# Patient Record
Sex: FEMALE | Race: WHITE | Employment: PART TIME | URBAN - METROPOLITAN AREA
[De-identification: names, ages, dates, MRNs, and addresses within clinical notes are randomized per-mention and may not be internally consistent; named-entity substitution may affect disease eponyms.]

---

## 2017-01-01 ENCOUNTER — RADIANT APPOINTMENT (OUTPATIENT)
Dept: MAMMOGRAPHY | Facility: CLINIC | Age: 62
End: 2017-01-01
Payer: MEDICARE

## 2017-01-01 ENCOUNTER — TELEPHONE (OUTPATIENT)
Dept: PSYCHIATRY | Facility: CLINIC | Age: 62
End: 2017-01-01

## 2017-01-01 ENCOUNTER — ALLIED HEALTH/NURSE VISIT (OUTPATIENT)
Dept: DERMATOLOGY | Facility: CLINIC | Age: 62
End: 2017-01-01
Payer: MEDICARE

## 2017-01-01 ENCOUNTER — TELEPHONE (OUTPATIENT)
Dept: FAMILY MEDICINE | Facility: CLINIC | Age: 62
End: 2017-01-01

## 2017-01-01 ENCOUNTER — OFFICE VISIT (OUTPATIENT)
Dept: PSYCHIATRY | Facility: CLINIC | Age: 62
End: 2017-01-01
Payer: MEDICARE

## 2017-01-01 ENCOUNTER — APPOINTMENT (OUTPATIENT)
Dept: GENERAL RADIOLOGY | Facility: CLINIC | Age: 62
End: 2017-01-01
Attending: EMERGENCY MEDICINE
Payer: MEDICARE

## 2017-01-01 ENCOUNTER — OFFICE VISIT (OUTPATIENT)
Dept: FAMILY MEDICINE | Facility: CLINIC | Age: 62
End: 2017-01-01
Payer: MEDICARE

## 2017-01-01 ENCOUNTER — HOSPITAL ENCOUNTER (OUTPATIENT)
Facility: CLINIC | Age: 62
Setting detail: OBSERVATION
Discharge: HOME | End: 2017-12-20
Attending: EMERGENCY MEDICINE | Admitting: INTERNAL MEDICINE
Payer: MEDICARE

## 2017-01-01 ENCOUNTER — TELEPHONE (OUTPATIENT)
Dept: CARDIOLOGY | Facility: CLINIC | Age: 62
End: 2017-01-01

## 2017-01-01 ENCOUNTER — OFFICE VISIT (OUTPATIENT)
Dept: BEHAVIORAL HEALTH | Facility: CLINIC | Age: 62
End: 2017-01-01
Payer: MEDICARE

## 2017-01-01 ENCOUNTER — PRE VISIT (OUTPATIENT)
Dept: CARDIOLOGY | Facility: CLINIC | Age: 62
End: 2017-01-01

## 2017-01-01 ENCOUNTER — APPOINTMENT (OUTPATIENT)
Dept: NUCLEAR MEDICINE | Facility: CLINIC | Age: 62
End: 2017-01-01
Attending: PHYSICIAN ASSISTANT
Payer: MEDICARE

## 2017-01-01 VITALS
HEART RATE: 90 BPM | OXYGEN SATURATION: 96 % | SYSTOLIC BLOOD PRESSURE: 160 MMHG | DIASTOLIC BLOOD PRESSURE: 81 MMHG | WEIGHT: 220 LBS | BODY MASS INDEX: 34.53 KG/M2 | HEIGHT: 67 IN | TEMPERATURE: 98.2 F | RESPIRATION RATE: 16 BRPM

## 2017-01-01 VITALS
RESPIRATION RATE: 14 BRPM | OXYGEN SATURATION: 93 % | HEIGHT: 67 IN | BODY MASS INDEX: 33.74 KG/M2 | WEIGHT: 215 LBS | SYSTOLIC BLOOD PRESSURE: 118 MMHG | HEART RATE: 99 BPM | TEMPERATURE: 98.2 F | DIASTOLIC BLOOD PRESSURE: 82 MMHG

## 2017-01-01 VITALS
TEMPERATURE: 98.1 F | RESPIRATION RATE: 14 BRPM | HEART RATE: 78 BPM | DIASTOLIC BLOOD PRESSURE: 83 MMHG | WEIGHT: 220 LBS | OXYGEN SATURATION: 78 % | HEIGHT: 67 IN | BODY MASS INDEX: 34.53 KG/M2 | SYSTOLIC BLOOD PRESSURE: 116 MMHG

## 2017-01-01 VITALS
DIASTOLIC BLOOD PRESSURE: 74 MMHG | SYSTOLIC BLOOD PRESSURE: 112 MMHG | RESPIRATION RATE: 14 BRPM | WEIGHT: 200 LBS | TEMPERATURE: 98.3 F | HEIGHT: 67 IN | HEART RATE: 105 BPM | BODY MASS INDEX: 31.39 KG/M2 | OXYGEN SATURATION: 92 %

## 2017-01-01 VITALS — BODY MASS INDEX: 31.17 KG/M2 | OXYGEN SATURATION: 97 % | WEIGHT: 199 LBS | HEART RATE: 106 BPM

## 2017-01-01 VITALS
OXYGEN SATURATION: 99 % | HEART RATE: 82 BPM | TEMPERATURE: 97.9 F | BODY MASS INDEX: 31.39 KG/M2 | HEIGHT: 67 IN | DIASTOLIC BLOOD PRESSURE: 74 MMHG | SYSTOLIC BLOOD PRESSURE: 102 MMHG | WEIGHT: 200 LBS

## 2017-01-01 DIAGNOSIS — F33.3 SEVERE EPISODE OF RECURRENT MAJOR DEPRESSIVE DISORDER, WITH PSYCHOTIC FEATURES (H): ICD-10-CM

## 2017-01-01 DIAGNOSIS — F43.10 PTSD (POST-TRAUMATIC STRESS DISORDER): ICD-10-CM

## 2017-01-01 DIAGNOSIS — F41.1 GAD (GENERALIZED ANXIETY DISORDER): ICD-10-CM

## 2017-01-01 DIAGNOSIS — F31.81 BIPOLAR 2 DISORDER (H): Primary | ICD-10-CM

## 2017-01-01 DIAGNOSIS — R07.9 CHEST PAIN: ICD-10-CM

## 2017-01-01 DIAGNOSIS — M54.59 MECHANICAL LOW BACK PAIN: ICD-10-CM

## 2017-01-01 DIAGNOSIS — M51.379 DEGENERATION OF LUMBAR OR LUMBOSACRAL INTERVERTEBRAL DISC: Primary | ICD-10-CM

## 2017-01-01 DIAGNOSIS — I25.10 CAD (CORONARY ARTERY DISEASE): ICD-10-CM

## 2017-01-01 DIAGNOSIS — Z87.891 PERSONAL HISTORY OF TOBACCO USE, PRESENTING HAZARDS TO HEALTH: ICD-10-CM

## 2017-01-01 DIAGNOSIS — I10 HTN, GOAL BELOW 140/90: ICD-10-CM

## 2017-01-01 DIAGNOSIS — G89.29 OTHER CHRONIC PAIN: ICD-10-CM

## 2017-01-01 DIAGNOSIS — Z87.891 PERSONAL HISTORY OF TOBACCO USE, PRESENTING HAZARDS TO HEALTH: Primary | ICD-10-CM

## 2017-01-01 DIAGNOSIS — Z48.01 ENCOUNTER FOR CHANGE OR REMOVAL OF SURGICAL WOUND DRESSING: Primary | ICD-10-CM

## 2017-01-01 DIAGNOSIS — J43.2 CENTRILOBULAR EMPHYSEMA (H): ICD-10-CM

## 2017-01-01 DIAGNOSIS — R30.0 DYSURIA: Primary | ICD-10-CM

## 2017-01-01 DIAGNOSIS — Z12.31 VISIT FOR SCREENING MAMMOGRAM: ICD-10-CM

## 2017-01-01 DIAGNOSIS — F33.41 MAJOR DEPRESSIVE DISORDER, RECURRENT EPISODE, IN PARTIAL REMISSION (H): ICD-10-CM

## 2017-01-01 DIAGNOSIS — J96.11 CHRONIC RESPIRATORY FAILURE WITH HYPOXIA AND HYPERCAPNIA (H): ICD-10-CM

## 2017-01-01 DIAGNOSIS — F41.1 GAD (GENERALIZED ANXIETY DISORDER): Primary | ICD-10-CM

## 2017-01-01 DIAGNOSIS — F41.1 GENERALIZED ANXIETY DISORDER: ICD-10-CM

## 2017-01-01 DIAGNOSIS — M51.379 DEGENERATION OF LUMBAR OR LUMBOSACRAL INTERVERTEBRAL DISC: ICD-10-CM

## 2017-01-01 DIAGNOSIS — J96.12 CHRONIC RESPIRATORY FAILURE WITH HYPOXIA AND HYPERCAPNIA (H): ICD-10-CM

## 2017-01-01 DIAGNOSIS — F31.81 BIPOLAR 2 DISORDER (H): ICD-10-CM

## 2017-01-01 LAB
ALBUMIN SERPL-MCNC: 3.1 G/DL (ref 3.4–5)
ALP SERPL-CCNC: 103 U/L (ref 40–150)
ALT SERPL W P-5'-P-CCNC: 24 U/L (ref 0–50)
ANION GAP SERPL CALCULATED.3IONS-SCNC: 2 MMOL/L (ref 3–14)
AST SERPL W P-5'-P-CCNC: 22 U/L (ref 0–45)
BASOPHILS # BLD AUTO: 0.1 10E9/L (ref 0–0.2)
BASOPHILS NFR BLD AUTO: 0.9 %
BILIRUB SERPL-MCNC: 0.2 MG/DL (ref 0.2–1.3)
BUN SERPL-MCNC: 8 MG/DL (ref 7–30)
CALCIUM SERPL-MCNC: 8.1 MG/DL (ref 8.5–10.1)
CHLORIDE SERPL-SCNC: 99 MMOL/L (ref 94–109)
CO2 SERPL-SCNC: 39 MMOL/L (ref 20–32)
CREAT SERPL-MCNC: 0.6 MG/DL (ref 0.52–1.04)
D DIMER PPP FEU-MCNC: 0.5 UG/ML FEU (ref 0–0.5)
DIFFERENTIAL METHOD BLD: ABNORMAL
EOSINOPHIL # BLD AUTO: 0.6 10E9/L (ref 0–0.7)
EOSINOPHIL NFR BLD AUTO: 9.3 %
ERYTHROCYTE [DISTWIDTH] IN BLOOD BY AUTOMATED COUNT: 13.2 % (ref 10–15)
GFR SERPL CREATININE-BSD FRML MDRD: >90 ML/MIN/1.7M2
GLUCOSE SERPL-MCNC: 95 MG/DL (ref 70–99)
HCT VFR BLD AUTO: 37.4 % (ref 35–47)
HGB BLD-MCNC: 11.3 G/DL (ref 11.7–15.7)
IMM GRANULOCYTES # BLD: 0 10E9/L (ref 0–0.4)
IMM GRANULOCYTES NFR BLD: 0.5 %
INTERPRETATION ECG - MUSE: NORMAL
LIPASE SERPL-CCNC: 71 U/L (ref 73–393)
LYMPHOCYTES # BLD AUTO: 1.8 10E9/L (ref 0.8–5.3)
LYMPHOCYTES NFR BLD AUTO: 28.9 %
MCH RBC QN AUTO: 29.7 PG (ref 26.5–33)
MCHC RBC AUTO-ENTMCNC: 30.2 G/DL (ref 31.5–36.5)
MCV RBC AUTO: 98 FL (ref 78–100)
MONOCYTES # BLD AUTO: 0.7 10E9/L (ref 0–1.3)
MONOCYTES NFR BLD AUTO: 10.8 %
NEUTROPHILS # BLD AUTO: 3.2 10E9/L (ref 1.6–8.3)
NEUTROPHILS NFR BLD AUTO: 49.6 %
NRBC # BLD AUTO: 0 10*3/UL
NRBC BLD AUTO-RTO: 0 /100
NT-PROBNP SERPL-MCNC: 476 PG/ML (ref 0–900)
PLATELET # BLD AUTO: 188 10E9/L (ref 150–450)
POTASSIUM SERPL-SCNC: 4.4 MMOL/L (ref 3.4–5.3)
PROT SERPL-MCNC: 6.7 G/DL (ref 6.8–8.8)
RBC # BLD AUTO: 3.8 10E12/L (ref 3.8–5.2)
SODIUM SERPL-SCNC: 140 MMOL/L (ref 133–144)
TROPONIN I SERPL-MCNC: <0.015 UG/L (ref 0–0.04)
WBC # BLD AUTO: 6.4 10E9/L (ref 4–11)

## 2017-01-01 PROCEDURE — 99220 ZZC INITIAL OBSERVATION CARE,LEVL III: CPT | Performed by: INTERNAL MEDICINE

## 2017-01-01 PROCEDURE — 99495 TRANSJ CARE MGMT MOD F2F 14D: CPT | Performed by: FAMILY MEDICINE

## 2017-01-01 PROCEDURE — 99285 EMERGENCY DEPT VISIT HI MDM: CPT | Mod: 25

## 2017-01-01 PROCEDURE — 90834 PSYTX W PT 45 MINUTES: CPT | Performed by: SOCIAL WORKER

## 2017-01-01 PROCEDURE — 93018 CV STRESS TEST I&R ONLY: CPT | Performed by: INTERNAL MEDICINE

## 2017-01-01 PROCEDURE — 99214 OFFICE O/P EST MOD 30 MIN: CPT | Performed by: NURSE PRACTITIONER

## 2017-01-01 PROCEDURE — 25000132 ZZH RX MED GY IP 250 OP 250 PS 637: Mod: GY | Performed by: PHYSICIAN ASSISTANT

## 2017-01-01 PROCEDURE — 78452 HT MUSCLE IMAGE SPECT MULT: CPT | Mod: 26 | Performed by: INTERNAL MEDICINE

## 2017-01-01 PROCEDURE — 93005 ELECTROCARDIOGRAM TRACING: CPT

## 2017-01-01 PROCEDURE — 83690 ASSAY OF LIPASE: CPT | Performed by: EMERGENCY MEDICINE

## 2017-01-01 PROCEDURE — 94640 AIRWAY INHALATION TREATMENT: CPT

## 2017-01-01 PROCEDURE — 99214 OFFICE O/P EST MOD 30 MIN: CPT | Performed by: FAMILY MEDICINE

## 2017-01-01 PROCEDURE — 83880 ASSAY OF NATRIURETIC PEPTIDE: CPT | Performed by: EMERGENCY MEDICINE

## 2017-01-01 PROCEDURE — 34300033 ZZH RX 343: Performed by: INTERNAL MEDICINE

## 2017-01-01 PROCEDURE — 25000128 H RX IP 250 OP 636

## 2017-01-01 PROCEDURE — 85379 FIBRIN DEGRADATION QUANT: CPT | Performed by: EMERGENCY MEDICINE

## 2017-01-01 PROCEDURE — 93016 CV STRESS TEST SUPVJ ONLY: CPT | Performed by: INTERNAL MEDICINE

## 2017-01-01 PROCEDURE — A9270 NON-COVERED ITEM OR SERVICE: HCPCS | Mod: GY | Performed by: PHYSICIAN ASSISTANT

## 2017-01-01 PROCEDURE — 93017 CV STRESS TEST TRACING ONLY: CPT

## 2017-01-01 PROCEDURE — 40000275 ZZH STATISTIC RCP TIME EA 10 MIN

## 2017-01-01 PROCEDURE — A9270 NON-COVERED ITEM OR SERVICE: HCPCS | Mod: GY | Performed by: INTERNAL MEDICINE

## 2017-01-01 PROCEDURE — A9502 TC99M TETROFOSMIN: HCPCS | Performed by: INTERNAL MEDICINE

## 2017-01-01 PROCEDURE — 80053 COMPREHEN METABOLIC PANEL: CPT | Performed by: EMERGENCY MEDICINE

## 2017-01-01 PROCEDURE — 99207 ZZC NO CHARGE NURSE ONLY: CPT

## 2017-01-01 PROCEDURE — G0378 HOSPITAL OBSERVATION PER HR: HCPCS

## 2017-01-01 PROCEDURE — 84484 ASSAY OF TROPONIN QUANT: CPT | Mod: 91 | Performed by: INTERNAL MEDICINE

## 2017-01-01 PROCEDURE — 78452 HT MUSCLE IMAGE SPECT MULT: CPT

## 2017-01-01 PROCEDURE — 25000125 ZZHC RX 250: Performed by: EMERGENCY MEDICINE

## 2017-01-01 PROCEDURE — 85025 COMPLETE CBC W/AUTO DIFF WBC: CPT | Performed by: EMERGENCY MEDICINE

## 2017-01-01 PROCEDURE — 25000125 ZZHC RX 250

## 2017-01-01 PROCEDURE — 71010 XR CHEST PORT 1 VW: CPT

## 2017-01-01 PROCEDURE — 36415 COLL VENOUS BLD VENIPUNCTURE: CPT | Performed by: INTERNAL MEDICINE

## 2017-01-01 PROCEDURE — G0202 SCR MAMMO BI INCL CAD: HCPCS | Mod: TC

## 2017-01-01 PROCEDURE — 84484 ASSAY OF TROPONIN QUANT: CPT | Performed by: EMERGENCY MEDICINE

## 2017-01-01 PROCEDURE — 25000132 ZZH RX MED GY IP 250 OP 250 PS 637: Mod: GY | Performed by: INTERNAL MEDICINE

## 2017-01-01 RX ORDER — LAMOTRIGINE 100 MG/1
100 TABLET ORAL 2 TIMES DAILY
Qty: 60 TABLET | Refills: 1 | Status: CANCELLED | OUTPATIENT
Start: 2017-01-01

## 2017-01-01 RX ORDER — AMOXICILLIN 250 MG
2 CAPSULE ORAL 2 TIMES DAILY PRN
Status: DISCONTINUED | OUTPATIENT
Start: 2017-01-01 | End: 2017-01-01 | Stop reason: HOSPADM

## 2017-01-01 RX ORDER — DIAZEPAM 10 MG
10 TABLET ORAL EVERY 12 HOURS PRN
Qty: 60 TABLET | Refills: 1 | OUTPATIENT
Start: 2017-01-01

## 2017-01-01 RX ORDER — GABAPENTIN 300 MG/1
CAPSULE ORAL
Qty: 60 CAPSULE | Refills: 1 | Status: ON HOLD | OUTPATIENT
Start: 2017-01-01 | End: 2017-01-01

## 2017-01-01 RX ORDER — HYDROCODONE BITARTRATE AND ACETAMINOPHEN 5; 325 MG/1; MG/1
1 TABLET ORAL 2 TIMES DAILY PRN
Status: DISCONTINUED | OUTPATIENT
Start: 2017-01-01 | End: 2017-01-01 | Stop reason: HOSPADM

## 2017-01-01 RX ORDER — TIOTROPIUM BROMIDE 18 UG/1
18 CAPSULE ORAL; RESPIRATORY (INHALATION) DAILY
Status: DISCONTINUED | OUTPATIENT
Start: 2017-01-01 | End: 2017-01-01 | Stop reason: HOSPADM

## 2017-01-01 RX ORDER — ISOSORBIDE MONONITRATE 30 MG/1
30 TABLET, EXTENDED RELEASE ORAL DAILY
Status: DISCONTINUED | OUTPATIENT
Start: 2017-01-01 | End: 2017-01-01

## 2017-01-01 RX ORDER — DULOXETIN HYDROCHLORIDE 30 MG/1
120 CAPSULE, DELAYED RELEASE ORAL DAILY
Status: DISCONTINUED | OUTPATIENT
Start: 2017-01-01 | End: 2017-01-01 | Stop reason: HOSPADM

## 2017-01-01 RX ORDER — QUETIAPINE FUMARATE 50 MG/1
50 TABLET, FILM COATED ORAL DAILY
Qty: 90 TABLET | Refills: 1 | Status: SHIPPED | OUTPATIENT
Start: 2017-01-01 | End: 2017-01-01 | Stop reason: ALTCHOICE

## 2017-01-01 RX ORDER — ACETAMINOPHEN 325 MG/1
650 TABLET ORAL EVERY 4 HOURS PRN
Status: DISCONTINUED | OUTPATIENT
Start: 2017-01-01 | End: 2017-01-01 | Stop reason: HOSPADM

## 2017-01-01 RX ORDER — QUETIAPINE FUMARATE 200 MG/1
200 TABLET, FILM COATED ORAL AT BEDTIME
Status: DISCONTINUED | OUTPATIENT
Start: 2017-01-01 | End: 2017-01-01 | Stop reason: HOSPADM

## 2017-01-01 RX ORDER — REGADENOSON 0.08 MG/ML
INJECTION, SOLUTION INTRAVENOUS
Status: COMPLETED
Start: 2017-01-01 | End: 2017-01-01

## 2017-01-01 RX ORDER — HYDROXYZINE HYDROCHLORIDE 25 MG/1
25 TABLET, FILM COATED ORAL EVERY 6 HOURS PRN
Status: DISCONTINUED | OUTPATIENT
Start: 2017-01-01 | End: 2017-01-01 | Stop reason: HOSPADM

## 2017-01-01 RX ORDER — DULOXETIN HYDROCHLORIDE 60 MG/1
120 CAPSULE, DELAYED RELEASE ORAL DAILY
Qty: 180 CAPSULE | Refills: 1 | Status: ON HOLD | OUTPATIENT
Start: 2017-01-01 | End: 2018-01-01

## 2017-01-01 RX ORDER — IBUPROFEN 600 MG/1
600 TABLET, FILM COATED ORAL EVERY 6 HOURS PRN
Status: DISCONTINUED | OUTPATIENT
Start: 2017-01-01 | End: 2017-01-01 | Stop reason: HOSPADM

## 2017-01-01 RX ORDER — NITROGLYCERIN 0.4 MG/1
0.4 TABLET SUBLINGUAL EVERY 5 MIN PRN
Status: DISCONTINUED | OUTPATIENT
Start: 2017-01-01 | End: 2017-01-01 | Stop reason: HOSPADM

## 2017-01-01 RX ORDER — ISOSORBIDE MONONITRATE 30 MG/1
30 TABLET, EXTENDED RELEASE ORAL DAILY
Status: DISCONTINUED | OUTPATIENT
Start: 2017-01-01 | End: 2017-01-01 | Stop reason: HOSPADM

## 2017-01-01 RX ORDER — PROCHLORPERAZINE 25 MG
25 SUPPOSITORY, RECTAL RECTAL EVERY 12 HOURS PRN
Status: DISCONTINUED | OUTPATIENT
Start: 2017-01-01 | End: 2017-01-01 | Stop reason: HOSPADM

## 2017-01-01 RX ORDER — AMOXICILLIN 500 MG/1
500 CAPSULE ORAL 3 TIMES DAILY
Qty: 30 CAPSULE | Refills: 0 | Status: ON HOLD | OUTPATIENT
Start: 2017-01-01 | End: 2017-01-01

## 2017-01-01 RX ORDER — NALOXONE HYDROCHLORIDE 0.4 MG/ML
.1-.4 INJECTION, SOLUTION INTRAMUSCULAR; INTRAVENOUS; SUBCUTANEOUS
Status: DISCONTINUED | OUTPATIENT
Start: 2017-01-01 | End: 2017-01-01 | Stop reason: HOSPADM

## 2017-01-01 RX ORDER — HYDROCODONE BITARTRATE AND ACETAMINOPHEN 5; 325 MG/1; MG/1
TABLET ORAL
Qty: 30 TABLET | Refills: 0 | Status: SHIPPED | OUTPATIENT
Start: 2017-01-01 | End: 2017-01-01

## 2017-01-01 RX ORDER — DIAZEPAM 5 MG
10 TABLET ORAL EVERY 12 HOURS PRN
Status: DISCONTINUED | OUTPATIENT
Start: 2017-01-01 | End: 2017-01-01 | Stop reason: HOSPADM

## 2017-01-01 RX ORDER — ALBUTEROL SULFATE 0.83 MG/ML
2.5 SOLUTION RESPIRATORY (INHALATION)
Status: DISCONTINUED | OUTPATIENT
Start: 2017-01-01 | End: 2017-01-01 | Stop reason: HOSPADM

## 2017-01-01 RX ORDER — BISACODYL 10 MG
10 SUPPOSITORY, RECTAL RECTAL DAILY PRN
Status: DISCONTINUED | OUTPATIENT
Start: 2017-01-01 | End: 2017-01-01 | Stop reason: HOSPADM

## 2017-01-01 RX ORDER — ASPIRIN 81 MG/1
81 TABLET, CHEWABLE ORAL DAILY
Status: DISCONTINUED | OUTPATIENT
Start: 2017-01-01 | End: 2017-01-01 | Stop reason: HOSPADM

## 2017-01-01 RX ORDER — ATORVASTATIN CALCIUM 40 MG/1
40 TABLET, FILM COATED ORAL DAILY
Qty: 90 TABLET | Refills: 0 | Status: SHIPPED | OUTPATIENT
Start: 2017-01-01 | End: 2018-01-01

## 2017-01-01 RX ORDER — AMOXICILLIN 250 MG
1 CAPSULE ORAL 2 TIMES DAILY PRN
Status: DISCONTINUED | OUTPATIENT
Start: 2017-01-01 | End: 2017-01-01 | Stop reason: HOSPADM

## 2017-01-01 RX ORDER — ONDANSETRON 4 MG/1
4 TABLET, ORALLY DISINTEGRATING ORAL EVERY 6 HOURS PRN
Status: DISCONTINUED | OUTPATIENT
Start: 2017-01-01 | End: 2017-01-01 | Stop reason: HOSPADM

## 2017-01-01 RX ORDER — IPRATROPIUM BROMIDE AND ALBUTEROL SULFATE 2.5; .5 MG/3ML; MG/3ML
3 SOLUTION RESPIRATORY (INHALATION) ONCE
Status: COMPLETED | OUTPATIENT
Start: 2017-01-01 | End: 2017-01-01

## 2017-01-01 RX ORDER — HYDROCODONE BITARTRATE AND ACETAMINOPHEN 5; 325 MG/1; MG/1
TABLET ORAL
Qty: 30 TABLET | Refills: 0 | Status: SHIPPED | OUTPATIENT
Start: 2017-01-01 | End: 2018-01-01

## 2017-01-01 RX ORDER — ACETAMINOPHEN 650 MG/1
650 SUPPOSITORY RECTAL EVERY 4 HOURS PRN
Status: DISCONTINUED | OUTPATIENT
Start: 2017-01-01 | End: 2017-01-01 | Stop reason: HOSPADM

## 2017-01-01 RX ORDER — GABAPENTIN 300 MG/1
300 CAPSULE ORAL AT BEDTIME
Status: DISCONTINUED | OUTPATIENT
Start: 2017-01-01 | End: 2017-01-01 | Stop reason: HOSPADM

## 2017-01-01 RX ORDER — QUETIAPINE FUMARATE 200 MG/1
200 TABLET, FILM COATED ORAL AT BEDTIME
Qty: 90 TABLET | Refills: 1 | Status: SHIPPED | OUTPATIENT
Start: 2017-01-01 | End: 2018-01-01

## 2017-01-01 RX ORDER — AMINOPHYLLINE 25 MG/ML
INJECTION, SOLUTION INTRAVENOUS
Status: DISCONTINUED
Start: 2017-01-01 | End: 2017-01-01 | Stop reason: WASHOUT

## 2017-01-01 RX ORDER — PROCHLORPERAZINE MALEATE 5 MG
10 TABLET ORAL EVERY 6 HOURS PRN
Status: DISCONTINUED | OUTPATIENT
Start: 2017-01-01 | End: 2017-01-01 | Stop reason: HOSPADM

## 2017-01-01 RX ORDER — OXYCODONE HYDROCHLORIDE 5 MG/1
5-10 TABLET ORAL
Status: DISCONTINUED | OUTPATIENT
Start: 2017-01-01 | End: 2017-01-01

## 2017-01-01 RX ORDER — DIAZEPAM 10 MG
10 TABLET ORAL EVERY 12 HOURS PRN
Qty: 60 TABLET | Refills: 1 | Status: ON HOLD | OUTPATIENT
Start: 2017-01-01 | End: 2018-01-01

## 2017-01-01 RX ORDER — IPRATROPIUM BROMIDE AND ALBUTEROL SULFATE 2.5; .5 MG/3ML; MG/3ML
SOLUTION RESPIRATORY (INHALATION)
Status: COMPLETED
Start: 2017-01-01 | End: 2017-01-01

## 2017-01-01 RX ORDER — BUPROPION HYDROCHLORIDE 150 MG/1
300 TABLET ORAL EVERY MORNING
Status: DISCONTINUED | OUTPATIENT
Start: 2017-01-01 | End: 2017-01-01 | Stop reason: HOSPADM

## 2017-01-01 RX ORDER — LIDOCAINE 40 MG/G
CREAM TOPICAL
Status: DISCONTINUED | OUTPATIENT
Start: 2017-01-01 | End: 2017-01-01 | Stop reason: HOSPADM

## 2017-01-01 RX ORDER — ONDANSETRON 2 MG/ML
4 INJECTION INTRAMUSCULAR; INTRAVENOUS EVERY 6 HOURS PRN
Status: DISCONTINUED | OUTPATIENT
Start: 2017-01-01 | End: 2017-01-01 | Stop reason: HOSPADM

## 2017-01-01 RX ORDER — POLYETHYLENE GLYCOL 3350 17 G/17G
17 POWDER, FOR SOLUTION ORAL DAILY PRN
Status: DISCONTINUED | OUTPATIENT
Start: 2017-01-01 | End: 2017-01-01 | Stop reason: HOSPADM

## 2017-01-01 RX ORDER — BUPROPION HYDROCHLORIDE 300 MG/1
300 TABLET ORAL EVERY MORNING
Qty: 90 TABLET | Refills: 1 | Status: SHIPPED | OUTPATIENT
Start: 2017-01-01

## 2017-01-01 RX ORDER — ATORVASTATIN CALCIUM 40 MG/1
40 TABLET, FILM COATED ORAL DAILY
Status: DISCONTINUED | OUTPATIENT
Start: 2017-01-01 | End: 2017-01-01 | Stop reason: HOSPADM

## 2017-01-01 RX ADMIN — IPRATROPIUM BROMIDE AND ALBUTEROL SULFATE 3 ML: .5; 3 SOLUTION RESPIRATORY (INHALATION) at 09:10

## 2017-01-01 RX ADMIN — TETROFOSMIN 11 MCI.: 1.38 INJECTION, POWDER, LYOPHILIZED, FOR SOLUTION INTRAVENOUS at 07:28

## 2017-01-01 RX ADMIN — DULOXETINE HYDROCHLORIDE 120 MG: 30 CAPSULE, DELAYED RELEASE ORAL at 13:42

## 2017-01-01 RX ADMIN — ASPIRIN 81 MG 81 MG: 81 TABLET ORAL at 13:42

## 2017-01-01 RX ADMIN — IPRATROPIUM BROMIDE AND ALBUTEROL SULFATE 3 ML: .5; 3 SOLUTION RESPIRATORY (INHALATION) at 23:46

## 2017-01-01 RX ADMIN — TETROFOSMIN 32 MCI.: 1.38 INJECTION, POWDER, LYOPHILIZED, FOR SOLUTION INTRAVENOUS at 09:11

## 2017-01-01 RX ADMIN — ATORVASTATIN CALCIUM 40 MG: 40 TABLET, FILM COATED ORAL at 13:42

## 2017-01-01 RX ADMIN — REGADENOSON 0.4 MG: 0.08 INJECTION, SOLUTION INTRAVENOUS at 09:16

## 2017-01-01 ASSESSMENT — ANXIETY QUESTIONNAIRES
1. FEELING NERVOUS, ANXIOUS, OR ON EDGE: NEARLY EVERY DAY
GAD7 TOTAL SCORE: 20
3. WORRYING TOO MUCH ABOUT DIFFERENT THINGS: NEARLY EVERY DAY
7. FEELING AFRAID AS IF SOMETHING AWFUL MIGHT HAPPEN: NEARLY EVERY DAY
GAD7 TOTAL SCORE: 20
2. NOT BEING ABLE TO STOP OR CONTROL WORRYING: NEARLY EVERY DAY
7. FEELING AFRAID AS IF SOMETHING AWFUL MIGHT HAPPEN: MORE THAN HALF THE DAYS
7. FEELING AFRAID AS IF SOMETHING AWFUL MIGHT HAPPEN: MORE THAN HALF THE DAYS
3. WORRYING TOO MUCH ABOUT DIFFERENT THINGS: NEARLY EVERY DAY
5. BEING SO RESTLESS THAT IT IS HARD TO SIT STILL: NEARLY EVERY DAY
1. FEELING NERVOUS, ANXIOUS, OR ON EDGE: NEARLY EVERY DAY
6. BECOMING EASILY ANNOYED OR IRRITABLE: NEARLY EVERY DAY
5. BEING SO RESTLESS THAT IT IS HARD TO SIT STILL: NEARLY EVERY DAY
6. BECOMING EASILY ANNOYED OR IRRITABLE: NEARLY EVERY DAY
3. WORRYING TOO MUCH ABOUT DIFFERENT THINGS: NEARLY EVERY DAY
GAD7 TOTAL SCORE: 20
2. NOT BEING ABLE TO STOP OR CONTROL WORRYING: NEARLY EVERY DAY
1. FEELING NERVOUS, ANXIOUS, OR ON EDGE: NEARLY EVERY DAY
GAD7 TOTAL SCORE: 21
4. TROUBLE RELAXING: NEARLY EVERY DAY
GAD7 TOTAL SCORE: 21
7. FEELING AFRAID AS IF SOMETHING AWFUL MIGHT HAPPEN: MORE THAN HALF THE DAYS
GAD7 TOTAL SCORE: 20
GAD7 TOTAL SCORE: 20
2. NOT BEING ABLE TO STOP OR CONTROL WORRYING: NEARLY EVERY DAY
GAD7 TOTAL SCORE: 20
6. BECOMING EASILY ANNOYED OR IRRITABLE: NEARLY EVERY DAY
5. BEING SO RESTLESS THAT IT IS HARD TO SIT STILL: NEARLY EVERY DAY
IF YOU CHECKED OFF ANY PROBLEMS ON THIS QUESTIONNAIRE, HOW DIFFICULT HAVE THESE PROBLEMS MADE IT FOR YOU TO DO YOUR WORK, TAKE CARE OF THINGS AT HOME, OR GET ALONG WITH OTHER PEOPLE: VERY DIFFICULT
IF YOU CHECKED OFF ANY PROBLEMS ON THIS QUESTIONNAIRE, HOW DIFFICULT HAVE THESE PROBLEMS MADE IT FOR YOU TO DO YOUR WORK, TAKE CARE OF THINGS AT HOME, OR GET ALONG WITH OTHER PEOPLE: EXTREMELY DIFFICULT

## 2017-01-01 ASSESSMENT — ENCOUNTER SYMPTOMS
FEVER: 0
PALPITATIONS: 0
LIGHT-HEADEDNESS: 1
MYALGIAS: 1
NAUSEA: 0
VOMITING: 0
CHILLS: 0
NUMBNESS: 0

## 2017-01-01 ASSESSMENT — PATIENT HEALTH QUESTIONNAIRE - PHQ9
SUM OF ALL RESPONSES TO PHQ QUESTIONS 1-9: 23
10. IF YOU CHECKED OFF ANY PROBLEMS, HOW DIFFICULT HAVE THESE PROBLEMS MADE IT FOR YOU TO DO YOUR WORK, TAKE CARE OF THINGS AT HOME, OR GET ALONG WITH OTHER PEOPLE: VERY DIFFICULT
SUM OF ALL RESPONSES TO PHQ QUESTIONS 1-9: 23
SUM OF ALL RESPONSES TO PHQ QUESTIONS 1-9: 24
5. POOR APPETITE OR OVEREATING: NEARLY EVERY DAY
5. POOR APPETITE OR OVEREATING: NEARLY EVERY DAY

## 2017-01-05 DIAGNOSIS — G89.29 OTHER CHRONIC PAIN: ICD-10-CM

## 2017-01-05 DIAGNOSIS — M54.59 MECHANICAL LOW BACK PAIN: Primary | ICD-10-CM

## 2017-01-05 NOTE — TELEPHONE ENCOUNTER
Controlled Substance Refill Request for Norco  Problem List Complete:  Yes    Patient is followed by OKSANA ANTONIO for ongoing prescription of pain medication.  All refills should be approved by this provider, or covering partner.    Medication(s): Norco.   Maximum quantity per month: 40  Clinic visit frequency required: Q 3 months     Controlled substance agreement on file: Yes       Date(s): 7/16/15    Pain Clinic evaluation in the past: No       Date(s):  n/a       Location(s):  n/a    DIRE Total Score(s):  No flowsheet data found.    Last [facility] website verification:  6/28/16     checked in past 6 months?  No, route to RN   Ilirue    Georgie Shabazz, Pharmacy [facility]

## 2017-01-06 RX ORDER — HYDROCODONE BITARTRATE AND ACETAMINOPHEN 5; 325 MG/1; MG/1
TABLET ORAL
Qty: 30 TABLET | Refills: 0 | Status: SHIPPED | OUTPATIENT
Start: 2017-01-06 | End: 2017-01-17

## 2017-01-06 NOTE — TELEPHONE ENCOUNTER
updated, routed to  (LAUREN FERNÁNDEZ)  Last refill per  12/20/16  Estelle Haynes, RN, BSN  Message handled by Nurse Triage.

## 2017-01-09 DIAGNOSIS — I25.10 CAD (CORONARY ARTERY DISEASE): Primary | ICD-10-CM

## 2017-01-09 DIAGNOSIS — R07.9 CHEST PAIN: ICD-10-CM

## 2017-01-09 RX ORDER — ISOSORBIDE MONONITRATE 30 MG/1
30 TABLET, EXTENDED RELEASE ORAL DAILY
Qty: 90 TABLET | Refills: 3 | Status: SHIPPED | OUTPATIENT
Start: 2017-01-09 | End: 2018-01-01

## 2017-01-09 RX ORDER — ATORVASTATIN CALCIUM 40 MG/1
40 TABLET, FILM COATED ORAL DAILY
Qty: 90 TABLET | Refills: 3 | Status: SHIPPED | OUTPATIENT
Start: 2017-01-09 | End: 2017-01-01

## 2017-01-10 ENCOUNTER — OFFICE VISIT (OUTPATIENT)
Dept: PSYCHIATRY | Facility: CLINIC | Age: 62
End: 2017-01-10
Payer: MEDICARE

## 2017-01-10 ENCOUNTER — TELEPHONE (OUTPATIENT)
Dept: INTERNAL MEDICINE | Facility: CLINIC | Age: 62
End: 2017-01-10

## 2017-01-10 VITALS
WEIGHT: 190.8 LBS | BODY MASS INDEX: 29.95 KG/M2 | OXYGEN SATURATION: 94 % | SYSTOLIC BLOOD PRESSURE: 96 MMHG | DIASTOLIC BLOOD PRESSURE: 64 MMHG | HEART RATE: 112 BPM | HEIGHT: 67 IN | TEMPERATURE: 98.3 F

## 2017-01-10 DIAGNOSIS — F41.1 GAD (GENERALIZED ANXIETY DISORDER): Primary | ICD-10-CM

## 2017-01-10 DIAGNOSIS — F33.3 SEVERE EPISODE OF RECURRENT MAJOR DEPRESSIVE DISORDER, WITH PSYCHOTIC FEATURES (H): ICD-10-CM

## 2017-01-10 DIAGNOSIS — J43.1 PANLOBULAR EMPHYSEMA (H): Primary | ICD-10-CM

## 2017-01-10 PROCEDURE — 99214 OFFICE O/P EST MOD 30 MIN: CPT | Performed by: NURSE PRACTITIONER

## 2017-01-10 RX ORDER — DULOXETIN HYDROCHLORIDE 60 MG/1
60 CAPSULE, DELAYED RELEASE ORAL DAILY
Qty: 30 CAPSULE | Refills: 1 | Status: SHIPPED | OUTPATIENT
Start: 2017-01-10 | End: 2017-03-09

## 2017-01-10 RX ORDER — QUETIAPINE FUMARATE 50 MG/1
TABLET, FILM COATED ORAL
Qty: 30 TABLET | Refills: 1 | Status: SHIPPED | OUTPATIENT
Start: 2017-01-10 | End: 2017-03-09

## 2017-01-10 RX ORDER — ALBUTEROL SULFATE 90 UG/1
1-2 AEROSOL, METERED RESPIRATORY (INHALATION) EVERY 4 HOURS PRN
Qty: 1 INHALER | Refills: 5 | Status: SHIPPED | OUTPATIENT
Start: 2017-01-10 | End: 2017-09-21

## 2017-01-10 ASSESSMENT — ANXIETY QUESTIONNAIRES
7. FEELING AFRAID AS IF SOMETHING AWFUL MIGHT HAPPEN: SEVERAL DAYS
2. NOT BEING ABLE TO STOP OR CONTROL WORRYING: NEARLY EVERY DAY
1. FEELING NERVOUS, ANXIOUS, OR ON EDGE: NEARLY EVERY DAY
5. BEING SO RESTLESS THAT IT IS HARD TO SIT STILL: MORE THAN HALF THE DAYS
GAD7 TOTAL SCORE: 18
6. BECOMING EASILY ANNOYED OR IRRITABLE: NEARLY EVERY DAY
IF YOU CHECKED OFF ANY PROBLEMS ON THIS QUESTIONNAIRE, HOW DIFFICULT HAVE THESE PROBLEMS MADE IT FOR YOU TO DO YOUR WORK, TAKE CARE OF THINGS AT HOME, OR GET ALONG WITH OTHER PEOPLE: SOMEWHAT DIFFICULT
3. WORRYING TOO MUCH ABOUT DIFFERENT THINGS: NEARLY EVERY DAY

## 2017-01-10 ASSESSMENT — PATIENT HEALTH QUESTIONNAIRE - PHQ9: 5. POOR APPETITE OR OVEREATING: NEARLY EVERY DAY

## 2017-01-10 NOTE — TELEPHONE ENCOUNTER
Kathi saw Leana Patel today and wanted a refill on her inhaler. I called and left a voicemail to call us back. Routed to PCP to advise.  Anila Villegas MA

## 2017-01-10 NOTE — PROGRESS NOTES
"    Outpatient Psychiatric Progress Note    Name: Karen Alex   : 1955  Date: 1/10/2017                         Primary Care Provider: Eros Rebolledo MD  Therapist: Lupis Mcgee - last visit was 2016    PHQ-9 scores:  PHQ-9 SCORE 2016 2016 1/10/2017   Total Score 23 20 23       HUNG-7 scores:  HUNG-7 SCORE 2016 2016 1/10/2017   Total Score 18 16 18       Patient Identification:  Patient is a 61 year old year old,   White American female  who presents for return visit with me.  Patient is currently disabled. Patient attended the session alone. Patient prefers to be called: \"Kathi.\"    Interim History:    I last saw Karen Alex for outpatient psychiatry Return Visit on 10/27/2016.     During that appointment, we Continue Lamictal (lamotrigine) 150 mg by mouth in AM and PM. May increase dose up to 200 mg by mouth in AM and 200 mg by mouth in PM, but due to risk of dose dependent cognitive changes, will wait on this.    Increase Cymbalta (duloxetine) to 60 mg by mouth daily.    Continue Ativan (lorazepam) 1 mg up to 3 times per day per primary care provider .     Current medications include:   Current Outpatient Prescriptions   Medication Sig     atorvastatin (LIPITOR) 40 MG tablet Take 1 tablet (40 mg) by mouth daily     isosorbide mononitrate (IMDUR) 30 MG 24 hr tablet Take 1 tablet (30 mg) by mouth daily     HYDROcodone-acetaminophen (NORCO) 5-325 MG per tablet Take one two times daily as needed     LORazepam (ATIVAN) 1 MG tablet Take 1 tablet (1 mg) by mouth 3 times daily as needed for anxiety     DULoxetine (CYMBALTA) 30 MG EC capsule Take 2 capsules (60 mg) by mouth daily For mood and anxiety and pain. Increased dose.     ibuprofen (ADVIL,MOTRIN) 600 MG tablet Take 1 tablet (600 mg) by mouth every 6 hours as needed for moderate pain     lamoTRIgine (LAMICTAL) 150 MG tablet Take 1 tablet (150 mg) by mouth 2 times daily     fluticasone-salmeterol (ADVAIR) " "250-50 MCG/DOSE diskus inhaler Inhale 1 puff into the lungs 2 times daily     tiotropium (SPIRIVA) 18 MCG inhalation capsule Inhale 1 capsule (18 mcg) into the lungs daily     buPROPion (WELLBUTRIN XL) 300 MG 24 hr tablet Take 1 tablet (300 mg) by mouth every morning     Cholecalciferol (VITAMIN D3) 2000 UNITS CHEW Take 2,000 mg by mouth daily     Docusate Calcium (STOOL SOFTENER PO) Take by mouth daily     albuterol (2.5 MG/3ML) 0.083% nebulizer solution Take 1 vial (2.5 mg) by nebulization 4 times daily as needed     albuterol (ALBUTEROL) 108 (90 BASE) MCG/ACT inhaler Inhale 1-2 puffs into the lungs every 4 hours as needed for shortness of breath / dyspnea     NITROSTAT 0.4 MG SL tablet prn     ASPIRIN PO Take 81 mg by mouth daily     No current facility-administered medications for this visit.       The [place] Prescription Monitoring Program has been reviewed and there are no concerns about diversionary activity for controlled substances at this time.  Receives Ativan (lorazepam) regularly from Primary Care Provider.     Karen ADRIANE Abi reports mood has been: \"still down\" and denies hypomania or cheko.   Anxiety has been: \"about the same\" with no panic   Sleep has been: \"not able to sleep well\". Hard to stay asleep. Rare naps during day. Still hears noises through register and in her living room. No command hallucinations. Does not appear to be responding today.   PHQ9 and GAD7 scores were reviewed today.   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     Hypercholesterolemia      Asthma      Arthritis      generalized     Chronic back pain      low back     Pneumothorax 8/2012     left sided      COPD (chronic obstructive pulmonary disease) (H)      O2 at night     Anxiety      " "Bipolar 2 disorder (H)      Varicose veins      HTN, goal below 140/90 1/29/2015     Hyperlipidaemia       has a past medical history of Cervical dysplasia; BOBBY III (vulvar intraepithelial neoplasia III) (8/2007); Hypercholesterolemia; Asthma; Arthritis; Chronic back pain; Pneumothorax (8/2012); COPD (chronic obstructive pulmonary disease) (H); Anxiety; Bipolar 2 disorder (H); Varicose veins; HTN, goal below 140/90 (1/29/2015); and Hyperlipidaemia. She also has no past medical history of Malignant hyperthermia, Chronic infection, or Diabetes (H).    Social History:  Current Living situation:  [place], [place] with self . Feels safe at home.  Employment: disabled   Current use of drugs or alcohol: Current use of drugs or alcohol: cannabis , cocaine , heroin  and cocaine and heroin was over 30 years ago. Last used marijuana last week. Once per week. \"it relaxes me and helps with my sleep\"     Tobacco use:Quit cigarettes one month ago- positive feedback provided today.  Caffeine:  Yes  2 sodas/day  Recovery Programming Involvement: None    Vital Signs:   BP 96/64 mmHg  Pulse 112  Temp(Src) 98.3  F (36.8  C) (Oral)  Ht 5' 7\" (1.702 m)  Wt 190 lb 12.8 oz (86.546 kg)  BMI 29.88 kg/m2  SpO2 94%  LMP 12/01/2007    Labs:  Most recent laboratory results reviewed and no new labs.    Review of Systems:  10 systems (general, cardiovascular, respiratory, eyes, ENT, endocrine, GI, , M/S, neurological) were reviewed. Most pertinent finding(s) is/are: wears oxygen, chronic pain, right knee sprain few weeks ago, dizziness rarely, no falls, dry skin, no rashes. The remaining systems are all unremarkable.    Mental Status Examination:  Appearance:  awake, alert, appeared as age stated, alert, cooperative, wearing oxygen, wears makeup, pleasant  Attitude:  cooperative  Eye Contact:  limited  Mood:  anxious and sad   Affect:  mood congruent   Speech:  clear, coherent  Psychomotor Behavior:  no evidence of tardive dyskinesia, " dystonia, or tics and physical retardation  Thought Process:  logical  Associations:  no loose associations  Thought Content:  no evidence of suicidal ideation or homicidal ideation, auditory hallucinations present and visual hallucinations present, does not appear to be responding  Oriented to:  time, person, and place  Attention Span and Concentration:  adequate, but notes difficultie  Recent and Remote Memory:  limited. Not formally assessed. Ongoing short term memory concerns. No Amnesia.  Fund of Knowledge: low-normal  Gait and Station: Normal, slightly slowed, history of falls, no assistive devices used  Insight:  fair  Judgment:  fair    Suicide Risk Assessment:  Today Karen Alex reports increased psychosocial distress. In addition, there are notable risk factors for self-harm, including age, anxiety, psychosis and substance abuse. However, risk is mitigated by absence of past attempts, ability to volunteer a safety plan, history of seeking help when needed, future oriented and no access to firearms or weapons. Therefore, based on all available evidence including the factors cited above, Karen Alex does not appear to be at imminent risk for self-harm, does not meet criteria for a 72-hr hold, and therefore remains appropriate for ongoing outpatient level of care.  A thorough assessment of risk factors related to suicide and self-harm have been reviewed and are noted above. The patient convincingly denies suicidality on several occasions. There was no deceit detected, and the patient presented in a manner that was believable.     DSM5 Diagnosis:  295.70 Schizoaffective Disorder Depressive Type Rule out Bipolar Type  300.02 (F41.1) Generalized Anxiety Disorder  309.81 (F43.10) Posttraumatic Stress Disorder Without dissociative symptoms, prolonged  Cluster B personality disorder traits. Rule out Disorder.    Medical comorbidities include:   Patient Active Problem List    Diagnosis Date Noted     Health  Care Home 09/04/2012     Priority: High     Status:  Closed   Care Coordinator:  Korin Wang [phone]  See Letters for [facility] Care Plan  March 16, 2015               Major depression in partial remission (H) 01/19/2016     Priority: Medium     PTSD (post-traumatic stress disorder) 09/22/2015     Priority: Medium     Chronic pain 08/31/2015     Priority: Medium     Patient is followed by OKSANA ANTONIO for ongoing prescription of pain medication.  All refills should be approved by this provider, or covering partner.    Medication(s): Norco.   Maximum quantity per month: 40  Clinic visit frequency required: Q 3 months     Controlled substance agreement on file: Yes       Date(s): 7/16/15    Pain Clinic evaluation in the past: No       Date(s):  n/a       Location(s):  n/a    DIRE Total Score(s):  No flowsheet data found.    Last [facility] website verification: 1/7/16   [email or web address]       HTN, goal below 140/90 01/29/2015     Priority: Medium     Chest pain      Priority: Medium     Acute on chronic respiratory failure with hypoxia (H) 10/26/2014     Priority: Medium     Bipolar 2 disorder (H) 08/21/2013     Priority: Medium     Generalized anxiety disorder 08/21/2013     Priority: Medium     Patient is followed by OKSANA ANTONIO for ongoing prescription of benzodiazepines.  All refills should be approved by this provider, or covering partner.    Medication(s): Lorazepam.   Maximum quantity per month: 90  Clinic visit frequency required: Q 3 months     Controlled substance agreement on file: Yes-7/17/15  Benzodiazepine use reviewed by psychiatry:  No    Last [employer] website verification:  done on 6/24/16   [email or web address]           Psychosis 12/07/2011     Priority: Medium     Problem list name updated by automated process. Provider to review       COPD (chronic obstructive pulmonary disease) (H) 09/01/2011     Priority: Medium     Hyperlipidemia LDL goal <130 10/31/2010      Priority: Medium     Anxiety 12/30/2009     Priority: Medium     Degeneration of lumbar or lumbosacral intervertebral disc 02/23/2005     Priority: Medium     Personal history of tobacco use, presenting hazards to health 02/23/2005     Priority: Medium       Psychosocial & Contextual Factors:  Financial Difficulties and Relationship Difficulties and Parent/Child Relationship Difficulties    Assessment:  Karen Alex reports limited improvement in mood and anxiety. Psychosis and insomnia continues to be a problem. She is taking Ativan (lorazepam) daily, scheduled 3 times per day and does not find it helpful. Will likely need to taper her off of these medications.  Medication side effects and alternatives were reviewed. Controlled Substance Agreement should remain in place with Primary Care Provider. If she continues to use Marijuana, I question continuing to prescribe benzodiazepines.  Risks and benefits of medication reviewed today. The Black Box Warning for use of benzodiazepines was reviewed today. Patients taking opioids with benzodiazepines, other CNS depressant medicines, or alcohol, and caregivers of these patients, should seek medical attention immediately if they or someone they are caring for experiences symptoms of unusual dizziness or lightheadedness, extreme sleepiness, slowed or difficult breathing, or unresponsiveness.  May increase dose of Lamictal (lamotrigine) up to 200 mg by mouth in AM and 200 mg by mouth in PM, but due to risk of dose dependent cognitive changes, will wait on this. Strongly recommend continued work with therapist due to ongoing psychosocial stressors that affect her mood.     Treatment Plan:    Continue Lamictal (lamotrigine) 150 mg by mouth in AM and PM.    Continue Cymbalta (duloxetine) to 60 mg by mouth daily.    Continue Wellbutrin (buproprion)  mg by mouth daily in AM.     Start Seroquel (quetiapine) 25 mg by mouth daily at bedtime. After 1-2 weeks, may increase  to 50 mg by mouth at bedtime. For insomnia, anxiety, mood, and psychosis.     Continue Ativan (lorazepam) 1 mg up to 3 times per day per primary care provider.    Continue all other medications as reviewed per electronic medical record today.     All questions addressed. Education and counseling completed regarding risks and benefits of medications and psychotherapy options.    Safety plan was reviewed. To the Emergency Department as needed or call after hours crisis line at [phone] or [phone].     Continue individual/group therapy as planned with Lupis Mcgee.     Schedule an appointment with me in 4-6 weeks or sooner as needed. Call [facility] at [phone] to schedule.    Follow up with primary care provider as planned or for acute medical concerns.    Call the psychiatric nurse line with medication questions or concerns at [phone].    My Practice Policy was reviewed and signed: YES     MyChart may be used to communicate with your provider, but this is not intended to be used for emergencies.    Administrative Billing:   Time spent with patient was 30 minutes and greater than 50% of time or 20 minutes was spent in counseling and coordination of care.    Signed:   Leana Patel, PhD, APRN, CNP   Psychiatry

## 2017-01-10 NOTE — PATIENT INSTRUCTIONS
Treatment Plan:    Continue Lamictal (lamotrigine) 150 mg by mouth in AM and PM.    Continue Cymbalta (duloxetine) to 60 mg by mouth daily.    Continue Wellbutrin (buproprion)  mg by mouth daily in AM.     Start Seroquel (quetiapine) 25 mg by mouth daily at bedtime.    Continue Ativan (lorazepam) 1 mg up to 3 times per day per primary care provider.    Continue all other medications as reviewed per electronic medical record today.     All questions addressed. Education and counseling completed regarding risks and benefits of medications and psychotherapy options.    Safety plan was reviewed. To the Emergency Department as needed or call after hours crisis line at [phone] or [phone].     Continue individual/group therapy as planned with Lupis Mcgee.     Schedule an appointment with me in 4-6 weeks or sooner as needed. Call [facility] at [phone] to schedule.    Follow up with primary care provider as planned or for acute medical concerns.    Call the psychiatric nurse line with medication questions or concerns at [phone].    My Practice Policy was reviewed and signed: YES     MyChart may be used to communicate with your provider, but this is not intended to be used for emergencies.

## 2017-01-10 NOTE — MR AVS SNAPSHOT
After Visit Summary   1/10/2017    Karen Alex    MRN: [id number]           Patient Information     Date Of Birth          1955        Visit Information        Provider Department      1/10/2017 12:45 PM Leana Patel NP [facility]        Today's Diagnoses     HUNG (generalized anxiety disorder)    -  1     Major depressive disorder, recurrent episode, in partial remission (H)           Care Instructions    Treatment Plan:    Continue Lamictal (lamotrigine) 150 mg by mouth in AM and PM.    Continue Cymbalta (duloxetine) to 60 mg by mouth daily.    Continue Wellbutrin (buproprion)  mg by mouth daily in AM.     Start Seroquel (quetiapine) 25 mg by mouth daily at bedtime.    Continue Ativan (lorazepam) 1 mg up to 3 times per day per primary care provider.    Continue all other medications as reviewed per electronic medical record today.     All questions addressed. Education and counseling completed regarding risks and benefits of medications and psychotherapy options.    Safety plan was reviewed. To the Emergency Department as needed or call after hours crisis line at [phone] or [phone].     Continue individual/group therapy as planned with Lupis Mcgee.     Schedule an appointment with me in 4-6 weeks or sooner as needed. Call [facility] at [phone] to schedule.    Follow up with primary care provider as planned or for acute medical concerns.    Call the psychiatric nurse line with medication questions or concerns at [phone].    My Practice Policy was reviewed and signed: YES     MyChart may be used to communicate with your provider, but this is not intended to be used for emergencies.        Follow-ups after your visit        Your next 10 appointments already scheduled     Jan 25, 2017  1:30 PM   Return Visit with MAURY Lemos   [facility] ([facility])    [address] [id number] [phone]              Who to contact     If you have questions or need follow up information about today's clinic visit or your schedule please contact [facility] directly at [phone].  Normal or non-critical lab and imaging results will be communicated to you by MyChart, letter or phone within 4 business days after the clinic has received the results. If you do not hear from us within 7 days, please contact the clinic through [employer] or phone. If you have a critical or abnormal lab result, we will notify you by phone as soon as possible.  Submit refill requests through [employer] or call your pharmacy and they will forward the refill request to us. Please allow 3 business days for your refill to be completed.          Additional Information About Your Visit        [employer] Information     [employer] gives you secure access to your electronic health record. If you see a primary care provider, you can also send messages to your care team and make appointments. If you have questions, please call your primary care clinic.  If you do not have a primary care provider, please call [phone] and they will assist you.        Care EveryWhere ID     This is your Care EveryWhere ID. This could be used by other organizations to access your [facility] medical records  [id number]        Your Vitals Were     Pulse Temperature Height BMI (Body Mass Index) Pulse Oximetry Last Period    112 98.3  F (36.8  C) (Oral) 5' 7\" (1.702 m) 29.88 kg/m2 94% 12/01/2007       Blood Pressure from Last 3 Encounters:   01/10/17 96/64   11/18/16 118/88   11/04/16 106/70    Weight from Last 3 Encounters:   01/10/17 190 lb 12.8 oz (86.546 kg)   11/18/16 181 lb (82.101 kg)   11/04/16 181 lb 12.8 oz (82.464 kg)              Today, you had the following     No orders found for display         Today's Medication Changes          These changes are accurate as of: 1/10/17  1:10 PM.  If you " have any questions, ask your nurse or doctor.               Start taking these medicines.        Dose/Directions    QUEtiapine 50 MG tablet   Commonly known as:  SEROQUEL   Used for:  HUNG (generalized anxiety disorder)   Started by:  Leana Patel NP        Start 1/2 tablet by mouth at bedtime for 1-2 week. Then may increase to 1 tablet by mouth at bedtime.   Quantity:  30 tablet   Refills:  1         These medicines have changed or have updated prescriptions.        Dose/Directions    DULoxetine 60 MG EC capsule   Commonly known as:  CYMBALTA   This may have changed:    - medication strength  - additional instructions   Used for:  Major depressive disorder, recurrent episode, in partial remission (H)   Changed by:  Leana Patel NP        Dose:  60 mg   Take 1 capsule (60 mg) by mouth daily For mood and anxiety and pain.   Quantity:  30 capsule   Refills:  1            Where to get your medicines      These medications were sent to [facility] [address] [address]     Phone:  [phone]    - DULoxetine 60 MG EC capsule  - QUEtiapine 50 MG tablet             Primary Care Provider Office Phone # Fax #    Eros Rebolledo [phone]       [address] [address]        Thank you!     Thank you for choosing [facility]  for your care. Our goal is always to provide you with excellent care. Hearing back from our patients is one way we can continue to improve our services. Please take a few minutes to complete the written survey that you may receive in the mail after your visit with us. Thank you!             Your Updated Medication List - Protect others around you: Learn how to safely use, store and throw away your medicines at www.disposemymeds.org.          This list is accurate as of: 1/10/17  1:10 PM.  Always use your most recent med list.                    Brand Name Dispense Instructions for use    * albuterol 108 (90 BASE) MCG/ACT Inhaler    albuterol    1 Inhaler    Inhale 1-2 puffs into the lungs every 4 hours as needed for shortness of breath / dyspnea       * albuterol (2.5 MG/3ML) 0.083% neb solution     120 vial    Take 1 vial (2.5 mg) by nebulization 4 times daily as needed       ASPIRIN PO      Take 81 mg by mouth daily       atorvastatin 40 MG tablet    LIPITOR    90 tablet    Take 1 tablet (40 mg) by mouth daily       buPROPion 300 MG 24 hr tablet    WELLBUTRIN XL    30 tablet    Take 1 tablet (300 mg) by mouth every morning       DULoxetine 60 MG EC capsule    CYMBALTA    30 capsule    Take 1 capsule (60 mg) by mouth daily For mood and anxiety and pain.       fluticasone-salmeterol 250-50 MCG/DOSE diskus inhaler    ADVAIR    3 Inhaler    Inhale 1 puff into the lungs 2 times daily       HYDROcodone-acetaminophen 5-325 MG per tablet    NORCO    30 tablet    Take one two times daily as needed       ibuprofen 600 MG tablet    ADVIL/MOTRIN    20 tablet    Take 1 tablet (600 mg) by mouth every 6 hours as needed for moderate pain       isosorbide mononitrate 30 MG 24 hr tablet    IMDUR    90 tablet    Take 1 tablet (30 mg) by mouth daily       lamoTRIgine 150 MG tablet    LAMICTAL    60 tablet    Take 1 tablet (150 mg) by mouth 2 times daily       LORazepam 1 MG tablet    ATIVAN    90 tablet    Take 1 tablet (1 mg) by mouth 3 times daily as needed for anxiety       NITROSTAT 0.4 MG sublingual tablet   Generic drug:  nitroglycerin      prn       QUEtiapine 50 MG tablet    SEROQUEL    30 tablet    Start 1/2 tablet by mouth at bedtime for 1-2 week. Then may increase to 1 tablet by mouth at bedtime.       STOOL SOFTENER PO      Take by mouth daily       tiotropium 18 MCG capsule    SPIRIVA    90 capsule    Inhale 1 capsule (18 mcg) into the lungs daily       Vitamin D3 2000 UNITS Chew      Take 2,000 mg by mouth daily       * Notice:  This list has 2  medication(s) that are the same as other medications prescribed for you. Read the directions carefully, and ask your doctor or other care provider to review them with you.

## 2017-01-10 NOTE — NURSING NOTE
"Chief Complaint   Patient presents with     RECHECK     3 months       Initial BP 96/64 mmHg  Pulse 112  Temp(Src) 98.3  F (36.8  C) (Oral)  Ht 5' 7\" (1.702 m)  Wt 190 lb 12.8 oz (86.546 kg)  BMI 29.88 kg/m2  SpO2 94%  LMP 12/01/2007 Estimated body mass index is 29.88 kg/(m^2) as calculated from the following:    Height as of this encounter: 5' 7\" (1.702 m).    Weight as of this encounter: 190 lb 12.8 oz (86.546 kg).  BP completed using cuff size: umm Villegas MA    "

## 2017-01-11 ASSESSMENT — ANXIETY QUESTIONNAIRES: GAD7 TOTAL SCORE: 18

## 2017-01-11 ASSESSMENT — PATIENT HEALTH QUESTIONNAIRE - PHQ9: SUM OF ALL RESPONSES TO PHQ QUESTIONS 1-9: 23

## 2017-01-17 DIAGNOSIS — J44.9 CHRONIC OBSTRUCTIVE PULMONARY DISEASE, UNSPECIFIED COPD TYPE (H): Primary | ICD-10-CM

## 2017-01-17 DIAGNOSIS — M54.59 MECHANICAL LOW BACK PAIN: ICD-10-CM

## 2017-01-17 DIAGNOSIS — F41.9 ANXIETY: Primary | ICD-10-CM

## 2017-01-17 RX ORDER — LORAZEPAM 1 MG/1
1 TABLET ORAL 3 TIMES DAILY PRN
Qty: 90 TABLET | Refills: 0 | Status: SHIPPED | OUTPATIENT
Start: 2017-01-17 | End: 2017-02-15

## 2017-01-17 RX ORDER — HYDROCODONE BITARTRATE AND ACETAMINOPHEN 5; 325 MG/1; MG/1
TABLET ORAL
Qty: 30 TABLET | Refills: 0 | Status: SHIPPED | OUTPATIENT
Start: 2017-01-17 | End: 2017-02-15

## 2017-01-17 NOTE — TELEPHONE ENCOUNTER
Controlled Substance Refill Request for Norco  Problem List Complete:  Yes    Patient is followed by OKSANA ANTONIO for ongoing prescription of pain medication.  All refills should be approved by this provider, or covering partner.    Medication(s): Norco.   Maximum quantity per month: 40  Clinic visit frequency required: Q 3 months     Controlled substance agreement on file: Yes       Date(s): 7/16/15    Pain Clinic evaluation in the past: No       Date(s):  n/a       Location(s):  n/a    DIRE Total Score(s):  No flowsheet data found.    Last [facility] website verification: 1/7/16     checked in past 6 months?  Yes 1/7/16     Last office visit: 10/21/16 pt due to be seen every 3 months so pt is due to be seen soon    Georgie Shabazz, Pharmacy [facility]

## 2017-01-17 NOTE — TELEPHONE ENCOUNTER
Controlled Substance Refill Request for Lorazepam  Problem List Complete:  Yes    Patient is followed by OKSANA ANTONIO for ongoing prescription of benzodiazepines.  All refills should be approved by this provider, or covering partner.    Medication(s): Lorazepam.   Maximum quantity per month: 90  Clinic visit frequency required: Q 3 months     Controlled substance agreement on file: Yes-7/17/15  Benzodiazepine use reviewed by psychiatry:  No    Last [facility] website verification:  done on 6/24/16     checked in past 6 months?  Yes 1/6/17     Last office visit: 10/21/16    Georgie Shabazz, Pharmacy [facility]

## 2017-01-25 ENCOUNTER — OFFICE VISIT (OUTPATIENT)
Dept: BEHAVIORAL HEALTH | Facility: CLINIC | Age: 62
End: 2017-01-25
Payer: MEDICARE

## 2017-01-25 DIAGNOSIS — F33.41 RECURRENT MAJOR DEPRESSIVE DISORDER, IN PARTIAL REMISSION (H): Primary | ICD-10-CM

## 2017-01-25 DIAGNOSIS — F43.10 PTSD (POST-TRAUMATIC STRESS DISORDER): ICD-10-CM

## 2017-01-25 DIAGNOSIS — F41.1 GAD (GENERALIZED ANXIETY DISORDER): ICD-10-CM

## 2017-01-25 PROCEDURE — 90834 PSYTX W PT 45 MINUTES: CPT | Performed by: SOCIAL WORKER

## 2017-01-25 ASSESSMENT — ANXIETY QUESTIONNAIRES
7. FEELING AFRAID AS IF SOMETHING AWFUL MIGHT HAPPEN: NEARLY EVERY DAY
1. FEELING NERVOUS, ANXIOUS, OR ON EDGE: NEARLY EVERY DAY
GAD7 TOTAL SCORE: 21
6. BECOMING EASILY ANNOYED OR IRRITABLE: NEARLY EVERY DAY
IF YOU CHECKED OFF ANY PROBLEMS ON THIS QUESTIONNAIRE, HOW DIFFICULT HAVE THESE PROBLEMS MADE IT FOR YOU TO DO YOUR WORK, TAKE CARE OF THINGS AT HOME, OR GET ALONG WITH OTHER PEOPLE: SOMEWHAT DIFFICULT
5. BEING SO RESTLESS THAT IT IS HARD TO SIT STILL: NEARLY EVERY DAY
2. NOT BEING ABLE TO STOP OR CONTROL WORRYING: NEARLY EVERY DAY
3. WORRYING TOO MUCH ABOUT DIFFERENT THINGS: NEARLY EVERY DAY

## 2017-01-25 ASSESSMENT — PATIENT HEALTH QUESTIONNAIRE - PHQ9: 5. POOR APPETITE OR OVEREATING: NEARLY EVERY DAY

## 2017-01-25 NOTE — PROGRESS NOTES
Progress Note    Client Name: Karen Alex  Date: 1/25/2017                                  Service Type: Individual      Session Start Time:  1:30  Session End Time: 2:30      Session Length: 45 - 50     Session #: 17     Attendees: Client attended alone    Treatment Plan Last Completed Date: 10/4/16  PHQ-9 / HUNG-7 Last Completed Date: 1/25/2017                 DATA      Progress Since Last Session (Related to Symptoms / Goals / Homework):   Symptoms: Stable    Homework: Achieved / completed to satisfaction- did talk to her bf, laying down limits      Episode of Care Goals: Minimal progress - ACTION (Actively working towards change); Intervened by reinforcing change plan / affirming steps taken     Current / Ongoing Stressors and Concerns:   Client's mood is more depressed.  Worried about her son, he is leaving for [place] next week.  It has increased her symptoms and memories of past. She has been focusing on her ex-husbands, the lack of a relationship with her older son, and more irritability with her partner.  We discussed that this increase in symptoms and presentation was similar to what happened when she thought her son was going to [facility].  Encouraged her to use her skills to remain in th present and acknowledge her sadness about him leaving.  She game planned what she will try to work on for social contact while he is gone.      Treatment Objective(s) Addressed in This Session:   focus on being socially active and not isolating at home due to medical condition   Using acceptance skills to understand her level of depression and what she has control over       Intervention:   CBT: Encourage client to focus on present and goals for future        ASSESSMENT: Current Emotional / Mental Status (status of significant symptoms):   Risk status (Self / Other harm or suicidal ideation)  Client denies a history of suicidal ideation, suicide attempts, self-injurious  behavior, homicidal ideation, homicidal behavior and and other safety concerns   Client denies current fears or concerns for personal safety.   Client reports the following current or recent suicidal ideation or behaviors: passive thoughts of not wanting to live.   Client denies current or recent homicidal ideation or behaviors.   Client denies current or recent self injurious behavior or ideation.   Client denies other safety concerns.   A safety and risk management plan has not been developed at this time, however client was given the after-hours number should there be a change in any of these risk factors.     Appearance:   Appropriate    Eye Contact:   Fair    Psychomotor Behavior: Normal    Attitude:   Cooperative    Orientation:   All   Speech    Rate / Production: Normal     Volume:  Soft    Mood:    Anxious  Depressed    Affect:    Labile    Thought Content:  Hallucinations  Notices spiders and auditory hallucinations of a party going on    Thought Form:  Ruminations about past and her losses    Insight:    Fair      Medication Review:   No changes to current psychiatric medication(s)     Medication Compliance:   Yes     Changes in Health Issues:   Yes: Respiratory Disease, Associated Psychological Distress     Chemical Use Review:   Substance Use: Chemical use reviewed, no active concerns identified      Tobacco Use: No current tobacco use.         Collateral Reports Completed:   Not Applicable    PLAN: (Client Tasks / Therapist Tasks / Other)  1. Focus on recognizing her feelings of sadness and use skills to work with them.     Lupis Mcgee, [facility]     __________________________________________________________________________________________________                                                           Treatment Plan    Client's Name: Karen Alex  YOB: 1955    Date: 9/22/2015       DSM-V Diagnoses: 296.32 - Major Depressive Disorder, Recurrent, Moderate    300.02 -  Generalized Anxiety Disorder    309.81 - Posttraumatic Stress Disorder By History; 799.9 - Deferred Diagnosis   WHODAS:  35    Referral / Collaboration:  Referral to another professional/service is not indicated at this time..    Anticipated number of session or this episode of care: 12      MeasurableTreatment Goal(s) related to diagnosis / functional impairment(s)  Goal 1: Client will have stability with her mental health as evidenced by PHQ-9 and HUNG-7 scores as well as self-report.      I will know I've met my goal when I start feeling better about myself.      Objective #A (Client Action)    Status: Continued - Date(s): 10/4/2016         Client will Decrease frequency and intensity of feeling down, depressed, hopeless.    Intervention(s)  Therapist will assign homework by learning to build awareness of her triggers which activate her mental health symptoms.  Therapist will introduce and have client implement strategies to address triggers.    Objective #B  Client will Identify negative self-talk and behaviors: challenge core beliefs, myths, and actions.  Status: Continued - Date(s): 10/4/2016        Intervention(s)  Therapist will continue to help client identify and reframe negative perceptions of self.  Work on ways to increase self-esteem.    Objective #C  Client will Feel less tired and more energy during the day .  Status: Completed - Date: 3/2/16     Intervention(s)  Therapist will assign homework 1.  Complete at least one household activity daily; 2.  Focus on getting out of the house at least three times weekly; 3.  Look into entering social groups.      Client has reviewed and agreed to the above plan.      Lupis Mcgee, [facility]  September 22, 2015

## 2017-01-26 ASSESSMENT — ANXIETY QUESTIONNAIRES: GAD7 TOTAL SCORE: 21

## 2017-01-26 ASSESSMENT — PATIENT HEALTH QUESTIONNAIRE - PHQ9: SUM OF ALL RESPONSES TO PHQ QUESTIONS 1-9: 22

## 2017-02-01 ENCOUNTER — TELEPHONE (OUTPATIENT)
Dept: FAMILY MEDICINE | Facility: CLINIC | Age: 62
End: 2017-02-01

## 2017-02-01 NOTE — TELEPHONE ENCOUNTER
Fax from [facility] review, pt late on filling escitalopram and bupropion, no action needed  Estelle Haynes RN, BSN  Message handled by Nurse Triage.

## 2017-02-14 ENCOUNTER — OFFICE VISIT (OUTPATIENT)
Dept: BEHAVIORAL HEALTH | Facility: CLINIC | Age: 62
End: 2017-02-14
Payer: MEDICARE

## 2017-02-14 DIAGNOSIS — F41.1 GAD (GENERALIZED ANXIETY DISORDER): ICD-10-CM

## 2017-02-14 DIAGNOSIS — F33.3 SEVERE EPISODE OF RECURRENT MAJOR DEPRESSIVE DISORDER, WITH PSYCHOTIC FEATURES (H): Primary | ICD-10-CM

## 2017-02-14 DIAGNOSIS — F43.10 PTSD (POST-TRAUMATIC STRESS DISORDER): ICD-10-CM

## 2017-02-14 PROCEDURE — 90834 PSYTX W PT 45 MINUTES: CPT | Performed by: SOCIAL WORKER

## 2017-02-14 ASSESSMENT — ANXIETY QUESTIONNAIRES
2. NOT BEING ABLE TO STOP OR CONTROL WORRYING: NEARLY EVERY DAY
5. BEING SO RESTLESS THAT IT IS HARD TO SIT STILL: NEARLY EVERY DAY
6. BECOMING EASILY ANNOYED OR IRRITABLE: NEARLY EVERY DAY
IF YOU CHECKED OFF ANY PROBLEMS ON THIS QUESTIONNAIRE, HOW DIFFICULT HAVE THESE PROBLEMS MADE IT FOR YOU TO DO YOUR WORK, TAKE CARE OF THINGS AT HOME, OR GET ALONG WITH OTHER PEOPLE: VERY DIFFICULT
1. FEELING NERVOUS, ANXIOUS, OR ON EDGE: NEARLY EVERY DAY
GAD7 TOTAL SCORE: 20
3. WORRYING TOO MUCH ABOUT DIFFERENT THINGS: NEARLY EVERY DAY
7. FEELING AFRAID AS IF SOMETHING AWFUL MIGHT HAPPEN: MORE THAN HALF THE DAYS

## 2017-02-14 ASSESSMENT — PATIENT HEALTH QUESTIONNAIRE - PHQ9: 5. POOR APPETITE OR OVEREATING: NEARLY EVERY DAY

## 2017-02-14 NOTE — MR AVS SNAPSHOT
MRN:[id number]                      After Visit Summary   2/14/2017    Karen Alex    MRN: [id number]           Visit Information        Provider Department      2/14/2017 10:30 AM Lupis Mcgee LICSW [facility]      Your next 10 appointments already scheduled     Mar 14, 2017 10:30 AM CDT   Return Visit with MAURY Lemos   [facility] ([facility]    [address] [place] [id number] [phone]              MyChart Information     Fingerprint gives you secure access to your electronic health record. If you see a primary care provider, you can also send messages to your care team and make appointments. If you have questions, please call your primary care clinic.  If you do not have a primary care provider, please call [phone] and they will assist you.        Care EveryWhere ID     This is your Care EveryWhere ID. This could be used by other organizations to access your [facility] medical records  [id number]

## 2017-02-14 NOTE — PROGRESS NOTES
Progress Note    Client Name: Karen Alex  Date: 2/14/2017                              Service Type: Individual      Session Start Time:  10:30  Session End Time: 11:15      Session Length: 45 - 50     Session #: 18     Attendees: Client attended alone    Treatment Plan Last Completed Date: 2/14/17  PHQ-9 / HUNG-7 Last Completed Date: 2/14/2017                  DATA      Progress Since Last Session (Related to Symptoms / Goals / Homework):   Symptoms: Stable    Homework: Achieved / completed to satisfaction- managing with out her son present      Episode of Care Goals: Minimal progress - ACTION (Actively working towards change); Intervened by reinforcing change plan / affirming steps taken     Current / Ongoing Stressors and Concerns:   Client's mood is stable.  Son left for [place]- has been gone for last two weeks.  She states that she has been talking to him everyday.  She is hoping to have him home by end of the week.  Processed through stressors with boyfriend, health, and her family.        Treatment Objective(s) Addressed in This Session:   focus on being socially active and not isolating at home due to medical condition   Using acceptance skills to understand her level of depression and what she has control over       Intervention:   CBT: Encourage client to focus on present and goals for future        ASSESSMENT: Current Emotional / Mental Status (status of significant symptoms):   Risk status (Self / Other harm or suicidal ideation)  Client denies a history of suicidal ideation, suicide attempts, self-injurious behavior, homicidal ideation, homicidal behavior and and other safety concerns   Client denies current fears or concerns for personal safety.   Client reports the following current or recent suicidal ideation or behaviors: passive thoughts of not wanting to live.  Denies any plan or intent.  Voices are not directing her.   Client denies current or  recent homicidal ideation or behaviors.   Client denies current or recent self injurious behavior or ideation.   Client denies other safety concerns.   A safety and risk management plan has not been developed at this time, however client was given the after-hours number should there be a change in any of these risk factors.     Appearance:   Appropriate    Eye Contact:   Fair    Psychomotor Behavior: Normal    Attitude:   Cooperative    Orientation:   All   Speech    Rate / Production: Normal     Volume:  Soft    Mood:    Anxious  Depressed    Affect:    Labile    Thought Content:  Hallucinations  notices spiders and orbs    Thought Form:  Ruminations about past and her losses    Insight:    Fair      Medication Review:   No changes to current psychiatric medication(s)     Medication Compliance:   Yes     Changes in Health Issues:   Yes: Respiratory Disease, Associated Psychological Distress     Chemical Use Review:   Substance Use: Chemical use reviewed, no active concerns identified      Tobacco Use: No current tobacco use.         Collateral Reports Completed:   Not Applicable    PLAN: (Client Tasks / Therapist Tasks / Other)  1. Recognize when the past is intruding and refocus back on present.     Lupis Mcgee, [facility]     __________________________________________________________________________________________________                                                           Treatment Plan    Client's Name: Karen Alex  YOB: 1955    Date: 9/22/2015       DSM-V Diagnoses: 296.32 - Major Depressive Disorder, Recurrent, Moderate    300.02 - Generalized Anxiety Disorder    309.81 - Posttraumatic Stress Disorder By History; 799.9 - Deferred Diagnosis   WHODAS:  35    Referral / Collaboration:  Referral to another professional/service is not indicated at this time..    Anticipated number of session or this episode of care: 12      MeasurableTreatment Goal(s) related to diagnosis /  functional impairment(s)  Goal 1: Client will have stability with her mental health as evidenced by PHQ-9 and HUNG-7 scores as well as self-report.      I will know I've met my goal when I start feeling better about myself.      Objective #A (Client Action)    Status: Continued - Date(s): 2/14/2017          Client will Decrease frequency and intensity of feeling down, depressed, hopeless.    Intervention(s)  Therapist will assign homework by learning to build awareness of her triggers which activate her mental health symptoms.  Therapist will introduce and have client implement strategies to address triggers.    Objective #B  Client will Identify negative self-talk and behaviors: challenge core beliefs, myths, and actions.  Status: Continued - Date(s): 2/14/2017        Intervention(s)  Therapist will continue to help client identify and reframe negative perceptions of self.  Work on ways to increase self-esteem.    Objective #C  Client will Feel less tired and more energy during the day .  Status: Completed - Date: 3/2/16     Intervention(s)  Therapist will assign homework 1.  Complete at least one household activity daily; 2.  Focus on getting out of the house at least three times weekly; 3.  Look into entering social groups.      Client has reviewed and agreed to the above plan.      Lupis Mcgee, [facility]  September 22, 2015

## 2017-02-15 DIAGNOSIS — F41.9 ANXIETY: ICD-10-CM

## 2017-02-15 DIAGNOSIS — M54.59 MECHANICAL LOW BACK PAIN: ICD-10-CM

## 2017-02-15 ASSESSMENT — ANXIETY QUESTIONNAIRES: GAD7 TOTAL SCORE: 20

## 2017-02-15 ASSESSMENT — PATIENT HEALTH QUESTIONNAIRE - PHQ9: SUM OF ALL RESPONSES TO PHQ QUESTIONS 1-9: 22

## 2017-02-15 NOTE — TELEPHONE ENCOUNTER
Controlled Substance Refill Request for Lorazepam  Problem List Complete:  Yes    Patient is followed by OKSANA ANTONIO for ongoing prescription of benzodiazepines.  All refills should be approved by this provider, or covering partner.    Medication(s): Lorazepam.   Maximum quantity per month: 90  Clinic visit frequency required: Q 3 months     Controlled substance agreement on file: Yes-7/17/15  Benzodiazepine use reviewed by psychiatry:  No    Last [facility] website verification:  done on 6/24/16   checked in past 6 months?  No, route to RN

## 2017-02-15 NOTE — TELEPHONE ENCOUNTER
Controlled Substance Refill Request for Norco  Problem List Complete:  Yes    Patient is followed by OKSANA ANTONIO for ongoing prescription of pain medication.  All refills should be approved by this provider, or covering partner.    Medication(s): Norco.   Maximum quantity per month: 40  Clinic visit frequency required: Q 3 months     Controlled substance agreement on file: Yes       Date(s): 7/16/15    Pain Clinic evaluation in the past: No       Date(s):  n/a       Location(s):  n/a    DIRE Total Score(s):  No flowsheet data found.    Last [facility] website verification: 1/7/16     checked in past 6 months?  No, route to RN  Ilirue    Georgie Shabazz, Pharmacy [facility]

## 2017-02-16 NOTE — TELEPHONE ENCOUNTER
Norco      Last Written Prescription Date:  1/17/2017  Last Fill Quantity: 30,   # refills: 0  Last Office Visit with [facility], [facility] or [facility] prescribing provider: 10/21/2016  Future Office visit:    Next 5 appointments (look out 90 days)     Mar 14, 2017 10:30 AM CDT   Return Visit with Lupis Mcgee [facility] ([address] [address]   [phone]                   Routing refill request to provider for review/approval because:  Drug not on the [facility], [facility] or [facility] refill protocol or controlled substance      Lorazepam      Last Written Prescription Date: 1/17/2017  Last Fill Quantity: 90,   # refills: 0  Last Office Visit with [facility], [facility] or [facility] prescribing provider: 10/21/2016  Future Office visit:    Next 5 appointments (look out 90 days)     Mar 14, 2017 10:30 AM CDT   Return Visit with Lupis Mcgee [facility] ([address] [address]   [phone]                   Routing refill request to provider for review/approval because:  Drug not on the [facility], [facility] or [facility] refill protocol or controlled substance      RX [place] ([place]) reviewed:  reviewed- no concerns    Last fill:     Norco: 1/20/2017, #15 and 1/6/2017, #15    Ativan: 1/18/2017, #30 and 12/20/2016, #30    [place]:  [email or web address]    Ally Verdin, MANUELA, BSN, PHN

## 2017-02-17 RX ORDER — LORAZEPAM 1 MG/1
1 TABLET ORAL 3 TIMES DAILY PRN
Qty: 90 TABLET | Refills: 0 | Status: SHIPPED | OUTPATIENT
Start: 2017-02-17 | End: 2017-03-15

## 2017-02-17 RX ORDER — HYDROCODONE BITARTRATE AND ACETAMINOPHEN 5; 325 MG/1; MG/1
TABLET ORAL
Qty: 30 TABLET | Refills: 0 | Status: SHIPPED | OUTPATIENT
Start: 2017-02-17 | End: 2017-03-15

## 2017-03-09 ENCOUNTER — OFFICE VISIT (OUTPATIENT)
Dept: PSYCHIATRY | Facility: CLINIC | Age: 62
End: 2017-03-09
Payer: MEDICARE

## 2017-03-09 VITALS
OXYGEN SATURATION: 98 % | SYSTOLIC BLOOD PRESSURE: 100 MMHG | BODY MASS INDEX: 29.66 KG/M2 | HEIGHT: 67 IN | DIASTOLIC BLOOD PRESSURE: 60 MMHG | HEART RATE: 91 BPM | TEMPERATURE: 98.1 F | WEIGHT: 189 LBS

## 2017-03-09 DIAGNOSIS — F41.1 GAD (GENERALIZED ANXIETY DISORDER): ICD-10-CM

## 2017-03-09 DIAGNOSIS — F31.81 BIPOLAR 2 DISORDER (H): ICD-10-CM

## 2017-03-09 DIAGNOSIS — F33.41 MAJOR DEPRESSIVE DISORDER, RECURRENT EPISODE, IN PARTIAL REMISSION (H): ICD-10-CM

## 2017-03-09 PROCEDURE — 99214 OFFICE O/P EST MOD 30 MIN: CPT | Performed by: NURSE PRACTITIONER

## 2017-03-09 RX ORDER — BUPROPION HYDROCHLORIDE 300 MG/1
300 TABLET ORAL EVERY MORNING
Qty: 30 TABLET | Refills: 1 | Status: SHIPPED | OUTPATIENT
Start: 2017-03-09 | End: 2017-05-08

## 2017-03-09 RX ORDER — QUETIAPINE FUMARATE 50 MG/1
50 TABLET, FILM COATED ORAL DAILY
Qty: 30 TABLET | Refills: 1 | Status: SHIPPED | OUTPATIENT
Start: 2017-03-09 | End: 2017-05-08

## 2017-03-09 RX ORDER — DULOXETIN HYDROCHLORIDE 30 MG/1
30 CAPSULE, DELAYED RELEASE ORAL DAILY
Qty: 30 CAPSULE | Refills: 1 | Status: SHIPPED | OUTPATIENT
Start: 2017-03-09 | End: 2017-05-08

## 2017-03-09 RX ORDER — LAMOTRIGINE 150 MG/1
150 TABLET ORAL 2 TIMES DAILY
Qty: 60 TABLET | Refills: 1 | Status: SHIPPED | OUTPATIENT
Start: 2017-03-09 | End: 2017-06-06

## 2017-03-09 RX ORDER — DULOXETIN HYDROCHLORIDE 60 MG/1
60 CAPSULE, DELAYED RELEASE ORAL DAILY
Qty: 30 CAPSULE | Refills: 1 | Status: SHIPPED | OUTPATIENT
Start: 2017-03-09 | End: 2017-05-08

## 2017-03-09 ASSESSMENT — ANXIETY QUESTIONNAIRES
GAD7 TOTAL SCORE: 19
2. NOT BEING ABLE TO STOP OR CONTROL WORRYING: NEARLY EVERY DAY
7. FEELING AFRAID AS IF SOMETHING AWFUL MIGHT HAPPEN: MORE THAN HALF THE DAYS
3. WORRYING TOO MUCH ABOUT DIFFERENT THINGS: NEARLY EVERY DAY
IF YOU CHECKED OFF ANY PROBLEMS ON THIS QUESTIONNAIRE, HOW DIFFICULT HAVE THESE PROBLEMS MADE IT FOR YOU TO DO YOUR WORK, TAKE CARE OF THINGS AT HOME, OR GET ALONG WITH OTHER PEOPLE: SOMEWHAT DIFFICULT
1. FEELING NERVOUS, ANXIOUS, OR ON EDGE: NEARLY EVERY DAY
6. BECOMING EASILY ANNOYED OR IRRITABLE: NEARLY EVERY DAY
5. BEING SO RESTLESS THAT IT IS HARD TO SIT STILL: MORE THAN HALF THE DAYS

## 2017-03-09 ASSESSMENT — PATIENT HEALTH QUESTIONNAIRE - PHQ9: 5. POOR APPETITE OR OVEREATING: NEARLY EVERY DAY

## 2017-03-09 NOTE — PATIENT INSTRUCTIONS
Treatment Plan:    Continue Lamictal (lamotrigine) 150 mg by mouth in AM and PM.    Increase Cymbalta (duloxetine) to 90 mg by mouth daily for depression and anxiety.     Continue Wellbutrin (buproprion)  mg by mouth daily in AM.     Continue Seroquel (quetiapine) 50 mg by mouth daily at bedtime.     Continue Ativan (lorazepam) 1 mg up to 3 times per day as needed for panic only per primary care provider.    Continue all other medications as reviewed per electronic medical record today.     All questions addressed. Education and counseling completed regarding risks and benefits of medications and psychotherapy options.    Safety plan was reviewed. To the Emergency Department as needed or call after hours crisis line at [phone] or [phone].     Continue individual/group therapy as planned with Lupis Mcgee.     Schedule an appointment with me in 4-6 weeks or sooner as needed. Call [facility] at [phone] to schedule.    Follow up with primary care provider as planned or for acute medical concerns.    Call the psychiatric nurse line with medication questions or concerns at [phone].    My Practice Policy was reviewed and signed: YES     MyChart may be used to communicate with your provider, but this is not intended to be used for emergencies.

## 2017-03-09 NOTE — MR AVS SNAPSHOT
After Visit Summary   3/9/2017    Karen Alex    MRN: [id number]           Patient Information     Date Of Birth          1955        Visit Information        Provider Department      3/9/2017 3:45 PM Leana Patel NP [facility]        Today's Diagnoses     HUNG (generalized anxiety disorder)        Bipolar 2 disorder (H)        Major depressive disorder, recurrent episode, in partial remission (H)          Care Instructions    Treatment Plan:    Continue Lamictal (lamotrigine) 150 mg by mouth in AM and PM.    Increase Cymbalta (duloxetine) to 90 mg by mouth daily for depression and anxiety.     Continue Wellbutrin (buproprion)  mg by mouth daily in AM.     Continue Seroquel (quetiapine) 50 mg by mouth daily at bedtime.     Continue Ativan (lorazepam) 1 mg up to 3 times per day as needed for panic only per primary care provider.    Continue all other medications as reviewed per electronic medical record today.     All questions addressed. Education and counseling completed regarding risks and benefits of medications and psychotherapy options.    Safety plan was reviewed. To the Emergency Department as needed or call after hours crisis line at [phone] or [phone].     Continue individual/group therapy as planned with Lupis Mcgee.     Schedule an appointment with me in 4-6 weeks or sooner as needed. Call [facility] at [phone] to schedule.    Follow up with primary care provider as planned or for acute medical concerns.    Call the psychiatric nurse line with medication questions or concerns at [phone].    My Practice Policy was reviewed and signed: YES     MyChart may be used to communicate with your provider, but this is not intended to be used for emergencies.        Follow-ups after your visit        Follow-up notes from your care team     Return in about 4 weeks (around 4/6/2017).      Your next 10 appointments already scheduled  "    Mar 14, 2017 10:30 AM CDT   Return Visit with MAURY Lemos   [facility] ([facility])    [address] [address] [phone]              Who to contact     If you have questions or need follow up information about today's clinic visit or your schedule please contact [facility] directly at [phone].  Normal or non-critical lab and imaging results will be communicated to you by [employer], letter or phone within 4 business days after the clinic has received the results. If you do not hear from us within 7 days, please contact the clinic through [employer] or phone. If you have a critical or abnormal lab result, we will notify you by phone as soon as possible.  Submit refill requests through [employer] or call your pharmacy and they will forward the refill request to us. Please allow 3 business days for your refill to be completed.          Additional Information About Your Visit        [employer] Information     [employer] gives you secure access to your electronic health record. If you see a primary care provider, you can also send messages to your care team and make appointments. If you have questions, please call your primary care clinic.  If you do not have a primary care provider, please call [phone] and they will assist you.        Care EveryWhere ID     This is your Care EveryWhere ID. This could be used by other organizations to access your [place] medical records  [id number]        Your Vitals Were     Pulse Temperature Height Last Period Pulse Oximetry BMI (Body Mass Index)    91 98.1  F (36.7  C) (Oral) 5' 7\" (1.702 m) 12/01/2007 98% 29.6 kg/m2       Blood Pressure from Last 3 Encounters:   03/09/17 100/60   01/10/17 96/64   11/18/16 118/88    Weight from Last 3 Encounters:   03/09/17 189 lb (85.7 kg)   01/10/17 190 lb 12.8 oz (86.5 kg)   11/18/16 181 lb (82.1 kg)              Today, you had " the following     No orders found for display         Today's Medication Changes          These changes are accurate as of: 3/9/17  4:22 PM.  If you have any questions, ask your nurse or doctor.               These medicines have changed or have updated prescriptions.        Dose/Directions    * DULoxetine 60 MG EC capsule   Commonly known as:  CYMBALTA   This may have changed:  additional instructions   Used for:  HUNG (generalized anxiety disorder), Major depressive disorder, recurrent episode, in partial remission (H)   Changed by:  Leana Patel NP        Dose:  60 mg   Take 1 capsule (60 mg) by mouth daily For mood and anxiety and pain. Increased dose. Take with 30 mg capsule.   Quantity:  30 capsule   Refills:  1       * DULoxetine 30 MG EC capsule   Commonly known as:  CYMBALTA   This may have changed:  You were already taking a medication with the same name, and this prescription was added. Make sure you understand how and when to take each.   Used for:  HUNG (generalized anxiety disorder)   Changed by:  Leana Patel NP        Dose:  30 mg   Take 1 capsule (30 mg) by mouth daily . Increased dose. Take with 60 mg capsules. For anxiety, depression, pain.   Quantity:  30 capsule   Refills:  1       QUEtiapine 50 MG tablet   Commonly known as:  SEROQUEL   This may have changed:    - how much to take  - how to take this  - when to take this  - additional instructions   Used for:  HUNG (generalized anxiety disorder)   Changed by:  Leana Patel NP        Dose:  50 mg   Take 1 tablet (50 mg) by mouth daily   Quantity:  30 tablet   Refills:  1       * Notice:  This list has 2 medication(s) that are the same as other medications prescribed for you. Read the directions carefully, and ask your doctor or other care provider to review them with you.         Where to get your medicines      These medications were sent [facility] [address]  [address] [facility]  [facility] [address]     Phone:  [phone]     buPROPion 300 MG 24 hr tablet    DULoxetine 30 MG EC capsule    DULoxetine 60 MG EC capsule    lamoTRIgine 150 MG tablet    QUEtiapine 50 MG tablet                Primary Care Provider Office Phone # Fax #    Eros Rebolledo [phone]       [facility] [address]  [facility] [address]        Thank you!     Thank you for choosing [facility]  for your care. Our goal is always to provide you with excellent care. Hearing back from our patients is one way we can continue to improve our services. Please take a few minutes to complete the written survey that you may receive in the mail after your visit with us. Thank you!             Your Updated Medication List - Protect others around you: Learn how to safely use, store and throw away your medicines at www.disposemymeds.org.          This list is accurate as of: 3/9/17  4:22 PM.  Always use your most recent med list.                   Brand Name Dispense Instructions for use    * albuterol (2.5 MG/3ML) 0.083% neb solution     120 vial    Take 1 vial (2.5 mg) by nebulization 4 times daily as needed       * albuterol 108 (90 BASE) MCG/ACT Inhaler    albuterol    1 Inhaler    Inhale 1-2 puffs into the lungs every 4 hours as needed for shortness of breath / dyspnea       ASPIRIN PO      Take 81 mg by mouth daily       atorvastatin 40 MG tablet    LIPITOR    90 tablet    Take 1 tablet (40 mg) by mouth daily       buPROPion 300 MG 24 hr tablet    WELLBUTRIN XL    30 tablet    Take 1 tablet (300 mg) by mouth every morning       * DULoxetine 60 MG EC capsule    CYMBALTA    30 capsule    Take 1 capsule (60 mg) by mouth daily For mood and anxiety and pain. Increased dose. Take with 30 mg capsule.       * DULoxetine 30 MG EC capsule    CYMBALTA    30 capsule    Take 1 capsule (30 mg) by mouth daily . Increased dose. Take with 60 mg capsules. For anxiety, depression, pain.        fluticasone-salmeterol 250-50 MCG/DOSE diskus inhaler    ADVAIR    3 Inhaler    Inhale 1 puff into the lungs 2 times daily       HYDROcodone-acetaminophen 5-325 MG per tablet    NORCO    30 tablet    Take one two times daily as needed       ibuprofen 600 MG tablet    ADVIL/MOTRIN    20 tablet    Take 1 tablet (600 mg) by mouth every 6 hours as needed for moderate pain       isosorbide mononitrate 30 MG 24 hr tablet    IMDUR    90 tablet    Take 1 tablet (30 mg) by mouth daily       lamoTRIgine 150 MG tablet    LAMICTAL    60 tablet    Take 1 tablet (150 mg) by mouth 2 times daily       LORazepam 1 MG tablet    ATIVAN    90 tablet    Take 1 tablet (1 mg) by mouth 3 times daily as needed for anxiety       NITROSTAT 0.4 MG sublingual tablet   Generic drug:  nitroglycerin      prn       QUEtiapine 50 MG tablet    SEROQUEL    30 tablet    Take 1 tablet (50 mg) by mouth daily       STOOL SOFTENER PO      Take by mouth daily       tiotropium 18 MCG capsule    SPIRIVA    90 capsule    Inhale 1 capsule (18 mcg) into the lungs daily       Vitamin D3 2000 UNITS Chew      Take 2,000 mg by mouth daily       * Notice:  This list has 4 medication(s) that are the same as other medications prescribed for you. Read the directions carefully, and ask your doctor or other care provider to review them with you.

## 2017-03-09 NOTE — PROGRESS NOTES
"    Outpatient Psychiatric Progress Note    Name: Karen Alex   : 1955                    Primary Care Provider: Eros Rebolledo MD - last visit 10/21/16  Therapist: Lupis Mcgee - last visit 2017    PHQ-9 scores:  PHQ-9 SCORE 2017 2017 3/9/2017   Total Score 22 22 22       HUNG-7 scores:  HUNG-7 SCORE 2017 2017 3/9/2017   Total Score 21 20 19       Patient Identification:  Patient is a 61 year old year old,   White American female  who presents for return visit with me.  Patient is currently disabled. Patient attended the session alone. Patient prefers to be called: \"Kathi\"    Interim History:    I last saw Karen Alex for outpatient psychiatry Return Visit on 1/10/2017.     During that appointment, we Continue Lamictal (lamotrigine) 150 mg by mouth in AM and PM.    Continue Cymbalta (duloxetine) to 60 mg by mouth daily.    Continue Wellbutrin (buproprion)  mg by mouth daily in AM.     Start Seroquel (quetiapine) 25 mg by mouth daily at bedtime. After 1-2 weeks, may increase to 50 mg by mouth at bedtime. For insomnia, anxiety, mood, and psychosis.     Continue Ativan (lorazepam) 1 mg up to 3 times per day per primary care provider.     Current medications include:   Current Outpatient Prescriptions   Medication Sig     HYDROcodone-acetaminophen (NORCO) 5-325 MG per tablet Take one two times daily as needed     LORazepam (ATIVAN) 1 MG tablet Take 1 tablet (1 mg) by mouth 3 times daily as needed for anxiety     QUEtiapine (SEROQUEL) 50 MG tablet Start 1/2 tablet by mouth at bedtime for 1-2 week. Then may increase to 1 tablet by mouth at bedtime.     DULoxetine (CYMBALTA) 60 MG EC capsule Take 1 capsule (60 mg) by mouth daily For mood and anxiety and pain.     albuterol (ALBUTEROL) 108 (90 BASE) MCG/ACT Inhaler Inhale 1-2 puffs into the lungs every 4 hours as needed for shortness of breath / dyspnea     atorvastatin (LIPITOR) 40 MG tablet Take 1 tablet (40 " mg) by mouth daily     isosorbide mononitrate (IMDUR) 30 MG 24 hr tablet Take 1 tablet (30 mg) by mouth daily     ibuprofen (ADVIL,MOTRIN) 600 MG tablet Take 1 tablet (600 mg) by mouth every 6 hours as needed for moderate pain     lamoTRIgine (LAMICTAL) 150 MG tablet Take 1 tablet (150 mg) by mouth 2 times daily     fluticasone-salmeterol (ADVAIR) 250-50 MCG/DOSE diskus inhaler Inhale 1 puff into the lungs 2 times daily     tiotropium (SPIRIVA) 18 MCG inhalation capsule Inhale 1 capsule (18 mcg) into the lungs daily     buPROPion (WELLBUTRIN XL) 300 MG 24 hr tablet Take 1 tablet (300 mg) by mouth every morning     Cholecalciferol (VITAMIN D3) 2000 UNITS CHEW Take 2,000 mg by mouth daily     Docusate Calcium (STOOL SOFTENER PO) Take by mouth daily     albuterol (2.5 MG/3ML) 0.083% nebulizer solution Take 1 vial (2.5 mg) by nebulization 4 times daily as needed     NITROSTAT 0.4 MG SL tablet prn     ASPIRIN PO Take 81 mg by mouth daily     No current facility-administered medications for this visit.        The [place] Prescription Monitoring Program has been reviewed and there are no concerns about diversionary activity for controlled substances at this time.      LORI MONTILLA  Search Criteria: Last Name 'lori' and First Name 'chalino' and  = ' and Request Period = '16' to  ' - 6 out of 6 Recipients Selected.    Fill Date Product, Str, Form Qty Days Pt ID Prescriber Written RX# N/R* Pharm MED+  ---------- -------------------------------- ------ ---- --------- ---------- ---------- ------------ ----- --------- ------  2017 LORAZEPAM 1 MG TABLET 90.00 30 [id number] 2017 [id number] N [id number] 00.0  2017 HYDROCODON-ACETAMINOPHEN 5-325 30.00 15 [id number] 2017 [id number] N [id number] 10.0  2017 HYDROCODON-ACETAMINOPHEN 5-325 30.00 15 [id number] 2017 [id number] N [id number] 10.0  2017 LORAZEPAM 1 MG TABLET 90.00 30 [id number] 2017  "[id number] N [id number] 00.0  01/06/2017 HYDROCODON-ACETAMINOPHEN 5-325 30.00 15 [id number] 01/06/2017 [id number] N [id number] 10.0       I was able to review most recent Primary Care Provider, specialty provider, and therapy visit notes.   Karen Alex reports mood has been: \"not great\" ongoing depression most of the days  Anxiety has been: \"still high\"  Takes Ativan (lorazepam) up to 2 times per day. However, she has been filling consistently per [place] .  Sleep has been: \"okay\" No nightmares.   PHQ9 and GAD7 scores were reviewed today.   Medication side effects: Denies  Current stressors include: About the same. 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 She also has no past medical history of Chronic infection; Diabetes (H); or Malignant hyperthermia.    Social History:  Current Living situation:  [place], [place] with self . Feels safe at home.  Employment: disabled   Current use of drugs or alcohol: Current use of drugs or alcohol: cannabis , cocaine , heroin  and cocaine and heroin was over 30 years ago. Last used marijuana last week. Once per week. \"it relaxes me and " "helps with my sleep\"     Tobacco use:Quit cigarettes one month ago- positive feedback provided today. Relapsed, and quit again 3 days ago.   Caffeine:  Yes  2 sodas/day  Recovery Programming Involvement: None    Vital Signs:   /60 (BP Location: Right arm, Patient Position: Chair, Cuff Size: Adult Regular)  Pulse 91  Temp 98.1  F (36.7  C) (Oral)  Ht 5' 7\" (1.702 m)  Wt 189 lb (85.7 kg)  LMP 12/01/2007  SpO2 98%  BMI 29.6 kg/m2    Labs:  Most recent laboratory results reviewed and no new labs.    Review of Systems:  10 systems (general, cardiovascular, respiratory, eyes, ENT, endocrine, GI, , M/S, neurological) were reviewed. Most pertinent finding(s) is/are:  most recent fall in January slipping on ice, no rashes. The remaining systems are all unremarkable.    Mental Status Examination:  Appearance:  awake, alert, appeared as age stated, alert, cooperative, wearing oxygen, wears makeup, pleasant  Attitude:  cooperative  Eye Contact:  limited  Mood:  anxious and sad   Affect:  mood congruent   Speech:  clear, coherent  Psychomotor Behavior:  no evidence of tardive dyskinesia, dystonia, or tics and physical retardation  Thought Process:  logical  Associations:  no loose associations  Thought Content:  no evidence of suicidal ideation or homicidal ideation, auditory hallucinations present and visual hallucinations present, does not appear to be responding  Oriented to:  time, person, and place  Attention Span and Concentration:  adequate, but notes difficultie  Recent and Remote Memory:  reported as \"so so\". Not formally assessed. Ongoing short term memory concerns. No Amnesia.  Fund of Knowledge: low-normal  Gait and Station: Normal, slightly slowed, history of falls, no assistive devices used  Insight:  fair  Judgment:  fair    Suicide Risk Assessment:  Today Karen Alex reports increased psychosocial distress. In addition, there are notable risk factors for self-harm, including age, anxiety, psychosis " and substance abuse. However, risk is mitigated by absence of past attempts, ability to volunteer a safety plan, history of seeking help when needed, future oriented and no access to firearms or weapons. Therefore, based on all available evidence including the factors cited above, Karen Alex does not appear to be at imminent risk for self-harm, does not meet criteria for a 72-hr hold, and therefore remains appropriate for ongoing outpatient level of care.  A thorough assessment of risk factors related to suicide and self-harm have been reviewed and are noted above. The patient convincingly denies suicidality on several occasions. There was no deceit detected, and the patient presented in a manner that was believable.     DSM5 Diagnosis:  295.70 Schizoaffective Disorder Depressive Type Rule out Bipolar Type  300.02 (F41.1) Generalized Anxiety Disorder  309.81 (F43.10) Posttraumatic Stress Disorder Without dissociative symptoms, prolonged  Cluster B personality disorder traits. Rule out Disorder.    Medical comorbidities include:   Patient Active Problem List    Diagnosis Date Noted     Health Care Home 09/04/2012     Priority: High     Status:  Closed   Care Coordinator:  Korin Wang [phone]  See Letters for [facility] Care Plan  March 16, 2015               HUNG (generalized anxiety disorder) 01/10/2017     Priority: Medium     Severe episode of recurrent major depressive disorder, with psychotic features (H) 01/10/2017     Priority: Medium     Major depression in partial remission (H) 01/19/2016     Priority: Medium     PTSD (post-traumatic stress disorder) 09/22/2015     Priority: Medium     Chronic pain 08/31/2015     Priority: Medium     Patient is followed by OKSANA ANTONIO for ongoing prescription of pain medication.  All refills should be approved by this provider, or covering partner.    Medication(s): Norco.   Maximum quantity per month: 40  Clinic visit frequency required: Q 3 months      Controlled substance agreement on file: Yes       Date(s): 7/16/15    Pain Clinic evaluation in the past: No       Date(s):  n/a       Location(s):  n/a    DIRE Total Score(s):  No flowsheet data found.    Last [facility] website verification: 1/7/16   [email or web address]       HTN, goal below 140/90 01/29/2015     Priority: Medium     Chest pain      Priority: Medium     Acute on chronic respiratory failure with hypoxia (H) 10/26/2014     Priority: Medium     Bipolar 2 disorder (H) 08/21/2013     Priority: Medium     Generalized anxiety disorder 08/21/2013     Priority: Medium     Patient is followed by OKSANA ANTONIO for ongoing prescription of benzodiazepines.  All refills should be approved by this provider, or covering partner.    Medication(s): Lorazepam.   Maximum quantity per month: 90  Clinic visit frequency required: Q 3 months     Controlled substance agreement on file: Yes-7/17/15  Benzodiazepine use reviewed by psychiatry:  No    Last [facility] website verification:  done on 6/24/16   [email or web address]           Psychosis 12/07/2011     Priority: Medium     Problem list name updated by automated process. Provider to review       COPD (chronic obstructive pulmonary disease) (H) 09/01/2011     Priority: Medium     Hyperlipidemia LDL goal <130 10/31/2010     Priority: Medium     Anxiety 12/30/2009     Priority: Medium     Degeneration of lumbar or lumbosacral intervertebral disc 02/23/2005     Priority: Medium     Personal history of tobacco use, presenting hazards to health 02/23/2005     Priority: Medium       Psychosocial & Contextual Factors:  Financial Difficulties and Relationship Difficulties and Parent/Child Relationship Difficulties    Assessment:  Karen Alex reports ongoing anxiety and depression. Psychosocial stressors continue to affect her mood and anxiety. Still continues to struggle with relationship with son. She has tried to reach out, but he has written back.  Encouraged to continue therapy work. Medication side effects and alternatives were reviewed. Health promotion activities recommended and reviewed today.     May increase dose of Lamictal (lamotrigine) up to 200 mg by mouth in AM and 200 mg by mouth in PM, but due to risk of dose dependent cognitive changes, will wait on this. We will work on maximizing her Cymbalta (duloxetine).     Discussed Ativan (lorazepam) taking only as needed as I do not recommend long term use of this medication. She was going to try to do this over the next few weeks. She finds limited improvement when she takes this medication.     Treatment Plan:    Continue Lamictal (lamotrigine) 150 mg by mouth in AM and PM.    Increase Cymbalta (duloxetine) to 90 mg by mouth daily for depression and anxiety.     Continue Wellbutrin (buproprion)  mg by mouth daily in AM.     Continue Seroquel (quetiapine) 50 mg by mouth daily at bedtime.     Continue Ativan (lorazepam) 1 mg up to 3 times per day as needed for panic only per primary care provider.    Continue all other medications as reviewed per electronic medical record today.     All questions addressed. Education and counseling completed regarding risks and benefits of medications and psychotherapy options.    Safety plan was reviewed. To the Emergency Department as needed or call after hours crisis line at [phone] or [phone].     Continue individual/group therapy as planned with Lupis Mcgee.     Schedule an appointment with me in 4-6 weeks or sooner as needed. Call [facility] at [phone] to schedule.    Follow up with primary care provider as planned or for acute medical concerns.    Call the psychiatric nurse line with medication questions or concerns at [phone].    My Practice Policy was reviewed and signed: YES     MyChart may be used to communicate with your provider, but this is not intended to be used for emergencies.    Administrative Billing:    Time spent with patient was 30 minutes and greater than 50% of time or 20 minutes was spent in counseling and coordination of care.    Patient Status:  Patient will continue to be seen for ongoing consultation and stabilization.    Signed:   Leana Patel, PhD, APRN, CNP   Psychiatry

## 2017-03-09 NOTE — NURSING NOTE
"Chief Complaint   Patient presents with     RECHECK       Initial /60 (BP Location: Right arm, Patient Position: Chair, Cuff Size: Adult Regular)  Pulse 91  Temp 98.1  F (36.7  C) (Oral)  Ht 5' 7\" (1.702 m)  Wt 189 lb (85.7 kg)  LMP 12/01/2007  SpO2 98%  BMI 29.6 kg/m2 Estimated body mass index is 29.6 kg/(m^2) as calculated from the following:    Height as of this encounter: 5' 7\" (1.702 m).    Weight as of this encounter: 189 lb (85.7 kg).  Medication Reconciliation: complete    "

## 2017-03-10 ASSESSMENT — PATIENT HEALTH QUESTIONNAIRE - PHQ9: SUM OF ALL RESPONSES TO PHQ QUESTIONS 1-9: 22

## 2017-03-10 ASSESSMENT — ANXIETY QUESTIONNAIRES: GAD7 TOTAL SCORE: 19

## 2017-03-14 ENCOUNTER — OFFICE VISIT (OUTPATIENT)
Dept: BEHAVIORAL HEALTH | Facility: CLINIC | Age: 62
End: 2017-03-14
Payer: MEDICARE

## 2017-03-14 DIAGNOSIS — F43.10 PTSD (POST-TRAUMATIC STRESS DISORDER): ICD-10-CM

## 2017-03-14 DIAGNOSIS — F41.1 GAD (GENERALIZED ANXIETY DISORDER): ICD-10-CM

## 2017-03-14 DIAGNOSIS — F33.41 RECURRENT MAJOR DEPRESSIVE DISORDER, IN PARTIAL REMISSION (H): Primary | ICD-10-CM

## 2017-03-14 PROCEDURE — 90834 PSYTX W PT 45 MINUTES: CPT | Performed by: SOCIAL WORKER

## 2017-03-14 ASSESSMENT — ANXIETY QUESTIONNAIRES
7. FEELING AFRAID AS IF SOMETHING AWFUL MIGHT HAPPEN: SEVERAL DAYS
3. WORRYING TOO MUCH ABOUT DIFFERENT THINGS: NEARLY EVERY DAY
IF YOU CHECKED OFF ANY PROBLEMS ON THIS QUESTIONNAIRE, HOW DIFFICULT HAVE THESE PROBLEMS MADE IT FOR YOU TO DO YOUR WORK, TAKE CARE OF THINGS AT HOME, OR GET ALONG WITH OTHER PEOPLE: EXTREMELY DIFFICULT
6. BECOMING EASILY ANNOYED OR IRRITABLE: NEARLY EVERY DAY
5. BEING SO RESTLESS THAT IT IS HARD TO SIT STILL: NEARLY EVERY DAY
2. NOT BEING ABLE TO STOP OR CONTROL WORRYING: NEARLY EVERY DAY
GAD7 TOTAL SCORE: 19
1. FEELING NERVOUS, ANXIOUS, OR ON EDGE: NEARLY EVERY DAY

## 2017-03-14 ASSESSMENT — PATIENT HEALTH QUESTIONNAIRE - PHQ9: 5. POOR APPETITE OR OVEREATING: NEARLY EVERY DAY

## 2017-03-14 NOTE — MR AVS SNAPSHOT
MRN:[id number]                      After Visit Summary   3/14/2017    Karen Alex    MRN: [id number]           Visit Information        Provider Department      3/14/2017 10:30 AM Lupis Mcgee [facility] Generic      MyChart Information     MyChart gives you secure access to your electronic health record. If you see a primary care provider, you can also send messages to your care team and make appointments. If you have questions, please call your primary care clinic.  If you do not have a primary care provider, please call [phone] and they will assist you.        Care EveryWhere ID     This is your Care EveryWhere ID. This could be used by other organizations to access your [place] medical records  [id number]

## 2017-03-14 NOTE — PROGRESS NOTES
Progress Note    Client Name: Karen Alex  Date: 3/14/2017                               Service Type: Individual      Session Start Time:  10:31  Session End Time: 11:15      Session Length: 45 - 50     Session #: 19     Attendees: Client attended alone    Treatment Plan Last Completed Date: 2/14/17  PHQ-9 / HUNG-7 Last Completed Date: 3/14/2017                   DATA      Progress Since Last Session (Related to Symptoms / Goals / Homework):   Symptoms: Stable    Homework: Achieved / completed to satisfaction- managing with out her son present      Episode of Care Goals: Minimal progress - ACTION (Actively working towards change); Intervened by reinforcing change plan / affirming steps taken     Current / Ongoing Stressors and Concerns:   Client's mood is stable.  Son is back from [place].  More symptoms present thinking about ex- and past regrets.  Encouraged to focus on the future- spend time with child and grandchildren.  Focus on where she can create stronger relationships and be assertive with her communication and what she would like to do.       Treatment Objective(s) Addressed in This Session:   focus on being socially active and not isolating at home due to medical condition   Using acceptance skills to understand her level of depression and what she has control over       Intervention:   CBT: Encourage client to focus on present and goals for future        ASSESSMENT: Current Emotional / Mental Status (status of significant symptoms):   Risk status (Self / Other harm or suicidal ideation)  Client denies a history of suicidal ideation, suicide attempts, self-injurious behavior, homicidal ideation, homicidal behavior and and other safety concerns   Client denies current fears or concerns for personal safety.   Client reports the following current or recent suicidal ideation or behaviors: passive thoughts of not wanting to live.  Denies any plan or  intent.  Voices are not directing her.   Client denies current or recent homicidal ideation or behaviors.   Client denies current or recent self injurious behavior or ideation.   Client denies other safety concerns.   A safety and risk management plan has not been developed at this time, however client was given the after-hours number should there be a change in any of these risk factors.     Appearance:   Appropriate    Eye Contact:   Fair    Psychomotor Behavior: Normal    Attitude:   Cooperative    Orientation:   All   Speech    Rate / Production: Normal     Volume:  Soft    Mood:    Anxious  Depressed    Affect:    Labile    Thought Content:  Hallucinations  notices spiders and orbs    Thought Form:  Ruminations about past and her losses    Insight:    Fair      Medication Review:   Changes to psychiatric medications, see updated Medication List in EPIC.      Medication Compliance:   Yes     Changes in Health Issues:   Yes: Respiratory Disease, Associated Psychological Distress     Chemical Use Review:   Substance Use: Chemical use reviewed, no active concerns identified      Tobacco Use: No current tobacco use.         Collateral Reports Completed:   Not Applicable    PLAN: (Client Tasks / Therapist Tasks / Other)  1. Talk to son about spending time with granddaughter  2.  Consider a family date with son's new girlfriend.     Lupis Mcgee, [facility]     __________________________________________________________________________________________________                                                           Treatment Plan    Client's Name: Karen Alex  YOB: 1955    Date: 9/22/2015       DSM-V Diagnoses: 296.32 - Major Depressive Disorder, Recurrent, Moderate    300.02 - Generalized Anxiety Disorder    309.81 - Posttraumatic Stress Disorder By History; 799.9 - Deferred Diagnosis   WHODAS:  35    Referral / Collaboration:  Referral to another professional/service is not indicated at  this time..    Anticipated number of session or this episode of care: 12      MeasurableTreatment Goal(s) related to diagnosis / functional impairment(s)  Goal 1: Client will have stability with her mental health as evidenced by PHQ-9 and HUNG-7 scores as well as self-report.      I will know I've met my goal when I start feeling better about myself.      Objective #A (Client Action)    Status: Continued - Date(s): 2/14/2017          Client will Decrease frequency and intensity of feeling down, depressed, hopeless.    Intervention(s)  Therapist will assign homework by learning to build awareness of her triggers which activate her mental health symptoms.  Therapist will introduce and have client implement strategies to address triggers.    Objective #B  Client will Identify negative self-talk and behaviors: challenge core beliefs, myths, and actions.  Status: Continued - Date(s): 2/14/2017        Intervention(s)  Therapist will continue to help client identify and reframe negative perceptions of self.  Work on ways to increase self-esteem.    Objective #C  Client will Feel less tired and more energy during the day .  Status: Completed - Date: 3/2/16     Intervention(s)  Therapist will assign homework 1.  Complete at least one household activity daily; 2.  Focus on getting out of the house at least three times weekly; 3.  Look into entering social groups.      Client has reviewed and agreed to the above plan.      Lupis Mcgee, [facility]  September 22, 2015

## 2017-03-15 ENCOUNTER — TELEPHONE (OUTPATIENT)
Dept: FAMILY MEDICINE | Facility: CLINIC | Age: 62
End: 2017-03-15

## 2017-03-15 DIAGNOSIS — M54.59 MECHANICAL LOW BACK PAIN: ICD-10-CM

## 2017-03-15 DIAGNOSIS — F41.9 ANXIETY: ICD-10-CM

## 2017-03-15 RX ORDER — LORAZEPAM 1 MG/1
1 TABLET ORAL 3 TIMES DAILY PRN
Qty: 90 TABLET | Refills: 0 | Status: SHIPPED | OUTPATIENT
Start: 2017-03-15 | End: 2017-04-14

## 2017-03-15 RX ORDER — HYDROCODONE BITARTRATE AND ACETAMINOPHEN 5; 325 MG/1; MG/1
TABLET ORAL
Qty: 30 TABLET | Refills: 0 | Status: SHIPPED | OUTPATIENT
Start: 2017-03-15 | End: 2017-04-14

## 2017-03-15 ASSESSMENT — ANXIETY QUESTIONNAIRES: GAD7 TOTAL SCORE: 19

## 2017-03-15 ASSESSMENT — PATIENT HEALTH QUESTIONNAIRE - PHQ9: SUM OF ALL RESPONSES TO PHQ QUESTIONS 1-9: 23

## 2017-03-15 NOTE — TELEPHONE ENCOUNTER
Fax from  [facility], need chart notes from past 90 days faxed to them, last visit with Oksana Rebolledo MD 10/2017, called [facility], informed, they will f/u with pt to see who lung doctor maybe, will also see who is prescribing O2, cannot find order from OKSANA REBOLLEDO MD, can disregard for now  Estelle Haynes, RN, BSN  Message handled by Nurse Triage.

## 2017-03-15 NOTE — TELEPHONE ENCOUNTER
Controlled Substance Refill Request for Lorazepam  Problem List Complete:  Yes  Patient is followed by OKSANA ANTONIO for ongoing prescription of benzodiazepines.  All refills should be approved by this provider, or covering partner.    Medication(s): Lorazepam.   Maximum quantity per month: 90  Clinic visit frequency required: Q 3 months Last office visit: 10/21/16 pt overdue for office visit    Controlled substance agreement on file: Yes-7/17/15  Benzodiazepine use reviewed by psychiatry:  No    Last [facility] website verification:  done on 6/24/16     checked in past 6 months?  Yes 2/17/16      Controlled Substance Refill Request for Norco  Problem List Complete:  Yes    Patient is followed by OKSANA ANTONIO for ongoing prescription of pain medication.  All refills should be approved by this provider, or covering partner.    Medication(s): Norco.   Maximum quantity per month: 40  Clinic visit frequency required: Q 3 months Last office visit: 10/21/16 pt overdue for office visit  Controlled substance agreement on file: Yes       Date(s): 7/16/15    Pain Clinic evaluation in the past: No       Date(s):  n/a       Location(s):  n/a    DIRE Total Score(s):  No flowsheet data found.    Last [facility] website verification: 1/7/16     checked in past 6 months?  Yes 2/16/17     Last picked up 2/17/17; qty 30    Georgie Shabazz, Pharmacy [facility]

## 2017-03-22 DIAGNOSIS — J44.9 CHRONIC OBSTRUCTIVE PULMONARY DISEASE, UNSPECIFIED COPD TYPE (H): ICD-10-CM

## 2017-03-23 NOTE — TELEPHONE ENCOUNTER
ADVAIR DISKUS 250-50 MCG/DOSE diskus inhaler       Last Written Prescription Date: 8/1/16  Last Fill Quantity: 3, # refills: 1    Last Office Visit with [facility], [facility] or [facility] prescribing provider:  11/4/2016     Future Office Visit:    Next 5 appointments (look out 90 days)     Apr 18, 2017 11:30 AM CDT   [facility]    [address] [place] [id number] [phone]                   Date of Last Asthma Action Plan Letter:   There are no preventive care reminders to display for this patient.   Asthma Control Test:   ACT Total Scores 10/16/2014   ACT TOTAL SCORE 16   ASTHMA ER VISITS 0 = None   ASTHMA HOSPITALIZATIONS 0 = None       Date of Last Spirometry Test:   No results found for this or any previous visit.      BESSY Antoine  March 23, 2017  1:53 PM

## 2017-03-27 NOTE — TELEPHONE ENCOUNTER
Refilled for 3 months per protocol.  Pt due for ACT.  Aissatou Mon, PharmD  [facility]

## 2017-04-14 DIAGNOSIS — M54.59 MECHANICAL LOW BACK PAIN: ICD-10-CM

## 2017-04-14 DIAGNOSIS — F41.9 ANXIETY: ICD-10-CM

## 2017-04-14 NOTE — TELEPHONE ENCOUNTER
Controlled Substance Refill Request for [place] 5/325  Problem List Complete:  Yes    Patient is followed by OKSANA ANTONIO for ongoing prescription of pain medication.  All refills should be approved by this provider, or covering partner.    Medication(s): Norco.   Maximum quantity per month: 40  Clinic visit frequency required: Q 3 months     Controlled substance agreement on file: Yes       Date(s): 7/16/15    Pain Clinic evaluation in the past: No       Date(s):  n/a       Location(s):  n/a    DIRE Total Score(s):  No flowsheet data found.    Last [facility] website verification: 1/7/16     checked in past 6 months?  No, route to RN     Please walk signed prescription to pharmacy.  Thanks!  Modesta Lizama, Aly  [facility]  [phone]

## 2017-04-14 NOTE — TELEPHONE ENCOUNTER
Controlled Substance Refill Request for lorazepam 1mg  Problem List Complete:  No     PROVIDER TO CONSIDER COMPLETION OF PROBLEM LIST AND OVERVIEW/CONTROLLED SUBSTANCE AGREEMENT    Last Written Prescription Date:  03/15/2017  Last Fill Quantity: 90,   # refills: 0    Last Office Visit with [facility] primary care provider: 03/14/2017    Future Office visit:   Next 5 appointments (look out 90 days)     Apr 18, 2017 11:30 AM CDT   [facility]   [facility] ([facility])    [address] [place] [id number] [phone]                  Controlled substance agreement on file: Yes:  Date 10/21/2016.     Processing:  Staff will hand deliver Rx to on-site pharmacy   checked in past 6 months?  No, route to RN     Thanks,  Modesta Lizama CPhT  [facility]  [phone]

## 2017-04-17 RX ORDER — HYDROCODONE BITARTRATE AND ACETAMINOPHEN 5; 325 MG/1; MG/1
TABLET ORAL
Qty: 30 TABLET | Refills: 0 | Status: SHIPPED | OUTPATIENT
Start: 2017-04-17 | End: 2017-05-15

## 2017-04-18 ENCOUNTER — OFFICE VISIT (OUTPATIENT)
Dept: BEHAVIORAL HEALTH | Facility: CLINIC | Age: 62
End: 2017-04-18
Payer: MEDICARE

## 2017-04-18 DIAGNOSIS — F41.1 GAD (GENERALIZED ANXIETY DISORDER): ICD-10-CM

## 2017-04-18 DIAGNOSIS — F31.81 BIPOLAR 2 DISORDER (H): Primary | ICD-10-CM

## 2017-04-18 PROCEDURE — 90834 PSYTX W PT 45 MINUTES: CPT | Performed by: SOCIAL WORKER

## 2017-04-18 RX ORDER — LORAZEPAM 1 MG/1
1 TABLET ORAL 3 TIMES DAILY PRN
Qty: 90 TABLET | Refills: 0 | Status: SHIPPED | OUTPATIENT
Start: 2017-04-18 | End: 2017-05-15

## 2017-04-18 ASSESSMENT — ANXIETY QUESTIONNAIRES
6. BECOMING EASILY ANNOYED OR IRRITABLE: MORE THAN HALF THE DAYS
7. FEELING AFRAID AS IF SOMETHING AWFUL MIGHT HAPPEN: NEARLY EVERY DAY
2. NOT BEING ABLE TO STOP OR CONTROL WORRYING: NEARLY EVERY DAY
1. FEELING NERVOUS, ANXIOUS, OR ON EDGE: NEARLY EVERY DAY
IF YOU CHECKED OFF ANY PROBLEMS ON THIS QUESTIONNAIRE, HOW DIFFICULT HAVE THESE PROBLEMS MADE IT FOR YOU TO DO YOUR WORK, TAKE CARE OF THINGS AT HOME, OR GET ALONG WITH OTHER PEOPLE: VERY DIFFICULT
5. BEING SO RESTLESS THAT IT IS HARD TO SIT STILL: MORE THAN HALF THE DAYS
GAD7 TOTAL SCORE: 19
3. WORRYING TOO MUCH ABOUT DIFFERENT THINGS: NEARLY EVERY DAY

## 2017-04-18 ASSESSMENT — PATIENT HEALTH QUESTIONNAIRE - PHQ9: 5. POOR APPETITE OR OVEREATING: NEARLY EVERY DAY

## 2017-04-18 NOTE — MR AVS SNAPSHOT
MRN:[id number]                      After Visit Summary   4/18/2017    Karen Alex    MRN: [id number]           Visit Information        Provider Department      4/18/2017 11:30 AM Lupis Mcgee [facility] Generic      Your next 10 appointments already scheduled     May 16, 2017 11:30 AM CDT   Return Visit with Lupis Mcgee [facility] ([facility])    [address] [place] [id number] [phone]            Jun 13, 2017 11:30 AM CDT   Return Visit with Lupis Mcgee [facility] ([facility])    [address] [place] [id number] [phone]              MyChart Information     [employer] gives you secure access to your electronic health record. If you see a primary care provider, you can also send messages to your care team and make appointments. If you have questions, please call your primary care clinic.  If you do not have a primary care provider, please call [phone] and they will assist you.        Care EveryWhere ID     This is your Care EveryWhere ID. This could be used by other organizations to access your [place] medical records  [id number]

## 2017-04-18 NOTE — PROGRESS NOTES
Progress Note    Client Name: Karen Alex  Date: 4/18/2017                               Service Type: Individual      Session Start Time:  11:31  Session End Time: 12:15      Session Length: 45 - 50     Session #: 20     Attendees: Client attended alone    Treatment Plan Last Completed Date: 2/14/17  PHQ-9 / HUNG-7 Last Completed Date: 4/18/2017                    DATA      Progress Since Last Session (Related to Symptoms / Goals / Homework):   Symptoms: Stable- remains consistent with her PHQ-9 and HUNG-7 scores    Homework: Achieved / completed to satisfaction- managing with out her son present      Episode of Care Goals: Minimal progress - ACTION (Actively working towards change); Intervened by reinforcing change plan / affirming steps taken     Current / Ongoing Stressors and Concerns:   Client reports that she has been working on accepting the situations that have happened in the past.  Client has been working on increasing her socialization outside of home.  She has been working  on making meals for self.  She has been spending time with her son and his family.  Encourage client to restructure her negative thoughts.  Keep focusing on long term goals.       Treatment Objective(s) Addressed in This Session:   focus on being socially active and not isolating at home due to medical condition   Using acceptance skills to understand her level of depression and what she has control over       Intervention:   CBT: Encourage client to focus on present and goals for future        ASSESSMENT: Current Emotional / Mental Status (status of significant symptoms):   Risk status (Self / Other harm or suicidal ideation)  Client denies a history of suicidal ideation, suicide attempts, self-injurious behavior, homicidal ideation, homicidal behavior and and other safety concerns   Client denies current fears or concerns for personal safety.   Client reports the following current or  recent suicidal ideation or behaviors: passive thoughts of not wanting to live.  Denies any plan or intent.  Voices are not directing her.   Client denies current or recent homicidal ideation or behaviors.   Client denies current or recent self injurious behavior or ideation.   Client denies other safety concerns.   A safety and risk management plan has not been developed at this time, however client was given the after-hours number should there be a change in any of these risk factors.     Appearance:   Appropriate    Eye Contact:   Fair    Psychomotor Behavior: Normal    Attitude:   Cooperative    Orientation:   All   Speech    Rate / Production: Normal     Volume:  Soft    Mood:    Anxious  Depressed    Affect:    Flat    Thought Content:  Hallucinations    Thought Form:  Ruminations about past and her losses    Insight:    Fair      Medication Review:   No changes to current psychiatric medication(s)     Medication Compliance:   Yes     Changes in Health Issues:   Yes: Respiratory Disease, No Psychological Distress     Chemical Use Review:   Substance Use: Chemical use reviewed, no active concerns identified      Tobacco Use: No current tobacco use.         Collateral Reports Completed:   Not Applicable    PLAN: (Client Tasks / Therapist Tasks / Other)  1.   Client will restructure when negative thoughts are present- focus on future goals and what she is grateful for in her life.    Lupis Mcgee, [facility]     __________________________________________________________________________________________________                                                           Treatment Plan    Client's Name: Karen Alex  YOB: 1955    Date: 9/22/2015       DSM-V Diagnoses: 296.32 - Major Depressive Disorder, Recurrent, Moderate    300.02 - Generalized Anxiety Disorder    309.81 - Posttraumatic Stress Disorder By History; 799.9 - Deferred Diagnosis   WHODAS:  35    Referral /  Collaboration:  Referral to another professional/service is not indicated at this time..    Anticipated number of session or this episode of care: 12      MeasurableTreatment Goal(s) related to diagnosis / functional impairment(s)  Goal 1: Client will have stability with her mental health as evidenced by PHQ-9 and HUNG-7 scores as well as self-report.      I will know I've met my goal when I start feeling better about myself.      Objective #A (Client Action)    Status: Continued - Date(s): 2/14/2017          Client will Decrease frequency and intensity of feeling down, depressed, hopeless.    Intervention(s)  Therapist will assign homework by learning to build awareness of her triggers which activate her mental health symptoms.  Therapist will introduce and have client implement strategies to address triggers.    Objective #B  Client will Identify negative self-talk and behaviors: challenge core beliefs, myths, and actions.  Status: Continued - Date(s): 2/14/2017        Intervention(s)  Therapist will continue to help client identify and reframe negative perceptions of self.  Work on ways to increase self-esteem.    Objective #C  Client will Feel less tired and more energy during the day .  Status: Completed - Date: 3/2/16     Intervention(s)  Therapist will assign homework 1.  Complete at least one household activity daily; 2.  Focus on getting out of the house at least three times weekly; 3.  Look into entering social groups.      Client has reviewed and agreed to the above plan.      Lupis Mcgee, [facility]  September 22, 2015

## 2017-04-18 NOTE — TELEPHONE ENCOUNTER
Controlled Substance Refill Request for Lorazepem  Problem List Complete:  Yes    Patient is followed by OKSANA ANTONIO for ongoing prescription of benzodiazepines.  All refills should be approved by this provider, or covering partner.    Medication(s): Lorazepam.   Maximum quantity per month: 90  Clinic visit frequency required: Q 3 months     Controlled substance agreement on file: Yes-7/17/15  Benzodiazepine use reviewed by psychiatry:  No    Last [facility] website verification:  done on 6/24/16     checked in past 6 months?  Yes 2/17/17     Georgie Shabazz, Pharmacy [facility]

## 2017-04-19 ASSESSMENT — PATIENT HEALTH QUESTIONNAIRE - PHQ9: SUM OF ALL RESPONSES TO PHQ QUESTIONS 1-9: 22

## 2017-04-19 ASSESSMENT — ANXIETY QUESTIONNAIRES: GAD7 TOTAL SCORE: 19

## 2017-04-27 ENCOUNTER — TELEPHONE (OUTPATIENT)
Dept: FAMILY MEDICINE | Facility: CLINIC | Age: 62
End: 2017-04-27

## 2017-04-27 NOTE — TELEPHONE ENCOUNTER
QUARTERLY review from ALPESH Carrera, no action needed  Estelle Haynes RN, BSN  Message handled by Nurse Triage.

## 2017-05-08 ENCOUNTER — TELEPHONE (OUTPATIENT)
Dept: PSYCHIATRY | Facility: CLINIC | Age: 62
End: 2017-05-08

## 2017-05-08 DIAGNOSIS — F33.41 MAJOR DEPRESSIVE DISORDER, RECURRENT EPISODE, IN PARTIAL REMISSION (H): ICD-10-CM

## 2017-05-08 DIAGNOSIS — F41.1 GAD (GENERALIZED ANXIETY DISORDER): ICD-10-CM

## 2017-05-08 DIAGNOSIS — F31.81 BIPOLAR 2 DISORDER (H): ICD-10-CM

## 2017-05-08 RX ORDER — BUPROPION HYDROCHLORIDE 300 MG/1
300 TABLET ORAL EVERY MORNING
Qty: 30 TABLET | Refills: 1 | Status: SHIPPED | OUTPATIENT
Start: 2017-05-08 | End: 2017-06-06

## 2017-05-08 RX ORDER — QUETIAPINE FUMARATE 50 MG/1
50 TABLET, FILM COATED ORAL DAILY
Qty: 30 TABLET | Refills: 1 | Status: SHIPPED | OUTPATIENT
Start: 2017-05-08 | End: 2017-06-06

## 2017-05-08 RX ORDER — DULOXETIN HYDROCHLORIDE 60 MG/1
60 CAPSULE, DELAYED RELEASE ORAL DAILY
Qty: 30 CAPSULE | Refills: 1 | Status: SHIPPED | OUTPATIENT
Start: 2017-05-08 | End: 2017-06-06

## 2017-05-08 RX ORDER — DULOXETIN HYDROCHLORIDE 30 MG/1
30 CAPSULE, DELAYED RELEASE ORAL DAILY
Qty: 30 CAPSULE | Refills: 1 | Status: SHIPPED | OUTPATIENT
Start: 2017-05-08 | End: 2017-06-06 | Stop reason: ALTCHOICE

## 2017-05-08 NOTE — TELEPHONE ENCOUNTER
lamoTRIgine (LAMICTAL) 150 MG tablet  Last Written Prescription Date: 3/9/17  Last Fill Quantity: 60,  # refills: 1   Last Office Visit with [facility], [facility] or [facility] prescribing provider:  11/4/2016                                          Next 5 appointments (look out 90 days)     May 16, 2017 11:30 AM CDT   Return Visit with MAURY Lemos   [facility] ([facility])    [address] [id number] [phone]            Jun 06, 2017 10:15 AM CDT   Return Visit with Leana Patel NP   [facility] ([facility]    [address]  [address]  [facility] [id number] [phone]            Jun 13, 2017 11:30 AM CDT   Return Visit with MAURY Lemos   [facility] ([facility])    [address] [id number] [phone]                    BESSY Antoine  May 8, 2017  3:02 PM

## 2017-05-08 NOTE — TELEPHONE ENCOUNTER
I reached out to Kathi to set up her follow up. She was due back at the beginning of April. She requested refills and these meds will be bridged per RN protocol.    Follow up scheduled with provider for 6/6/2017.    Last Office Visit with [facility], [facility] or [facility] prescribing provider: 3/9/17  Next 5 appointments (look out 90 days)     May 16, 2017 11:30 AM CDT   Return Visit with MAURY Lemos   [facility] ([facility])    [address] [address]   [phone]            Jun 06, 2017 10:15 AM CDT   Return Visit with Leana Patel NP   [facility]    [address]  [address]  [facility] [address]   [phone]            Jun 13, 2017 11:30 AM CDT   Return Visit with MAURY Lemos   [facility] ([facility])    [address] [id number] [phone]                   BP Readings from Last 3 Encounters:   03/09/17 100/60   01/10/17 96/64   11/18/16 118/88     Pulse Readings from Last 2 Encounters:   03/09/17 91   01/10/17 112     Lab Results   Component Value Date    GLC 91 10/21/2016     Lab Results   Component Value Date    WBC 6.7 10/21/2016     Lab Results   Component Value Date    RBC 4.73 10/21/2016     Lab Results   Component Value Date    HGB 14.6 10/21/2016     Lab Results   Component Value Date    HCT 45.2 10/21/2016     No components found for: MCT  Lab Results   Component Value Date    MCV 96 10/21/2016     Lab Results   Component Value Date    MCH 30.9 10/21/2016     Lab Results   Component Value Date    MCHC 32.3 10/21/2016     Lab Results   Component Value Date    RDW 13.2 10/21/2016     Lab Results   Component Value Date     10/21/2016     Lab Results   Component Value Date    CHOL 133 10/05/2016     Lab Results   Component Value Date    HDL 51  10/05/2016     Lab Results   Component Value Date    LDL 79 10/21/2016    LDL 69 10/05/2016     Lab Results   Component Value Date    TRIG 65 10/05/2016     Lab Results   Component Value Date    CHOLTRAVIS 2.6 01/16/2015

## 2017-05-09 RX ORDER — LAMOTRIGINE 150 MG/1
TABLET ORAL
Qty: 60 TABLET | Refills: 1 | Status: SHIPPED | OUTPATIENT
Start: 2017-05-09 | End: 2017-05-15

## 2017-05-09 NOTE — TELEPHONE ENCOUNTER
Medication is being filled for 1 time refill only due to:  Patient needs to be seen because due for f/u. .     Pt. Scheduled today will see PCP this Friday.    Prescription approved per [facility] Refill Protocol.    Ally Verdin, RN, BSN, PHN

## 2017-05-15 ENCOUNTER — OFFICE VISIT (OUTPATIENT)
Dept: FAMILY MEDICINE | Facility: CLINIC | Age: 62
End: 2017-05-15
Payer: MEDICARE

## 2017-05-15 VITALS
TEMPERATURE: 98 F | OXYGEN SATURATION: 91 % | WEIGHT: 191.7 LBS | DIASTOLIC BLOOD PRESSURE: 74 MMHG | SYSTOLIC BLOOD PRESSURE: 108 MMHG | RESPIRATION RATE: 16 BRPM | HEART RATE: 84 BPM | HEIGHT: 67 IN | BODY MASS INDEX: 30.09 KG/M2

## 2017-05-15 DIAGNOSIS — Z11.59 NEED FOR HEPATITIS C SCREENING TEST: ICD-10-CM

## 2017-05-15 DIAGNOSIS — F41.9 ANXIETY: ICD-10-CM

## 2017-05-15 DIAGNOSIS — F31.81 BIPOLAR II DISORDER (H): Primary | ICD-10-CM

## 2017-05-15 DIAGNOSIS — F41.1 GAD (GENERALIZED ANXIETY DISORDER): ICD-10-CM

## 2017-05-15 DIAGNOSIS — I10 HTN, GOAL BELOW 140/90: ICD-10-CM

## 2017-05-15 DIAGNOSIS — J42 CHRONIC BRONCHITIS, UNSPECIFIED CHRONIC BRONCHITIS TYPE (H): ICD-10-CM

## 2017-05-15 DIAGNOSIS — M54.59 MECHANICAL LOW BACK PAIN: ICD-10-CM

## 2017-05-15 LAB
ERYTHROCYTE [DISTWIDTH] IN BLOOD BY AUTOMATED COUNT: 13.4 % (ref 10–15)
HCT VFR BLD AUTO: 41.5 % (ref 35–47)
HGB BLD-MCNC: 13.3 G/DL (ref 11.7–15.7)
MCH RBC QN AUTO: 30.8 PG (ref 26.5–33)
MCHC RBC AUTO-ENTMCNC: 32 G/DL (ref 31.5–36.5)
MCV RBC AUTO: 96 FL (ref 78–100)
PLATELET # BLD AUTO: 204 10E9/L (ref 150–450)
RBC # BLD AUTO: 4.32 10E12/L (ref 3.8–5.2)
WBC # BLD AUTO: 13.3 10E9/L (ref 4–11)

## 2017-05-15 PROCEDURE — 80053 COMPREHEN METABOLIC PANEL: CPT | Performed by: FAMILY MEDICINE

## 2017-05-15 PROCEDURE — 85027 COMPLETE CBC AUTOMATED: CPT | Performed by: FAMILY MEDICINE

## 2017-05-15 PROCEDURE — 36415 COLL VENOUS BLD VENIPUNCTURE: CPT | Performed by: FAMILY MEDICINE

## 2017-05-15 PROCEDURE — 86803 HEPATITIS C AB TEST: CPT | Performed by: FAMILY MEDICINE

## 2017-05-15 PROCEDURE — 80061 LIPID PANEL: CPT | Performed by: FAMILY MEDICINE

## 2017-05-15 PROCEDURE — 99214 OFFICE O/P EST MOD 30 MIN: CPT | Performed by: FAMILY MEDICINE

## 2017-05-15 PROCEDURE — G0472 HEP C SCREEN HIGH RISK/OTHER: HCPCS | Performed by: FAMILY MEDICINE

## 2017-05-15 RX ORDER — HYDROCODONE BITARTRATE AND ACETAMINOPHEN 5; 325 MG/1; MG/1
TABLET ORAL
Qty: 30 TABLET | Refills: 0 | Status: SHIPPED | OUTPATIENT
Start: 2017-05-15 | End: 2017-06-08

## 2017-05-15 RX ORDER — LORAZEPAM 1 MG/1
1 TABLET ORAL 3 TIMES DAILY PRN
Qty: 90 TABLET | Refills: 0 | Status: SHIPPED | OUTPATIENT
Start: 2017-05-15 | End: 2017-06-13

## 2017-05-15 ASSESSMENT — PATIENT HEALTH QUESTIONNAIRE - PHQ9: 5. POOR APPETITE OR OVEREATING: NEARLY EVERY DAY

## 2017-05-15 ASSESSMENT — ANXIETY QUESTIONNAIRES
6. BECOMING EASILY ANNOYED OR IRRITABLE: NEARLY EVERY DAY
1. FEELING NERVOUS, ANXIOUS, OR ON EDGE: NEARLY EVERY DAY
5. BEING SO RESTLESS THAT IT IS HARD TO SIT STILL: MORE THAN HALF THE DAYS
7. FEELING AFRAID AS IF SOMETHING AWFUL MIGHT HAPPEN: MORE THAN HALF THE DAYS
GAD7 TOTAL SCORE: 19
2. NOT BEING ABLE TO STOP OR CONTROL WORRYING: NEARLY EVERY DAY
3. WORRYING TOO MUCH ABOUT DIFFERENT THINGS: NEARLY EVERY DAY

## 2017-05-15 NOTE — PROGRESS NOTES
SUBJECTIVE:                                                    Karen Alex is a 61 year old female who presents to clinic today for the following health issues:      Bipolar medication follow up.Has talk therapy and Psych consulting     Musculoskeletal problem/pain      Duration: 2 months    Description  Location: right knee pain which goes laterally up the right thigh area    Intensity:  9/10    Accompanying signs and symptoms: weakness of leg    History  Previous similar problem: no   Previous evaluation:  none    Precipitating or alleviating factors:  Trauma or overuse: no   Aggravating factors include: climbing stairs    Therapies tried and outcome: heat and stretching with the heat helping the most.       Inventory of mental status shows issues with anxiety , chronic pain and  depression.    Discussed regarding anxiety, sleep, anhedonia, irritability, energy,  perspective, self image, affect, current stressors, holistic parameters, work situation,  physical and social mitigating factors.    Past history has included bipolar  episodes and positive  family history.    Prior treatment has included meds and Psych  and talk therapy is ongoing with Lupis .    Suicidal ideation is currently absent.    No problems with vision, hearing, thyroid, chest pain, dyspnea,rash,  stomach upset, polyuria, polydipsia, dysuria,muscle aches, numbness,  cough, tics or balance issues. Memory is mostly reliable .    ANASTASIA,thyroid normal, lungs clear, s1s2,soft abdoman, symmetrical dtrs,  no abdominal mass,good peripheral pulses, vs stable.  Discussed ssri vs dop/ser grouping vs wellbutrin in depression mgmt  Discussed anxiolytics as situational tools not specific therapy  (F31.81) Bipolar II disorder (H)  (primary encounter diagnosis)  Comment:   Plan: CBC with platelets, Comprehensive metabolic         panel            (F41.1) HUNG (generalized anxiety disorder)  Comment:   Plan:     (I10) HTN, goal below 140/90  Comment:    Plan: Lipid panel reflex to direct LDL            (J42) Chronic bronchitis, unspecified chronic bronchitis type (H)  Comment:   Plan:     (F41.9) Anxiety  Comment:   Plan: LORazepam (ATIVAN) 1 MG tablet            (M54.5) Mechanical low back pain  Comment:   Plan: HYDROcodone-acetaminophen (NORCO) 5-325 MG per         tablet        She's been responsible , she asks for doseup but accepts NO    (Z11.59) Need for hepatitis C screening test  Comment:   Plan: Hepatitis C antibody          Sees her therapist tomorrow

## 2017-05-15 NOTE — MR AVS SNAPSHOT
After Visit Summary   5/15/2017    Karen Alex    MRN: [id number]           Patient Information     Date Of Birth          1955        Visit Information        Provider Department      5/15/2017 10:30 AM Eros Rebolledo MD [facility]        Today's Diagnoses     Bipolar II disorder (H)    -  1    HUNG (generalized anxiety disorder)        HTN, goal below 140/90        Chronic bronchitis, unspecified chronic bronchitis type (H)        Anxiety        Mechanical low back pain        Need for hepatitis C screening test           Follow-ups after your visit        Your next 10 appointments already scheduled     Jun 06, 2017 10:15 AM CDT   [facility]    [address]  [facility] [address] [phone]            Jun 13, 2017 11:30 AM CDT   Return Visit with MAURY Lemos   [facility])    [address] [address] [phone]              Who to contact     If you have questions or need follow up information about today's clinic visit or your schedule please contact [facility] directly at [phone].  Normal or non-critical lab and imaging results will be communicated to you by [email or web address], letter or phone within 4 business days after the clinic has received the results. If you do not hear from us within 7 days, please contact the clinic through MyChart or phone. If you have a critical or abnormal lab result, we will notify you by phone as soon as possible.  Submit refill requests through [employer] or call your pharmacy and they will forward the refill request to us. Please allow 3 business days for your refill to be completed.          Additional Information About Your Visit        [email or web address] Information     [employer] gives you secure access to  "your electronic health record. If you see a primary care provider, you can also send messages to your care team and make appointments. If you have questions, please call your primary care clinic.  If you do not have a primary care provider, please call [phone] and they will assist you.        Care EveryWhere ID     This is your Care EveryWhere ID. This could be used by other organizations to access your [place] medical records  [id number]        Your Vitals Were     Pulse Temperature Respirations Height Last Period Pulse Oximetry    84 98  F (36.7  C) (Oral) 16 5' 7\" (1.702 m) 12/01/2007 91%    BMI (Body Mass Index)                   30.02 kg/m2            Blood Pressure from Last 3 Encounters:   05/15/17 108/74   03/09/17 100/60   01/10/17 96/64    Weight from Last 3 Encounters:   05/15/17 191 lb 11.2 oz (87 kg)   03/09/17 189 lb (85.7 kg)   01/10/17 190 lb 12.8 oz (86.5 kg)              We Performed the Following     CBC with platelets     Comprehensive metabolic panel     DEPRESSION ACTION PLAN (DAP)     Hepatitis C antibody     Lipid panel reflex to direct LDL          Where to get your medicines      Some of these will need a paper prescription and others can be bought over the counter.  Ask your nurse if you have questions.     Bring a paper prescription for each of these medications     HYDROcodone-acetaminophen 5-325 MG per tablet    LORazepam 1 MG tablet          Primary Care Provider Office Phone # Fax #    Eros Rebolledo [phone]       [address] [address]        Thank you!     Thank you for choosing [facility]  for your care. Our goal is always to provide you with excellent care. Hearing back from our patients is one way we can continue to improve our services. Please take a few minutes to complete the written survey that you may receive in the mail after your visit with us. Thank you!             Your " Updated Medication List - Protect others around you: Learn how to safely use, store and throw away your medicines at www.disposemymeds.org.          This list is accurate as of: 5/15/17 11:59 PM.  Always use your most recent med list.                   Brand Name Dispense Instructions for use    ADVAIR DISKUS 250-50 MCG/DOSE diskus inhaler   Generic drug:  fluticasone-salmeterol     3 Inhaler    INHALE ONE PUFF BY MOUTH TWICE A DAY       * albuterol (2.5 MG/3ML) 0.083% neb solution     120 vial    Take 1 vial (2.5 mg) by nebulization 4 times daily as needed       * albuterol 108 (90 BASE) MCG/ACT Inhaler    albuterol    1 Inhaler    Inhale 1-2 puffs into the lungs every 4 hours as needed for shortness of breath / dyspnea       ASPIRIN PO      Take 81 mg by mouth daily       atorvastatin 40 MG tablet    LIPITOR    90 tablet    Take 1 tablet (40 mg) by mouth daily       buPROPion 300 MG 24 hr tablet    WELLBUTRIN XL    30 tablet    Take 1 tablet (300 mg) by mouth every morning       * DULoxetine 60 MG EC capsule    CYMBALTA    30 capsule    Take 1 capsule (60 mg) by mouth daily For mood and anxiety and pain. Increased dose. Take with 30 mg capsule.       * DULoxetine 30 MG EC capsule    CYMBALTA    30 capsule    Take 1 capsule (30 mg) by mouth daily . Increased dose. Take with 60 mg capsules. For anxiety, depression, pain.       HYDROcodone-acetaminophen 5-325 MG per tablet    NORCO    30 tablet    Take one two times daily as needed       ibuprofen 600 MG tablet    ADVIL/MOTRIN    20 tablet    Take 1 tablet (600 mg) by mouth every 6 hours as needed for moderate pain       isosorbide mononitrate 30 MG 24 hr tablet    IMDUR    90 tablet    Take 1 tablet (30 mg) by mouth daily       lamoTRIgine 150 MG tablet    LAMICTAL    60 tablet    Take 1 tablet (150 mg) by mouth 2 times daily       LORazepam 1 MG tablet    ATIVAN    90 tablet    Take 1 tablet (1 mg) by mouth 3 times daily as needed for anxiety       NITROSTAT 0.4 MG  sublingual tablet   Generic drug:  nitroglycerin      prn       QUEtiapine 50 MG tablet    SEROQUEL    30 tablet    Take 1 tablet (50 mg) by mouth daily       STOOL SOFTENER PO      Take by mouth daily       tiotropium 18 MCG capsule    SPIRIVA    90 capsule    Inhale 1 capsule (18 mcg) into the lungs daily       Vitamin D3 2000 UNITS Chew      Take 2,000 mg by mouth daily       * Notice:  This list has 4 medication(s) that are the same as other medications prescribed for you. Read the directions carefully, and ask your doctor or other care provider to review them with you.

## 2017-05-15 NOTE — LETTER
"[facility]  [address] [place], [place], [id number] [phone]    May 17, 2017       Karen FORREST [place]                                                                                                                                          [address]   [facility] [address]      Dear Karen:    The results of your latest lipid tests as attached.  Your blood is pretty good. I hope that your leg and back feel better as the month goes on..    LDL is the \"bad\" cholesterol linked to heart disease and stroke.   HDL is the \"good\" cholesterol and when it is high, it decreases the risk for above problems.    Results for orders placed or performed in visit on 05/15/17   CBC with platelets   Result Value Ref Range    WBC 13.3 (H) 4.0 - 11.0 10e9/L    RBC Count 4.32 3.8 - 5.2 10e12/L    Hemoglobin 13.3 11.7 - 15.7 g/dL    Hematocrit 41.5 35.0 - 47.0 %    MCV 96 78 - 100 fl    MCH 30.8 26.5 - 33.0 pg    MCHC 32.0 31.5 - 36.5 g/dL    RDW 13.4 10.0 - 15.0 %    Platelet Count 204 150 - 450 10e9/L   Lipid panel reflex to direct LDL   Result Value Ref Range    Cholesterol 135 <200 mg/dL    Triglycerides 104 <150 mg/dL    HDL Cholesterol 48 (L) >49 mg/dL    LDL Cholesterol Calculated 66 <100 mg/dL    Non HDL Cholesterol 87 <130 mg/dL   Comprehensive metabolic panel   Result Value Ref Range    Sodium 145 (H) 133 - 144 mmol/L    Potassium 4.3 3.4 - 5.3 mmol/L    Chloride 107 94 - 109 mmol/L    Carbon Dioxide 29 20 - 32 mmol/L    Anion Gap 9 3 - 14 mmol/L    Glucose 84 70 - 99 mg/dL    Urea Nitrogen 12 7 - 30 mg/dL    Creatinine 0.71 0.52 - 1.04 mg/dL    GFR Estimate 85 >60 mL/min/1.7m2    GFR Estimate If Black >90   GFR Calc   >60 mL/min/1.7m2    Calcium 8.5 8.5 - 10.1 mg/dL    Bilirubin Total 0.4 0.2 - 1.3 mg/dL    Albumin 3.4 3.4 - 5.0 g/dL    Protein Total 6.4 (L) 6.8 - 8.8 g/dL    Alkaline Phosphatase 106 40 - 150 U/L    ALT 26 0 - 50 U/L    AST 17 0 - 45 U/L   Hepatitis C " antibody   Result Value Ref Range    Hepatitis C Antibody  NR     Nonreactive   Assay performance characteristics have not been established for newborns,   infants, and children           Follow a low-fat, low-cholesterol diet and get regular exercise.  Please feel free to call the clinic at [phone] if you have any questions.    Sincerely,    Eros Rebolledo MD/DENNIS

## 2017-05-15 NOTE — NURSING NOTE
"Chief Complaint   Patient presents with     Bipolar 2 medication review       Initial /74 (BP Location: Left arm, Patient Position: Chair, Cuff Size: Adult Large)  Pulse 84  Temp 98  F (36.7  C) (Oral)  Resp 16  Ht 5' 7\" (1.702 m)  Wt 191 lb 11.2 oz (87 kg)  LMP 12/01/2007  SpO2 91%  BMI 30.02 kg/m2 Estimated body mass index is 30.02 kg/(m^2) as calculated from the following:    Height as of this encounter: 5' 7\" (1.702 m).    Weight as of this encounter: 191 lb 11.2 oz (87 kg).  Medication Reconciliation: complete   Anil Lopez CMA       "

## 2017-05-16 ENCOUNTER — OFFICE VISIT (OUTPATIENT)
Dept: BEHAVIORAL HEALTH | Facility: CLINIC | Age: 62
End: 2017-05-16
Payer: MEDICARE

## 2017-05-16 DIAGNOSIS — F31.81 BIPOLAR 2 DISORDER (H): Primary | ICD-10-CM

## 2017-05-16 DIAGNOSIS — F41.1 GAD (GENERALIZED ANXIETY DISORDER): ICD-10-CM

## 2017-05-16 LAB
ALBUMIN SERPL-MCNC: 3.4 G/DL (ref 3.4–5)
ALP SERPL-CCNC: 106 U/L (ref 40–150)
ALT SERPL W P-5'-P-CCNC: 26 U/L (ref 0–50)
ANION GAP SERPL CALCULATED.3IONS-SCNC: 9 MMOL/L (ref 3–14)
AST SERPL W P-5'-P-CCNC: 17 U/L (ref 0–45)
BILIRUB SERPL-MCNC: 0.4 MG/DL (ref 0.2–1.3)
BUN SERPL-MCNC: 12 MG/DL (ref 7–30)
CALCIUM SERPL-MCNC: 8.5 MG/DL (ref 8.5–10.1)
CHLORIDE SERPL-SCNC: 107 MMOL/L (ref 94–109)
CHOLEST SERPL-MCNC: 135 MG/DL
CO2 SERPL-SCNC: 29 MMOL/L (ref 20–32)
CREAT SERPL-MCNC: 0.71 MG/DL (ref 0.52–1.04)
GFR SERPL CREATININE-BSD FRML MDRD: 85 ML/MIN/1.7M2
GLUCOSE SERPL-MCNC: 84 MG/DL (ref 70–99)
HCV AB SERPL QL IA: NORMAL
HDLC SERPL-MCNC: 48 MG/DL
LDLC SERPL CALC-MCNC: 66 MG/DL
NONHDLC SERPL-MCNC: 87 MG/DL
POTASSIUM SERPL-SCNC: 4.3 MMOL/L (ref 3.4–5.3)
PROT SERPL-MCNC: 6.4 G/DL (ref 6.8–8.8)
SODIUM SERPL-SCNC: 145 MMOL/L (ref 133–144)
TRIGL SERPL-MCNC: 104 MG/DL

## 2017-05-16 PROCEDURE — 90834 PSYTX W PT 45 MINUTES: CPT | Performed by: SOCIAL WORKER

## 2017-05-16 ASSESSMENT — ANXIETY QUESTIONNAIRES: GAD7 TOTAL SCORE: 19

## 2017-05-16 ASSESSMENT — PATIENT HEALTH QUESTIONNAIRE - PHQ9: SUM OF ALL RESPONSES TO PHQ QUESTIONS 1-9: 19

## 2017-05-16 NOTE — PROGRESS NOTES
Progress Note    Client Name: Karen Alex  Date: 5/16/2017                               Service Type: Individual      Session Start Time:  11:31  Session End Time: 12:15      Session Length: 45 - 50     Session #: 20     Attendees: Client attended alone    Treatment Plan Last Completed Date: 5/16/17  PHQ-9 / HUNG-7 Last Completed Date: completed with PMD 5/15/17 (scores were 19, 19)                    DATA      Progress Since Last Session (Related to Symptoms / Goals / Homework):   Symptoms: Stable- remains consistent with her PHQ-9 and HUNG-7 scores    Homework: Achieved / completed to satisfaction- managing with out her son present      Episode of Care Goals: Minimal progress - ACTION (Actively working towards change); Intervened by reinforcing change plan / affirming steps taken     Current / Ongoing Stressors and Concerns:   Client reports that she met with PMD to discuss her physical health.  No further recommendations were made.  Spent some time with son on Mother's Day.  Relationship with Emmanuel is going well.  Has  been spending time with family or her bf.  Being a bit more social with the weather.  Waiting on CDA housing.       Treatment Objective(s) Addressed in This Session:   focus on being socially active and not isolating at home due to medical condition   Using acceptance skills to understand her level of depression and what she has control over       Intervention:   CBT: Encourage client to focus on present and goals for future        ASSESSMENT: Current Emotional / Mental Status (status of significant symptoms):   Risk status (Self / Other harm or suicidal ideation)  Client denies a history of suicidal ideation, suicide attempts, self-injurious behavior, homicidal ideation, homicidal behavior and and other safety concerns   Client denies current fears or concerns for personal safety.   Client reports the following current or recent suicidal ideation or  behaviors: passive thoughts of not wanting to live.  Denies any plan or intent.  Voices are not directing her.   Client denies current or recent homicidal ideation or behaviors.   Client denies current or recent self injurious behavior or ideation.   Client denies other safety concerns.   A safety and risk management plan has not been developed at this time, however client was given the after-hours number should there be a change in any of these risk factors.     Appearance:   Appropriate    Eye Contact:   Fair    Psychomotor Behavior: Normal    Attitude:   Cooperative    Orientation:   All   Speech    Rate / Production: Normal     Volume:  Soft    Mood:    Anxious  Depressed    Affect:    Flat    Thought Content:  Hallucinations    Thought Form:  Ruminations about past and her losses    Insight:    Fair      Medication Review:   No changes to current psychiatric medication(s)     Medication Compliance:   Yes     Changes in Health Issues:   Yes: Respiratory Disease, No Psychological Distress     Chemical Use Review:   Substance Use: Chemical use reviewed, no active concerns identified      Tobacco Use: No current tobacco use.         Collateral Reports Completed:   Not Applicable    PLAN: (Client Tasks / Therapist Tasks / Other)  1.   Client continues to work on  Restructuring her when negative thoughts are present- focus on future goals and what she is grateful for in her life.  2.  Continue with social events with family and friends.  3.  Commit to working on self-care to help strengthen her body.    Lupis Mcgee, [facility]     __________________________________________________________________________________________________                                                           Treatment Plan    Client's Name: Karen Alex  YOB: 1955    Date: 9/22/2015       DSM-V Diagnoses: 296.32 - Major Depressive Disorder, Recurrent, Moderate    300.02 - Generalized Anxiety Disorder    309.81  - Posttraumatic Stress Disorder By History; 799.9 - Deferred Diagnosis   WHODAS:  35    Referral / Collaboration:  Referral to another professional/service is not indicated at this time..    Anticipated number of session or this episode of care: 12      MeasurableTreatment Goal(s) related to diagnosis / functional impairment(s)  Goal 1: Client will have stability with her mental health as evidenced by PHQ-9 and HUNG-7 scores as well as self-report.      I will know I've met my goal when I start feeling better about myself.      Objective #A (Client Action)      Status: Continued - Date(s): 5/16/2017          Client will Decrease frequency and intensity of feeling down, depressed, hopeless.    Intervention(s)  Therapist will assign homework by learning to build awareness of her triggers which activate her mental health symptoms.  Therapist will introduce and have client implement strategies to address triggers.    Objective #B  Client will Identify negative self-talk and behaviors: challenge core beliefs, myths, and actions.  Status: Continued - Date(s): 5/16/2017        Intervention(s)  Therapist will continue to help client identify and reframe negative perceptions of self.  Work on ways to increase self-esteem.    Objective #C  Client will Feel less tired and more energy during the day .  Status: Completed - Date: 3/2/16     Intervention(s)  Therapist will assign homework 1.  Complete at least one household activity daily; 2.  Focus on getting out of the house at least three times weekly; 3.  Look into entering social groups.      Client has reviewed and agreed to the above plan.      Lupis Mcgee, [facility]  September 22, 2015

## 2017-05-16 NOTE — MR AVS SNAPSHOT
MRN:[id number]                      After Visit Summary   5/16/2017    Karen Alex    MRN: [id number]           Visit Information        Provider Department      5/16/2017 11:30 AM Lupis Mcgee LICSW [facility]      Your next 10 appointments already scheduled     Jun 06, 2017 10:15 AM CDT   [facility] with Leana Patel NP   [facility]    [address]  [facility] [address] [phone]            Jun 13, 2017 11:30 AM CDT   Return Visit with MAURY Lemos   [facility] ([facility])    [address] [address] [phone]              MyChart Information     [employer] gives you secure access to your electronic health record. If you see a primary care provider, you can also send messages to your care team and make appointments. If you have questions, please call your primary care clinic.  If you do not have a primary care provider, please call [phone] and they will assist you.        Care EveryWhere ID     This is your Care EveryWhere ID. This could be used by other organizations to access your [place] medical records  [id number]

## 2017-05-17 ENCOUNTER — TELEPHONE (OUTPATIENT)
Dept: FAMILY MEDICINE | Facility: CLINIC | Age: 62
End: 2017-05-17

## 2017-05-17 NOTE — TELEPHONE ENCOUNTER
Pt calls, does not recall name of lung doctor.   Meena Abreu MD at [facility]. Phone number provided too.   Chaim Slaughter RN

## 2017-05-30 ENCOUNTER — TRANSFERRED RECORDS (OUTPATIENT)
Dept: CARDIOLOGY | Facility: CLINIC | Age: 62
End: 2017-05-30

## 2017-05-30 ENCOUNTER — DOCUMENTATION ONLY (OUTPATIENT)
Dept: CARDIOLOGY | Facility: CLINIC | Age: 62
End: 2017-05-30

## 2017-06-06 ENCOUNTER — OFFICE VISIT (OUTPATIENT)
Dept: PSYCHIATRY | Facility: CLINIC | Age: 62
End: 2017-06-06
Payer: MEDICARE

## 2017-06-06 VITALS
TEMPERATURE: 98 F | OXYGEN SATURATION: 96 % | SYSTOLIC BLOOD PRESSURE: 90 MMHG | HEART RATE: 102 BPM | BODY MASS INDEX: 29.91 KG/M2 | DIASTOLIC BLOOD PRESSURE: 60 MMHG | WEIGHT: 191 LBS

## 2017-06-06 DIAGNOSIS — F33.41 MAJOR DEPRESSIVE DISORDER, RECURRENT EPISODE, IN PARTIAL REMISSION (H): ICD-10-CM

## 2017-06-06 DIAGNOSIS — F31.81 BIPOLAR 2 DISORDER (H): ICD-10-CM

## 2017-06-06 DIAGNOSIS — F41.1 GAD (GENERALIZED ANXIETY DISORDER): ICD-10-CM

## 2017-06-06 DIAGNOSIS — F17.200 TOBACCO DEPENDENCE SYNDROME: Primary | ICD-10-CM

## 2017-06-06 PROCEDURE — 99214 OFFICE O/P EST MOD 30 MIN: CPT | Performed by: NURSE PRACTITIONER

## 2017-06-06 RX ORDER — LAMOTRIGINE 150 MG/1
150 TABLET ORAL 2 TIMES DAILY
Qty: 60 TABLET | Refills: 1 | Status: SHIPPED | OUTPATIENT
Start: 2017-06-06 | End: 2017-07-10

## 2017-06-06 RX ORDER — DULOXETIN HYDROCHLORIDE 60 MG/1
120 CAPSULE, DELAYED RELEASE ORAL DAILY
Qty: 60 CAPSULE | Refills: 1 | Status: SHIPPED | OUTPATIENT
Start: 2017-06-06 | End: 2017-07-10

## 2017-06-06 RX ORDER — BUPROPION HYDROCHLORIDE 300 MG/1
300 TABLET ORAL EVERY MORNING
Qty: 30 TABLET | Refills: 1 | Status: SHIPPED | OUTPATIENT
Start: 2017-06-06 | End: 2017-08-11

## 2017-06-06 RX ORDER — QUETIAPINE FUMARATE 50 MG/1
50-100 TABLET, FILM COATED ORAL AT BEDTIME
Qty: 45 TABLET | Refills: 1 | Status: SHIPPED | OUTPATIENT
Start: 2017-06-06 | End: 2017-08-11

## 2017-06-06 ASSESSMENT — ANXIETY QUESTIONNAIRES
7. FEELING AFRAID AS IF SOMETHING AWFUL MIGHT HAPPEN: SEVERAL DAYS
5. BEING SO RESTLESS THAT IT IS HARD TO SIT STILL: MORE THAN HALF THE DAYS
1. FEELING NERVOUS, ANXIOUS, OR ON EDGE: NEARLY EVERY DAY
GAD7 TOTAL SCORE: 18
3. WORRYING TOO MUCH ABOUT DIFFERENT THINGS: NEARLY EVERY DAY
IF YOU CHECKED OFF ANY PROBLEMS ON THIS QUESTIONNAIRE, HOW DIFFICULT HAVE THESE PROBLEMS MADE IT FOR YOU TO DO YOUR WORK, TAKE CARE OF THINGS AT HOME, OR GET ALONG WITH OTHER PEOPLE: VERY DIFFICULT
6. BECOMING EASILY ANNOYED OR IRRITABLE: NEARLY EVERY DAY
2. NOT BEING ABLE TO STOP OR CONTROL WORRYING: NEARLY EVERY DAY

## 2017-06-06 ASSESSMENT — PATIENT HEALTH QUESTIONNAIRE - PHQ9: 5. POOR APPETITE OR OVEREATING: NEARLY EVERY DAY

## 2017-06-06 NOTE — MR AVS SNAPSHOT
After Visit Summary   6/6/2017    Karen Alex    MRN: [id number]           Patient Information     Date Of Birth          1955        Visit Information        Provider Department      6/6/2017 10:15 AM Leana Patel NP [facility]        Today's Diagnoses     Tobacco dependence syndrome    -  1    Major depressive disorder, recurrent episode, in partial remission (H)        HUNG (generalized anxiety disorder)        Bipolar 2 disorder (H)          Care Instructions    Treatment Plan:    Increase Cymbalta (duloxetine) to 120 mg by mouth daily for depression and anxiety.     Increase Seroquel (quetiapine)  mg by mouth daily at bedtime.     Continue Lamictal (lamotrigine) 150 mg by mouth in AM and PM.    Continue Wellbutrin (buproprion)  mg by mouth daily in AM.     Continue Ativan (lorazepam) 1 mg up to 3 times per day as needed for panic only per primary care provider.    Start Nicotine Patches for smoking cessation.     Continue all other medications as reviewed per electronic medical record today.     All questions addressed. Education and counseling completed regarding risks and benefits of medications and psychotherapy options.    Safety plan was reviewed. To the Emergency Department as needed or call after hours crisis line at [phone] or [phone].     Continue individual/group therapy as planned with Lupis Mcgee     Schedule an appointment with me in 6-8 weeks or sooner as needed. Call [facility] at [phone] to schedule.    Follow up with primary care provider as planned or for acute medical concerns.    Call the psychiatric nurse line with medication questions or concerns at [phone].    My Practice Policy was reviewed and signed: YES     MyChart may be used to communicate with your provider, but this is not intended to be used for emergencies.          Follow-ups after your visit        Follow-up notes from your care team      Return in about 6 weeks (around 7/18/2017).      Your next 10 appointments already scheduled     Jun 13, 2017 11:30 AM CDT   Return Visit with MAURY Lemos   [facility] ([facility])    [address] [id number] [phone]              Who to contact     If you have questions or need follow up information about today's clinic visit or your schedule please contact [facility] directly at [phone].  Normal or non-critical lab and imaging results will be communicated to you by [employer], letter or phone within 4 business days after the clinic has received the results. If you do not hear from us within 7 days, please contact the clinic through [employer] or phone. If you have a critical or abnormal lab result, we will notify you by phone as soon as possible.  Submit refill requests through [employer] or call your pharmacy and they will forward the refill request to us. Please allow 3 business days for your refill to be completed.          Additional Information About Your Visit        MyChart Information     [employer] gives you secure access to your electronic health record. If you see a primary care provider, you can also send messages to your care team and make appointments. If you have questions, please call your primary care clinic.  If you do not have a primary care provider, please call [phone] and they will assist you.        Care EveryWhere ID     This is your Care EveryWhere ID. This could be used by other organizations to access your [facility] medical records  [id number]        Your Vitals Were     Pulse Temperature Last Period Pulse Oximetry BMI (Body Mass Index)       102 98  F (36.7  C) (Oral) 12/01/2007 96% 29.91 kg/m2        Blood Pressure from Last 3 Encounters:   06/06/17 90/60   05/15/17 108/74   03/09/17 100/60    Weight from Last 3 Encounters:   06/06/17 191 lb (86.6 kg)   05/15/17 191  lb 11.2 oz (87 kg)   03/09/17 189 lb (85.7 kg)              Today, you had the following     No orders found for display         Today's Medication Changes          These changes are accurate as of: 6/6/17 10:50 AM.  If you have any questions, ask your nurse or doctor.               Start taking these medicines.        Dose/Directions    nicotine 7 MG/24HR 24 hr patch   Commonly known as:  NICODERM CQ   Used for:  Tobacco dependence syndrome   Started by:  Leana Patel NP        Dose:  1 patch   Place 1 patch onto the skin every 24 hours   Quantity:  30 patch   Refills:  3         These medicines have changed or have updated prescriptions.        Dose/Directions    DULoxetine 60 MG EC capsule   Commonly known as:  CYMBALTA   This may have changed:    - how much to take  - additional instructions  - Another medication with the same name was removed. Continue taking this medication, and follow the directions you see here.   Used for:  HUNG (generalized anxiety disorder), Major depressive disorder, recurrent episode, in partial remission (H)   Changed by:  Leana Patel NP        Dose:  120 mg   Take 2 capsules (120 mg) by mouth daily For mood and anxiety and pain. Increased dose.   Quantity:  60 capsule   Refills:  1       QUEtiapine 50 MG tablet   Commonly known as:  SEROQUEL   This may have changed:    - how much to take  - when to take this   Used for:  HUNG (generalized anxiety disorder)   Changed by:  Leana Patel NP        Dose:   mg   Take 1-2 tablets ( mg) by mouth At Bedtime   Quantity:  45 tablet   Refills:  1            Where to get your medicines      These medications were sent to [facility] [address]  [address] [facility] [address]     Phone:  [phone]     buPROPion 300 MG 24 hr tablet    DULoxetine 60 MG EC capsule    lamoTRIgine 150 MG tablet    nicotine 7 MG/24HR 24 hr patch    QUEtiapine 50 MG tablet                Primary  Care Provider Office Phone # Fax #    Eros Bayron Rebolledo [phone]       [address] [address]        Thank you!     Thank you for choosing [facility]  for your care. Our goal is always to provide you with excellent care. Hearing back from our patients is one way we can continue to improve our services. Please take a few minutes to complete the written survey that you may receive in the mail after your visit with us. Thank you!             Your Updated Medication List - Protect others around you: Learn how to safely use, store and throw away your medicines at www.disposemymeds.org.          This list is accurate as of: 6/6/17 10:50 AM.  Always use your most recent med list.                   Brand Name Dispense Instructions for use    ADVAIR DISKUS 250-50 MCG/DOSE diskus inhaler   Generic drug:  fluticasone-salmeterol     3 Inhaler    INHALE ONE PUFF BY MOUTH TWICE A DAY       * albuterol (2.5 MG/3ML) 0.083% neb solution     120 vial    Take 1 vial (2.5 mg) by nebulization 4 times daily as needed       * albuterol 108 (90 BASE) MCG/ACT Inhaler    albuterol    1 Inhaler    Inhale 1-2 puffs into the lungs every 4 hours as needed for shortness of breath / dyspnea       ASPIRIN PO      Take 81 mg by mouth daily       atorvastatin 40 MG tablet    LIPITOR    90 tablet    Take 1 tablet (40 mg) by mouth daily       buPROPion 300 MG 24 hr tablet    WELLBUTRIN XL    30 tablet    Take 1 tablet (300 mg) by mouth every morning       DULoxetine 60 MG EC capsule    CYMBALTA    60 capsule    Take 2 capsules (120 mg) by mouth daily For mood and anxiety and pain. Increased dose.       HYDROcodone-acetaminophen 5-325 MG per tablet    NORCO    30 tablet    Take one two times daily as needed       ibuprofen 600 MG tablet    ADVIL/MOTRIN    20 tablet    Take 1 tablet (600 mg) by mouth every 6 hours as needed for moderate pain       isosorbide  mononitrate 30 MG 24 hr tablet    IMDUR    90 tablet    Take 1 tablet (30 mg) by mouth daily       lamoTRIgine 150 MG tablet    LAMICTAL    60 tablet    Take 1 tablet (150 mg) by mouth 2 times daily       LORazepam 1 MG tablet    ATIVAN    90 tablet    Take 1 tablet (1 mg) by mouth 3 times daily as needed for anxiety       nicotine 7 MG/24HR 24 hr patch    NICODERM CQ    30 patch    Place 1 patch onto the skin every 24 hours       NITROSTAT 0.4 MG sublingual tablet   Generic drug:  nitroglycerin      prn       QUEtiapine 50 MG tablet    SEROQUEL    45 tablet    Take 1-2 tablets ( mg) by mouth At Bedtime       STOOL SOFTENER PO      Take by mouth daily       tiotropium 18 MCG capsule    SPIRIVA    90 capsule    Inhale 1 capsule (18 mcg) into the lungs daily       Vitamin D3 2000 UNITS Chew      Take 2,000 mg by mouth daily       * Notice:  This list has 2 medication(s) that are the same as other medications prescribed for you. Read the directions carefully, and ask your doctor or other care provider to review them with you.

## 2017-06-06 NOTE — NURSING NOTE
"Chief Complaint   Patient presents with     RECHECK     pt c/o trouble staying asleep and mood swings       Initial BP 90/60 (BP Location: Left arm, Patient Position: Chair, Cuff Size: Adult Large)  Pulse 102  Temp 98  F (36.7  C) (Oral)  Wt 191 lb (86.6 kg)  LMP 12/01/2007  SpO2 96%  BMI 29.91 kg/m2 Estimated body mass index is 29.91 kg/(m^2) as calculated from the following:    Height as of 5/15/17: 5' 7\" (1.702 m).    Weight as of this encounter: 191 lb (86.6 kg).  Medication Reconciliation: complete    "

## 2017-06-06 NOTE — PATIENT INSTRUCTIONS
Treatment Plan:    Increase Cymbalta (duloxetine) to 120 mg by mouth daily for depression and anxiety.     Increase Seroquel (quetiapine)  mg by mouth daily at bedtime.     Continue Lamictal (lamotrigine) 150 mg by mouth in AM and PM.    Continue Wellbutrin (buproprion)  mg by mouth daily in AM.     Continue Ativan (lorazepam) 1 mg up to 3 times per day as needed for panic only per primary care provider.    Start Nicotine Patches for smoking cessation.     Continue all other medications as reviewed per electronic medical record today.     All questions addressed. Education and counseling completed regarding risks and benefits of medications and psychotherapy options.    Safety plan was reviewed. To the Emergency Department as needed or call after hours crisis line at [phone] or [phone].     Continue individual/group therapy as planned with Lupis Mcgee     Schedule an appointment with me in 6-8 weeks or sooner as needed. Call [facility] at [phone] to schedule.    Follow up with primary care provider as planned or for acute medical concerns.    Call the psychiatric nurse line with medication questions or concerns at [phone].    My Practice Policy was reviewed and signed: YES     MyChart may be used to communicate with your provider, but this is not intended to be used for emergencies.

## 2017-06-06 NOTE — PROGRESS NOTES
"    Outpatient Psychiatric Progress Note    Name: Karen Alex   : 1955                    Primary Care Provider: Eros Rebolledo MD - last visit 5/15/2017  Therapist: Lupis Mcgee  - last visit 2017    PHQ-9 scores:  PHQ-9 SCORE 2017 5/15/2017 2017   Total Score 22 19 22       HUNG-7 scores:  HUNG-7 SCORE 2017 5/15/2017 2017   Total Score 19 19 18       Patient Identification:  Patient is a 62 year old year old,   White American female  who presents for return visit with me.  Patient is currently disabled. Patient attended the session alone. Patient prefers to be called: \"Kathi\"    Interim History:    I last saw Karen Alex for outpatient psychiatry Return Visit on 3/9/2017.     During that appointment, we Continue Lamictal (lamotrigine) 150 mg by mouth in AM and PM.    Increase Cymbalta (duloxetine) to 90 mg by mouth daily for depression and anxiety.     Continue Wellbutrin (buproprion)  mg by mouth daily in AM.     Continue Seroquel (quetiapine) 50 mg by mouth daily at bedtime.     Continue Ativan (lorazepam) 1 mg up to 3 times per day as needed for panic only per primary care provider.     Current medications include:   Current Outpatient Prescriptions   Medication Sig     LORazepam (ATIVAN) 1 MG tablet Take 1 tablet (1 mg) by mouth 3 times daily as needed for anxiety     HYDROcodone-acetaminophen (NORCO) 5-325 MG per tablet Take one two times daily as needed     DULoxetine (CYMBALTA) 60 MG EC capsule Take 1 capsule (60 mg) by mouth daily For mood and anxiety and pain. Increased dose. Take with 30 mg capsule.     DULoxetine (CYMBALTA) 30 MG EC capsule Take 1 capsule (30 mg) by mouth daily . Increased dose. Take with 60 mg capsules. For anxiety, depression, pain.     QUEtiapine (SEROQUEL) 50 MG tablet Take 1 tablet (50 mg) by mouth daily     buPROPion (WELLBUTRIN XL) 300 MG 24 hr tablet Take 1 tablet (300 mg) by mouth every morning     ADVAIR DISKUS " "250-50 MCG/DOSE diskus inhaler INHALE ONE PUFF BY MOUTH TWICE A DAY     lamoTRIgine (LAMICTAL) 150 MG tablet Take 1 tablet (150 mg) by mouth 2 times daily     albuterol (ALBUTEROL) 108 (90 BASE) MCG/ACT Inhaler Inhale 1-2 puffs into the lungs every 4 hours as needed for shortness of breath / dyspnea     atorvastatin (LIPITOR) 40 MG tablet Take 1 tablet (40 mg) by mouth daily     isosorbide mononitrate (IMDUR) 30 MG 24 hr tablet Take 1 tablet (30 mg) by mouth daily     ibuprofen (ADVIL,MOTRIN) 600 MG tablet Take 1 tablet (600 mg) by mouth every 6 hours as needed for moderate pain     tiotropium (SPIRIVA) 18 MCG inhalation capsule Inhale 1 capsule (18 mcg) into the lungs daily     Cholecalciferol (VITAMIN D3) 2000 UNITS CHEW Take 2,000 mg by mouth daily     Docusate Calcium (STOOL SOFTENER PO) Take by mouth daily     albuterol (2.5 MG/3ML) 0.083% nebulizer solution Take 1 vial (2.5 mg) by nebulization 4 times daily as needed     NITROSTAT 0.4 MG SL tablet prn     ASPIRIN PO Take 81 mg by mouth daily     No current facility-administered medications for this visit.        The [place] Prescription Monitoring Program has been reviewed and there are no concerns about diversionary activity for controlled substances at this time.  Ativan (lorazepam) 1 mg, 90 tablets filled 5/17/2017 from Primary Care Provider. Also continues to receive hydrocodone.     I was able to review most recent Primary Care Provider, specialty provider, and therapy visit notes that I have access to.     Karen Alex reports mood has been: \"swings still\" more high lately.   Anxiety has been: \"still worry\" takes Ativan (lorazepam) BID and feels it does not help her calm. Reviewed again to take this as needed and not scheduled.   Sleep has been: \"struggling with falling asleep\" tired during the day. Naps during the day. Rare nightmares. Unknown snoring. Continues to hear voices that do not bother her. No command hallucinations.   PHQ9 and GAD7 " "scores were reviewed today.   Medication side effects: Denies  Current stressors include: About the same. 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 She also has no past medical history of Chronic infection; Diabetes (H); or Malignant hyperthermia.    Social History:  Current Living situation:  [place], [place] with self . Feels safe at home.  Employment: Disabled   Current use of drugs or alcohol: Current use of drugs or alcohol: cannabis , cocaine , heroin  and cocaine and heroin was over 30 years ago. Last used marijuana last week. Once per week. \"it relaxes me and helps with my sleep\"     Tobacco use:Quit cigarettes one month ago- positive feedback provided today. Relapsed, and quit again 3 days ago- continues to relapse and restart. Discussed smoking cessation. Did well with patches in the past. Cravings continue.   Caffeine:  Yes  2-3 sodas/day  Recovery Programming Involvement: None    Vital Signs:   BP 90/60 (BP Location: Left arm, Patient Position: Chair, Cuff Size: Adult Large)  Pulse 102  Temp 98  F (36.7  C) (Oral)  Wt 191 lb (86.6 kg)  LMP 12/01/2007  SpO2 96%  " BMI 29.91 kg/m2    Labs:  Most recent laboratory results reviewed and the pertinent results include:   Office Visit on 05/15/2017   Component Date Value Ref Range Status     WBC 05/15/2017 13.3* 4.0 - 11.0 10e9/L Final     RBC Count 05/15/2017 4.32  3.8 - 5.2 10e12/L Final     Hemoglobin 05/15/2017 13.3  11.7 - 15.7 g/dL Final     Hematocrit 05/15/2017 41.5  35.0 - 47.0 % Final     MCV 05/15/2017 96  78 - 100 fl Final     MCH 05/15/2017 30.8  26.5 - 33.0 pg Final     MCHC 05/15/2017 32.0  31.5 - 36.5 g/dL Final     RDW 05/15/2017 13.4  10.0 - 15.0 % Final     Platelet Count 05/15/2017 204  150 - 450 10e9/L Final     Cholesterol 05/15/2017 135  <200 mg/dL Final     Triglycerides 05/15/2017 104  <150 mg/dL Final    Fasting specimen     HDL Cholesterol 05/15/2017 48* >49 mg/dL Final     LDL Cholesterol Calculated 05/15/2017 66  <100 mg/dL Final    Desirable:       <100 mg/dl     Non HDL Cholesterol 05/15/2017 87  <130 mg/dL Final     Sodium 05/15/2017 145* 133 - 144 mmol/L Final     Potassium 05/15/2017 4.3  3.4 - 5.3 mmol/L Final     Chloride 05/15/2017 107  94 - 109 mmol/L Final     Carbon Dioxide 05/15/2017 29  20 - 32 mmol/L Final     Anion Gap 05/15/2017 9  3 - 14 mmol/L Final     Glucose 05/15/2017 84  70 - 99 mg/dL Final    Fasting specimen     Urea Nitrogen 05/15/2017 12  7 - 30 mg/dL Final     Creatinine 05/15/2017 0.71  0.52 - 1.04 mg/dL Final     GFR Estimate 05/15/2017 85  >60 mL/min/1.7m2 Final    Non  GFR Calc     GFR Estimate If Black 05/15/2017   >60 mL/min/1.7m2 Final                    Value:>90   GFR Calc       Calcium 05/15/2017 8.5  8.5 - 10.1 mg/dL Final     Bilirubin Total 05/15/2017 0.4  0.2 - 1.3 mg/dL Final     Albumin 05/15/2017 3.4  3.4 - 5.0 g/dL Final     Protein Total 05/15/2017 6.4* 6.8 - 8.8 g/dL Final     Alkaline Phosphatase 05/15/2017 106  40 - 150 U/L Final     ALT 05/15/2017 26  0 - 50 U/L Final     AST 05/15/2017 17  0 - 45 U/L Final     Hepatitis C  "Antibody 05/15/2017   NR Final                    Value:Nonreactive   Assay performance characteristics have not been established for newborns,   infants, and children            Review of Systems:  10 systems (general, cardiovascular, respiratory, eyes, ENT, endocrine, GI, , M/S, neurological) were reviewed. Most pertinent finding(s) is/are: dry mouth, dizziness,  pain levels are under control . The remaining systems are all unremarkable.    Mental Status Examination:  Appearance:  awake, alert, appeared as age stated, alert, cooperative, wearing oxygen, wears makeup, pleasant  Attitude:  cooperative  Eye Contact:  limited- wears sunglasses  Mood:  anxious and sad   Affect:  mood congruent, calmer, more reactive  Speech:  clear, coherent  Psychomotor Behavior:  no evidence of tardive dyskinesia, dystonia, or tics and physical retardation  Thought Process:  logical, linear, future oriented  Associations:  no loose associations  Thought Content:  no evidence of suicidal ideation or homicidal ideation, auditory hallucinations present and visual hallucinations present, does not appear to be responding  Oriented to:  time, person, and place  Attention Span and Concentration:  adequate, but notes difficultie  Recent and Remote Memory:  reported as \"not good\". Forgetful at times.  Not formally assessed. Ongoing short term memory concerns. No Amnesia.  Fund of Knowledge: low-normal  Gait and Station: Normal, slightly slowed, history of falls, no assistive devices used  Insight:  fair  Judgment:  fair     Suicide Risk Assessment:  Today Karen Alex reports increased psychosocial distress with suicidal ideation 3 days ago. In addition, there are notable risk factors for self-harm, including age, anxiety, psychosis and substance abuse. However, risk is mitigated by absence of past attempts, ability to volunteer a safety plan, history of seeking help when needed, future oriented and no access to firearms or weapons. " Grandchildren and son are major reasons for living. Therefore, based on all available evidence including the factors cited above, Karen Alex does not appear to be at imminent risk for self-harm, does not meet criteria for a 72-hr hold, and therefore remains appropriate for ongoing outpatient level of care.  A thorough assessment of risk factors related to suicide and self-harm have been reviewed and are noted above. The patient convincingly denies suicidality on several occasions. Local community safety resources printed and reviewed for patient to use if needed. There was no deceit detected, and the patient presented in a manner that was believable.     DSM5 Diagnosis:  295.70 Schizoaffective Disorder Depressive Type Rule out Bipolar Type  300.02 (F41.1) Generalized Anxiety Disorder  309.81 (F43.10) Posttraumatic Stress Disorder Without dissociative symptoms, prolonged  Cluster B personality disorder traits. Rule out Disorder.    Medical comorbidities include:   Patient Active Problem List    Diagnosis Date Noted     Health Care Home 09/04/2012     Priority: High     Status:  Closed   Care Coordinator:  Korin Wang [phone]  See Letters for [facility] Care Plan  March 16, 2015               HUNG (generalized anxiety disorder) 01/10/2017     Priority: Medium     Severe episode of recurrent major depressive disorder, with psychotic features (H) 01/10/2017     Priority: Medium     Major depression in partial remission (H) 01/19/2016     Priority: Medium     PTSD (post-traumatic stress disorder) 09/22/2015     Priority: Medium     Chronic pain 08/31/2015     Priority: Medium     Patient is followed by OKSANA ANTONIO for ongoing prescription of pain medication.  All refills should be approved by this provider, or covering partner.    Medication(s): Norco.   Maximum quantity per month: 40  Clinic visit frequency required: Q 3 months     Controlled substance agreement on file: Yes       Date(s):  7/16/15    Pain Clinic evaluation in the past: No       Date(s):  n/a       Location(s):  n/a    DIRE Total Score(s):  No flowsheet data found.    Last [facility] website verification: 1/7/16   [email or web address]       HTN, goal below 140/90 01/29/2015     Priority: Medium     Chest pain      Priority: Medium     Acute on chronic respiratory failure with hypoxia (H) 10/26/2014     Priority: Medium     Bipolar 2 disorder (H) 08/21/2013     Priority: Medium     Generalized anxiety disorder 08/21/2013     Priority: Medium     Patient is followed by OKSANA ANTONIO for ongoing prescription of benzodiazepines.  All refills should be approved by this provider, or covering partner.    Medication(s): Lorazepam.   Maximum quantity per month: 90  Clinic visit frequency required: Q 3 months     Controlled substance agreement on file: Yes-7/17/15  Benzodiazepine use reviewed by psychiatry:  No    Last [facility] website verification:  done on 6/24/16   [email or web address]           Psychosis 12/07/2011     Priority: Medium     Problem list name updated by automated process. Provider to review       COPD (chronic obstructive pulmonary disease) (H) 09/01/2011     Priority: Medium     Hyperlipidemia LDL goal <130 10/31/2010     Priority: Medium     Anxiety 12/30/2009     Priority: Medium     Degeneration of lumbar or lumbosacral intervertebral disc 02/23/2005     Priority: Medium     Personal history of tobacco use, presenting hazards to health 02/23/2005     Priority: Medium       Psychosocial & Contextual Factors:  Financial Difficulties and Relationship Difficulties and Parent/Child Relationship Difficulties    Assessment:  Karen Alex reports ongoing struggles with mood and anxiety and sleep. Medication side effects and alternatives were reviewed. Health promotion activities recommended and reviewed today. Has been spending time at the indoor and outdoor pool at her place of residence. Tries to exercise on  bike as well. Discussed increased water intake to assist in orthostasis, overall health, and dry mouth. Discussed smoking cessation today including behavioral changes.     May increase dose of Lamictal (lamotrigine) up to 200 mg by mouth in AM and 200 mg by mouth in PM, but due to risk of dose dependent cognitive changes, will wait on this. We will work on maximizing her Cymbalta (duloxetine). Will also work on improving sleep.     Discussed Ativan (lorazepam) taking only as needed again as I do not recommend long term use of this medication. She was going to try to do this over the next few weeks. She finds limited improvement when she takes this medication. Controlled Substance Agreement should remain in place with Primary Care Provider. Risks and benefits of medication reviewed today. The Black Box Warning for use of benzodiazepines was reviewed today. Patients taking opioids with benzodiazepines, other CNS depressant medicines, or alcohol, and caregivers of these patients, should seek medical attention immediately if they or someone they are caring for experiences symptoms of unusual dizziness or lightheadedness, extreme sleepiness, slowed or difficult breathing, or unresponsiveness.      Treatment Plan:    Increase Cymbalta (duloxetine) to 120 mg by mouth daily for depression and anxiety.     Increase Seroquel (quetiapine)  mg by mouth daily at bedtime.     Continue Lamictal (lamotrigine) 150 mg by mouth in AM and PM.    Continue Wellbutrin (buproprion)  mg by mouth daily in AM.     Continue Ativan (lorazepam) 1 mg up to 3 times per day as needed for panic only per primary care provider.    Start Nicotine Patches for smoking cessation.     Continue all other medications as reviewed per electronic medical record today.     All questions addressed. Education and counseling completed regarding risks and benefits of medications and psychotherapy options.    Safety plan was reviewed. To the Emergency  Department as needed or call after hours crisis line at [phone] or [phone].     Continue individual/group therapy as planned with Lupis Mcgee     Schedule an appointment with me in 6-8 weeks or sooner as needed. Call [facility] at [phone] to schedule.    Follow up with primary care provider as planned or for acute medical concerns.    Call the psychiatric nurse line with medication questions or concerns at [phone].    My Practice Policy was reviewed and signed: YES     MyChart may be used to communicate with your provider, but this is not intended to be used for emergencies.    Administrative Billing:   Time spent with patient was 30 minutes and greater than 50% of time or 20 minutes was spent in counseling and coordination of care.    Patient Status:  Patient will continue to be seen for ongoing consultation and stabilization.    Signed:   Leana Patel, PhD, APRN, CNP   Psychiatry

## 2017-06-07 ASSESSMENT — ANXIETY QUESTIONNAIRES: GAD7 TOTAL SCORE: 18

## 2017-06-07 ASSESSMENT — PATIENT HEALTH QUESTIONNAIRE - PHQ9: SUM OF ALL RESPONSES TO PHQ QUESTIONS 1-9: 22

## 2017-06-08 DIAGNOSIS — M54.59 MECHANICAL LOW BACK PAIN: ICD-10-CM

## 2017-06-08 NOTE — TELEPHONE ENCOUNTER
Controlled Substance Refill Request    HYDROCODONE AND LOREZAPM    Problem List Complete:  No     PROVIDER TO CONSIDER COMPLETION OF PROBLEM LIST AND OVERVIEW/CONTROLLED SUBSTANCE AGREEMENT    Last Office Visit with G primary care provider: 05/17/17    Next 5 appointments (look out 90 days)     Jun 13, 2017 11:30 AM CDT   Return Visit with MAURY Lemos   [facility] ([facility])    [address] [address] [phone]             Controlled substance agreement on file: ?     Processing:  Staff will hand deliver Rx to on-site pharmacy      checked in past 6 months: ?

## 2017-06-12 RX ORDER — HYDROCODONE BITARTRATE AND ACETAMINOPHEN 5; 325 MG/1; MG/1
TABLET ORAL
Qty: 30 TABLET | Refills: 0 | Status: SHIPPED | OUTPATIENT
Start: 2017-06-12 | End: 2017-06-26

## 2017-06-13 ENCOUNTER — OFFICE VISIT (OUTPATIENT)
Dept: BEHAVIORAL HEALTH | Facility: CLINIC | Age: 62
End: 2017-06-13
Payer: MEDICARE

## 2017-06-13 DIAGNOSIS — F41.9 ANXIETY: ICD-10-CM

## 2017-06-13 DIAGNOSIS — F43.10 PTSD (POST-TRAUMATIC STRESS DISORDER): ICD-10-CM

## 2017-06-13 DIAGNOSIS — F31.81 BIPOLAR 2 DISORDER (H): Primary | ICD-10-CM

## 2017-06-13 DIAGNOSIS — F41.1 GAD (GENERALIZED ANXIETY DISORDER): ICD-10-CM

## 2017-06-13 PROCEDURE — 90834 PSYTX W PT 45 MINUTES: CPT | Performed by: SOCIAL WORKER

## 2017-06-13 RX ORDER — LORAZEPAM 1 MG/1
1 TABLET ORAL 3 TIMES DAILY PRN
Qty: 90 TABLET | Refills: 0 | Status: SHIPPED | OUTPATIENT
Start: 2017-06-13 | End: 2017-07-14

## 2017-06-13 ASSESSMENT — ANXIETY QUESTIONNAIRES
6. BECOMING EASILY ANNOYED OR IRRITABLE: MORE THAN HALF THE DAYS
GAD7 TOTAL SCORE: 20
1. FEELING NERVOUS, ANXIOUS, OR ON EDGE: NEARLY EVERY DAY
7. FEELING AFRAID AS IF SOMETHING AWFUL MIGHT HAPPEN: NEARLY EVERY DAY
2. NOT BEING ABLE TO STOP OR CONTROL WORRYING: NEARLY EVERY DAY
IF YOU CHECKED OFF ANY PROBLEMS ON THIS QUESTIONNAIRE, HOW DIFFICULT HAVE THESE PROBLEMS MADE IT FOR YOU TO DO YOUR WORK, TAKE CARE OF THINGS AT HOME, OR GET ALONG WITH OTHER PEOPLE: VERY DIFFICULT
3. WORRYING TOO MUCH ABOUT DIFFERENT THINGS: NEARLY EVERY DAY
5. BEING SO RESTLESS THAT IT IS HARD TO SIT STILL: NEARLY EVERY DAY

## 2017-06-13 ASSESSMENT — PATIENT HEALTH QUESTIONNAIRE - PHQ9: 5. POOR APPETITE OR OVEREATING: NEARLY EVERY DAY

## 2017-06-13 NOTE — MR AVS SNAPSHOT
MRN:[id number]                      After Visit Summary   6/13/2017    Karen Alex    MRN: [id number]           Visit Information        Provider Department      6/13/2017 11:30 AM Lupis Mcgee LICSW [facility] Generic      Your next 10 appointments already scheduled     Jul 18, 2017 10:30 AM CDT   Return Visit with MAURY Lemos   [facility] ([facility])    [address] [place] [id number] [phone]              MyChart Information     [employer] gives you secure access to your electronic health record. If you see a primary care provider, you can also send messages to your care team and make appointments. If you have questions, please call your primary care clinic.  If you do not have a primary care provider, please call [phone] and they will assist you.        Care EveryWhere ID     This is your Care EveryWhere ID. This could be used by other organizations to access your [facility] medical records  [id number]

## 2017-06-13 NOTE — TELEPHONE ENCOUNTER
Controlled Substance Refill Request for Ativan  Problem List Complete:  Yes    Patient is followed by OKSANA ANTONIO for ongoing prescription of benzodiazepines.  All refills should be approved by this provider, or covering partner.    Medication(s): Lorazepam.   Maximum quantity per month: 90  Clinic visit frequency required: Q 3 months Last Office Visit: 5/15/17    Controlled substance agreement on file: Yes-7/17/15  Benzodiazepine use reviewed by psychiatry:  No    Last [facility] website verification:  done on 6/24/16   [email or web address]         checked in past 6 months?  Yes 5/15/17         Georgie Shabazz, Pharmacy [facility]

## 2017-06-13 NOTE — PROGRESS NOTES
Progress Note    Client Name: Karen Alex  Date: 6/13/2017                                Service Type: Individual      Session Start Time:  11:33  Session End Time: 12:30      Session Length: 45 - 50     Session #: 21     Attendees: Client attended alone    Treatment Plan Last Completed Date: 5/16/17  PHQ-9 / HUNG-7 Last Completed Date: completed 6/13/2017                     DATA      Progress Since Last Session (Related to Symptoms / Goals / Homework):   Symptoms: Stable- remains consistent with her PHQ-9 and HUNG-7 scores    Homework: Achieved / completed to satisfaction- managing with out her son present      Episode of Care Goals: Minimal progress - ACTION (Actively working towards change); Intervened by reinforcing change plan / affirming steps taken     Current / Ongoing Stressors and Concerns:   Client reports that anxiety has been present.  BF quite his job.  Has been working on setting boundaries on him to not spend money.  He minimally contributes to grocery shopping but lives and support  her.  No changes with other relationships.  Talked about what continues to keep her stuck in her depressive and anxiety symptoms.  Finances continue to be a huge barrier for her as well as her  physical health.  Client reports that she is altogether eliminating tobacco in hopes of getting a lung transplant.  She is on the waitlist for senior housing.      Treatment Objective(s) Addressed in This Session:   focus on being socially active and not isolating at home due to medical condition   Using acceptance skills to understand her level of depression and what she has control over       Intervention:   CBT: Encourage client to focus on present and goals for future        ASSESSMENT: Current Emotional / Mental Status (status of significant symptoms):   Risk status (Self / Other harm or suicidal ideation)  Client denies a history of suicidal ideation, suicide attempts,  self-injurious behavior, homicidal ideation, homicidal behavior and and other safety concerns   Client denies current fears or concerns for personal safety.   Client reports the following current or recent suicidal ideation or behaviors: passive thoughts of not wanting to live.  Denies any plan or intent.  Voices are not directing her.   Client denies current or recent homicidal ideation or behaviors.   Client denies current or recent self injurious behavior or ideation.   Client denies other safety concerns.   A safety and risk management plan has not been developed at this time, however client was given the after-hours number should there be a change in any of these risk factors.     Appearance:   Appropriate    Eye Contact:   Fair    Psychomotor Behavior: Normal    Attitude:   Cooperative    Orientation:   All   Speech    Rate / Production: Normal     Volume:  Soft    Mood:    Anxious  Depressed    Affect:    Flat    Thought Content:  Hallucinations    Thought Form:  Ruminations about past and her losses    Insight:    Fair      Medication Review:   No changes to current psychiatric medication(s)     Medication Compliance:   Yes     Changes in Health Issues:   Yes: Respiratory Disease, No Psychological Distress     Chemical Use Review:   Substance Use: Chemical use reviewed, no active concerns identified      Tobacco Use: No current tobacco use.         Collateral Reports Completed:   Not Applicable    PLAN: (Client Tasks / Therapist Tasks / Other)  1.   Client will work on her boundary with her partner and focus more on her health, walking and not smoking to increase chance for lung transplant.    MAURY Lemos     __________________________________________________________________________________________________                                                           Treatment Plan    Client's Name: Karen Alex  YOB: 1955    Date: 9/22/2015       DSM-V Diagnoses: 296.32  - Major Depressive Disorder, Recurrent, Moderate    300.02 - Generalized Anxiety Disorder    309.81 - Posttraumatic Stress Disorder By History; 799.9 - Deferred Diagnosis   WHODAS:  35    Referral / Collaboration:  Referral to another professional/service is not indicated at this time..    Anticipated number of session or this episode of care: 12      MeasurableTreatment Goal(s) related to diagnosis / functional impairment(s)  Goal 1: Client will have stability with her mental health as evidenced by PHQ-9 and HUNG-7 scores as well as self-report.      I will know I've met my goal when I start feeling better about myself.      Objective #A (Client Action)      Status: Continued - Date(s): 5/16/2017          Client will Decrease frequency and intensity of feeling down, depressed, hopeless.    Intervention(s)  Therapist will assign homework by learning to build awareness of her triggers which activate her mental health symptoms.  Therapist will introduce and have client implement strategies to address triggers.    Objective #B  Client will Identify negative self-talk and behaviors: challenge core beliefs, myths, and actions.  Status: Continued - Date(s): 5/16/2017        Intervention(s)  Therapist will continue to help client identify and reframe negative perceptions of self.  Work on ways to increase self-esteem.    Objective #C  Client will Feel less tired and more energy during the day .  Status: Completed - Date: 3/2/16     Intervention(s)  Therapist will assign homework 1.  Complete at least one household activity daily; 2.  Focus on getting out of the house at least three times weekly; 3.  Look into entering social groups.      Client has reviewed and agreed to the above plan.      Lupis Mcgee, [facility]  September 22, 2015

## 2017-06-14 ASSESSMENT — ANXIETY QUESTIONNAIRES: GAD7 TOTAL SCORE: 20

## 2017-06-14 ASSESSMENT — PATIENT HEALTH QUESTIONNAIRE - PHQ9: SUM OF ALL RESPONSES TO PHQ QUESTIONS 1-9: 22

## 2017-06-26 DIAGNOSIS — M54.59 MECHANICAL LOW BACK PAIN: ICD-10-CM

## 2017-06-26 RX ORDER — HYDROCODONE BITARTRATE AND ACETAMINOPHEN 5; 325 MG/1; MG/1
TABLET ORAL
Qty: 30 TABLET | Refills: 0 | Status: SHIPPED | OUTPATIENT
Start: 2017-06-26 | End: 2017-07-13

## 2017-06-26 NOTE — TELEPHONE ENCOUNTER
Controlled Substance Refill Request for Norco  Problem List Complete:  Yes    Patient is followed by OKSANA ANTONIO for ongoing prescription of pain medication.  All refills should be approved by this provider, or covering partner.    Medication(s): Norco.   Maximum quantity per month: 40  Clinic visit frequency required: Q 3 months Last Office Visit: 5/15/17    Controlled substance agreement on file: Yes       Date(s): 7/16/15    Pain Clinic evaluation in the past: No       Date(s):  n/a       Location(s):  n/a    DIRE Total Score(s):  No flowsheet data found.    Last [facility] website verification: 1/7/16   [email or web address]     checked in past 6 months?  Yes 5/15/17     Last Picked up: 6/13/17    Georgie Shabazz, Pharmacy [facility]

## 2017-07-08 DIAGNOSIS — F31.81 BIPOLAR 2 DISORDER (H): ICD-10-CM

## 2017-07-08 NOTE — TELEPHONE ENCOUNTER
Pending Prescriptions:                       Disp   Refills    lamoTRIgine (LAMICTAL) 150 MG tablet [Pha*60 tab*1            Sig: TAKE 1 TABLET (150 MG) BY MOUTH 2 TIMES DAILY              Last Written Prescription Date:  6/6/2017  Last Fill Quantity: 60,   # refills: 1  Last [facility] Visit with [facility] prescribing provider: 5/15/2017Kesha    Future Office visit:    Next 5 appointments (look out 90 days)     Jul 18, 2017 10:30 AM CDT   Return Visit with Lupis Mcgee, [facility] ([facility])    [address] [address] [phone]                   Routing refill request to provider for review/approval because:  Drug not on the [facility] or  Health refill protocol or controlled substance

## 2017-07-10 ENCOUNTER — TELEPHONE (OUTPATIENT)
Dept: PSYCHIATRY | Facility: CLINIC | Age: 62
End: 2017-07-10

## 2017-07-10 DIAGNOSIS — F33.41 MAJOR DEPRESSIVE DISORDER, RECURRENT EPISODE, IN PARTIAL REMISSION (H): ICD-10-CM

## 2017-07-10 DIAGNOSIS — F41.1 GAD (GENERALIZED ANXIETY DISORDER): ICD-10-CM

## 2017-07-10 DIAGNOSIS — F31.81 BIPOLAR 2 DISORDER (H): ICD-10-CM

## 2017-07-10 RX ORDER — LAMOTRIGINE 150 MG/1
TABLET ORAL
Refills: 0
Start: 2017-07-10

## 2017-07-10 RX ORDER — LAMOTRIGINE 150 MG/1
150 TABLET ORAL 2 TIMES DAILY
Qty: 60 TABLET | Refills: 1 | Status: SHIPPED | OUTPATIENT
Start: 2017-07-10 | End: 2017-09-05

## 2017-07-10 RX ORDER — DULOXETIN HYDROCHLORIDE 60 MG/1
120 CAPSULE, DELAYED RELEASE ORAL DAILY
Qty: 60 CAPSULE | Refills: 1 | Status: SHIPPED | OUTPATIENT
Start: 2017-07-10 | End: 2017-01-01

## 2017-07-10 NOTE — TELEPHONE ENCOUNTER
Reason for call:  Other   Patient called regarding (reason for call): prescription  Additional comments: Needs refill of lamictal.    Phone number to reach patient:  Cell number on file:    Telephone Information:   Mobile [phone]       Best Time:  anytime    Can we leave a detailed message on this number?  YES

## 2017-07-10 NOTE — TELEPHONE ENCOUNTER
Denied with note to [facility] pharmacist:   Please contact patient whether transfer Rx. She has refill on file at [facility].  Chaim Slaughter RN

## 2017-07-10 NOTE — TELEPHONE ENCOUNTER
Pt takes Duloxetine in 2 rx's    Last Written Prescription Date:   Last Fill Quantity: , # refills:   Last Office Visit with [facility], [facility] or  Health prescribing provider:    Next 5 appointments (look out 90 days)     Jul 18, 2017 10:30 AM CDT   Return Visit with MAURY Lemos   [facility] ([facility])    [address] [address] [phone]            Aug 11, 2017 10:15 AM CDT   Return Visit with Leana Patel NP   [facility]    [address]  [address]  [facility] [address] [phone]                   BP Readings from Last 3 Encounters:   06/06/17 90/60   05/15/17 108/74   03/09/17 100/60     Pulse: (for Fetzima)  Creatinine   Date Value Ref Range Status   05/15/2017 0.71 0.52 - 1.04 mg/dL Final   ]    Last PHQ-9 score on record=   PHQ-9 SCORE 6/13/2017   Total Score 22

## 2017-07-10 NOTE — TELEPHONE ENCOUNTER
RX refill of Lamictal done per RN protocol.  Patient notified.         Next 5 appointments (look out 90 days)     Jul 18, 2017 10:30 AM CDT   Return Visit with MAURY Lemos   [facility] ([facility])    [address] [id number] [phone]            Aug 11, 2017 10:15 AM CDT   Return Visit with Leana Patel NP   [facility] ([facility])    [address]  [address]  [facility] [address] [phone]                   BP Readings from Last 3 Encounters:   06/06/17 90/60   05/15/17 108/74   03/09/17 100/60

## 2017-07-13 DIAGNOSIS — M54.59 MECHANICAL LOW BACK PAIN: ICD-10-CM

## 2017-07-13 NOTE — TELEPHONE ENCOUNTER
Norco 5/325      Last Written Prescription Date:  6/26/17  Last Fill Quantity: 30,   # refills: 0  Last Office Visit with [facility], [facility] or [facility] prescribing provider: 5/15/17  Future Office visit:    Next 5 appointments (look out 90 days)     Jul 18, 2017 10:30 AM CDT   Return Visit with MAURY Lemos   [facility] ([facility])    [address] [address] [phone]            Aug 11, 2017 10:15 AM CDT   Return Visit with Leana Patel NP   [facility]    [address]  [address]  [facility] [address] [phone]                   Routing refill request to provider for review/approval because:  Drug not on the [facility], [facility] or [facility] refill protocol or controlled substance      Thank you,  Cee Hook CPhT  On behalf of [facility]

## 2017-07-14 ENCOUNTER — TELEPHONE (OUTPATIENT)
Dept: PSYCHIATRY | Facility: CLINIC | Age: 62
End: 2017-07-14

## 2017-07-14 DIAGNOSIS — F41.9 ANXIETY: ICD-10-CM

## 2017-07-14 RX ORDER — LORAZEPAM 1 MG/1
1 TABLET ORAL 3 TIMES DAILY PRN
Qty: 90 TABLET | Refills: 0 | Status: SHIPPED | OUTPATIENT
Start: 2017-07-14 | End: 2017-08-10

## 2017-07-14 NOTE — TELEPHONE ENCOUNTER
Reason for call:  Other   Patient called regarding (reason for call): prescription  Additional comments: Patient has a question regarding to dosage of her medication.     Phone number to reach patient:  Home number on file [phone] (home)    Best Time:  any    Can we leave a detailed message on this number?  YES

## 2017-07-14 NOTE — TELEPHONE ENCOUNTER
Controlled Substance Refill Request for Lorazepam 1  Problem List Complete:  Yes    Patient is followed by OKSANA ANTONIO for ongoing prescription of benzodiazepines.  All refills should be approved by this provider, or covering partner.    Medication(s): Lorazepam.   Maximum quantity per month: 90  Clinic visit frequency required: Q 3 months     Controlled substance agreement on file: Yes-7/17/15  Benzodiazepine use reviewed by psychiatry:  No    Last [facility] website verification:  done on 6/24/16   [email or web address]       checked in past 6 months?  No, route to RN     Please walk signed prescription to pharmacy.  Thanks.  Modesta Lizama, [facility]  [phone]

## 2017-07-17 RX ORDER — HYDROCODONE BITARTRATE AND ACETAMINOPHEN 5; 325 MG/1; MG/1
TABLET ORAL
Qty: 30 TABLET | Refills: 0 | Status: SHIPPED | OUTPATIENT
Start: 2017-07-17 | End: 2017-07-28

## 2017-07-18 ENCOUNTER — OFFICE VISIT (OUTPATIENT)
Dept: BEHAVIORAL HEALTH | Facility: CLINIC | Age: 62
End: 2017-07-18
Payer: MEDICARE

## 2017-07-18 DIAGNOSIS — F31.81 BIPOLAR 2 DISORDER (H): Primary | ICD-10-CM

## 2017-07-18 DIAGNOSIS — F43.10 PTSD (POST-TRAUMATIC STRESS DISORDER): ICD-10-CM

## 2017-07-18 DIAGNOSIS — F41.1 GAD (GENERALIZED ANXIETY DISORDER): ICD-10-CM

## 2017-07-18 PROCEDURE — 90834 PSYTX W PT 45 MINUTES: CPT | Performed by: SOCIAL WORKER

## 2017-07-18 ASSESSMENT — ANXIETY QUESTIONNAIRES
7. FEELING AFRAID AS IF SOMETHING AWFUL MIGHT HAPPEN: SEVERAL DAYS
6. BECOMING EASILY ANNOYED OR IRRITABLE: NEARLY EVERY DAY
2. NOT BEING ABLE TO STOP OR CONTROL WORRYING: NEARLY EVERY DAY
3. WORRYING TOO MUCH ABOUT DIFFERENT THINGS: NEARLY EVERY DAY
IF YOU CHECKED OFF ANY PROBLEMS ON THIS QUESTIONNAIRE, HOW DIFFICULT HAVE THESE PROBLEMS MADE IT FOR YOU TO DO YOUR WORK, TAKE CARE OF THINGS AT HOME, OR GET ALONG WITH OTHER PEOPLE: VERY DIFFICULT
1. FEELING NERVOUS, ANXIOUS, OR ON EDGE: NEARLY EVERY DAY
5. BEING SO RESTLESS THAT IT IS HARD TO SIT STILL: NEARLY EVERY DAY
GAD7 TOTAL SCORE: 19

## 2017-07-18 ASSESSMENT — PATIENT HEALTH QUESTIONNAIRE - PHQ9: 5. POOR APPETITE OR OVEREATING: NEARLY EVERY DAY

## 2017-07-18 NOTE — PROGRESS NOTES
Progress Note    Client Name: Karen Alex  Date: 2017                                 Service Type: Individual      Session Start Time:  11:33  Session End Time: 12:30      Session Length: 45 - 50     Session #: 22     Attendees: Client attended alone    Treatment Plan Last Completed Date: 17  PHQ-9 / HUNG-7 Last Completed Date: completed 2017                      DATA      Progress Since Last Session (Related to Symptoms / Goals / Homework):   Symptoms: Stable- remains consistent with her PHQ-9 and HUNG-7 scores     Homework: Partially completed- still needs to work towards goals      Episode of Care Goals: Minimal progress - ACTION (Actively working towards change); Intervened by reinforcing change plan / affirming steps taken     Current / Ongoing Stressors and Concerns:   Client reports that stress continues to linger.  She had a friend passed away a couple of days ago- plans to go to the .  Bf continues to not have a job- he has financial concerns and relies on her financial support.  Client continues to spend time with granddaughters and son. Discussed some of the stress in the relationship with bf and encouraged the client to continue to set boundaries.       Treatment Objective(s) Addressed in This Session:   focus on being socially active and not isolating at home due to medical condition   Using acceptance skills to understand her level of depression and what she has control over       Intervention:   CBT: Encourage client to focus on present and goals for future        ASSESSMENT: Current Emotional / Mental Status (status of significant symptoms):   Risk status (Self / Other harm or suicidal ideation)  Client denies a history of suicidal ideation, suicide attempts, self-injurious behavior, homicidal ideation, homicidal behavior and and other safety concerns   Client denies current fears or concerns for personal safety.   Client reports  the following current or recent suicidal ideation or behaviors: passive thoughts of not wanting to live.  Denies any plan or intent.  Voices are not directing her.   Client denies current or recent homicidal ideation or behaviors.   Client denies current or recent self injurious behavior or ideation.   Client denies other safety concerns.   A safety and risk management plan has not been developed at this time, however client was given the after-hours number should there be a change in any of these risk factors.     Appearance:   Appropriate    Eye Contact:   Fair    Psychomotor Behavior: Normal    Attitude:   Cooperative    Orientation:   All   Speech    Rate / Production: Normal     Volume:  Soft    Mood:    Anxious  Depressed    Affect:    Flat    Thought Content:  Hallucinations    Thought Form:  Ruminations about past and her losses    Insight:    Fair      Medication Review:   No changes to current psychiatric medication(s)     Medication Compliance:   Yes     Changes in Health Issues:   Yes: Respiratory Disease, No Psychological Distress     Chemical Use Review:   Substance Use: Chemical use reviewed, no active concerns identified      Tobacco Use: No change in amount of tobacco use since last session.  Provided encouragement to quit  (smokes still on occassions)       Collateral Reports Completed:   Not Applicable    PLAN: (Client Tasks / Therapist Tasks / Other)  1.   Client will work on her boundary with her partner and focus more on her health, walking and not smoking to increase chance for lung transplant.    Lupis Mcgee, [facility]     __________________________________________________________________________________________________                                                           Treatment Plan    Client's Name: Karen Alex  YOB: 1955    Date: 9/22/2015       DSM-V Diagnoses: 296.32 - Major Depressive Disorder, Recurrent, Moderate    300.02 - Generalized Anxiety  Disorder    309.81 - Posttraumatic Stress Disorder By History; 799.9 - Deferred Diagnosis   WHODAS:  35    Referral / Collaboration:  Referral to another professional/service is not indicated at this time..    Anticipated number of session or this episode of care: 12      MeasurableTreatment Goal(s) related to diagnosis / functional impairment(s)  Goal 1: Client will have stability with her mental health as evidenced by PHQ-9 and HUNG-7 scores as well as self-report.      I will know I've met my goal when I start feeling better about myself.      Objective #A (Client Action)      Status: Continued - Date(s): 5/16/2017          Client will Decrease frequency and intensity of feeling down, depressed, hopeless.    Intervention(s)  Therapist will assign homework by learning to build awareness of her triggers which activate her mental health symptoms.  Therapist will introduce and have client implement strategies to address triggers.    Objective #B  Client will Identify negative self-talk and behaviors: challenge core beliefs, myths, and actions.  Status: Continued - Date(s): 5/16/2017        Intervention(s)  Therapist will continue to help client identify and reframe negative perceptions of self.  Work on ways to increase self-esteem.    Objective #C  Client will Feel less tired and more energy during the day .  Status: Completed - Date: 3/2/16     Intervention(s)  Therapist will assign homework 1.  Complete at least one household activity daily; 2.  Focus on getting out of the house at least three times weekly; 3.  Look into entering social groups.      Client has reviewed and agreed to the above plan.      Lupis Mcgee, [facility]  September 22, 2015

## 2017-07-18 NOTE — MR AVS SNAPSHOT
MRN:[id number]                      After Visit Summary   7/18/2017    Karen Alex    MRN: [id number]           Visit Information        Provider Department      7/18/2017 10:30 AM Lupis Mcgee, MAURY [facility]      Your next 10 appointments already scheduled     Aug 11, 2017 10:15 AM CDT   [facility]el [facility]    [address]  [address]  [facility] [address] [phone]            Aug 28, 2017 11:30 AM CDT   Return Visit with Lupis Symoneashleigh MAURY Monteiro   [facility] ([facility])    [address] [address] [phone]              MyChart Information     [facility] gives you secure access to your electronic health record. If you see a primary care provider, you can also send messages to your care team and make appointments. If you have questions, please call your primary care clinic.  If you do not have a primary care provider, please call [phone] and they will assist you.        Care EveryWhere ID     This is your Care EveryWhere ID. This could be used by other organizations to access your [place] medical records  [id number]        Equal Access to Services     BRANDYN TURNER : Hadii omid woodard hadasho Soomaali, waaxda luqadaha, qaybta kaalmada adeegyada, rj ace. So [facility] [phone].    ATENCIÓN: Si habla español, tiene a zaragoza disposición servicios gratuitos de asistencia lingüística. Llame al [phone].    We comply with applicable federal civil rights laws and [place] laws. We do not discriminate on the basis of race, color, national origin, age, disability sex, sexual orientation or gender identity.

## 2017-07-19 ASSESSMENT — PATIENT HEALTH QUESTIONNAIRE - PHQ9: SUM OF ALL RESPONSES TO PHQ QUESTIONS 1-9: 22

## 2017-07-19 ASSESSMENT — ANXIETY QUESTIONNAIRES: GAD7 TOTAL SCORE: 19

## 2017-07-28 DIAGNOSIS — M54.59 MECHANICAL LOW BACK PAIN: ICD-10-CM

## 2017-07-28 NOTE — TELEPHONE ENCOUNTER
Controlled Substance Refill Request for HYDROcodone-acetaminophen (NORCO) 5-325 MG per tablet  Problem List Complete:  No     PROVIDER TO CONSIDER COMPLETION OF PROBLEM LIST AND OVERVIEW/CONTROLLED SUBSTANCE AGREEMENT    Last Written Prescription Date:  07/17/17  Last Fill Quantity: 30,   # refills: 0    Last Office Visit with [facility] primary care provider: Dr. Rebolledo, 05/15/17    Future Office visit:   Next 5 appointments (look out 90 days)     Aug 11, 2017 10:15 AM CDT   Return Visit with Leana Patel NP   [facility]    [address] [place] [address] [phone]            Aug 28, 2017 11:30 AM CDT   Return Visit with MAURY Lemos   [facility] ([facility])    [address] [id number] [phone]                  Controlled substance agreement on file: Yes:  Date 10/28/16.     Processing:  Staff will hand deliver Rx to on-site pharmacy     checked in past 6 months?  No, route to RN

## 2017-07-30 DIAGNOSIS — F31.81 BIPOLAR 2 DISORDER (H): ICD-10-CM

## 2017-07-31 NOTE — TELEPHONE ENCOUNTER
Pending Prescriptions:                       Disp   Refills    lamoTRIgine (LAMICTAL) 150 MG tablet [Pha*60 tab*1            Sig: TAKE 1 TABLET (150 MG) BY MOUTH 2 TIMES DAILY              Last Written Prescription Date:  7/10/2017  Last Fill Quantity: 60,   # refills: 1  Last Office Visit with [facility] prescribing provider: 5/15/2017Kesha Office visit:    Next 5 appointments (look out 90 days)     Aug 11, 2017 10:15 AM CDT   Return Visit with Leana Patel NP   [facility]    [address] [place] [address] [phone]            Aug 28, 2017 11:30 AM CDT   Return Visit with MAURY Lemos   [facility] ([facility])    [address] [id number] [phone]                   Routing refill request to provider for review/approval because:  Drug not on the [facility], [facility] or [facility] refill protocol or controlled substance

## 2017-08-01 RX ORDER — HYDROCODONE BITARTRATE AND ACETAMINOPHEN 5; 325 MG/1; MG/1
TABLET ORAL
Qty: 30 TABLET | Refills: 0 | Status: SHIPPED | OUTPATIENT
Start: 2017-08-01 | End: 2017-08-16

## 2017-08-01 NOTE — TELEPHONE ENCOUNTER
Last fill 7/17/17 Dr. Rebolledo #30.   updated and nothing concerning and no unknown providers noted.  Not [facility] med.  Sent to provider.  Kailey Guevara RN    Patient is followed by OKSANA REBOLLEDO for ongoing prescription of pain medication.  All refills should be approved by this provider, or covering partner.    Medication(s): Norco.   Maximum quantity per month: 40  Clinic visit frequency required: Q 3 months     Controlled substance agreement on file: Yes       Date(s): 7/16/15    Pain Clinic evaluation in the past: No       Date(s):  n/a       Location(s):  n/a    DIRE Total Score(s):  No flowsheet data found.    Last [facility] website verification: 8/1/17   [email or web address]

## 2017-08-02 RX ORDER — LAMOTRIGINE 150 MG/1
TABLET ORAL
Qty: 60 TABLET | Refills: 1 | Status: SHIPPED | OUTPATIENT
Start: 2017-08-02 | End: 2017-08-11

## 2017-08-04 ENCOUNTER — TELEPHONE (OUTPATIENT)
Dept: FAMILY MEDICINE | Facility: CLINIC | Age: 62
End: 2017-08-04

## 2017-08-10 ENCOUNTER — TELEPHONE (OUTPATIENT)
Dept: FAMILY MEDICINE | Facility: CLINIC | Age: 62
End: 2017-08-10

## 2017-08-10 DIAGNOSIS — F41.9 ANXIETY: ICD-10-CM

## 2017-08-10 RX ORDER — LORAZEPAM 1 MG/1
1 TABLET ORAL 3 TIMES DAILY PRN
Qty: 90 TABLET | Refills: 0 | Status: SHIPPED | OUTPATIENT
Start: 2017-08-10 | End: 2017-08-15

## 2017-08-10 NOTE — TELEPHONE ENCOUNTER
Disp Refills Start End GIBRAN   LORazepam (ATIVAN) 1 MG tablet 90 tablet 0 2017  No   Sig: Take 1 tablet (1 mg) by mouth 3 times daily as needed for anxiety     Pt calling in tears, her sister  this morning and she is upset of course  Requesting something to help calm her    Requesting either refill or something similar    # [phone] OK to LM    Route to Dr Holguin covering for Dr Kesha Taylor RN, BS  Clinical Nurse Triage.

## 2017-08-11 ENCOUNTER — OFFICE VISIT (OUTPATIENT)
Dept: PSYCHIATRY | Facility: CLINIC | Age: 62
End: 2017-08-11
Payer: MEDICARE

## 2017-08-11 VITALS
SYSTOLIC BLOOD PRESSURE: 110 MMHG | WEIGHT: 194 LBS | OXYGEN SATURATION: 94 % | HEART RATE: 108 BPM | BODY MASS INDEX: 30.38 KG/M2 | TEMPERATURE: 98.8 F | DIASTOLIC BLOOD PRESSURE: 80 MMHG

## 2017-08-11 DIAGNOSIS — F33.41 MAJOR DEPRESSIVE DISORDER, RECURRENT EPISODE, IN PARTIAL REMISSION (H): ICD-10-CM

## 2017-08-11 DIAGNOSIS — F43.21 GRIEF: ICD-10-CM

## 2017-08-11 DIAGNOSIS — F41.1 GAD (GENERALIZED ANXIETY DISORDER): ICD-10-CM

## 2017-08-11 DIAGNOSIS — F33.3 SEVERE EPISODE OF RECURRENT MAJOR DEPRESSIVE DISORDER, WITH PSYCHOTIC FEATURES (H): Primary | ICD-10-CM

## 2017-08-11 PROCEDURE — 99214 OFFICE O/P EST MOD 30 MIN: CPT | Performed by: NURSE PRACTITIONER

## 2017-08-11 RX ORDER — QUETIAPINE FUMARATE 200 MG/1
200 TABLET, FILM COATED ORAL AT BEDTIME
Qty: 30 TABLET | Refills: 1 | Status: SHIPPED | OUTPATIENT
Start: 2017-08-11 | End: 2017-01-01

## 2017-08-11 RX ORDER — QUETIAPINE FUMARATE 50 MG/1
25-50 TABLET, FILM COATED ORAL 2 TIMES DAILY PRN
Qty: 60 TABLET | Refills: 1 | Status: SHIPPED | OUTPATIENT
Start: 2017-08-11 | End: 2017-01-01

## 2017-08-11 RX ORDER — BUPROPION HYDROCHLORIDE 300 MG/1
300 TABLET ORAL EVERY MORNING
Qty: 30 TABLET | Refills: 1 | Status: SHIPPED | OUTPATIENT
Start: 2017-08-11 | End: 2017-01-01

## 2017-08-11 ASSESSMENT — ANXIETY QUESTIONNAIRES
1. FEELING NERVOUS, ANXIOUS, OR ON EDGE: NEARLY EVERY DAY
IF YOU CHECKED OFF ANY PROBLEMS ON THIS QUESTIONNAIRE, HOW DIFFICULT HAVE THESE PROBLEMS MADE IT FOR YOU TO DO YOUR WORK, TAKE CARE OF THINGS AT HOME, OR GET ALONG WITH OTHER PEOPLE: SOMEWHAT DIFFICULT
7. FEELING AFRAID AS IF SOMETHING AWFUL MIGHT HAPPEN: MORE THAN HALF THE DAYS
5. BEING SO RESTLESS THAT IT IS HARD TO SIT STILL: NEARLY EVERY DAY
3. WORRYING TOO MUCH ABOUT DIFFERENT THINGS: NEARLY EVERY DAY
GAD7 TOTAL SCORE: 20
6. BECOMING EASILY ANNOYED OR IRRITABLE: NEARLY EVERY DAY
2. NOT BEING ABLE TO STOP OR CONTROL WORRYING: NEARLY EVERY DAY

## 2017-08-11 ASSESSMENT — PATIENT HEALTH QUESTIONNAIRE - PHQ9
SUM OF ALL RESPONSES TO PHQ QUESTIONS 1-9: 25
5. POOR APPETITE OR OVEREATING: NEARLY EVERY DAY

## 2017-08-11 NOTE — PATIENT INSTRUCTIONS
Treatment Plan:    Continue Cymbalta (duloxetine) 120 mg by mouth daily for depression and anxiety.     Continue Wellbutrin (buproprion)  mg by mouth daily in AM.     Increase Seroquel (quetiapine) to 200 mg by mouth daily at bedtime.      Increase Seroquel (quetiapine) to 25 - 50 mg by mouth up to 2 times per day for anxiety.    Continue Lamictal (lamotrigine) 150 mg by mouth in AM and PM.    Continue Ativan (lorazepam) 1 mg up to 3 times per day as needed for panic only per primary care provider. After using up Ativan (lorazepam), may consider change to Valium (diazepam).     Start Nicotine Patches for smoking cessation.     Continue all other medications as reviewed per electronic medical record today.     All questions addressed. Education and counseling completed regarding risks and benefits of medications and psychotherapy options.    Safety plan was reviewed. To the Emergency Department as needed or call after hours crisis line at [phone] or [phone].     Continue individual/group therapy as planned with Lupis Mcgee.    Schedule an appointment with me in 6-8 weeks or sooner as needed. Call [facility] at [phone] to schedule.    Follow up with primary care provider as planned or for acute medical concerns.    Call the psychiatric nurse line with medication questions or concerns at [phone].    My Practice Policy was reviewed and signed: YES     MyChart may be used to communicate with your provider, but this is not intended to be used for emergencies.

## 2017-08-11 NOTE — MR AVS SNAPSHOT
After Visit Summary   8/11/2017    Karen Alex    MRN: [id number]           Patient Information     Date Of Birth          1955        Visit Information        Provider Department      8/11/2017 10:15 AM Leana Patel NP [facility]        Today's Diagnoses     Severe episode of recurrent major depressive disorder, with psychotic features (H)    -  1    HUNG (generalized anxiety disorder)        Major depressive disorder, recurrent episode, in partial remission (H)          Care Instructions    Treatment Plan:    Continue Cymbalta (duloxetine) 120 mg by mouth daily for depression and anxiety.     Continue Wellbutrin (buproprion)  mg by mouth daily in AM.     Increase Seroquel (quetiapine) to 200 mg by mouth daily at bedtime.      Increase Seroquel (quetiapine) to 25 - 50 mg by mouth up to 2 times per day for anxiety.    Continue Lamictal (lamotrigine) 150 mg by mouth in AM and PM.    Continue Ativan (lorazepam) 1 mg up to 3 times per day as needed for panic only per primary care provider. After using up Ativan (lorazepam), may consider change to Valium (diazepam).     Start Nicotine Patches for smoking cessation.     Continue all other medications as reviewed per electronic medical record today.     All questions addressed. Education and counseling completed regarding risks and benefits of medications and psychotherapy options.    Safety plan was reviewed. To the Emergency Department as needed or call after hours crisis line at [phone] or [phone].     Continue individual/group therapy as planned with Lupis Mcgee.    Schedule an appointment with me in 6-8 weeks or sooner as needed. Call [facility] at [phone] to schedule.    Follow up with primary care provider as planned or for acute medical concerns.    Call the psychiatric nurse line with medication questions or concerns at [phone].    My Practice Policy was reviewed and signed:  YES     [employer] may be used to communicate with your provider, but this is not intended to be used for emergencies.          Follow-ups after your visit        Follow-up notes from your care team     Return in about 6 weeks (around 9/22/2017).      Your next 10 appointments already scheduled     Aug 28, 2017 11:30 AM CDT   Return Visit with MAURY Lemos   [facility] ([facility])    [address] [address] [phone]              Who to contact     If you have questions or need follow up information about today's clinic visit or your schedule please contact [facility] directly at [phone].  Normal or non-critical lab and imaging results will be communicated to you by MyChart, letter or phone within 4 business days after the clinic has received the results. If you do not hear from us within 7 days, please contact the clinic through [employer] or phone. If you have a critical or abnormal lab result, we will notify you by phone as soon as possible.  Submit refill requests through [employer] or call your pharmacy and they will forward the refill request to us. Please allow 3 business days for your refill to be completed.          Additional Information About Your Visit        [employer] Information     [employer] gives you secure access to your electronic health record. If you see a primary care provider, you can also send messages to your care team and make appointments. If you have questions, please call your primary care clinic.  If you do not have a primary care provider, please call [phone] and they will assist you.        Care EveryWhere ID     This is your Care EveryWhere ID. This could be used by other organizations to access your [facility] medical records  [id number]        Your Vitals Were     Pulse Temperature Last Period Pulse Oximetry BMI (Body Mass Index)       108 98.8  F (37.1  C) (Oral)  12/01/2007 94% 30.38 kg/m2        Blood Pressure from Last 3 Encounters:   08/11/17 110/80   06/06/17 90/60   05/15/17 108/74    Weight from Last 3 Encounters:   08/11/17 194 lb (88 kg)   06/06/17 191 lb (86.6 kg)   05/15/17 191 lb 11.2 oz (87 kg)              Today, you had the following     No orders found for display         Today's Medication Changes          These changes are accurate as of: 8/11/17 10:45 AM.  If you have any questions, ask your nurse or doctor.               These medicines have changed or have updated prescriptions.        Dose/Directions    * QUEtiapine 200 MG tablet   Commonly known as:  SEROQUEL   This may have changed:    - medication strength  - how much to take  - additional instructions   Used for:  HUNG (generalized anxiety disorder)   Changed by:  Leana Patel NP        Dose:  200 mg   Take 1 tablet (200 mg) by mouth At Bedtime Increased dose.   Quantity:  30 tablet   Refills:  1       * QUEtiapine 50 MG tablet   Commonly known as:  SEROQUEL   This may have changed:  You were already taking a medication with the same name, and this prescription was added. Make sure you understand how and when to take each.   Used for:  Severe episode of recurrent major depressive disorder, with psychotic features (H), HUNG (generalized anxiety disorder)   Changed by:  Leana Patel NP        Dose:  25-50 mg   Take 0.5-1 tablets (25-50 mg) by mouth 2 times daily as needed For anxiety   Quantity:  60 tablet   Refills:  1       * Notice:  This list has 2 medication(s) that are the same as other medications prescribed for you. Read the directions carefully, and ask your doctor or other care provider to review them with you.         Where to get your medicines      These medications were sent to [facility] [address]  [address] [facility] [address]     Phone:  [phone]     buPROPion 300 MG 24 hr tablet    QUEtiapine 200 MG tablet    QUEtiapine  50 MG tablet                Primary Care Provider Office Phone # Fax #    Eros Bayron Rebolledo [phone] [address] [facility] [address]        Equal Access to Services     AUBREYBRETT YUE : Hadivett omid ku westo Sodinoraali, waaxda luqadaha, qaybta kaalmada aderaymundoyada, rj mitchellsophia ace. So [facility] [phone].    ATENCIÓN: Si habla español, tiene a zaragoza disposición servicios gratuitos de asistencia lingüística. Llame al [phone].    We comply with applicable federal civil rights laws and [place] laws. We do not discriminate on the basis of race, color, national origin, age, disability sex, sexual orientation or gender identity.            Thank you!     Thank you for choosing [facility]  for your care. Our goal is always to provide you with excellent care. Hearing back from our patients is one way we can continue to improve our services. Please take a few minutes to complete the written survey that you may receive in the mail after your visit with us. Thank you!             Your Updated Medication List - Protect others around you: Learn how to safely use, store and throw away your medicines at www.disposemymeds.org.          This list is accurate as of: 8/11/17 10:45 AM.  Always use your most recent med list.                   Brand Name Dispense Instructions for use Diagnosis    ADVAIR DISKUS 250-50 MCG/DOSE diskus inhaler   Generic drug:  fluticasone-salmeterol     3 Inhaler    INHALE ONE PUFF BY MOUTH TWICE A DAY    Chronic obstructive pulmonary disease, unspecified COPD type (H)       * albuterol (2.5 MG/3ML) 0.083% neb solution     120 vial    Take 1 vial (2.5 mg) by nebulization 4 times daily as needed    COPD (chronic obstructive pulmonary disease) (H)       * albuterol 108 (90 BASE) MCG/ACT Inhaler    albuterol    1 Inhaler    Inhale 1-2 puffs into the lungs every 4 hours as needed for shortness of breath / dyspnea    Panlobular emphysema  (H)       ASPIRIN PO      Take 81 mg by mouth daily        atorvastatin 40 MG tablet    LIPITOR    90 tablet    Take 1 tablet (40 mg) by mouth daily    CAD (coronary artery disease)       buPROPion 300 MG 24 hr tablet    WELLBUTRIN XL    30 tablet    Take 1 tablet (300 mg) by mouth every morning    Major depressive disorder, recurrent episode, in partial remission (H)       DULoxetine 60 MG EC capsule    CYMBALTA    60 capsule    Take 2 capsules (120 mg) by mouth daily For mood and anxiety and pain. Increased dose.    HUNG (generalized anxiety disorder), Major depressive disorder, recurrent episode, in partial remission (H)       HYDROcodone-acetaminophen 5-325 MG per tablet    NORCO    30 tablet    Take one two times daily as needed    Mechanical low back pain       ibuprofen 600 MG tablet    ADVIL/MOTRIN    20 tablet    Take 1 tablet (600 mg) by mouth every 6 hours as needed for moderate pain        isosorbide mononitrate 30 MG 24 hr tablet    IMDUR    90 tablet    Take 1 tablet (30 mg) by mouth daily    Chest pain, CAD (coronary artery disease)       lamoTRIgine 150 MG tablet    LAMICTAL    60 tablet    Take 1 tablet (150 mg) by mouth 2 times daily    Bipolar 2 disorder (H)       LORazepam 1 MG tablet    ATIVAN    90 tablet    Take 1 tablet (1 mg) by mouth 3 times daily as needed for anxiety    Anxiety       nicotine 7 MG/24HR 24 hr patch    NICODERM CQ    30 patch    Place 1 patch onto the skin every 24 hours    Tobacco dependence syndrome       NITROSTAT 0.4 MG sublingual tablet   Generic drug:  nitroGLYcerin      prn        * QUEtiapine 200 MG tablet    SEROQUEL    30 tablet    Take 1 tablet (200 mg) by mouth At Bedtime Increased dose.    HUNG (generalized anxiety disorder)       * QUEtiapine 50 MG tablet    SEROQUEL    60 tablet    Take 0.5-1 tablets (25-50 mg) by mouth 2 times daily as needed For anxiety    Severe episode of recurrent major depressive disorder, with psychotic features (H), HUNG (generalized  anxiety disorder)       STOOL SOFTENER PO      Take by mouth daily        tiotropium 18 MCG capsule    SPIRIVA    90 capsule    Inhale 1 capsule (18 mcg) into the lungs daily    Generalized anxiety disorder, Chronic obstructive pulmonary disease, unspecified COPD type (H)       Vitamin D3 2000 UNITS Chew      Take 2,000 mg by mouth daily        * Notice:  This list has 4 medication(s) that are the same as other medications prescribed for you. Read the directions carefully, and ask your doctor or other care provider to review them with you.

## 2017-08-11 NOTE — NURSING NOTE
"Chief Complaint   Patient presents with     RECHECK     pt figidty cannot fall or stay asleep. No help with Lorazepam       Initial /80 (BP Location: Right arm, Patient Position: Chair, Cuff Size: Adult Large)  Pulse 108  Temp 98.8  F (37.1  C) (Oral)  Wt 194 lb (88 kg)  LMP 12/01/2007  SpO2 94%  BMI 30.38 kg/m2 Estimated body mass index is 30.38 kg/(m^2) as calculated from the following:    Height as of 5/15/17: 5' 7\" (1.702 m).    Weight as of this encounter: 194 lb (88 kg).  Medication Reconciliation: complete    "

## 2017-08-11 NOTE — PROGRESS NOTES
"    Outpatient Psychiatric Progress Note    Name: Karen Alex   : 1955                    Primary Care Provider: Eros Rebolledo MD - last visit 5/15/2017  Therapist: Lupis Mcgee  - last visit 2017    PHQ-9 scores:  PHQ-9 SCORE 2017   Total Score 22 22 25       HUNG-7 scores:  HUNG-7 SCORE 2017   Total Score 20 19 20       Patient Identification:  Patient is a 62 year old year old,   White American female  who presents for return visit with me.  Patient is currently disabled. Patient attended the session alone. Patient prefers to be called: \"Kathi\"    Interim History:    I last saw Karen Alex for outpatient psychiatry Return Visit on 2017.     During that appointment, we Increase Cymbalta (duloxetine) to 120 mg by mouth daily for depression and anxiety.     Increase Seroquel (quetiapine)  mg by mouth daily at bedtime.     Continue Lamictal (lamotrigine) 150 mg by mouth in AM and PM.    Continue Wellbutrin (buproprion)  mg by mouth daily in AM.     Continue Ativan (lorazepam) 1 mg up to 3 times per day as needed for panic only per primary care provider.    Start Nicotine Patches for smoking cessation.      Current medications include:   Current Outpatient Prescriptions   Medication Sig     LORazepam (ATIVAN) 1 MG tablet Take 1 tablet (1 mg) by mouth 3 times daily as needed for anxiety     HYDROcodone-acetaminophen (NORCO) 5-325 MG per tablet Take one two times daily as needed     lamoTRIgine (LAMICTAL) 150 MG tablet Take 1 tablet (150 mg) by mouth 2 times daily     DULoxetine (CYMBALTA) 60 MG EC capsule Take 2 capsules (120 mg) by mouth daily For mood and anxiety and pain. Increased dose.     buPROPion (WELLBUTRIN XL) 300 MG 24 hr tablet Take 1 tablet (300 mg) by mouth every morning     QUEtiapine (SEROQUEL) 50 MG tablet Take 1-2 tablets ( mg) by mouth At Bedtime     ADVAIR DISKUS 250-50 MCG/DOSE diskus " "inhaler INHALE ONE PUFF BY MOUTH TWICE A DAY     albuterol (ALBUTEROL) 108 (90 BASE) MCG/ACT Inhaler Inhale 1-2 puffs into the lungs every 4 hours as needed for shortness of breath / dyspnea     atorvastatin (LIPITOR) 40 MG tablet Take 1 tablet (40 mg) by mouth daily     isosorbide mononitrate (IMDUR) 30 MG 24 hr tablet Take 1 tablet (30 mg) by mouth daily     ibuprofen (ADVIL,MOTRIN) 600 MG tablet Take 1 tablet (600 mg) by mouth every 6 hours as needed for moderate pain     tiotropium (SPIRIVA) 18 MCG inhalation capsule Inhale 1 capsule (18 mcg) into the lungs daily     Cholecalciferol (VITAMIN D3) 2000 UNITS CHEW Take 2,000 mg by mouth daily     Docusate Calcium (STOOL SOFTENER PO) Take by mouth daily     albuterol (2.5 MG/3ML) 0.083% nebulizer solution Take 1 vial (2.5 mg) by nebulization 4 times daily as needed     NITROSTAT 0.4 MG SL tablet prn     ASPIRIN PO Take 81 mg by mouth daily     [DISCONTINUED] lamoTRIgine (LAMICTAL) 150 MG tablet TAKE 1 TABLET (150 MG) BY MOUTH 2 TIMES DAILY     nicotine (NICODERM CQ) 7 MG/24HR 24 hr patch Place 1 patch onto the skin every 24 hours (Patient not taking: Reported on 2017)     No current facility-administered medications for this visit.        The [place] Prescription Monitoring Program has been reviewed and there are no concerns about diversionary activity for controlled substances at this time.      I was able to review most recent Primary Care Provider, specialty provider, and therapy visit notes that I have access to.     Karen Alex reports mood has been: \"not good\" Patient shares that her sister  this morning.   Anxiety has been: \"not good\" Ativan (lorazepam) continues to not help her anxiety symptoms. Patient says that she takes more than prescribed of Ativan (lorazepam),  Up to 5 tablets per day. Reports she took Valium (diazepam) from her sister.   Sleep has been: \"not sleeping\" up to 2 at night.   PHQ9 and GAD7 scores were reviewed today. " "  Medication side effects: Denies  Current stressors include: About the same. Parenting Stress and Symptoms  Coping mechanisms and supports include: Therapy and Hobbies    Previous medication trials include but not limited to:  Prozac (fluoxetine)  Zoloft (sertraline)  Lexapro (escitalopram)  Wellbutrin, Zyban, Aplenzin (bupropion)  Cymbalta (duloxetine)  Lamictal (lamotrigine)  Trilafon (perphenazine)  Ambien (zolpidem)  Ativan (lorazepam)  Seroquel (quetiapine)   Trilafon (perphenazine)     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 She also has no past medical history of Chronic infection; Diabetes (H); or Malignant hyperthermia.    Social History:  Current Living situation:  [place], [place] with self . Feels safe at home.  Current use of drugs or alcohol: Current use of drugs or alcohol: cannabis , cocaine , heroin  and cocaine and heroin was over 30 years ago. Last used marijuana about Once per week. \"it relaxes me and helps with my sleep\"     Tobacco use: 1 ppd cigarettes. Discussed smoking cessation. Did well with patches in the past, but has not started them again. Cravings continue. Not ready to quit.   Caffeine:  Yes  2-3 sodas/day  Recovery Programming " "Involvement: None    Vital Signs:   /80 (BP Location: Right arm, Patient Position: Chair, Cuff Size: Adult Large)  Pulse 108  Temp 98.8  F (37.1  C) (Oral)  Wt 194 lb (88 kg)  LMP 12/01/2007  SpO2 94%  BMI 30.38 kg/m2    Labs:  Most recent laboratory results reviewed and no new labs     Review of Systems:  10 systems (general, cardiovascular, respiratory, eyes, ENT, endocrine, GI, , M/S, neurological) were reviewed. Most pertinent finding(s) is/are: dry mouth, dizziness,  pain  . The remaining systems are all unremarkable.    Mental Status Examination:  Appearance:  awake, alert, appeared as age stated, alert, cooperative, wearing oxygen, wears makeup, pleasant  Attitude:  cooperative  Eye Contact:  limited- wears sunglasses  Mood:  anxious and sad   Affect:  mood congruent, tearful entire visit  Speech:  clear, coherent  Psychomotor Behavior:  no evidence of tardive dyskinesia, dystonia, or tics and physical retardation  Thought Process:  logical, linear, future oriented  Associations:  no loose associations  Thought Content:  no evidence of suicidal ideation or homicidal ideation, auditory hallucinations present and visual hallucinations present, does not appear to be responding to stimuli - focused on death of sister  Oriented to:  time, person, and place  Attention Span and Concentration:  adequate, but notes difficultie  Recent and Remote Memory:  reported as \"not good\". Forgetful at times.  Not formally assessed. Ongoing short term memory concerns. No Amnesia.  Fund of Knowledge: low-normal  Gait and Station: Normal, slightly slowed, history of falls, no assistive devices used  Insight:  fair  Judgment:  fair      Suicide Risk Assessment:  Today Karen Alex reports increased psychosocial distress with her sister dying. In addition, there are notable risk factors for self-harm, including age, anxiety, psychosis and substance abuse. However, risk is mitigated by absence of past attempts, " ability to volunteer a safety plan, history of seeking help when needed, future oriented and no access to firearms or weapons. Grandchildren and son are major reasons for living. Does not want to leave her son. Therefore, based on all available evidence including the factors cited above, Karen Alex does not appear to be at imminent risk for self-harm, does not meet criteria for a 72-hr hold, and therefore remains appropriate for ongoing outpatient level of care.  A thorough assessment of risk factors related to suicide and self-harm have been reviewed and are noted above. The patient convincingly denies suicidality on several occasions. Local community safety resources printed and reviewed for patient to use if needed. There was no deceit detected, and the patient presented in a manner that was believable.      DSM5 Diagnosis:  295.70 Schizoaffective Disorder Depressive Type Rule out Bipolar Type  300.02 (F41.1) Generalized Anxiety Disorder  309.81 (F43.10) Posttraumatic Stress Disorder Without dissociative symptoms, prolonged  Cluster B personality disorder traits. Rule out Disorder.  Obesity per BMI of 30.38    Medical comorbidities include:   Patient Active Problem List    Diagnosis Date Noted     Health Care Home 09/04/2012     Priority: High     Status:  Closed   Care Coordinator:  Korin Wang [phone]  See Letters for [facility] Care Plan  March 16, 2015               Tobacco dependence syndrome 06/06/2017     Priority: Medium     HUNG (generalized anxiety disorder) 01/10/2017     Priority: Medium     Severe episode of recurrent major depressive disorder, with psychotic features (H) 01/10/2017     Priority: Medium     Major depression in partial remission (H) 01/19/2016     Priority: Medium     PTSD (post-traumatic stress disorder) 09/22/2015     Priority: Medium     Chronic pain 08/31/2015     Priority: Medium     Patient is followed by [facility]SA[facility] ANTONIO for ongoing prescription of pain  medication.  All refills should be approved by this provider, or covering partner.    Medication(s): Norco.   Maximum quantity per month: 40  Clinic visit frequency required: Q 3 months     Controlled substance agreement on file: Yes       Date(s): 7/16/15    Pain Clinic evaluation in the past: No       Date(s):  n/a       Location(s):  n/a    DIRE Total Score(s):  No flowsheet data found.    Last [facility] website verification: 8/1/17   [email or web address]       HTN, goal below 140/90 01/29/2015     Priority: Medium     Chest pain      Priority: Medium     Acute on chronic respiratory failure with hypoxia (H) 10/26/2014     Priority: Medium     Bipolar 2 disorder (H) 08/21/2013     Priority: Medium     Generalized anxiety disorder 08/21/2013     Priority: Medium     Patient is followed by [facility]SA[facility] ANTONIO for ongoing prescription of benzodiazepines.  All refills should be approved by this provider, or covering partner.    Medication(s): Lorazepam.   Maximum quantity per month: 90  Clinic visit frequency required: Q 3 months     Controlled substance agreement on file: Yes-7/17/15  Benzodiazepine use reviewed by psychiatry:  No    Last [facility] website verification:  done on 6/24/16   [email or web address]           Psychosis 12/07/2011     Priority: Medium     Problem list name updated by automated process. Provider to review       COPD (chronic obstructive pulmonary disease) (H) 09/01/2011     Priority: Medium     Hyperlipidemia LDL goal <130 10/31/2010     Priority: Medium     Anxiety 12/30/2009     Priority: Medium     Degeneration of lumbar or lumbosacral intervertebral disc 02/23/2005     Priority: Medium     Personal history of tobacco use, presenting hazards to health 02/23/2005     Priority: Medium       Psychosocial & Contextual Factors:  Financial Difficulties and Relationship Difficulties and Parent/Child Relationship Difficulties and Recent Loss of Sister    Assessment:  Karen Alex  reports much distress after sudden death of her sister today. Medication side effects and alternatives were reviewed. Health promotion activities recommended and reviewed today. Patient tells me again today that Ativan (lorazepam) continues to not work for her. Told her again that we should discontinue this medication. She wants Valium (diazepam). Patient just picked up 90 tabs of Ativan (lorazepam) yesterday.  I will not add another benzodiazepine with ongoing pain medication.  Will need to refer patient out for long term psychiatry care.     Treatment Plan:    Continue Cymbalta (duloxetine) 120 mg by mouth daily for depression and anxiety.     Continue Wellbutrin (buproprion)  mg by mouth daily in AM.     Increase Seroquel (quetiapine) to 200 mg by mouth daily at bedtime.      Increase Seroquel (quetiapine) to 25 - 50 mg by mouth up to 2 times per day for anxiety.    Continue Lamictal (lamotrigine) 150 mg by mouth in AM and PM.    Continue Ativan (lorazepam) 1 mg up to 3 times per day as needed for panic only per primary care provider. After using up Ativan (lorazepam), may consider change to Valium (diazepam).     Start Nicotine Patches for smoking cessation.     Continue all other medications as reviewed per electronic medical record today.     All questions addressed. Education and counseling completed regarding risks and benefits of medications and psychotherapy options.    Safety plan was reviewed. To the Emergency Department as needed or call after hours crisis line at [phone] or [phone].     Continue individual/group therapy as planned with Lupis Mcgee.    Schedule an appointment with me in 6-8 weeks or sooner as needed. Call [facility] at [phone] to schedule.    Follow up with primary care provider as planned or for acute medical concerns.    Call the psychiatric nurse line with medication questions or concerns at [phone].    My Practice Policy was reviewed and  signed: YES     MyChart may be used to communicate with your provider, but this is not intended to be used for emergencies.    Administrative Billing:   Time spent with patient was 30 minutes and greater than 50% of time or 20 minutes was spent in counseling and coordination of care.    Patient Status:  Patient will continue to be seen for ongoing consultation and stabilization.    Signed:   Leana Patel, PhD, APRN, CNP   Psychiatry

## 2017-08-12 ASSESSMENT — ANXIETY QUESTIONNAIRES: GAD7 TOTAL SCORE: 20

## 2017-08-15 ENCOUNTER — TELEPHONE (OUTPATIENT)
Dept: PSYCHIATRY | Facility: CLINIC | Age: 62
End: 2017-08-15

## 2017-08-15 ENCOUNTER — TELEPHONE (OUTPATIENT)
Dept: FAMILY MEDICINE | Facility: CLINIC | Age: 62
End: 2017-08-15

## 2017-08-15 DIAGNOSIS — F41.9 ANXIETY: ICD-10-CM

## 2017-08-15 RX ORDER — LORAZEPAM 1 MG/1
1 TABLET ORAL 4 TIMES DAILY PRN
Qty: 90 TABLET | Refills: 0 | COMMUNITY
Start: 2017-08-15 | End: 2017-09-05 | Stop reason: ALTCHOICE

## 2017-08-15 NOTE — TELEPHONE ENCOUNTER
Reason for call:  Other   Patient called regarding (reason for call): prescription  Additional comments: She needs clarification on dosage.  She thought Leana said take 200mg but her pills don't add up to that.       Phone number to reach patient:  Home number on file [phone] (home)    Best Time:  ASAP    Can we leave a detailed message on this number?  YES

## 2017-08-15 NOTE — TELEPHONE ENCOUNTER
[facility] pharmacy informed, recommend add note on next rx reminding/informing them of change, they can't do anything about current rx, MD KEO GONZALEZ, RN, BSN  Message handled by Nurse Triage.

## 2017-08-15 NOTE — TELEPHONE ENCOUNTER
"Pt calls reporting her sister's sudden and unexpected death. Tearful.  \" I just need something to hold me together.\"   Our sympathy extended.   She requests Valium or ?   Please advise.   Chaim Slaughter RN    "

## 2017-08-15 NOTE — TELEPHONE ENCOUNTER
Called pt, discussed, pt wonders how pharmacy/insurance will handle short term increase as will need refill earlier than usual, Eros Rebolledo MD let me know if I should call pharmacy to adjust increase, pt's sister burial is Saturday, wants to increase 4 per day until burial, see 8/11/17 psych note, route for processing if needed  Estelle Haynes RN, BSN  Message handled by Nurse Triage.

## 2017-08-15 NOTE — TELEPHONE ENCOUNTER
I THINK the lorazepam Kathi takes already is the best stuff, she can increase to 4 times daily during this stressful time.

## 2017-08-16 DIAGNOSIS — M54.59 MECHANICAL LOW BACK PAIN: ICD-10-CM

## 2017-08-16 RX ORDER — HYDROCODONE BITARTRATE AND ACETAMINOPHEN 5; 325 MG/1; MG/1
TABLET ORAL
Qty: 30 TABLET | Refills: 0 | Status: SHIPPED | OUTPATIENT
Start: 2017-08-16 | End: 2017-09-18

## 2017-08-16 NOTE — TELEPHONE ENCOUNTER
Controlled Substance Refill Request for Norco  Problem List Complete:  Yes  Patient is followed by [facility] ANTONIO for ongoing prescription of pain medication.  All refills should be approved by this provider, or covering partner.    Medication(s): Norco.   Maximum quantity per month: 40  Clinic visit frequency required: Q 3 months     Controlled substance agreement on file: Yes       Date(s): 7/16/15    Pain Clinic evaluation in the past: No       Date(s):  n/a       Location(s):  n/a    DIRE Total Score(s):  No flowsheet data found.    Last [facility] website verification: 8/1/17   [email or web address]   checked in past 6 months?  Yes 8/1/17     Last Picked Up: 8/2/17    Georgie Shabazz, Pharmacy [facility]

## 2017-08-16 NOTE — TELEPHONE ENCOUNTER
Returned call to Karen Alex to discuss her medications. Pt reported she found her paperwork and no longer has a question.  Pt did report she wants to know what she can take to help deal with the death of her sister.  Pt reported that her sister passed 3 days ago and she is struggling.     Per chart review she discussed with PCP, and Ativan increased to QID, however Pt reported this is not helping.     From OV 8/11/17 with Leana Patel, PhD, APRN, CNP  Treatment Plan:    Continue Cymbalta (duloxetine) 120 mg by mouth daily for depression and anxiety.     Continue Wellbutrin (buproprion)  mg by mouth daily in AM.     Increase Seroquel (quetiapine) to 200 mg by mouth daily at bedtime.      Increase Seroquel (quetiapine) to 25 - 50 mg by mouth up to 2 times per day for anxiety.    Continue Lamictal (lamotrigine) 150 mg by mouth in AM and PM.    Continue Ativan (lorazepam) 1 mg up to 3 times per day as needed for panic only per primary care provider. After using up Ativan (lorazepam), may consider change to Valium (diazepam).     Start Nicotine Patches for smoking cessation.     Continue all other medications as reviewed per electronic medical record today.     All questions addressed. Education and counseling completed regarding risks and benefits of medications and psychotherapy options.    Safety plan was reviewed. To the Emergency Department as needed or call after hours crisis line at [phone] or [phone].     Continue individual/group therapy as planned with Lupis Mcgee.    Schedule an appointment with me in 6-8 weeks or sooner as needed. Call [facility] at [phone] to schedule.    Follow up with primary care provider as planned or for acute medical concerns.    Call the psychiatric nurse line with medication questions or concerns at [phone].    My Practice Policy was reviewed and signed: YES     MyChart may be used to communicate with your provider, but this is  not intended to be used for emergencies.     Administrative Billing:   Time spent with patient was 30 minutes and greater than 50% of time or 20 minutes was spent in counseling and coordination of care.     Patient Status:  Patient will continue to be seen for ongoing consultation and stabilization.     Signed:   Leana Patel, PhD, APRN, CNP   Psychiatry

## 2017-08-17 NOTE — TELEPHONE ENCOUNTER
"Pt called again requesting additional medication for anxiety  Reminded her we just gave her lorazepam 2 days ago and would likely not prescribe anything else  She stated the medication was not effective  Sarcastically said \"thanks for your help\" and hung up    FYI to PCP  Chinyere Taylor RN, BS  Clinical Nurse Triage.    "

## 2017-08-17 NOTE — TELEPHONE ENCOUNTER
Yes, we talked about this at our visit.  Patient has not found help with Ativan (lorazepam) even before her loss and current grief. She has been on it too long and it is not effective for her. I have told her numerous times to take this medication as needed and she continues to just schedule it, now it is really not effective when she really needs it.   She should be seeing her therapist soon for more support.  I increased her Seroquel (quetiapine) to take during the day for anxiety.   She needs to work through her current Ativan (lorazepam) before I consider giving her possibly Valium (diazepam).

## 2017-08-17 NOTE — TELEPHONE ENCOUNTER
RN spoke with patient about the grief with her sister. RN encouraged her to use her supports and reach out to her psychotherapist to cope with this grief.  Patient verbalized understanding.

## 2017-08-28 ENCOUNTER — OFFICE VISIT (OUTPATIENT)
Dept: BEHAVIORAL HEALTH | Facility: CLINIC | Age: 62
End: 2017-08-28
Payer: MEDICARE

## 2017-08-28 DIAGNOSIS — F41.1 GAD (GENERALIZED ANXIETY DISORDER): ICD-10-CM

## 2017-08-28 DIAGNOSIS — F43.10 PTSD (POST-TRAUMATIC STRESS DISORDER): ICD-10-CM

## 2017-08-28 DIAGNOSIS — F31.81 BIPOLAR 2 DISORDER (H): Primary | ICD-10-CM

## 2017-08-28 PROCEDURE — 90834 PSYTX W PT 45 MINUTES: CPT | Performed by: SOCIAL WORKER

## 2017-08-28 ASSESSMENT — ANXIETY QUESTIONNAIRES
1. FEELING NERVOUS, ANXIOUS, OR ON EDGE: NEARLY EVERY DAY
7. FEELING AFRAID AS IF SOMETHING AWFUL MIGHT HAPPEN: NEARLY EVERY DAY
5. BEING SO RESTLESS THAT IT IS HARD TO SIT STILL: NEARLY EVERY DAY
2. NOT BEING ABLE TO STOP OR CONTROL WORRYING: NEARLY EVERY DAY
3. WORRYING TOO MUCH ABOUT DIFFERENT THINGS: NEARLY EVERY DAY
6. BECOMING EASILY ANNOYED OR IRRITABLE: NEARLY EVERY DAY
IF YOU CHECKED OFF ANY PROBLEMS ON THIS QUESTIONNAIRE, HOW DIFFICULT HAVE THESE PROBLEMS MADE IT FOR YOU TO DO YOUR WORK, TAKE CARE OF THINGS AT HOME, OR GET ALONG WITH OTHER PEOPLE: EXTREMELY DIFFICULT
GAD7 TOTAL SCORE: 21

## 2017-08-28 ASSESSMENT — PATIENT HEALTH QUESTIONNAIRE - PHQ9
SUM OF ALL RESPONSES TO PHQ QUESTIONS 1-9: 25
5. POOR APPETITE OR OVEREATING: NEARLY EVERY DAY

## 2017-08-28 NOTE — MR AVS SNAPSHOT
MRN:[id number]                      After Visit Summary   8/28/2017    Karen Alex    MRN: [id number]           Visit Information        Provider Department      8/28/2017 11:30 AM Lupis Mcgee LICSW [facility] Generic      Your next 10 appointments already scheduled     Sep 11, 2017 12:30 PM CDT   Return Visit with Lupis Mcgee [facility] ([facility])    [address] [address]   [phone]            Sep 19, 2017 12:30 PM CDT   Return Visit with Lupis Mcgee [facility] ([facility])    [address] [address]   [phone]            Sep 26, 2017 11:15 AM CDT   [facility]    [address]  [address]  [facility] [address]   [phone]            Sep 27, 2017  1:30 PM CDT   Return Visit with Lupis Mcgee [facility] ([facility])    [address] [address]   [phone]              Care Instructions      Continue Cymbalta (duloxetine) 120 mg by mouth daily for depression and anxiety.     Continue Wellbutrin (buproprion)  mg by mouth daily in AM.     Increase Seroquel (quetiapine) to 200 mg by mouth daily at bedtime.      Increase Seroquel (quetiapine) to 25 - 50 mg by mouth up to 2 times per day for anxiety.    Continue Lamictal (lamotrigine) 150 mg by mouth in AM and PM.    Continue Ativan (lorazepam) 1 mg up to 3 times per day as needed for panic only per primary care provider. After using up Ativan (lorazepam), may consider change to Valium (diazepam).          [employer] Information     [employer] gives you secure access to your electronic health  record. If you see a primary care provider, you can also send messages to your care team and make appointments. If you have questions, please call your primary care clinic.  If you do not have a primary care provider, please call [phone] and they will assist you.        Care EveryWhere ID     This is your Care EveryWhere ID. This could be used by other organizations to access your [facility] medical records  [id number]        Equal Access to Services     BRANDYN TURNER : Ej Calixto, rupert sifuentes, fede daniel, rj ace. So [facility] [phone].    ATENCIÓN: Si habla español, tiene a zaragoza disposición servicios gratuitos de asistencia lingüística. Llame al [phone].    We comply with applicable federal civil rights laws and [place] laws. We do not discriminate on the basis of race, color, national origin, age, disability sex, sexual orientation or gender identity.

## 2017-08-28 NOTE — PROGRESS NOTES
Progress Note    Client Name: Karen Alex  Date: 8/28/2017                                  Service Type: Individual      Session Start Time:  11:30  Session End Time: 12:30      Session Length: 45 - 50     Session #: 23     Attendees: Client attended alone    Treatment Plan Last Completed Date: 5/16/17  PHQ-9 / HUNG-7 Last Completed Date: completed 8/28/2017                      DATA      Progress Since Last Session (Related to Symptoms / Goals / Homework):   Symptoms: Stable- reports that sister passed away unexpectedly     Homework: Partially completed- still needs to work towards goals      Episode of Care Goals: Minimal progress - ACTION (Actively working towards change); Intervened by reinforcing change plan / affirming steps taken     Current / Ongoing Stressors and Concerns:   Client reports that about two weeks ago her younger sister and friend passed away unexpectedly.    Client has been managing her grief, had some thoughts to be with her sister, but won't because  Of her son.  Feels overwhelmed by the thoughts that her sister will not be there.  Some of the family stress emerged, client states that she feels minimal support from her other siblings.  Discussed grief and encouraging client to feel her feelings and grieve versus stuffing her feelings.  Discussed crisis plans, shared the med list with her which I asked her to share with her son to make sure that she is on the right plan.       Treatment Objective(s) Addressed in This Session:   focus on being socially active and not isolating at home due to medical condition   Using acceptance skills to understand her level of depression and what she has control over       Intervention:   CBT: Encourage client to focus on present and goals for future        ASSESSMENT: Current Emotional / Mental Status (status of significant symptoms):   Risk status (Self / Other harm or suicidal ideation)  Client denies a  "history of suicidal ideation, suicide attempts, self-injurious behavior, homicidal ideation, homicidal behavior and and other safety concerns   Client denies current fears or concerns for personal safety.   Client reports the following current or recent suicidal ideation or behaviors: passive thoughts of not wanting to live.  Denies any plan or intent.  \"Would not do that to my son\".   Client denies current or recent homicidal ideation or behaviors.   Client denies current or recent self injurious behavior or ideation.   Client denies other safety concerns.   A safety and risk management plan has not been developed at this time, however client was given the after-hours number should there be a change in any of these risk factors.     Appearance:   Appropriate    Eye Contact:   Fair    Psychomotor Behavior: Normal    Attitude:   Cooperative    Orientation:   All   Speech    Rate / Production: Normal     Volume:  Soft    Mood:    Depressed  Sad    Affect:    Flat    Thought Content:  Hallucinations    Thought Form:  Focused on grief   Insight:    Fair      Medication Review:   No changes to current psychiatric medication(s)     Medication Compliance:   Yes     Changes in Health Issues:   Yes: Respiratory Disease, No Psychological Distress     Chemical Use Review:   Substance Use: Chemical use reviewed, no active concerns identified      Tobacco Use: No change in amount of tobacco use since last session.  Provided encouragement to quit  (smokes still on occassions)       Collateral Reports Completed:   Not Applicable    PLAN: (Client Tasks / Therapist Tasks / Other)  1.   Client will work through grief, using her support system, and medications as prescribed.    Lupis Mcgee, MAURY     __________________________________________________________________________________________________                                                           Treatment Plan    Client's Name: Karen A Abi  Date Of " Birth: 1955    Date: 9/22/2015       DSM-V Diagnoses: 296.32 - Major Depressive Disorder, Recurrent, Moderate    300.02 - Generalized Anxiety Disorder    309.81 - Posttraumatic Stress Disorder By History; 799.9 - Deferred Diagnosis   WHODAS:  35    Referral / Collaboration:  Referral to another professional/service is not indicated at this time..    Anticipated number of session or this episode of care: 12      MeasurableTreatment Goal(s) related to diagnosis / functional impairment(s)  Goal 1: Client will have stability with her mental health as evidenced by PHQ-9 and HUNG-7 scores as well as self-report.      I will know I've met my goal when I start feeling better about myself.      Objective #A (Client Action)      Status: Continued - Date(s): 5/16/2017          Client will Decrease frequency and intensity of feeling down, depressed, hopeless.    Intervention(s)  Therapist will assign homework by learning to build awareness of her triggers which activate her mental health symptoms.  Therapist will introduce and have client implement strategies to address triggers.    Objective #B  Client will Identify negative self-talk and behaviors: challenge core beliefs, myths, and actions.  Status: Continued - Date(s): 5/16/2017        Intervention(s)  Therapist will continue to help client identify and reframe negative perceptions of self.  Work on ways to increase self-esteem.    Objective #C  Client will Feel less tired and more energy during the day .  Status: Completed - Date: 3/2/16     Intervention(s)  Therapist will assign homework 1.  Complete at least one household activity daily; 2.  Focus on getting out of the house at least three times weekly; 3.  Look into entering social groups.      Client has reviewed and agreed to the above plan.      Lupis Mcgee, [facility]  September 22, 2015

## 2017-08-28 NOTE — PATIENT INSTRUCTIONS
Continue Cymbalta (duloxetine) 120 mg by mouth daily for depression and anxiety.     Continue Wellbutrin (buproprion)  mg by mouth daily in AM.     Increase Seroquel (quetiapine) to 200 mg by mouth daily at bedtime.      Increase Seroquel (quetiapine) to 25 - 50 mg by mouth up to 2 times per day for anxiety.    Continue Lamictal (lamotrigine) 150 mg by mouth in AM and PM.    Continue Ativan (lorazepam) 1 mg up to 3 times per day as needed for panic only per primary care provider. After using up Ativan (lorazepam), may consider change to Valium (diazepam).

## 2017-08-29 ASSESSMENT — ANXIETY QUESTIONNAIRES: GAD7 TOTAL SCORE: 21

## 2017-09-02 DIAGNOSIS — F41.9 ANXIETY: ICD-10-CM

## 2017-09-02 RX ORDER — LORAZEPAM 1 MG/1
1 TABLET ORAL 4 TIMES DAILY PRN
Qty: 90 TABLET | Refills: 0 | Status: CANCELLED | OUTPATIENT
Start: 2017-09-02

## 2017-09-02 NOTE — TELEPHONE ENCOUNTER
Patient states the doctor changed Rx to 4 a day, she is out.     LORazepam (ATIVAN) 1 MG tablet      Last Written Prescription Date: 08/15/17  Last Fill Quantity: 90,  # refills: 0   Last Office Visit with ODETTE, RAMEZ or [facility] prescribing provider: 05/15/17                                             Next 5 appointments (look out 90 days)     Sep 11, 2017 12:30 PM CDT   Return Visit with MAURY Lemos   [facility]    [address] [id number] [phone]            Sep 19, 2017 12:30 PM CDT   Return Visit with MAURY Lemos   [facility]    [address] [id number] [phone]            Sep 26, 2017 11:15 AM CDT   [facility]    [address]  [address]  [facility] [id number] [phone]            Sep 27, 2017  1:30 PM CDT   Return Visit with MAURY Lemos   [facility]    [address] [id number] [phone]

## 2017-09-05 ENCOUNTER — OFFICE VISIT (OUTPATIENT)
Dept: PSYCHIATRY | Facility: CLINIC | Age: 62
End: 2017-09-05
Payer: MEDICARE

## 2017-09-05 VITALS
WEIGHT: 198 LBS | BODY MASS INDEX: 31.01 KG/M2 | DIASTOLIC BLOOD PRESSURE: 60 MMHG | HEART RATE: 104 BPM | SYSTOLIC BLOOD PRESSURE: 108 MMHG

## 2017-09-05 DIAGNOSIS — F31.81 BIPOLAR 2 DISORDER (H): ICD-10-CM

## 2017-09-05 DIAGNOSIS — F41.1 GAD (GENERALIZED ANXIETY DISORDER): Primary | ICD-10-CM

## 2017-09-05 PROCEDURE — 99214 OFFICE O/P EST MOD 30 MIN: CPT | Performed by: NURSE PRACTITIONER

## 2017-09-05 RX ORDER — DIAZEPAM 10 MG
10 TABLET ORAL EVERY 12 HOURS PRN
Qty: 45 TABLET | Refills: 1 | Status: SHIPPED | OUTPATIENT
Start: 2017-09-05 | End: 2017-01-01

## 2017-09-05 RX ORDER — LAMOTRIGINE 100 MG/1
100 TABLET ORAL 2 TIMES DAILY
Qty: 60 TABLET | Refills: 1 | Status: SHIPPED | OUTPATIENT
Start: 2017-09-05 | End: 2017-01-01 | Stop reason: ALTCHOICE

## 2017-09-05 ASSESSMENT — PATIENT HEALTH QUESTIONNAIRE - PHQ9
SUM OF ALL RESPONSES TO PHQ QUESTIONS 1-9: 25
5. POOR APPETITE OR OVEREATING: NEARLY EVERY DAY

## 2017-09-05 ASSESSMENT — ANXIETY QUESTIONNAIRES
3. WORRYING TOO MUCH ABOUT DIFFERENT THINGS: NEARLY EVERY DAY
6. BECOMING EASILY ANNOYED OR IRRITABLE: NEARLY EVERY DAY
7. FEELING AFRAID AS IF SOMETHING AWFUL MIGHT HAPPEN: NEARLY EVERY DAY
2. NOT BEING ABLE TO STOP OR CONTROL WORRYING: NEARLY EVERY DAY
1. FEELING NERVOUS, ANXIOUS, OR ON EDGE: NEARLY EVERY DAY
GAD7 TOTAL SCORE: 21
5. BEING SO RESTLESS THAT IT IS HARD TO SIT STILL: NEARLY EVERY DAY
IF YOU CHECKED OFF ANY PROBLEMS ON THIS QUESTIONNAIRE, HOW DIFFICULT HAVE THESE PROBLEMS MADE IT FOR YOU TO DO YOUR WORK, TAKE CARE OF THINGS AT HOME, OR GET ALONG WITH OTHER PEOPLE: VERY DIFFICULT

## 2017-09-05 NOTE — PATIENT INSTRUCTIONS
Treatment Plan:    Discontinue Ativan (lorazepam).     Start Valium (diazepam) 10 mg up to 2 times per day for anxiety.     Reduce Lamictal (lamotrigine) to 100 mg by mouth in AM and PM.    Continue Cymbalta (duloxetine) 120 mg by mouth daily for depression and anxiety.    Continue Wellbutrin (buproprion)  mg by mouth daily in AM.     Continue Seroquel (quetiapine) 100- 200 mg by mouth daily at bedtime.      Continue Seroquel (quetiapine) 25 - 50 mg by mouth up to 2 times per day for anxiety.    Continue to use nicotine patches for smoking cessation. Keep up the great work!      Continue all other medications as reviewed per electronic medical record today.     All questions addressed. Education and counseling completed regarding risks and benefits of medications and psychotherapy options.    Safety plan was reviewed. To the Emergency Department as needed or call after hours crisis line at [phone] or [phone].     Continue individual therapy as planned with Lupis Mcgee.    Schedule an appointment with me in 6-8 weeks or sooner as needed. Call [facility] at [phone] to schedule.    Follow up with primary care provider as planned or for acute medical concerns.    Call the psychiatric nurse line with medication questions or concerns at [phone].    My Practice Policy was reviewed and signed: YES     MyChart may be used to communicate with your provider, but this is not intended to be used for emergencies.

## 2017-09-05 NOTE — Clinical Note
Psychiatry update. Changing Ativan (lorazepam) to Valium (diazepam). Also working on reducing polypharmacy. More details in my note. Leana

## 2017-09-05 NOTE — PROGRESS NOTES
"    Outpatient Psychiatric Progress Note    Name: Karen Alex   : 1955                    Primary Care Provider: Eros Rebolledo MD - last visit 5/15/2017  Therapist: Lupis Mcgee  - last visit 2017    PHQ-9 scores:  PHQ-9 SCORE 2017   Total Score 25 25 25       HUNG-7 scores:  HUNG-7 SCORE 2017   Total Score 20 21 21       Patient Identification:  Patient is a 62 year old year old,   White American female  who presents for return visit with me.  Patient is currently disabled. Patient attended the session alone. Patient prefers to be called: \"Kathi\"    Interim History:    I last saw Karen Alex for outpatient psychiatry Return Visit on 2017.     During that appointment, we Continue Cymbalta (duloxetine) 120 mg by mouth daily for depression and anxiety.     Continue Wellbutrin (buproprion)  mg by mouth daily in AM.     Increase Seroquel (quetiapine) to 200 mg by mouth daily at bedtime.      Increase Seroquel (quetiapine) to 25 - 50 mg by mouth up to 2 times per day for anxiety.    Continue Lamictal (lamotrigine) 150 mg by mouth in AM and PM.    Continue Ativan (lorazepam) 1 mg up to 3 times per day as needed for panic only per primary care provider. After using up Ativan (lorazepam), may consider change to Valium (diazepam).     Start Nicotine Patches for smoking cessation.      Current medications include:   Current Outpatient Prescriptions   Medication Sig     diazepam (VALIUM) 10 MG tablet Take 1 tablet (10 mg) by mouth every 12 hours as needed for anxiety Change from Ativan (lorazepam)     lamoTRIgine (LAMICTAL) 100 MG tablet Take 1 tablet (100 mg) by mouth 2 times daily Reduced dose.     HYDROcodone-acetaminophen (NORCO) 5-325 MG per tablet Take one two times daily as needed     QUEtiapine (SEROQUEL) 200 MG tablet Take 1 tablet (200 mg) by mouth At Bedtime Increased dose.     buPROPion (WELLBUTRIN XL) 300 MG 24 hr " tablet Take 1 tablet (300 mg) by mouth every morning     QUEtiapine (SEROQUEL) 50 MG tablet Take 0.5-1 tablets (25-50 mg) by mouth 2 times daily as needed For anxiety     DULoxetine (CYMBALTA) 60 MG EC capsule Take 2 capsules (120 mg) by mouth daily For mood and anxiety and pain. Increased dose.     nicotine (NICODERM CQ) 7 MG/24HR 24 hr patch Place 1 patch onto the skin every 24 hours     ADVAIR DISKUS 250-50 MCG/DOSE diskus inhaler INHALE ONE PUFF BY MOUTH TWICE A DAY     albuterol (ALBUTEROL) 108 (90 BASE) MCG/ACT Inhaler Inhale 1-2 puffs into the lungs every 4 hours as needed for shortness of breath / dyspnea     atorvastatin (LIPITOR) 40 MG tablet Take 1 tablet (40 mg) by mouth daily     isosorbide mononitrate (IMDUR) 30 MG 24 hr tablet Take 1 tablet (30 mg) by mouth daily     ibuprofen (ADVIL,MOTRIN) 600 MG tablet Take 1 tablet (600 mg) by mouth every 6 hours as needed for moderate pain     tiotropium (SPIRIVA) 18 MCG inhalation capsule Inhale 1 capsule (18 mcg) into the lungs daily     Cholecalciferol (VITAMIN D3) 2000 UNITS CHEW Take 2,000 mg by mouth daily     Docusate Calcium (STOOL SOFTENER PO) Take by mouth daily     albuterol (2.5 MG/3ML) 0.083% nebulizer solution Take 1 vial (2.5 mg) by nebulization 4 times daily as needed     NITROSTAT 0.4 MG SL tablet prn     ASPIRIN PO Take 81 mg by mouth daily     [DISCONTINUED] lamoTRIgine (LAMICTAL) 150 MG tablet Take 1 tablet (150 mg) by mouth 2 times daily     No current facility-administered medications for this visit.        The [place] Prescription Monitoring Program has been reviewed and there are no concerns about diversionary activity for controlled substances at this time.  Ativan (lorazepam) 1 mg 90 tabs filled 8/10/89151 from Primary Care Provider team. Continues to receive chronic pain medication.     I was able to review most recent Primary Care Provider, specialty provider, and therapy visit notes that I have access to.   Patient was scheduled  "with me for a same day appointment today.     Karen Alex reports mood has been: \"angry at everything\" has been ongoing and building up over the last few months. Limited appetite. Still hears people in her living room- her partner does not hear anything. Is not scary to her.   Anxiety has been: \"high\" takes the Seroquel (quetiapine) in the AM.   Sleep has been: \"so so\" no nightmares. Is having dreams. No naps during the day.   PHQ9 and GAD7 scores were reviewed today.   Medication side effects: Seroquel (quetiapine) does not make her tired.  Current stressors include: About the same. Parenting Stress and Symptoms  Coping mechanisms and supports include: Therapy and Hobbies    Previous medication trials include but not limited to:  Prozac (fluoxetine)  Zoloft (sertraline)  Lexapro (escitalopram)  Wellbutrin, Zyban, Aplenzin (bupropion)  Cymbalta (duloxetine)  Lamictal (lamotrigine)  Trilafon (perphenazine)  Ambien (zolpidem)  Ativan (lorazepam)  Seroquel (quetiapine)   Trilafon (perphenazine)     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 She also has no past medical history of Chronic infection; Diabetes (H); or Malignant hyperthermia.    Social History:  Current " "Living situation:  [place], [place] with self . Feels safe at home.  Current use of drugs or alcohol: Current use of drugs or alcohol: cannabis , cocaine , heroin  and cocaine and heroin was over 30 years ago. Last used marijuana about 2 weeks ago and used once per week. \"it relaxes me and helps with my sleep\"     Tobacco use: 1 ppd cigarettes. Discussed smoking cessation. Did well with patches in the past, but has not started them again. Cravings continue. Not ready to quit.   Caffeine:  Yes  2-3 sodas/day  Recovery Programming Involvement: None    Vital Signs:   /60 (BP Location: Right arm, Patient Position: Chair, Cuff Size: Adult Large)  Pulse 104  Wt 198 lb (89.8 kg)  LMP 12/01/2007  BMI 31.01 kg/m2    Labs:  Most recent laboratory results reviewed and no new labs.     Review of Systems:  10 systems (general, cardiovascular, respiratory, eyes, ENT, endocrine, GI, , M/S, neurological) were reviewed. Most pertinent finding(s) is/are: dry mouth, chronic pain - reports does not take pain medication every day  . The remaining systems are all unremarkable.    Mental Status Examination:  Appearance:  awake, alert, appeared as age stated, alert, cooperative, wearing oxygen, wears makeup, pleasant  Attitude:  cooperative  Eye Contact:  adequate  Mood:  anxious and sad   Affect:  mood congruent, calmer and reactive as appropriate today  Speech:  clear, coherent  Psychomotor Behavior:  no evidence of tardive dyskinesia, dystonia, or tics and physical retardation  Thought Process:  logical, linear, future oriented  Associations:  no loose associations  Thought Content:  no evidence of suicidal ideation or homicidal ideation, auditory hallucinations present at night only, no visual hallucinations present, does not appear to be responding to stimuli, denies paranoia  Oriented to:  time, person, and place  Attention Span and Concentration:  adequate, but notes difficultie  Recent and Remote Memory:  reported as " "\"not good\". Forgetful at times.  Not formally assessed. Ongoing short term memory concerns. No Amnesia.  Fund of Knowledge: low-normal  Gait and Station: Normal, slightly slowed, history of falls, no assistive devices used  Insight:  fair  Judgment:  fair      Suicide Risk Assessment:  Today Karen Alex reports feelings of anger and thoughts of \"I can drive my car off the road\" These are brief glimpses and denies thoughts or wants to die. In addition, there are notable risk factors for self-harm, including age, anxiety, psychosis and substance abuse. However, risk is mitigated by absence of past attempts, ability to volunteer a safety plan, history of seeking help when needed, future oriented and no access to firearms or weapons. Grandchildren and son are major reasons for living. Does not want to leave her son. Therefore, based on all available evidence including the factors cited above, Karen Alex does not appear to be at imminent risk for self-harm, does not meet criteria for a 72-hr hold, and therefore remains appropriate for ongoing outpatient level of care.  A thorough assessment of risk factors related to suicide and self-harm have been reviewed and are noted above. The patient convincingly denies suicidality on several occasions. Local community safety resources printed and reviewed for patient to use if needed. There was no deceit detected, and the patient presented in a manner that was believable.       DSM5 Diagnosis:  295.70 Schizoaffective Disorder Depressive Type , Rule out Bipolar Type  300.02 (F41.1) Generalized Anxiety Disorder  309.81 (F43.10) Posttraumatic Stress Disorder Without dissociative symptoms, prolonged  Cluster B personality disorder traits. Rule out Disorder.  Polysubstance Abuse   Obesity per BMI of 31.01    Medical comorbidities include:   Patient Active Problem List    Diagnosis Date Noted     Health Care Home 09/04/2012     Priority: High     Status:  Closed   Care " Coordinator:  Korin Wang [phone]  See Letters for [facility] Care Plan  March 16, 2015               Grief 08/11/2017     Priority: Medium     Tobacco dependence syndrome 06/06/2017     Priority: Medium     HUNG (generalized anxiety disorder) 01/10/2017     Priority: Medium     Severe episode of recurrent major depressive disorder, with psychotic features (H) 01/10/2017     Priority: Medium     PTSD (post-traumatic stress disorder) 09/22/2015     Priority: Medium     Chronic pain 08/31/2015     Priority: Medium     Patient is followed by [facility]SA[facility] ANTONIO for ongoing prescription of pain medication.  All refills should be approved by this provider, or covering partner.    Medication(s): Norco.   Maximum quantity per month: 40  Clinic visit frequency required: Q 3 months     Controlled substance agreement on file: Yes       Date(s): 7/16/15    Pain Clinic evaluation in the past: No       Date(s):  n/a       Location(s):  n/a    DIRE Total Score(s):  No flowsheet data found.    Last [employer] website verification: 8/1/17   [email or web address]       HTN, goal below 140/90 01/29/2015     Priority: Medium     Chest pain      Priority: Medium     Acute on chronic respiratory failure with hypoxia (H) 10/26/2014     Priority: Medium     Generalized anxiety disorder 08/21/2013     Priority: Medium     Patient is followed by [facility]SA[facility] ANTONIO for ongoing prescription of benzodiazepines.  All refills should be approved by this provider, or covering partner.    Medication(s): Lorazepam.   Maximum quantity per month: 90  Clinic visit frequency required: Q 3 months     Controlled substance agreement on file: Yes-7/17/15  Benzodiazepine use reviewed by psychiatry:  No    Last [employer] website verification:  done on 6/24/16   [email or web address]           COPD (chronic obstructive pulmonary disease) (H) 09/01/2011     Priority: Medium     Hyperlipidemia LDL goal <130 10/31/2010     Priority: Medium      Degeneration of lumbar or lumbosacral intervertebral disc 02/23/2005     Priority: Medium     Personal history of tobacco use, presenting hazards to health 02/23/2005     Priority: Medium       Psychosocial & Contextual Factors:  Financial Difficulties and Relationship Difficulties and Parent/Child Relationship Difficulties and Recent Loss of Sister    Assessment:  Karen Alex reports ongoing depression, anxiety, and poor sleep; but is doing better than our last visit.   Medication side effects and alternatives were reviewed. Health promotion activities recommended and reviewed today.   Will likely need to refer patient out for long term psychiatry care.   Patient has a Controlled Substance Agreement in place with Primary Care Provider for pain and benzodiazepine medications.   Needs an annual Urine Drug Screen as Patient may still be using Marijuana ocassionally.   Reviewed Narcan today and provided printed information about benzodiazepine and opioid pain medications.   Patient declined a prescription at this time, but will continue to monitor with myself and her Primary Care Provider.    The Black Box Warning for use of benzodiazepines and opioids was specifically reviewed today. Patients taking opioids with benzodiazepines, other CNS depressant medicines, or alcohol, and caregivers of these patients, should seek medical attention immediately if they or someone they are caring for experiences symptoms of unusual dizziness or lightheadedness, extreme sleepiness, slowed or difficult breathing, or unresponsiveness.    Patient has been using nicotine patches and reports she is having strange dreams- sleeps with patches. Continue to monitor. She has not been smoking recently and cravings have been controlled. Positive feedback provided today.    Ongoing memory concerns, so will reduce dose of Lamictal (lamotrigine).   Valium (diazepam) prescription printed and faxed to pharmacy today.   Discussed grief process and  her processing through the loss of her sister with the help of her other sister and therapist.     Treatment Plan:    Discontinue Ativan (lorazepam).     Start Valium (diazepam) 10 mg up to 2 times per day as needed for anxiety.     Reduce Lamictal (lamotrigine) to 100 mg by mouth in AM and PM.    Continue Cymbalta (duloxetine) 120 mg by mouth daily for depression and anxiety.    Continue Wellbutrin (buproprion)  mg by mouth daily in AM.     Continue Seroquel (quetiapine) 100- 200 mg by mouth daily at bedtime.      Continue Seroquel (quetiapine) 25 - 50 mg by mouth up to 2 times per day for anxiety.    Continue to use nicotine patches for smoking cessation. Keep up the great work!      Continue all other medications as reviewed per electronic medical record today.     All questions addressed. Education and counseling completed regarding risks and benefits of medications and psychotherapy options.    Safety plan was reviewed. To the Emergency Department as needed or call after hours crisis line at [phone] or [phone].     Continue individual therapy as planned with Lupis Mcgee.    Schedule an appointment with me in 6-8 weeks or sooner as needed. Call [facility] at [phone] to schedule.    Follow up with primary care provider as planned or for acute medical concerns.    Call the psychiatric nurse line with medication questions or concerns at [phone].    My Practice Policy was reviewed and signed: YES     MyChart may be used to communicate with your provider, but this is not intended to be used for emergencies.    Administrative Billing:   Time spent with patient was 30 minutes and greater than 50% of time or 20 minutes was spent in counseling and coordination of care.    Patient Status:  Patient will continue to be seen for ongoing consultation and stabilization.    Signed:   Leana Patel, PhD, APRN, CNP   Psychiatry

## 2017-09-05 NOTE — MR AVS SNAPSHOT
After Visit Summary   9/5/2017    Karen Alex    MRN: [id number]           Patient Information     Date Of Birth          1955        Visit Information        Provider Department      9/5/2017 2:45 PM Leana Patel NP [facility]        Today's Diagnoses     HUNG (generalized anxiety disorder)    -  1    Bipolar 2 disorder (H)          Care Instructions    Treatment Plan:    Discontinue Ativan (lorazepam).     Start Valium (diazepam) 10 mg up to 2 times per day for anxiety.     Reduce Lamictal (lamotrigine) to 100 mg by mouth in AM and PM.    Continue Cymbalta (duloxetine) 120 mg by mouth daily for depression and anxiety.    Continue Wellbutrin (buproprion)  mg by mouth daily in AM.     Continue Seroquel (quetiapine) 100- 200 mg by mouth daily at bedtime.      Continue Seroquel (quetiapine) 25 - 50 mg by mouth up to 2 times per day for anxiety.    Continue to use nicotine patches for smoking cessation. Keep up the great work!      Continue all other medications as reviewed per electronic medical record today.     All questions addressed. Education and counseling completed regarding risks and benefits of medications and psychotherapy options.    Safety plan was reviewed. To the Emergency Department as needed or call after hours crisis line at [phone] or [phone].     Continue individual therapy as planned with Lupis Mcgee.    Schedule an appointment with me in 6-8 weeks or sooner as needed. Call [facility] at [phone] to schedule.    Follow up with primary care provider as planned or for acute medical concerns.    Call the psychiatric nurse line with medication questions or concerns at [phone].    My Practice Policy was reviewed and signed: YES     MyChart may be used to communicate with your provider, but this is not intended to be used for emergencies.          Follow-ups after your visit        Follow-up notes from your care team      Return in about 7 weeks (around 10/24/2017).      Your next 10 appointments already scheduled     Sep 11, 2017 12:30 PM CDT   Return Visit with MAURY Lemos   [facility] ([facility])    [address] [place] [id number] [phone]            Sep 19, 2017 12:30 PM CDT   Return Visit with Lupis Mcgee [facility] ([facility])    [address] [place] [id number] [phone]            Sep 27, 2017  1:30 PM CDT   Return Visit with Lupis Mcgee [facility] ([facility])    [address] [place] [address] [id number] [phone]              Who to contact     If you have questions or need follow up information about today's clinic visit or your schedule please contact [facility] directly at [phone].  Normal or non-critical lab and imaging results will be communicated to you by MyChart, letter or phone within 4 business days after the clinic has received the results. If you do not hear from us within 7 days, please contact the clinic through [employer] or phone. If you have a critical or abnormal lab result, we will notify you by phone as soon as possible.  Submit refill requests through [employer] or call your pharmacy and they will forward the refill request to us. Please allow 3 business days for your refill to be completed.          Additional Information About Your Visit        MyChart Information     [employer] gives you secure access to your electronic health record. If you see a primary care provider, you can also send messages to your care team and make appointments. If you have questions, please call your primary care clinic.  If you do not have a primary care provider, please call [phone] and they will assist you.        Care EveryWhere ID      This is your Care EveryWhere ID. This could be used by other organizations to access your [place] medical records  [id number]        Your Vitals Were     Pulse Last Period BMI (Body Mass Index)             104 12/01/2007 31.01 kg/m2          Blood Pressure from Last 3 Encounters:   09/05/17 108/60   08/11/17 110/80   06/06/17 90/60    Weight from Last 3 Encounters:   09/05/17 198 lb (89.8 kg)   08/11/17 194 lb (88 kg)   06/06/17 191 lb (86.6 kg)              Today, you had the following     No orders found for display         Today's Medication Changes          These changes are accurate as of: 9/5/17  3:13 PM.  If you have any questions, ask your nurse or doctor.               Start taking these medicines.        Dose/Directions    diazepam 10 MG tablet   Commonly known as:  VALIUM   Used for:  HUNG (generalized anxiety disorder)   Started by:  Leana Patel NP        Dose:  10 mg   Take 1 tablet (10 mg) by mouth every 12 hours as needed for anxiety Change from Ativan (lorazepam)   Quantity:  45 tablet   Refills:  1         These medicines have changed or have updated prescriptions.        Dose/Directions    lamoTRIgine 100 MG tablet   Commonly known as:  LAMICTAL   This may have changed:    - medication strength  - how much to take  - additional instructions   Used for:  Bipolar 2 disorder (H)   Changed by:  Leana Patel NP        Dose:  100 mg   Take 1 tablet (100 mg) by mouth 2 times daily Reduced dose.   Quantity:  60 tablet   Refills:  1         Stop taking these medicines if you haven't already. Please contact your care team if you have questions.     ATIVAN 1 MG tablet   Generic drug:  LORazepam   Stopped by:  Leana Patel NP                Where to get your medicines      [place] [facility] [address]  [address] [facility] [address]     Phone:  [phone]     lamoTRIgine 100 MG tablet         Some of these will  need a paper prescription and others can be bought over the counter.  Ask your nurse if you have questions.     Bring a paper prescription for each of these medications     diazepam 10 MG tablet                Primary Care Provider Office Phone # Fax #    Eros Rebolledo -[phone] [address] [facility] [address]        Equal Access to Services     [facility] Center[facility]ABRIEL : Hadii aad ku hadasho Soomaali, waaxda luqadaha, qaybta kaalmada adeegyada, waxay alanin hayaan adeeg khjoesh lapoonam . So [facility] [phone].    ATENCIÓN: Si habla español, tiene a zaragoza disposición servicios gratuitos de asistencia lingüística. Llame al [phone].    We comply with applicable federal civil rights laws and [place] laws. We do not discriminate on the basis of race, color, national origin, age, disability sex, sexual orientation or gender identity.            Thank you!     Thank you for choosing [facility]  for your care. Our goal is always to provide you with excellent care. Hearing back from our patients is one way we can continue to improve our services. Please take a few minutes to complete the written survey that you may receive in the mail after your visit with us. Thank you!             Your Updated Medication List - Protect others around you: Learn how to safely use, store and throw away your medicines at www.disposemymeds.org.          This list is accurate as of: 9/5/17  3:13 PM.  Always use your most recent med list.                   Brand Name Dispense Instructions for use Diagnosis    ADVAIR DISKUS 250-50 MCG/DOSE diskus inhaler   Generic drug:  fluticasone-salmeterol     3 Inhaler    INHALE ONE PUFF BY MOUTH TWICE A DAY    Chronic obstructive pulmonary disease, unspecified COPD type (H)       * albuterol (2.5 MG/3ML) 0.083% neb solution     120 vial    Take 1 vial (2.5 mg) by nebulization 4 times daily as needed    COPD (chronic obstructive pulmonary disease) (H)       *  albuterol 108 (90 BASE) MCG/ACT Inhaler    PROAIR HFA    1 Inhaler    Inhale 1-2 puffs into the lungs every 4 hours as needed for shortness of breath / dyspnea    Panlobular emphysema (H)       ASPIRIN PO      Take 81 mg by mouth daily        atorvastatin 40 MG tablet    LIPITOR    90 tablet    Take 1 tablet (40 mg) by mouth daily    CAD (coronary artery disease)       buPROPion 300 MG 24 hr tablet    WELLBUTRIN XL    30 tablet    Take 1 tablet (300 mg) by mouth every morning    Major depressive disorder, recurrent episode, in partial remission (H)       diazepam 10 MG tablet    VALIUM    45 tablet    Take 1 tablet (10 mg) by mouth every 12 hours as needed for anxiety Change from Ativan (lorazepam)    HUNG (generalized anxiety disorder)       DULoxetine 60 MG EC capsule    CYMBALTA    60 capsule    Take 2 capsules (120 mg) by mouth daily For mood and anxiety and pain. Increased dose.    HUNG (generalized anxiety disorder), Major depressive disorder, recurrent episode, in partial remission (H)       HYDROcodone-acetaminophen 5-325 MG per tablet    NORCO    30 tablet    Take one two times daily as needed    Mechanical low back pain       ibuprofen 600 MG tablet    ADVIL/MOTRIN    20 tablet    Take 1 tablet (600 mg) by mouth every 6 hours as needed for moderate pain        isosorbide mononitrate 30 MG 24 hr tablet    IMDUR    90 tablet    Take 1 tablet (30 mg) by mouth daily    Chest pain, CAD (coronary artery disease)       lamoTRIgine 100 MG tablet    LAMICTAL    60 tablet    Take 1 tablet (100 mg) by mouth 2 times daily Reduced dose.    Bipolar 2 disorder (H)       nicotine 7 MG/24HR 24 hr patch    NICODERM CQ    30 patch    Place 1 patch onto the skin every 24 hours    Tobacco dependence syndrome       NITROSTAT 0.4 MG sublingual tablet   Generic drug:  nitroGLYcerin      prn        * QUEtiapine 200 MG tablet    SEROQUEL    30 tablet    Take 1 tablet (200 mg) by mouth At Bedtime Increased dose.    HUNG (generalized  anxiety disorder)       * QUEtiapine 50 MG tablet    SEROQUEL    60 tablet    Take 0.5-1 tablets (25-50 mg) by mouth 2 times daily as needed For anxiety    Severe episode of recurrent major depressive disorder, with psychotic features (H), HUNG (generalized anxiety disorder)       STOOL SOFTENER PO      Take by mouth daily        tiotropium 18 MCG capsule    SPIRIVA    90 capsule    Inhale 1 capsule (18 mcg) into the lungs daily    Generalized anxiety disorder, Chronic obstructive pulmonary disease, unspecified COPD type (H)       Vitamin D3 2000 UNITS Chew      Take 2,000 mg by mouth daily        * Notice:  This list has 4 medication(s) that are the same as other medications prescribed for you. Read the directions carefully, and ask your doctor or other care provider to review them with you.

## 2017-09-05 NOTE — NURSING NOTE
"Chief Complaint   Patient presents with     RECHECK     pt c/o having anger       Initial /60 (BP Location: Right arm, Patient Position: Chair, Cuff Size: Adult Large)  Pulse 104  Wt 198 lb (89.8 kg)  LMP 12/01/2007  BMI 31.01 kg/m2 Estimated body mass index is 31.01 kg/(m^2) as calculated from the following:    Height as of 5/15/17: 5' 7\" (1.702 m).    Weight as of this encounter: 198 lb (89.8 kg).  Medication Reconciliation: complete    "

## 2017-09-06 ASSESSMENT — ANXIETY QUESTIONNAIRES: GAD7 TOTAL SCORE: 21

## 2017-09-07 ENCOUNTER — OFFICE VISIT (OUTPATIENT)
Dept: DERMATOLOGY | Facility: CLINIC | Age: 62
End: 2017-09-07
Payer: MEDICARE

## 2017-09-07 VITALS — SYSTOLIC BLOOD PRESSURE: 131 MMHG | HEART RATE: 88 BPM | HEIGHT: 67 IN | DIASTOLIC BLOOD PRESSURE: 86 MMHG

## 2017-09-07 DIAGNOSIS — D18.00 ANGIOMA: ICD-10-CM

## 2017-09-07 DIAGNOSIS — L72.3 INFLAMED EPIDERMOID CYST OF SKIN: ICD-10-CM

## 2017-09-07 DIAGNOSIS — L81.4 LENTIGO: ICD-10-CM

## 2017-09-07 DIAGNOSIS — L82.1 SK (SEBORRHEIC KERATOSIS): Primary | ICD-10-CM

## 2017-09-07 PROCEDURE — 99203 OFFICE O/P NEW LOW 30 MIN: CPT | Mod: 25 | Performed by: DERMATOLOGY

## 2017-09-07 PROCEDURE — 11900 INJECT SKIN LESIONS </W 7: CPT | Performed by: DERMATOLOGY

## 2017-09-07 RX ORDER — DOXYCYCLINE 100 MG/1
100 CAPSULE ORAL 2 TIMES DAILY
Qty: 14 CAPSULE | Refills: 0 | Status: SHIPPED | OUTPATIENT
Start: 2017-09-07 | End: 2017-09-14

## 2017-09-07 NOTE — NURSING NOTE
"Initial /86  Pulse 88  Ht 5' 7\" (1.702 m)  LMP 12/01/2007 Estimated body mass index is 31.01 kg/(m^2) as calculated from the following:    Height as of 5/15/17: 5' 7\" (1.702 m).    Weight as of 9/5/17: 198 lb (89.8 kg). .      "

## 2017-09-07 NOTE — MR AVS SNAPSHOT
After Visit Summary   9/7/2017    Karen Alex    MRN: [id number]           Patient Information     Date Of Birth          1955        Visit Information        Provider Department      9/7/2017 11:15 AM Sandip Elliott MD [facility]        Today's Diagnoses     SK (seborrheic keratosis)    -  1    Lentigo        Angioma        Inflamed epidermoid cyst of skin           Follow-ups after your visit        Your next 10 appointments already scheduled     Sep 11, 2017 12:30 PM CDT   Return Visit with Lupis Mcgee [facility] ([facility])    [address] [phone] [phone]            Sep 19, 2017 12:30 PM CDT   Return Visit with Lupis Mcgee [facility] ([facility])    [address] [phone] [phone]            Sep 27, 2017  1:30 PM CDT   Return Visit with Lupis Mcgee [facility] ([facility])    [address] [id number] [phone]              Who to contact     If you have questions or need follow up information about today's clinic visit or your schedule please contact [facility] directly at [phone].  Normal or non-critical lab and imaging results will be communicated to you by MyChart, letter or phone within 4 business days after the clinic has received the results. If you do not hear from us within 7 days, please contact the clinic through MyChart or phone. If you have a critical or abnormal lab result, we will notify you by phone as soon as possible.  Submit refill requests through [employer] or call your pharmacy and they will forward the refill request to us. Please allow 3 business days for your refill to be completed.           "Additional Information About Your Visit        MyChart Information     [employer] gives you secure access to your electronic health record. If you see a primary care provider, you can also send messages to your care team and make appointments. If you have questions, please call your primary care clinic.  If you do not have a primary care provider, please call [phone] and they will assist you.        Care EveryWhere ID     This is your Care EveryWhere ID. This could be used by other organizations to access your [place] medical records  [id number]        Your Vitals Were     Pulse Height Last Period             88 1.702 m (5' 7\") 12/01/2007          Blood Pressure from Last 3 Encounters:   09/07/17 131/86   09/05/17 108/60   08/11/17 110/80    Weight from Last 3 Encounters:   09/05/17 89.8 kg (198 lb)   08/11/17 88 kg (194 lb)   06/06/17 86.6 kg (191 lb)              We Performed the Following     INJECTION INTO SKIN LESIONS <=7     KENALOG PER 10 MG          Today's Medication Changes          These changes are accurate as of: 9/7/17 11:25 AM.  If you have any questions, ask your nurse or doctor.               Start taking these medicines.        Dose/Directions    doxycycline Monohydrate 100 MG Caps   Used for:  SK (seborrheic keratosis), Lentigo, Angioma, Inflamed epidermoid cyst of skin        Dose:  100 mg   Take 1 capsule (100 mg) by mouth 2 times daily for 7 days   Quantity:  14 capsule   Refills:  0            Where to get your medicines      These medications were sent to [facility] [address]  [id number] [place] [address]     Phone:  [phone]     doxycycline Monohydrate 100 MG Caps                Primary Care Provider Office Phone # Fax #    Eros Rebolledo [phone] [address] [facility] [address]        Equal Access to Services     BRANDYN TUNRER AH: Ej Calixto, rupert sifuentes, qaybta " rj austin charlycassidy leanna ramirez ahRadha [facility] [phone].    ATENCIÓN: Si gary montero, tiene a zaragoza disposición servicios gratuitos de asistencia lingüística. Farshad al [phone].    We comply with applicable federal civil rights laws and [place] laws. We do not discriminate on the basis of race, color, national origin, age, disability sex, sexual orientation or gender identity.            Thank you!     Thank you for choosing [facility]  for your care. Our goal is always to provide you with excellent care. Hearing back from our patients is one way we can continue to improve our services. Please take a few minutes to complete the written survey that you may receive in the mail after your visit with us. Thank you!             Your Updated Medication List - Protect others around you: Learn how to safely use, store and throw away your medicines at www.disposemymeds.org.          This list is accurate as of: 9/7/17 11:25 AM.  Always use your most recent med list.                   Brand Name Dispense Instructions for use Diagnosis    ADVAIR DISKUS 250-50 MCG/DOSE diskus inhaler   Generic drug:  fluticasone-salmeterol     3 Inhaler    INHALE ONE PUFF BY MOUTH TWICE A DAY    Chronic obstructive pulmonary disease, unspecified COPD type (H)       * albuterol (2.5 MG/3ML) 0.083% neb solution     120 vial    Take 1 vial (2.5 mg) by nebulization 4 times daily as needed    COPD (chronic obstructive pulmonary disease) (H)       * albuterol 108 (90 BASE) MCG/ACT Inhaler    PROAIR HFA    1 Inhaler    Inhale 1-2 puffs into the lungs every 4 hours as needed for shortness of breath / dyspnea    Panlobular emphysema (H)       ASPIRIN PO      Take 81 mg by mouth daily        atorvastatin 40 MG tablet    LIPITOR    90 tablet    Take 1 tablet (40 mg) by mouth daily    CAD (coronary artery disease)       buPROPion 300 MG 24 hr tablet    WELLBUTRIN XL    30 tablet    Take 1 tablet  (300 mg) by mouth every morning    Major depressive disorder, recurrent episode, in partial remission (H)       diazepam 10 MG tablet    VALIUM    45 tablet    Take 1 tablet (10 mg) by mouth every 12 hours as needed for anxiety Change from Ativan (lorazepam)    HUNG (generalized anxiety disorder)       doxycycline Monohydrate 100 MG Caps     14 capsule    Take 1 capsule (100 mg) by mouth 2 times daily for 7 days    SK (seborrheic keratosis), Lentigo, Angioma, Inflamed epidermoid cyst of skin       DULoxetine 60 MG EC capsule    CYMBALTA    60 capsule    Take 2 capsules (120 mg) by mouth daily For mood and anxiety and pain. Increased dose.    HUNG (generalized anxiety disorder), Major depressive disorder, recurrent episode, in partial remission (H)       HYDROcodone-acetaminophen 5-325 MG per tablet    NORCO    30 tablet    Take one two times daily as needed    Mechanical low back pain       ibuprofen 600 MG tablet    ADVIL/MOTRIN    20 tablet    Take 1 tablet (600 mg) by mouth every 6 hours as needed for moderate pain        isosorbide mononitrate 30 MG 24 hr tablet    IMDUR    90 tablet    Take 1 tablet (30 mg) by mouth daily    Chest pain, CAD (coronary artery disease)       lamoTRIgine 100 MG tablet    LAMICTAL    60 tablet    Take 1 tablet (100 mg) by mouth 2 times daily Reduced dose.    Bipolar 2 disorder (H)       nicotine 7 MG/24HR 24 hr patch    NICODERM CQ    30 patch    Place 1 patch onto the skin every 24 hours    Tobacco dependence syndrome       NITROSTAT 0.4 MG sublingual tablet   Generic drug:  nitroGLYcerin      prn        * QUEtiapine 200 MG tablet    SEROQUEL    30 tablet    Take 1 tablet (200 mg) by mouth At Bedtime Increased dose.    HUNG (generalized anxiety disorder)       * QUEtiapine 50 MG tablet    SEROQUEL    60 tablet    Take 0.5-1 tablets (25-50 mg) by mouth 2 times daily as needed For anxiety    Severe episode of recurrent major depressive disorder, with psychotic features (H), HUNG  (generalized anxiety disorder)       STOOL SOFTENER PO      Take by mouth daily        tiotropium 18 MCG capsule    SPIRIVA    90 capsule    Inhale 1 capsule (18 mcg) into the lungs daily    Generalized anxiety disorder, Chronic obstructive pulmonary disease, unspecified COPD type (H)       Vitamin D3 2000 UNITS Chew      Take 2,000 mg by mouth daily        * Notice:  This list has 4 medication(s) that are the same as other medications prescribed for you. Read the directions carefully, and ask your doctor or other care provider to review them with you.

## 2017-09-07 NOTE — PROGRESS NOTES
Karen Alex is a 62 year old year old female patient here today for spot on right cheek and r flank.   .  Patient states this has been present for months on cheek very painful and growing .  Patient reports the following symptoms:  Growing tender on cheek.  .  Patient reports the following previous treatments none.  Patient reports the following modifying factors none.  Associated symptoms: none.  Patient has no other skin complaints today.  Remainder of the HPI, Meds, PMH, Allergies, FH, and SH was reviewed in chart.    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       Past Surgical History:   Procedure Laterality Date     BACK SURGERY       CONIZATION  10/23/07    Vulvar excision for BOBBY 3, cold knife conization     EXCISE VULVA WIDE LOCAL  5/25/2012    Procedure:EXCISE VULVA WIDE LOCAL; Wide Local Excision Of Vulvar Lesion; Surgeon:RE ALDRIDGE; Location:[facility] OR     Foot surgeries       HERNIORRHAPHY INCISIONAL (LOCATION)  4/25/2012    Procedure:HERNIORRHAPHY INCISIONAL (LOCATION); Incisional Hernia Repair with mesh ; Surgeon:KIM QUICK; Location:[facility] OR     HYSTERECTOMY TOTAL ABDOMINAL, BILATERAL SALPINGO-OOPHORECTOMY, COMBINED       LAPAROSCOPIC HYSTERECTOMY TOTAL, BILATERAL SALPINGO-OOPHORECTOMY, COMBINED  3/6/2012    Procedure:COMBINED LAPAROSCOPIC HYSTERECTOMY TOTAL, BILATERAL SALPINGO-OOPHORECTOMY; Total laparoscopic Hysterectomy, Bilateral Salpingo Ooporectomy, Pubovaginal Sling, Biopsy Left Volva; Surgeon:RE ALDRIDGE; Location:[facility] OR     repair of  anal fissures       SLING BLADDER SUSPENSION WITH FASCIA ALESHA       SLING TRANSPUBO WITHOUT ANTERIOR COLPORRHAPHY  3/6/2012    Procedure:SLING TRANSPUBO WITHOUT ANTERIOR COLPORRHAPHY; Surgeon:JOLANTA ENRIQUEZ; Location:[facility] OR     TUBAL LIGATION          Family History   Problem Relation Age of Onset     CANCER Father      asbestos lung disease     Hypertension Mother      DIABETES Paternal Aunt      HEART DISEASE Maternal Grandfather      CANCER Maternal Grandmother      unsure of what kind,  at the age of 86     Circulatory Paternal Grandmother       of brain aneurysm     Asthma Son      HEART DISEASE Sister        Social History     Social History     Marital status:      Spouse name: N/A     Number of children: N/A     Years of education: N/A     Occupational History     Not on file.     Social History Main Topics     Smoking status: Former Smoker     Packs/day: 0.50     Years: 26.00     Types: Cigarettes     Quit date: 7/3/2015     Smokeless tobacco: Never Used      Comment: sometimes     Alcohol use No     Drug use: No     Sexual activity: Not Currently     Birth control/ protection: Surgical     Other Topics Concern     Parent/Sibling W/ Cabg, Mi Or Angioplasty Before 65f 55m? No     Caffeine Concern No     2 cans per day     Sleep Concern Yes     sometimes     Special Diet No     Exercise No     walking in halls at least 3 days per week     Social History Narrative       Outpatient Encounter Prescriptions as of 2017   Medication Sig Dispense Refill     diazepam (VALIUM) 10 MG tablet Take 1 tablet (10 mg) by mouth every 12 hours as needed for anxiety Change from Ativan (lorazepam) 45 tablet 1     lamoTRIgine (LAMICTAL) 100 MG tablet Take 1 tablet (100 mg) by mouth 2 times daily Reduced dose. 60 tablet 1     HYDROcodone-acetaminophen (NORCO) 5-325 MG per tablet Take one two times daily as needed 30 tablet 0     QUEtiapine (SEROQUEL) 200 MG tablet Take 1 tablet (200 mg) by mouth At  Bedtime Increased dose. 30 tablet 1     buPROPion (WELLBUTRIN XL) 300 MG 24 hr tablet Take 1 tablet (300 mg) by mouth every morning 30 tablet 1     QUEtiapine (SEROQUEL) 50 MG tablet Take 0.5-1 tablets (25-50 mg) by mouth 2 times daily as needed For anxiety 60 tablet 1     DULoxetine (CYMBALTA) 60 MG EC capsule Take 2 capsules (120 mg) by mouth daily For mood and anxiety and pain. Increased dose. 60 capsule 1     nicotine (NICODERM CQ) 7 MG/24HR 24 hr patch Place 1 patch onto the skin every 24 hours 30 patch 3     ADVAIR DISKUS 250-50 MCG/DOSE diskus inhaler INHALE ONE PUFF BY MOUTH TWICE A DAY 3 Inhaler 0     albuterol (ALBUTEROL) 108 (90 BASE) MCG/ACT Inhaler Inhale 1-2 puffs into the lungs every 4 hours as needed for shortness of breath / dyspnea 1 Inhaler 5     atorvastatin (LIPITOR) 40 MG tablet Take 1 tablet (40 mg) by mouth daily 90 tablet 3     isosorbide mononitrate (IMDUR) 30 MG 24 hr tablet Take 1 tablet (30 mg) by mouth daily 90 tablet 3     ibuprofen (ADVIL,MOTRIN) 600 MG tablet Take 1 tablet (600 mg) by mouth every 6 hours as needed for moderate pain 20 tablet 1     tiotropium (SPIRIVA) 18 MCG inhalation capsule Inhale 1 capsule (18 mcg) into the lungs daily 90 capsule 2     Cholecalciferol (VITAMIN D3) 2000 UNITS CHEW Take 2,000 mg by mouth daily       Docusate Calcium (STOOL SOFTENER PO) Take by mouth daily       albuterol (2.5 MG/3ML) 0.083% nebulizer solution Take 1 vial (2.5 mg) by nebulization 4 times daily as needed 120 vial 5     NITROSTAT 0.4 MG SL tablet prn  1     ASPIRIN PO Take 81 mg by mouth daily       No facility-administered encounter medications on file as of 9/7/2017.              Review Of Systems  Skin: As above  Eyes: negative  Ears/Nose/Throat: negative  Respiratory: No shortness of breath, dyspnea on exertion, cough, or hemoptysis  Cardiovascular: negative  Gastrointestinal: negative  Genitourinary: negative  Musculoskeletal: negative  Neurologic: negative  Psychiatric:  "negative  Hematologic/Lymphatic/Immunologic: negative  Endocrine: negative      O:   NAD, WDWN, Alert & Oriented, Mood & Affect wnl, Vitals stable   Here today alone   /86  Pulse 88  Ht 1.702 m (5' 7\")  LMP 12/01/2007   General appearance normal   Vitals stable   Alert, oriented and in no acute distress      Following lymph nodes palpated: Occipital, Cervical, Supraclavicular no lad   Stuck on papules and brown macules on trunk and ext    Red papules on trunk    r cheek indurated nodule with comedone         The remainder of the full exam was unremarkable; the following areas were examined:  conjunctiva/lids, oral mucosa, neck, peripheral vascular system, abdomen, lymph nodes, digits/nails, eccrine and apocrine glands, scalp/hair, face, neck, chest, abdomen, buttocks, back, RUE, LUE, RLE, LLE       Eyes: Conjunctivae/lids:Normal     ENT: Lips, buccal mucosa, tongue: normal    MSK:Normal    Cardiovascular: peripheral edema none    Pulm: Breathing Normal    Lymph Nodes: No Head and Neck Lymphadenopathy     Neuro/Psych: Orientation:Normal; Mood/Affect:Normal      A/P:  1. Inflamed eic r cheek  IL TAC: PGACAC discussed.  Risks including but not limited to injection site reaction, bruising, no resolution.  All questions answered and entertained to patient s satisfaction.  Informed consent obtained.  IL TAC in concentration of 10mg/ml was injected ID to R cheek.  Total injected was  0.3 ml.  Patient tolerated without complications and given wound care instructions, including not to move product around.  Return in 4 weeks for follow-up and possible additional IL TAC.    Schedule excision doxy for one week  2. Seborrheic keratosis, letnigo, angioma    BENIGN LESIONS DISCUSSED WITH PATIENT:  I discussed the specifics of tumor, prognosis, and genetics of benign lesions.  I explained that treatment of these lesions would be purely cosmetic and not medically neccessary.  I discussed with patient different removal " options including excision, cautery and /or laser.      Nature and genetics of benign skin lesions dicussed with patient.  Signs and Symptoms of skin cancer discussed with patient.  Patient encouraged to perform monthly skin exams.  UV precautions reviewed with patient.  Skin care regimen reviewed with patient: Eliminate harsh soaps, i.e. Dial, zest, irsih spring; Mild soaps such as Cetaphil or Dove sensitive skin, avoid hot or cold showers, aggressive use of emollients including vanicream, cetaphil or cerave discussed with patient.    Risks of non-melanoma skin cancer discussed with patient   Return to clinic for excision

## 2017-09-11 ENCOUNTER — OFFICE VISIT (OUTPATIENT)
Dept: BEHAVIORAL HEALTH | Facility: CLINIC | Age: 62
End: 2017-09-11
Payer: MEDICARE

## 2017-09-11 DIAGNOSIS — F41.1 GAD (GENERALIZED ANXIETY DISORDER): ICD-10-CM

## 2017-09-11 DIAGNOSIS — F43.10 PTSD (POST-TRAUMATIC STRESS DISORDER): ICD-10-CM

## 2017-09-11 DIAGNOSIS — F31.81 BIPOLAR 2 DISORDER (H): Primary | ICD-10-CM

## 2017-09-11 PROCEDURE — 90834 PSYTX W PT 45 MINUTES: CPT | Performed by: SOCIAL WORKER

## 2017-09-11 NOTE — PROGRESS NOTES
Progress Note    Client Name: Karen Alex  Date: 9/11/2017                                   Service Type: Individual      Session Start Time:  12:30  Session End Time: 1:30      Session Length: 45 - 50     Session #: 24     Attendees: Client attended alone    Treatment Plan Last Completed Date: 5/16/17  PHQ-9 / HUNG-7 Last Completed Date: completed 8/28/2017                      DATA      Progress Since Last Session (Related to Symptoms / Goals / Homework):   Symptoms: Stable-continues to grieve for sister     Homework: Partially completed- has been active with family      Episode of Care Goals: Minimal progress - ACTION (Actively working towards change); Intervened by reinforcing change plan / affirming steps taken     Current / Ongoing Stressors and Concerns:   Client states that her boyfriend's son passed away about one week ago.  She compares her grief to her bf's grief (he is not grieving- more relieved).  States that she continues to remember her sister and gets overwhelmed with her emotions.  Anxiety is better with a change in her medication.  Discussed ways to remember her sister and ways to work with her emotions.       Treatment Objective(s) Addressed in This Session:   focus on being socially active and not isolating at home due to medical condition   Using acceptance skills to understand her level of depression and what she has control over       Intervention:   CBT: Encourage client to focus on present and goals for future        ASSESSMENT: Current Emotional / Mental Status (status of significant symptoms):   Risk status (Self / Other harm or suicidal ideation)  Client denies a history of suicidal ideation, suicide attempts, self-injurious behavior, homicidal ideation, homicidal behavior and and other safety concerns   Client denies current fears or concerns for personal safety.   Client reports the following current or recent suicidal ideation or  "behaviors: passive thoughts of not wanting to live.  Denies any plan or intent.  \"Would not do that to my son\".   Client denies current or recent homicidal ideation or behaviors.   Client denies current or recent self injurious behavior or ideation.   Client denies other safety concerns.   A safety and risk management plan has not been developed at this time, however client was given the after-hours number should there be a change in any of these risk factors.     Appearance:   Appropriate    Eye Contact:   Fair    Psychomotor Behavior: Normal    Attitude:   Cooperative    Orientation:   All   Speech    Rate / Production: Normal     Volume:  Soft    Mood:    Depressed  Sad    Affect:    Flat    Thought Content:  Hallucinations    Thought Form:  Focused on grief   Insight:    Fair      Medication Review:   Changes to psychiatric medications, see updated Medication List in EPIC.      Medication Compliance:   Yes     Changes in Health Issues:   Yes: Respiratory Disease, No Psychological Distress     Chemical Use Review:   Substance Use: Chemical use reviewed, no active concerns identified      Tobacco Use: No change in amount of tobacco use since last session.  Action on nicotine patches- struggles with cravings     Collateral Reports Completed:   Not Applicable    PLAN: (Client Tasks / Therapist Tasks / Other)  1.   Client will work through grief, using her support system, and medications as prescribed. 2.  Encourage her to set up a tour [employer] [facility] for additional  support.    Lupis Mcgee, LICSW     __________________________________________________________________________________________________                                                           Treatment Plan    Client's Name: Karen Alex  YOB: 1955    Date: 9/22/2015       DSM-V Diagnoses: 296.32 - Major Depressive Disorder, Recurrent, Moderate    300.02 - Generalized Anxiety Disorder    309.81 - Posttraumatic " Stress Disorder By History; 799.9 - Deferred Diagnosis   WHODAS:  35    Referral / Collaboration:  Referral to another professional/service is not indicated at this time..    Anticipated number of session or this episode of care: 12      MeasurableTreatment Goal(s) related to diagnosis / functional impairment(s)  Goal 1: Client will have stability with her mental health as evidenced by PHQ-9 and HUNG-7 scores as well as self-report.      I will know I've met my goal when I start feeling better about myself.      Objective #A (Client Action)      Status: Continued - Date(s): 5/16/2017          Client will Decrease frequency and intensity of feeling down, depressed, hopeless.    Intervention(s)  Therapist will assign homework by learning to build awareness of her triggers which activate her mental health symptoms.  Therapist will introduce and have client implement strategies to address triggers.    Objective #B  Client will Identify negative self-talk and behaviors: challenge core beliefs, myths, and actions.  Status: Continued - Date(s): 5/16/2017        Intervention(s)  Therapist will continue to help client identify and reframe negative perceptions of self.  Work on ways to increase self-esteem.    Objective #C  Client will Feel less tired and more energy during the day .  Status: Completed - Date: 3/2/16     Intervention(s)  Therapist will assign homework 1.  Complete at least one household activity daily; 2.  Focus on getting out of the house at least three times weekly; 3.  Look into entering social groups.      Client has reviewed and agreed to the above plan.      Lupis Mcgee, [facility]  September 22, 2015

## 2017-09-11 NOTE — MR AVS SNAPSHOT
MRN:[id number]                      After Visit Summary   9/11/2017    Karen Alex    MRN: [id number]           Visit Information        Provider Department      9/11/2017 12:30 PM Lupis Mcgee [facility]      Your next 10 appointments already scheduled     Sep 19, 2017 12:30 PM CDT   Return Visit with Lupis Mcgee [facility]    [address] [place] [id number] [phone]            Sep 21, 2017  8:15 AM CDT   Return Visit with Sandip Elliott MD   [facility] ([address] [phone]            Sep 27, 2017  1:30 PM CDT   Return Visit with Lupis Mcgee formerly [facility] ([facility])    [address] [place] [address] [id number] [phone]              MyChart Information     [facility] gives you secure access to your electronic health record. If you see a primary care provider, you can also send messages to your care team and make appointments. If you have questions, please call your primary care clinic.  If you do not have a primary care provider, please call [phone] and they will assist you.        Care EveryWhere ID     This is your Care EveryWhere ID. This could be used by other organizations to access your [facility] medical records  [id number]        Equal Access to Services     [facility] : Hadii aad ku hadasho Soomaali, waaxda luqadaha, qaybta kaalmada adeegyada, rj burgess haysophia ramirez . So [facility] [phone].    ATENCIÓN: Si habla español, tiene a zaragoza disposición servicios gratuitos de asistencia lingüística. Llame al [phone].    We comply with applicable federal civil rights laws and [place] laws. We do  not discriminate on the basis of race, color, national origin, age, disability sex, sexual orientation or gender identity.

## 2017-09-14 DIAGNOSIS — R07.9 CHEST PAIN: Primary | ICD-10-CM

## 2017-09-14 DIAGNOSIS — L82.1 SK (SEBORRHEIC KERATOSIS): ICD-10-CM

## 2017-09-14 DIAGNOSIS — D18.00 ANGIOMA: ICD-10-CM

## 2017-09-14 DIAGNOSIS — L81.4 LENTIGO: ICD-10-CM

## 2017-09-14 DIAGNOSIS — L72.3 INFLAMED EPIDERMOID CYST OF SKIN: ICD-10-CM

## 2017-09-14 RX ORDER — DOXYCYCLINE 100 MG/1
100 CAPSULE ORAL 2 TIMES DAILY
Qty: 14 CAPSULE | Refills: 0 | Status: SHIPPED | OUTPATIENT
Start: 2017-09-14 | End: 2017-01-01

## 2017-09-14 NOTE — TELEPHONE ENCOUNTER
Nitrostat      Last Written Prescription Date: 11/27/14  Last Fill Quantity: prn,  # refills: 1   Last Office Visit with [facility], [facility] or [facility] prescribing provider: 05/15/17 w/Dr Rebolledo                                         Next 5 appointments (look out 90 days)     Sep 19, 2017 12:30 PM CDT   Return Visit with MAURY Lemos   [facility] ([facility])    [address] [phone]            Sep 21, 2017  8:15 AM CDT   Return Visit with Sandip Elliott MD   [facility]    [address] [place] [id number] [phone]            Sep 27, 2017  1:30 PM CDT   Return Visit with MAURY Lemos   [facility] ([facility])    [address] [place] [phone]

## 2017-09-14 NOTE — TELEPHONE ENCOUNTER
Doxycycline      Last Written Prescription Date: 09/07/17  Last Fill Quantity: 14,  # refills: 0   Last Office Visit with [facility] or [facility] prescribing provider: 09/07/17                                         Next 5 appointments (look out 90 days)     Sep 19, 2017 12:30 PM CDT   Return Visit with MAURY Lemos   [facility] ([facility])    [address] [place] [id number]   [phone]            Sep 21, 2017  8:15 AM CDT   Return Visit with Sandip Elliott MD   [facility] ([address] [phone]   [phone]            Sep 27, 2017  1:30 PM CDT   Return Visit with MAURY Lemos   [facility] ([address] [address]   [phone]                  Routing refill request to provider for review/approval because:  Drug not on the [facility] refill protocol. According to note doxy was suppose to be for 1 wk only

## 2017-09-15 RX ORDER — NITROGLYCERIN 0.4 MG/1
0.4 TABLET SUBLINGUAL EVERY 5 MIN PRN
Qty: 25 TABLET | Refills: 1 | Status: SHIPPED | OUTPATIENT
Start: 2017-09-15

## 2017-09-15 NOTE — TELEPHONE ENCOUNTER
Prescription approved per [facility] Refill Protocol.  Maribeth Mullen RN

## 2017-09-18 DIAGNOSIS — M54.59 MECHANICAL LOW BACK PAIN: ICD-10-CM

## 2017-09-18 NOTE — TELEPHONE ENCOUNTER
Controlled Substance Refill Request for Norco  Problem List Complete:  Yes  Patient is followed by OKSANA ANTONIO for ongoing prescription of pain medication.  All refills should be approved by this provider, or covering partner.    Medication(s): Norco.   Maximum quantity per month: 40  Clinic visit frequency required: Q 3 months Last: 5/15/17    Controlled substance agreement on file: Yes       Date(s): 7/16/15    Pain Clinic evaluation in the past: No       Date(s):  n/a       Location(s):  n/a    DIRE Total Score(s):  No flowsheet data found.    Last [facility] website verification: 8/1/17   [email or web address]   checked in past 6 months?  Yes 8/1/17     Last Picked Up: 8/21/17    Georgie Shabazz, Pharmacy [facility]

## 2017-09-19 ENCOUNTER — OFFICE VISIT (OUTPATIENT)
Dept: BEHAVIORAL HEALTH | Facility: CLINIC | Age: 62
End: 2017-09-19
Payer: MEDICARE

## 2017-09-19 DIAGNOSIS — F41.1 GAD (GENERALIZED ANXIETY DISORDER): ICD-10-CM

## 2017-09-19 DIAGNOSIS — F43.10 PTSD (POST-TRAUMATIC STRESS DISORDER): ICD-10-CM

## 2017-09-19 DIAGNOSIS — F31.81 BIPOLAR 2 DISORDER (H): Primary | ICD-10-CM

## 2017-09-19 PROCEDURE — 90834 PSYTX W PT 45 MINUTES: CPT | Performed by: SOCIAL WORKER

## 2017-09-19 RX ORDER — HYDROCODONE BITARTRATE AND ACETAMINOPHEN 5; 325 MG/1; MG/1
TABLET ORAL
Qty: 30 TABLET | Refills: 0 | Status: SHIPPED | OUTPATIENT
Start: 2017-09-19 | End: 2017-01-01

## 2017-09-19 NOTE — PROGRESS NOTES
"                                             Progress Note    Client Name: Karen Alex  Date: 9/19/2017                                    Service Type: Individual      Session Start Time:  12:30  Session End Time: 1:30      Session Length: 45 - 50     Session #: 25     Attendees: Client attended alone    Treatment Plan Last Completed Date: 5/16/17  PHQ-9 / HUNG-7 Last Completed Date: completed 8/28/2017                      DATA      Progress Since Last Session (Related to Symptoms / Goals / Homework):   Symptoms: Stable-continues to grieve for sister     Homework: Partially completed- has been active with family      Episode of Care Goals: Minimal progress - ACTION (Actively working towards change); Intervened by reinforcing change plan / affirming steps taken     Current / Ongoing Stressors and Concerns:   Client reports that her sleep has improved with the medication change (Valium).  Went to visit her other sister- Shannan over the past weekend.  Continues to grieve about the death of her sister.  Gets  triggered by other losses.       Treatment Objective(s) Addressed in This Session:   focus on being socially active and not isolating at home due to medical condition   Using acceptance skills to understand her level of depression and what she has control over       Intervention:   CBT: Encourage client to focus on present and goals for future        ASSESSMENT: Current Emotional / Mental Status (status of significant symptoms):   Risk status (Self / Other harm or suicidal ideation)  Client denies a history of suicidal ideation, suicide attempts, self-injurious behavior, homicidal ideation, homicidal behavior and and other safety concerns   Client denies current fears or concerns for personal safety.   Client reports the following current or recent suicidal ideation or behaviors: passive thoughts of not wanting to live.  Denies any plan or intent.  \"Would not do that to my son\".   Client denies current or " recent homicidal ideation or behaviors.   Client denies current or recent self injurious behavior or ideation.   Client denies other safety concerns.   A safety and risk management plan has not been developed at this time, however client was given the after-hours number should there be a change in any of these risk factors.     Appearance:   Appropriate    Eye Contact:   Fair    Psychomotor Behavior: Normal    Attitude:   Cooperative    Orientation:   All   Speech    Rate / Production: Normal     Volume:  Soft    Mood:    Depressed  Sad    Affect:    Flat    Thought Content:  Hallucinations    Thought Form:  Focused on grief   Insight:    Fair      Medication Review:   Changes to psychiatric medications, see updated Medication List in EPIC.      Medication Compliance:   Yes     Changes in Health Issues:   Yes: Respiratory Disease, No Psychological Distress     Chemical Use Review:   Substance Use: Chemical use reviewed, no active concerns identified      Tobacco Use: No change in amount of tobacco use since last session.  Action on nicotine patches- struggles with cravings- One whole week of no smoking     Collateral Reports Completed:   Not Applicable    PLAN: (Client Tasks / Therapist Tasks / Other)  1.   Client will follow through with referrals fort a tour [facility] for additional  support and Elderly Waiver.    Lupis Mcgee, [facility]     __________________________________________________________________________________________________                                                           Treatment Plan    Client's Name: Karen Alex  YOB: 1955    Date: 9/22/2015       DSM-V Diagnoses: 296.32 - Major Depressive Disorder, Recurrent, Moderate    300.02 - Generalized Anxiety Disorder    309.81 - Posttraumatic Stress Disorder By History; 799.9 - Deferred Diagnosis   WHODAS:  35    Referral / Collaboration:  Referral to another professional/service is not indicated at this  time..    Anticipated number of session or this episode of care: 12      MeasurableTreatment Goal(s) related to diagnosis / functional impairment(s)  Goal 1: Client will have stability with her mental health as evidenced by PHQ-9 and HUNG-7 scores as well as self-report.      I will know I've met my goal when I start feeling better about myself.      Objective #A (Client Action)      Status: Continued - Date(s): 5/16/2017          Client will Decrease frequency and intensity of feeling down, depressed, hopeless.    Intervention(s)  Therapist will assign homework by learning to build awareness of her triggers which activate her mental health symptoms.  Therapist will introduce and have client implement strategies to address triggers.    Objective #B  Client will Identify negative self-talk and behaviors: challenge core beliefs, myths, and actions.  Status: Continued - Date(s): 5/16/2017        Intervention(s)  Therapist will continue to help client identify and reframe negative perceptions of self.  Work on ways to increase self-esteem.    Objective #C  Client will Feel less tired and more energy during the day .  Status: Completed - Date: 3/2/16     Intervention(s)  Therapist will assign homework 1.  Complete at least one household activity daily; 2.  Focus on getting out of the house at least three times weekly; 3.  Look into entering social groups.      Client has reviewed and agreed to the above plan.      Lupis Mcgee, [facility]  September 22, 2015

## 2017-09-19 NOTE — MR AVS SNAPSHOT
MRN:[id number]                      After Visit Summary   9/19/2017    Karen Alex    MRN: [id number]           Visit Information        Provider Department      9/19/2017 12:30 PM Lupis Mcgee, MAUR[facility]      Your next 10 appointments already scheduled     Sep 21, 2017  8:15 AM CDT   Return Visit with Sandip Elliott MD   [facility] ([facility])    [address] [place] [id number] [phone]            Sep 27, 2017  1:30 PM CDT   [facility]   [facility] ([facility]    [address] [place] [id number] [phone]              MyChart Information     [employer] gives you secure access to your electronic health record. If you see a primary care provider, you can also send messages to your care team and make appointments. If you have questions, please call your primary care clinic.  If you do not have a primary care provider, please call [phone] and they will assist you.        Care EveryWhere ID     This is your Care EveryWhere ID. This could be used by other organizations to access your [facility] medical records  [id number]        Equal Access to Services     BRANDYN TURNER : Hadii omid woodard hadasho Sodinoraali, waaxda luqadaha, qaybta kaalmada adeegyada, rj ace. So [facility] [phone].    ATENCIÓN: Si habla español, tiene a zaragoza disposición servicios gratuitos de asistencia lingüística. Llame al [phone].    We comply with applicable federal civil rights laws and [place] laws. We do not discriminate on the basis of race, color, national origin, age, disability sex, sexual orientation or gender identity.

## 2017-09-21 ENCOUNTER — TELEPHONE (OUTPATIENT)
Dept: DERMATOLOGY | Facility: CLINIC | Age: 62
End: 2017-09-21

## 2017-09-21 ENCOUNTER — OFFICE VISIT (OUTPATIENT)
Dept: DERMATOLOGY | Facility: CLINIC | Age: 62
End: 2017-09-21
Payer: MEDICARE

## 2017-09-21 ENCOUNTER — MYC MEDICAL ADVICE (OUTPATIENT)
Dept: DERMATOLOGY | Facility: CLINIC | Age: 62
End: 2017-09-21

## 2017-09-21 VITALS — OXYGEN SATURATION: 92 % | HEART RATE: 72 BPM | DIASTOLIC BLOOD PRESSURE: 79 MMHG | SYSTOLIC BLOOD PRESSURE: 115 MMHG

## 2017-09-21 DIAGNOSIS — J44.9 CHRONIC OBSTRUCTIVE PULMONARY DISEASE, UNSPECIFIED COPD TYPE (H): ICD-10-CM

## 2017-09-21 DIAGNOSIS — F41.1 GENERALIZED ANXIETY DISORDER: ICD-10-CM

## 2017-09-21 DIAGNOSIS — L72.0 EIC (EPIDERMAL INCLUSION CYST): Primary | ICD-10-CM

## 2017-09-21 DIAGNOSIS — J43.1 PANLOBULAR EMPHYSEMA (H): ICD-10-CM

## 2017-09-21 PROCEDURE — 88331 PATH CONSLTJ SURG 1 BLK 1SPC: CPT | Performed by: DERMATOLOGY

## 2017-09-21 PROCEDURE — 11443 EXC FACE-MM B9+MARG 2.1-3 CM: CPT | Mod: 51 | Performed by: DERMATOLOGY

## 2017-09-21 PROCEDURE — 13132 CMPLX RPR F/C/C/M/N/AX/G/H/F: CPT | Performed by: DERMATOLOGY

## 2017-09-21 RX ORDER — TIOTROPIUM BROMIDE 18 UG/1
18 CAPSULE ORAL; RESPIRATORY (INHALATION) DAILY
Qty: 90 CAPSULE | Refills: 2 | Status: SHIPPED | OUTPATIENT
Start: 2017-09-21

## 2017-09-21 RX ORDER — ALBUTEROL SULFATE 90 UG/1
AEROSOL, METERED RESPIRATORY (INHALATION)
Qty: 18 G | Refills: 5 | Status: SHIPPED | OUTPATIENT
Start: 2017-09-21 | End: 2018-01-01

## 2017-09-21 NOTE — MR AVS SNAPSHOT
After Visit Summary   2017    Karen Alex    MRN: [id number]           Patient Information     Date Of Birth          1955        Visit Information        Provider Department      2017 8:15 AM Sandip Elliott MD [facility]        Care Instructions      Sutured Wound Care     [facility]: [phone]          ? No strenuous activity for 48 hours. Resume moderate activity in 48 hours. No heavy exercising until you are seen for follow up in one week.     ? Take Tylenol as needed for discomfort.                         ? Do not drink alcoholic beverages for 48 hours.     ? Keep the pressure bandage in place for 24 hours. If the bandage becomes blood tinged or loose, reinforce it with gauze and tape.        (Refer to the reverse side of this page for management of bleeding).    ? Remove pressure bandage in 24 hours     ? Leave the flat bandage in place until your follow up appointment.    ? Keep the bandage dry. Wash around it carefully.    ? If the tape becomes soiled or starts to come off, reinforce it with additional paper tape.    ? Do not smoke for 3 weeks; smoking is detrimental to wound healing.    ? It is normal to have swelling and bruising around the surgical site. The bruising will fade in approximately 10-14 days. Elevate the area to reduce swelling.    ? Numbness, itchiness and sensitivity to temperature changes can occur after surgery and may take up to 18 months to normalize.      POSSIBLE COMPLICATIONS    BLEEDIN. Leave the bandage in place.  2. Use tightly rolled up gauze or a cloth to apply direct pressure over the bandage for 20   minutes.  3. Reapply pressure for an additional 20 minutes if necessary  4. Call the office or go to the nearest emergency room if pressure fails to stop the bleeding.  5. Use additional gauze and tape to maintain pressure once the bleeding has stopped.        PAIN:    1. Post operative pain  should slowly get better, never worse.  2. A severe increase in pain may indicate a problem. Call the office if this occurs.    In case of emergency phone:Dr Elliott [phone]              Follow-ups after your visit        Your next 10 appointments already scheduled     Sep 27, 2017  1:30 PM CDT   Return Visit with MAURY Lemos   [facility] ([facility])    [address] [address] [phone]              Who to contact     If you have questions or need follow up information about today's clinic visit or your schedule please contact [facility] directly at [phone].  Normal or non-critical lab and imaging results will be communicated to you by MyChart, letter or phone within 4 business days after the clinic has received the results. If you do not hear from us within 7 days, please contact the clinic through MyChart or phone. If you have a critical or abnormal lab result, we will notify you by phone as soon as possible.  Submit refill requests through [employer] or call your pharmacy and they will forward the refill request to us. Please allow 3 business days for your refill to be completed.          Additional Information About Your Visit        [employer] Information     [employer] gives you secure access to your electronic health record. If you see a primary care provider, you can also send messages to your care team and make appointments. If you have questions, please call your primary care clinic.  If you do not have a primary care provider, please call [phone] and they will assist you.        Care EveryWhere ID     This is your Care EveryWhere ID. This could be used by other organizations to access your [facility] medical records  [id number]        Your Vitals Were     Last Period                   12/01/2007            Blood Pressure from Last 3 Encounters:   09/07/17 131/86   09/05/17  108/60   08/11/17 110/80    Weight from Last 3 Encounters:   09/05/17 89.8 kg (198 lb)   08/11/17 88 kg (194 lb)   06/06/17 86.6 kg (191 lb)              Today, you had the following     No orders found for display       Primary Care Provider Office Phone # Fax #    Eros Rebolledo [phone] [address] [facility] [address]        Equal Access to Services     BRETT TURNER : Hadii aad ku hadasho Soomaali, waaxda luqadaha, qaybta kaalmada adeegyada, waxay idiin hayaan adeeg kharash lapoonam . So [facility] [phone].    ATENCIÓN: Si habla español, tiene a zaragoza disposición servicios gratuitos de asistencia lingüística. [facility] [phone].    We comply with applicable federal civil rights laws and [place] laws. We do not discriminate on the basis of race, color, national origin, age, disability sex, sexual orientation or gender identity.            Thank you!     Thank you for choosing [facility]  for your care. Our goal is always to provide you with excellent care. Hearing back from our patients is one way we can continue to improve our services. Please take a few minutes to complete the written survey that you may receive in the mail after your visit with us. Thank you!             Your Updated Medication List - Protect others around you: Learn how to safely use, store and throw away your medicines at www.disposemymeds.org.          This list is accurate as of: 9/21/17  9:09 AM.  Always use your most recent med list.                   Brand Name Dispense Instructions for use Diagnosis    ADVAIR DISKUS 250-50 MCG/DOSE diskus inhaler   Generic drug:  fluticasone-salmeterol     3 Inhaler    INHALE ONE PUFF BY MOUTH TWICE A DAY    Chronic obstructive pulmonary disease, unspecified COPD type (H)       * albuterol (2.5 MG/3ML) 0.083% neb solution     120 vial    Take 1 vial (2.5 mg) by nebulization 4 times daily as needed    COPD (chronic obstructive pulmonary  disease) (H)       * albuterol 108 (90 BASE) MCG/ACT Inhaler    PROAIR HFA    1 Inhaler    Inhale 1-2 puffs into the lungs every 4 hours as needed for shortness of breath / dyspnea    Panlobular emphysema (H)       ASPIRIN PO      Take 81 mg by mouth daily        atorvastatin 40 MG tablet    LIPITOR    90 tablet    Take 1 tablet (40 mg) by mouth daily    CAD (coronary artery disease)       buPROPion 300 MG 24 hr tablet    WELLBUTRIN XL    30 tablet    Take 1 tablet (300 mg) by mouth every morning    Major depressive disorder, recurrent episode, in partial remission (H)       diazepam 10 MG tablet    VALIUM    45 tablet    Take 1 tablet (10 mg) by mouth every 12 hours as needed for anxiety Change from Ativan (lorazepam)    HUNG (generalized anxiety disorder)       doxycycline Monohydrate 100 MG Caps     14 capsule    Take 1 capsule (100 mg) by mouth 2 times daily    SK (seborrheic keratosis), Lentigo, Angioma, Inflamed epidermoid cyst of skin       DULoxetine 60 MG EC capsule    CYMBALTA    60 capsule    Take 2 capsules (120 mg) by mouth daily For mood and anxiety and pain. Increased dose.    HUNG (generalized anxiety disorder), Major depressive disorder, recurrent episode, in partial remission (H)       HYDROcodone-acetaminophen 5-325 MG per tablet    NORCO    30 tablet    Take one two times daily as needed    Mechanical low back pain       ibuprofen 600 MG tablet    ADVIL/MOTRIN    20 tablet    Take 1 tablet (600 mg) by mouth every 6 hours as needed for moderate pain        isosorbide mononitrate 30 MG 24 hr tablet    IMDUR    90 tablet    Take 1 tablet (30 mg) by mouth daily    Chest pain, CAD (coronary artery disease)       lamoTRIgine 100 MG tablet    LAMICTAL    60 tablet    Take 1 tablet (100 mg) by mouth 2 times daily Reduced dose.    Bipolar 2 disorder (H)       nicotine 7 MG/24HR 24 hr patch    NICODERM CQ    30 patch    Place 1 patch onto the skin every 24 hours    Tobacco dependence syndrome        nitroGLYcerin 0.4 MG sublingual tablet    NITROSTAT    25 tablet    Place 1 tablet (0.4 mg) under the tongue every 5 minutes as needed for chest pain prn    Chest pain       * QUEtiapine 200 MG tablet    SEROQUEL    30 tablet    Take 1 tablet (200 mg) by mouth At Bedtime Increased dose.    HUNG (generalized anxiety disorder)       * QUEtiapine 50 MG tablet    SEROQUEL    60 tablet    Take 0.5-1 tablets (25-50 mg) by mouth 2 times daily as needed For anxiety    Severe episode of recurrent major depressive disorder, with psychotic features (H), HUNG (generalized anxiety disorder)       STOOL SOFTENER PO      Take by mouth daily        tiotropium 18 MCG capsule    SPIRIVA    90 capsule    Inhale 1 capsule (18 mcg) into the lungs daily    Generalized anxiety disorder, Chronic obstructive pulmonary disease, unspecified COPD type (H)       Vitamin D3 2000 UNITS Chew      Take 2,000 mg by mouth daily        * Notice:  This list has 4 medication(s) that are the same as other medications prescribed for you. Read the directions carefully, and ask your doctor or other care provider to review them with you.

## 2017-09-21 NOTE — PROGRESS NOTES
Karen Alex is a 62 year old year old female patient here today for evaluation and managment of inflamed ruptured eic r cheek. Patient reports the following modifying factors none.  Associated symptoms: none.  Patient has no other skin complaints today.  Remainder of the HPI, Meds, PMH, Allergies, FH, and SH was reviewed in chart.      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       Past Surgical History:   Procedure Laterality Date     BACK SURGERY       CONIZATION  10/23/07    Vulvar excision for BOBBY 3, cold knife conization     EXCISE VULVA WIDE LOCAL  5/25/2012    Procedure:EXCISE VULVA WIDE LOCAL; Wide Local Excision Of Vulvar Lesion; Surgeon:RE ALDRIDGE; Location:[facility] OR     Foot surgeries       HERNIORRHAPHY INCISIONAL (LOCATION)  4/25/2012    Procedure:HERNIORRHAPHY INCISIONAL (LOCATION); Incisional Hernia Repair with mesh ; Surgeon:KIM QUICK; Location:[facility] OR     HYSTERECTOMY TOTAL ABDOMINAL, BILATERAL SALPINGO-OOPHORECTOMY, COMBINED       LAPAROSCOPIC HYSTERECTOMY TOTAL, BILATERAL SALPINGO-OOPHORECTOMY, COMBINED  3/6/2012    Procedure:COMBINED LAPAROSCOPIC HYSTERECTOMY TOTAL, BILATERAL SALPINGO-OOPHORECTOMY; Total laparoscopic Hysterectomy, Bilateral Salpingo Ooporectomy, Pubovaginal Sling, Biopsy Left Volva; Surgeon:RE ALDRIDGE; Location:[facility] OR     repair of anal fissures  2005     SLING BLADDER SUSPENSION WITH FASCIA ALESHA       SLING TRANSPUBO WITHOUT ANTERIOR COLPORRHAPHY  3/6/2012    Procedure:SLING TRANSPUBO WITHOUT ANTERIOR COLPORRHAPHY; Surgeon:JOLANTA ENRIQUEZ;  Location:[facility] OR     TUBAL LIGATION          Family History   Problem Relation Age of Onset     CANCER Father      asbestos lung disease     Hypertension Mother      DIABETES Paternal Aunt      HEART DISEASE Maternal Grandfather      CANCER Maternal Grandmother      unsure of what kind,  at the age of 86     Circulatory Paternal Grandmother       of brain aneurysm     Asthma Son      HEART DISEASE Sister        Social History     Social History     Marital status:      Spouse name: N/A     Number of children: N/A     Years of education: N/A     Occupational History     Not on file.     Social History Main Topics     Smoking status: Former Smoker     Packs/day: 0.50     Years: 26.00     Types: Cigarettes     Quit date: 7/3/2015     Smokeless tobacco: Never Used      Comment: sometimes     Alcohol use No     Drug use: No     Sexual activity: Not Currently     Birth control/ protection: Surgical     Other Topics Concern     Parent/Sibling W/ Cabg, Mi Or Angioplasty Before 65f 55m? No     Caffeine Concern No     2 cans per day     Sleep Concern Yes     sometimes     Special Diet No     Exercise No     walking in halls at least 3 days per week     Social History Narrative       Outpatient Encounter Prescriptions as of 2017   Medication Sig Dispense Refill     HYDROcodone-acetaminophen (NORCO) 5-325 MG per tablet Take one two times daily as needed 30 tablet 0     nitroGLYcerin (NITROSTAT) 0.4 MG sublingual tablet Place 1 tablet (0.4 mg) under the tongue every 5 minutes as needed for chest pain prn 25 tablet 1     doxycycline Monohydrate 100 MG CAPS Take 1 capsule (100 mg) by mouth 2 times daily 14 capsule 0     diazepam (VALIUM) 10 MG tablet Take 1 tablet (10 mg) by mouth every 12 hours as needed for anxiety Change from Ativan (lorazepam) 45 tablet 1     lamoTRIgine (LAMICTAL) 100 MG tablet Take 1 tablet (100 mg) by mouth 2 times daily Reduced dose. 60 tablet 1     QUEtiapine (SEROQUEL) 200 MG tablet  Take 1 tablet (200 mg) by mouth At Bedtime Increased dose. 30 tablet 1     buPROPion (WELLBUTRIN XL) 300 MG 24 hr tablet Take 1 tablet (300 mg) by mouth every morning 30 tablet 1     QUEtiapine (SEROQUEL) 50 MG tablet Take 0.5-1 tablets (25-50 mg) by mouth 2 times daily as needed For anxiety 60 tablet 1     DULoxetine (CYMBALTA) 60 MG EC capsule Take 2 capsules (120 mg) by mouth daily For mood and anxiety and pain. Increased dose. 60 capsule 1     nicotine (NICODERM CQ) 7 MG/24HR 24 hr patch Place 1 patch onto the skin every 24 hours 30 patch 3     ADVAIR DISKUS 250-50 MCG/DOSE diskus inhaler INHALE ONE PUFF BY MOUTH TWICE A DAY 3 Inhaler 0     albuterol (ALBUTEROL) 108 (90 BASE) MCG/ACT Inhaler Inhale 1-2 puffs into the lungs every 4 hours as needed for shortness of breath / dyspnea 1 Inhaler 5     atorvastatin (LIPITOR) 40 MG tablet Take 1 tablet (40 mg) by mouth daily 90 tablet 3     isosorbide mononitrate (IMDUR) 30 MG 24 hr tablet Take 1 tablet (30 mg) by mouth daily 90 tablet 3     ibuprofen (ADVIL,MOTRIN) 600 MG tablet Take 1 tablet (600 mg) by mouth every 6 hours as needed for moderate pain 20 tablet 1     tiotropium (SPIRIVA) 18 MCG inhalation capsule Inhale 1 capsule (18 mcg) into the lungs daily 90 capsule 2     Cholecalciferol (VITAMIN D3) 2000 UNITS CHEW Take 2,000 mg by mouth daily       Docusate Calcium (STOOL SOFTENER PO) Take by mouth daily       albuterol (2.5 MG/3ML) 0.083% nebulizer solution Take 1 vial (2.5 mg) by nebulization 4 times daily as needed 120 vial 5     ASPIRIN PO Take 81 mg by mouth daily       No facility-administered encounter medications on file as of 9/21/2017.              Review Of Systems  Skin: As above  Eyes: negative  Ears/Nose/Throat: negative  Respiratory: No shortness of breath, dyspnea on exertion, cough, or hemoptysis  Cardiovascular: negative  Gastrointestinal: negative  Genitourinary: negative  Musculoskeletal: negative  Neurologic: negative  Psychiatric:  negative  Hematologic/Lymphatic/Immunologic: negative  Endocrine: negative      O:   NAD, WDWN, Alert & Oriented, Mood & Affect wnl, Vitals stable   Here today alone   /79  Pulse 72  LMP 12/01/2007  SpO2 92%   General appearance normal   Vitals stable   Alert, oriented and in no acute distress     R cheek indurated red 2.3cm nodule with multiple comedones      Eyes: Conjunctivae/lids:Normal     ENT: Lips, buccal mucosa, tongue: normal    MSK:Normal    Cardiovascular: peripheral edema none    Pulm: Breathing Normal    Lymph Nodes: No Head and Neck Lymphadenopathy     Neuro/Psych: Orientation:Normal; Mood/Affect:Normal      MICRO:   R cheek: Normal epidermis with ruptured cyst lined by stratified squamous epithelium with epidermal keratinization with acute inflammatory cells and no overlying connection to epidermis.    A/P:  1. R cheek eic  EXCISION OF CYST AND COMPLEX: After thorough discussion of PGACAC, consent obtained, anesthesia and prep, the margins of the cyst were identified and an elliptical incision was made encompassing the cyst. The incisions were made through the skin and down to and including the subcutaneous tissue. The cyst was removed en bloc and submitted for frozen pathologic review. The wound edges were widely undermined until adequate tissue mobility was obtained. hemostasis was achieved. The wound edges were then closed in a layered fashion, being careful not to leave any dead space. Postoperative length was 4.3 cm.   EBL minimal; complications none; wound care routine. The patient was discharged in good condition and will return in one week for wound evaluation.

## 2017-09-21 NOTE — NURSING NOTE
"Initial /79  Pulse 72  LMP 12/01/2007  SpO2 92% Estimated body mass index is 31.01 kg/(m^2) as calculated from the following:    Height as of 5/15/17: 1.702 m (5' 7\").    Weight as of 9/5/17: 89.8 kg (198 lb). .      "

## 2017-09-21 NOTE — TELEPHONE ENCOUNTER
Inhalers  Last Office Visit with [facility], [facility] or [facility] prescribing provider:  5/2017   Future Office Visit:    Next 5 appointments (look out 90 days)     Sep 27, 2017  1:30 PM CDT   Return Visit with MAURY Lemos   [facility] ([facility])    [address] [address]   [phone]            Sep 28, 2017 11:00 AM CDT   Nurse Only with  DERM NURSE   [facility]    [address] [address]   [phone]                   Date of Last Asthma Action Plan Letter:   There are no preventive care reminders to display for this patient.   Asthma Control Test:   ACT Total Scores 10/16/2014   ACT TOTAL SCORE 16   ASTHMA ER VISITS 0 = None   ASTHMA HOSPITALIZATIONS 0 = None       Date of Last Spirometry Test:   No results found for this or any previous visit.    Approved per pharmacist refill protocol. Patient is current on office visit and labs. Thanks.    Laurel Michaud, Pharm.D  Pharmacist in Charge  [facility]

## 2017-09-21 NOTE — TELEPHONE ENCOUNTER
----- Message from Sandip Elliott MD sent at 9/21/2017 12:55 PM CDT -----  r cheek ruptured cyst no further treatment

## 2017-09-21 NOTE — PATIENT INSTRUCTIONS
Sutured Wound Care     [facility]: [phone]          ? No strenuous activity for 48 hours. Resume moderate activity in 48 hours. No heavy exercising until you are seen for follow up in one week.     ? Take Tylenol as needed for discomfort.                         ? Do not drink alcoholic beverages for 48 hours.     ? Keep the pressure bandage in place for 24 hours. If the bandage becomes blood tinged or loose, reinforce it with gauze and tape.        (Refer to the reverse side of this page for management of bleeding).    ? Remove pressure bandage in 24 hours     ? Leave the flat bandage in place until your follow up appointment.    ? Keep the bandage dry. Wash around it carefully.    ? If the tape becomes soiled or starts to come off, reinforce it with additional paper tape.    ? Do not smoke for 3 weeks; smoking is detrimental to wound healing.    ? It is normal to have swelling and bruising around the surgical site. The bruising will fade in approximately 10-14 days. Elevate the area to reduce swelling.    ? Numbness, itchiness and sensitivity to temperature changes can occur after surgery and may take up to 18 months to normalize.      POSSIBLE COMPLICATIONS    BLEEDIN. Leave the bandage in place.  2. Use tightly rolled up gauze or a cloth to apply direct pressure over the bandage for 20   minutes.  3. Reapply pressure for an additional 20 minutes if necessary  4. Call the office or go to the nearest emergency room if pressure fails to stop the bleeding.  5. Use additional gauze and tape to maintain pressure once the bleeding has stopped.        PAIN:    1. Post operative pain should slowly get better, never worse.  2. A severe increase in pain may indicate a problem. Call the office if this occurs.    In case of emergency phone:Dr Elliott [phone]

## 2017-09-27 ENCOUNTER — OFFICE VISIT (OUTPATIENT)
Dept: BEHAVIORAL HEALTH | Facility: CLINIC | Age: 62
End: 2017-09-27
Payer: MEDICARE

## 2017-09-27 DIAGNOSIS — F31.81 BIPOLAR 2 DISORDER (H): Primary | ICD-10-CM

## 2017-09-27 DIAGNOSIS — F43.10 PTSD (POST-TRAUMATIC STRESS DISORDER): ICD-10-CM

## 2017-09-27 DIAGNOSIS — F41.1 GAD (GENERALIZED ANXIETY DISORDER): ICD-10-CM

## 2017-09-27 PROCEDURE — 90834 PSYTX W PT 45 MINUTES: CPT | Performed by: SOCIAL WORKER

## 2017-09-27 NOTE — PROGRESS NOTES
Progress Note    Client Name: Karen Alex  Date: 9/27/2017                                     Service Type: Individual      Session Start Time:  1:30  Session End Time: 2:30      Session Length: 45 - 50     Session #: 26     Attendees: Client attended alone    Treatment Plan Last Completed Date: 5/16/17  PHQ-9 / HUNG-7 Last Completed Date: completed 8/28/2017                      DATA      Progress Since Last Session (Related to Symptoms / Goals / Homework):   Symptoms: Stable-continues to grieve for sister     Homework: Partially completed- has been stressed with stressors and not completing tasks we discussed      Episode of Care Goals: Minimal progress - ACTION (Actively working towards change); Intervened by reinforcing change plan / affirming steps taken     Current / Ongoing Stressors and Concerns:   Client reports that she overwhelmed with some recent stressors- she is trying to dispute a bill, get signed up for HE[employer], and grieving for her losses  She has not followed through on suggestions but will try after resolving some of these other stressors.  Gave her strategies to help with some of her stressors as well as reminding her to manage the emotions that are present.  Pushed client to work on her socialization needs that are not being currently met.     Treatment Objective(s) Addressed in This Session:   focus on being socially active and not isolating at home due to medical condition   Using acceptance skills to understand her level of depression and what she has control over       Intervention:   CBT: Encourage client to focus on present and goals for future        ASSESSMENT: Current Emotional / Mental Status (status of significant symptoms):   Risk status (Self / Other harm or suicidal ideation)  Client denies a history of suicidal ideation, suicide attempts, self-injurious behavior, homicidal ideation, homicidal behavior and and other safety  "concerns   Client denies current fears or concerns for personal safety.   Client reports the following current or recent suicidal ideation or behaviors: passive thoughts of not wanting to live.  Denies any plan or intent.  \"Would not do that to my son\".   Client denies current or recent homicidal ideation or behaviors.   Client denies current or recent self injurious behavior or ideation.   Client denies other safety concerns.   A safety and risk management plan has not been developed at this time, however client was given the after-hours number should there be a change in any of these risk factors.     Appearance:   Appropriate    Eye Contact:   Fair    Psychomotor Behavior: Normal    Attitude:   Cooperative    Orientation:   All   Speech    Rate / Production: Normal     Volume:  Soft    Mood:    Depressed  Sad    Affect:    Flat    Thought Content:  Hallucinations    Thought Form:  Focused on grief   Insight:    Fair      Medication Review:   Changes to psychiatric medications, see updated Medication List in EPIC.      Medication Compliance:   Yes     Changes in Health Issues:   Yes: Respiratory Disease, No Psychological Distress     Chemical Use Review:   Substance Use: Chemical use reviewed, no active concerns identified      Tobacco Use: No change in amount of tobacco use since last session.  Action on nicotine patches- struggles with cravings- One whole week of no smoking     Collateral Reports Completed:   Not Applicable    PLAN: (Client Tasks / Therapist Tasks / Other)  1.   Client will follow through with referrals fort a tour [facility] for additional  support and Elderly Waiver.  2.  Work on using supports for her anxiety and stress.    Lupis Mcgee, MAURY     __________________________________________________________________________________________________                                                           Treatment Plan    Client's Name: Karen FORREST Abi  Date Of " Birth: 1955    Date: 9/22/2015       DSM-V Diagnoses: 296.32 - Major Depressive Disorder, Recurrent, Moderate    300.02 - Generalized Anxiety Disorder    309.81 - Posttraumatic Stress Disorder By History; 799.9 - Deferred Diagnosis   WHODAS:  35    Referral / Collaboration:  Referral to another professional/service is not indicated at this time..    Anticipated number of session or this episode of care: 12      MeasurableTreatment Goal(s) related to diagnosis / functional impairment(s)  Goal 1: Client will have stability with her mental health as evidenced by PHQ-9 and HUNG-7 scores as well as self-report.      I will know I've met my goal when I start feeling better about myself.      Objective #A (Client Action)      Status: Continued - Date(s): 5/16/2017          Client will Decrease frequency and intensity of feeling down, depressed, hopeless.    Intervention(s)  Therapist will assign homework by learning to build awareness of her triggers which activate her mental health symptoms.  Therapist will introduce and have client implement strategies to address triggers.    Objective #B  Client will Identify negative self-talk and behaviors: challenge core beliefs, myths, and actions.  Status: Continued - Date(s): 5/16/2017        Intervention(s)  Therapist will continue to help client identify and reframe negative perceptions of self.  Work on ways to increase self-esteem.    Objective #C  Client will Feel less tired and more energy during the day .  Status: Completed - Date: 3/2/16     Intervention(s)  Therapist will assign homework 1.  Complete at least one household activity daily; 2.  Focus on getting out of the house at least three times weekly; 3.  Look into entering social groups.      Client has reviewed and agreed to the above plan.      Lupis Mcgee, [facility]  September 22, 2015

## 2017-09-27 NOTE — MR AVS SNAPSHOT
MRN:[id number]                      After Visit Summary   9/27/2017    Karen Alex    MRN: [id number]           Visit Information        Provider Department      9/27/2017 1:30 PM Arely, Lupis Marquez [facility] Generic      Your next 10 appointments already scheduled     Sep 28, 2017 11:00 AM CDT   Nurse Only with OX DERM NURSE   [facility]    [address] [address]   [phone]            Nov 01, 2017  1:30 PM CDT   Return Visit with Lupis Mcgee [facility] ([facility])    [address] [address]   [phone]            Nov 15, 2017  1:30 PM CST   Return Visit with Lupis Mcgee [facility] ([facility])    [address] [address]   [phone]            Nov 29, 2017  1:30 PM CST   Return Visit with Lupis Mcgee [facility] ([facility])    [address] [place] [address]   [phone]              MyChart Information     [employer] gives you secure access to your electronic health record. If you see a primary care provider, you can also send messages to your care team and make appointments. If you have questions, please call your primary care clinic.  If you do not have a primary care provider, please call [phone] and they will assist you.        Care EveryWhere ID     This is your Care EveryWhere ID. This could be used by other organizations to access your [place] medical records  [id number]        Equal Access to Services     BRANDYN TURNER : Ej reynolds Sonatalie, waaxda luqadaha, qaybta kaalmada aderaymundoyakike, rj ace. So [facility]  [phone].    ATENCIÓN: Si habla español, tiene a zaragoza disposición servicios gratuitos de asistencia lingüística. Llame al [phone].    We comply with applicable federal civil rights laws and [place] laws. We do not discriminate on the basis of race, color, national origin, age, disability sex, sexual orientation or gender identity.

## 2017-09-28 NOTE — NURSING NOTE
Pt returned to clinic for post surgery 1 week follow up bandage change. Pt has no complaints, denies pain. Bandage removed right cheek, area cleansed with normal saline. Site is healing and wound edges approximating well. Reapplied new steri strips and paper tape.    Advised to watch for signs/sx of infection; spreading redness, drainage, odor, fever. Call or report promptly to clinic. Pt given written instructions and informed to rtc as needed. Patient verbalized understanding.

## 2017-09-28 NOTE — MR AVS SNAPSHOT
After Visit Summary   9/28/2017    Karen Alex    MRN: [id number]           Patient Information     Date Of Birth          1955        Visit Information        Provider Department      9/28/2017 11:00 AM  [facility]        Care Instructions    WOUND CARE INSTRUCTIONS  for  ONE WEEK AFTER SURGERY          1) Leave flat bandage on your skin for one week after today s bandage change.  2) In one week when you remove the bandage, you may resume your regular skin care routine, including washing with mild soap and water, applying moisturizer, make-up and sunscreen.    3) If there are any open or bleeding areas at the incision/graft site you should begin to cover the area with a bandage daily as follows:    1) Clean and dry the area with plain tap water using a Q-tip or sterile gauze pad.  2) Apply Polysporin or Bacitracin ointment to the open area.  3) Cover the wound with a band-aid or a sterile non-stick gauze pad and micropore paper tape.             *Once the bandages are removed, the scar will be red and firm (especially in the lip/chin area). This is normal and will fade in time. It might take 6-12 months for this to happen.     *Massaging the area will help the scar soften and fade quicker. Begin to massage the area one month after the bandages have been removed. To massage apply pressure directly and firmly over the scar with the fingertips and move in a circular motion. Massage the area for a few minutes several times a day. Continue to massage the site for several months.    *Approximately 6-8 weeks after surgery it is not uncommon to see the formation of  tender pimple-like  bump along the scar. This is normal. As the scar continues to mature and the stitches underneath the skin begin to dissolve, this might occur. Do not pick or squeeze, this will resolve on it s own. Should one break open producing a small amount of drainage, apply Polysporin or  Bacitracin ointment a few times a day until the wound is completely healed.    *Numbness in the surgical area is expected. It might take 12-18 months for the feeling to return to normal. During this time sensations of itchiness, tingling and occasional sharp pains might be noted. These feelings are normal and will subside once the nerves have completely healed.         IN CASE OF EMERGENCY: Dr Elliott [phone]     If you were seen in [place] call: [phone]    If you were seen in [place] call: [phone]              Follow-ups after your visit        Your next 10 appointments already scheduled     Sep 28, 2017 11:00 AM CDT   Nurse Only with OX DERM NURSE   [facility] ([facility]    [address] [id number] [phone]            Sep 29, 2017  2:15 PM CDT   [facility]    [address]  [address]  [facility] [id number] [phone]            Oct 30, 2017  1:15 PM CDT   [facility] Visit with Leana Patel NP   [facility]    [address]  [facility] [address] [phone]            Nov 01, 2017  1:30 PM CDT   Return Visit with MAURY Lemos   [facility] ([facility])    [address] [id number] [phone]            Nov 15, 2017  1:30 PM CST   Return Visit with Lupis Mcgee [facility] ([facility])    [address] [id number] [phone]            Nov 29, 2017  1:30 PM CST   Return Visit with Lupis Mcgee [facility] ([facility])    [address] [address]    [phone]              Who to contact     If you have questions or need follow up information about today's clinic visit or your schedule please contact [facility] directly at [phone].  Normal or non-critical lab and imaging results will be communicated to you by MyChart, letter or phone within 4 business days after the clinic has received the results. If you do not hear from us within 7 days, please contact the clinic through MyChart or phone. If you have a critical or abnormal lab result, we will notify you by phone as soon as possible.  Submit refill requests through [employer] or call your pharmacy and they will forward the refill request to us. Please allow 3 business days for your refill to be completed.          Additional Information About Your Visit        [employer] Information     [employer] gives you secure access to your electronic health record. If you see a primary care provider, you can also send messages to your care team and make appointments. If you have questions, please call your primary care clinic.  If you do not have a primary care provider, please call [phone] and they will assist you.        Care EveryWhere ID     This is your Care EveryWhere ID. This could be used by other organizations to access your [place] medical records  [id number]        Your Vitals Were     Last Period                   12/01/2007            Blood Pressure from Last 3 Encounters:   09/21/17 115/79   09/07/17 131/86   09/05/17 108/60    Weight from Last 3 Encounters:   09/05/17 89.8 kg (198 lb)   08/11/17 88 kg (194 lb)   06/06/17 86.6 kg (191 lb)              Today, you had the following     No orders found for display       Primary Care Provider Office Phone # Fax #    Eros Rebolledo [phone] [address] [facility] [address]        Equal Access to Services     BRANDYN TURNER : Ej Calixto, rupert sifuentes, fede saucedo  rj daniel leanna mitchellaan ah. [facility] [phone].    ATENCIÓN: Si benignola kylah, tiene a zaragoza disposición servicios gratuitos de asistencia lingüística. Farshad al [phone].    We comply with applicable federal civil rights laws and [place] laws. We do not discriminate on the basis of race, color, national origin, age, disability sex, sexual orientation or gender identity.            Thank you!     Thank you for choosing [facility]  for your care. Our goal is always to provide you with excellent care. Hearing back from our patients is one way we can continue to improve our services. Please take a few minutes to complete the written survey that you may receive in the mail after your visit with us. Thank you!             Your Updated Medication List - Protect others around you: Learn how to safely use, store and throw away your medicines at www.disposemymeds.org.          This list is accurate as of: 9/28/17 10:55 AM.  Always use your most recent med list.                   Brand Name Dispense Instructions for use Diagnosis    ADVAIR DISKUS 250-50 MCG/DOSE diskus inhaler   Generic drug:  fluticasone-salmeterol     3 Inhaler    INHALE ONE PUFF BY MOUTH TWO TIMES A DAY    Chronic obstructive pulmonary disease, unspecified COPD type (H)       * albuterol (2.5 MG/3ML) 0.083% neb solution     120 vial    Take 1 vial (2.5 mg) by nebulization 4 times daily as needed    COPD (chronic obstructive pulmonary disease) (H)       * VENTOLIN  (90 BASE) MCG/ACT Inhaler   Generic drug:  albuterol     18 g    SHAKE WELL AND INHALE 1 TO 2 PUFFS INTO THE LUNGS EVERY 4 HOURS AS NEEDED FOR SHORTNESS OF BREATH, DIFFICULTY BREATHING OR WHEEZING.    Panlobular emphysema (H)       ASPIRIN PO      Take 81 mg by mouth daily        atorvastatin 40 MG tablet    LIPITOR    90 tablet    Take 1 tablet (40 mg) by mouth daily    CAD (coronary artery disease)       buPROPion 300 MG 24 hr tablet     WELLBUTRIN XL    30 tablet    Take 1 tablet (300 mg) by mouth every morning    Major depressive disorder, recurrent episode, in partial remission (H)       diazepam 10 MG tablet    VALIUM    45 tablet    Take 1 tablet (10 mg) by mouth every 12 hours as needed for anxiety Change from Ativan (lorazepam)    HUNG (generalized anxiety disorder)       doxycycline Monohydrate 100 MG Caps     14 capsule    Take 1 capsule (100 mg) by mouth 2 times daily    SK (seborrheic keratosis), Lentigo, Angioma, Inflamed epidermoid cyst of skin       DULoxetine 60 MG EC capsule    CYMBALTA    60 capsule    Take 2 capsules (120 mg) by mouth daily For mood and anxiety and pain. Increased dose.    HUNG (generalized anxiety disorder), Major depressive disorder, recurrent episode, in partial remission (H)       HYDROcodone-acetaminophen 5-325 MG per tablet    NORCO    30 tablet    Take one two times daily as needed    Mechanical low back pain       ibuprofen 600 MG tablet    ADVIL/MOTRIN    20 tablet    Take 1 tablet (600 mg) by mouth every 6 hours as needed for moderate pain        isosorbide mononitrate 30 MG 24 hr tablet    IMDUR    90 tablet    Take 1 tablet (30 mg) by mouth daily    Chest pain, CAD (coronary artery disease)       lamoTRIgine 100 MG tablet    LAMICTAL    60 tablet    Take 1 tablet (100 mg) by mouth 2 times daily Reduced dose.    Bipolar 2 disorder (H)       nicotine 7 MG/24HR 24 hr patch    NICODERM CQ    30 patch    Place 1 patch onto the skin every 24 hours    Tobacco dependence syndrome       nitroGLYcerin 0.4 MG sublingual tablet    NITROSTAT    25 tablet    Place 1 tablet (0.4 mg) under the tongue every 5 minutes as needed for chest pain prn    Chest pain       * QUEtiapine 200 MG tablet    SEROQUEL    30 tablet    Take 1 tablet (200 mg) by mouth At Bedtime Increased dose.    HUNG (generalized anxiety disorder)       * QUEtiapine 50 MG tablet    SEROQUEL    60 tablet    Take 0.5-1 tablets (25-50 mg) by mouth 2 times  daily as needed For anxiety    Severe episode of recurrent major depressive disorder, with psychotic features (H), HUNG (generalized anxiety disorder)       STOOL SOFTENER PO      Take by mouth daily        tiotropium 18 MCG capsule    SPIRIVA    90 capsule    Inhale 1 capsule (18 mcg) into the lungs daily    Generalized anxiety disorder, Chronic obstructive pulmonary disease, unspecified COPD type (H)       Vitamin D3 2000 UNITS Chew      Take 2,000 mg by mouth daily        * Notice:  This list has 4 medication(s) that are the same as other medications prescribed for you. Read the directions carefully, and ask your doctor or other care provider to review them with you.

## 2017-09-28 NOTE — PATIENT INSTRUCTIONS
WOUND CARE INSTRUCTIONS  for  ONE WEEK AFTER SURGERY          1) Leave flat bandage on your skin for one week after today s bandage change.  2) In one week when you remove the bandage, you may resume your regular skin care routine, including washing with mild soap and water, applying moisturizer, make-up and sunscreen.    3) If there are any open or bleeding areas at the incision/graft site you should begin to cover the area with a bandage daily as follows:    1) Clean and dry the area with plain tap water using a Q-tip or sterile gauze pad.  2) Apply Polysporin or Bacitracin ointment to the open area.  3) Cover the wound with a band-aid or a sterile non-stick gauze pad and micropore paper tape.             *Once the bandages are removed, the scar will be red and firm (especially in the lip/chin area). This is normal and will fade in time. It might take 6-12 months for this to happen.     *Massaging the area will help the scar soften and fade quicker. Begin to massage the area one month after the bandages have been removed. To massage apply pressure directly and firmly over the scar with the fingertips and move in a circular motion. Massage the area for a few minutes several times a day. Continue to massage the site for several months.    *Approximately 6-8 weeks after surgery it is not uncommon to see the formation of  tender pimple-like  bump along the scar. This is normal. As the scar continues to mature and the stitches underneath the skin begin to dissolve, this might occur. Do not pick or squeeze, this will resolve on it s own. Should one break open producing a small amount of drainage, apply Polysporin or Bacitracin ointment a few times a day until the wound is completely healed.    *Numbness in the surgical area is expected. It might take 12-18 months for the feeling to return to normal. During this time sensations of itchiness, tingling and occasional sharp pains might be noted. These feelings are normal  and will subside once the nerves have completely healed.         IN CASE OF EMERGENCY: Dr Elliott [phone]     If you were seen in [place] call: [phone]    If you were seen in [place] call: [phone]

## 2017-09-29 NOTE — Clinical Note
Psychiatry update. Scores are still bad, but she appears better during interview today. Still working on medication changes. More details in my note. Leana

## 2017-09-29 NOTE — MR AVS SNAPSHOT
After Visit Summary   9/29/2017    Karen Alex    MRN: [id number]           Patient Information     Date Of Birth          1955        Visit Information        Provider Department      9/29/2017 2:15 PM Leana Patel NP [facility]        Today's Diagnoses     HUNG (generalized anxiety disorder)        Severe episode of recurrent major depressive disorder, with psychotic features (H)        Major depressive disorder, recurrent episode, in partial remission (H)          Care Instructions    Treatment Plan:    Continue Valium (diazepam) 10 mg by mouth 2 times per day.     Continue Cymbalta (duloxetine) 120 mg by mouth daily for depression and anxiety.    Continue Wellbutrin (buproprion)  mg by mouth daily in AM.     Reduce Lamictal (lamotrigine) to 50 mg by mouth in AM and 50 mg in PM for one week then stop.     Continue Seroquel (quetiapine) 200 mg by mouth daily in PM at bedtime for anxiety    Continue Seroquel (quetiapine) 50 mg by mouth daily in AM for anxiety.    Continue all other medications as reviewed per electronic medical record today.     All questions addressed. Education and counseling completed regarding risks and benefits of medications and psychotherapy options.    Safety plan was reviewed. To the Emergency Department as needed or call after hours crisis line at [phone] or [phone].     Continue individual therapy as planned with Lupis Mcgee.    Schedule an appointment with me as planned in October. Call [facility] at [phone] to schedule.    Follow up with primary care provider as planned or for acute medical concerns.    Call the psychiatric nurse line with medication questions or concerns at [phone].    My Practice Policy was reviewed and signed: YES     MyChart may be used to communicate with your provider, but this is not intended to be used for emergencies.          Follow-ups after your visit        Your next  10 appointments already scheduled     Oct 30, 2017  1:15 PM CDT   Return Visit with Leana Patel NP   [facility]    [address]  [address]  [facility] [address]   [phone]            Nov 01, 2017  1:30 PM CDT   Return Visit with Lupis Mcgee [facility] ([facility])    [address] [id number] [phone]            Nov 15, 2017  1:30 PM CST   Return Visit with Lupis Mcgee [facility] ([facility])    [address] [id number] [phone]            Nov 29, 2017  1:30 PM CST   Return Visit with Lupis Mcgee [facility] ([address] [id number] [phone]              Who to contact     If you have questions or need follow up information about today's clinic visit or your schedule please contact [facility] directly at [phone].  Normal or non-critical lab and imaging results will be communicated to you by MyChart, letter or phone within 4 business days after the clinic has received the results. If you do not hear from us within 7 days, please contact the clinic through MyChart or phone. If you have a critical or abnormal lab result, we will notify you by phone as soon as possible.  Submit refill requests through [employer] or call your pharmacy and they will forward the refill request to us. Please allow 3 business days for your refill to be completed.          Additional Information About Your Visit        [email or web address] Information     [employer] gives you secure access to your electronic health record. If you see a primary care provider, you can also send messages to your care team and make appointments. If you have questions,  please call your primary care clinic.  If you do not have a primary care provider, please call [phone] and they will assist you.        Care EveryWhere ID     This is your Care EveryWhere ID. This could be used by other organizations to access your [facility] medical records  [id number]        Your Vitals Were     Last Period                   12/01/2007            Blood Pressure from Last 3 Encounters:   09/21/17 115/79   09/07/17 131/86   09/05/17 108/60    Weight from Last 3 Encounters:   09/05/17 198 lb (89.8 kg)   08/11/17 194 lb (88 kg)   06/06/17 191 lb (86.6 kg)              Today, you had the following     No orders found for display         Today's Medication Changes          These changes are accurate as of: 9/29/17  2:56 PM.  If you have any questions, ask your nurse or doctor.               These medicines have changed or have updated prescriptions.        Dose/Directions    diazepam 10 MG tablet   Commonly known as:  VALIUM   This may have changed:  additional instructions   Used for:  HUNG (generalized anxiety disorder)   Changed by:  Leana Patel NP        Dose:  10 mg   Take 1 tablet (10 mg) by mouth every 12 hours as needed for anxiety   Quantity:  60 tablet   Refills:  1       DULoxetine 60 MG EC capsule   Commonly known as:  CYMBALTA   This may have changed:  additional instructions   Used for:  HUNG (generalized anxiety disorder), Major depressive disorder, recurrent episode, in partial remission (H)   Changed by:  Leana Patel NP        Dose:  120 mg   Take 2 capsules (120 mg) by mouth daily For mood and anxiety and pain.   Quantity:  180 capsule   Refills:  1       * QUEtiapine 200 MG tablet   Commonly known as:  SEROquel   This may have changed:  additional instructions   Used for:  HUNG (generalized anxiety disorder)   Changed by:  Leana Patel NP        Dose:  200 mg   Take 1 tablet (200 mg) by mouth At Bedtime   Quantity:  90 tablet   Refills:  1       * QUEtiapine 50 MG  tablet   Commonly known as:  SEROQUEL   This may have changed:    - how much to take  - when to take this  - reasons to take this   Used for:  Severe episode of recurrent major depressive disorder, with psychotic features (H), HUNG (generalized anxiety disorder)   Changed by:  Leana Patel NP        Dose:  50 mg   Take 1 tablet (50 mg) by mouth daily For anxiety   Quantity:  90 tablet   Refills:  1       * Notice:  This list has 2 medication(s) that are the same as other medications prescribed for you. Read the directions carefully, and ask your doctor or other care provider to review them with you.      Stop taking these medicines if you haven't already. Please contact your care team if you have questions.     lamoTRIgine 100 MG tablet   Commonly known as:  LAMICTAL   Stopped by:  Leana Patel NP                Where to get your medicines      These medications were sent to [facility] [address]  [address] [facility] [address]     Phone:  [phone]     buPROPion 300 MG 24 hr tablet    DULoxetine 60 MG EC capsule    QUEtiapine 200 MG tablet    QUEtiapine 50 MG tablet         Some of these will need a paper prescription and others can be bought over the counter.  Ask your nurse if you have questions.     Bring a paper prescription for each of these medications     diazepam 10 MG tablet                Primary Care Provider Office Phone # Fax #    Eros Rebolledo [phone] [address] [facility] [address]        Equal Access to Services     BRETT [facility] Center[facility]ABRIEL : Hadii omid dodsono Sonatalie, waaxda luqadaha, qaybta kaalmada adeegyada, rj ace. So [facility] [phone].    ATENCIÓN: Si habla español, tiene a zaragoza disposición servicios gratuitos de asistencia lingüística. Llranjan al [phone].    We comply with applicable federal civil rights laws and [place] laws. We do not discriminate on the  basis of race, color, national origin, age, disability, sex, sexual orientation, or gender identity.            Thank you!     Thank you for choosing [facility]  for your care. Our goal is always to provide you with excellent care. Hearing back from our patients is one way we can continue to improve our services. Please take a few minutes to complete the written survey that you may receive in the mail after your visit with us. Thank you!             Your Updated Medication List - Protect others around you: Learn how to safely use, store and throw away your medicines at www.disposemymeds.org.          This list is accurate as of: 9/29/17  2:56 PM.  Always use your most recent med list.                   Brand Name Dispense Instructions for use Diagnosis    ADVAIR DISKUS 250-50 MCG/DOSE diskus inhaler   Generic drug:  fluticasone-salmeterol     3 Inhaler    INHALE ONE PUFF BY MOUTH TWO TIMES A DAY    Chronic obstructive pulmonary disease, unspecified COPD type (H)       * albuterol (2.5 MG/3ML) 0.083% neb solution     120 vial    Take 1 vial (2.5 mg) by nebulization 4 times daily as needed    COPD (chronic obstructive pulmonary disease) (H)       * VENTOLIN  (90 BASE) MCG/ACT Inhaler   Generic drug:  albuterol     18 g    SHAKE WELL AND INHALE 1 TO 2 PUFFS INTO THE LUNGS EVERY 4 HOURS AS NEEDED FOR SHORTNESS OF BREATH, DIFFICULTY BREATHING OR WHEEZING.    Panlobular emphysema (H)       ASPIRIN PO      Take 81 mg by mouth daily        atorvastatin 40 MG tablet    LIPITOR    90 tablet    Take 1 tablet (40 mg) by mouth daily    CAD (coronary artery disease)       buPROPion 300 MG 24 hr tablet    WELLBUTRIN XL    90 tablet    Take 1 tablet (300 mg) by mouth every morning    Major depressive disorder, recurrent episode, in partial remission (H)       diazepam 10 MG tablet    VALIUM    60 tablet    Take 1 tablet (10 mg) by mouth every 12 hours as needed for anxiety    HUNG (generalized anxiety  disorder)       doxycycline Monohydrate 100 MG Caps     14 capsule    Take 1 capsule (100 mg) by mouth 2 times daily    SK (seborrheic keratosis), Lentigo, Angioma, Inflamed epidermoid cyst of skin       DULoxetine 60 MG EC capsule    CYMBALTA    180 capsule    Take 2 capsules (120 mg) by mouth daily For mood and anxiety and pain.    HUNG (generalized anxiety disorder), Major depressive disorder, recurrent episode, in partial remission (H)       HYDROcodone-acetaminophen 5-325 MG per tablet    NORCO    30 tablet    Take one two times daily as needed    Mechanical low back pain       ibuprofen 600 MG tablet    ADVIL/MOTRIN    20 tablet    Take 1 tablet (600 mg) by mouth every 6 hours as needed for moderate pain        isosorbide mononitrate 30 MG 24 hr tablet    IMDUR    90 tablet    Take 1 tablet (30 mg) by mouth daily    Chest pain, CAD (coronary artery disease)       nicotine 7 MG/24HR 24 hr patch    NICODERM CQ    30 patch    Place 1 patch onto the skin every 24 hours    Tobacco dependence syndrome       nitroGLYcerin 0.4 MG sublingual tablet    NITROSTAT    25 tablet    Place 1 tablet (0.4 mg) under the tongue every 5 minutes as needed for chest pain prn    Chest pain       * QUEtiapine 200 MG tablet    SEROquel    90 tablet    Take 1 tablet (200 mg) by mouth At Bedtime    HUNG (generalized anxiety disorder)       * QUEtiapine 50 MG tablet    SEROQUEL    90 tablet    Take 1 tablet (50 mg) by mouth daily For anxiety    Severe episode of recurrent major depressive disorder, with psychotic features (H), HUNG (generalized anxiety disorder)       STOOL SOFTENER PO      Take by mouth daily        tiotropium 18 MCG capsule    SPIRIVA    90 capsule    Inhale 1 capsule (18 mcg) into the lungs daily    Generalized anxiety disorder, Chronic obstructive pulmonary disease, unspecified COPD type (H)       Vitamin D3 2000 UNITS Chew      Take 2,000 mg by mouth daily        * Notice:  This list has 4 medication(s) that are the  same as other medications prescribed for you. Read the directions carefully, and ask your doctor or other care provider to review them with you.

## 2017-09-29 NOTE — TELEPHONE ENCOUNTER
Rx discontinued by Leana [facility].    Ally THAKKAR RN, BSN, PHN  Salemcarmen Lara RN

## 2017-09-29 NOTE — TELEPHONE ENCOUNTER
Lamotrigine 150mg---REQUESTING A 90 DAY SUPPLY      Last Written Prescription Date: 09/05/2017  Last Fill Quantity: 60,  # refills: 1   Last Office Visit with G, [facility] or [facility] prescribing provider: 05/15/2017-Dr Rebolledo                                         Next 5 appointments (look out 90 days)     Sep 29, 2017  2:15 PM CDT   [facility]    [address]  [address]  [facility] [address] [phone]            Oct 30, 2017  1:15 PM CDT   [facility] with [facility]   [facility]    [address]  [address]  [facility] [id number] [phone]            Nov 01, 2017  1:30 PM CDT   Return Visit with MAURY Lemos   [facility] ([facility])    [address] [id number] [phone]            Nov 15, 2017  1:30 PM CST   Return Visit with MAURY Lemos   [facility] ([facility])    [address] [id number] [phone]            Nov 29, 2017  1:30 PM CST   Return Visit with MAURY Lemos   [facility] ([facility])    [address] [id number] [phone]

## 2017-09-29 NOTE — PROGRESS NOTES
"    Outpatient Psychiatric Progress Note    Name: Karen Alex   : 1955                    Primary Care Provider: Eros Rebolledo MD - last visit 5/15/2017  Therapist: Lupis Mcgee  - last visit 2017    PHQ-9 scores:  PHQ-9 SCORE 2017   Total Score 25 25 24       HUNG-7 scores:  HUNG-7 SCORE 2017   Total Score 21 21 21       Patient Identification:  Patient is a 62 year old year old,   White American female  who presents for return visit with me.  Patient is currently disabled. Patient attended the session alone. Patient prefers to be called: \"Kathi\".    Interim History:    I last saw Karen Alex for outpatient psychiatry Return Visit on 2017.     During that appointment, we Discontinue Ativan (lorazepam).     Start Valium (diazepam) 10 mg up to 2 times per day as needed for anxiety.     Reduce Lamictal (lamotrigine) to 100 mg by mouth in AM and PM.    Continue Cymbalta (duloxetine) 120 mg by mouth daily for depression and anxiety.    Continue Wellbutrin (buproprion)  mg by mouth daily in AM.     Continue Seroquel (quetiapine) 100- 200 mg by mouth daily at bedtime.      Continue Seroquel (quetiapine) 25 - 50 mg by mouth up to 2 times per day for anxiety.    Continue to use nicotine patches for smoking cessation. Keep up the great work!    Current medications include:   Current Outpatient Prescriptions   Medication Sig     ADVAIR DISKUS 250-50 MCG/DOSE diskus inhaler INHALE ONE PUFF BY MOUTH TWO TIMES A DAY     VENTOLIN  (90 BASE) MCG/ACT Inhaler SHAKE WELL AND INHALE 1 TO 2 PUFFS INTO THE LUNGS EVERY 4 HOURS AS NEEDED FOR SHORTNESS OF BREATH, DIFFICULTY BREATHING OR WHEEZING.     tiotropium (SPIRIVA) 18 MCG capsule Inhale 1 capsule (18 mcg) into the lungs daily     HYDROcodone-acetaminophen (NORCO) 5-325 MG per tablet Take one two times daily as needed     nitroGLYcerin (NITROSTAT) 0.4 MG sublingual tablet Place 1 " tablet (0.4 mg) under the tongue every 5 minutes as needed for chest pain prn     doxycycline Monohydrate 100 MG CAPS Take 1 capsule (100 mg) by mouth 2 times daily     diazepam (VALIUM) 10 MG tablet Take 1 tablet (10 mg) by mouth every 12 hours as needed for anxiety Change from Ativan (lorazepam)     lamoTRIgine (LAMICTAL) 100 MG tablet Take 1 tablet (100 mg) by mouth 2 times daily Reduced dose.     QUEtiapine (SEROQUEL) 200 MG tablet Take 1 tablet (200 mg) by mouth At Bedtime Increased dose.     buPROPion (WELLBUTRIN XL) 300 MG 24 hr tablet Take 1 tablet (300 mg) by mouth every morning     QUEtiapine (SEROQUEL) 50 MG tablet Take 0.5-1 tablets (25-50 mg) by mouth 2 times daily as needed For anxiety     DULoxetine (CYMBALTA) 60 MG EC capsule Take 2 capsules (120 mg) by mouth daily For mood and anxiety and pain. Increased dose.     nicotine (NICODERM CQ) 7 MG/24HR 24 hr patch Place 1 patch onto the skin every 24 hours     atorvastatin (LIPITOR) 40 MG tablet Take 1 tablet (40 mg) by mouth daily     isosorbide mononitrate (IMDUR) 30 MG 24 hr tablet Take 1 tablet (30 mg) by mouth daily     ibuprofen (ADVIL,MOTRIN) 600 MG tablet Take 1 tablet (600 mg) by mouth every 6 hours as needed for moderate pain     Cholecalciferol (VITAMIN D3) 2000 UNITS CHEW Take 2,000 mg by mouth daily      Docusate Calcium (STOOL SOFTENER PO) Take by mouth daily     albuterol (2.5 MG/3ML) 0.083% nebulizer solution Take 1 vial (2.5 mg) by nebulization 4 times daily as needed     ASPIRIN PO Take 81 mg by mouth daily     No current facility-administered medications for this visit.        The [place] Prescription Monitoring Program has been reviewed and there are no concerns about diversionary activity for controlled substances at this time.  Continues to receive chronic pain medication. Valium (diazepam) 10 mg 45 filled 9/5 and 9/25/2017 from me.     I was able to review most recent Primary Care Provider, specialty provider, and therapy visit  "notes that I have access to.     Karen ADRIANE Abi reports mood has been: \"pretty good- I get frustrated, but not as frustrated as before\" \"all the time depressed\"  Anxiety has been: \"okay\" \"people have noticed I have been more calm\" Patient has been taking the Valium (diazepam) 10 mg AM and 10 mg PM. Does not take it as needed. Reports anxiety in the AM and takes the medication in the AM. Reports this helps for sleep before bed.   Sleep has been: \"better\" sleep talking, wears oxygen at night.  PHQ9 and GAD7 scores were reviewed today. Continues elevated scores, not consistent with presentation and interview today.   Medication side effects: Denies  Current stressors include: Parenting Stress, Grief/Loss, Financial Difficulties, Relationship Difficulties, and Symptoms  Coping mechanisms and supports include: Therapy, Son, Nephew, Sister, Hobbies     Previous medication trials include but not limited to:  Prozac (fluoxetine)  Zoloft (sertraline)  Lexapro (escitalopram)  Wellbutrin, Zyban, Aplenzin (bupropion)  Cymbalta (duloxetine)  Lamictal (lamotrigine)  Trilafon (perphenazine)  Ambien (zolpidem)  Ativan (lorazepam)  Seroquel (quetiapine)   Trilafon (perphenazine)   Valium (diazepam)     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 She also has no past medical history of Basal cell carcinoma; Chronic infection; Diabetes (H); Malignant hyperthermia; Malignant melanoma (H); Skin cancer; or Squamous cell carcinoma.    Social History:  Current Living situation:  [place], [place] with Self. Feels safe at home.  Current use of drugs or alcohol: Current use of drugs or alcohol: cannabis , cocaine , heroin  and cocaine and heroin was over 30 years ago. Last used marijuana about 2-4 weeks ago and used once per week. \"it relaxes me and helps with my sleep\"     Tobacco use: denies  Caffeine:  Yes  2-3 sodas/day    Vital Signs:   LMP 12/01/2007    Labs:  Most recent laboratory results reviewed and no new labs.     Review of Systems:  10 systems (general, cardiovascular, respiratory, eyes, ENT, endocrine, GI, , M/S, neurological) were reviewed. Most pertinent finding(s) is/are: chronic shortness of breath, dry mouth, chronic pain, dizziness, no falls, left hip pain at night. The remaining systems are all unremarkable.    Mental Status Examination:  Appearance:  awake, alert, appeared as age stated, alert, cooperative, wearing oxygen, wears makeup, right cheek bandage, pleasant  Attitude:  cooperative  Eye Contact:  adequate  Psychomotor Behavior:  no evidence of tardive dyskinesia, dystonia, or tics and physical retardation  Speech:  clear, coherent, regular rate, fluent  Gait and Station: Normal, slightly slowed, history of falls, no assistive devices used  Mood: \" pretty good, less irritable\"  Affect:  mood congruent, calmer and reactive as appropriate today  Thought Process:  logical, linear, future oriented  Associations:  no loose associations  Thought Content:  no evidence of suicidal ideation or homicidal ideation, auditory hallucinations present at night only, no visual hallucinations present, does not appear to be responding to stimuli, denies paranoia  Oriented to:  time, person, and " "place  Attention Span and Concentration:  adequate, but notes difficulty  Recent and Remote Memory:  reported as \"not good\". Forgetful at times. Intact to interview. Not formally assessed. Ongoing short term memory concerns. No Amnesia.  Fund of Knowledge: low-normal  Insight:  fair  Judgment:  adequate for safety  Impulse Control: intact      Suicide Risk Assessment:  Today Karen Alex reports recent suicidal thoughts few days ago when thinking of financial stress, but does not want to kill herself because \"I don't want my son to hate me\". In addition, there are notable risk factors for self-harm, including age, anxiety, psychosis and substance abuse. However, risk is mitigated by absence of past attempts, ability to volunteer a safety plan, history of seeking help when needed, future oriented and no access to firearms or weapons. Grandchildren and son are major reasons for living. Does not want to leave her son. Will have a birthday party for grand daughter soon. Therefore, based on all available evidence including the factors cited above, Karen Alex does not appear to be at imminent risk for self-harm, does not meet criteria for a 72-hr hold, and therefore remains appropriate for ongoing outpatient level of care.  A thorough assessment of risk factors related to suicide and self-harm have been reviewed and are noted above. The patient convincingly denies suicidality on several occasions. Local community safety resources printed and reviewed for patient to use if needed. There was no deceit detected, and the patient presented in a manner that was believable.       DSM5 Diagnosis:  295.70 Schizoaffective Disorder Depressive Type, Rule out Bipolar Type  300.02 (F41.1) Generalized Anxiety Disorder  309.81 (F43.10) Posttraumatic Stress Disorder Without dissociative symptoms, prolonged  Borderline Personality Disorder   Polysubstance Abuse full remission per patient report except cannabis use  Tobacco Use " Disorder, partial remission  Obesity per BMI of 31.17- weight gain continues    Medical comorbidities include:   Patient Active Problem List    Diagnosis Date Noted     Health Care Home 09/04/2012     Priority: High     Status:  Closed   Care Coordinator:  Korin Wang [phone]  See Letters for [facility] Care Plan  March 16, 2015               Grief 08/11/2017     Priority: Medium     Tobacco dependence syndrome 06/06/2017     Priority: Medium     HUNG (generalized anxiety disorder) 01/10/2017     Priority: Medium     Severe episode of recurrent major depressive disorder, with psychotic features (H) 01/10/2017     Priority: Medium     PTSD (post-traumatic stress disorder) 09/22/2015     Priority: Medium     Chronic pain 08/31/2015     Priority: Medium     Patient is followed by OKSANA ANTONIO for ongoing prescription of pain medication.  All refills should be approved by this provider, or covering partner.    Medication(s): Norco.   Maximum quantity per month: 40  Clinic visit frequency required: Q 3 months     Controlled substance agreement on file: Yes       Date(s): 7/16/15    Pain Clinic evaluation in the past: No       Date(s):  n/a       Location(s):  n/a    DIRE Total Score(s):  No flowsheet data found.    Last [facility] website verification: 8/1/17   [email or web address]       HTN, goal below 140/90 01/29/2015     Priority: Medium     Chest pain      Priority: Medium     Acute on chronic respiratory failure with hypoxia (H) 10/26/2014     Priority: Medium     Generalized anxiety disorder 08/21/2013     Priority: Medium     Patient is followed by OKSANA ANTONIO for ongoing prescription of benzodiazepines.  All refills should be approved by this provider, or covering partner.    Medication(s): Lorazepam.   Maximum quantity per month: 90  Clinic visit frequency required: Q 3 months     Controlled substance agreement on file: Yes-7/17/15  Benzodiazepine use reviewed by psychiatry:   No    Last [facility] website verification:  done on 6/24/16   [email or web address]           COPD (chronic obstructive pulmonary disease) (H) 09/01/2011     Priority: Medium     Hyperlipidemia LDL goal <130 10/31/2010     Priority: Medium     Degeneration of lumbar or lumbosacral intervertebral disc 02/23/2005     Priority: Medium     Personal history of tobacco use, presenting hazards to health 02/23/2005     Priority: Medium       Psychosocial & Contextual Factors:  Financial Difficulties and Relationship Difficulties and Parent/Child Relationship Difficulties and Recent Loss of Sister    Assessment:  Karen Alex reports some mood and anxiety and sleep improvement. Medication side effects and alternatives were reviewed. Health promotion activities recommended and reviewed today. Controlled Substance Agreement should remain in place.     Risks and benefits of medication reviewed today. The Black Box Warning for use of benzodiazepines was reviewed today. Patients taking opioids with benzodiazepines, other CNS depressant medicines, or alcohol, and caregivers of these patients, should seek medical attention immediately if they or someone they are caring for experiences symptoms of unusual dizziness or lightheadedness, extreme sleepiness, slowed or difficult breathing, or unresponsiveness.      Reviewed Collaborative Care Psychiatry Service. Will meet again and hope to return back to Primary Care Provider again soon.     Patient is future oriented. Hopes to get on lung transplant list in December.  Hopes to volunteer for [employer] but closest in [place], [place]. I will ask therapist of other options to work with animals and volunteer.    Discussed polypharmacy and goal to reduce medications. May need to change Seroquel (quetiapine) due to weight gain. Continue to monitor. Will discontinue Lamictal (lamotrigine) due to polypharmacy and memory concerns and limited mood improvement.     Valium (diazepam) faxed  to pharmacy today.     Information related to safe disposing of medications provided to patient today.     Treatment Plan:    Continue Valium (diazepam) 10 mg by mouth 2 times per day.     Continue Cymbalta (duloxetine) 120 mg by mouth daily for depression and anxiety.    Continue Wellbutrin (buproprion)  mg by mouth daily in AM.     Reduce Lamictal (lamotrigine) to 50 mg by mouth in AM and 50 mg in PM for one week then stop.     Continue Seroquel (quetiapine) 200 mg by mouth daily in PM at bedtime for anxiety    Continue Seroquel (quetiapine) 50 mg by mouth daily in AM for anxiety.    Continue all other medications as reviewed per electronic medical record today.     All questions addressed. Education and counseling completed regarding risks and benefits of medications and psychotherapy options.    Safety plan was reviewed. To the Emergency Department as needed or call after hours crisis line at [phone] or [phone].     Continue individual therapy as planned with Lupis Mcgee.    Schedule an appointment with me as planned in October. Call [facility] at [phone] to schedule.    Follow up with primary care provider as planned or for acute medical concerns.    Call the psychiatric nurse line with medication questions or concerns at [phone].    My Practice Policy was reviewed and signed: YES     MyChart may be used to communicate with your provider, but this is not intended to be used for emergencies.    Administrative Billing:   Time spent with patient was 30 minutes and greater than 50% of time or 20 minutes was spent in counseling and coordination of care.    Patient Status:  Patient will continue to be seen for ongoing consultation and stabilization.    Signed:   Leana Patel, PhD, APRN, CNP   Psychiatry

## 2017-09-29 NOTE — PATIENT INSTRUCTIONS
Treatment Plan:    Continue Valium (diazepam) 10 mg by mouth 2 times per day.     Continue Cymbalta (duloxetine) 120 mg by mouth daily for depression and anxiety.    Continue Wellbutrin (buproprion)  mg by mouth daily in AM.     Reduce Lamictal (lamotrigine) to 50 mg by mouth in AM and 50 mg in PM for one week then stop.     Continue Seroquel (quetiapine) 200 mg by mouth daily in PM at bedtime for anxiety    Continue Seroquel (quetiapine) 50 mg by mouth daily in AM for anxiety.    Continue all other medications as reviewed per electronic medical record today.     All questions addressed. Education and counseling completed regarding risks and benefits of medications and psychotherapy options.    Safety plan was reviewed. To the Emergency Department as needed or call after hours crisis line at [phone] or [phone].     Continue individual therapy as planned with Lupis Mcgee.    Schedule an appointment with me as planned in October. Call [facility] at [phone] to schedule.    Follow up with primary care provider as planned or for acute medical concerns.    Call the psychiatric nurse line with medication questions or concerns at [phone].    My Practice Policy was reviewed and signed: YES     MyChart may be used to communicate with your provider, but this is not intended to be used for emergencies.

## 2017-09-29 NOTE — NURSING NOTE
"Chief Complaint   Patient presents with     RECHECK     pt wanting to know why she cannot get a month supply of diazepam       Initial Pulse 106  Wt 199 lb (90.3 kg)  LMP 12/01/2007  SpO2 97%  BMI 31.17 kg/m2 Estimated body mass index is 31.17 kg/(m^2) as calculated from the following:    Height as of 9/7/17: 5' 7\" (1.702 m).    Weight as of this encounter: 199 lb (90.3 kg).  Medication Reconciliation: complete    "

## 2017-10-09 NOTE — TELEPHONE ENCOUNTER
Controlled Substance Refill Request for Diazpeam  Problem List Complete:  Yes For Norco  Patient is followed by OKSANA ANTONIO for ongoing prescription of pain medication.  All refills should be approved by this provider, or covering partner.    Medication(s): Norco.   Maximum quantity per month: 40  Clinic visit frequency required: Q 3 months with [facility]: 9/29/17  Controlled substance agreement on file: Yes       Date(s): 7/16/15    Pain Clinic evaluation in the past: No       Date(s):  n/a       Location(s):  n/a    DIRE Total Score(s):  No flowsheet data found.    Last [facility] website verification: 8/1/17     checked in past 6 months?  Yes 8/1/17     Last picked up: 9/25/17; qty 45 for 22 days supply    Georgie Shabazz, Pharmacy [facility]

## 2017-10-10 NOTE — TELEPHONE ENCOUNTER
Controlled Substance Refill Request for Norco  Problem List Complete:  Yes  Patient is followed by OKSANA ANTONIO for ongoing prescription of pain medication.  All refills should be approved by this provider, or covering partner.    Medication(s): Norco.   Maximum quantity per month: 40  Clinic visit frequency required: Q 3 months     Controlled substance agreement on file: Yes       Date(s): 7/16/15    Pain Clinic evaluation in the past: No       Date(s):  n/a       Location(s):  n/a    DIRE Total Score(s):  No flowsheet data found.    Last [facility] website verification: 8/1/17   [email or web address]   checked in past 6 months?  Yes 8/1/17     Georgie Shabazz, Pharmacy [facility]

## 2017-10-10 NOTE — TELEPHONE ENCOUNTER
Valium refused as Pt just had this ordered. 9/29/17    From last OV 9/29/17  Assessment:  Karen Alex reports some mood and anxiety and sleep improvement. Medication side effects and alternatives were reviewed. Health promotion activities recommended and reviewed today. Controlled Substance Agreement should remain in place.      Risks and benefits of medication reviewed today. The Black Box Warning for use of benzodiazepines was reviewed today. Patients taking opioids with benzodiazepines, other CNS depressant medicines, or alcohol, and caregivers of these patients, should seek medical attention immediately if they or someone they are caring for experiences symptoms of unusual dizziness or lightheadedness, extreme sleepiness, slowed or difficult breathing, or unresponsiveness.       Reviewed Collaborative Care Psychiatry Service. Will meet again and hope to return back to Primary Care Provider again soon.      Patient is future oriented. Hopes to get on lung transplant list in December.  Hopes to volunteer for [email or web address] but closest in [place], [place]. I will ask therapist of other options to work with animals and volunteer.     Discussed polypharmacy and goal to reduce medications. May need to change Seroquel (quetiapine) due to weight gain. Continue to monitor. Will discontinue Lamictal (lamotrigine) due to polypharmacy and memory concerns and limited mood improvement.      Valium (diazepam) faxed to pharmacy today.      Information related to safe disposing of medications provided to patient today.      Treatment Plan:    Continue Valium (diazepam) 10 mg by mouth 2 times per day.     Continue Cymbalta (duloxetine) 120 mg by mouth daily for depression and anxiety.    Continue Wellbutrin (buproprion)  mg by mouth daily in AM.     Reduce Lamictal (lamotrigine) to 50 mg by mouth in AM and 50 mg in PM for one week then stop.     Continue Seroquel (quetiapine) 200 mg by mouth daily in PM at bedtime for  anxiety    Continue Seroquel (quetiapine) 50 mg by mouth daily in AM for anxiety.    Continue all other medications as reviewed per electronic medical record today.     All questions addressed. Education and counseling completed regarding risks and benefits of medications and psychotherapy options.    Safety plan was reviewed. To the Emergency Department as needed or call after hours crisis line at [phone] or [phone].     Continue individual therapy as planned with Lupis Mcgee.    Schedule an appointment with me as planned in October. Call [facility] at [phone] to schedule.    Follow up with primary care provider as planned or for acute medical concerns.    Call the psychiatric nurse line with medication questions or concerns at [phone].    My Practice Policy was reviewed and signed: YES     MyChart may be used to communicate with your provider, but this is not intended to be used for emergencies.     Administrative Billing:   Time spent with patient was 30 minutes and greater than 50% of time or 20 minutes was spent in counseling and coordination of care.     Patient Status:  Patient will continue to be seen for ongoing consultation and stabilization.     Signed:   Leana Patel, PhD, APRN, CNP   Psychiatry    [facility]  [facility] Date: 10/10/17  Query Report Page#: 1  Patient Rx History Report  AMAIRANI MOTT  Search Criteria: Last Name 'Amairani' and First Name 'P' and  = '[birth year]' and Request Period = '10/10/16' to  '10/10/17' - 6 out of 6 Recipients Selected.  Fill Date Product, Str, Form Qty Days Pt ID Prescriber Written RX# N/R* Pharm **MED+  ---------- -------------------------------- ------ ---- --------- ---------- ---------- ------------ ----- --------- ------  2017 DIAZEPAM 10 MG TABLET 45.00 22 [id number] 2017 [id number] R [id number] 00.0  2017 HYDROCODON-ACETAMINOPHEN 5-325 30.00 15 [id number] 2017 [id number] N [id number]  10.0  09/05/2017 DIAZEPAM 10 MG TABLET 45.00 22 [id number] 09/05/2017 [id number] N [id number] 00.0  08/17/2017 HYDROCODON-ACETAMINOPHEN 5-325 30.00 15 [id number] 08/16/2017 [id number] N [id number] 10.0  08/10/2017 LORAZEPAM 1 MG TABLET 90.00 30 [id number] 08/10/2017 [id number] N [id number] 00.0  08/01/2017 HYDROCODON-ACETAMINOPHEN 5-325 30.00 15 [id number] 08/01/2017 [id number] N [id number] 10.0  07/17/2017 HYDROCODON-ACETAMINOPHEN 5-325 30.00 15 [id number] 07/17/2017 [id number] N [id number] 10.0  07/15/2017 LORAZEPAM 1 MG TABLET 90.00 30 [id number] 07/14/2017 [id number] N [id number] 00.0  06/26/2017 HYDROCODON-ACETAMINOPHEN 5-325 30.00 15 [id number] 06/26/2017 [id number] N [id number] 10.0  06/15/2017 LORAZEPAM 1 MG TABLET 90.00 30 [id number] 06/13/2017 [id number] N [id number] 00.0  06/12/2017 HYDROCODON-ACETAMINOPHEN 5-325 30.00 15 [id number] 06/12/2017 [id number] N [id number] 10.0  05/17/2017 LORAZEPAM 1 MG TABLET 90.00 30 [id number] 05/15/2017 [id number] N [id number] 00.0  05/15/2017 HYDROCODON-ACETAMINOPHEN 5-325 30.00 15 [id number] 05/15/2017 [id number] N [id number] 10.0  04/18/2017 LORAZEPAM 1 MG TABLET 90.00 30 [id number] 04/18/2017 [id number] N [id number] 00.0  04/17/2017 HYDROCODON-ACETAMINOPHEN 5-325 30.00 15 [id number] 04/17/2017 [id number] N [id number] 10.0  03/17/2017 LORAZEPAM 1 MG TABLET 90.00 30 [id number] 03/15/2017 [id number] N [id number] 00.0  03/16/2017 HYDROCODON-ACETAMINOPHEN 5-325 30.00 15 [id number] 03/15/2017 [id number] N [id number] 10.0  02/17/2017 LORAZEPAM 1 MG TABLET 90.00 30 [id number] 02/17/2017 [id number] N [id number] 00.0  02/17/2017 HYDROCODON-ACETAMINOPHEN 5-325 30.00 15 [id number] 02/17/2017 [id number] N [id number] 10.0  01/20/2017 HYDROCODON-ACETAMINOPHEN 5-325 30.00 15 [id number] 01/17/2017 [id number] N [id number] 10.0  01/18/2017 LORAZEPAM 1 MG TABLET 90.00 30 [id number] 01/17/2017 [id number] N  [id number] 00.0  01/06/2017 HYDROCODON-ACETAMINOPHEN 5-325 30.00 15 [id number] 01/06/2017 [id number] N [id number] 10.0  12/20/2016 HYDROCODON-ACETAMINOPHEN 5-325 30.00 15 [id number] 12/20/2016 [id number] N [id number] 10.0  12/20/2016 LORAZEPAM 1 MG TABLET 90.00 30 [id number] 12/20/2016 [id number] N [id number] 00.0  12/05/2016 HYDROCODON-ACETAMINOPHEN 5-325 30.00 15 [id number] 12/01/2016 [id number] N [id number] 10.0  11/22/2016 LORAZEPAM 1 MG TABLET 90.00 30 [id number] 11/21/2016 [id number] N [id number] 00.0  11/21/2016 HYDROCODON-ACETAMINOPHEN 5-325 42.00 14 [id number] 11/21/2016 [id number] N [id number] 15.0  11/03/2016 HYDROCODON-ACETAMINOPHEN 5-325 42.00 14 [id number] 11/03/2016 [id number] N [id number] 15.0  10/25/2016 LORAZEPAM 1 MG TABLET 90.00 30 [id number] 10/24/2016 [id number] N [id number] 00.0  10/20/2016 HYDROCODON-ACETAMINOPHEN 5-325 28.00 7 [id number] 10/18/2016 [id number] N [id number] 20.0  10/12/2016 HYDROCODON-ACETAMINOPHEN 5-325 40.00 10 [id number] 10/11/2016 [id number] N [id number] 20.0  *N/R N=New R=Refill  +MED Daily  Prescribers for prescriptions listed  ----------------------------------------------------------------------------------------------------------------------------------  [id number] OKSANA ANTONIO MD; [facility]., [address] [facility] [address]  **Per CDC guidance, the conversion factors and associated daily morphine milligram equivalents for drugs prescribed as part of  medication-assisted treatment for opioid use disorder should not be used to benchmark against dosage thresholds meant for opioids  prescribed for pain.

## 2017-10-10 NOTE — TELEPHONE ENCOUNTER
Walked prescription over to the [facility] pharmacy.    Katelyn Webber/

## 2017-10-11 NOTE — NURSING NOTE
"Chief Complaint   Patient presents with     Recheck Medication       Initial /74 (BP Location: Left arm, Patient Position: Chair, Cuff Size: Adult Large)  Pulse 105  Temp 98.3  F (36.8  C) (Oral)  Resp 14  Ht 5' 7\" (1.702 m)  Wt 200 lb (90.7 kg)  LMP 12/01/2007  SpO2 (!) 89%  BMI 31.32 kg/m2 Estimated body mass index is 31.32 kg/(m^2) as calculated from the following:    Height as of this encounter: 5' 7\" (1.702 m).    Weight as of this encounter: 200 lb (90.7 kg).  Medication Reconciliation: complete   Anil Lopez CMA       "

## 2017-10-11 NOTE — MR AVS SNAPSHOT
After Visit Summary   10/11/2017    Karen Alex    MRN: [id number]           Patient Information     Date Of Birth          1955        Visit Information        Provider Department      10/11/2017 3:00 PM Eros Rebolledo MD [facility]         Follow-ups after your visit        Your next 10 appointments already scheduled     Oct 30, 2017  1:15 PM CDT   Return Visit with Leana Patel NP   [facility] ([facility])    [address]  [address]  [facility] [address]   [phone]            Nov 01, 2017  1:30 PM CDT   Return Visit with MAURY Lemos   [facility] ([facility]    [address] [address]   [phone]            Nov 15, 2017  1:30 PM CST   Return Visit with MAURY Lemos   [facility] ([facility]    [address] [id number] [phone]            Nov 29, 2017  1:30 PM CST   Return Visit with MAURY Lemos   [facility] ([facility]    [address] [address]   [phone]              Who to contact     If you have questions or need follow up information about today's clinic visit or your schedule please contact [facility] directly at [phone].  Normal or non-critical lab and imaging results will be communicated to you by MyChart, letter or phone within 4 business days after the clinic has received the results. If you do not hear from us within 7 days, please contact the clinic through MyChart or phone. If you have a critical or abnormal lab result, we will notify you by phone as soon as possible.  Submit refill requests through [employer] or call your pharmacy and they will forward the refill request to us.  "Please allow 3 business days for your refill to be completed.          Additional Information About Your Visit        MyChart Information     [employer] gives you secure access to your electronic health record. If you see a primary care provider, you can also send messages to your care team and make appointments. If you have questions, please call your primary care clinic.  If you do not have a primary care provider, please call [phone] and they will assist you.        Care EveryWhere ID     This is your Care EveryWhere ID. This could be used by other organizations to access your [place] medical records  [id number]        Your Vitals Were     Pulse Temperature Respirations Height Last Period Pulse Oximetry    105 98.3  F (36.8  C) (Oral) 14 5' 7\" (1.702 m) 12/01/2007 89%    BMI (Body Mass Index)                   31.32 kg/m2            Blood Pressure from Last 3 Encounters:   10/11/17 112/74   09/21/17 115/79   09/07/17 131/86    Weight from Last 3 Encounters:   10/11/17 200 lb (90.7 kg)   09/29/17 199 lb (90.3 kg)   09/05/17 198 lb (89.8 kg)              Today, you had the following     No orders found for display       Primary Care Provider Office Phone # Fax #    Eros Rebolledo [phone] [address] [facility] [address]        Equal Access to Services     BRANDYN TURNER : Hadii omid ku hadasho Sonatalie, waaxda luqadaha, qaybta kaalmada leannayada, rj ramirez . So [facility] [phone].    ATENCIÓN: Si habla español, tiene a zaragoza disposición servicios gratuitos de asistencia lingüística. Farshad al [phone].    We comply with applicable federal civil rights laws and [place] laws. We do not discriminate on the basis of race, color, national origin, age, disability, sex, sexual orientation, or gender identity.            Thank you!     Thank you for choosing [facility]  for your care. Our goal is always to provide you with " excellent care. Hearing back from our patients is one way we can continue to improve our services. Please take a few minutes to complete the written survey that you may receive in the mail after your visit with us. Thank you!             Your Updated Medication List - Protect others around you: Learn how to safely use, store and throw away your medicines at www.disposemymeds.org.          This list is accurate as of: 10/11/17  3:41 PM.  Always use your most recent med list.                   Brand Name Dispense Instructions for use Diagnosis    ADVAIR DISKUS 250-50 MCG/DOSE diskus inhaler   Generic drug:  fluticasone-salmeterol     3 Inhaler    INHALE ONE PUFF BY MOUTH TWO TIMES A DAY    Chronic obstructive pulmonary disease, unspecified COPD type (H)       * albuterol (2.5 MG/3ML) 0.083% neb solution     120 vial    Take 1 vial (2.5 mg) by nebulization 4 times daily as needed    COPD (chronic obstructive pulmonary disease) (H)       * VENTOLIN  (90 BASE) MCG/ACT Inhaler   Generic drug:  albuterol     18 g    SHAKE WELL AND INHALE 1 TO 2 PUFFS INTO THE LUNGS EVERY 4 HOURS AS NEEDED FOR SHORTNESS OF BREATH, DIFFICULTY BREATHING OR WHEEZING.    Panlobular emphysema (H)       ASPIRIN PO      Take 81 mg by mouth daily        atorvastatin 40 MG tablet    LIPITOR    90 tablet    Take 1 tablet (40 mg) by mouth daily    CAD (coronary artery disease)       buPROPion 300 MG 24 hr tablet    WELLBUTRIN XL    90 tablet    Take 1 tablet (300 mg) by mouth every morning    Major depressive disorder, recurrent episode, in partial remission (H)       diazepam 10 MG tablet    VALIUM    60 tablet    Take 1 tablet (10 mg) by mouth every 12 hours as needed for anxiety    HUNG (generalized anxiety disorder)       doxycycline monohydrate 100 MG capsule     14 capsule    Take 1 capsule (100 mg) by mouth 2 times daily    SK (seborrheic keratosis), Lentigo, Angioma, Inflamed epidermoid cyst of skin       DULoxetine 60 MG EC capsule     CYMBALTA    180 capsule    Take 2 capsules (120 mg) by mouth daily For mood and anxiety and pain.    HUNG (generalized anxiety disorder), Major depressive disorder, recurrent episode, in partial remission (H)       HYDROcodone-acetaminophen 5-325 MG per tablet    NORCO    30 tablet    Take one two times daily as needed    Mechanical low back pain       ibuprofen 600 MG tablet    ADVIL/MOTRIN    20 tablet    Take 1 tablet (600 mg) by mouth every 6 hours as needed for moderate pain        isosorbide mononitrate 30 MG 24 hr tablet    IMDUR    90 tablet    Take 1 tablet (30 mg) by mouth daily    Chest pain, CAD (coronary artery disease)       nicotine 7 MG/24HR 24 hr patch    NICODERM CQ    30 patch    Place 1 patch onto the skin every 24 hours    Tobacco dependence syndrome       nitroGLYcerin 0.4 MG sublingual tablet    NITROSTAT    25 tablet    Place 1 tablet (0.4 mg) under the tongue every 5 minutes as needed for chest pain prn    Chest pain       * QUEtiapine 200 MG tablet    SEROquel    90 tablet    Take 1 tablet (200 mg) by mouth At Bedtime    HUNG (generalized anxiety disorder)       * QUEtiapine 50 MG tablet    SEROQUEL    90 tablet    Take 1 tablet (50 mg) by mouth daily For anxiety    Severe episode of recurrent major depressive disorder, with psychotic features (H), HUNG (generalized anxiety disorder)       STOOL SOFTENER PO      Take by mouth daily        tiotropium 18 MCG capsule    SPIRIVA    90 capsule    Inhale 1 capsule (18 mcg) into the lungs daily    Generalized anxiety disorder, Chronic obstructive pulmonary disease, unspecified COPD type (H)       Vitamin D3 2000 UNITS Chew      Take 2,000 mg by mouth daily        * Notice:  This list has 4 medication(s) that are the same as other medications prescribed for you. Read the directions carefully, and ask your doctor or other care provider to review them with you.

## 2017-10-11 NOTE — PROGRESS NOTES
SUBJECTIVE:   Karen Alex is a 62 year old female who presents to clinic today for the following health issues:oxygen dependent copd, depression chronic low back pain and has ongoing Psych therapy with close supervision for Bipolar      Medication Followup of Norco    Taking Medication as prescribed: yes    Side Effects:  None    Medication Helping Symptoms:  yes     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       Past Surgical History:   Procedure Laterality Date     BACK SURGERY       CONIZATION  10/23/07    Vulvar excision for BOBBY 3, cold knife conization     EXCISE VULVA WIDE LOCAL  5/25/2012    Procedure:EXCISE VULVA WIDE LOCAL; Wide Local Excision Of Vulvar Lesion; Surgeon:RE ALDRIDGE; Location:[facility] OR     Foot surgeries       HERNIORRHAPHY INCISIONAL (LOCATION)  4/25/2012    Procedure:HERNIORRHAPHY INCISIONAL (LOCATION); Incisional Hernia Repair with mesh ; Surgeon:KIM QUICK; Location:[facility] OR     HYSTERECTOMY TOTAL ABDOMINAL, BILATERAL SALPINGO-OOPHORECTOMY, COMBINED       LAPAROSCOPIC HYSTERECTOMY TOTAL, BILATERAL SALPINGO-OOPHORECTOMY, COMBINED  3/6/2012    Procedure:COMBINED LAPAROSCOPIC HYSTERECTOMY TOTAL, BILATERAL SALPINGO-OOPHORECTOMY; Total laparoscopic Hysterectomy, Bilateral Salpingo Ooporectomy, Pubovaginal Sling, Biopsy Left Volva; Surgeon:RE ALDRIDGE; Location:[facility] OR     repair of anal fissures  2005     SLING BLADDER SUSPENSION WITH FASCIA ALESHA       SLING TRANSPUBO WITHOUT ANTERIOR COLPORRHAPHY  3/6/2012    Procedure:SLING TRANSPUBO WITHOUT ANTERIOR COLPORRHAPHY;  "Surgeon:JOLANTA ENRIQUEZ; Location:[facility] OR     TUBAL LIGATION         Family History   Problem Relation Age of Onset     CANCER Father      asbestos lung disease     Hypertension Mother      DIABETES Paternal Aunt      HEART DISEASE Maternal Grandfather      CANCER Maternal Grandmother      unsure of what kind,  at the age of 86     Circulatory Paternal Grandmother       of brain aneurysm     Asthma Son      HEART DISEASE Sister        Social History   Substance Use Topics     Smoking status: Former Smoker     Packs/day: 0.50     Years: 26.00     Types: Cigarettes     Quit date: 7/3/2015     Smokeless tobacco: Never Used      Comment: sometimes     Alcohol use No          REVIEW OF SYSTEMS    Generally has been struggling with  feeling well until this episode. No problems with vision, hearing, dental or neck pain.Has severe copd, oxygen dependent  airborne or ingestion allergy  No chest pain, palpitations, dyspnea, change in bowel habits, blood  in stool or dyspepsia.  No rashes, changing moles, weakness, lassitude or back problems.  No chronic issues . No dysuria  Patient no longer  a smoker. No problems with significant headaches.         ROS:  Constitutional, HEENT, cardiovascular, pulmonary, GI, , musculoskeletal, neuro, skin, endocrine and psych systems are negative, except as otherwise noted.      OBJECTIVE:   /74 (BP Location: Left arm, Patient Position: Chair, Cuff Size: Adult Large)  Pulse 105  Temp 98.3  F (36.8  C) (Oral)  Resp 14  Ht 5' 7\" (1.702 m)  Wt 200 lb (90.7 kg)  LMP 2007  SpO2 (!) 89%  BMI 31.32 kg/m2  Body mass index is 31.32 kg/(m^2).  GENERAL: healthy, alert and no distress  EYES: Eyes grossly normal to inspection, PERRL and conjunctivae and sclerae normal  HENT: ear canals and TM's normal, nose and mouth without ulcers or lesions  NECK: no adenopathy, no asymmetry, masses, or scars and thyroid normal to palpation  RESP: lungs clear to auscultation - no rales, " rhonchi or wheezes  CV: regular rate and rhythm, normal S1 S2, no S3 or S4, no murmur, click or rub, no peripheral edema and peripheral pulses strong  ABDOMEN: soft, nontender, no hepatosplenomegaly, no masses and bowel sounds normal  MS: no gross musculoskeletal defects noted, no edema  SKIN: no suspicious lesions or rashes  NEURO: Normal strength and tone, mentation intact and speech normal  PSYCH: mentation appears normal, affect normal/bright  MENTAL STATUS EXAM  Appearance: appropriate  Attitude: cooperative  Behavior: normal  Eyre Contact: normal  Speech: normal  Orientation: oreinted to person , place, time and situation  Mood:  admits sadness and anxiety most of time  Affect: Mood Congruient  Thought Process: clear  Suicidal Ideation: reports thoughts, no intention  Hallucination: no      ASSESSMENT/PLAN:     (M51.37) Degeneration of lumbar or lumbosacral intervertebral disc  (primary encounter diagnosis)  Comment: right leg pain  Plan: nsaids and prn norco     (F33.3) Severe episode of recurrent major depressive disorder, with psychotic features (H)  Comment:   Plan: bipolar, Psych prescribeds, talk therapy    (Z87.891) Personal history of tobacco use, presenting hazards to health  Comment:   Plan:     (J43.2) Centrilobular emphysema (H)  Comment:   Plan: oxygen            Eros Rebolledo MD  [facility]

## 2017-10-13 NOTE — TELEPHONE ENCOUNTER
Discussed with pharmacy. 9/29 Rx had been filed and they did not see that request. Per her last fill she would not be able to get the medication filled until 10/15, however they will reach out to her if she can get that filled tomorrow.

## 2017-10-13 NOTE — TELEPHONE ENCOUNTER
"Reason for call:  Other   Patient called regarding (reason for call): prescription  Additional comments: Requesting refill of Valium as she only has 2 pills left.  I informed her of the nurse msg from 10/10 that says it can't be refilled now as it was ordered 9/29.  She states that she takes one in the morning and one at night and doesn't know if her boyfriend is taking them but she only has 2 left and doesn't want to \"flip out\" if she doesn't get a refill.      Phone number to reach patient:  Home number on file [phone] (home)    Best Time:  anytime    Can we leave a detailed message on this number?  YES  "
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
## 2017-10-30 NOTE — PATIENT INSTRUCTIONS
Treatment Plan:    Continue Valium (diazepam) 10 mg by mouth 2 times per day.     Continue Cymbalta (duloxetine) 120 mg by mouth daily for depression and anxiety.    Continue Wellbutrin (buproprion)  mg by mouth daily in AM.     Continue Seroquel (quetiapine) 200 mg by mouth daily in PM at bedtime for anxiety.    Discontinue Seroquel (quetiapine) during the day.     Start Neurontin (gabapentin) 300 mg by mouth daily at bedtime for 5 days, then increase to 300 mg in AM and 300 mg in PM. May consider dose increase if needed.     Continue all other medications as reviewed per electronic medical record today.     Safety plan reviewed. To the Emergency Department as needed or call after hours crisis line at [phone] or [phone].     Continue individual therapy as planned with Lupis Mcgee.    Schedule an appointment with me in 10-12 weeks or sooner as needed. Call [facility] at [phone] to schedule.    Follow up with primary care provider as planned or for acute medical concerns.    Call the psychiatric nurse line with medication questions or concerns at [phone].    MyChart may be used to communicate with your provider, but this is not intended to be used for emergencies.

## 2017-10-30 NOTE — Clinical Note
Please read psychiatry update. Tarasoff Warning reported today. Referring out for long term community psychiatry care.

## 2017-10-30 NOTE — NURSING NOTE
"Chief Complaint   Patient presents with     Consult       Initial /74 (BP Location: Left arm, Patient Position: Sitting, Cuff Size: Adult Large)  Pulse 82  Temp 97.9  F (36.6  C) (Oral)  Ht 5' 7\" (1.702 m)  Wt 200 lb (90.7 kg)  LMP 12/01/2007  SpO2 99%  Breastfeeding? No  BMI 31.32 kg/m2 Estimated body mass index is 31.32 kg/(m^2) as calculated from the following:    Height as of this encounter: 5' 7\" (1.702 m).    Weight as of this encounter: 200 lb (90.7 kg).  Medication Reconciliation: complete   Eros HINOJOSA      "
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
## 2017-10-30 NOTE — TELEPHONE ENCOUNTER
Noted. Thank you.   My note is complete with more details.  Leadership notified via email.   PCP and [facility] ([facility]) Therapist have been notified via CC Chart.

## 2017-10-30 NOTE — MR AVS SNAPSHOT
After Visit Summary   10/30/2017    Karen Alex    MRN: [id number]           Patient Information     Date Of Birth          1955        Visit Information        Provider Department      10/30/2017 1:15 PM Leana Patel NP [facility]        Today's Diagnoses     Severe episode of recurrent major depressive disorder, with psychotic features (H)        HUNG (generalized anxiety disorder)          Care Instructions    Treatment Plan:    Continue Valium (diazepam) 10 mg by mouth 2 times per day.     Continue Cymbalta (duloxetine) 120 mg by mouth daily for depression and anxiety.    Continue Wellbutrin (buproprion)  mg by mouth daily in AM.     Continue Seroquel (quetiapine) 200 mg by mouth daily in PM at bedtime for anxiety.    Discontinue Seroquel (quetiapine) during the day.     Start Neurontin (gabapentin) 300 mg by mouth daily at bedtime for 5 days, then increase to 300 mg in AM and 300 mg in PM. May consider dose increase if needed.     Continue all other medications as reviewed per electronic medical record today.     Safety plan reviewed. To the Emergency Department as needed or call after hours crisis line at [phone] or [phone].     Continue individual therapy as planned with Lupis Mcgee.    Schedule an appointment with me in 10-12 weeks or sooner as needed. Call [facility] at [phone] to schedule.    Follow up with primary care provider as planned or for acute medical concerns.    Call the psychiatric nurse line with medication questions or concerns at [phone].    MyChart may be used to communicate with your provider, but this is not intended to be used for emergencies.            Follow-ups after your visit        Additional Services     MENTAL HEALTH REFERRAL       Your provider has referred you to: Behavioral Healthcare Providers Intake Scheduling [phone]  [email or web address]    Long term psychiatry. Prefer  females.     All scheduling is subject to the client's specific insurance plan & benefits, provider/location availability, and provider clinical specialities.  Please arrive 15 minutes early for your first appointment and bring your completed paperwork.    Please be aware that coverage of these services is subject to the terms and limitations of your health insurance plan.  Call member services at your health plan with any benefit or coverage questions.                  Follow-up notes from your care team     Return in about 10 weeks (around 1/8/2018).      Your next 10 appointments already scheduled     Nov 01, 2017  1:30 PM CDT   [facility]    [address] [phone]            Nov 15, 2017  1:30 PM CST   Return Visit with Lupis Mcgee [facility] ([facility]    [address] [phone]            Nov 29, 2017  1:30 PM CST   Return Visit with Lupis Mcgee [facility]    [address] [place] [address] [phone]              Who to contact     If you have questions or need follow up information about today's clinic visit or your schedule please contact [facility] directly at [phone].  Normal or non-critical lab and imaging results will be communicated to you by MyChart, letter or phone within 4 business days after the clinic has received the results. If you do not hear from us within 7 days, please contact the clinic through MyChart or phone. If you have a critical or abnormal lab result, we will notify you by phone as soon as possible.  Submit refill requests through [employer] or call your pharmacy and they will forward the refill  "request to us. Please allow 3 business days for your refill to be completed.          Additional Information About Your Visit        PAX [employer] Information     [employer] gives you secure access to your electronic health record. If you see a primary care provider, you can also send messages to your care team and make appointments. If you have questions, please call your primary care clinic.  If you do not have a primary care provider, please call [phone] and they will assist you.        Care EveryWhere ID     This is your Care EveryWhere ID. This could be used by other organizations to access your [facility] medical records  [id number]        Your Vitals Were     Pulse Temperature Height Last Period Pulse Oximetry Breastfeeding?    82 97.9  F (36.6  C) (Oral) 5' 7\" (1.702 m) 12/01/2007 99% No    BMI (Body Mass Index)                   31.32 kg/m2            Blood Pressure from Last 3 Encounters:   10/30/17 102/74   10/11/17 112/74   09/21/17 115/79    Weight from Last 3 Encounters:   10/30/17 200 lb (90.7 kg)   10/11/17 200 lb (90.7 kg)   09/29/17 199 lb (90.3 kg)              We Performed the Following     MENTAL HEALTH REFERRAL          Today's Medication Changes          These changes are accurate as of: 10/30/17  2:01 PM.  If you have any questions, ask your nurse or doctor.               Start taking these medicines.        Dose/Directions    gabapentin 300 MG capsule   Commonly known as:  NEURONTIN   Used for:  HUNG (generalized anxiety disorder)   Started by:  Leana Patel NP        Take 1 tablet (300 mg) every night for 3 days, then 1 tablet twice daily. For anxiety, mood, and anger.   Quantity:  60 capsule   Refills:  1         These medicines have changed or have updated prescriptions.        Dose/Directions    QUEtiapine 200 MG tablet   Commonly known as:  SEROquel   This may have changed:  Another medication with the same name was removed. Continue taking this medication, and follow the directions you " see here.   Used for:  HUNG (generalized anxiety disorder)   Changed by:  Leana Patel NP        Dose:  200 mg   Take 1 tablet (200 mg) by mouth At Bedtime   Quantity:  90 tablet   Refills:  1            Where to get your medicines      These medications were sent to [facility] [place], [place] - [address]  [address] [facility] [address]     Phone:  [phone]     gabapentin 300 MG capsule                Primary Care Provider Office Phone # Fax #    Eros Bayron Rebolledo [phone] [address] [facility] [address]        Equal Access to Services     [facility]: Hadii omid woodard hadasho Sonatalie, waaxda luqadaha, qaybta kaalmada adeegyada, rj ramirez . So [facility] [phone].    ATENCIÓN: Si habla español, tiene a zaragoza disposición servicios gratuitos de asistencia lingüística. [facility] [phone].    We comply with applicable federal civil rights laws and [place] laws. We do not discriminate on the basis of race, color, national origin, age, disability, sex, sexual orientation, or gender identity.            Thank you!     Thank you for choosing [facility]  for your care. Our goal is always to provide you with excellent care. Hearing back from our patients is one way we can continue to improve our services. Please take a few minutes to complete the written survey that you may receive in the mail after your visit with us. Thank you!             Your Updated Medication List - Protect others around you: Learn how to safely use, store and throw away your medicines at www.disposemymeds.org.          This list is accurate as of: 10/30/17  2:01 PM.  Always use your most recent med list.                   Brand Name Dispense Instructions for use Diagnosis    ADVAIR DISKUS 250-50 MCG/DOSE diskus inhaler   Generic drug:  fluticasone-salmeterol     3 Inhaler    INHALE ONE PUFF BY MOUTH TWO TIMES A DAY    Chronic  obstructive pulmonary disease, unspecified COPD type (H)       * albuterol (2.5 MG/3ML) 0.083% neb solution     120 vial    Take 1 vial (2.5 mg) by nebulization 4 times daily as needed    COPD (chronic obstructive pulmonary disease) (H)       * VENTOLIN  (90 BASE) MCG/ACT Inhaler   Generic drug:  albuterol     18 g    SHAKE WELL AND INHALE 1 TO 2 PUFFS INTO THE LUNGS EVERY 4 HOURS AS NEEDED FOR SHORTNESS OF BREATH, DIFFICULTY BREATHING OR WHEEZING.    Panlobular emphysema (H)       ASPIRIN PO      Take 81 mg by mouth daily        atorvastatin 40 MG tablet    LIPITOR    90 tablet    Take 1 tablet (40 mg) by mouth daily    CAD (coronary artery disease)       buPROPion 300 MG 24 hr tablet    WELLBUTRIN XL    90 tablet    Take 1 tablet (300 mg) by mouth every morning    Major depressive disorder, recurrent episode, in partial remission (H)       diazepam 10 MG tablet    VALIUM    60 tablet    Take 1 tablet (10 mg) by mouth every 12 hours as needed for anxiety    HUNG (generalized anxiety disorder)       DULoxetine 60 MG EC capsule    CYMBALTA    180 capsule    Take 2 capsules (120 mg) by mouth daily For mood and anxiety and pain.    HUNG (generalized anxiety disorder), Major depressive disorder, recurrent episode, in partial remission (H)       gabapentin 300 MG capsule    NEURONTIN    60 capsule    Take 1 tablet (300 mg) every night for 3 days, then 1 tablet twice daily. For anxiety, mood, and anger.    HUNG (generalized anxiety disorder)       HYDROcodone-acetaminophen 5-325 MG per tablet    NORCO    30 tablet    Take one two times daily as needed    Mechanical low back pain       ibuprofen 600 MG tablet    ADVIL/MOTRIN    20 tablet    Take 1 tablet (600 mg) by mouth every 6 hours as needed for moderate pain        isosorbide mononitrate 30 MG 24 hr tablet    IMDUR    90 tablet    Take 1 tablet (30 mg) by mouth daily    Chest pain, CAD (coronary artery disease)       nicotine 7 MG/24HR 24 hr patch    NICODERM CQ     30 patch    Place 1 patch onto the skin every 24 hours    Tobacco dependence syndrome       nitroGLYcerin 0.4 MG sublingual tablet    NITROSTAT    25 tablet    Place 1 tablet (0.4 mg) under the tongue every 5 minutes as needed for chest pain prn    Chest pain       QUEtiapine 200 MG tablet    SEROquel    90 tablet    Take 1 tablet (200 mg) by mouth At Bedtime    HUNG (generalized anxiety disorder)       STOOL SOFTENER PO      Take by mouth daily        tiotropium 18 MCG capsule    SPIRIVA    90 capsule    Inhale 1 capsule (18 mcg) into the lungs daily    Generalized anxiety disorder, Chronic obstructive pulmonary disease, unspecified COPD type (H)       Vitamin D3 2000 UNITS Chew      Take 2,000 mg by mouth daily        * Notice:  This list has 2 medication(s) that are the same as other medications prescribed for you. Read the directions carefully, and ask your doctor or other care provider to review them with you.

## 2017-10-31 NOTE — TELEPHONE ENCOUNTER
----- Message from Leana Montenegro sent at 10/31/2017  3:52 PM CDT -----  Regarding: MENTAL HEALTH ORDER   Patient was contacted by [facility]. Patient was scheduled for medication management services with Jany Leonardo on 11.3.17 at 2pm.       Leana Montenegro   , [facility]

## 2017-11-01 NOTE — MR AVS SNAPSHOT
MRN:[id number]                      After Visit Summary   11/1/2017    Karen Alex    MRN: [id number]           Visit Information        Provider Department      11/1/2017 1:30 PM Siddharth Mcgeeargavi Symonenatividadroger Marquez [facility]      Your next 10 appointments already scheduled     Nov 15, 2017  1:30 PM CST   Return Visit with Lupiskatina Friedman Alma Mcgee [facility] ([facility]    [address] [place] [phone]            Nov 29, 2017  1:30 PM CST   Return Visit with Lupis Friedman Alma Mcgee [facility] ([facility])    [address] [place] [address] [phone]              MyChart Information     [employer] gives you secure access to your electronic health record. If you see a primary care provider, you can also send messages to your care team and make appointments. If you have questions, please call your primary care clinic.  If you do not have a primary care provider, please call [phone] and they will assist you.        Care EveryWhere ID     This is your Care EveryWhere ID. This could be used by other organizations to access your [facility] medical records  [id number]        Equal Access to Services     BRANDYN TURNER : Hadii omid woodard hadasho Soomaali, waaxda luqadaha, qaybta kaalmada aderaymundoyakike, rj ace. So [facility] [phone].    ATENCIÓN: Si habla español, tiene a zaragoza disposición servicios gratuitos de asistencia lingüística. Llame al [phone].    We comply with applicable federal civil rights laws and [place] laws. We do not discriminate on the basis of race, color, national origin, age, disability, sex, sexual orientation, or gender identity.
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
## 2017-11-07 NOTE — TELEPHONE ENCOUNTER
Controlled Substance Refill Request for Norco  Problem List Complete:  Yes  Patient is followed by OKSANA ANTONIO for ongoing prescription of pain medication.  All refills should be approved by this provider, or covering partner.    Medication(s): Norco.   Maximum quantity per month: 40  Clinic visit frequency required: Q 3 months  Last Visit: 10/11/17    Controlled substance agreement on file: Yes       Date(s): 7/16/15    Pain Clinic evaluation in the past: No       Date(s):  n/a       Location(s):  n/a    DIRE Total Score(s):  No flowsheet data found.    Last [facility] website verification: 8/1/17   [email or web address]   checked in past 6 months?  Yes 8/1/17     Last office visit: 10/11/17    Georgie Shabazz, Pharmacy [facility]

## 2017-11-15 NOTE — PROGRESS NOTES
Progress Note    Client Name: Karen Alex  Date: 11/15/2017                                      Service Type: Individual      Session Start Time:  1:30  Session End Time: 2:30      Session Length: 45 - 50     Session #: 27     Attendees: Client attended alone    Treatment Plan Last Completed Date: 11/1/17  PHQ-9 / HUNG-7 Last Completed Date: last completed 10/30/17                  DATA      Progress Since Last Session (Related to Symptoms / Goals / Homework):   Symptoms: Stable-continues to grieve for sister, anger about ex-bf     Homework: Did not complete- has been stressed with stressors and not completing tasks we discussed      Episode of Care Goals: Minimal progress - ACTION (Actively working towards change); Intervened by reinforcing change plan / affirming steps taken     Current / Ongoing Stressors and Concerns:   Client reports that her bf was admitted to the hospital for heart attack.  She is concerned about his wants and demands on her. Client reports changes in her medication.  She was discontinued off her her one med. Instead of tapering as it was directed, client quit cold turkey.  This was about two weeks.  She has noticed that her irritability has been more- made the association that it may be due to the discontinuation of this medication.  Client has continues to struggle to follow though on suggestions we have discussed.  Reiterated the importance of following through and setting up some of these supports.  Reprinted some of these resources including [facility] [facility] and Community Psychiatry.  Has some financial stress that she is trying to manage though- gave info on food shelf.     Treatment Objective(s) Addressed in This Session:   focus on being socially active and not isolating at home due to medical condition   Using acceptance skills to understand her level of depression and what she has control over       Intervention:   CBT: Encourage client  "to focus on present and goals for future        ASSESSMENT: Current Emotional / Mental Status (status of significant symptoms):   Risk status (Self / Other harm or suicidal ideation)  Client denies a history of suicidal ideation, suicide attempts, self-injurious behavior, homicidal ideation, homicidal behavior and and other safety concerns   Client denies current fears or concerns for personal safety.   Client reports the following current or recent suicidal ideation or behaviors: passive thoughts of not wanting to live.  Denies any plan or intent.  \"Would not do that to my son\".   Client denies current or recent homicidal ideation or behaviors.   Client denies current or recent self injurious behavior or ideation.   Client denies other safety concerns.   A safety and risk management plan has not been developed at this time, however client was given the after-hours number should there be a change in any of these risk factors.     Appearance:   Appropriate    Eye Contact:   Fair    Psychomotor Behavior: Normal    Attitude:   Cooperative    Orientation:   All   Speech    Rate / Production: Normal     Volume:  Soft    Mood:    Depressed  Sad    Affect:    Flat    Thought Content:  Hallucinations    Thought Form:  Focused on grief   Insight:    Fair      Medication Review:   Changes to psychiatric medications, see updated Medication List in EPIC.      Medication Compliance:   Yes     Changes in Health Issues:   Yes: Respiratory Disease, No Psychological Distress     Chemical Use Review:   Substance Use: Chemical use reviewed, no active concerns identified      Tobacco Use: No change in amount of tobacco use since last session.  Action on nicotine patches- struggles with cravings- One whole week of no smoking     Collateral Reports Completed:   Not Applicable    PLAN: (Client Tasks / Therapist Tasks / Other)  1.   Client will follow through with referrals fort a tour [employer] [facility] for additional MH support, Elderly " Waiver, Food Shelf , and Community Psychiatry.  2.  Work on using supports for her anxiety and stress.    Lupis Mcgee, LICSW     __________________________________________________________________________________________________                                                           Treatment Plan    Client's Name: Karen Alex  YOB: 1955    Date: 9/22/2015       DSM-V Diagnoses: 296.32 - Major Depressive Disorder, Recurrent, Moderate    300.02 - Generalized Anxiety Disorder    309.81 - Posttraumatic Stress Disorder By History; 799.9 - Deferred Diagnosis   WHODAS:  35    Referral / Collaboration:  Referral to another professional/service is not indicated at this time..    Anticipated number of session or this episode of care: 12      MeasurableTreatment Goal(s) related to diagnosis / functional impairment(s)  Goal 1: Client will have stability with her mental health as evidenced by PHQ-9 and HUNG-7 scores as well as self-report.      I will know I've met my goal when I start feeling better about myself.      Objective #A (Client Action)      Status: Continued - Date(s): 11/1/2017           Client will Decrease frequency and intensity of feeling down, depressed, hopeless.    Intervention(s)  Therapist will assign homework by learning to build awareness of her triggers which activate her mental health symptoms.  Therapist will introduce and have client implement strategies to address triggers.    Objective #B  Client will Identify negative self-talk and behaviors: challenge core beliefs, myths, and actions.  Status: Continued - Date(s): 11/1/2017         Intervention(s)  Therapist will continue to help client identify and reframe negative perceptions of self.  Work on ways to increase self-esteem.    Objective #C  Client will Feel less tired and more energy during the day .  Status: Completed - Date: 3/2/16     Intervention(s)  Therapist will assign homework 1.  Complete at least  one household activity daily; 2.  Focus on getting out of the house at least three times weekly; 3.  Look into entering social groups.      Client has reviewed and agreed to the above plan.      Lupis Mcgee, MAURY  September 22, 2015

## 2017-11-15 NOTE — MR AVS SNAPSHOT
MRN:[id number]                      After Visit Summary   11/15/2017    Karen Alex    MRN: [id number]           Visit Information        Provider Department      11/15/2017 1:30 PM Siddharth Mcgeeargavi Symonenatividadroger Marquez [facility] Generic      Your next 10 appointments already scheduled     Nov 29, 2017  1:30 PM CST   Return Visit with Lupis Friedman Alma Mcgee [facility] ([facility])    [address] [place] [address] [id number] [phone]            Dec 18, 2017  2:30 PM CST   Return Visit with Lupis Friedman Alma Mcgee [facility] ([facility])    [address] [place] [address] [id number] [phone]              MyChart Information     [employer] gives you secure access to your electronic health record. If you see a primary care provider, you can also send messages to your care team and make appointments. If you have questions, please call your primary care clinic.  If you do not have a primary care provider, please call [phone] and they will assist you.        Care EveryWhere ID     This is your Care EveryWhere ID. This could be used by other organizations to access your [place] medical records  [id number]        Equal Access to Services     BRANDYN TURNER : Hadii omid woodard hadasho Soomaali, waaxda luqadaha, qaybta kaalmada aderaymundoyakike, rj ace. So [facility] [phone].    ATENCIÓN: Si habla español, tiene a zaragoza disposición servicios gratuitos de asistencia lingüística. Llame al [phone].    We comply with applicable federal civil rights laws and [place] laws. We do not discriminate on the basis of race, color, national origin, age, disability, sex, sexual orientation, or gender identity.

## 2017-11-27 NOTE — TELEPHONE ENCOUNTER
Controlled Substance Refill Request for Norco  Problem List Complete:  Yes  Patient is followed by OKSANA ANTONIO for ongoing prescription of pain medication.  All refills should be approved by this provider, or covering partner.    Medication(s): Norco.   Maximum quantity per month: 40  Clinic visit frequency required: Q 3 months Last office visit: 10/11/17    Controlled substance agreement on file: Yes       Date(s): 7/16/15    Pain Clinic evaluation in the past: No       Date(s):  n/a       Location(s):  n/a    DIRE Total Score(s):  No flowsheet data found.    Last [facility] website verification: 8/1/17   [email or web address]   checked in past 6 months?  Yes 8/1/17     Last picked uP: 11/9/17    Georgie Shabazz, Pharmacy [facility]

## 2017-11-29 NOTE — MR AVS SNAPSHOT
MRN:[id number]                      After Visit Summary   11/29/2017    Karen Alex    MRN: [id number]           Visit Information        Provider Department      11/29/2017 1:30 PM Lupis Mcgee LICSW [facility] Generic      Your next 10 appointments already scheduled     Dec 18, 2017  2:30 PM CST   Return Visit with MAURY Lemos   [facility] ([facility])    [address] [id number] [phone]              MyChart Information     [employer] gives you secure access to your electronic health record. If you see a primary care provider, you can also send messages to your care team and make appointments. If you have questions, please call your primary care clinic.  If you do not have a primary care provider, please call [phone] and they will assist you.        Care EveryWhere ID     This is your Care EveryWhere ID. This could be used by other organizations to access your [place] medical records  [id number]        Equal Access to Services     BRANDYN TURNER : Hadii omid Calixto, waaxda lujericaadaha, qaybta kaalal daniel, rj ace. So [facility] [phone].    ATENCIÓN: Si habla español, tiene a zaragoza disposición servicios gratuitos de asistencia lingüística. Llame al [phone].    We comply with applicable federal civil rights laws and [place] laws. We do not discriminate on the basis of race, color, national origin, age, disability, sex, sexual orientation, or gender identity.

## 2017-11-29 NOTE — PROGRESS NOTES
Progress Note    Client Name: Karen Alex  Date: 11/29/2017                                      Service Type: Individual      Session Start Time:  1:50  Session End Time: 2:40      Session Length: 45 - 50     Session #: 28     Attendees: Client attended alone    Treatment Plan Last Completed Date: 11/1/17  PHQ-9 / HUNG-7 Last Completed Date: last completed 10/30/17                  DATA      Progress Since Last Session (Related to Symptoms / Goals / Homework):   Symptoms: Stable-continues to grieve for sister, anger about ex-bf     Homework: Did not complete- has been present with stressors       Episode of Care Goals: Minimal progress - ACTION (Actively working towards change); Intervened by reinforcing change plan / affirming steps taken     Current / Ongoing Stressors and Concerns:   Client reports that [facility].  She reports that son did a welfare check and found the bf had cut his wrists.  Client reports that her son will be living temporarily with  her.  Discussed the stressors that have been present and the skills that she has been using to manage them.  Client did report that she was unable to follow through on the homework we had  discussed due to this recent stress.  Met with son briefly to talk about action plans.      Treatment Objective(s) Addressed in This Session:   focus on being socially active and not isolating at home due to medical condition   Using acceptance skills to understand her level of depression and what she has control over       Intervention:   CBT: Following through with action steps        ASSESSMENT: Current Emotional / Mental Status (status of significant symptoms):   Risk status (Self / Other harm or suicidal ideation)  Client denies a history of suicidal ideation, suicide attempts, self-injurious behavior, homicidal ideation, homicidal behavior and and other safety concerns   Client denies current  "fears or concerns for personal safety.   Client reports the following current or recent suicidal ideation or behaviors: passive thoughts of not wanting to live.  Denies any plan or intent.  \"Would not do that to my son\".   Client denies current or recent homicidal ideation or behaviors.   Client denies current or recent self injurious behavior or ideation.   Client denies other safety concerns.   A safety and risk management plan has not been developed at this time, however client was given the after-hours number should there be a change in any of these risk factors.     Appearance:   Appropriate    Eye Contact:   Fair    Psychomotor Behavior: Normal    Attitude:   Cooperative    Orientation:   All   Speech    Rate / Production: Normal     Volume:  Soft    Mood:    Depressed  Sad    Affect:    Flat    Thought Content:  Hallucinations    Thought Form:  Focused on grief   Insight:    Fair      Medication Review:   Changes to psychiatric medications, see updated Medication List in EPIC.      Medication Compliance:   Yes     Changes in Health Issues:   Yes: Respiratory Disease, No Psychological Distress     Chemical Use Review:   Substance Use: Chemical use reviewed, no active concerns identified      Tobacco Use: No change in amount of tobacco use since last session.  Action on nicotine patches- struggles with cravings- One whole week of no smoking     Collateral Reports Completed:   Not Applicable    PLAN: (Client Tasks / Therapist Tasks / Other)  1.   Client will follow through with referrals [facility] for additional [facility] support, Elderly Waiver, Food Shelf , and Community Psychiatry.      Lupis Mcgee, MAURY     __________________________________________________________________________________________________                                                           Treatment Plan    Client's Name: Karen Alex  YOB: 1955    Date: 9/22/2015       DSM-V " Diagnoses: 296.32 - Major Depressive Disorder, Recurrent, Moderate    300.02 - Generalized Anxiety Disorder    309.81 - Posttraumatic Stress Disorder By History; 799.9 - Deferred Diagnosis   WHODAS:  35    Referral / Collaboration:  Referral to another professional/service is not indicated at this time..    Anticipated number of session or this episode of care: 12      MeasurableTreatment Goal(s) related to diagnosis / functional impairment(s)  Goal 1: Client will have stability with her mental health as evidenced by PHQ-9 and HUNG-7 scores as well as self-report.      I will know I've met my goal when I start feeling better about myself.      Objective #A (Client Action)      Status: Continued - Date(s): 11/1/2017           Client will Decrease frequency and intensity of feeling down, depressed, hopeless.    Intervention(s)  Therapist will assign homework by learning to build awareness of her triggers which activate her mental health symptoms.  Therapist will introduce and have client implement strategies to address triggers.    Objective #B  Client will Identify negative self-talk and behaviors: challenge core beliefs, myths, and actions.  Status: Continued - Date(s): 11/1/2017         Intervention(s)  Therapist will continue to help client identify and reframe negative perceptions of self.  Work on ways to increase self-esteem.    Objective #C  Client will Feel less tired and more energy during the day .  Status: Completed - Date: 3/2/16     Intervention(s)  Therapist will assign homework 1.  Complete at least one household activity daily; 2.  Focus on getting out of the house at least three times weekly; 3.  Look into entering social groups.      Client has reviewed and agreed to the above plan.      Lupis Mcgee, [facility]  September 22, 2015

## 2017-12-12 PROBLEM — J96.11 CHRONIC RESPIRATORY FAILURE WITH HYPOXIA AND HYPERCAPNIA (H): Status: ACTIVE | Noted: 2017-01-01

## 2017-12-12 PROBLEM — J96.12 CHRONIC RESPIRATORY FAILURE WITH HYPOXIA AND HYPERCAPNIA (H): Status: ACTIVE | Noted: 2017-01-01

## 2017-12-12 NOTE — TELEPHONE ENCOUNTER
Panel Management Review      Patient has the following on her problem list: None      Composite cancer screening  Chart review shows that this patient is due/due soon for the following Mammogram  Summary:    Patient is due/failing the following:   MAMMOGRAM    Action needed:   Patient needs office visit for Mammogram.    Type of outreach:    Phone, spoke to patient.  Schedule Mammogram 12/15/17.At     Questions for provider review:    None                                                                                                                                    Brynn Brown      Chart routed to Care Team .

## 2017-12-12 NOTE — TELEPHONE ENCOUNTER
Please contact patient.   I referred out to another provider at last visit and should be established with psychiatry in community.   Below is note from referral office.      Regarding: MENTAL HEALTH ORDER   Patient was contacted by [facility]. Patient was scheduled for medication management services with Jany Leonardo on 11.3.17 at 2pm.

## 2017-12-12 NOTE — PROGRESS NOTES
"  SUBJECTIVE:   Karen Alex is a 62 year old female who presents to clinic today for the following health issues:  Issues with urinary incontinence  And Chronic Pain Follow-Up and   COPD  OXYGEN DEPENDENT FOR SLEEP AND ACTIVITY        Type / Location of Pain: Back pain , longterm, recurrent, osteopenia,   Analgesia/pain control:       Recent changes:  improved      Overall control: Comfortably manageable  Activity level/function:      Daily activities:  Light house cooking     Work:  Unable to work  Adverse effects:  No  Adherance    Taking medication as directed?  Yes    Participating in other treatments: yes  Risk Factors:    Sleep:  Fair    Mood/anxiety:  improved    Recent family or social stressors:  none noted    Other aggravating factors: none  PHQ-9 SCORE 9/5/2017 9/29/2017 10/30/2017   Total Score - - -   Total Score MyChart - - 23 (Severe depression)   Total Score 25 24 23     HUNG-7 SCORE 9/5/2017 9/29/2017 10/30/2017   Total Score - - -   Total Score - - 20 (severe anxiety)   Total Score 21 21 20     Encounter-Level CSA - 10/21/2016:          Controlled Substance Agreement - Scan on 10/28/2016  9:19 AM : CONTROLLED SUBSTANCE AGREEMENT (below)            Encounter-Level CSA - 10/21/2016:          Controlled Substance Agreement - Scan on 7/20/2015  2:33 PM : [facility] Controlled Substance Agreement 7-16-15 (below)             /83 (BP Location: Right arm, Patient Position: Chair, Cuff Size: Adult Large)  Pulse 78  Temp 98.1  F (36.7  C) (Oral)  Resp 14  Ht 5' 7\" (1.702 m)  Wt 220 lb (99.8 kg)  LMP 12/01/2007  SpO2 (!) 78%  BMI 34.46 kg/m2           Amount of exercise or physical activity: 5  day/week for an average of 15-30 minutes    Problems taking medications regularly: No    Medication side effects: none    Diet: regular (no restrictions)    Past Medical History:   Diagnosis Date     Anxiety      Arthritis     generalized     Asthma      Bipolar 2 disorder (H)      Cervical " dysplasia      Chronic back pain     low back     COPD (chronic obstructive pulmonary disease) (H)     O2 at night     HTN, goal below 140/90 2015     Hypercholesterolemia      Hyperlipidaemia      Other chronic pain      Pneumothorax 2012    left sided      Varicose veins      BOBBY III (vulvar intraepithelial neoplasia III) 2007       Past Surgical History:   Procedure Laterality Date     BACK SURGERY       CONIZATION  10/23/07    Vulvar excision for BOBBY 3, cold knife conization     EXCISE VULVA WIDE LOCAL  2012    Procedure:EXCISE VULVA WIDE LOCAL; Wide Local Excision Of Vulvar Lesion; Surgeon:RE ALDRIDGE; Location:[facility] OR     Foot surgeries       HERNIORRHAPHY INCISIONAL (LOCATION)  2012    Procedure:HERNIORRHAPHY INCISIONAL (LOCATION); Incisional Hernia Repair with mesh ; Surgeon:KIM QUICK; Location:[facility] OR     HYSTERECTOMY TOTAL ABDOMINAL, BILATERAL SALPINGO-OOPHORECTOMY, COMBINED       LAPAROSCOPIC HYSTERECTOMY TOTAL, BILATERAL SALPINGO-OOPHORECTOMY, COMBINED  3/6/2012    Procedure:COMBINED LAPAROSCOPIC HYSTERECTOMY TOTAL, BILATERAL SALPINGO-OOPHORECTOMY; Total laparoscopic Hysterectomy, Bilateral Salpingo Ooporectomy, Pubovaginal Sling, Biopsy Left Volva; Surgeon:RE ALDRIDGE; Location:[facility] OR     repair of anal fissures       SLING BLADDER SUSPENSION WITH FASCIA ALESHA       SLING TRANSPUBO WITHOUT ANTERIOR COLPORRHAPHY  3/6/2012    Procedure:SLING TRANSPUBO WITHOUT ANTERIOR COLPORRHAPHY; Surgeon:JOLANTA ENRIQUEZ; Location:[facility] OR     TUBAL LIGATION         Family History   Problem Relation Age of Onset     CANCER Father      asbestos lung disease     Hypertension Mother      DIABETES Paternal Aunt      HEART DISEASE Maternal Grandfather      CANCER Maternal Grandmother      unsure of what kind,  at the age of 86     Circulatory Paternal Grandmother       of brain aneurysm     Asthma Son      HEART DISEASE Sister        Social History   Substance Use Topics      Smoking status: Former Smoker     Packs/day: 0.50     Years: 26.00     Types: Cigarettes     Quit date: 7/3/2015     Smokeless tobacco: Never Used      Comment: sometimes     Alcohol use No     On exam the vital signs are stable  Weight is Body mass index is 34.46 kg/(m^2).   Eyes show edith  No neck masses or thyromegaly.Ear nose and throat shows normal   No bruits, murmers, rubs or extrasounds. No cardiomegaly or chest wall tenderness. Lungs clear, no abdominal masses or organomegaly. No CVA tenderness.  Skin eval no   No hernias, good range of motion neck, back and extremities. No abnormal skin lesions. Normal genitalia. Good peripheral pulses. No adenopathy.  Normal gait and stance. Neck is supple.  Back exam shows       (R30.0)loss of urinary control -stress incontinence  Comment:   Plan: *UA reflex to Microscopic and Culture ([place]         and [facility] (except [place] and         [place]), amoxicillin (AMOXIL) 500 MG capsule            (M51.37) Degeneration of lumbar or lumbosacral intervertebral disc  Comment:   Plan: severe low back pain  Current Outpatient Prescriptions   Medication     HYDROcodone-acetaminophen (NORCO) 5-325 MG per tablet     amoxicillin (AMOXIL) 500 MG capsule     gabapentin (NEURONTIN) 300 MG capsule     atorvastatin (LIPITOR) 40 MG tablet     QUEtiapine (SEROQUEL) 200 MG tablet     buPROPion (WELLBUTRIN XL) 300 MG 24 hr tablet     DULoxetine (CYMBALTA) 60 MG EC capsule     diazepam (VALIUM) 10 MG tablet     ADVAIR DISKUS 250-50 MCG/DOSE diskus inhaler     VENTOLIN  (90 BASE) MCG/ACT Inhaler     tiotropium (SPIRIVA) 18 MCG capsule     nitroGLYcerin (NITROSTAT) 0.4 MG sublingual tablet     nicotine (NICODERM CQ) 7 MG/24HR 24 hr patch     isosorbide mononitrate (IMDUR) 30 MG 24 hr tablet     ibuprofen (ADVIL,MOTRIN) 600 MG tablet     Cholecalciferol (VITAMIN D3) 2000 UNITS CHEW     Docusate Calcium (STOOL SOFTENER PO)     albuterol (2.5 MG/3ML) 0.083% nebulizer solution      ASPIRIN PO     No current facility-administered medications for this visit.          (M54.5) Mechanical low back pain  Comment:   Plan: HYDROcodone-acetaminophen (NORCO) 5-325 MG per         tablet            (J96.11,  J96.12) Chronic respiratory failure with hypoxia and hypercapnia (H)  Comment:   Plan: sats 89 percent

## 2017-12-12 NOTE — NURSING NOTE
"Chief Complaint   Patient presents with     Recheck Medication       Initial /83 (BP Location: Right arm, Patient Position: Chair, Cuff Size: Adult Large)  Pulse 78  Temp 98.1  F (36.7  C) (Oral)  Resp 14  Ht 5' 7\" (1.702 m)  Wt 220 lb (99.8 kg)  LMP 12/01/2007  SpO2 (!) 78%  BMI 34.46 kg/m2 Estimated body mass index is 34.46 kg/(m^2) as calculated from the following:    Height as of this encounter: 5' 7\" (1.702 m).    Weight as of this encounter: 220 lb (99.8 kg).  Medication Reconciliation: complete   "

## 2017-12-12 NOTE — MR AVS SNAPSHOT
"              After Visit Summary   12/12/2017    Karen Alex    MRN: [id number]           Patient Information     Date Of Birth          1955        Visit Information        Provider Department      12/12/2017 1:15 PM Eros Rebolledo MD [facility]        Today's Diagnoses     Dysuria    -  1    Degeneration of lumbar or lumbosacral intervertebral disc        Mechanical low back pain        Chronic respiratory failure with hypoxia and hypercapnia (H)           Follow-ups after your visit        Your next 10 appointments already scheduled     Dec 15, 2017  2:30 PM CST   (Arrive by 2:15 PM)   MA SCREENING DIGITAL BILATERAL with [facility]   [facility]    [address] [place] [address] [phone]           Do not use any powder, lotion or deodorant under your arms or on your breast. If you do, we will ask you to remove it before your exam.  Wear comfortable, two-piece clothing.  If you have any allergies, tell your care team.  Bring any previous mammograms from other facilities or have them mailed to the breast center. Three-dimensional (3D) mammograms are available at [place] locations in [facility], [facility], [place], [place], and [place]. [facility] locations include [place] [facility] in [place]. Benefits of 3D mammograms include: - Improved rate of cancer detection - Decreases your chance of having to go back for more tests, which means fewer: - \"False-positive\" results (This means that there is an abnormal area but it isn't cancer.) - Invasive testing procedures, such as a biopsy or surgery - Can provide clearer images of the breast if you have dense breast tissue. 3D mammography is an optional exam that anyone can have with a 2D mammogram. It doesn't replace or take the place of a 2D mammogram. 2D mammograms remain an effective screening test for " all women.  Not all insurance companies cover the cost of a 3D mammogram. Check with your insurance.            Dec 18, 2017  2:30 PM CST   Return Visit with MAURY Lemos   [facility] ([facility])    [address] [id number] [phone]            Jan 08, 2018  2:30 PM CST   Return Visit with MAURY Lemos   [facility] ([facility])    [address] [id number] [phone]              Future tests that were ordered for you today     Open Future Orders        Priority Expected Expires Ordered    MA Screening Digital Bilateral Routine  12/12/2018 12/12/2017    *UA reflex to Microscopic and Culture ([facility] (except [place] and [place]) Routine  12/12/2018 12/12/2017            Who to contact     If you have questions or need follow up information about today's clinic visit or your schedule please contact [facility] directly at [phone].  Normal or non-critical lab and imaging results will be communicated to you by MyChart, letter or phone within 4 business days after the clinic has received the results. If you do not hear from us within 7 days, please contact the clinic through [employer] or phone. If you have a critical or abnormal lab result, we will notify you by phone as soon as possible.  Submit refill requests through [employer] or call your pharmacy and they will forward the refill request to us. Please allow 3 business days for your refill to be completed.          Additional Information About Your Visit        MyChart Information     [employer] gives you secure access to your electronic health record. If you see a primary care provider, you can also send messages to your care team and make appointments. If you have questions, please call your primary care clinic.  If you do not have a  "primary care provider, please call [phone] and they will assist you.        Care EveryWhere ID     This is your Care EveryWhere ID. This could be used by other organizations to access your [facility] medical records  [id number]        Your Vitals Were     Pulse Temperature Respirations Height Last Period Pulse Oximetry    78 98.1  F (36.7  C) (Oral) 14 5' 7\" (1.702 m) 12/01/2007 78%    BMI (Body Mass Index)                   34.46 kg/m2            Blood Pressure from Last 3 Encounters:   12/12/17 116/83   10/30/17 102/74   10/11/17 112/74    Weight from Last 3 Encounters:   12/12/17 220 lb (99.8 kg)   10/30/17 200 lb (90.7 kg)   10/11/17 200 lb (90.7 kg)                 Today's Medication Changes          These changes are accurate as of: 12/12/17 11:59 PM.  If you have any questions, ask your nurse or doctor.               Start taking these medicines.        Dose/Directions    amoxicillin 500 MG capsule   Commonly known as:  AMOXIL   Used for:  Dysuria   Started by:  Eros Rebolledo MD        Dose:  500 mg   Take 1 capsule (500 mg) by mouth 3 times daily   Quantity:  30 capsule   Refills:  0            Where to get your medicines      Some of these will need a paper prescription and others can be bought over the counter.  Ask your nurse if you have questions.     Bring a paper prescription for each of these medications     amoxicillin 500 MG capsule    HYDROcodone-acetaminophen 5-325 MG per tablet                Primary Care Provider Office Phone # Fax #    Eros Rebolledo [phone] [address] [facility] [address]        Equal Access to Services     [facility]: Hadii omid Calixto, waaxda luqadaha, qaybta kaalmarj de los santos. So [facility] [phone].    ATENCIÓN: Si habla español, tiene a zaragoza disposición servicios gratuitos de asistencia lingüística. Llame al [phone].    We comply with applicable federal " civil rights laws and [place] laws. We do not discriminate on the basis of race, color, national origin, age, disability, sex, sexual orientation, or gender identity.            Thank you!     Thank you for choosing [facility]  for your care. Our goal is always to provide you with excellent care. Hearing back from our patients is one way we can continue to improve our services. Please take a few minutes to complete the written survey that you may receive in the mail after your visit with us. Thank you!             Your Updated Medication List - Protect others around you: Learn how to safely use, store and throw away your medicines at www.disposemymeds.org.          This list is accurate as of: 12/12/17 11:59 PM.  Always use your most recent med list.                   Brand Name Dispense Instructions for use Diagnosis    ADVAIR DISKUS 250-50 MCG/DOSE diskus inhaler   Generic drug:  fluticasone-salmeterol     3 Inhaler    INHALE ONE PUFF BY MOUTH TWO TIMES A DAY    Chronic obstructive pulmonary disease, unspecified COPD type (H)       * albuterol (2.5 MG/3ML) 0.083% neb solution     120 vial    Take 1 vial (2.5 mg) by nebulization 4 times daily as needed    COPD (chronic obstructive pulmonary disease) (H)       * VENTOLIN  (90 BASE) MCG/ACT Inhaler   Generic drug:  albuterol     18 g    SHAKE WELL AND INHALE 1 TO 2 PUFFS INTO THE LUNGS EVERY 4 HOURS AS NEEDED FOR SHORTNESS OF BREATH, DIFFICULTY BREATHING OR WHEEZING.    Panlobular emphysema (H)       amoxicillin 500 MG capsule    AMOXIL    30 capsule    Take 1 capsule (500 mg) by mouth 3 times daily    Dysuria       ASPIRIN PO      Take 81 mg by mouth daily        atorvastatin 40 MG tablet    LIPITOR    90 tablet    Take 1 tablet (40 mg) by mouth daily    CAD (coronary artery disease)       buPROPion 300 MG 24 hr tablet    WELLBUTRIN XL    90 tablet    Take 1 tablet (300 mg) by mouth every morning    Major depressive disorder, recurrent  episode, in partial remission (H)       diazepam 10 MG tablet    VALIUM    60 tablet    Take 1 tablet (10 mg) by mouth every 12 hours as needed for anxiety    HUNG (generalized anxiety disorder)       DULoxetine 60 MG EC capsule    CYMBALTA    180 capsule    Take 2 capsules (120 mg) by mouth daily For mood and anxiety and pain.    HUNG (generalized anxiety disorder), Major depressive disorder, recurrent episode, in partial remission (H)       gabapentin 300 MG capsule    NEURONTIN    60 capsule    Take 1 tablet (300 mg) every night for 3 days, then 1 tablet twice daily. For anxiety, mood, and anger.    HUNG (generalized anxiety disorder)       HYDROcodone-acetaminophen 5-325 MG per tablet    NORCO    30 tablet    Take one two times daily as needed    Mechanical low back pain       ibuprofen 600 MG tablet    ADVIL/MOTRIN    20 tablet    Take 1 tablet (600 mg) by mouth every 6 hours as needed for moderate pain        isosorbide mononitrate 30 MG 24 hr tablet    IMDUR    90 tablet    Take 1 tablet (30 mg) by mouth daily    Chest pain, CAD (coronary artery disease)       nicotine 7 MG/24HR 24 hr patch    NICODERM CQ    30 patch    Place 1 patch onto the skin every 24 hours    Tobacco dependence syndrome       nitroGLYcerin 0.4 MG sublingual tablet    NITROSTAT    25 tablet    Place 1 tablet (0.4 mg) under the tongue every 5 minutes as needed for chest pain prn    Chest pain       QUEtiapine 200 MG tablet    SEROquel    90 tablet    Take 1 tablet (200 mg) by mouth At Bedtime    HUNG (generalized anxiety disorder)       STOOL SOFTENER PO      Take by mouth daily        tiotropium 18 MCG capsule    SPIRIVA    90 capsule    Inhale 1 capsule (18 mcg) into the lungs daily    Generalized anxiety disorder, Chronic obstructive pulmonary disease, unspecified COPD type (H)       Vitamin D3 2000 UNITS Chew      Take 2,000 mg by mouth daily        * Notice:  This list has 2 medication(s) that are the same as other medications prescribed  for you. Read the directions carefully, and ask your doctor or other care provider to review them with you.

## 2017-12-12 NOTE — TELEPHONE ENCOUNTER
Controlled Substance Refill Request for Diazepam  Problem List Complete:  No     PROVIDER TO CONSIDER COMPLETION OF PROBLEM LIST AND OVERVIEW/CONTROLLED SUBSTANCE AGREEMENT    Last Written Prescription Date:  9/29/17  Last Fill Quantity: 60,   # refills: 1    Last Office Visit with [facility] primary care provider: 10/30/17    Future Office visit:   Next 5 appointments (look out 90 days)     Dec 12, 2017  1:15 PM CST   SHORT with Eros Macias MD   [facility]    [address] [place] [address]   [phone]            Dec 18, 2017  2:30 PM CST   Return Visit with MAURY Lemos   [facility] ([address] [phone]            Jan 08, 2018  2:30 PM CST   Return Visit with MAURY Lemos   [facility] ([address] [phone]                  Controlled substance agreement on file: Yes:  Date 10/28/16 with dr. macias.     Processing:  Staff will hand deliver Rx to on-site pharmacy     checked in past 6 months?  Yes -     Georgie Shabazz, Pharmacy [facility]

## 2017-12-12 NOTE — TELEPHONE ENCOUNTER
RN left [facility]. She was to see a new provider in November. MPMP below.        [employer] [place] Date: 17  Query Report Page#: 1  Patient Rx History Report  AMAIRANI VERA  Search Criteria: Last Name 'Amairani' and First Name 'lynn' and  = ' and Request Period = ' to  ' - 2 out of 2 Recipients Selected.  Fill Date Product, Str, Form Qty Days Pt ID Prescriber Written RX# N/R* Pharm **MED+  ---------- -------------------------------- ------ ---- --------- ---------- ---------- ------------ ----- --------- ------  2017 GABAPENTIN 300 MG CAPSULE 60.00 30 [id number] 10/30/2017 [id number] R [id number] 00.0  2017 HYDROCODONE-ACETAMIN 5-325 MG 30.00 15 [id number] 2017 [id number] N [id number] 10.0  2017 DIAZEPAM 10 MG TABLET 60.00 30 [id number] 2017 [id number] R [id number] 00.0  2017 HYDROCODONE-ACETAMIN 5-325 MG 30.00 15 [id number] 2017 [id number] N [id number] 10.0  10/30/2017 GABAPENTIN 300 MG CAPSULE 60.00 30 [id number] 10/30/2017 [id number] N [id number] 00.0  10/14/2017 DIAZEPAM 10 MG TABLET 60.00 30 [id number] 2017 [id number] N [id number] 00.0  10/10/2017 HYDROCODONE-ACETAMIN 5-325 MG 30.00 15 [id number] 10/10/2017 [id number] N [id number] 10.0  2017 DIAZEPAM 10 MG TABLET 45.00 22 [id number] 2017 [id number] R [id number] 00.0  2017 HYDROCODONE-ACETAMIN 5-325 MG 30.00 15 [id number] 2017 [id number] N [id number] 10.0  2017 DIAZEPAM 10 MG TABLET 45.00 22 [id number] 2017 [id number] N [id number] 00.0  2017 HYDROCODONE-ACETAMIN 5-325 MG 30.00 15 [id number] 2017 [id number] N [id number] 10.0  08/10/2017 LORAZEPAM 1 MG TABLET 90.00 30 [id number] 08/10/2017 [id number] N [id number] 00.0  2017 HYDROCODONE-ACETAMIN 5-325 MG 30.00 15 [id number] 2017 [id number] N [id number] 10.0  2017 HYDROCODONE-ACETAMIN 5-325 MG 30.00 15  [id number] 07/17/2017 [id number] N [id number] 10.0  07/15/2017 LORAZEPAM 1 MG TABLET 90.00 30 [id number] 07/14/2017 [id number] N [id number] 00.0  06/26/2017 HYDROCODONE-ACETAMIN 5-325 MG 30.00 15 [id number] 06/26/2017 [id number] N [id number] 10.0  06/15/2017 LORAZEPAM 1 MG TABLET 90.00 30 [id number] 06/13/2017 [id number] N [id number] 00.0  06/12/2017 HYDROCODONE-ACETAMIN 5-325 MG 30.00 15 [id number] 06/12/2017 [id number] N [id number] 10.0  05/17/2017 LORAZEPAM 1 MG TABLET 90.00 30 [id number] 05/15/2017 [id number] N [id number] 00.0  05/15/2017 HYDROCODONE-ACETAMIN 5-325 MG 30.00 15 [id number] 05/15/2017 [id number] N [id number] 10.0  04/18/2017 LORAZEPAM 1 MG TABLET 90.00 30 [id number] 04/18/2017 [id number] N [id number] 00.0  04/17/2017 HYDROCODONE-ACETAMIN 5-325 MG 30.00 15 [id number] 04/17/2017 [id number] N [id number] 10.0  03/17/2017 LORAZEPAM 1 MG TABLET 90.00 30 [id number] 03/15/2017 [id number] N [id number] 00.0  03/16/2017 HYDROCODONE-ACETAMIN 5-325 MG 30.00 15 [id number] 03/15/2017 [id number] N [id number] 10.0  02/17/2017 LORAZEPAM 1 MG TABLET 90.00 30 [id number] 02/17/2017 [id number] N [id number] 00.0  02/17/2017 HYDROCODONE-ACETAMIN 5-325 MG 30.00 15 [id number] 02/17/2017 [id number] N [id number] 10.0  01/20/2017 HYDROCODONE-ACETAMIN 5-325 MG 30.00 15 [id number] 01/17/2017 [id number] N [id number] 10.0  01/18/2017 LORAZEPAM 1 MG TABLET 90.00 30 [id number] 01/17/2017 [id number] N [id number] 00.0  01/06/2017 HYDROCODONE-ACETAMIN 5-325 MG 30.00 15 [id number] 01/06/2017 [id number] N [id number] 10.0  12/20/2016 HYDROCODONE-ACETAMIN 5-325 MG 30.00 15 [id number] 12/20/2016 [id number] N [id number] 10.0  12/20/2016 LORAZEPAM 1 MG TABLET 90.00 30 [id number] 12/20/2016 [id number] N [id number] 00.0  *N/R N=New R=Refill  +MED Daily  Prescribers for prescriptions  listed  ----------------------------------------------------------------------------------------------------------------------------------  [id number] OKSANA ANTONIO MD; [facility], [address] [place] [address]  **Per CDC guidance, the conversion factors and associated daily morphine milligram equivalents for drugs prescribed as part of  medication-assisted treatment for opioid use disorder should not be used to benchmark against dosage thresholds meant for opioids  prescribed for pain.  Report Disclaimers:  The report provided above is based upon the search criteria and the data provided by the dispensing entities. For more information  about any prescription, please contact the dispenser or the prescriber.  This report contains confidential information, including patient identifiers, and is not a public record. The information on this  report must be treated as protected health information and is to be disclosed to others only as authorized by applicable state  and Federal regulations.  [facility] Date: 12/12/17  Query Report Page#: 2  Patient Rx History Report  AAMIRANI VERA  Prescribers for prescriptions listed  ----------------------------------------------------------------------------------------------------------------------------------  [id number] HAASE, SUSAN; [address] [place] [address]  [id number] CARISA CALABRESE; [place] [address]  [id number] SHERLEY PERES (NP); [address], [address], [facility] [address]  Pharmacies that dispensed prescriptions listed  ----------------------------------------------------------------------------------------------------------------------------------  [id number] [facility]; [address] [facility] [address],  Patients that match search  criteria  ----------------------------------------------------------------------------------------------------------------------------------  [id number] AMAIRANI JODY, [facility]; [address] [facility] [address]  [id number] AAMIRANI MAYRA, [facility]; [address] [facility] [facility] [address]  [id number] AMAIRANI MAYRA, [facility] 57; [address] [facility], [place] [address]  [id number] AMAIRANI MAYRA, [facility] 57; [address] , [place] [address]  [id number] AMAIRANI JODY, [facility] 57; [address] , [place] [address]  MED Summary  This section displays cumulative MED values by unique recipient. The MED Max value is the maximum occurrence of cumulative MED  sustained for any 3 consecutive days. This value is calculated based on prescriptions dispensed during the date range requested.  -----------------------------------------------------------------------------------------------------------------------------------  10 MAYRA BALES; 1955; [address], [place] [address]  **Per CDC guidance, the conversion factors and associated daily morphine milligram equivalents for drugs prescribed as part of  medication-assisted treatment for opioid use disorder should not be used to benchmark against dosage thresholds meant for opioids  prescribed for pain.  Report Disclaimers:  The report provided above is based upon the search criteria and the data provided by the dispensing entities. For more information  about any prescription, please contact the dispenser or the prescriber.  This report contains confidential information, including patient identifiers, and is not a public record. The information on this  report must be treated as protected health information and is to be disclosed to others only as authorized by applicable state  and Federal regulations.

## 2017-12-14 NOTE — TELEPHONE ENCOUNTER
"Pt calls, \"does not have urinary infection\", has incontinence issue, confused why received message to  amoxicillin for uti, not told has uti, no ua done (order futured), see 12/12 visit, routed to OKSANA ANTONIO MD, please CONFIRM, aware you are out now and ok for am to advise    (R30.0)loss of urinary control -stress incontinence  Comment:   Plan: *UA reflex to Microscopic and Culture ([facility] (except [place] and         [place]), amoxicillin (AMOXIL) 500 MG capsule      Telephone Information:   Mobile [phone]     Estelle Haynes, RN, BSN  Message handled by Nurse Triage.    "

## 2017-12-18 NOTE — PROGRESS NOTES
Progress Note    Client Name: Karen Alex  Date: 12/18/2017                                       Service Type: Individual      Session Start Time:  1:50  Session End Time: 2:40      Session Length: 45 - 50     Session #: 28     Attendees: Client attended alone    Treatment Plan Last Completed Date: 11/1/17  PHQ-9 / HUNG-7 Last Completed Date: last completed 12/12/17                DATA      Progress Since Last Session (Related to Symptoms / Goals / Homework):   Symptoms: Stable-continues to grieve for sister, anger about ex-bf     Homework: Partially completed- has been challenging with current health      Episode of Care Goals: Minimal progress - ACTION (Actively working towards change); Intervened by reinforcing change plan / affirming steps taken     Current / Ongoing Stressors and Concerns:   Client states that she was able to schedule for psychiatry.  She has been having more difficulty with her breathing, incontinence, and fluid retention.  Met with PMD.  Plans to see her pulmonologist and cardiologist within the month.  Son continues to stay with her and it has been helpful to have him around the house.  BF remains in the hospital.      Treatment Objective(s) Addressed in This Session:   focus on being socially active and not isolating at home due to medical condition   Using acceptance skills to understand her level of depression and what she has control over       Intervention:   CBT: Following through with action steps        ASSESSMENT: Current Emotional / Mental Status (status of significant symptoms):   Risk status (Self / Other harm or suicidal ideation)  Client denies a history of suicidal ideation, suicide attempts, self-injurious behavior, homicidal ideation, homicidal behavior and and other safety concerns   Client denies current fears or concerns for personal safety.   Client reports the following current or recent suicidal ideation or behaviors:  "passive thoughts of not wanting to live.  Denies any plan or intent.  \"Would not do that to my son\".   Client denies current or recent homicidal ideation or behaviors.   Client denies current or recent self injurious behavior or ideation.   Client denies other safety concerns.   A safety and risk management plan has not been developed at this time, however client was given the after-hours number should there be a change in any of these risk factors.     Appearance:   Appropriate    Eye Contact:   Fair    Psychomotor Behavior: Normal    Attitude:   Cooperative    Orientation:   All   Speech    Rate / Production: Normal     Volume:  Soft    Mood:    Depressed  Sad    Affect:    Flat    Thought Content:  Hallucinations    Thought Form:  Focused on grief   Insight:    Fair      Medication Review:   Changes to psychiatric medications, see updated Medication List in EPIC.      Medication Compliance:   Yes- set up appt with psychiatry in Jan 30th with Sara Leonardo PA-C     Changes in Health Issues:   Yes: Respiratory Disease, No Psychological Distress-struggling with breathing, fluid retention     Chemical Use Review:   Substance Use: Chemical use reviewed, no active concerns identified      Tobacco Use: No change in amount of tobacco use since last session.  Action on nicotine patches- struggles with cravings- Continues to not smoke     Collateral Reports Completed:   Not Applicable    PLAN: (Client Tasks / Therapist Tasks / Other)  1.   Client will follow through with referrals fort a tour [facility] for additional  support, Food Shelf, and Community Psychiatry.      Lupis Mcgee, MAURY     __________________________________________________________________________________________________                                                           Treatment Plan    Client's Name: Karen Alex  YOB: 1955    Date: 9/22/2015       DSM-V Diagnoses: 296.32 - Major Depressive " Disorder, Recurrent, Moderate    300.02 - Generalized Anxiety Disorder    309.81 - Posttraumatic Stress Disorder By History; 799.9 - Deferred Diagnosis   WHODAS:  35    Referral / Collaboration:  Referral to another professional/service is not indicated at this time..    Anticipated number of session or this episode of care: 12      MeasurableTreatment Goal(s) related to diagnosis / functional impairment(s)  Goal 1: Client will have stability with her mental health as evidenced by PHQ-9 and HUNG-7 scores as well as self-report.      I will know I've met my goal when I start feeling better about myself.      Objective #A (Client Action)      Status: Continued - Date(s): 11/1/2017           Client will Decrease frequency and intensity of feeling down, depressed, hopeless.    Intervention(s)  Therapist will assign homework by learning to build awareness of her triggers which activate her mental health symptoms.  Therapist will introduce and have client implement strategies to address triggers.    Objective #B  Client will Identify negative self-talk and behaviors: challenge core beliefs, myths, and actions.  Status: Continued - Date(s): 11/1/2017         Intervention(s)  Therapist will continue to help client identify and reframe negative perceptions of self.  Work on ways to increase self-esteem.    Objective #C  Client will Feel less tired and more energy during the day .  Status: Completed - Date: 3/2/16     Intervention(s)  Therapist will assign homework 1.  Complete at least one household activity daily; 2.  Focus on getting out of the house at least three times weekly; 3.  Look into entering social groups.      Client has reviewed and agreed to the above plan.      Lupis Mcgee, [facility]  September 22, 2015

## 2017-12-18 NOTE — MR AVS SNAPSHOT
MRN:[id number]                      After Visit Summary   12/18/2017    Karen Alex    MRN: [id number]           Visit Information        Provider Department      12/18/2017 2:30 PM Lupis Mcgee LICSW [facility]      Your next 10 appointments already scheduled     Jan 08, 2018  2:30 PM CST   Return Visit with MAURY Lemos   [facility])    [address] [address] [phone]              MyChart Information     [employer] gives you secure access to your electronic health record. If you see a primary care provider, you can also send messages to your care team and make appointments. If you have questions, please call your primary care clinic.  If you do not have a primary care provider, please call [phone] and they will assist you.        Care EveryWhere ID     This is your Care EveryWhere ID. This could be used by other organizations to access your [place] medical records  [id number]        Equal Access to Services     BRANDYN TURNER : Hadii omid Calixto, waaxda lujericaadaha, qaybta kaalal daniel, rj ace. So [facility] [phone].    ATENCIÓN: Si habla español, tiene a zaragoza disposición servicios grattalos de asistencia lingüística. Llame al [phone].    We comply with applicable federal civil rights laws and [place] laws. We do not discriminate on the basis of race, color, national origin, age, disability, sex, sexual orientation, or gender identity.

## 2017-12-19 NOTE — TELEPHONE ENCOUNTER
Message received from patient reporting swelling. Called to follow up regarding this. Kathi stated that she has had swelling in her hands, ankles, and feet. She feels that this has been an issue intermittently for a couple of years, but the current worsening of the swelling has been going on for about two weeks. She has been on prednisone and thinks she's gained around 30lbs in a little over the past month. She has a hard time telling if she has been more short of breath or not because she is short of breath at baseline and has been on home oxygen as needed for several years. However, she noted that she has needed to wear her oxygen more frequently for about the past two weeks. She denied fever, redness, or tenderness in her extremities with the swelling. She has been elevating her extremities in the evening and it improves the swelling, but it returns shortly after. She is scheduled to see a PA with her Pulmonology office tomorrow 12/20, follow up with her PMD on Friday 12/22, and was scheduled to see Dr. Andrade on 12/29. Advised that she keep these appointments, but if her symptoms worsen or she becomes more short of breath in the mean time that she go to the ER for further evaluation. She stated understanding and agreement with this plan. TEMO Sorto RN

## 2017-12-19 NOTE — IP AVS SNAPSHOT
[facility] Observation Department    [address]    [facility] [address]    Phone:  [phone]                                       After Visit Summary   12/19/2017    Karen Alex    MRN: [id number]           After Visit Summary Signature Page     I have received my discharge instructions, and my questions have been answered. I have discussed any challenges I see with this plan with the nurse or doctor.    ..........................................................................................................................................  Patient/Patient Representative Signature      ..........................................................................................................................................  Patient Representative Print Name and Relationship to Patient    ..................................................               ................................................  Date                                            Time    ..........................................................................................................................................  Reviewed by Signature/Title    ...................................................              ..............................................  Date                                                            Time

## 2017-12-19 NOTE — IP AVS SNAPSHOT
MRN:[id number]                      After Visit Summary   12/19/2017    Karen Alex    MRN: [id number]           Thank you!     Thank you for choosing [facility] for your care. Our goal is always to provide you with excellent care. Hearing back from our patients is one way we can continue to improve our services. Please take a few minutes to complete the written survey that you may receive in the mail after you visit. If you would like to speak to someone directly about your visit please contact Patient Relations at [phone]. Thank you!          Patient Information     Date Of Birth          1955        About your hospital stay     You were admitted on:  December 20, 2017 You last received care in the:  [facility] Observation Department    You were discharged on:  December 20, 2017        Reason for your hospital stay       You were admitted to the observation unit for chest pain. Your heart was monitored overnight with no abnormal findings. Your cardiac enzymes were negative for heart attack. You had a chemical stress test that was negative for heart disease so we do not believe your chest pain was related to your heart. Suspect your symptoms were related to recent stressors and increased anxiety. We recommend you follow up with your primary doctor if you continue to have pain.                  Who to Call     For medical emergencies, please call 911.  For non-urgent questions about your medical care, please call your primary care provider or clinic, [phone]          Attending Provider     Provider Specialty    Modesta Soto MD Emergency Medicine    Jhonatan Campos DO Internal Medicine       Primary Care Provider Office Phone # Fax #    Eros Rebolledo [phone]      After Care Instructions     Activity       Your activity upon discharge: activity as tolerated            Diet       Follow this diet upon discharge: Regular  diet            Discharge Instructions       1. For lower extremity swelling: elevate legs above heart when sitting and wear compression stockings when up during the day; discuss with primary care about any additional workup related to this at follow up                  Follow-up Appointments     Follow-up and recommended labs and tests        Follow up with primary care provider, Eros Rebolledo, within 7 days for hospital follow- up. No follow up labs or test are needed.  Continue scheduled follow up with psychiatrist and therapist for ongoing stressors and increased anxiety of recent.                  Your next 10 appointments already scheduled     Dec 22, 2017  1:00 PM CST   [facility] with Eros [facility]ledo [facility]    [address] [place] [id number] [phone]            Dec 29, 2017  2:15 PM CST   New Visit with Josefina Andrade DO   [facility] ([facility])    [address]  [facility] [id number] [phone]            Jan 08, 2018  2:30 PM CST   Return Visit with MAURY Lemos   [facility] ([facility])    [address] [address] [phone]                         Pending Results     No orders found for last 3 day(s).            Statement of Approval     Ordered          12/20/17 1431  I have reviewed and agree with all the recommendations and orders detailed in this document.  EFFECTIVE NOW     Approved and electronically signed by:  Kelly Alvarez PA-C             Admission Information     Date & Time Provider Department Dept. Phone    12/19/2017 Jhonatan Campos,  [facility] Observation Department [phone]      Your Vitals Were     Blood Pressure Pulse Temperature Respirations Height Weight    149/91 (BP Location:  "Right arm) 90 97.9  F (36.6  C) (Oral) 16 1.702 m (5' 7\") 99.8 kg (220 lb)    Last Period Pulse Oximetry BMI (Body Mass Index)             12/01/2007 100% 34.46 kg/m2         MyChart Information     [employer] gives you secure access to your electronic health record. If you see a primary care provider, you can also send messages to your care team and make appointments. If you have questions, please call your primary care clinic.  If you do not have a primary care provider, please call [phone] and they will assist you.        Care EveryWhere ID     This is your Care EveryWhere ID. This could be used by other organizations to access your [place] medical records  [id number]        Equal Access to Services     BRANDYN TURNER : Ej Calixto, rupert sifuentes, fede daniel, rj ace. So [facility] [phone].    ATENCIÓN: Si habla español, tiene a zaragoza disposición servicios gratuitos de asistencia lingüística. Llame al [phone].    We comply with applicable federal civil rights laws and [place] laws. We do not discriminate on the basis of race, color, national origin, age, disability, sex, sexual orientation, or gender identity.               Review of your medicines      CONTINUE these medicines which have NOT CHANGED        Dose / Directions    ADVAIR DISKUS 250-50 MCG/DOSE diskus inhaler   Used for:  Chronic obstructive pulmonary disease, unspecified COPD type (H)   Generic drug:  fluticasone-salmeterol        INHALE ONE PUFF BY MOUTH TWO TIMES A DAY   Quantity:  3 Inhaler   Refills:  2       * albuterol (2.5 MG/3ML) 0.083% neb solution   Used for:  COPD (chronic obstructive pulmonary disease) (H)        Dose:  1 vial   Take 1 vial (2.5 mg) by nebulization 4 times daily as needed   Quantity:  120 vial   Refills:  5       * VENTOLIN  (90 BASE) MCG/ACT Inhaler   Used for:  Panlobular emphysema (H)   Generic drug:  albuterol        SHAKE WELL AND " INHALE 1 TO 2 PUFFS INTO THE LUNGS EVERY 4 HOURS AS NEEDED FOR SHORTNESS OF BREATH, DIFFICULTY BREATHING OR WHEEZING.   Quantity:  18 g   Refills:  5       ASPIRIN PO        Dose:  81 mg   Take 81 mg by mouth daily   Refills:  0       atorvastatin 40 MG tablet   Commonly known as:  LIPITOR   Used for:  CAD (coronary artery disease)        Dose:  40 mg   Take 1 tablet (40 mg) by mouth daily   Quantity:  90 tablet   Refills:  0       buPROPion 300 MG 24 hr tablet   Commonly known as:  WELLBUTRIN XL   Used for:  Major depressive disorder, recurrent episode, in partial remission (H)        Dose:  300 mg   Take 1 tablet (300 mg) by mouth every morning   Quantity:  90 tablet   Refills:  1       diazepam 10 MG tablet   Commonly known as:  VALIUM   Used for:  HUNG (generalized anxiety disorder)        Dose:  10 mg   Take 1 tablet (10 mg) by mouth every 12 hours as needed for anxiety   Quantity:  60 tablet   Refills:  1       DULoxetine 60 MG EC capsule   Commonly known as:  CYMBALTA   Used for:  HUNG (generalized anxiety disorder), Major depressive disorder, recurrent episode, in partial remission (H)        Dose:  120 mg   Take 2 capsules (120 mg) by mouth daily For mood and anxiety and pain.   Quantity:  180 capsule   Refills:  1       GABAPENTIN PO        Dose:  300 mg   Take 300 mg by mouth At Bedtime   Refills:  0       HYDROcodone-acetaminophen 5-325 MG per tablet   Commonly known as:  NORCO   Used for:  Mechanical low back pain        Take one two times daily as needed   Quantity:  30 tablet   Refills:  0       ibuprofen 600 MG tablet   Commonly known as:  ADVIL/MOTRIN        Dose:  600 mg   Take 1 tablet (600 mg) by mouth every 6 hours as needed for moderate pain   Quantity:  20 tablet   Refills:  1       isosorbide mononitrate 30 MG 24 hr tablet   Commonly known as:  IMDUR   Used for:  CAD (coronary artery disease)        Dose:  30 mg   Take 1 tablet (30 mg) by mouth daily   Quantity:  90 tablet   Refills:  3        nitroGLYcerin 0.4 MG sublingual tablet   Commonly known as:  NITROSTAT        Dose:  0.4 mg   Place 1 tablet (0.4 mg) under the tongue every 5 minutes as needed for chest pain prn   Quantity:  25 tablet   Refills:  1       QUEtiapine 200 MG tablet   Commonly known as:  SEROquel   Used for:  HUNG (generalized anxiety disorder)        Dose:  200 mg   Take 1 tablet (200 mg) by mouth At Bedtime   Quantity:  90 tablet   Refills:  1       STOOL SOFTENER PO        Dose:  100 mg   Take 100 mg by mouth 2 times daily   Refills:  0       tiotropium 18 MCG capsule   Commonly known as:  SPIRIVA   Used for:  Generalized anxiety disorder, Chronic obstructive pulmonary disease, unspecified COPD type (H)        Dose:  18 mcg   Inhale 1 capsule (18 mcg) into the lungs daily   Quantity:  90 capsule   Refills:  2       Vitamin D3 2000 UNITS Chew   Indication:  prn        Dose:  2000 mg   Take 2,000 mg by mouth daily   Refills:  0       * Notice:  This list has 2 medication(s) that are the same as other medications prescribed for you. Read the directions carefully, and ask your doctor or other care provider to review them with you.             Protect others around you: Learn how to safely use, store and throw away your medicines at www.Sensr.netemymeds.org.             Medication List: This is a list of all your medications and when to take them. Check marks below indicate your daily home schedule. Keep this list as a reference.      Medications           Morning Afternoon Evening Bedtime As Needed    ADVAIR DISKUS 250-50 MCG/DOSE diskus inhaler   INHALE ONE PUFF BY MOUTH TWO TIMES A DAY   Generic drug:  fluticasone-salmeterol                                * albuterol (2.5 MG/3ML) 0.083% neb solution   Take 1 vial (2.5 mg) by nebulization 4 times daily as needed                                * VENTOLIN  (90 BASE) MCG/ACT Inhaler   SHAKE WELL AND INHALE 1 TO 2 PUFFS INTO THE LUNGS EVERY 4 HOURS AS NEEDED FOR SHORTNESS OF BREATH,  DIFFICULTY BREATHING OR WHEEZING.   Generic drug:  albuterol                                ASPIRIN PO   Take 81 mg by mouth daily   Last time this was given:  81 mg on 12/20/2017  1:42 PM                                atorvastatin 40 MG tablet   Commonly known as:  LIPITOR   Take 1 tablet (40 mg) by mouth daily   Last time this was given:  40 mg on 12/20/2017  1:42 PM                                buPROPion 300 MG 24 hr tablet   Commonly known as:  WELLBUTRIN XL   Take 1 tablet (300 mg) by mouth every morning                                diazepam 10 MG tablet   Commonly known as:  VALIUM   Take 1 tablet (10 mg) by mouth every 12 hours as needed for anxiety                                DULoxetine 60 MG EC capsule   Commonly known as:  CYMBALTA   Take 2 capsules (120 mg) by mouth daily For mood and anxiety and pain.   Last time this was given:  120 mg on 12/20/2017  1:42 PM                                GABAPENTIN PO   Take 300 mg by mouth At Bedtime                                HYDROcodone-acetaminophen 5-325 MG per tablet   Commonly known as:  NORCO   Take one two times daily as needed                                ibuprofen 600 MG tablet   Commonly known as:  ADVIL/MOTRIN   Take 1 tablet (600 mg) by mouth every 6 hours as needed for moderate pain                                isosorbide mononitrate 30 MG 24 hr tablet   Commonly known as:  IMDUR   Take 1 tablet (30 mg) by mouth daily                                nitroGLYcerin 0.4 MG sublingual tablet   Commonly known as:  NITROSTAT   Place 1 tablet (0.4 mg) under the tongue every 5 minutes as needed for chest pain prn                                QUEtiapine 200 MG tablet   Commonly known as:  SEROquel   Take 1 tablet (200 mg) by mouth At Bedtime                                STOOL SOFTENER PO   Take 100 mg by mouth 2 times daily                                tiotropium 18 MCG capsule   Commonly known as:  SPIRIVA   Inhale 1 capsule (18 mcg) into  the lungs daily                                Vitamin D3 2000 UNITS Chew   Take 2,000 mg by mouth daily                                * Notice:  This list has 2 medication(s) that are the same as other medications prescribed for you. Read the directions carefully, and ask your doctor or other care provider to review them with you.

## 2017-12-20 NOTE — DISCHARGE SUMMARY
[facility] Outpatient / Observation Unit  Discharge Summary        Karen Alex MRN# [id number]   YOB: 1955 Age: 62 year old     Date of Admission: 12/19/2017  Date of Discharge: 12/20/2017  Admitting Physician: Jhonatan Campos DO  Discharge Physician: Kelly Alvarez PA-C  Discharging Service: Hospitalist      Primary Provider: Eros Rebolledo  Primary Care Physician Phone Number: [phone]         Primary Discharge Diagnoses:    Karen Alex is a 62 year old female with a history of COPD, HTN, HLD, chronic pain syndrome, bipolar disorder, and anxiety who presents with chest pain.    1. Chest pain: ruled out ACS. Suspect related to recent increased stressors causing anxiety.         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 Your heart was monitored overnight with no abnormal findings. Your cardiac enzymes were negative for heart attack. You had a chemical stress test that was negative for heart disease so we do not believe your chest pain was related to your heart. Suspect your symptoms were related to recent stressors and increased anxiety. We recommend you follow up with your   primary doctor if you continue to have pain.       Please refer to initial admission history and physical for further details.   Briefly, Karen Alex was admitted on 12/19/2017  for concerns of acute chest pain. Initial work up in the ED did not reveal evidence of STEMI or findings consistent with unstable angina or acute coronary ischemia. Pt was registered to the Observation Unit for further evaluation.     Pt ruled out with serial troponins, underwent Lexiscan Stress Thallium test that did not show evidence of significant coronary ischemia. Labs were reviewed and significant results addressed. On the day of discharge, pt was pain free, with no complaints of pain. Medications were reviewed and adjustments made as necessary. Pt is instructed to follow up as below.           Significant Lab During Hospitalization:      All 3 serial Troponin levels <0.015          Significant Imaging During Hospitalization:      Results for orders placed or performed during the hospital encounter of 12/19/17   Chest  XR, 1 view PORTABLE    Narrative    CHEST SINGLE VIEW PORTABLE  12/19/2017 11:02 PM     HISTORY: Chest pain.    COMPARISON: 10/22/2014.    FINDINGS: Hyperinflated lungs. Prominent lucency within the upper  lungs bilaterally, left side more prominent than right, similar in  appearance to 10/22/2014. This appearance is consistent with  emphysematous changes. A band-like opacity at the left lung base  likely represents scarring or atelectasis. The lungs are otherwise  clear. Normal-sized cardiac silhouette. No visualized pneumothorax.      Impression    IMPRESSION:  1. No convincing evidence of active cardiopulmonary disease.  2. Emphysematous changes again noted within the lungs.    SANTIAGO TRAYLOR MD   NM MPI w Lexiscan    Narrative    GATED MYOCARDIAL PERFUSION SCINTIGRAPHY WITH INTRAVENOUS PHARMACOLOGIC  VASODILATATION LEXISCAN -ONE DAY STUDY     12/20/2017 10:13 AM  JODY BAUMANUM  62 years  Female  1955.    Indication/Clinical History: Chest pain    Impression (technically very difficult study due to significant bowel  uptake artifact and diaphragm attenuation)  1.  Myocardial perfusion  imaging using single isotope technique  demonstrated moderate size relatively fixed  inferior/inferoseptal/inferolateral defect extending from the base to  the apex most likely consistent with bowel uptake artifact/diaphragm  attenuation. No significant area of ischemia or infarct seen.   2. Gated images demonstrated normal size left ventricle with vigorous  overall contractility and no significant regional wall motion  abnormality.  The left ventricular systolic function is hyperdynamic  with ejection fraction 78%.  3. Compared to the prior study from 1/7/2015 probably no significant  change .    Procedure  Pharmacologic stress testing was performed with Lexiscan at a rate of  0.08 mg/ml rapid bolus injection, for 15 seconds, 0.4 mg/5ml  intravenously. Low-level exercise was not performed along with the  vasodilator infusion.  The heart rate was 83 at baseline and trung to  99 beats per minute during the Lexiscan infusion. The rest blood  pressure was 170/90 mmHg and was 151/90 mm Hg during Lexiscan  infusion. The patient experienced shortness of breath  during the  test.    Myocardial perfusion imaging was performed at rest, approximately 45  minutes after the injection intravenously of 11.0 mCi of Tc-99m  Myoview. At peak pharmacologic effect, 10-20 seconds after Lexiscan,   the patient was injected intravenously with 32.0 mCi of  Tc-99m  Myoview. The post-stress tomographic imaging was performed  approximately 60 minutes after stress.    EKG Findings  The resting EKG demonstrated normal sinus rhythm with poor R wave  progression the right precordial leads with low QRS voltage and no  significant ST abnormality. The stress EKG demonstrated normal sinus  rhythm with poor R wave progression in the precordial leads with low  QRS voltage and no significant ST depression.    Tomographic Findings  Overall, the study quality is suboptimal with significant bowel  uptake/diaphragm attenuation artifact . On the stress  images, there is  a moderate size mild inferior/inferoseptal/inferolateral defect  extending from the base to the apex. On the rest images, there is a  moderate size mild inferior/inferoseptal/inferolateral defect  extending from the base to the apex . Gated images demonstrated normal  size left ventricle with vigorous overall contractility and no  significant regional wall motion abnormality. The left ventricular  ejection fraction was calculated to be 78% with stress 80% rest. TID  was absent.    CLAUDIA GUEVARA MD            Pending Results:      None        Consultations This Hospital Stay:      No consultations were requested during this admission         Discharge Instructions and Follow-Up:      Follow-up Appointments        Follow up with primary care provider, Eors Rebolledo, within 7 days for hospital follow- up. No follow up labs or test are needed.  Continue scheduled follow up with psychiatrist and therapist for ongoing stressors and increased anxiety of recent.           Discharge Disposition:      Discharged to home         Discharge Medications:        Discharge Medication List as of 12/20/2017  2:32 PM      CONTINUE these medications which have NOT CHANGED    Details   GABAPENTIN PO Take 300 mg by mouth At Bedtime, Historical      HYDROcodone-acetaminophen (NORCO) 5-325 MG per tablet Take one two times daily as needed, Disp-30 tablet, R-0, Local Print      atorvastatin (LIPITOR) 40 MG tablet Take 1 tablet (40 mg) by mouth daily, Disp-90 tablet, R-0, E-Prescribe      QUEtiapine (SEROQUEL) 200 MG tablet Take 1 tablet (200 mg) by mouth At Bedtime, Disp-90 tablet, R-1, E-Prescribe      buPROPion (WELLBUTRIN XL) 300 MG 24 hr tablet Take 1 tablet (300 mg) by mouth every morning, Disp-90 tablet, R-1, E-Prescribe      DULoxetine (CYMBALTA) 60 MG EC capsule Take 2 capsules (120 mg) by mouth daily For mood and anxiety and pain., Disp-180 capsule, R-1, E-Prescribe      diazepam (VALIUM) 10 MG tablet Take  "1 tablet (10 mg) by mouth every 12 hours as needed for anxiety, Disp-60 tablet, R-1, Local Print      ADVAIR DISKUS 250-50 MCG/DOSE diskus inhaler INHALE ONE PUFF BY MOUTH TWO TIMES A DAY, Disp-3 Inhaler, R-2, E-Prescribe      VENTOLIN  (90 BASE) MCG/ACT Inhaler SHAKE WELL AND INHALE 1 TO 2 PUFFS INTO THE LUNGS EVERY 4 HOURS AS NEEDED FOR SHORTNESS OF BREATH, DIFFICULTY BREATHING OR WHEEZING., Disp-18 g, R-5, E-Prescribe      tiotropium (SPIRIVA) 18 MCG capsule Inhale 1 capsule (18 mcg) into the lungs daily, Disp-90 capsule, R-2, E-Prescribe      nitroGLYcerin (NITROSTAT) 0.4 MG sublingual tablet Place 1 tablet (0.4 mg) under the tongue every 5 minutes as needed for chest pain prn, Disp-25 tablet, R-1, E-Prescribe      isosorbide mononitrate (IMDUR) 30 MG 24 hr tablet Take 1 tablet (30 mg) by mouth daily, Disp-90 tablet, R-3, E-Prescribe      ibuprofen (ADVIL,MOTRIN) 600 MG tablet Take 1 tablet (600 mg) by mouth every 6 hours as needed for moderate pain, Disp-20 tablet, R-1, Local Print      Cholecalciferol (VITAMIN D3) 2000 UNITS CHEW Take 2,000 mg by mouth daily , Historical      Docusate Calcium (STOOL SOFTENER PO) Take 100 mg by mouth 2 times daily , Historical      albuterol (2.5 MG/3ML) 0.083% nebulizer solution Take 1 vial (2.5 mg) by nebulization 4 times daily as needed, Disp-120 vial, R-5, E-Prescribe      ASPIRIN PO Take 81 mg by mouth daily, Historical               Allergies:         Allergies   Allergen Reactions     No Known Drug Allergies          Condition and Physical on Discharge:      Discharge condition: Stable   Vitals: Blood pressure 160/81, pulse 90, temperature 98.2  F (36.8  C), temperature source Oral, resp. rate 16, height 1.702 m (5' 7\"), weight 99.8 kg (220 lb), last menstrual period 12/01/2007, SpO2 96 %, not currently breastfeeding.  220 lbs 0 oz      GENERAL:  Comfortable.  PSYCH: pleasant, oriented, No acute distress.  HEENT:  PERRLA. Normal conjunctiva, normal hearing, nasal " mucosa and oropharynx are normal.  NECK:  Supple, no neck vein distention, adenopathy or bruits, normal thyroid.  HEART:  Normal S1, S2 with no murmur, no pericardial rub, gallops or S3 or S4.  LUNGS:  Clear to auscultation with decreased breath sounds throughout, normal respiratory effort.   ABDOMEN:  Soft, no hepatosplenomegaly, normal bowel sounds. Non-tender, non distended.   EXTREMITIES:  1+ pedal edema, +2 radial pulses bilateral and equal.  SKIN:  Dry to touch, No rash, wound or ulcerations.  NEUROLOGIC:  CN 2-12 intact, BL 5/5 symmetric upper and lower extremity strength, sensation is intact with no focal deficits.     Kelly Alvarez PA-C

## 2017-12-20 NOTE — PLAN OF CARE
Problem: Patient Care Overview  Goal: Plan of Care/Patient Progress Review  Outcome: Improving  PRIMARY DIAGNOSIS: CHEST PAIN  OUTPATIENT/OBSERVATION GOALS TO BE MET BEFORE DISCHARGE:  1. Ruled out acute coronary syndrome (negative or stable Troponin):  Yes  2. Pain Status: Pain free.  3. Appropriate provocative testing performed: Yes-pending   - Stress Test Procedure: Lexiscan  - Interpretation of cardiac rhythm per telemetry tech: SR     4. Cleared by Consultants (if applicable):N/A  5. Return to near baseline physical activity: Yes  Discharge Planner Nurse   Safe discharge environment identified: Yes  Barriers to discharge: No       Entered by: Catia Ford 12/20/2017 8:48 AM     Please review provider order for any additional goals.   Nurse to notify provider when observation goals have been met and patient is ready for discharge.     Patient is alert and oriented. Patient complains of some SOB this am but says that a certain position always helps her to catch her breath. Patient is on 3-4L of oxygen at baseline and patient says she increases it also when she is up moving around. Patient denies chest pain at this time. Patient's BNP was 476 and trops neg x3. Patient's lexiscan is pending. Will continue to monitor.

## 2017-12-20 NOTE — H&P
[facility]  Hospitalist Admission Note    Name: Karen Alex      MRN: [id number]  YOB: 1955    Age: 62 year old  Date of admission: 12/19/2017  Primary care provider: Eros Rebolledo            Assessment and Plan:   Karen Alex is a 62 year old female with a history of COPD, HTN, Hyperlipidemia chronic pain syndrome, bipolar disorder, and anxiety who presents with chest pain.    1. Chest pain.  Monitor on Telemetry.  Serial troponins.  ASA, Lipitor.  Exercise Echo stress in morning if troponins remain negative.    2.  Hyperlipidemia.  Lipitor.    3.  HTN.  Likely restart home meds after stress test.    4.  COPD.  No acute exacerbation.  Continue Advair, Spiriva.  Albuterol PRN.    5.  Anxiety.  Restart home meds once verified by Pharm D.    6.  Bipolar disorder.  Restart home meds once verified by Pharm D.    Code status: Full  Admit to Observation.  Prophylaxis: Ambulate.              Chief Complaint:   Chest pain.         History of Present Illness:   Karen Alex is a 62 year old female who presents with chest pain.  Developed chest pain in center of chest the evening of 12/19/17.  Felt short of breath while having pain.  Nitro given in ambulance helped pain.  Pain is currently almost completely resolved with minimal residual pain.  Has had similar pain in past.  This episode felt different than previous episodes.  No nausea or vomiting.  No recent cough or fever.  Has not been around anyone sick that she knows of.  Has been under a lot of stress recently as her significant other recently tried to commit suicide.  No other complaints.            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             Past Surgical History:     Past Surgical History:   Procedure Laterality Date     BACK SURGERY       CONIZATION  10/23/07    Vulvar excision for BOBBY 3, cold knife conization     EXCISE VULVA WIDE LOCAL  5/25/2012    Procedure:EXCISE VULVA WIDE LOCAL; Wide Local Excision Of Vulvar Lesion; Surgeon:RE ALDRIDGE; Location:[facility] OR     Foot surgeries       HERNIORRHAPHY INCISIONAL (LOCATION)  4/25/2012    Procedure:HERNIORRHAPHY INCISIONAL (LOCATION); Incisional Hernia Repair with mesh ; Surgeon:KIM QUICK; Location:[facility] OR     HYSTERECTOMY TOTAL ABDOMINAL, BILATERAL SALPINGO-OOPHORECTOMY, COMBINED       LAPAROSCOPIC HYSTERECTOMY TOTAL, BILATERAL SALPINGO-OOPHORECTOMY, COMBINED  3/6/2012    Procedure:COMBINED LAPAROSCOPIC HYSTERECTOMY TOTAL, BILATERAL SALPINGO-OOPHORECTOMY; Total laparoscopic Hysterectomy, Bilateral Salpingo Ooporectomy, Pubovaginal Sling, Biopsy Left Volva; Surgeon:RE ALDRIDGE; Location:[facility] OR     repair of anal fissures  2005     SLING BLADDER SUSPENSION WITH FASCIA ALESHA       SLING TRANSPUBO WITHOUT ANTERIOR COLPORRHAPHY  3/6/2012    Procedure:SLING TRANSPUBO WITHOUT ANTERIOR COLPORRHAPHY; Surgeon:JOLANTA ENRIQUEZ; Location:[facility] OR     TUBAL LIGATION               Social History:     Social History   Substance Use Topics     Smoking status: Former Smoker     Packs/day: 0.50     Years: 26.00     Types: Cigarettes     Quit date: 7/3/2015     Smokeless tobacco: Never Used      Comment: sometimes     Alcohol use No             Family History:   Mother and two sisters with CAD.         Allergies:     Allergies   Allergen Reactions     No Known Drug Allergies              Medications:     Prior to Admission medications    Medication Sig Last Dose Taking? Auth Provider   HYDROcodone-acetaminophen  (NORCO) 5-325 MG per tablet Take one two times daily as needed   Eros Rebolledo MD   amoxicillin (AMOXIL) 500 MG capsule Take 1 capsule (500 mg) by mouth 3 times daily   Eros Rebolledo MD   gabapentin (NEURONTIN) 300 MG capsule Take 1 tablet (300 mg) every night for 3 days, then 1 tablet twice daily. For anxiety, mood, and anger.   Leana Patel NP   atorvastatin (LIPITOR) 40 MG tablet Take 1 tablet (40 mg) by mouth daily   Cj Tapia MD   QUEtiapine (SEROQUEL) 200 MG tablet Take 1 tablet (200 mg) by mouth At Bedtime   Leana Patel NP   buPROPion (WELLBUTRIN XL) 300 MG 24 hr tablet Take 1 tablet (300 mg) by mouth every morning   Leana Patel NP   DULoxetine (CYMBALTA) 60 MG EC capsule Take 2 capsules (120 mg) by mouth daily For mood and anxiety and pain.   Leana Patel NP   diazepam (VALIUM) 10 MG tablet Take 1 tablet (10 mg) by mouth every 12 hours as needed for anxiety   Leana Patel NP   ADVAIR DISKUS 250-50 MCG/DOSE diskus inhaler INHALE ONE PUFF BY MOUTH TWO TIMES A DAY   Eros Rebolledo MD   VENTOLIN  (90 BASE) MCG/ACT Inhaler SHAKE WELL AND INHALE 1 TO 2 PUFFS INTO THE LUNGS EVERY 4 HOURS AS NEEDED FOR SHORTNESS OF BREATH, DIFFICULTY BREATHING OR WHEEZING.   Eros Rebolledo MD   tiotropium (SPIRIVA) 18 MCG capsule Inhale 1 capsule (18 mcg) into the lungs daily   Eros Rebolledo MD   nitroGLYcerin (NITROSTAT) 0.4 MG sublingual tablet Place 1 tablet (0.4 mg) under the tongue every 5 minutes as needed for chest pain prn   Eros Rebolledo MD   nicotine (NICODERM CQ) 7 MG/24HR 24 hr patch Place 1 patch onto the skin every 24 hours   Leana Patel NP   isosorbide mononitrate (IMDUR) 30 MG 24 hr tablet Take 1 tablet (30 mg) by mouth daily   Cj Tapia MD   ibuprofen (ADVIL,MOTRIN) 600 MG tablet Take 1 tablet (600 mg) by mouth every 6 hours as needed for moderate pain   Dalton Cardenas MD   Cholecalciferol (VITAMIN D3) 2000  "UNITS CHEW Take 2,000 mg by mouth daily    Reported, Patient   Docusate Calcium (STOOL SOFTENER PO) Take by mouth daily   Reported, Patient   albuterol (2.5 MG/3ML) 0.083% nebulizer solution Take 1 vial (2.5 mg) by nebulization 4 times daily as needed   Eros Rebolledo MD   ASPIRIN PO Take 81 mg by mouth daily   Unknown, Entered By History             Review of Systems:   A Comprehensive greater than 10 system review of systems was carried out.  Pertinent positives and negatives are noted above.  Otherwise negative for contributory information.           Physical Exam:   Blood pressure 131/57, pulse 97, temperature 98.3  F (36.8  C), temperature source Oral, resp. rate 18, height 1.702 m (5' 7\"), weight 99.8 kg (220 lb), last menstrual period 12/01/2007, SpO2 94 %, not currently breastfeeding.  Wt Readings from Last 1 Encounters:   12/20/17 99.8 kg (220 lb)     Exam:  GENERAL: No apparent distress. Awake, alert, and fully oriented.  HEENT: Normocephalic, atraumatic. Extraocular movements intact.  CARDIOVASCULAR: Regular rate and rhythm without murmurs or rubs. No S3.  PULMONARY: Clear to auscultation bilaterally.  ABDOMINAL: Soft, non-tender, non-distended. Bowel sounds normoactive.   EXTREMITIES: No cyanosis or clubbing. No appreciable edema.  NEUROLOGICAL: CN 2-12 grossly intact, no focal neurological deficits.  DERMATOLOGICAL: No rash, ulcer, bruising, nor jaundice.          Data:   EKG:  Personally reviewed. No obvious acute ischemic changes noted.    Laboratory:    Recent Labs  Lab 12/19/17  2200   WBC 6.4   HGB 11.3*   HCT 37.4   MCV 98          Recent Labs  Lab 12/19/17  2200      POTASSIUM 4.4   CHLORIDE 99   CO2 39*   ANIONGAP 2*   GLC 95   BUN 8   CR 0.60   GFRESTIMATED >90   GFRESTBLACK >90   MAHOGANY 8.1*     No results for input(s): CULT in the last 168 hours.    Imaging:  Recent Results (from the past 24 hour(s))   Chest  XR, 1 view PORTABLE    Narrative    CHEST SINGLE VIEW PORTABLE  " 12/19/2017 11:02 PM     HISTORY: Chest pain.    COMPARISON: 10/22/2014.    FINDINGS: Hyperinflated lungs. Prominent lucency within the upper  lungs bilaterally, left side more prominent than right, similar in  appearance to 10/22/2014. This appearance is consistent with  emphysematous changes. A band-like opacity at the left lung base  likely represents scarring or atelectasis. The lungs are otherwise  clear. Normal-sized cardiac silhouette. No visualized pneumothorax.      Impression    IMPRESSION:  1. No convincing evidence of active cardiopulmonary disease.  2. Emphysematous changes again noted within the lungs.    SANTIAGO TRAYLOR MD

## 2017-12-20 NOTE — ED NOTES
[facility]  ED Nurse Handoff Report    Karen Alex is a 62 year old female   ED Chief complaint: Chest Pain  . ED Diagnosis:   Final diagnoses:   Chest pain     Allergies:   Allergies   Allergen Reactions     No Known Drug Allergies        Code Status: Full Code  Activity level - Baseline/Home:  Stand with Assist. Activity Level - Current:   Stand with Assist. Lift room needed: No. Bariatric: Yes   Needed: No   Isolation: No. Infection: Not Applicable.     Vital Signs:   Vitals:    12/19/17 2215 12/19/17 2230 12/19/17 2346 12/19/17 2348   BP: 133/85 (!) 140/97     Pulse:       Resp: 26 15     SpO2: 100% 98% 99% 95%   Weight:           Cardiac Rhythm:  ,   Cardiac  Cardiac Rhythm: Normal sinus rhythm  Pain level: 0-10 Pain Scale: 2  Patient confused: No. Patient Falls Risk: Yes.   Elimination Status: Has voided   Patient Report - Initial Complaint: SOB. Focused Assessment:    23:04 Cardiac Cardiac - Cardiac WDL:  WDL except Cardiac Comment: Pt presents with 2/10 chest pain 4 nitro and ASA pre hospital.   Review of Systems (Cardiac) - Cardiac Signs/Symptoms: denies Cardiac Conditions: hypertension  Chest Pain Assessment - Rating (0-10): 2 Precipitating Factors: at rest; activity Chest Pain Reproducible?: No  Cardiac Monitoring - EKG Monitoring: Yes Cardiac Regularity: Regular Cardiac Rhythm: NSR  Chest Pain Assessment - Chest Pain Location: substernal RN    23:03 Imaging Exam Ended Chest XR, 1 view PORTABLE AS    23:03 Respiratory Respiratory - Respiratory WDL:  WDL except Lung Fields: All Fields Throughout All Lung Fields: scratchy; coarse         Tests Performed:   Chest  XR, 1 view PORTABLE   Final Result   IMPRESSION:   1. No convincing evidence of active cardiopulmonary disease.   2. Emphysematous changes again noted within the lungs.      SANTIAGO TRAYLOR MD        Medications   ipratropium - albuterol 0.5 mg/2.5 mg/3 mL (DUONEB) neb solution 3 mL (3 mLs Nebulization Given 12/19/17  2346)     Labs Ordered and Resulted from Time of ED Arrival Up to the Time of Departure from the ED   CBC WITH PLATELETS DIFFERENTIAL - Abnormal; Notable for the following:        Result Value    Hemoglobin 11.3 (*)     MCHC 30.2 (*)     All other components within normal limits   COMPREHENSIVE METABOLIC PANEL - Abnormal; Notable for the following:     Carbon Dioxide 39 (*)     Anion Gap 2 (*)     Calcium 8.1 (*)     Albumin 3.1 (*)     Protein Total 6.7 (*)     All other components within normal limits   LIPASE - Abnormal; Notable for the following:     Lipase 71 (*)     All other components within normal limits   D DIMER QUANTITATIVE   NT PROBNP INPATIENT   TROPONIN I   PULSE OXIMETRY NURSING   CARDIAC CONTINUOUS MONITORING   PERIPHERAL IV CATHETER   VITAL SIGNS       Treatments provided: duo neb  Family Comments: Son at bedside.   OBS brochure/video discussed/provided to patient:  Yes  ED Medications:   Medications   ipratropium - albuterol 0.5 mg/2.5 mg/3 mL (DUONEB) neb solution 3 mL (3 mLs Nebulization Given 12/19/17 8600)     Drips infusing:  No  For the majority of the shift, the patient's behavior Green. Interventions performed were monitor .     Severe Sepsis OR Septic Shock Diagnosis Present: No      ED Nurse Name/Phone Number: Dilan Burdickmonet,   12:25 AM  RECEIVING UNIT ED HANDOFF REVIEW    Above ED Nurse Handoff Report was reviewed: Yes  Reviewed by: Erika Rand on December 20, 2017 at 1:24 AM

## 2017-12-20 NOTE — PLAN OF CARE
Problem: Patient Care Overview  Goal: Discharge Needs Assessment  Outcome: Improving  PRIMARY DIAGNOSIS: CHEST PAIN  OUTPATIENT/OBSERVATION GOALS TO BE MET BEFORE DISCHARGE:  1. Ruled out acute coronary syndrome (negative or stable Troponin):  No  2. Pain Status: Pain minimal at 2/10.  States is much improved  3. Appropriate provocative testing performed: No  - Stress Test Procedure: stress echo in am  - Interpretation of cardiac rhythm per telemetry tech: sr 70-80    4. Cleared by Consultants (if applicable):No  5. Return to near baseline physical activity: Yes  Discharge Planner Nurse   Safe discharge environment identified: Yes  Barriers to discharge: No       Entered by: Erika Rand 12/20/2017 3:47 AM     Please review provider order for any additional goals.   Nurse to notify provider when observation goals have been met and patient is ready for discharge.    At this time patient's chest pain is less than a 2/10.  Vss.  Denies shortness of breath.  States the last time she had chest pain was about 1 year ago.  Telemetry is on and shows sr.  Plan is to continue to monitor trop levels, have stress echo in am.  Will continue to monitor and assess.

## 2017-12-20 NOTE — ED NOTES
Pt presents to the ED with having chest pain with COPD exacerbation and chest pain has taken 1 nitro at home and also 3 nitro and ASA en route pain down to a 2/10 right now. ABC's intact A&Ox.4

## 2017-12-20 NOTE — PLAN OF CARE
Problem: Patient Care Overview  Goal: Plan of Care/Patient Progress Review  Outcome: Improving  PRIMARY DIAGNOSIS: CHEST PAIN  OUTPATIENT/OBSERVATION GOALS TO BE MET BEFORE DISCHARGE:  1. Ruled out acute coronary syndrome (negative or stable Troponin):  Yes  2. Pain Status: Pain free.  3. Appropriate provocative testing performed: No-will do erika this am   - Stress Test Procedure: Lexiscan  - Interpretation of cardiac rhythm per telemetry tech: SR    4. Cleared by Consultants (if applicable):N/A  5. Return to near baseline physical activity: Yes  Discharge Planner Nurse   Safe discharge environment identified: Yes  Barriers to discharge: No       Entered by: Catia Ford 12/20/2017 8:48 AM     Please review provider order for any additional goals.   Nurse to notify provider when observation goals have been met and patient is ready for discharge.    Patient is alert and oriented. Patient complains of some SOB this am but says that a certain position always helps her to catch her breath. Patient is on 3-4L of oxygen at baseline and patient says she increases it also when she is up moving around. Patient denies chest pain at this time. Patient's BNP was 476 and trops neg x3. Plan is for lexiscan this am. Will continue to monitor.

## 2017-12-20 NOTE — ED PROVIDER NOTES
History     Chief Complaint:  Chest Pain    HPI   Karen Alex is a 62 year old female, with a history of COPD, asthma, left sided pneumothorax, hypertension, hyperlipidemia, cervical cancer, oxygen dependent, former smoker, and family history of heart disease, who presents with her son to the ED for evaluation of chest pain. The patient reports her sharp shooting chest pain began at 8:50PM today after she finished watching a movie. The patient notes her chest pain improved with the treatment provided by EMS which inclued aspririn and nitro en route. The patient rates the pain as a 2/10 currently. The patient reports she has bilateral leg swelling and tenderness. The patient notes she has been having intermittent lightheadedness with movement for the past few days. The patient reports she is normally on 3 liters supplemental oxygen which increases to 5 liters with exertion. The patient's son reports the patient has been having increased bowel movements as well. The patient notes she took herself off of Prednisone a month ago because she was gaining weight. The patient denies any fevers, cough, palpitations, nausea, vomiting, numbness, or tingling.     CARDIAC RISK FACTORS:  Sex:    Female  Tobacco:   Former smoker, Quit date 7/3/2015  Hypertension:   Yes  Hyperlipidemia:  Yes  Diabetes:   No  Family History:  Yes    PE/DVT RISK FACTORS:  Sex:    Female  Hormones:   No  Tobacco:   Former smoker, Quit date 7/3/2015  Cancer:   Yes  Travel:   No  Surgery:   No  Other immobilization: No  Personal history:  No  Family history:  No    Allergies:  No known drug allergies    Medications:    Norco  Amoxicillin  Neurontin   Lipitor  Seroquel  Wellbutrin  Cymbalta  Valium  Advair inhaler  Ventolin inhaler  Spiriva  Nitrostat  Nicoderm  Imdur  Vitamin D3  Docusate calcium  Albuterol nebulizer  Aspirin    Past Medical History:    Chronic respiratory failure  Anxiety  Depression  PTSD  Chronic pain   COPD    HLD  Hypercholesterolemia   Arthritis  Left sided pneumothorax  Asthma  Bipolar 2 disorder  Varicose veins  Cervical dysplasia  Lumbar/lumbosacral intervertebral disc degeneration   Vulvar intraepithelial neoplasia III  Oxygen dependent   Cervical cancer    Past Surgical History:    Back surgery  Vulvar excision, cold knife conization  Vulva wide local excision    Herniorrhaphy  NEENA-BSO  Anal fissures repair  Bladder sling suspension w/ fascia lázaro  Transpubic sling  Tubal ligation    Family History:    Asbestos lung disease  HTN  Asthma  Heart disease     Social History:  Smoking status: Former smoker, Quit date 7/3/2015  Alcohol use: No  Presents to ED with son   Marital Status:   [4]     Review of Systems   Constitutional: Negative for chills and fever.   Cardiovascular: Positive for chest pain and leg swelling. Negative for palpitations.   Gastrointestinal: Negative for nausea and vomiting.   Musculoskeletal: Positive for myalgias.   Neurological: Positive for light-headedness. Negative for numbness.   All other systems reviewed and are negative.    Physical Exam     Patient Vitals for the past 24 hrs:   BP Pulse Heart Rate Resp SpO2 Weight   12/19/17 2348 - - - - 95 % -   12/19/17 2346 - - - - 99 % -   12/19/17 2230 (!) 140/97 - 72 15 98 % -   12/19/17 2215 133/85 - 74 26 100 % -   12/19/17 2145 135/81 77 77 20 98 % 97.5 kg (215 lb)     Physical Exam  General: Well-nourished  Eyes: PERRL, conjunctivae pink no scleral icterus or conjunctival injection  ENT:  Moist mucus membranes, posterior oropharynx clear without erythema or exudates  Respiratory:  Lungs very diminished throughout.  Sat'ing well on supplemental O2.  No retractions or respiratory distress  CV: Normal rate and rhythm, no murmurs/rubs/gallops  GI:  Abdomen soft and non-distended.  Normoactive BS.  No tenderness, guarding or rebound  Skin: Warm, dry.  No rashes or petechiae  Musculoskeletal: 1+ symmetric peripheral edema no calf  tenderness  Neuro: Alert and oriented to person/place/time  Psychiatric: Normal affect      Emergency Department Course     ECG (21:45:42):  Rate 78 bpm. MS interval 154. QRS duration 80. QT/QTc 394/449. P-R-T axes 69 57 75. Normal sinus rhythm. Low voltage QRS. Nonspecific T wave abnormality. Abnormal ECG. Interpreted at 2158 by Modesta Soto MD.    Imaging:  Radiographic findings were communicated with the patient and family who voiced understanding of the findings.    Chest XR, 1 View Portable  IMPRESSION:  1. No convincing evidence of active cardiopulmonary disease.  2. Emphysematous changes again noted within the lungs.  As read by Radiology.    Laboratory:  D dimer: 0.5  Lipase: 71(L)  Nt Pro BNP: 476  Troponin I (2200): <0.015  CBC: HGB 11.3(L) o/w WNL (WBC 6.4, )   CMP: Carbon dioxide 39(H), Anion gap 2(L), Calcium 8.1(L), Albumin 3.1(L), Protein total 6.7(L), o/w WNL (Creatinine 0.60)     Interventions:  2346: Duoneb 3mLs Nebulization     Emergency Department Course:  Patient arrived by EMS.     Past medical records, nursing notes, and vitals reviewed.  2215: I performed an exam of the patient and obtained history, as documented above.  IV inserted and blood drawn.    The patient had a portable chest -xray while in the emergency department, findings above.    0014: I spoke to Dr. Campos of the hospitalist service who accepts the patient for admission.     0016: I rechecked the patient. Explained findings to patient and son.    Findings and plan explained to the Patient and son who consents to admission. Discussed the patient with Dr. Campos, who will admit the patient to an obs bed for further monitoring, evaluation, and treatment.     Impression & Plan      Medical Decision Making:    HEART Score  Criteria   0-2 points for each of 5 items (maximum of 10 points):  Score 0- History slightly suspicious for coronary syndrome  Score 1- EKG with Non-specific repolarization disturbance  Score 1- Age 45  to 65 years old  Score 2- Three or more risk factors for or history of atherosclerotic disease  Score 0- Within normal limits for troponin levels  Interpretation  Risk of adverse outcome  Heart Score: 4  Total Score 4-6- Adverse Outcome Risk 20.3% - Supports admission with standard rule-out management -serial troponins and stress testing    Karen Alex is a 62 year old female who presents with chest pain.  Their history and risk factor analysis are significant for hypertension, smoking and dyslipidemia and her HEART score is 4.  The workup in the Emergency Department (see above for cardiac enzymes and EKG)  is  negative.  My clinical suspicion of acute coronary syndrome is high enough to warrant further therapy and investigation.  I will admit the patient  to medicine service for further workup.  The patient is chest pain free after nitroglycerin and aspirin. There is no clinical, laboratory, or radiographic evidence of pulmonary embolism, AAA, aortic dissection, pneumonia or pneumothorax.   She was treated with her scheduled duoneb and supplemental oxygen.  She declined steroids.  She does not appear to be in an acute exacerbation at this moment but this could be contributing to her pain. The patient agrees to be admitted and all questions were answered.      Diagnosis:    ICD-10-CM   1. Chest pain R07.9     Disposition: Patient admitted to an obs bed by Dr. Andres Russ  12/19/2017   [facility] EMERGENCY DEPARTMENT    Ashley CARTER, am serving as a scribe at 10:15 PM on 12/19/2017 to document services personally performed by Modesta Soto MD based on my observations and the provider's statements to me.        Modesta Soto MD  12/20/17 0103

## 2017-12-20 NOTE — PLAN OF CARE
Problem: Patient Care Overview  Goal: Plan of Care/Patient Progress Review  Outcome: Improving  PRIMARY DIAGNOSIS: CHEST PAIN  OUTPATIENT/OBSERVATION GOALS TO BE MET BEFORE DISCHARGE:  1. Ruled out acute coronary syndrome (negative or stable Troponin):  No  2. Pain Status: Pain minimal at 2/10.  States is much improved  3. Appropriate provocative testing performed: No  - Stress Test Procedure: erika scan ordered due to patient being O2 dependent  - Interpretation of cardiac rhythm per telemetry tech: sr 70-80     4. Cleared by Consultants (if applicable):No  5. Return to near baseline physical activity: Yes  Discharge Planner Nurse   Safe discharge environment identified: Yes  Barriers to discharge: No       Entered by: Erika Rand 12/20/2017 3:47 AM     Please review provider order for any additional goals.   Nurse to notify provider when observation goals have been met and patient is ready for discharge.    At this time patient's chest pain is less than a 2/10.  Vss.  Denies shortness of breath.  States the last time she had chest pain was about 1 year ago.  Telemetry is on and shows sr.  Plan is to continue to monitor trop levels, have erika scan this am.  Will continue to monitor and assess.

## 2017-12-20 NOTE — PHARMACY-ADMISSION MEDICATION HISTORY
Admission medication history interview status for this patient is complete. See [facility] admission navigator for allergy information, prior to admission medications and immunization status.     Medication history interview source(s):Patient  Medication history resources (including written lists, pill bottles, clinic record):None  Primary pharmacy:[facility]    Changes made to [facility] [place] medication list:  Added: none  Deleted: amoxicillin, nicotine patch  Changed: docusate, gabapentin    Actions taken by pharmacist (provider contacted, etc):None     Additional medication history information:None    Medication reconciliation/reorder completed by provider prior to medication history? No    Do you take OTC medications (eg tylenol, ibuprofen, fish oil, eye/ear drops, etc)? Y(Y/N)    For patients on insulin therapy: N (Y/N)  Lantus/levemir/NPH/Mix 70/30 dose:   (Y/N) (see Med list for doses)   Sliding scale Novolog Y/N  If Yes, do you have a baseline novolog pre-meal dose:  units with meals  Patients eat three meals a day:   Y/N    How many episodes of hypoglycemia do you have per week: _______  How many missed doses do you have per week: ______  How many times do you check your blood glucose per day: _______   Any Barriers to therapy - Be specific :  cost of medications, comfortable with giving injections (if applicable), comfortable and confident with current diabetes regimen: Y/N ______________      Prior to Admission medications    Medication Sig Last Dose Taking? Auth Provider   GABAPENTIN PO Take 300 mg by mouth At Bedtime 12/18/2017 at Unknown time Yes Unknown, Entered By History   HYDROcodone-acetaminophen (NORCO) 5-325 MG per tablet Take one two times daily as needed  Yes Eros Rebolledo MD   atorvastatin (LIPITOR) 40 MG tablet Take 1 tablet (40 mg) by mouth daily 12/19/2017 at Unknown time Yes Cj Tapia MD   QUEtiapine (SEROQUEL) 200 MG tablet Take 1 tablet (200 mg) by mouth At Bedtime 12/18/2017 at  Unknown time Yes Leana Patel NP   buPROPion (WELLBUTRIN XL) 300 MG 24 hr tablet Take 1 tablet (300 mg) by mouth every morning 12/19/2017 at Unknown time Yes Leana Patel NP   DULoxetine (CYMBALTA) 60 MG EC capsule Take 2 capsules (120 mg) by mouth daily For mood and anxiety and pain. 12/19/2017 at Unknown time Yes Leana Patel NP   diazepam (VALIUM) 10 MG tablet Take 1 tablet (10 mg) by mouth every 12 hours as needed for anxiety  Yes Leana Patel NP   ADVAIR DISKUS 250-50 MCG/DOSE diskus inhaler INHALE ONE PUFF BY MOUTH TWO TIMES A DAY 12/19/2017 at Unknown time Yes Eros Rebolledo MD   VENTOLIN  (90 BASE) MCG/ACT Inhaler SHAKE WELL AND INHALE 1 TO 2 PUFFS INTO THE LUNGS EVERY 4 HOURS AS NEEDED FOR SHORTNESS OF BREATH, DIFFICULTY BREATHING OR WHEEZING.  Yes Eros Rebolledo MD   tiotropium (SPIRIVA) 18 MCG capsule Inhale 1 capsule (18 mcg) into the lungs daily 12/19/2017 at Unknown time Yes Eros Rebolledo MD   nitroGLYcerin (NITROSTAT) 0.4 MG sublingual tablet Place 1 tablet (0.4 mg) under the tongue every 5 minutes as needed for chest pain prn  Yes Eros Rebolledo MD   isosorbide mononitrate (IMDUR) 30 MG 24 hr tablet Take 1 tablet (30 mg) by mouth daily 12/19/2017 at Unknown time Yes Cj Tapia MD   ibuprofen (ADVIL,MOTRIN) 600 MG tablet Take 1 tablet (600 mg) by mouth every 6 hours as needed for moderate pain  Yes Dalton Cardenas MD   Cholecalciferol (VITAMIN D3) 2000 UNITS CHEW Take 2,000 mg by mouth daily  12/19/2017 at Unknown time Yes Reported, Patient   Docusate Calcium (STOOL SOFTENER PO) Take 100 mg by mouth 2 times daily  12/19/2017 at Unknown time Yes Reported, Patient   albuterol (2.5 MG/3ML) 0.083% nebulizer solution Take 1 vial (2.5 mg) by nebulization 4 times daily as needed  Yes Eros Rebolledo MD   ASPIRIN PO Take 81 mg by mouth daily 12/19/2017 at Unknown time Yes Unknown, Entered By History

## 2017-12-20 NOTE — PLAN OF CARE
Problem: Patient Care Overview  Goal: Discharge Needs Assessment  Outcome: Improving  ROOM # 206-2    Living Situation (if not independent, order SW consult):  Lives in a house with son  Facility name:  : son    Activity level at baseline: ind  Activity level on admit: ind      Patient registered to observation; given Patient Bill of Rights; given the opportunity to ask questions about observation status and their plan of care.  Patient has been oriented to the observation room, bathroom and call light is in place.    Discussed discharge goals and expectations with patient/family.

## 2017-12-20 NOTE — PROGRESS NOTES
Pre-procedure:    Initial vital signs: /90, HR 83, RR 19  Allergies reviewed: yes   Rhythm: Sinus  Medications taken within 48 hours of procedure: see epic   Last Caffeine: none today  Lung sounds: CTA, no wheezing, crackles or rtx  expiratory wheezes bilaterally, A Neb was given pre procedure  Health History (COPD, Asthma, etc): see epic, COPD    Procedure: Lexiscan  Reaction/symptoms after receiving Marya injection: Shortness of breath, dizziness  Intensity of Pain: none  1. Vital Signs:/90, HR 90, RR 18  2. Vital Signs:/86, HR 88, RR 18    Reversal agent: N/A    Post:   Resolution of symptoms?: YES  Vital signs: /83, HR 94, RR 18  Walk: NO  Comment: Patient denies any complaints , remains on O2 with sats in the upper 90s  Return to Radiology

## 2017-12-21 NOTE — TELEPHONE ENCOUNTER
Chief Complaint   Patient presents with     Hospital F/U     Inpatient discharge from [facility] on 12/21/2017 Chest Pain       Modesta Davies/

## 2017-12-21 NOTE — TELEPHONE ENCOUNTER
ED / Discharge Outreach Protocol    Patient Contact    Attempt # 1    Was call answered?  No.  Left message on voicemail with information to call me back if questions or concerns, pt has f/u appointment tomorrow  Estelle Haynes RN, BSN  Message handled by Nurse Triage.

## 2017-12-22 NOTE — MR AVS SNAPSHOT
After Visit Summary   12/22/2017    Karen Alex    MRN: [id number]           Patient Information     Date Of Birth          1955        Visit Information        Provider Department      12/22/2017 1:00 PM Eros Rebolledo MD [facility]        Today's Diagnoses     Personal history of tobacco use, presenting hazards to health    -  1    Centrilobular emphysema (H)        Generalized anxiety disorder        HTN, goal below 140/90        Other chronic pain        Severe episode of recurrent major depressive disorder, with psychotic features (H)           Follow-ups after your visit        Your next 10 appointments already scheduled     Dec 29, 2017  2:15 PM CST   New Visit with Josefina Andrade DO   [facility] ([facility])    [address]  [place] [address] [phone]            Jan 08, 2018  2:30 PM CST   Return Visit with Lupis Mcgee [facility] ([facility])    [address] [address] [phone]              Who to contact     If you have questions or need follow up information about today's clinic visit or your schedule please contact [facility] directly at [phone].  Normal or non-critical lab and imaging results will be communicated to you by MyChart, letter or phone within 4 business days after the clinic has received the results. If you do not hear from us within 7 days, please contact the clinic through MyChart or phone. If you have a critical or abnormal lab result, we will notify you by phone as soon as possible.  Submit refill requests through [employer] or call your pharmacy and they will forward the refill request to us. Please allow 3 business days for your refill to be completed.          Additional Information About Your Visit        [facility]  "Information     Eagle gives you secure access to your electronic health record. If you see a primary care provider, you can also send messages to your care team and make appointments. If you have questions, please call your primary care clinic.  If you do not have a primary care provider, please call [phone] and they will assist you.        Care EveryWhere ID     This is your Care EveryWhere ID. This could be used by other organizations to access your [place] medical records  [id number]        Your Vitals Were     Pulse Temperature Respirations Height Last Period Pulse Oximetry    99 98.2  F (36.8  C) (Oral) 14 5' 7\" (1.702 m) 12/01/2007 93%    BMI (Body Mass Index)                   33.67 kg/m2            Blood Pressure from Last 3 Encounters:   12/22/17 118/82   12/20/17 160/81   12/12/17 116/83    Weight from Last 3 Encounters:   12/22/17 215 lb (97.5 kg)   12/20/17 220 lb (99.8 kg)   12/12/17 220 lb (99.8 kg)              Today, you had the following     No orders found for display       Primary Care Provider Office Phone # Fax #    Eros Bayron Rebolledo [phone] [address] [facility] [address]        Equal Access to Services     BRANDYN TURNER AH: Hadii aad ku hadasho Soomaali, waaxda luqadaha, qaybta kaalmada adeegyada, waxay idiin hayaan leanna zavala lapoonam . So [facility] [place] [phone].    ATENCIÓN: Si habla español, tiene a zaragoza disposición servicios gratuitos de asistencia lingüística. Llame al [phone].    We comply with applicable federal civil rights laws and [place] laws. We do not discriminate on the basis of race, color, national origin, age, disability, sex, sexual orientation, or gender identity.            Thank you!     Thank you for choosing [facility]  for your care. Our goal is always to provide you with excellent care. Hearing back from our patients is one way we can continue to improve our services. Please take a few minutes to " complete the written survey that you may receive in the mail after your visit with us. Thank you!             Your Updated Medication List - Protect others around you: Learn how to safely use, store and throw away your medicines at www.disposemymeds.org.          This list is accurate as of: 12/22/17 11:59 PM.  Always use your most recent med list.                   Brand Name Dispense Instructions for use Diagnosis    ADVAIR DISKUS 250-50 MCG/DOSE diskus inhaler   Generic drug:  fluticasone-salmeterol     3 Inhaler    INHALE ONE PUFF BY MOUTH TWO TIMES A DAY    Chronic obstructive pulmonary disease, unspecified COPD type (H)       * albuterol (2.5 MG/3ML) 0.083% neb solution     120 vial    Take 1 vial (2.5 mg) by nebulization 4 times daily as needed    COPD (chronic obstructive pulmonary disease) (H)       * VENTOLIN  (90 BASE) MCG/ACT Inhaler   Generic drug:  albuterol     18 g    SHAKE WELL AND INHALE 1 TO 2 PUFFS INTO THE LUNGS EVERY 4 HOURS AS NEEDED FOR SHORTNESS OF BREATH, DIFFICULTY BREATHING OR WHEEZING.    Panlobular emphysema (H)       ASPIRIN PO      Take 81 mg by mouth daily        atorvastatin 40 MG tablet    LIPITOR    90 tablet    Take 1 tablet (40 mg) by mouth daily    CAD (coronary artery disease)       buPROPion 300 MG 24 hr tablet    WELLBUTRIN XL    90 tablet    Take 1 tablet (300 mg) by mouth every morning    Major depressive disorder, recurrent episode, in partial remission (H)       diazepam 10 MG tablet    VALIUM    60 tablet    Take 1 tablet (10 mg) by mouth every 12 hours as needed for anxiety    HUNG (generalized anxiety disorder)       DULoxetine 60 MG EC capsule    CYMBALTA    180 capsule    Take 2 capsules (120 mg) by mouth daily For mood and anxiety and pain.    HUNG (generalized anxiety disorder), Major depressive disorder, recurrent episode, in partial remission (H)       GABAPENTIN PO      Take 300 mg by mouth At Bedtime        HYDROcodone-acetaminophen 5-325 MG per tablet     NORCO    30 tablet    Take one two times daily as needed    Mechanical low back pain       ibuprofen 600 MG tablet    ADVIL/MOTRIN    20 tablet    Take 1 tablet (600 mg) by mouth every 6 hours as needed for moderate pain        isosorbide mononitrate 30 MG 24 hr tablet    IMDUR    90 tablet    Take 1 tablet (30 mg) by mouth daily    Chest pain, CAD (coronary artery disease)       nitroGLYcerin 0.4 MG sublingual tablet    NITROSTAT    25 tablet    Place 1 tablet (0.4 mg) under the tongue every 5 minutes as needed for chest pain prn    Chest pain       QUEtiapine 200 MG tablet    SEROquel    90 tablet    Take 1 tablet (200 mg) by mouth At Bedtime    HUNG (generalized anxiety disorder)       STOOL SOFTENER PO      Take 100 mg by mouth 2 times daily        tiotropium 18 MCG capsule    SPIRIVA    90 capsule    Inhale 1 capsule (18 mcg) into the lungs daily    Generalized anxiety disorder, Chronic obstructive pulmonary disease, unspecified COPD type (H)       Vitamin D3 2000 UNITS Chew      Take 2,000 mg by mouth daily        * Notice:  This list has 2 medication(s) that are the same as other medications prescribed for you. Read the directions carefully, and ask your doctor or other care provider to review them with you.

## 2017-12-22 NOTE — TELEPHONE ENCOUNTER
Controlled Substance Refill Request for norco  Problem List Complete:  Yes  Patient is followed by OKSANA ANTONIO for ongoing prescription of pain medication.  All refills should be approved by this provider, or covering partner.    Medication(s): Norco.   Maximum quantity per month: 40  Clinic visit frequency required: Q 3 months     Controlled substance agreement on file: Yes       Date(s): 7/16/15    Pain Clinic evaluation in the past: No       Date(s):  n/a       Location(s):  n/a    DIRE Total Score(s):  No flowsheet data found.    Last [facility] website verification: 8/1/17   [email or web address]   checked in past 6 months?  Yes 8/1/*17     Please walk signed prescirption to pharmcy.  Thanks.  Modesta Lizama, Jaswant  [facility]  [phone]

## 2017-12-22 NOTE — PROGRESS NOTES
SUBJECTIVE:   Karen Alex is a 62 year old female who presents to clinic today for the following health issues:          Hospital Follow-up Visit:    Hospital/Nursing Home/IP Rehab Facility: [facility]  Date of Admission: 12/19/17  Date of Discharge: 12/20/17  Reason(s) for Admission: chest pain            Problems taking medications regularly:  None       Medication changes since discharge: None       Problems adhering to non-medication therapy:  None    Summary of hospitalization:    Diagnostic Tests/Treatments reviewed.  Follow up needed:   Other Healthcare Providers Involved in Patient s Care:         Homecare and Care Coordination  Update since discharge: improved. Follow up Psychiatray and Pulmonary     Post Discharge Medication Reconciliation: discharge medications reconciled, continue medications without change.  Plan of care communicated with patient     Coding guidelines for this visit:  Type of Medical   Decision Making Face-to-Face Visit       within 7 Days of discharge Face-to-Face Visit        within 14 days of discharge   Moderate Complexity 95125 22946   High Complexity 54233 03188              Past Medical History:   Diagnosis Date     Anxiety      Arthritis     generalized     Asthma      Bipolar 2 disorder (H)      Cervical dysplasia      Chronic back pain     low back     COPD (chronic obstructive pulmonary disease) (H)     O2 at night     HTN, goal below 140/90 1/29/2015     Hypercholesterolemia      Hyperlipidaemia      Other chronic pain      Pneumothorax 8/2012    left sided      Varicose veins      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       Past Surgical History:   Procedure Laterality Date     BACK SURGERY       CONIZATION  10/23/07    Vulvar excision for BOBBY 3, cold knife conization     EXCISE VULVA WIDE LOCAL  5/25/2012    Procedure:EXCISE VULVA WIDE LOCAL; Wide Local Excision Of Vulvar Lesion; Surgeon:RE ALDRIDGE; Location:[facility] OR     Foot  surgeries       HERNIORRHAPHY INCISIONAL (LOCATION)  2012    Procedure:HERNIORRHAPHY INCISIONAL (LOCATION); Incisional Hernia Repair with mesh ; Surgeon:KIM QUICK; Location:[facility] OR     HYSTERECTOMY TOTAL ABDOMINAL, BILATERAL SALPINGO-OOPHORECTOMY, COMBINED       LAPAROSCOPIC HYSTERECTOMY TOTAL, BILATERAL SALPINGO-OOPHORECTOMY, COMBINED  3/6/2012    Procedure:COMBINED LAPAROSCOPIC HYSTERECTOMY TOTAL, BILATERAL SALPINGO-OOPHORECTOMY; Total laparoscopic Hysterectomy, Bilateral Salpingo Ooporectomy, Pubovaginal Sling, Biopsy Left Volva; Surgeon:RE ALDRIDGE; Location:[facility] OR     repair of anal fissures       SLING BLADDER SUSPENSION WITH FASCIA ALESHA       SLING TRANSPUBO WITHOUT ANTERIOR COLPORRHAPHY  3/6/2012    Procedure:SLING TRANSPUBO WITHOUT ANTERIOR COLPORRHAPHY; Surgeon:JOLANTA ENRIQUEZ; Location:[facility] OR     TUBAL LIGATION         Family History   Problem Relation Age of Onset     CANCER Father      asbestos lung disease     Hypertension Mother      DIABETES Paternal Aunt      HEART DISEASE Maternal Grandfather      CANCER Maternal Grandmother      unsure of what kind,  at the age of 86     Circulatory Paternal Grandmother       of brain aneurysm     Asthma Son      HEART DISEASE Sister        Social History   Substance Use Topics     Smoking status: Former Smoker     Packs/day: 0.50     Years: 26.00     Types: Cigarettes     Quit date: 7/3/2015     Smokeless tobacco: Never Used      Comment: sometimes     Alcohol use No      REVIEW OF SYSTEMS    Generally has been sliding into not  feeling well before  this episode. No problems with vision, hearing, dental or neck pain.Has copd , airborne or ingestion allergy  No chest pain, palpitations, dyspnea, change in bowel habits, blood  in stool or dyspepsia.  No rashes, changing moles, weakness, lassitude or back problems.  No chronic issues . No dysuria  Patient no longer  a smoker. No problems with significant headaches.  On exam the vital  signs are stable  Weight is .mbi   Eyes show edith  No neck masses or thyromegaly.Ear nose and throat shows normal   No bruits, murmers, rubs or extrasounds. No cardiomegaly or chest wall tenderness. Lungs clear, no abdominal masses or organomegaly. No CVA tenderness.  Skin eval no rash   Neck is supple.  Back exam shows dorsal kyphosis and hyperinflation    (Z87.891) Personal history of tobacco use, presenting hazards to health  (primary encounter diagnosis)  Comment:   Plan:     (J43.2) Centrilobular emphysema (H)  Comment: with tobacco  Plan:     (F41.1) Generalized anxiety disorder  Comment:   Plan: meds and counselling    (I10) HTN, goal below 140/90  Comment:   Plan: at goal    (G89.29) Other chronic pain  Comment:   Plan: back and neck     (F33.3) Severe episode of recurrent major depressive disorder, with psychotic features (H)  Comment:   Plan: improved with years of therapy, is on home oxygen for her lungs

## 2018-01-01 ENCOUNTER — OFFICE VISIT (OUTPATIENT)
Dept: ORTHOPEDICS | Facility: CLINIC | Age: 63
End: 2018-01-01
Payer: MEDICARE

## 2018-01-01 ENCOUNTER — TELEPHONE (OUTPATIENT)
Dept: FAMILY MEDICINE | Facility: CLINIC | Age: 63
End: 2018-01-01

## 2018-01-01 ENCOUNTER — HOSPITAL ENCOUNTER (INPATIENT)
Facility: CLINIC | Age: 63
LOS: 2 days | Discharge: HOME HEALTH | DRG: 189 | End: 2018-03-28
Attending: INTERNAL MEDICINE | Admitting: INTERNAL MEDICINE
Payer: MEDICARE

## 2018-01-01 ENCOUNTER — PATIENT OUTREACH (OUTPATIENT)
Dept: CARE COORDINATION | Facility: CLINIC | Age: 63
End: 2018-01-01

## 2018-01-01 ENCOUNTER — OFFICE VISIT (OUTPATIENT)
Dept: FAMILY MEDICINE | Facility: CLINIC | Age: 63
End: 2018-01-01
Payer: MEDICARE

## 2018-01-01 ENCOUNTER — APPOINTMENT (OUTPATIENT)
Dept: OCCUPATIONAL THERAPY | Facility: CLINIC | Age: 63
DRG: 189 | End: 2018-01-01
Attending: INTERNAL MEDICINE
Payer: MEDICARE

## 2018-01-01 ENCOUNTER — TELEPHONE (OUTPATIENT)
Dept: INTERNAL MEDICINE | Facility: CLINIC | Age: 63
End: 2018-01-01

## 2018-01-01 ENCOUNTER — CARE COORDINATION (OUTPATIENT)
Dept: CARE COORDINATION | Facility: CLINIC | Age: 63
End: 2018-01-01

## 2018-01-01 ENCOUNTER — HOSPITAL ENCOUNTER (INPATIENT)
Facility: CLINIC | Age: 63
LOS: 4 days | Discharge: HOME HEALTH | DRG: 189 | End: 2018-02-24
Attending: INTERNAL MEDICINE | Admitting: INTERNAL MEDICINE
Payer: MEDICARE

## 2018-01-01 ENCOUNTER — TELEPHONE (OUTPATIENT)
Dept: BEHAVIORAL HEALTH | Facility: CLINIC | Age: 63
End: 2018-01-01

## 2018-01-01 ENCOUNTER — APPOINTMENT (OUTPATIENT)
Dept: GENERAL RADIOLOGY | Facility: CLINIC | Age: 63
DRG: 189 | End: 2018-01-01
Attending: INTERNAL MEDICINE
Payer: MEDICARE

## 2018-01-01 ENCOUNTER — HOSPITAL ENCOUNTER (INPATIENT)
Facility: CLINIC | Age: 63
LOS: 6 days | Discharge: HOME HEALTH | DRG: 189 | End: 2018-06-11
Attending: EMERGENCY MEDICINE | Admitting: INTERNAL MEDICINE
Payer: MEDICARE

## 2018-01-01 ENCOUNTER — APPOINTMENT (OUTPATIENT)
Dept: PHYSICAL THERAPY | Facility: CLINIC | Age: 63
DRG: 189 | End: 2018-01-01
Attending: INTERNAL MEDICINE
Payer: MEDICARE

## 2018-01-01 ENCOUNTER — OFFICE VISIT (OUTPATIENT)
Dept: BEHAVIORAL HEALTH | Facility: CLINIC | Age: 63
End: 2018-01-01
Payer: MEDICARE

## 2018-01-01 ENCOUNTER — HOSPITAL ENCOUNTER (OUTPATIENT)
Dept: CARDIAC REHAB | Facility: CLINIC | Age: 63
End: 2018-02-12
Attending: INTERNAL MEDICINE
Payer: MEDICARE

## 2018-01-01 ENCOUNTER — TELEPHONE (OUTPATIENT)
Dept: PHARMACY | Facility: OTHER | Age: 63
End: 2018-01-01

## 2018-01-01 ENCOUNTER — APPOINTMENT (OUTPATIENT)
Dept: ULTRASOUND IMAGING | Facility: CLINIC | Age: 63
End: 2018-01-01
Attending: EMERGENCY MEDICINE
Payer: MEDICARE

## 2018-01-01 ENCOUNTER — TRANSFERRED RECORDS (OUTPATIENT)
Dept: HEALTH INFORMATION MANAGEMENT | Facility: CLINIC | Age: 63
End: 2018-01-01

## 2018-01-01 ENCOUNTER — DOCUMENTATION ONLY (OUTPATIENT)
Dept: CARE COORDINATION | Facility: CLINIC | Age: 63
End: 2018-01-01

## 2018-01-01 ENCOUNTER — OFFICE VISIT (OUTPATIENT)
Dept: CARDIOLOGY | Facility: CLINIC | Age: 63
End: 2018-01-01
Payer: MEDICARE

## 2018-01-01 ENCOUNTER — APPOINTMENT (OUTPATIENT)
Dept: GENERAL RADIOLOGY | Facility: CLINIC | Age: 63
End: 2018-01-01
Attending: EMERGENCY MEDICINE
Payer: MEDICARE

## 2018-01-01 ENCOUNTER — APPOINTMENT (OUTPATIENT)
Dept: CT IMAGING | Facility: CLINIC | Age: 63
DRG: 189 | End: 2018-01-01
Attending: INTERNAL MEDICINE
Payer: MEDICARE

## 2018-01-01 ENCOUNTER — APPOINTMENT (OUTPATIENT)
Dept: ULTRASOUND IMAGING | Facility: CLINIC | Age: 63
DRG: 189 | End: 2018-01-01
Attending: INTERNAL MEDICINE
Payer: MEDICARE

## 2018-01-01 ENCOUNTER — APPOINTMENT (OUTPATIENT)
Dept: OCCUPATIONAL THERAPY | Facility: CLINIC | Age: 63
DRG: 189 | End: 2018-01-01
Payer: MEDICARE

## 2018-01-01 ENCOUNTER — APPOINTMENT (OUTPATIENT)
Dept: SPEECH THERAPY | Facility: CLINIC | Age: 63
DRG: 189 | End: 2018-01-01
Payer: MEDICARE

## 2018-01-01 ENCOUNTER — OFFICE VISIT (OUTPATIENT)
Dept: RHEUMATOLOGY | Facility: CLINIC | Age: 63
End: 2018-01-01
Payer: MEDICARE

## 2018-01-01 ENCOUNTER — RADIANT APPOINTMENT (OUTPATIENT)
Dept: GENERAL RADIOLOGY | Facility: CLINIC | Age: 63
End: 2018-01-01
Attending: FAMILY MEDICINE
Payer: MEDICARE

## 2018-01-01 ENCOUNTER — APPOINTMENT (OUTPATIENT)
Dept: PHYSICAL THERAPY | Facility: CLINIC | Age: 63
DRG: 189 | End: 2018-01-01
Payer: MEDICARE

## 2018-01-01 ENCOUNTER — APPOINTMENT (OUTPATIENT)
Dept: GENERAL RADIOLOGY | Facility: CLINIC | Age: 63
DRG: 190 | End: 2018-01-01
Attending: PHYSICIAN ASSISTANT
Payer: MEDICARE

## 2018-01-01 ENCOUNTER — HOSPITAL ENCOUNTER (EMERGENCY)
Facility: CLINIC | Age: 63
Discharge: ANOTHER HOSPITAL | End: 2018-03-26
Attending: EMERGENCY MEDICINE | Admitting: EMERGENCY MEDICINE
Payer: MEDICARE

## 2018-01-01 ENCOUNTER — HOSPITAL ENCOUNTER (EMERGENCY)
Facility: CLINIC | Age: 63
Discharge: HOME | End: 2018-03-15
Attending: EMERGENCY MEDICINE | Admitting: EMERGENCY MEDICINE
Payer: MEDICARE

## 2018-01-01 ENCOUNTER — APPOINTMENT (OUTPATIENT)
Dept: GENERAL RADIOLOGY | Facility: CLINIC | Age: 63
DRG: 189 | End: 2018-01-01
Attending: EMERGENCY MEDICINE
Payer: MEDICARE

## 2018-01-01 ENCOUNTER — APPOINTMENT (OUTPATIENT)
Dept: SPEECH THERAPY | Facility: CLINIC | Age: 63
DRG: 189 | End: 2018-01-01
Attending: INTERNAL MEDICINE
Payer: MEDICARE

## 2018-01-01 ENCOUNTER — APPOINTMENT (OUTPATIENT)
Dept: CARDIOLOGY | Facility: CLINIC | Age: 63
DRG: 189 | End: 2018-01-01
Attending: INTERNAL MEDICINE
Payer: MEDICARE

## 2018-01-01 ENCOUNTER — PRE VISIT (OUTPATIENT)
Dept: CARDIOLOGY | Facility: CLINIC | Age: 63
End: 2018-01-01

## 2018-01-01 ENCOUNTER — HOSPITAL ENCOUNTER (INPATIENT)
Facility: CLINIC | Age: 63
LOS: 4 days | Discharge: HOME HEALTH | DRG: 190 | End: 2018-05-12
Attending: EMERGENCY MEDICINE | Admitting: INTERNAL MEDICINE
Payer: MEDICARE

## 2018-01-01 VITALS
WEIGHT: 227.9 LBS | OXYGEN SATURATION: 95 % | HEART RATE: 99 BPM | BODY MASS INDEX: 35.77 KG/M2 | DIASTOLIC BLOOD PRESSURE: 72 MMHG | HEIGHT: 67 IN | SYSTOLIC BLOOD PRESSURE: 116 MMHG | TEMPERATURE: 98.5 F

## 2018-01-01 VITALS
HEART RATE: 90 BPM | OXYGEN SATURATION: 97 % | DIASTOLIC BLOOD PRESSURE: 68 MMHG | RESPIRATION RATE: 18 BRPM | WEIGHT: 222.22 LBS | HEIGHT: 67 IN | BODY MASS INDEX: 34.88 KG/M2 | SYSTOLIC BLOOD PRESSURE: 115 MMHG | TEMPERATURE: 98.4 F

## 2018-01-01 VITALS
SYSTOLIC BLOOD PRESSURE: 141 MMHG | OXYGEN SATURATION: 96 % | BODY MASS INDEX: 34.26 KG/M2 | HEART RATE: 80 BPM | WEIGHT: 218.76 LBS | TEMPERATURE: 97.1 F | DIASTOLIC BLOOD PRESSURE: 78 MMHG | RESPIRATION RATE: 18 BRPM

## 2018-01-01 VITALS
OXYGEN SATURATION: 97 % | HEART RATE: 103 BPM | SYSTOLIC BLOOD PRESSURE: 137 MMHG | BODY MASS INDEX: 37.59 KG/M2 | TEMPERATURE: 98.9 F | RESPIRATION RATE: 29 BRPM | DIASTOLIC BLOOD PRESSURE: 95 MMHG | WEIGHT: 240 LBS

## 2018-01-01 VITALS
SYSTOLIC BLOOD PRESSURE: 111 MMHG | WEIGHT: 240 LBS | DIASTOLIC BLOOD PRESSURE: 73 MMHG | RESPIRATION RATE: 16 BRPM | TEMPERATURE: 98.7 F | BODY MASS INDEX: 37.59 KG/M2 | OXYGEN SATURATION: 92 % | HEART RATE: 95 BPM

## 2018-01-01 VITALS
HEART RATE: 115 BPM | SYSTOLIC BLOOD PRESSURE: 131 MMHG | BODY MASS INDEX: 34.37 KG/M2 | HEIGHT: 67 IN | OXYGEN SATURATION: 90 % | RESPIRATION RATE: 16 BRPM | DIASTOLIC BLOOD PRESSURE: 80 MMHG | TEMPERATURE: 98.2 F | WEIGHT: 219 LBS

## 2018-01-01 VITALS
BODY MASS INDEX: 34.77 KG/M2 | SYSTOLIC BLOOD PRESSURE: 139 MMHG | RESPIRATION RATE: 14 BRPM | HEART RATE: 119 BPM | WEIGHT: 222 LBS | DIASTOLIC BLOOD PRESSURE: 88 MMHG | OXYGEN SATURATION: 95 % | TEMPERATURE: 98 F

## 2018-01-01 VITALS
WEIGHT: 232 LBS | HEART RATE: 107 BPM | DIASTOLIC BLOOD PRESSURE: 70 MMHG | BODY MASS INDEX: 36.34 KG/M2 | SYSTOLIC BLOOD PRESSURE: 104 MMHG | RESPIRATION RATE: 14 BRPM | OXYGEN SATURATION: 90 % | TEMPERATURE: 98.7 F

## 2018-01-01 VITALS
OXYGEN SATURATION: 85 % | DIASTOLIC BLOOD PRESSURE: 70 MMHG | WEIGHT: 232.59 LBS | RESPIRATION RATE: 16 BRPM | SYSTOLIC BLOOD PRESSURE: 128 MMHG | HEART RATE: 93 BPM | BODY MASS INDEX: 36.43 KG/M2 | TEMPERATURE: 98.3 F

## 2018-01-01 VITALS
OXYGEN SATURATION: 90 % | WEIGHT: 228 LBS | RESPIRATION RATE: 12 BRPM | TEMPERATURE: 98 F | SYSTOLIC BLOOD PRESSURE: 128 MMHG | HEART RATE: 82 BPM | DIASTOLIC BLOOD PRESSURE: 86 MMHG | BODY MASS INDEX: 35.71 KG/M2

## 2018-01-01 VITALS
OXYGEN SATURATION: 96 % | TEMPERATURE: 97.6 F | DIASTOLIC BLOOD PRESSURE: 68 MMHG | HEIGHT: 67 IN | WEIGHT: 243.2 LBS | RESPIRATION RATE: 16 BRPM | BODY MASS INDEX: 38.17 KG/M2 | SYSTOLIC BLOOD PRESSURE: 109 MMHG | HEART RATE: 86 BPM

## 2018-01-01 VITALS
HEART RATE: 102 BPM | RESPIRATION RATE: 12 BRPM | SYSTOLIC BLOOD PRESSURE: 138 MMHG | TEMPERATURE: 97.6 F | HEIGHT: 67 IN | WEIGHT: 215 LBS | BODY MASS INDEX: 33.74 KG/M2 | DIASTOLIC BLOOD PRESSURE: 83 MMHG | OXYGEN SATURATION: 80 %

## 2018-01-01 VITALS
BODY MASS INDEX: 33.74 KG/M2 | WEIGHT: 215 LBS | HEIGHT: 67 IN | DIASTOLIC BLOOD PRESSURE: 86 MMHG | SYSTOLIC BLOOD PRESSURE: 136 MMHG

## 2018-01-01 VITALS
HEIGHT: 67 IN | BODY MASS INDEX: 35.9 KG/M2 | WEIGHT: 228.7 LBS | DIASTOLIC BLOOD PRESSURE: 84 MMHG | HEART RATE: 76 BPM | SYSTOLIC BLOOD PRESSURE: 152 MMHG | OXYGEN SATURATION: 97 % | TEMPERATURE: 96.1 F | RESPIRATION RATE: 18 BRPM

## 2018-01-01 VITALS
HEART RATE: 116 BPM | DIASTOLIC BLOOD PRESSURE: 89 MMHG | OXYGEN SATURATION: 96 % | RESPIRATION RATE: 20 BRPM | TEMPERATURE: 98.2 F | SYSTOLIC BLOOD PRESSURE: 136 MMHG

## 2018-01-01 VITALS — HEART RATE: 84 BPM | DIASTOLIC BLOOD PRESSURE: 68 MMHG | SYSTOLIC BLOOD PRESSURE: 94 MMHG | HEIGHT: 67 IN

## 2018-01-01 DIAGNOSIS — J44.1 COPD EXACERBATION (H): ICD-10-CM

## 2018-01-01 DIAGNOSIS — J96.11 CHRONIC RESPIRATORY FAILURE WITH HYPOXIA AND HYPERCAPNIA (H): Primary | ICD-10-CM

## 2018-01-01 DIAGNOSIS — J44.1 CHRONIC OBSTRUCTIVE PULMONARY DISEASE WITH ACUTE EXACERBATION (H): Primary | ICD-10-CM

## 2018-01-01 DIAGNOSIS — R09.02 HYPOXIA: ICD-10-CM

## 2018-01-01 DIAGNOSIS — F41.1 GAD (GENERALIZED ANXIETY DISORDER): ICD-10-CM

## 2018-01-01 DIAGNOSIS — F33.0 MAJOR DEPRESSIVE DISORDER, RECURRENT EPISODE, MILD (H): ICD-10-CM

## 2018-01-01 DIAGNOSIS — J43.1 PANLOBULAR EMPHYSEMA (H): ICD-10-CM

## 2018-01-01 DIAGNOSIS — G89.29 OTHER CHRONIC PAIN: ICD-10-CM

## 2018-01-01 DIAGNOSIS — G89.29 CHRONIC PAIN OF LEFT KNEE: Primary | ICD-10-CM

## 2018-01-01 DIAGNOSIS — M54.59 MECHANICAL LOW BACK PAIN: ICD-10-CM

## 2018-01-01 DIAGNOSIS — I25.118 CORONARY ARTERY DISEASE OF NATIVE HEART WITH STABLE ANGINA PECTORIS, UNSPECIFIED VESSEL OR LESION TYPE (H): ICD-10-CM

## 2018-01-01 DIAGNOSIS — R25.1 TREMOR: ICD-10-CM

## 2018-01-01 DIAGNOSIS — J96.01 ACUTE RESPIRATORY FAILURE WITH HYPOXIA (H): ICD-10-CM

## 2018-01-01 DIAGNOSIS — I20.89 STABLE ANGINA (H): Primary | ICD-10-CM

## 2018-01-01 DIAGNOSIS — J44.1 COPD EXACERBATION (H): Primary | ICD-10-CM

## 2018-01-01 DIAGNOSIS — F43.10 PTSD (POST-TRAUMATIC STRESS DISORDER): ICD-10-CM

## 2018-01-01 DIAGNOSIS — R07.9 CHEST PAIN: ICD-10-CM

## 2018-01-01 DIAGNOSIS — R25.1 TREMOR: Primary | ICD-10-CM

## 2018-01-01 DIAGNOSIS — M13.0 POLYARTHRITIS OF MULTIPLE SITES: Primary | ICD-10-CM

## 2018-01-01 DIAGNOSIS — J96.12 HYPERCAPNIC RESPIRATORY FAILURE, CHRONIC (H): ICD-10-CM

## 2018-01-01 DIAGNOSIS — J44.1 CHRONIC OBSTRUCTIVE PULMONARY DISEASE WITH ACUTE EXACERBATION (H): ICD-10-CM

## 2018-01-01 DIAGNOSIS — R53.81 PHYSICAL DECONDITIONING: ICD-10-CM

## 2018-01-01 DIAGNOSIS — J44.9 COPD (CHRONIC OBSTRUCTIVE PULMONARY DISEASE) (H): ICD-10-CM

## 2018-01-01 DIAGNOSIS — M25.562 CHRONIC PAIN OF LEFT KNEE: Primary | ICD-10-CM

## 2018-01-01 DIAGNOSIS — J44.0 CHRONIC OBSTRUCTIVE PULMONARY DISEASE WITH ACUTE LOWER RESPIRATORY INFECTION (H): ICD-10-CM

## 2018-01-01 DIAGNOSIS — F33.0 MAJOR DEPRESSIVE DISORDER, RECURRENT EPISODE, MILD (H): Primary | ICD-10-CM

## 2018-01-01 DIAGNOSIS — R07.9 CHEST PAIN, UNSPECIFIED TYPE: ICD-10-CM

## 2018-01-01 DIAGNOSIS — F33.41 RECURRENT MAJOR DEPRESSIVE DISORDER, IN PARTIAL REMISSION (H): Primary | ICD-10-CM

## 2018-01-01 DIAGNOSIS — R79.89 ELEVATED TROPONIN: ICD-10-CM

## 2018-01-01 DIAGNOSIS — J96.12 CHRONIC RESPIRATORY FAILURE WITH HYPOXIA AND HYPERCAPNIA (H): Primary | ICD-10-CM

## 2018-01-01 DIAGNOSIS — Z23 NEED FOR PNEUMOCOCCAL VACCINATION: Primary | ICD-10-CM

## 2018-01-01 DIAGNOSIS — F41.1 GENERALIZED ANXIETY DISORDER: ICD-10-CM

## 2018-01-01 DIAGNOSIS — I25.10 CORONARY ARTERY DISEASE INVOLVING NATIVE CORONARY ARTERY OF NATIVE HEART WITHOUT ANGINA PECTORIS: Primary | ICD-10-CM

## 2018-01-01 DIAGNOSIS — J41.1 MUCOPURULENT CHRONIC BRONCHITIS (H): Primary | ICD-10-CM

## 2018-01-01 DIAGNOSIS — F31.81 BIPOLAR 2 DISORDER (H): Primary | ICD-10-CM

## 2018-01-01 DIAGNOSIS — Z87.891 PERSONAL HISTORY OF TOBACCO USE, PRESENTING HAZARDS TO HEALTH: ICD-10-CM

## 2018-01-01 DIAGNOSIS — I25.10 CAD (CORONARY ARTERY DISEASE): ICD-10-CM

## 2018-01-01 DIAGNOSIS — E78.5 HYPERLIPIDEMIA LDL GOAL <130: Primary | ICD-10-CM

## 2018-01-01 DIAGNOSIS — F33.2 SEVERE EPISODE OF RECURRENT MAJOR DEPRESSIVE DISORDER, WITHOUT PSYCHOTIC FEATURES (H): Primary | ICD-10-CM

## 2018-01-01 DIAGNOSIS — R53.81 PHYSICAL DECONDITIONING: Primary | ICD-10-CM

## 2018-01-01 DIAGNOSIS — I25.119 CORONARY ARTERY DISEASE INVOLVING NATIVE CORONARY ARTERY OF NATIVE HEART WITH ANGINA PECTORIS (H): ICD-10-CM

## 2018-01-01 DIAGNOSIS — M25.50 ARTHRALGIA, UNSPECIFIED JOINT: Primary | ICD-10-CM

## 2018-01-01 DIAGNOSIS — M71.21 SYNOVIAL CYST OF RIGHT POPLITEAL SPACE: ICD-10-CM

## 2018-01-01 DIAGNOSIS — M17.12 PRIMARY OSTEOARTHRITIS OF LEFT KNEE: Primary | ICD-10-CM

## 2018-01-01 DIAGNOSIS — J42 CHRONIC BRONCHITIS, UNSPECIFIED CHRONIC BRONCHITIS TYPE (H): Primary | ICD-10-CM

## 2018-01-01 DIAGNOSIS — F33.41 RECURRENT MAJOR DEPRESSIVE DISORDER, IN PARTIAL REMISSION (H): ICD-10-CM

## 2018-01-01 DIAGNOSIS — I99.8 ISCHEMIC VASCULAR DISEASE: ICD-10-CM

## 2018-01-01 DIAGNOSIS — Z11.4 SCREENING FOR HIV (HUMAN IMMUNODEFICIENCY VIRUS): ICD-10-CM

## 2018-01-01 DIAGNOSIS — E78.5 HYPERLIPIDEMIA LDL GOAL <130: ICD-10-CM

## 2018-01-01 DIAGNOSIS — I70.90 ASVD (ARTERIOSCLEROTIC VASCULAR DISEASE): Primary | ICD-10-CM

## 2018-01-01 DIAGNOSIS — F31.81 BIPOLAR 2 DISORDER (H): ICD-10-CM

## 2018-01-01 DIAGNOSIS — J44.9 COPD (CHRONIC OBSTRUCTIVE PULMONARY DISEASE) (H): Primary | ICD-10-CM

## 2018-01-01 LAB
ALBUMIN SERPL-MCNC: 3.1 G/DL (ref 3.4–5)
ALBUMIN SERPL-MCNC: 3.2 G/DL (ref 3.4–5)
ALBUMIN SERPL-MCNC: 3.4 G/DL (ref 3.4–5)
ALBUMIN SERPL-MCNC: 3.5 G/DL (ref 3.4–5)
ALP SERPL-CCNC: 104 U/L (ref 40–150)
ALP SERPL-CCNC: 72 U/L (ref 40–150)
ALP SERPL-CCNC: 93 U/L (ref 40–150)
ALP SERPL-CCNC: 93 U/L (ref 40–150)
ALT SERPL W P-5'-P-CCNC: 17 U/L (ref 0–50)
ALT SERPL W P-5'-P-CCNC: 18 U/L (ref 0–50)
ALT SERPL W P-5'-P-CCNC: 22 U/L (ref 0–50)
ALT SERPL W P-5'-P-CCNC: 22 U/L (ref 0–50)
ANCA IGG TITR SER IF: NORMAL {TITER}
ANION GAP SERPL CALCULATED.3IONS-SCNC: 1 MMOL/L (ref 3–14)
ANION GAP SERPL CALCULATED.3IONS-SCNC: 2 MMOL/L (ref 3–14)
ANION GAP SERPL CALCULATED.3IONS-SCNC: 2 MMOL/L (ref 3–14)
ANION GAP SERPL CALCULATED.3IONS-SCNC: 3 MMOL/L (ref 3–14)
ANION GAP SERPL CALCULATED.3IONS-SCNC: <1 MMOL/L (ref 3–14)
ANION GAP SERPL CALCULATED.3IONS-SCNC: ABNORMAL MMOL/L (ref 3–14)
APTT PPP: 30 SEC (ref 22–37)
AST SERPL W P-5'-P-CCNC: 12 U/L (ref 0–45)
AST SERPL W P-5'-P-CCNC: 18 U/L (ref 0–45)
AST SERPL W P-5'-P-CCNC: 18 U/L (ref 0–45)
AST SERPL W P-5'-P-CCNC: 23 U/L (ref 0–45)
BACTERIA SPEC CULT: NO GROWTH
BASE EXCESS BLDV CALC-SCNC: 13.1 MMOL/L
BASE EXCESS BLDV CALC-SCNC: 13.9 MMOL/L
BASE EXCESS BLDV CALC-SCNC: 20.9 MMOL/L
BASE EXCESS BLDV CALC-SCNC: 9.5 MMOL/L
BASOPHILS # BLD AUTO: 0 10E9/L (ref 0–0.2)
BASOPHILS # BLD AUTO: 0.1 10E9/L (ref 0–0.2)
BASOPHILS # BLD AUTO: 0.1 10E9/L (ref 0–0.2)
BASOPHILS NFR BLD AUTO: 0 %
BASOPHILS NFR BLD AUTO: 0.4 %
BASOPHILS NFR BLD AUTO: 0.5 %
BASOPHILS NFR BLD AUTO: 0.6 %
BASOPHILS NFR BLD AUTO: 0.9 %
BILIRUB DIRECT SERPL-MCNC: <0.1 MG/DL (ref 0–0.2)
BILIRUB SERPL-MCNC: 0.2 MG/DL (ref 0.2–1.3)
BILIRUB SERPL-MCNC: 0.3 MG/DL (ref 0.2–1.3)
BILIRUB SERPL-MCNC: 0.3 MG/DL (ref 0.2–1.3)
BILIRUB SERPL-MCNC: 0.5 MG/DL (ref 0.2–1.3)
BUN SERPL-MCNC: 10 MG/DL (ref 7–30)
BUN SERPL-MCNC: 11 MG/DL (ref 7–30)
BUN SERPL-MCNC: 12 MG/DL (ref 7–30)
BUN SERPL-MCNC: 13 MG/DL (ref 7–30)
BUN SERPL-MCNC: 14 MG/DL (ref 7–30)
BUN SERPL-MCNC: 16 MG/DL (ref 7–30)
BUN SERPL-MCNC: 18 MG/DL (ref 7–30)
BUN SERPL-MCNC: 19 MG/DL (ref 7–30)
BUN SERPL-MCNC: 20 MG/DL (ref 7–30)
BUN SERPL-MCNC: 6 MG/DL (ref 7–30)
BUN SERPL-MCNC: 7 MG/DL (ref 7–30)
CALCIUM SERPL-MCNC: 7.9 MG/DL (ref 8.5–10.1)
CALCIUM SERPL-MCNC: 8 MG/DL (ref 8.5–10.1)
CALCIUM SERPL-MCNC: 8.3 MG/DL (ref 8.5–10.1)
CALCIUM SERPL-MCNC: 8.4 MG/DL (ref 8.5–10.1)
CALCIUM SERPL-MCNC: 8.4 MG/DL (ref 8.5–10.1)
CALCIUM SERPL-MCNC: 8.5 MG/DL (ref 8.5–10.1)
CALCIUM SERPL-MCNC: 8.5 MG/DL (ref 8.5–10.1)
CALCIUM SERPL-MCNC: 8.6 MG/DL (ref 8.5–10.1)
CALCIUM SERPL-MCNC: 8.8 MG/DL (ref 8.5–10.1)
CALCIUM SERPL-MCNC: 8.8 MG/DL (ref 8.5–10.1)
CALCIUM SERPL-MCNC: 9.1 MG/DL (ref 8.5–10.1)
CCP AB SER IA-ACNC: 1 U/ML
CHLORIDE SERPL-SCNC: 100 MMOL/L (ref 94–109)
CHLORIDE SERPL-SCNC: 101 MMOL/L (ref 94–109)
CHLORIDE SERPL-SCNC: 102 MMOL/L (ref 94–109)
CHLORIDE SERPL-SCNC: 103 MMOL/L (ref 94–109)
CHLORIDE SERPL-SCNC: 93 MMOL/L (ref 94–109)
CHLORIDE SERPL-SCNC: 94 MMOL/L (ref 94–109)
CHLORIDE SERPL-SCNC: 95 MMOL/L (ref 94–109)
CHLORIDE SERPL-SCNC: 95 MMOL/L (ref 94–109)
CHLORIDE SERPL-SCNC: 96 MMOL/L (ref 94–109)
CHLORIDE SERPL-SCNC: 97 MMOL/L (ref 94–109)
CHLORIDE SERPL-SCNC: 99 MMOL/L (ref 94–109)
CHOLEST SERPL-MCNC: 165 MG/DL
CO2 BLDCOV-SCNC: 44 MMOL/L (ref 21–28)
CO2 BLDCOV-SCNC: 45 MMOL/L (ref 21–28)
CO2 BLDCOV-SCNC: 54 MMOL/L (ref 21–28)
CO2 BLDCOV-SCNC: 57 MMOL/L (ref 21–28)
CO2 SERPL-SCNC: 36 MMOL/L (ref 20–32)
CO2 SERPL-SCNC: 38 MMOL/L (ref 20–32)
CO2 SERPL-SCNC: 39 MMOL/L (ref 20–32)
CO2 SERPL-SCNC: 40 MMOL/L (ref 20–32)
CO2 SERPL-SCNC: 41 MMOL/L (ref 20–32)
CO2 SERPL-SCNC: 42 MMOL/L (ref 20–32)
CO2 SERPL-SCNC: 43 MMOL/L (ref 20–32)
CO2 SERPL-SCNC: 45 MMOL/L (ref 20–32)
CO2 SERPL-SCNC: >45 MMOL/L (ref 20–32)
CREAT BLD-MCNC: 0.8 MG/DL (ref 0.52–1.04)
CREAT SERPL-MCNC: 0.44 MG/DL (ref 0.52–1.04)
CREAT SERPL-MCNC: 0.48 MG/DL (ref 0.52–1.04)
CREAT SERPL-MCNC: 0.49 MG/DL (ref 0.52–1.04)
CREAT SERPL-MCNC: 0.5 MG/DL (ref 0.52–1.04)
CREAT SERPL-MCNC: 0.51 MG/DL (ref 0.52–1.04)
CREAT SERPL-MCNC: 0.52 MG/DL (ref 0.52–1.04)
CREAT SERPL-MCNC: 0.55 MG/DL (ref 0.52–1.04)
CREAT SERPL-MCNC: 0.56 MG/DL (ref 0.52–1.04)
CREAT SERPL-MCNC: 0.57 MG/DL (ref 0.52–1.04)
CREAT SERPL-MCNC: 0.6 MG/DL (ref 0.52–1.04)
CREAT SERPL-MCNC: 0.65 MG/DL (ref 0.52–1.04)
CREAT SERPL-MCNC: 0.77 MG/DL (ref 0.52–1.04)
CRP SERPL-MCNC: <2.9 MG/L (ref 0–8)
DIFFERENTIAL METHOD BLD: ABNORMAL
EOSINOPHIL # BLD AUTO: 0 10E9/L (ref 0–0.7)
EOSINOPHIL # BLD AUTO: 0.2 10E9/L (ref 0–0.7)
EOSINOPHIL # BLD AUTO: 0.2 10E9/L (ref 0–0.7)
EOSINOPHIL # BLD AUTO: 0.3 10E9/L (ref 0–0.7)
EOSINOPHIL # BLD AUTO: 0.6 10E9/L (ref 0–0.7)
EOSINOPHIL NFR BLD AUTO: 0 %
EOSINOPHIL NFR BLD AUTO: 2.1 %
EOSINOPHIL NFR BLD AUTO: 2.9 %
EOSINOPHIL NFR BLD AUTO: 3.1 %
EOSINOPHIL NFR BLD AUTO: 7.1 %
ERYTHROCYTE [DISTWIDTH] IN BLOOD BY AUTOMATED COUNT: 12.9 % (ref 10–15)
ERYTHROCYTE [DISTWIDTH] IN BLOOD BY AUTOMATED COUNT: 13 % (ref 10–15)
ERYTHROCYTE [DISTWIDTH] IN BLOOD BY AUTOMATED COUNT: 13.2 % (ref 10–15)
ERYTHROCYTE [DISTWIDTH] IN BLOOD BY AUTOMATED COUNT: 13.3 % (ref 10–15)
ERYTHROCYTE [DISTWIDTH] IN BLOOD BY AUTOMATED COUNT: 13.5 % (ref 10–15)
ERYTHROCYTE [DISTWIDTH] IN BLOOD BY AUTOMATED COUNT: 13.6 % (ref 10–15)
ERYTHROCYTE [DISTWIDTH] IN BLOOD BY AUTOMATED COUNT: 14.1 % (ref 10–15)
ERYTHROCYTE [SEDIMENTATION RATE] IN BLOOD BY WESTERGREN METHOD: 7 MM/H (ref 0–30)
FIBRINOGEN PPP-MCNC: 344 MG/DL (ref 200–420)
FLUAV H1 2009 PAND RNA SPEC QL NAA+PROBE: NEGATIVE
FLUAV H1 RNA SPEC QL NAA+PROBE: NEGATIVE
FLUAV H3 RNA SPEC QL NAA+PROBE: NEGATIVE
FLUAV RNA SPEC QL NAA+PROBE: NEGATIVE
FLUAV+FLUBV AG SPEC QL: NEGATIVE
FLUBV RNA SPEC QL NAA+PROBE: NEGATIVE
GFR SERPL CREATININE-BSD FRML MDRD: 73 ML/MIN/1.7M2
GFR SERPL CREATININE-BSD FRML MDRD: 76 ML/MIN/1.7M2
GFR SERPL CREATININE-BSD FRML MDRD: >90 ML/MIN/1.7M2
GLUCOSE BLDC GLUCOMTR-MCNC: 107 MG/DL (ref 70–99)
GLUCOSE BLDC GLUCOMTR-MCNC: 112 MG/DL (ref 70–99)
GLUCOSE BLDC GLUCOMTR-MCNC: 128 MG/DL (ref 70–99)
GLUCOSE BLDC GLUCOMTR-MCNC: 131 MG/DL (ref 70–99)
GLUCOSE BLDC GLUCOMTR-MCNC: 133 MG/DL (ref 70–99)
GLUCOSE BLDC GLUCOMTR-MCNC: 135 MG/DL (ref 70–99)
GLUCOSE BLDC GLUCOMTR-MCNC: 135 MG/DL (ref 70–99)
GLUCOSE BLDC GLUCOMTR-MCNC: 138 MG/DL (ref 70–99)
GLUCOSE BLDC GLUCOMTR-MCNC: 145 MG/DL (ref 70–99)
GLUCOSE BLDC GLUCOMTR-MCNC: 147 MG/DL (ref 70–99)
GLUCOSE BLDC GLUCOMTR-MCNC: 151 MG/DL (ref 70–99)
GLUCOSE BLDC GLUCOMTR-MCNC: 154 MG/DL (ref 70–99)
GLUCOSE BLDC GLUCOMTR-MCNC: 166 MG/DL (ref 70–99)
GLUCOSE BLDC GLUCOMTR-MCNC: 167 MG/DL (ref 70–99)
GLUCOSE BLDC GLUCOMTR-MCNC: 167 MG/DL (ref 70–99)
GLUCOSE BLDC GLUCOMTR-MCNC: 192 MG/DL (ref 70–99)
GLUCOSE BLDC GLUCOMTR-MCNC: 192 MG/DL (ref 70–99)
GLUCOSE BLDC GLUCOMTR-MCNC: 227 MG/DL (ref 70–99)
GLUCOSE BLDC GLUCOMTR-MCNC: 234 MG/DL (ref 70–99)
GLUCOSE BLDC GLUCOMTR-MCNC: 243 MG/DL (ref 70–99)
GLUCOSE BLDC GLUCOMTR-MCNC: 69 MG/DL (ref 70–99)
GLUCOSE BLDC GLUCOMTR-MCNC: 79 MG/DL (ref 70–99)
GLUCOSE BLDC GLUCOMTR-MCNC: 80 MG/DL (ref 70–99)
GLUCOSE SERPL-MCNC: 107 MG/DL (ref 70–99)
GLUCOSE SERPL-MCNC: 111 MG/DL (ref 70–99)
GLUCOSE SERPL-MCNC: 116 MG/DL (ref 70–99)
GLUCOSE SERPL-MCNC: 129 MG/DL (ref 70–99)
GLUCOSE SERPL-MCNC: 132 MG/DL (ref 70–99)
GLUCOSE SERPL-MCNC: 137 MG/DL (ref 70–99)
GLUCOSE SERPL-MCNC: 138 MG/DL (ref 70–99)
GLUCOSE SERPL-MCNC: 147 MG/DL (ref 70–99)
GLUCOSE SERPL-MCNC: 150 MG/DL (ref 70–99)
GLUCOSE SERPL-MCNC: 180 MG/DL (ref 70–99)
GLUCOSE SERPL-MCNC: 85 MG/DL (ref 70–99)
GLUCOSE SERPL-MCNC: 95 MG/DL (ref 70–99)
GLUCOSE SERPL-MCNC: 98 MG/DL (ref 70–99)
HADV DNA SPEC QL NAA+PROBE: NEGATIVE
HADV DNA SPEC QL NAA+PROBE: NEGATIVE
HCO3 BLDV-SCNC: 39 MMOL/L (ref 21–28)
HCO3 BLDV-SCNC: 43 MMOL/L (ref 21–28)
HCO3 BLDV-SCNC: 44 MMOL/L (ref 21–28)
HCO3 BLDV-SCNC: 54 MMOL/L (ref 21–28)
HCT VFR BLD AUTO: 33.8 % (ref 35–47)
HCT VFR BLD AUTO: 34 % (ref 35–47)
HCT VFR BLD AUTO: 34 % (ref 35–47)
HCT VFR BLD AUTO: 37.4 % (ref 35–47)
HCT VFR BLD AUTO: 38.7 % (ref 35–47)
HCT VFR BLD AUTO: 40.5 % (ref 35–47)
HCT VFR BLD AUTO: 40.7 % (ref 35–47)
HCT VFR BLD AUTO: 41 % (ref 35–47)
HCT VFR BLD AUTO: 41.9 % (ref 35–47)
HDLC SERPL-MCNC: 75 MG/DL
HGB BLD-MCNC: 10 G/DL (ref 11.7–15.7)
HGB BLD-MCNC: 10 G/DL (ref 11.7–15.7)
HGB BLD-MCNC: 10.5 G/DL (ref 11.7–15.7)
HGB BLD-MCNC: 11.2 G/DL (ref 11.7–15.7)
HGB BLD-MCNC: 11.6 G/DL (ref 11.7–15.7)
HGB BLD-MCNC: 11.7 G/DL (ref 11.7–15.7)
HGB BLD-MCNC: 11.8 G/DL (ref 11.7–15.7)
HGB BLD-MCNC: 12 G/DL (ref 11.7–15.7)
HGB BLD-MCNC: 12.2 G/DL (ref 11.7–15.7)
HIV 1+2 AB+HIV1 P24 AG SERPL QL IA: NONREACTIVE
HMPV RNA SPEC QL NAA+PROBE: NEGATIVE
HPIV1 RNA SPEC QL NAA+PROBE: NEGATIVE
HPIV2 RNA SPEC QL NAA+PROBE: NEGATIVE
HPIV3 RNA SPEC QL NAA+PROBE: NEGATIVE
IMM GRANULOCYTES # BLD: 0 10E9/L (ref 0–0.4)
IMM GRANULOCYTES # BLD: 0.1 10E9/L (ref 0–0.4)
IMM GRANULOCYTES # BLD: 0.2 10E9/L (ref 0–0.4)
IMM GRANULOCYTES NFR BLD: 0.4 %
IMM GRANULOCYTES NFR BLD: 0.8 %
IMM GRANULOCYTES NFR BLD: 1.4 %
INR PPP: 0.95 (ref 0.86–1.14)
INTERPRETATION ECG - MUSE: NORMAL
LACTATE BLD-SCNC: 0.5 MMOL/L (ref 0.7–2.1)
LACTATE BLD-SCNC: 0.6 MMOL/L (ref 0.7–2.1)
LACTATE BLD-SCNC: 0.9 MMOL/L (ref 0.7–2.1)
LACTATE BLD-SCNC: 1 MMOL/L (ref 0.7–2.1)
LACTATE BLD-SCNC: 1.1 MMOL/L (ref 0.7–2)
LACTATE SERPL-SCNC: 0.7 MMOL/L (ref 0.4–2)
LDLC SERPL CALC-MCNC: 75 MG/DL
LMWH PPP CHRO-ACNC: 0.33 IU/ML
LMWH PPP CHRO-ACNC: 0.39 IU/ML
LMWH PPP CHRO-ACNC: 0.57 IU/ML
LYMPHOCYTES # BLD AUTO: 0.5 10E9/L (ref 0.8–5.3)
LYMPHOCYTES # BLD AUTO: 0.9 10E9/L (ref 0.8–5.3)
LYMPHOCYTES # BLD AUTO: 1.4 10E9/L (ref 0.8–5.3)
LYMPHOCYTES # BLD AUTO: 2.1 10E9/L (ref 0.8–5.3)
LYMPHOCYTES # BLD AUTO: 2.1 10E9/L (ref 0.8–5.3)
LYMPHOCYTES NFR BLD AUTO: 14.7 %
LYMPHOCYTES NFR BLD AUTO: 15.4 %
LYMPHOCYTES NFR BLD AUTO: 19.9 %
LYMPHOCYTES NFR BLD AUTO: 26.5 %
LYMPHOCYTES NFR BLD AUTO: 7.7 %
Lab: NORMAL
MAGNESIUM SERPL-MCNC: 2.1 MG/DL (ref 1.6–2.3)
MCH RBC QN AUTO: 28.7 PG (ref 26.5–33)
MCH RBC QN AUTO: 28.8 PG (ref 26.5–33)
MCH RBC QN AUTO: 28.8 PG (ref 26.5–33)
MCH RBC QN AUTO: 28.9 PG (ref 26.5–33)
MCH RBC QN AUTO: 28.9 PG (ref 26.5–33)
MCH RBC QN AUTO: 29.2 PG (ref 26.5–33)
MCH RBC QN AUTO: 29.2 PG (ref 26.5–33)
MCH RBC QN AUTO: 29.4 PG (ref 26.5–33)
MCH RBC QN AUTO: 29.5 PG (ref 26.5–33)
MCHC RBC AUTO-ENTMCNC: 28.7 G/DL (ref 31.5–36.5)
MCHC RBC AUTO-ENTMCNC: 28.8 G/DL (ref 31.5–36.5)
MCHC RBC AUTO-ENTMCNC: 29.1 G/DL (ref 31.5–36.5)
MCHC RBC AUTO-ENTMCNC: 29.4 G/DL (ref 31.5–36.5)
MCHC RBC AUTO-ENTMCNC: 29.4 G/DL (ref 31.5–36.5)
MCHC RBC AUTO-ENTMCNC: 29.6 G/DL (ref 31.5–36.5)
MCHC RBC AUTO-ENTMCNC: 29.9 G/DL (ref 31.5–36.5)
MCHC RBC AUTO-ENTMCNC: 30 G/DL (ref 31.5–36.5)
MCHC RBC AUTO-ENTMCNC: 31.1 G/DL (ref 31.5–36.5)
MCV RBC AUTO: 100 FL (ref 78–100)
MCV RBC AUTO: 100 FL (ref 78–100)
MCV RBC AUTO: 102 FL (ref 78–100)
MCV RBC AUTO: 95 FL (ref 78–100)
MCV RBC AUTO: 96 FL (ref 78–100)
MCV RBC AUTO: 96 FL (ref 78–100)
MCV RBC AUTO: 98 FL (ref 78–100)
MCV RBC AUTO: 99 FL (ref 78–100)
MCV RBC AUTO: 99 FL (ref 78–100)
MICROBIOLOGIST REVIEW: NORMAL
MONOCYTES # BLD AUTO: 0.2 10E9/L (ref 0–1.3)
MONOCYTES # BLD AUTO: 0.5 10E9/L (ref 0–1.3)
MONOCYTES # BLD AUTO: 0.7 10E9/L (ref 0–1.3)
MONOCYTES # BLD AUTO: 0.7 10E9/L (ref 0–1.3)
MONOCYTES # BLD AUTO: 1.2 10E9/L (ref 0–1.3)
MONOCYTES NFR BLD AUTO: 3.1 %
MONOCYTES NFR BLD AUTO: 8 %
MONOCYTES NFR BLD AUTO: 8.3 %
MONOCYTES NFR BLD AUTO: 8.4 %
MONOCYTES NFR BLD AUTO: 9.9 %
MRSA DNA SPEC QL NAA+PROBE: NEGATIVE
NEUTROPHILS # BLD AUTO: 10.2 10E9/L (ref 1.6–8.3)
NEUTROPHILS # BLD AUTO: 4.3 10E9/L (ref 1.6–8.3)
NEUTROPHILS # BLD AUTO: 4.5 10E9/L (ref 1.6–8.3)
NEUTROPHILS # BLD AUTO: 4.5 10E9/L (ref 1.6–8.3)
NEUTROPHILS # BLD AUTO: 6.2 10E9/L (ref 1.6–8.3)
NEUTROPHILS NFR BLD AUTO: 56.7 %
NEUTROPHILS NFR BLD AUTO: 66.1 %
NEUTROPHILS NFR BLD AUTO: 72.4 %
NEUTROPHILS NFR BLD AUTO: 73.1 %
NEUTROPHILS NFR BLD AUTO: 88.8 %
NONHDLC SERPL-MCNC: 90 MG/DL
NRBC # BLD AUTO: 0 10*3/UL
NRBC BLD AUTO-RTO: 0 /100
NT-PROBNP SERPL-MCNC: 1210 PG/ML (ref 0–900)
NT-PROBNP SERPL-MCNC: 259 PG/ML (ref 0–900)
NT-PROBNP SERPL-MCNC: 3471 PG/ML (ref 0–900)
NT-PROBNP SERPL-MCNC: 6688 PG/ML (ref 0–900)
O2/TOTAL GAS SETTING VFR VENT: 3.5 %
O2/TOTAL GAS SETTING VFR VENT: ABNORMAL %
OXYHGB MFR BLDV: 58 %
OXYHGB MFR BLDV: 67 %
OXYHGB MFR BLDV: 78 %
OXYHGB MFR BLDV: 89 %
PCO2 BLDV: 103 MM HG (ref 40–50)
PCO2 BLDV: 104 MM HG (ref 40–50)
PCO2 BLDV: 112 MM HG (ref 40–50)
PCO2 BLDV: 117 MM HG (ref 40–50)
PCO2 BLDV: 123 MM HG (ref 40–50)
PCO2 BLDV: 81 MM HG (ref 40–50)
PCO2 BLDV: 85 MM HG (ref 40–50)
PCO2 BLDV: 88 MM HG (ref 40–50)
PH BLDV: 7.24 PH (ref 7.32–7.43)
PH BLDV: 7.25 PH (ref 7.32–7.43)
PH BLDV: 7.25 PH (ref 7.32–7.43)
PH BLDV: 7.27 PH (ref 7.32–7.43)
PH BLDV: 7.29 PH (ref 7.32–7.43)
PH BLDV: 7.31 PH (ref 7.32–7.43)
PHOSPHATE SERPL-MCNC: 2.4 MG/DL (ref 2.5–4.5)
PLATELET # BLD AUTO: 135 10E9/L (ref 150–450)
PLATELET # BLD AUTO: 151 10E9/L (ref 150–450)
PLATELET # BLD AUTO: 160 10E9/L (ref 150–450)
PLATELET # BLD AUTO: 178 10E9/L (ref 150–450)
PLATELET # BLD AUTO: 185 10E9/L (ref 150–450)
PLATELET # BLD AUTO: 187 10E9/L (ref 150–450)
PLATELET # BLD AUTO: 203 10E9/L (ref 150–450)
PLATELET # BLD AUTO: 205 10E9/L (ref 150–450)
PLATELET # BLD AUTO: 211 10E9/L (ref 150–450)
PLATELET # BLD AUTO: 213 10E9/L (ref 150–450)
PLATELET # BLD AUTO: 214 10E9/L (ref 150–450)
PLATELET # BLD AUTO: 217 10E9/L (ref 150–450)
PLATELET # BLD AUTO: 241 10E9/L (ref 150–450)
PLATELET # BLD EST: ABNORMAL 10*3/UL
PO2 BLDV: 32 MM HG (ref 25–47)
PO2 BLDV: 33 MM HG (ref 25–47)
PO2 BLDV: 35 MM HG (ref 25–47)
PO2 BLDV: 35 MM HG (ref 25–47)
PO2 BLDV: 36 MM HG (ref 25–47)
PO2 BLDV: 39 MM HG (ref 25–47)
PO2 BLDV: 46 MM HG (ref 25–47)
PO2 BLDV: 64 MM HG (ref 25–47)
POTASSIUM SERPL-SCNC: 3.6 MMOL/L (ref 3.4–5.3)
POTASSIUM SERPL-SCNC: 3.7 MMOL/L (ref 3.4–5.3)
POTASSIUM SERPL-SCNC: 3.7 MMOL/L (ref 3.4–5.3)
POTASSIUM SERPL-SCNC: 3.9 MMOL/L (ref 3.4–5.3)
POTASSIUM SERPL-SCNC: 3.9 MMOL/L (ref 3.4–5.3)
POTASSIUM SERPL-SCNC: 4.1 MMOL/L (ref 3.4–5.3)
POTASSIUM SERPL-SCNC: 4.1 MMOL/L (ref 3.4–5.3)
POTASSIUM SERPL-SCNC: 4.2 MMOL/L (ref 3.4–5.3)
POTASSIUM SERPL-SCNC: 4.3 MMOL/L (ref 3.4–5.3)
POTASSIUM SERPL-SCNC: 4.4 MMOL/L (ref 3.4–5.3)
POTASSIUM SERPL-SCNC: 4.6 MMOL/L (ref 3.4–5.3)
POTASSIUM SERPL-SCNC: 4.7 MMOL/L (ref 3.4–5.3)
PROCALCITONIN SERPL-MCNC: <0.05 NG/ML
PROT SERPL-MCNC: 6.3 G/DL (ref 6.8–8.8)
PROT SERPL-MCNC: 6.5 G/DL (ref 6.8–8.8)
PROT SERPL-MCNC: 7.2 G/DL (ref 6.8–8.8)
PROT SERPL-MCNC: 7.4 G/DL (ref 6.8–8.8)
RBC # BLD AUTO: 3.39 10E12/L (ref 3.8–5.2)
RBC # BLD AUTO: 3.42 10E12/L (ref 3.8–5.2)
RBC # BLD AUTO: 3.57 10E12/L (ref 3.8–5.2)
RBC # BLD AUTO: 3.88 10E12/L (ref 3.8–5.2)
RBC # BLD AUTO: 4.01 10E12/L (ref 3.8–5.2)
RBC # BLD AUTO: 4.03 10E12/L (ref 3.8–5.2)
RBC # BLD AUTO: 4.1 10E12/L (ref 3.8–5.2)
RBC # BLD AUTO: 4.15 10E12/L (ref 3.8–5.2)
RBC # BLD AUTO: 4.25 10E12/L (ref 3.8–5.2)
RBC MORPH BLD: ABNORMAL
RHEUMATOID FACT SER NEPH-ACNC: <20 IU/ML (ref 0–20)
RHINOVIRUS RNA SPEC QL NAA+PROBE: NEGATIVE
RSV RNA SPEC QL NAA+PROBE: NEGATIVE
RSV RNA SPEC QL NAA+PROBE: NEGATIVE
SAO2 % BLDV FROM PO2: 48 %
SAO2 % BLDV FROM PO2: 48 %
SAO2 % BLDV FROM PO2: 51 %
SAO2 % BLDV FROM PO2: 55 %
SODIUM SERPL-SCNC: 138 MMOL/L (ref 133–144)
SODIUM SERPL-SCNC: 138 MMOL/L (ref 133–144)
SODIUM SERPL-SCNC: 139 MMOL/L (ref 133–144)
SODIUM SERPL-SCNC: 140 MMOL/L (ref 133–144)
SODIUM SERPL-SCNC: 140 MMOL/L (ref 133–144)
SODIUM SERPL-SCNC: 141 MMOL/L (ref 133–144)
SODIUM SERPL-SCNC: 142 MMOL/L (ref 133–144)
SODIUM SERPL-SCNC: 143 MMOL/L (ref 133–144)
SPECIMEN SOURCE: NORMAL
TRIGL SERPL-MCNC: 76 MG/DL
TROPONIN I BLD-MCNC: 0.01 UG/L (ref 0–0.1)
TROPONIN I SERPL-MCNC: 0.02 UG/L (ref 0–0.04)
TROPONIN I SERPL-MCNC: 0.23 UG/L (ref 0–0.04)
TROPONIN I SERPL-MCNC: 0.33 UG/L (ref 0–0.04)
TROPONIN I SERPL-MCNC: 0.44 UG/L (ref 0–0.04)
TROPONIN I SERPL-MCNC: 0.46 UG/L (ref 0–0.04)
TROPONIN I SERPL-MCNC: <0.015 UG/L (ref 0–0.04)
TROPONIN I SERPL-MCNC: <0.015 UG/L (ref 0–0.04)
TSH SERPL DL<=0.005 MIU/L-ACNC: 1.97 MU/L (ref 0.4–4)
URATE SERPL-MCNC: 3.1 MG/DL (ref 2.6–6)
WBC # BLD AUTO: 14 10E9/L (ref 4–11)
WBC # BLD AUTO: 5.4 10E9/L (ref 4–11)
WBC # BLD AUTO: 5.6 10E9/L (ref 4–11)
WBC # BLD AUTO: 5.9 10E9/L (ref 4–11)
WBC # BLD AUTO: 5.9 10E9/L (ref 4–11)
WBC # BLD AUTO: 6.8 10E9/L (ref 4–11)
WBC # BLD AUTO: 7 10E9/L (ref 4–11)
WBC # BLD AUTO: 7.9 10E9/L (ref 4–11)
WBC # BLD AUTO: 9.1 10E9/L (ref 4–11)

## 2018-01-01 PROCEDURE — 25000132 ZZH RX MED GY IP 250 OP 250 PS 637: Mod: GY | Performed by: INTERNAL MEDICINE

## 2018-01-01 PROCEDURE — 99223 1ST HOSP IP/OBS HIGH 75: CPT | Mod: AI | Performed by: INTERNAL MEDICINE

## 2018-01-01 PROCEDURE — A9270 NON-COVERED ITEM OR SERVICE: HCPCS | Mod: GY | Performed by: INTERNAL MEDICINE

## 2018-01-01 PROCEDURE — 94660 CPAP INITIATION&MGMT: CPT

## 2018-01-01 PROCEDURE — 20000003 ZZH R&B ICU

## 2018-01-01 PROCEDURE — 40000193 ZZH STATISTIC PT WARD VISIT

## 2018-01-01 PROCEDURE — 25000128 H RX IP 250 OP 636: Performed by: INTERNAL MEDICINE

## 2018-01-01 PROCEDURE — 80048 BASIC METABOLIC PNL TOTAL CA: CPT | Performed by: INTERNAL MEDICINE

## 2018-01-01 PROCEDURE — 94640 AIRWAY INHALATION TREATMENT: CPT

## 2018-01-01 PROCEDURE — 84484 ASSAY OF TROPONIN QUANT: CPT | Performed by: INTERNAL MEDICINE

## 2018-01-01 PROCEDURE — 25000125 ZZHC RX 250: Performed by: INTERNAL MEDICINE

## 2018-01-01 PROCEDURE — 40000275 ZZH STATISTIC RCP TIME EA 10 MIN

## 2018-01-01 PROCEDURE — 40000133 ZZH STATISTIC OT WARD VISIT: Performed by: OCCUPATIONAL THERAPIST

## 2018-01-01 PROCEDURE — 82248 BILIRUBIN DIRECT: CPT | Performed by: EMERGENCY MEDICINE

## 2018-01-01 PROCEDURE — 87040 BLOOD CULTURE FOR BACTERIA: CPT | Performed by: EMERGENCY MEDICINE

## 2018-01-01 PROCEDURE — 96365 THER/PROPH/DIAG IV INF INIT: CPT

## 2018-01-01 PROCEDURE — 94640 AIRWAY INHALATION TREATMENT: CPT | Mod: 76

## 2018-01-01 PROCEDURE — 92526 ORAL FUNCTION THERAPY: CPT | Mod: GN | Performed by: SPEECH-LANGUAGE PATHOLOGIST

## 2018-01-01 PROCEDURE — 99207 ZZC APP CREDIT; MD BILLING SHARED VISIT: CPT | Performed by: INTERNAL MEDICINE

## 2018-01-01 PROCEDURE — 96366 THER/PROPH/DIAG IV INF ADDON: CPT

## 2018-01-01 PROCEDURE — 25000132 ZZH RX MED GY IP 250 OP 250 PS 637: Mod: GY | Performed by: EMERGENCY MEDICINE

## 2018-01-01 PROCEDURE — 99232 SBSQ HOSP IP/OBS MODERATE 35: CPT | Performed by: INTERNAL MEDICINE

## 2018-01-01 PROCEDURE — 96375 TX/PRO/DX INJ NEW DRUG ADDON: CPT

## 2018-01-01 PROCEDURE — 36415 COLL VENOUS BLD VENIPUNCTURE: CPT | Performed by: INTERNAL MEDICINE

## 2018-01-01 PROCEDURE — 40000193 ZZH STATISTIC PT WARD VISIT: Performed by: PHYSICAL THERAPIST

## 2018-01-01 PROCEDURE — 97530 THERAPEUTIC ACTIVITIES: CPT | Mod: GP

## 2018-01-01 PROCEDURE — 83880 ASSAY OF NATRIURETIC PEPTIDE: CPT | Performed by: PHYSICIAN ASSISTANT

## 2018-01-01 PROCEDURE — 86255 FLUORESCENT ANTIBODY SCREEN: CPT | Mod: 90 | Performed by: INTERNAL MEDICINE

## 2018-01-01 PROCEDURE — 92526 ORAL FUNCTION THERAPY: CPT | Mod: GN

## 2018-01-01 PROCEDURE — 90834 PSYTX W PT 45 MINUTES: CPT | Performed by: SOCIAL WORKER

## 2018-01-01 PROCEDURE — 97535 SELF CARE MNGMENT TRAINING: CPT | Mod: GO | Performed by: OCCUPATIONAL THERAPIST

## 2018-01-01 PROCEDURE — 99238 HOSP IP/OBS DSCHRG MGMT 30/<: CPT | Performed by: INTERNAL MEDICINE

## 2018-01-01 PROCEDURE — 85025 COMPLETE CBC W/AUTO DIFF WBC: CPT | Performed by: EMERGENCY MEDICINE

## 2018-01-01 PROCEDURE — 12000007 ZZH R&B INTERMEDIATE

## 2018-01-01 PROCEDURE — 82805 BLOOD GASES W/O2 SATURATION: CPT | Performed by: PHYSICIAN ASSISTANT

## 2018-01-01 PROCEDURE — 99207 ZZC CONSULT E&M CHANGED TO INITIAL LEVEL: CPT | Performed by: INTERNAL MEDICINE

## 2018-01-01 PROCEDURE — 40000225 ZZH STATISTIC SLP WARD VISIT

## 2018-01-01 PROCEDURE — 00000146 ZZHCL STATISTIC GLUCOSE BY METER IP

## 2018-01-01 PROCEDURE — 12000000 ZZH R&B MED SURG/OB

## 2018-01-01 PROCEDURE — 84484 ASSAY OF TROPONIN QUANT: CPT

## 2018-01-01 PROCEDURE — 97530 THERAPEUTIC ACTIVITIES: CPT | Mod: GP | Performed by: PHYSICAL THERAPIST

## 2018-01-01 PROCEDURE — 85027 COMPLETE CBC AUTOMATED: CPT | Performed by: INTERNAL MEDICINE

## 2018-01-01 PROCEDURE — 83605 ASSAY OF LACTIC ACID: CPT

## 2018-01-01 PROCEDURE — 40000193 ZZH STATISTIC PT WARD VISIT: Performed by: PHYSICAL THERAPY ASSISTANT

## 2018-01-01 PROCEDURE — 25000125 ZZHC RX 250

## 2018-01-01 PROCEDURE — 93308 TTE F-UP OR LMTD: CPT | Mod: 26 | Performed by: INTERNAL MEDICINE

## 2018-01-01 PROCEDURE — 93005 ELECTROCARDIOGRAM TRACING: CPT

## 2018-01-01 PROCEDURE — 97110 THERAPEUTIC EXERCISES: CPT | Mod: GP

## 2018-01-01 PROCEDURE — 97165 OT EVAL LOW COMPLEX 30 MIN: CPT | Mod: GO | Performed by: STUDENT IN AN ORGANIZED HEALTH CARE EDUCATION/TRAINING PROGRAM

## 2018-01-01 PROCEDURE — 40000281 ZZH STATISTIC TRANSPORT TIME EA 15 MIN

## 2018-01-01 PROCEDURE — 97161 PT EVAL LOW COMPLEX 20 MIN: CPT | Mod: GP

## 2018-01-01 PROCEDURE — 40000133 ZZH STATISTIC OT WARD VISIT: Performed by: STUDENT IN AN ORGANIZED HEALTH CARE EDUCATION/TRAINING PROGRAM

## 2018-01-01 PROCEDURE — 96361 HYDRATE IV INFUSION ADD-ON: CPT

## 2018-01-01 PROCEDURE — 99283 EMERGENCY DEPT VISIT LOW MDM: CPT | Mod: 25

## 2018-01-01 PROCEDURE — 85610 PROTHROMBIN TIME: CPT | Performed by: INTERNAL MEDICINE

## 2018-01-01 PROCEDURE — 97535 SELF CARE MNGMENT TRAINING: CPT | Mod: GO | Performed by: STUDENT IN AN ORGANIZED HEALTH CARE EDUCATION/TRAINING PROGRAM

## 2018-01-01 PROCEDURE — 82805 BLOOD GASES W/O2 SATURATION: CPT | Performed by: INTERNAL MEDICINE

## 2018-01-01 PROCEDURE — 25000131 ZZH RX MED GY IP 250 OP 636 PS 637: Mod: GY | Performed by: INTERNAL MEDICINE

## 2018-01-01 PROCEDURE — 99291 CRITICAL CARE FIRST HOUR: CPT | Performed by: INTERNAL MEDICINE

## 2018-01-01 PROCEDURE — 86200 CCP ANTIBODY: CPT | Performed by: INTERNAL MEDICINE

## 2018-01-01 PROCEDURE — 12000011 ZZH R&B MS OVERFLOW

## 2018-01-01 PROCEDURE — 27210339 ZZH CIRCUIT HUMIDITY W/CPAP BIP

## 2018-01-01 PROCEDURE — 99213 OFFICE O/P EST LOW 20 MIN: CPT | Performed by: INTERNAL MEDICINE

## 2018-01-01 PROCEDURE — 97116 GAIT TRAINING THERAPY: CPT | Mod: GP

## 2018-01-01 PROCEDURE — 25000125 ZZHC RX 250: Performed by: PHYSICIAN ASSISTANT

## 2018-01-01 PROCEDURE — 99233 SBSQ HOSP IP/OBS HIGH 50: CPT | Performed by: INTERNAL MEDICINE

## 2018-01-01 PROCEDURE — 87040 BLOOD CULTURE FOR BACTERIA: CPT | Performed by: INTERNAL MEDICINE

## 2018-01-01 PROCEDURE — 80061 LIPID PANEL: CPT | Performed by: FAMILY MEDICINE

## 2018-01-01 PROCEDURE — 99213 OFFICE O/P EST LOW 20 MIN: CPT | Mod: 25 | Performed by: ORTHOPAEDIC SURGERY

## 2018-01-01 PROCEDURE — 71260 CT THORAX DX C+: CPT

## 2018-01-01 PROCEDURE — 84550 ASSAY OF BLOOD/URIC ACID: CPT | Performed by: INTERNAL MEDICINE

## 2018-01-01 PROCEDURE — 82565 ASSAY OF CREATININE: CPT | Performed by: INTERNAL MEDICINE

## 2018-01-01 PROCEDURE — 25000128 H RX IP 250 OP 636: Performed by: PHYSICIAN ASSISTANT

## 2018-01-01 PROCEDURE — 93321 DOPPLER ECHO F-UP/LMTD STD: CPT | Mod: 26 | Performed by: INTERNAL MEDICINE

## 2018-01-01 PROCEDURE — 92610 EVALUATE SWALLOWING FUNCTION: CPT | Mod: GN

## 2018-01-01 PROCEDURE — 40000274 ZZH STATISTIC RCP CONSULT EA 30 MIN

## 2018-01-01 PROCEDURE — 96374 THER/PROPH/DIAG INJ IV PUSH: CPT

## 2018-01-01 PROCEDURE — 97535 SELF CARE MNGMENT TRAINING: CPT | Mod: GO

## 2018-01-01 PROCEDURE — 99239 HOSP IP/OBS DSCHRG MGMT >30: CPT | Performed by: INTERNAL MEDICINE

## 2018-01-01 PROCEDURE — 97116 GAIT TRAINING THERAPY: CPT | Mod: GP | Performed by: PHYSICAL THERAPY ASSISTANT

## 2018-01-01 PROCEDURE — 97530 THERAPEUTIC ACTIVITIES: CPT | Mod: GO

## 2018-01-01 PROCEDURE — 86431 RHEUMATOID FACTOR QUANT: CPT | Performed by: INTERNAL MEDICINE

## 2018-01-01 PROCEDURE — 85025 COMPLETE CBC W/AUTO DIFF WBC: CPT | Performed by: PHYSICIAN ASSISTANT

## 2018-01-01 PROCEDURE — 99285 EMERGENCY DEPT VISIT HI MDM: CPT | Mod: 25

## 2018-01-01 PROCEDURE — 80053 COMPREHEN METABOLIC PANEL: CPT | Performed by: EMERGENCY MEDICINE

## 2018-01-01 PROCEDURE — 99496 TRANSJ CARE MGMT HIGH F2F 7D: CPT | Performed by: FAMILY MEDICINE

## 2018-01-01 PROCEDURE — 84484 ASSAY OF TROPONIN QUANT: CPT | Performed by: PHYSICIAN ASSISTANT

## 2018-01-01 PROCEDURE — 99214 OFFICE O/P EST MOD 30 MIN: CPT | Performed by: FAMILY MEDICINE

## 2018-01-01 PROCEDURE — 97165 OT EVAL LOW COMPLEX 30 MIN: CPT | Mod: GO

## 2018-01-01 PROCEDURE — 80053 COMPREHEN METABOLIC PANEL: CPT | Performed by: FAMILY MEDICINE

## 2018-01-01 PROCEDURE — 93971 EXTREMITY STUDY: CPT | Mod: RT

## 2018-01-01 PROCEDURE — 85049 AUTOMATED PLATELET COUNT: CPT | Performed by: INTERNAL MEDICINE

## 2018-01-01 PROCEDURE — 84484 ASSAY OF TROPONIN QUANT: CPT | Mod: 91 | Performed by: EMERGENCY MEDICINE

## 2018-01-01 PROCEDURE — 93325 DOPPLER ECHO COLOR FLOW MAPG: CPT | Mod: 26 | Performed by: INTERNAL MEDICINE

## 2018-01-01 PROCEDURE — 87640 STAPH A DNA AMP PROBE: CPT | Performed by: INTERNAL MEDICINE

## 2018-01-01 PROCEDURE — 40000133 ZZH STATISTIC OT WARD VISIT

## 2018-01-01 PROCEDURE — 84100 ASSAY OF PHOSPHORUS: CPT | Performed by: INTERNAL MEDICINE

## 2018-01-01 PROCEDURE — 83605 ASSAY OF LACTIC ACID: CPT | Performed by: EMERGENCY MEDICINE

## 2018-01-01 PROCEDURE — 82803 BLOOD GASES ANY COMBINATION: CPT

## 2018-01-01 PROCEDURE — 25000128 H RX IP 250 OP 636: Performed by: EMERGENCY MEDICINE

## 2018-01-01 PROCEDURE — 82805 BLOOD GASES W/O2 SATURATION: CPT | Performed by: EMERGENCY MEDICINE

## 2018-01-01 PROCEDURE — 27211316 ZZ H MASK, CPAP TOTAL HEADGEAR, LATEX FREE

## 2018-01-01 PROCEDURE — G0424 PULMONARY REHAB W EXER: HCPCS

## 2018-01-01 PROCEDURE — 87804 INFLUENZA ASSAY W/OPTIC: CPT | Performed by: INTERNAL MEDICINE

## 2018-01-01 PROCEDURE — 99204 OFFICE O/P NEW MOD 45 MIN: CPT | Performed by: INTERNAL MEDICINE

## 2018-01-01 PROCEDURE — 87641 MR-STAPH DNA AMP PROBE: CPT | Performed by: INTERNAL MEDICINE

## 2018-01-01 PROCEDURE — 25000125 ZZHC RX 250: Performed by: EMERGENCY MEDICINE

## 2018-01-01 PROCEDURE — 87633 RESP VIRUS 12-25 TARGETS: CPT | Performed by: EMERGENCY MEDICINE

## 2018-01-01 PROCEDURE — 90670 PCV13 VACCINE IM: CPT | Performed by: FAMILY MEDICINE

## 2018-01-01 PROCEDURE — 99000 SPECIMEN HANDLING OFFICE-LAB: CPT | Performed by: INTERNAL MEDICINE

## 2018-01-01 PROCEDURE — 36415 COLL VENOUS BLD VENIPUNCTURE: CPT | Performed by: FAMILY MEDICINE

## 2018-01-01 PROCEDURE — 20610 DRAIN/INJ JOINT/BURSA W/O US: CPT | Mod: LT | Performed by: ORTHOPAEDIC SURGERY

## 2018-01-01 PROCEDURE — 82565 ASSAY OF CREATININE: CPT | Mod: 91

## 2018-01-01 PROCEDURE — 84443 ASSAY THYROID STIM HORMONE: CPT | Performed by: FAMILY MEDICINE

## 2018-01-01 PROCEDURE — 73565 X-RAY EXAM OF KNEES: CPT

## 2018-01-01 PROCEDURE — 25500064 ZZH RX 255 OP 636: Performed by: INTERNAL MEDICINE

## 2018-01-01 PROCEDURE — 84484 ASSAY OF TROPONIN QUANT: CPT | Performed by: EMERGENCY MEDICINE

## 2018-01-01 PROCEDURE — 85025 COMPLETE CBC W/AUTO DIFF WBC: CPT | Performed by: INTERNAL MEDICINE

## 2018-01-01 PROCEDURE — 85520 HEPARIN ASSAY: CPT | Performed by: INTERNAL MEDICINE

## 2018-01-01 PROCEDURE — 93306 TTE W/DOPPLER COMPLETE: CPT | Mod: 26 | Performed by: INTERNAL MEDICINE

## 2018-01-01 PROCEDURE — 83880 ASSAY OF NATRIURETIC PEPTIDE: CPT | Performed by: EMERGENCY MEDICINE

## 2018-01-01 PROCEDURE — 71046 X-RAY EXAM CHEST 2 VIEWS: CPT

## 2018-01-01 PROCEDURE — 83735 ASSAY OF MAGNESIUM: CPT | Performed by: INTERNAL MEDICINE

## 2018-01-01 PROCEDURE — 93308 TTE F-UP OR LMTD: CPT

## 2018-01-01 PROCEDURE — 86140 C-REACTIVE PROTEIN: CPT | Performed by: INTERNAL MEDICINE

## 2018-01-01 PROCEDURE — 40000244 ZZH STATISTIC VISIT PULM REHAB

## 2018-01-01 PROCEDURE — 83880 ASSAY OF NATRIURETIC PEPTIDE: CPT | Performed by: INTERNAL MEDICINE

## 2018-01-01 PROCEDURE — 40000809 ZZH STATISTIC NO DOCUMENTATION TO SUPPORT CHARGE

## 2018-01-01 PROCEDURE — 40000225 ZZH STATISTIC SLP WARD VISIT: Performed by: SPEECH-LANGUAGE PATHOLOGIST

## 2018-01-01 PROCEDURE — 83605 ASSAY OF LACTIC ACID: CPT | Mod: 91

## 2018-01-01 PROCEDURE — 36415 COLL VENOUS BLD VENIPUNCTURE: CPT | Performed by: EMERGENCY MEDICINE

## 2018-01-01 PROCEDURE — 84145 PROCALCITONIN (PCT): CPT | Performed by: PHYSICIAN ASSISTANT

## 2018-01-01 PROCEDURE — 94645 CONT INHLJ TX EACH ADDL HOUR: CPT

## 2018-01-01 PROCEDURE — 99222 1ST HOSP IP/OBS MODERATE 55: CPT | Performed by: INTERNAL MEDICINE

## 2018-01-01 PROCEDURE — 97530 THERAPEUTIC ACTIVITIES: CPT | Mod: GP | Performed by: PHYSICAL THERAPY ASSISTANT

## 2018-01-01 PROCEDURE — 94644 CONT INHLJ TX 1ST HOUR: CPT

## 2018-01-01 PROCEDURE — 40000264 ECHO COMPLETE WITH LUMASON

## 2018-01-01 PROCEDURE — 99214 OFFICE O/P EST MOD 30 MIN: CPT | Mod: 25 | Performed by: FAMILY MEDICINE

## 2018-01-01 PROCEDURE — 87389 HIV-1 AG W/HIV-1&-2 AB AG IA: CPT | Performed by: FAMILY MEDICINE

## 2018-01-01 PROCEDURE — 71045 X-RAY EXAM CHEST 1 VIEW: CPT

## 2018-01-01 PROCEDURE — 99495 TRANSJ CARE MGMT MOD F2F 14D: CPT | Performed by: FAMILY MEDICINE

## 2018-01-01 PROCEDURE — 40000141 ZZH STATISTIC PERIPHERAL IV START W/O US GUIDANCE

## 2018-01-01 PROCEDURE — 25000132 ZZH RX MED GY IP 250 OP 250 PS 637: Mod: GY

## 2018-01-01 PROCEDURE — 27210338 ZZH CIRCUIT HUMID FACE/TRACH MSK

## 2018-01-01 PROCEDURE — A9270 NON-COVERED ITEM OR SERVICE: HCPCS | Mod: GY | Performed by: EMERGENCY MEDICINE

## 2018-01-01 PROCEDURE — G0009 ADMIN PNEUMOCOCCAL VACCINE: HCPCS | Performed by: FAMILY MEDICINE

## 2018-01-01 PROCEDURE — 84132 ASSAY OF SERUM POTASSIUM: CPT | Performed by: INTERNAL MEDICINE

## 2018-01-01 PROCEDURE — 85730 THROMBOPLASTIN TIME PARTIAL: CPT | Performed by: INTERNAL MEDICINE

## 2018-01-01 PROCEDURE — 97116 GAIT TRAINING THERAPY: CPT | Mod: GP | Performed by: PHYSICAL THERAPIST

## 2018-01-01 PROCEDURE — 85384 FIBRINOGEN ACTIVITY: CPT | Performed by: INTERNAL MEDICINE

## 2018-01-01 PROCEDURE — 40000893 ZZH STATISTIC PT IP EVAL DEFER: Performed by: PHYSICAL THERAPIST

## 2018-01-01 PROCEDURE — 97162 PT EVAL MOD COMPLEX 30 MIN: CPT | Mod: GP | Performed by: PHYSICAL THERAPIST

## 2018-01-01 PROCEDURE — 87804 INFLUENZA ASSAY W/OPTIC: CPT | Performed by: EMERGENCY MEDICINE

## 2018-01-01 PROCEDURE — 85652 RBC SED RATE AUTOMATED: CPT | Performed by: INTERNAL MEDICINE

## 2018-01-01 PROCEDURE — 80053 COMPREHEN METABOLIC PANEL: CPT | Performed by: PHYSICIAN ASSISTANT

## 2018-01-01 RX ORDER — QUETIAPINE FUMARATE 50 MG/1
50 TABLET, FILM COATED ORAL DAILY
Qty: 30 TABLET | Refills: 1 | Status: SHIPPED | OUTPATIENT
Start: 2018-01-01 | End: 2018-01-01

## 2018-01-01 RX ORDER — PREDNISONE 10 MG/1
TABLET ORAL
Qty: 20 TABLET | Refills: 0 | Status: SHIPPED | OUTPATIENT
Start: 2018-01-01 | End: 2018-01-01

## 2018-01-01 RX ORDER — ALBUTEROL SULFATE 0.83 MG/ML
1 SOLUTION RESPIRATORY (INHALATION) 4 TIMES DAILY PRN
Qty: 120 VIAL | Refills: 5 | Status: SHIPPED | OUTPATIENT
Start: 2018-01-01

## 2018-01-01 RX ORDER — SODIUM CHLORIDE 9 MG/ML
INJECTION, SOLUTION INTRAVENOUS CONTINUOUS
Status: DISCONTINUED | OUTPATIENT
Start: 2018-01-01 | End: 2018-01-01

## 2018-01-01 RX ORDER — AZITHROMYCIN 250 MG/1
250 TABLET, FILM COATED ORAL DAILY
Status: DISCONTINUED | OUTPATIENT
Start: 2018-01-01 | End: 2018-01-01 | Stop reason: HOSPADM

## 2018-01-01 RX ORDER — IPRATROPIUM BROMIDE AND ALBUTEROL SULFATE 2.5; .5 MG/3ML; MG/3ML
SOLUTION RESPIRATORY (INHALATION)
Status: COMPLETED
Start: 2018-01-01 | End: 2018-01-01

## 2018-01-01 RX ORDER — HYDROCODONE BITARTRATE AND ACETAMINOPHEN 5; 325 MG/1; MG/1
TABLET ORAL
Qty: 30 TABLET | Refills: 0 | Status: ON HOLD | OUTPATIENT
Start: 2018-01-01 | End: 2018-01-01

## 2018-01-01 RX ORDER — POLYETHYLENE GLYCOL 3350 17 G/17G
17 POWDER, FOR SOLUTION ORAL DAILY PRN
Status: DISCONTINUED | OUTPATIENT
Start: 2018-01-01 | End: 2018-01-01 | Stop reason: HOSPADM

## 2018-01-01 RX ORDER — HYDROCODONE BITARTRATE AND ACETAMINOPHEN 5; 325 MG/1; MG/1
TABLET ORAL
Qty: 20 TABLET | Refills: 0 | Status: SHIPPED | OUTPATIENT
Start: 2018-01-01 | End: 2018-01-01

## 2018-01-01 RX ORDER — HYDROCODONE BITARTRATE AND ACETAMINOPHEN 5; 325 MG/1; MG/1
TABLET ORAL
Qty: 30 TABLET | Refills: 0 | Status: SHIPPED | OUTPATIENT
Start: 2018-01-01

## 2018-01-01 RX ORDER — PREDNISONE 20 MG/1
40 TABLET ORAL DAILY
Status: CANCELLED | OUTPATIENT
Start: 2018-01-01

## 2018-01-01 RX ORDER — IPRATROPIUM BROMIDE AND ALBUTEROL SULFATE 2.5; .5 MG/3ML; MG/3ML
3 SOLUTION RESPIRATORY (INHALATION)
Status: DISCONTINUED | OUTPATIENT
Start: 2018-01-01 | End: 2018-01-01 | Stop reason: HOSPADM

## 2018-01-01 RX ORDER — DOCUSATE SODIUM 100 MG/1
100 CAPSULE, LIQUID FILLED ORAL 2 TIMES DAILY
Status: DISCONTINUED | OUTPATIENT
Start: 2018-01-01 | End: 2018-01-01 | Stop reason: HOSPADM

## 2018-01-01 RX ORDER — BUPROPION HYDROCHLORIDE 150 MG/1
300 TABLET ORAL EVERY MORNING
Status: DISCONTINUED | OUTPATIENT
Start: 2018-01-01 | End: 2018-01-01 | Stop reason: HOSPADM

## 2018-01-01 RX ORDER — IPRATROPIUM BROMIDE AND ALBUTEROL SULFATE 2.5; .5 MG/3ML; MG/3ML
SOLUTION RESPIRATORY (INHALATION)
Status: DISCONTINUED
Start: 2018-01-01 | End: 2018-01-01 | Stop reason: HOSPADM

## 2018-01-01 RX ORDER — PREDNISONE 20 MG/1
60 TABLET ORAL DAILY
Status: DISCONTINUED | OUTPATIENT
Start: 2018-01-01 | End: 2018-01-01 | Stop reason: HOSPADM

## 2018-01-01 RX ORDER — AMOXICILLIN 250 MG
1 CAPSULE ORAL 2 TIMES DAILY PRN
Status: DISCONTINUED | OUTPATIENT
Start: 2018-01-01 | End: 2018-01-01 | Stop reason: HOSPADM

## 2018-01-01 RX ORDER — AMLODIPINE BESYLATE 5 MG/1
5 TABLET ORAL DAILY
Status: DISCONTINUED | OUTPATIENT
Start: 2018-01-01 | End: 2018-01-01 | Stop reason: HOSPADM

## 2018-01-01 RX ORDER — PROCHLORPERAZINE 25 MG
25 SUPPOSITORY, RECTAL RECTAL EVERY 12 HOURS PRN
Status: DISCONTINUED | OUTPATIENT
Start: 2018-01-01 | End: 2018-01-01 | Stop reason: HOSPADM

## 2018-01-01 RX ORDER — ISOSORBIDE MONONITRATE 30 MG/1
30 TABLET, EXTENDED RELEASE ORAL DAILY
Status: DISCONTINUED | OUTPATIENT
Start: 2018-01-01 | End: 2018-01-01 | Stop reason: HOSPADM

## 2018-01-01 RX ORDER — AMOXICILLIN 250 MG
2 CAPSULE ORAL 2 TIMES DAILY PRN
Status: DISCONTINUED | OUTPATIENT
Start: 2018-01-01 | End: 2018-01-01 | Stop reason: HOSPADM

## 2018-01-01 RX ORDER — ALBUTEROL SULFATE 0.83 MG/ML
2.5 SOLUTION RESPIRATORY (INHALATION)
Status: DISCONTINUED | OUTPATIENT
Start: 2018-01-01 | End: 2018-01-01 | Stop reason: HOSPADM

## 2018-01-01 RX ORDER — IPRATROPIUM BROMIDE AND ALBUTEROL SULFATE 2.5; .5 MG/3ML; MG/3ML
3 SOLUTION RESPIRATORY (INHALATION)
Status: DISCONTINUED | OUTPATIENT
Start: 2018-01-01 | End: 2018-01-01

## 2018-01-01 RX ORDER — HYDROCODONE BITARTRATE AND ACETAMINOPHEN 5; 325 MG/1; MG/1
1 TABLET ORAL 2 TIMES DAILY PRN
Status: DISCONTINUED | OUTPATIENT
Start: 2018-01-01 | End: 2018-01-01 | Stop reason: HOSPADM

## 2018-01-01 RX ORDER — POTASSIUM CHLORIDE 7.45 MG/ML
10 INJECTION INTRAVENOUS
Status: DISCONTINUED | OUTPATIENT
Start: 2018-01-01 | End: 2018-01-01 | Stop reason: HOSPADM

## 2018-01-01 RX ORDER — HYDROCODONE BITARTRATE AND ACETAMINOPHEN 5; 325 MG/1; MG/1
TABLET ORAL
Qty: 30 TABLET | Refills: 0 | Status: SHIPPED | OUTPATIENT
Start: 2018-01-01 | End: 2018-01-01

## 2018-01-01 RX ORDER — TIOTROPIUM BROMIDE 18 UG/1
18 CAPSULE ORAL; RESPIRATORY (INHALATION) DAILY
Status: DISCONTINUED | OUTPATIENT
Start: 2018-01-01 | End: 2018-01-01

## 2018-01-01 RX ORDER — PREDNISONE 20 MG/1
40 TABLET ORAL DAILY
Status: DISCONTINUED | OUTPATIENT
Start: 2018-01-01 | End: 2018-01-01 | Stop reason: HOSPADM

## 2018-01-01 RX ORDER — ACETAMINOPHEN 325 MG/1
650 TABLET ORAL EVERY 4 HOURS PRN
Status: DISCONTINUED | OUTPATIENT
Start: 2018-01-01 | End: 2018-01-01 | Stop reason: HOSPADM

## 2018-01-01 RX ORDER — LORAZEPAM 2 MG/ML
INJECTION INTRAMUSCULAR
Status: DISPENSED
Start: 2018-01-01 | End: 2018-01-01

## 2018-01-01 RX ORDER — PRIMIDONE 50 MG/1
TABLET ORAL
Status: SHIPPED
Start: 2018-01-01

## 2018-01-01 RX ORDER — QUETIAPINE FUMARATE 25 MG/1
50 TABLET, FILM COATED ORAL EVERY MORNING
Status: DISCONTINUED | OUTPATIENT
Start: 2018-01-01 | End: 2018-01-01 | Stop reason: HOSPADM

## 2018-01-01 RX ORDER — ONDANSETRON 2 MG/ML
4 INJECTION INTRAMUSCULAR; INTRAVENOUS EVERY 6 HOURS PRN
Status: DISCONTINUED | OUTPATIENT
Start: 2018-01-01 | End: 2018-01-01 | Stop reason: HOSPADM

## 2018-01-01 RX ORDER — LIDOCAINE HYDROCHLORIDE 10 MG/ML
3 INJECTION, SOLUTION INFILTRATION; PERINEURAL ONCE
Qty: 3 ML | Refills: 0 | OUTPATIENT
Start: 2018-01-01 | End: 2018-01-01

## 2018-01-01 RX ORDER — PREDNISONE 10 MG/1
10 TABLET ORAL DAILY
Qty: 30 TABLET | Refills: 0 | Status: SHIPPED | OUTPATIENT
Start: 2018-01-01 | End: 2018-01-01

## 2018-01-01 RX ORDER — ATORVASTATIN CALCIUM 40 MG/1
40 TABLET, FILM COATED ORAL DAILY
Qty: 90 TABLET | Refills: 3 | Status: SHIPPED | OUTPATIENT
Start: 2018-01-01

## 2018-01-01 RX ORDER — METHYLPREDNISOLONE SODIUM SUCCINATE 40 MG/ML
40 INJECTION, POWDER, LYOPHILIZED, FOR SOLUTION INTRAMUSCULAR; INTRAVENOUS EVERY 12 HOURS
Status: COMPLETED | OUTPATIENT
Start: 2018-01-01 | End: 2018-01-01

## 2018-01-01 RX ORDER — METHYLPREDNISOLONE ACETATE 40 MG/ML
40 INJECTION, SUSPENSION INTRA-ARTICULAR; INTRALESIONAL; INTRAMUSCULAR; SOFT TISSUE ONCE
Qty: 1 ML | Refills: 0 | OUTPATIENT
Start: 2018-01-01 | End: 2018-01-01

## 2018-01-01 RX ORDER — QUETIAPINE FUMARATE 200 MG/1
200 TABLET, FILM COATED ORAL AT BEDTIME
Status: DISCONTINUED | OUTPATIENT
Start: 2018-01-01 | End: 2018-01-01 | Stop reason: HOSPADM

## 2018-01-01 RX ORDER — HYDROCODONE BITARTRATE AND ACETAMINOPHEN 5; 325 MG/1; MG/1
1-2 TABLET ORAL EVERY 4 HOURS PRN
Status: DISCONTINUED | OUTPATIENT
Start: 2018-01-01 | End: 2018-01-01 | Stop reason: HOSPADM

## 2018-01-01 RX ORDER — DIAZEPAM 5 MG
10 TABLET ORAL EVERY 12 HOURS PRN
Status: DISCONTINUED | OUTPATIENT
Start: 2018-01-01 | End: 2018-01-01 | Stop reason: HOSPADM

## 2018-01-01 RX ORDER — METHYLPREDNISOLONE SODIUM SUCCINATE 40 MG/ML
40 INJECTION, POWDER, LYOPHILIZED, FOR SOLUTION INTRAMUSCULAR; INTRAVENOUS EVERY 12 HOURS
Status: DISCONTINUED | OUTPATIENT
Start: 2018-01-01 | End: 2018-01-01

## 2018-01-01 RX ORDER — IPRATROPIUM BROMIDE AND ALBUTEROL SULFATE 2.5; .5 MG/3ML; MG/3ML
6 SOLUTION RESPIRATORY (INHALATION) ONCE
Status: COMPLETED | OUTPATIENT
Start: 2018-01-01 | End: 2018-01-01

## 2018-01-01 RX ORDER — IOPAMIDOL 755 MG/ML
500 INJECTION, SOLUTION INTRAVASCULAR ONCE
Status: COMPLETED | OUTPATIENT
Start: 2018-01-01 | End: 2018-01-01

## 2018-01-01 RX ORDER — ASPIRIN 81 MG/1
81 TABLET, CHEWABLE ORAL DAILY
Status: DISCONTINUED | OUTPATIENT
Start: 2018-01-01 | End: 2018-01-01 | Stop reason: HOSPADM

## 2018-01-01 RX ORDER — OXYCODONE HYDROCHLORIDE 5 MG/1
5 TABLET ORAL EVERY 4 HOURS PRN
Status: DISCONTINUED | OUTPATIENT
Start: 2018-01-01 | End: 2018-01-01 | Stop reason: HOSPADM

## 2018-01-01 RX ORDER — METHYLPREDNISOLONE SODIUM SUCCINATE 125 MG/2ML
60 INJECTION, POWDER, LYOPHILIZED, FOR SOLUTION INTRAMUSCULAR; INTRAVENOUS EVERY 12 HOURS
Status: DISCONTINUED | OUTPATIENT
Start: 2018-01-01 | End: 2018-01-01

## 2018-01-01 RX ORDER — ALBUTEROL SULFATE 0.83 MG/ML
2.5 SOLUTION RESPIRATORY (INHALATION) EVERY 4 HOURS PRN
Status: DISCONTINUED | OUTPATIENT
Start: 2018-01-01 | End: 2018-01-01

## 2018-01-01 RX ORDER — ARIPIPRAZOLE 5 MG/1
5 TABLET ORAL DAILY
Status: DISCONTINUED | OUTPATIENT
Start: 2018-01-01 | End: 2018-01-01 | Stop reason: HOSPADM

## 2018-01-01 RX ORDER — ONDANSETRON 4 MG/1
4 TABLET, ORALLY DISINTEGRATING ORAL EVERY 6 HOURS PRN
Status: DISCONTINUED | OUTPATIENT
Start: 2018-01-01 | End: 2018-01-01 | Stop reason: HOSPADM

## 2018-01-01 RX ORDER — ATORVASTATIN CALCIUM 40 MG/1
40 TABLET, FILM COATED ORAL DAILY
Status: DISCONTINUED | OUTPATIENT
Start: 2018-01-01 | End: 2018-01-01 | Stop reason: HOSPADM

## 2018-01-01 RX ORDER — TIOTROPIUM BROMIDE 18 UG/1
18 CAPSULE ORAL; RESPIRATORY (INHALATION) DAILY
Status: DISCONTINUED | OUTPATIENT
Start: 2018-01-01 | End: 2018-01-01 | Stop reason: ALTCHOICE

## 2018-01-01 RX ORDER — IPRATROPIUM BROMIDE AND ALBUTEROL SULFATE 2.5; .5 MG/3ML; MG/3ML
3 SOLUTION RESPIRATORY (INHALATION) EVERY 4 HOURS PRN
Status: DISCONTINUED | OUTPATIENT
Start: 2018-01-01 | End: 2018-01-01 | Stop reason: HOSPADM

## 2018-01-01 RX ORDER — PREDNISONE 10 MG/1
TABLET ORAL
Qty: 30 TABLET | Refills: 0 | Status: ON HOLD | OUTPATIENT
Start: 2018-01-01 | End: 2018-01-01

## 2018-01-01 RX ORDER — PREDNISONE 20 MG/1
60 TABLET ORAL DAILY
Status: DISCONTINUED | OUTPATIENT
Start: 2018-01-01 | End: 2018-01-01

## 2018-01-01 RX ORDER — LURASIDONE HYDROCHLORIDE 20 MG/1
60 TABLET, FILM COATED ORAL DAILY
Status: DISCONTINUED | OUTPATIENT
Start: 2018-01-01 | End: 2018-01-01 | Stop reason: HOSPADM

## 2018-01-01 RX ORDER — FUROSEMIDE 10 MG/ML
20 INJECTION INTRAMUSCULAR; INTRAVENOUS EVERY 8 HOURS
Status: COMPLETED | OUTPATIENT
Start: 2018-01-01 | End: 2018-01-01

## 2018-01-01 RX ORDER — PRIMIDONE 50 MG/1
TABLET ORAL
Qty: 15 TABLET | Refills: 0 | Status: SHIPPED | OUTPATIENT
Start: 2018-01-01 | End: 2018-01-01

## 2018-01-01 RX ORDER — BISACODYL 10 MG
10 SUPPOSITORY, RECTAL RECTAL DAILY PRN
Status: DISCONTINUED | OUTPATIENT
Start: 2018-01-01 | End: 2018-01-01 | Stop reason: HOSPADM

## 2018-01-01 RX ORDER — GABAPENTIN 300 MG/1
300 CAPSULE ORAL AT BEDTIME
Status: DISCONTINUED | OUTPATIENT
Start: 2018-01-01 | End: 2018-01-01 | Stop reason: HOSPADM

## 2018-01-01 RX ORDER — TIOTROPIUM BROMIDE 18 UG/1
18 CAPSULE ORAL; RESPIRATORY (INHALATION)
Status: DISCONTINUED | OUTPATIENT
Start: 2018-01-01 | End: 2018-01-01 | Stop reason: HOSPADM

## 2018-01-01 RX ORDER — QUETIAPINE FUMARATE 200 MG/1
200 TABLET, FILM COATED ORAL AT BEDTIME
Qty: 90 TABLET | Refills: 1 | Status: SHIPPED | OUTPATIENT
Start: 2018-01-01

## 2018-01-01 RX ORDER — POTASSIUM CHLORIDE 1500 MG/1
20-40 TABLET, EXTENDED RELEASE ORAL
Status: DISCONTINUED | OUTPATIENT
Start: 2018-01-01 | End: 2018-01-01 | Stop reason: HOSPADM

## 2018-01-01 RX ORDER — DOXYCYCLINE HYCLATE 100 MG
100 TABLET ORAL EVERY 12 HOURS SCHEDULED
Status: COMPLETED | OUTPATIENT
Start: 2018-01-01 | End: 2018-01-01

## 2018-01-01 RX ORDER — PREDNISONE 10 MG/1
10 TABLET ORAL DAILY
Qty: 30 TABLET | Refills: 1 | Status: ON HOLD | OUTPATIENT
Start: 2018-01-01 | End: 2018-01-01

## 2018-01-01 RX ORDER — MAGNESIUM SULFATE HEPTAHYDRATE 40 MG/ML
4 INJECTION, SOLUTION INTRAVENOUS EVERY 4 HOURS PRN
Status: DISCONTINUED | OUTPATIENT
Start: 2018-01-01 | End: 2018-01-01

## 2018-01-01 RX ORDER — POTASSIUM CL/LIDO/0.9 % NACL 10MEQ/0.1L
10 INTRAVENOUS SOLUTION, PIGGYBACK (ML) INTRAVENOUS
Status: DISCONTINUED | OUTPATIENT
Start: 2018-01-01 | End: 2018-01-01 | Stop reason: HOSPADM

## 2018-01-01 RX ORDER — PREDNISONE 10 MG/1
TABLET ORAL
Qty: 33 TABLET | Refills: 0 | Status: SHIPPED | OUTPATIENT
Start: 2018-01-01 | End: 2018-01-01

## 2018-01-01 RX ORDER — QUETIAPINE FUMARATE 50 MG/1
TABLET, FILM COATED ORAL
Qty: 30 TABLET | Refills: 2 | Status: SHIPPED | OUTPATIENT
Start: 2018-01-01

## 2018-01-01 RX ORDER — ASPIRIN 81 MG/1
324 TABLET, CHEWABLE ORAL ONCE
Status: COMPLETED | OUTPATIENT
Start: 2018-01-01 | End: 2018-01-01

## 2018-01-01 RX ORDER — NALOXONE HYDROCHLORIDE 0.4 MG/ML
.1-.4 INJECTION, SOLUTION INTRAMUSCULAR; INTRAVENOUS; SUBCUTANEOUS
Status: DISCONTINUED | OUTPATIENT
Start: 2018-01-01 | End: 2018-01-01 | Stop reason: HOSPADM

## 2018-01-01 RX ORDER — ISOSORBIDE MONONITRATE 30 MG/1
30 TABLET, EXTENDED RELEASE ORAL DAILY
Qty: 90 TABLET | Refills: 0 | Status: SHIPPED | OUTPATIENT
Start: 2018-01-01 | End: 2018-01-01

## 2018-01-01 RX ORDER — ISOSORBIDE MONONITRATE 30 MG/1
30 TABLET, EXTENDED RELEASE ORAL DAILY
Qty: 30 TABLET | Refills: 0 | Status: SHIPPED | OUTPATIENT
Start: 2018-01-01 | End: 2018-01-01

## 2018-01-01 RX ORDER — METHYLPREDNISOLONE SODIUM SUCCINATE 125 MG/2ML
125 INJECTION, POWDER, LYOPHILIZED, FOR SOLUTION INTRAMUSCULAR; INTRAVENOUS ONCE
Status: COMPLETED | OUTPATIENT
Start: 2018-01-01 | End: 2018-01-01

## 2018-01-01 RX ORDER — DULOXETIN HYDROCHLORIDE 30 MG/1
90 CAPSULE, DELAYED RELEASE ORAL DAILY
Status: DISCONTINUED | OUTPATIENT
Start: 2018-01-01 | End: 2018-01-01 | Stop reason: HOSPADM

## 2018-01-01 RX ORDER — QUETIAPINE FUMARATE 50 MG/1
50 TABLET, FILM COATED ORAL DAILY
Status: DISCONTINUED | OUTPATIENT
Start: 2018-01-01 | End: 2018-01-01 | Stop reason: HOSPADM

## 2018-01-01 RX ORDER — QUETIAPINE FUMARATE 50 MG/1
50 TABLET, FILM COATED ORAL EVERY MORNING
Status: DISCONTINUED | OUTPATIENT
Start: 2018-01-01 | End: 2018-01-01 | Stop reason: HOSPADM

## 2018-01-01 RX ORDER — PREDNISONE 10 MG/1
TABLET ORAL
Qty: 30 TABLET | Refills: 0 | Status: SHIPPED | OUTPATIENT
Start: 2018-01-01

## 2018-01-01 RX ORDER — METHYLPREDNISOLONE SODIUM SUCCINATE 125 MG/2ML
60 INJECTION, POWDER, LYOPHILIZED, FOR SOLUTION INTRAMUSCULAR; INTRAVENOUS EVERY 24 HOURS
Status: COMPLETED | OUTPATIENT
Start: 2018-01-01 | End: 2018-01-01

## 2018-01-01 RX ORDER — DOXYCYCLINE 100 MG/10ML
100 INJECTION, POWDER, LYOPHILIZED, FOR SOLUTION INTRAVENOUS ONCE
Status: COMPLETED | OUTPATIENT
Start: 2018-01-01 | End: 2018-01-01

## 2018-01-01 RX ORDER — METHYLPREDNISOLONE SODIUM SUCCINATE 125 MG/2ML
60 INJECTION, POWDER, LYOPHILIZED, FOR SOLUTION INTRAMUSCULAR; INTRAVENOUS EVERY 24 HOURS
Status: DISCONTINUED | OUTPATIENT
Start: 2018-01-01 | End: 2018-01-01

## 2018-01-01 RX ORDER — LIDOCAINE 40 MG/G
CREAM TOPICAL
Status: DISCONTINUED | OUTPATIENT
Start: 2018-01-01 | End: 2018-01-01 | Stop reason: HOSPADM

## 2018-01-01 RX ORDER — IPRATROPIUM BROMIDE AND ALBUTEROL SULFATE 2.5; .5 MG/3ML; MG/3ML
3 SOLUTION RESPIRATORY (INHALATION)
Status: COMPLETED | OUTPATIENT
Start: 2018-01-01 | End: 2018-01-01

## 2018-01-01 RX ORDER — QUETIAPINE FUMARATE 200 MG/1
200 TABLET, FILM COATED ORAL AT BEDTIME
Qty: 90 TABLET | Refills: 1 | OUTPATIENT
Start: 2018-01-01

## 2018-01-01 RX ORDER — ATORVASTATIN CALCIUM 40 MG/1
40 TABLET, FILM COATED ORAL EVERY EVENING
Status: DISCONTINUED | OUTPATIENT
Start: 2018-01-01 | End: 2018-01-01 | Stop reason: HOSPADM

## 2018-01-01 RX ORDER — AZITHROMYCIN 250 MG/1
TABLET, FILM COATED ORAL
Qty: 6 TABLET | Refills: 0 | Status: SHIPPED | OUTPATIENT
Start: 2018-01-01 | End: 2018-01-01

## 2018-01-01 RX ORDER — IPRATROPIUM BROMIDE AND ALBUTEROL SULFATE 2.5; .5 MG/3ML; MG/3ML
3 SOLUTION RESPIRATORY (INHALATION) ONCE
Status: COMPLETED | OUTPATIENT
Start: 2018-01-01 | End: 2018-01-01

## 2018-01-01 RX ORDER — DOCUSATE SODIUM 100 MG/1
100 CAPSULE, LIQUID FILLED ORAL DAILY
Status: DISCONTINUED | OUTPATIENT
Start: 2018-01-01 | End: 2018-01-01 | Stop reason: HOSPADM

## 2018-01-01 RX ORDER — ALBUTEROL SULFATE 90 UG/1
AEROSOL, METERED RESPIRATORY (INHALATION)
Qty: 18 G | Refills: 3 | Status: SHIPPED | OUTPATIENT
Start: 2018-01-01

## 2018-01-01 RX ORDER — ALBUTEROL SULFATE 0.83 MG/ML
1 SOLUTION RESPIRATORY (INHALATION) 4 TIMES DAILY PRN
Qty: 120 VIAL | Refills: 5 | Status: CANCELLED | OUTPATIENT
Start: 2018-01-01

## 2018-01-01 RX ORDER — POTASSIUM CHLORIDE 29.8 MG/ML
20 INJECTION INTRAVENOUS
Status: DISCONTINUED | OUTPATIENT
Start: 2018-01-01 | End: 2018-01-01 | Stop reason: HOSPADM

## 2018-01-01 RX ORDER — ARIPIPRAZOLE 5 MG/1
5 TABLET ORAL DAILY
COMMUNITY

## 2018-01-01 RX ORDER — HEPARIN SODIUM 5000 [USP'U]/.5ML
5000 INJECTION, SOLUTION INTRAVENOUS; SUBCUTANEOUS EVERY 8 HOURS
Status: DISCONTINUED | OUTPATIENT
Start: 2018-01-01 | End: 2018-01-01

## 2018-01-01 RX ORDER — BUDESONIDE AND FORMOTEROL FUMARATE DIHYDRATE 80; 4.5 UG/1; UG/1
2 AEROSOL RESPIRATORY (INHALATION) 2 TIMES DAILY
Qty: 1 INHALER | Refills: 0 | Status: SHIPPED | OUTPATIENT
Start: 2018-01-01

## 2018-01-01 RX ORDER — ALBUTEROL SULFATE 90 UG/1
AEROSOL, METERED RESPIRATORY (INHALATION)
Qty: 18 G | Refills: 3 | Status: SHIPPED | OUTPATIENT
Start: 2018-01-01 | End: 2018-01-01

## 2018-01-01 RX ORDER — BUPROPION HYDROCHLORIDE 300 MG/1
300 TABLET ORAL DAILY
Status: DISCONTINUED | OUTPATIENT
Start: 2018-01-01 | End: 2018-01-01 | Stop reason: HOSPADM

## 2018-01-01 RX ORDER — BUDESONIDE AND FORMOTEROL FUMARATE DIHYDRATE 160; 4.5 UG/1; UG/1
1 AEROSOL RESPIRATORY (INHALATION) DAILY
Status: DISCONTINUED | OUTPATIENT
Start: 2018-01-01 | End: 2018-01-01 | Stop reason: CLARIF

## 2018-01-01 RX ORDER — GABAPENTIN 300 MG/1
CAPSULE ORAL
Qty: 90 CAPSULE | Refills: 0 | Status: SHIPPED | OUTPATIENT
Start: 2018-01-01

## 2018-01-01 RX ORDER — ALBUTEROL SULFATE 0.83 MG/ML
SOLUTION RESPIRATORY (INHALATION)
Status: COMPLETED
Start: 2018-01-01 | End: 2018-01-01

## 2018-01-01 RX ORDER — AZITHROMYCIN 250 MG/1
250 TABLET, FILM COATED ORAL DAILY
Qty: 2 TABLET | Refills: 0 | Status: SHIPPED | OUTPATIENT
Start: 2018-01-01 | End: 2018-01-01

## 2018-01-01 RX ORDER — SODIUM CHLORIDE 9 MG/ML
1000 INJECTION, SOLUTION INTRAVENOUS CONTINUOUS
Status: DISCONTINUED | OUTPATIENT
Start: 2018-01-01 | End: 2018-01-01

## 2018-01-01 RX ORDER — LURASIDONE HYDROCHLORIDE 60 MG/1
60 TABLET, FILM COATED ORAL DAILY
Status: DISCONTINUED | OUTPATIENT
Start: 2018-01-01 | End: 2018-01-01

## 2018-01-01 RX ORDER — PROCHLORPERAZINE MALEATE 10 MG
10 TABLET ORAL EVERY 6 HOURS PRN
Status: DISCONTINUED | OUTPATIENT
Start: 2018-01-01 | End: 2018-01-01 | Stop reason: HOSPADM

## 2018-01-01 RX ORDER — DEXAMETHASONE SODIUM PHOSPHATE 4 MG/ML
4 INJECTION, SOLUTION INTRA-ARTICULAR; INTRALESIONAL; INTRAMUSCULAR; INTRAVENOUS; SOFT TISSUE ONCE
Qty: 1 ML | Refills: 0 | OUTPATIENT
Start: 2018-01-01 | End: 2018-01-01

## 2018-01-01 RX ORDER — GUAIFENESIN 600 MG/1
600 TABLET, EXTENDED RELEASE ORAL 2 TIMES DAILY
Status: DISCONTINUED | OUTPATIENT
Start: 2018-01-01 | End: 2018-01-01 | Stop reason: HOSPADM

## 2018-01-01 RX ORDER — IPRATROPIUM BROMIDE AND ALBUTEROL SULFATE 2.5; .5 MG/3ML; MG/3ML
3 SOLUTION RESPIRATORY (INHALATION) EVERY 4 HOURS
Status: DISCONTINUED | OUTPATIENT
Start: 2018-01-01 | End: 2018-01-01

## 2018-01-01 RX ORDER — DULOXETIN HYDROCHLORIDE 30 MG/1
120 CAPSULE, DELAYED RELEASE ORAL DAILY
Status: DISCONTINUED | OUTPATIENT
Start: 2018-01-01 | End: 2018-01-01 | Stop reason: ALTCHOICE

## 2018-01-01 RX ORDER — GABAPENTIN 300 MG/1
300 CAPSULE ORAL AT BEDTIME
Qty: 90 CAPSULE | Refills: 1 | Status: SHIPPED | OUTPATIENT
Start: 2018-01-01 | End: 2018-01-01

## 2018-01-01 RX ORDER — POTASSIUM CHLORIDE 1.5 G/1.58G
20-40 POWDER, FOR SOLUTION ORAL
Status: DISCONTINUED | OUTPATIENT
Start: 2018-01-01 | End: 2018-01-01 | Stop reason: HOSPADM

## 2018-01-01 RX ORDER — ISOSORBIDE MONONITRATE 30 MG/1
30 TABLET, EXTENDED RELEASE ORAL DAILY
Qty: 90 TABLET | Refills: 3 | Status: SHIPPED | OUTPATIENT
Start: 2018-01-01

## 2018-01-01 RX ADMIN — BUPROPION HYDROCHLORIDE 300 MG: 300 TABLET, FILM COATED, EXTENDED RELEASE ORAL at 17:40

## 2018-01-01 RX ADMIN — ACETAMINOPHEN 650 MG: 325 TABLET, FILM COATED ORAL at 02:22

## 2018-01-01 RX ADMIN — GUAIFENESIN 600 MG: 600 TABLET, EXTENDED RELEASE ORAL at 20:43

## 2018-01-01 RX ADMIN — METHYLPREDNISOLONE SODIUM SUCCINATE 62.5 MG: 125 INJECTION, POWDER, FOR SOLUTION INTRAMUSCULAR; INTRAVENOUS at 10:46

## 2018-01-01 RX ADMIN — IPRATROPIUM BROMIDE AND ALBUTEROL SULFATE 3 ML: .5; 3 SOLUTION RESPIRATORY (INHALATION) at 00:07

## 2018-01-01 RX ADMIN — IPRATROPIUM BROMIDE AND ALBUTEROL SULFATE 3 ML: .5; 3 SOLUTION RESPIRATORY (INHALATION) at 23:45

## 2018-01-01 RX ADMIN — IPRATROPIUM BROMIDE AND ALBUTEROL SULFATE 3 ML: .5; 3 SOLUTION RESPIRATORY (INHALATION) at 11:02

## 2018-01-01 RX ADMIN — ARIPIPRAZOLE 5 MG: 5 TABLET ORAL at 08:40

## 2018-01-01 RX ADMIN — ASPIRIN 81 MG 81 MG: 81 TABLET ORAL at 09:23

## 2018-01-01 RX ADMIN — DULOXETINE HYDROCHLORIDE 90 MG: 30 CAPSULE, DELAYED RELEASE ORAL at 09:27

## 2018-01-01 RX ADMIN — QUETIAPINE FUMARATE 200 MG: 200 TABLET, FILM COATED ORAL at 03:16

## 2018-01-01 RX ADMIN — IPRATROPIUM BROMIDE AND ALBUTEROL SULFATE 6 ML: .5; 3 SOLUTION RESPIRATORY (INHALATION) at 10:13

## 2018-01-01 RX ADMIN — METHYLPREDNISOLONE SODIUM SUCCINATE 62.5 MG: 125 INJECTION, POWDER, FOR SOLUTION INTRAMUSCULAR; INTRAVENOUS at 09:23

## 2018-01-01 RX ADMIN — DULOXETINE HYDROCHLORIDE 90 MG: 60 CAPSULE, DELAYED RELEASE ORAL at 08:43

## 2018-01-01 RX ADMIN — METHYLPREDNISOLONE SODIUM SUCCINATE 62.5 MG: 125 INJECTION, POWDER, FOR SOLUTION INTRAMUSCULAR; INTRAVENOUS at 21:12

## 2018-01-01 RX ADMIN — QUETIAPINE FUMARATE 200 MG: 200 TABLET, FILM COATED ORAL at 21:07

## 2018-01-01 RX ADMIN — METHYLPREDNISOLONE SODIUM SUCCINATE 125 MG: 125 INJECTION, POWDER, FOR SOLUTION INTRAMUSCULAR; INTRAVENOUS at 10:13

## 2018-01-01 RX ADMIN — Medication 5 MG: at 21:09

## 2018-01-01 RX ADMIN — IPRATROPIUM BROMIDE AND ALBUTEROL SULFATE 3 ML: .5; 3 SOLUTION RESPIRATORY (INHALATION) at 10:02

## 2018-01-01 RX ADMIN — Medication 5 MG: at 21:21

## 2018-01-01 RX ADMIN — IPRATROPIUM BROMIDE AND ALBUTEROL SULFATE 3 ML: .5; 3 SOLUTION RESPIRATORY (INHALATION) at 09:12

## 2018-01-01 RX ADMIN — ATORVASTATIN CALCIUM 40 MG: 40 TABLET, FILM COATED ORAL at 08:51

## 2018-01-01 RX ADMIN — DOCUSATE SODIUM 100 MG: 100 CAPSULE, LIQUID FILLED ORAL at 09:26

## 2018-01-01 RX ADMIN — IPRATROPIUM BROMIDE AND ALBUTEROL SULFATE 3 ML: .5; 3 SOLUTION RESPIRATORY (INHALATION) at 06:13

## 2018-01-01 RX ADMIN — VITAMIN D, TAB 1000IU (100/BT) 2000 UNITS: 25 TAB at 11:15

## 2018-01-01 RX ADMIN — IPRATROPIUM BROMIDE AND ALBUTEROL SULFATE 3 ML: .5; 3 SOLUTION RESPIRATORY (INHALATION) at 19:29

## 2018-01-01 RX ADMIN — IPRATROPIUM BROMIDE AND ALBUTEROL SULFATE 3 ML: .5; 3 SOLUTION RESPIRATORY (INHALATION) at 15:41

## 2018-01-01 RX ADMIN — IPRATROPIUM BROMIDE AND ALBUTEROL SULFATE 3 ML: .5; 3 SOLUTION RESPIRATORY (INHALATION) at 03:45

## 2018-01-01 RX ADMIN — ASPIRIN 81 MG 81 MG: 81 TABLET ORAL at 08:02

## 2018-01-01 RX ADMIN — DULOXETINE HYDROCHLORIDE 90 MG: 60 CAPSULE, DELAYED RELEASE ORAL at 09:03

## 2018-01-01 RX ADMIN — PREDNISONE 60 MG: 20 TABLET ORAL at 09:03

## 2018-01-01 RX ADMIN — IPRATROPIUM BROMIDE AND ALBUTEROL SULFATE 3 ML: .5; 3 SOLUTION RESPIRATORY (INHALATION) at 04:13

## 2018-01-01 RX ADMIN — SODIUM CHLORIDE 99 ML: 9 INJECTION, SOLUTION INTRAVENOUS at 00:10

## 2018-01-01 RX ADMIN — ISOSORBIDE MONONITRATE 30 MG: 30 TABLET, EXTENDED RELEASE ORAL at 08:54

## 2018-01-01 RX ADMIN — ARIPIPRAZOLE 5 MG: 5 TABLET ORAL at 13:44

## 2018-01-01 RX ADMIN — Medication 4800 UNITS: at 17:35

## 2018-01-01 RX ADMIN — BUPROPION HYDROCHLORIDE 300 MG: 150 TABLET, FILM COATED, EXTENDED RELEASE ORAL at 11:14

## 2018-01-01 RX ADMIN — QUETIAPINE FUMARATE 200 MG: 200 TABLET, FILM COATED ORAL at 21:13

## 2018-01-01 RX ADMIN — IPRATROPIUM BROMIDE AND ALBUTEROL SULFATE 3 ML: .5; 3 SOLUTION RESPIRATORY (INHALATION) at 07:29

## 2018-01-01 RX ADMIN — DOCUSATE SODIUM 100 MG: 100 CAPSULE, LIQUID FILLED ORAL at 21:13

## 2018-01-01 RX ADMIN — IPRATROPIUM BROMIDE AND ALBUTEROL SULFATE 3 ML: .5; 3 SOLUTION RESPIRATORY (INHALATION) at 23:24

## 2018-01-01 RX ADMIN — IPRATROPIUM BROMIDE AND ALBUTEROL SULFATE 3 ML: .5; 3 SOLUTION RESPIRATORY (INHALATION) at 11:52

## 2018-01-01 RX ADMIN — ASPIRIN 81 MG 81 MG: 81 TABLET ORAL at 08:03

## 2018-01-01 RX ADMIN — ASPIRIN 81 MG 81 MG: 81 TABLET ORAL at 09:04

## 2018-01-01 RX ADMIN — SODIUM CHLORIDE: 9 INJECTION, SOLUTION INTRAVENOUS at 08:29

## 2018-01-01 RX ADMIN — LURASIDONE HYDROCHLORIDE 60 MG: 20 TABLET, FILM COATED ORAL at 13:43

## 2018-01-01 RX ADMIN — ASPIRIN 81 MG 81 MG: 81 TABLET ORAL at 11:13

## 2018-01-01 RX ADMIN — HEPARIN SODIUM 800 UNITS/HR: 10000 INJECTION, SOLUTION INTRAVENOUS at 16:39

## 2018-01-01 RX ADMIN — BUPROPION HYDROCHLORIDE 300 MG: 150 TABLET, FILM COATED, EXTENDED RELEASE ORAL at 08:01

## 2018-01-01 RX ADMIN — HEPARIN SODIUM 950 UNITS/HR: 10000 INJECTION, SOLUTION INTRAVENOUS at 17:34

## 2018-01-01 RX ADMIN — ARIPIPRAZOLE 5 MG: 5 TABLET ORAL at 08:27

## 2018-01-01 RX ADMIN — Medication 5 MG: at 21:01

## 2018-01-01 RX ADMIN — Medication 2 G: at 16:48

## 2018-01-01 RX ADMIN — QUETIAPINE FUMARATE 200 MG: 200 TABLET ORAL at 21:13

## 2018-01-01 RX ADMIN — DOXYCYCLINE HYCLATE 100 MG: 100 TABLET, FILM COATED ORAL at 08:03

## 2018-01-01 RX ADMIN — ATORVASTATIN CALCIUM 40 MG: 40 TABLET, FILM COATED ORAL at 07:46

## 2018-01-01 RX ADMIN — SENNOSIDES AND DOCUSATE SODIUM 1 TABLET: 8.6; 5 TABLET ORAL at 20:47

## 2018-01-01 RX ADMIN — IPRATROPIUM BROMIDE AND ALBUTEROL SULFATE 9 ML: .5; 3 SOLUTION RESPIRATORY (INHALATION) at 22:25

## 2018-01-01 RX ADMIN — IPRATROPIUM BROMIDE AND ALBUTEROL SULFATE 3 ML: .5; 3 SOLUTION RESPIRATORY (INHALATION) at 15:28

## 2018-01-01 RX ADMIN — IPRATROPIUM BROMIDE 0.5 MG: 0.5 SOLUTION RESPIRATORY (INHALATION) at 08:12

## 2018-01-01 RX ADMIN — Medication 5 MG: at 21:07

## 2018-01-01 RX ADMIN — Medication 5 MG: at 21:13

## 2018-01-01 RX ADMIN — ISOSORBIDE MONONITRATE 30 MG: 30 TABLET, EXTENDED RELEASE ORAL at 08:31

## 2018-01-01 RX ADMIN — BUPROPION HYDROCHLORIDE 300 MG: 150 TABLET, FILM COATED, EXTENDED RELEASE ORAL at 08:03

## 2018-01-01 RX ADMIN — ASPIRIN 81 MG 81 MG: 81 TABLET ORAL at 08:26

## 2018-01-01 RX ADMIN — IPRATROPIUM BROMIDE AND ALBUTEROL SULFATE 3 ML: .5; 3 SOLUTION RESPIRATORY (INHALATION) at 19:27

## 2018-01-01 RX ADMIN — IPRATROPIUM BROMIDE AND ALBUTEROL SULFATE 3 ML: .5; 3 SOLUTION RESPIRATORY (INHALATION) at 19:37

## 2018-01-01 RX ADMIN — QUETIAPINE FUMARATE 50 MG: 50 TABLET, FILM COATED ORAL at 09:25

## 2018-01-01 RX ADMIN — GABAPENTIN 300 MG: 300 CAPSULE ORAL at 21:02

## 2018-01-01 RX ADMIN — IPRATROPIUM BROMIDE AND ALBUTEROL SULFATE 3 ML: .5; 3 SOLUTION RESPIRATORY (INHALATION) at 07:57

## 2018-01-01 RX ADMIN — DULOXETINE HYDROCHLORIDE 90 MG: 60 CAPSULE, DELAYED RELEASE ORAL at 17:11

## 2018-01-01 RX ADMIN — BUPROPION HYDROCHLORIDE 300 MG: 150 TABLET, FILM COATED, EXTENDED RELEASE ORAL at 08:31

## 2018-01-01 RX ADMIN — DOCUSATE SODIUM 100 MG: 100 CAPSULE, LIQUID FILLED ORAL at 08:30

## 2018-01-01 RX ADMIN — ARIPIPRAZOLE 5 MG: 5 TABLET ORAL at 17:12

## 2018-01-01 RX ADMIN — IPRATROPIUM BROMIDE AND ALBUTEROL SULFATE 3 ML: .5; 3 SOLUTION RESPIRATORY (INHALATION) at 07:34

## 2018-01-01 RX ADMIN — IPRATROPIUM BROMIDE 0.5 MG: 0.5 SOLUTION RESPIRATORY (INHALATION) at 08:02

## 2018-01-01 RX ADMIN — METHYLPREDNISOLONE SODIUM SUCCINATE 62.5 MG: 125 INJECTION, POWDER, FOR SOLUTION INTRAMUSCULAR; INTRAVENOUS at 10:27

## 2018-01-01 RX ADMIN — IPRATROPIUM BROMIDE AND ALBUTEROL SULFATE 3 ML: .5; 3 SOLUTION RESPIRATORY (INHALATION) at 11:11

## 2018-01-01 RX ADMIN — IPRATROPIUM BROMIDE AND ALBUTEROL SULFATE 3 ML: .5; 3 SOLUTION RESPIRATORY (INHALATION) at 15:36

## 2018-01-01 RX ADMIN — ENOXAPARIN SODIUM 40 MG: 40 INJECTION SUBCUTANEOUS at 19:42

## 2018-01-01 RX ADMIN — IPRATROPIUM BROMIDE 0.5 MG: 0.5 SOLUTION RESPIRATORY (INHALATION) at 20:25

## 2018-01-01 RX ADMIN — DOXYCYCLINE HYCLATE 100 MG: 100 TABLET, FILM COATED ORAL at 10:19

## 2018-01-01 RX ADMIN — GABAPENTIN 300 MG: 300 CAPSULE ORAL at 20:54

## 2018-01-01 RX ADMIN — GABAPENTIN 300 MG: 300 CAPSULE ORAL at 21:08

## 2018-01-01 RX ADMIN — DULOXETINE HYDROCHLORIDE 90 MG: 60 CAPSULE, DELAYED RELEASE ORAL at 09:25

## 2018-01-01 RX ADMIN — ARIPIPRAZOLE 5 MG: 5 TABLET ORAL at 08:02

## 2018-01-01 RX ADMIN — IPRATROPIUM BROMIDE 0.5 MG: 0.5 SOLUTION RESPIRATORY (INHALATION) at 11:27

## 2018-01-01 RX ADMIN — IPRATROPIUM BROMIDE AND ALBUTEROL SULFATE 3 ML: .5; 3 SOLUTION RESPIRATORY (INHALATION) at 11:27

## 2018-01-01 RX ADMIN — ISOSORBIDE MONONITRATE 30 MG: 30 TABLET, EXTENDED RELEASE ORAL at 08:27

## 2018-01-01 RX ADMIN — ARIPIPRAZOLE 5 MG: 5 TABLET ORAL at 10:16

## 2018-01-01 RX ADMIN — METHYLPREDNISOLONE SODIUM SUCCINATE 40 MG: 40 INJECTION, POWDER, FOR SOLUTION INTRAMUSCULAR; INTRAVENOUS at 21:28

## 2018-01-01 RX ADMIN — GABAPENTIN 300 MG: 300 CAPSULE ORAL at 21:03

## 2018-01-01 RX ADMIN — IPRATROPIUM BROMIDE AND ALBUTEROL SULFATE 3 ML: .5; 3 SOLUTION RESPIRATORY (INHALATION) at 16:01

## 2018-01-01 RX ADMIN — GABAPENTIN 300 MG: 300 CAPSULE ORAL at 21:01

## 2018-01-01 RX ADMIN — ARIPIPRAZOLE 5 MG: 5 TABLET ORAL at 08:54

## 2018-01-01 RX ADMIN — FUROSEMIDE 20 MG: 10 INJECTION, SOLUTION INTRAMUSCULAR; INTRAVENOUS at 17:10

## 2018-01-01 RX ADMIN — PREDNISONE 40 MG: 20 TABLET ORAL at 09:26

## 2018-01-01 RX ADMIN — ASPIRIN 81 MG 81 MG: 81 TABLET ORAL at 07:44

## 2018-01-01 RX ADMIN — IPRATROPIUM BROMIDE AND ALBUTEROL SULFATE 3 ML: .5; 3 SOLUTION RESPIRATORY (INHALATION) at 10:21

## 2018-01-01 RX ADMIN — VITAMIN D, TAB 1000IU (100/BT) 2000 UNITS: 25 TAB at 07:44

## 2018-01-01 RX ADMIN — QUETIAPINE FUMARATE 200 MG: 200 TABLET ORAL at 22:01

## 2018-01-01 RX ADMIN — IPRATROPIUM BROMIDE AND ALBUTEROL SULFATE 3 ML: .5; 3 SOLUTION RESPIRATORY (INHALATION) at 11:24

## 2018-01-01 RX ADMIN — SODIUM CHLORIDE, PRESERVATIVE FREE 10 MG/HR: 5 INJECTION INTRAVENOUS at 11:07

## 2018-01-01 RX ADMIN — DULOXETINE HYDROCHLORIDE 90 MG: 60 CAPSULE, DELAYED RELEASE ORAL at 07:46

## 2018-01-01 RX ADMIN — DULOXETINE HYDROCHLORIDE 90 MG: 60 CAPSULE, DELAYED RELEASE ORAL at 08:02

## 2018-01-01 RX ADMIN — DOCUSATE SODIUM 100 MG: 100 CAPSULE, LIQUID FILLED ORAL at 08:27

## 2018-01-01 RX ADMIN — METHYLPREDNISOLONE SODIUM SUCCINATE 40 MG: 40 INJECTION, POWDER, FOR SOLUTION INTRAMUSCULAR; INTRAVENOUS at 10:19

## 2018-01-01 RX ADMIN — ARIPIPRAZOLE 5 MG: 5 TABLET ORAL at 08:34

## 2018-01-01 RX ADMIN — PREDNISONE 60 MG: 10 TABLET ORAL at 10:20

## 2018-01-01 RX ADMIN — BUPROPION HYDROCHLORIDE 300 MG: 150 TABLET, FILM COATED, EXTENDED RELEASE ORAL at 07:46

## 2018-01-01 RX ADMIN — GUAIFENESIN 600 MG: 600 TABLET, EXTENDED RELEASE ORAL at 08:54

## 2018-01-01 RX ADMIN — ATORVASTATIN CALCIUM 40 MG: 40 TABLET, FILM COATED ORAL at 09:21

## 2018-01-01 RX ADMIN — GABAPENTIN 300 MG: 300 CAPSULE ORAL at 20:52

## 2018-01-01 RX ADMIN — SODIUM CHLORIDE 1000 ML: 9 INJECTION, SOLUTION INTRAVENOUS at 11:36

## 2018-01-01 RX ADMIN — IPRATROPIUM BROMIDE 0.5 MG: 0.5 SOLUTION RESPIRATORY (INHALATION) at 15:12

## 2018-01-01 RX ADMIN — AMLODIPINE BESYLATE 5 MG: 5 TABLET ORAL at 09:26

## 2018-01-01 RX ADMIN — ASPIRIN 81 MG 81 MG: 81 TABLET ORAL at 16:26

## 2018-01-01 RX ADMIN — ARIPIPRAZOLE 5 MG: 5 TABLET ORAL at 07:52

## 2018-01-01 RX ADMIN — ATORVASTATIN CALCIUM 40 MG: 40 TABLET, FILM COATED ORAL at 21:02

## 2018-01-01 RX ADMIN — IPRATROPIUM BROMIDE AND ALBUTEROL SULFATE 3 ML: .5; 3 SOLUTION RESPIRATORY (INHALATION) at 07:25

## 2018-01-01 RX ADMIN — ARIPIPRAZOLE 5 MG: 5 TABLET ORAL at 09:04

## 2018-01-01 RX ADMIN — ISOSORBIDE MONONITRATE 30 MG: 30 TABLET, EXTENDED RELEASE ORAL at 07:46

## 2018-01-01 RX ADMIN — QUETIAPINE FUMARATE 200 MG: 200 TABLET ORAL at 21:10

## 2018-01-01 RX ADMIN — ISOSORBIDE MONONITRATE 30 MG: 30 TABLET, EXTENDED RELEASE ORAL at 07:47

## 2018-01-01 RX ADMIN — ISOSORBIDE MONONITRATE 30 MG: 30 TABLET, EXTENDED RELEASE ORAL at 08:03

## 2018-01-01 RX ADMIN — DOCUSATE SODIUM 100 MG: 100 CAPSULE, LIQUID FILLED ORAL at 08:01

## 2018-01-01 RX ADMIN — ISOSORBIDE MONONITRATE 30 MG: 30 TABLET, EXTENDED RELEASE ORAL at 08:01

## 2018-01-01 RX ADMIN — BUPROPION HYDROCHLORIDE 300 MG: 150 TABLET, FILM COATED, EXTENDED RELEASE ORAL at 09:25

## 2018-01-01 RX ADMIN — METHYLPREDNISOLONE SODIUM SUCCINATE 62.5 MG: 125 INJECTION, POWDER, FOR SOLUTION INTRAMUSCULAR; INTRAVENOUS at 10:22

## 2018-01-01 RX ADMIN — VITAMIN D, TAB 1000IU (100/BT) 2000 UNITS: 25 TAB at 09:22

## 2018-01-01 RX ADMIN — IPRATROPIUM BROMIDE AND ALBUTEROL SULFATE 3 ML: .5; 3 SOLUTION RESPIRATORY (INHALATION) at 07:52

## 2018-01-01 RX ADMIN — ISOSORBIDE MONONITRATE 30 MG: 30 TABLET, EXTENDED RELEASE ORAL at 08:51

## 2018-01-01 RX ADMIN — ENOXAPARIN SODIUM 40 MG: 40 INJECTION SUBCUTANEOUS at 11:13

## 2018-01-01 RX ADMIN — PREDNISONE 60 MG: 10 TABLET ORAL at 12:48

## 2018-01-01 RX ADMIN — IPRATROPIUM BROMIDE AND ALBUTEROL SULFATE 3 ML: .5; 3 SOLUTION RESPIRATORY (INHALATION) at 07:12

## 2018-01-01 RX ADMIN — FLUTICASONE FUROATE AND VILANTEROL TRIFENATATE 1 PUFF: 200; 25 POWDER RESPIRATORY (INHALATION) at 17:40

## 2018-01-01 RX ADMIN — ATORVASTATIN CALCIUM 40 MG: 40 TABLET, FILM COATED ORAL at 09:04

## 2018-01-01 RX ADMIN — METHYLPREDNISOLONE SODIUM SUCCINATE 62.5 MG: 125 INJECTION, POWDER, FOR SOLUTION INTRAMUSCULAR; INTRAVENOUS at 21:10

## 2018-01-01 RX ADMIN — IPRATROPIUM BROMIDE AND ALBUTEROL SULFATE 3 ML: .5; 3 SOLUTION RESPIRATORY (INHALATION) at 20:20

## 2018-01-01 RX ADMIN — QUETIAPINE FUMARATE 50 MG: 50 TABLET, FILM COATED ORAL at 08:03

## 2018-01-01 RX ADMIN — GABAPENTIN 300 MG: 300 CAPSULE ORAL at 21:07

## 2018-01-01 RX ADMIN — IPRATROPIUM BROMIDE AND ALBUTEROL SULFATE 3 ML: .5; 3 SOLUTION RESPIRATORY (INHALATION) at 15:30

## 2018-01-01 RX ADMIN — ALBUTEROL SULFATE 2.5 MG: 2.5 SOLUTION RESPIRATORY (INHALATION) at 02:02

## 2018-01-01 RX ADMIN — IPRATROPIUM BROMIDE AND ALBUTEROL SULFATE 6 ML: .5; 3 SOLUTION RESPIRATORY (INHALATION) at 16:00

## 2018-01-01 RX ADMIN — ACETAMINOPHEN 650 MG: 325 TABLET, FILM COATED ORAL at 15:47

## 2018-01-01 RX ADMIN — QUETIAPINE FUMARATE 50 MG: 50 TABLET, FILM COATED ORAL at 07:45

## 2018-01-01 RX ADMIN — FUROSEMIDE 20 MG: 10 INJECTION, SOLUTION INTRAMUSCULAR; INTRAVENOUS at 10:13

## 2018-01-01 RX ADMIN — ARIPIPRAZOLE 5 MG: 5 TABLET ORAL at 08:01

## 2018-01-01 RX ADMIN — ATORVASTATIN CALCIUM 40 MG: 40 TABLET, FILM COATED ORAL at 21:10

## 2018-01-01 RX ADMIN — QUETIAPINE FUMARATE 200 MG: 200 TABLET, FILM COATED ORAL at 21:09

## 2018-01-01 RX ADMIN — ASPIRIN 81 MG 81 MG: 81 TABLET ORAL at 08:52

## 2018-01-01 RX ADMIN — ATORVASTATIN CALCIUM 40 MG: 40 TABLET, FILM COATED ORAL at 07:45

## 2018-01-01 RX ADMIN — BUPROPION HYDROCHLORIDE 300 MG: 150 TABLET, FILM COATED, EXTENDED RELEASE ORAL at 08:51

## 2018-01-01 RX ADMIN — AMLODIPINE BESYLATE 5 MG: 5 TABLET ORAL at 13:42

## 2018-01-01 RX ADMIN — LURASIDONE HYDROCHLORIDE 60 MG: 20 TABLET, FILM COATED ORAL at 07:52

## 2018-01-01 RX ADMIN — HYDROCODONE BITARTRATE AND ACETAMINOPHEN 1 TABLET: 5; 325 TABLET ORAL at 17:16

## 2018-01-01 RX ADMIN — IPRATROPIUM BROMIDE AND ALBUTEROL SULFATE 3 ML: .5; 3 SOLUTION RESPIRATORY (INHALATION) at 04:09

## 2018-01-01 RX ADMIN — UMECLIDINIUM 1 PUFF: 62.5 AEROSOL, POWDER ORAL at 06:18

## 2018-01-01 RX ADMIN — PREDNISONE 60 MG: 10 TABLET ORAL at 09:27

## 2018-01-01 RX ADMIN — ATORVASTATIN CALCIUM 40 MG: 40 TABLET, FILM COATED ORAL at 19:54

## 2018-01-01 RX ADMIN — ATORVASTATIN CALCIUM 40 MG: 40 TABLET, FILM COATED ORAL at 08:01

## 2018-01-01 RX ADMIN — QUETIAPINE FUMARATE 200 MG: 200 TABLET, FILM COATED ORAL at 21:03

## 2018-01-01 RX ADMIN — IPRATROPIUM BROMIDE AND ALBUTEROL SULFATE 3 ML: .5; 3 SOLUTION RESPIRATORY (INHALATION) at 15:03

## 2018-01-01 RX ADMIN — DOCUSATE SODIUM 100 MG: 100 CAPSULE, LIQUID FILLED ORAL at 08:02

## 2018-01-01 RX ADMIN — IPRATROPIUM BROMIDE AND ALBUTEROL SULFATE 3 ML: .5; 3 SOLUTION RESPIRATORY (INHALATION) at 00:30

## 2018-01-01 RX ADMIN — SODIUM CHLORIDE 1000 ML: 9 INJECTION, SOLUTION INTRAVENOUS at 22:40

## 2018-01-01 RX ADMIN — Medication 5 MG: at 21:10

## 2018-01-01 RX ADMIN — SODIUM CHLORIDE 500 ML: 9 INJECTION, SOLUTION INTRAVENOUS at 10:13

## 2018-01-01 RX ADMIN — ATORVASTATIN CALCIUM 40 MG: 40 TABLET, FILM COATED ORAL at 08:31

## 2018-01-01 RX ADMIN — ATORVASTATIN CALCIUM 40 MG: 40 TABLET, FILM COATED ORAL at 21:21

## 2018-01-01 RX ADMIN — ARIPIPRAZOLE 5 MG: 5 TABLET ORAL at 17:39

## 2018-01-01 RX ADMIN — DOCUSATE SODIUM 100 MG: 100 CAPSULE, LIQUID FILLED ORAL at 09:27

## 2018-01-01 RX ADMIN — Medication 5 MG: at 20:47

## 2018-01-01 RX ADMIN — ISOSORBIDE MONONITRATE 30 MG: 30 TABLET, EXTENDED RELEASE ORAL at 09:27

## 2018-01-01 RX ADMIN — HEPARIN SODIUM 5000 UNITS: 5000 INJECTION, SOLUTION INTRAVENOUS; SUBCUTANEOUS at 08:28

## 2018-01-01 RX ADMIN — QUETIAPINE FUMARATE 50 MG: 25 TABLET ORAL at 08:30

## 2018-01-01 RX ADMIN — IPRATROPIUM BROMIDE AND ALBUTEROL SULFATE 3 ML: .5; 3 SOLUTION RESPIRATORY (INHALATION) at 19:32

## 2018-01-01 RX ADMIN — PREDNISONE 60 MG: 20 TABLET ORAL at 08:32

## 2018-01-01 RX ADMIN — DOCUSATE SODIUM 100 MG: 100 CAPSULE, LIQUID FILLED ORAL at 08:03

## 2018-01-01 RX ADMIN — GABAPENTIN 300 MG: 300 CAPSULE ORAL at 20:43

## 2018-01-01 RX ADMIN — PREDNISONE 60 MG: 20 TABLET ORAL at 08:02

## 2018-01-01 RX ADMIN — GUAIFENESIN 600 MG: 600 TABLET, EXTENDED RELEASE ORAL at 10:19

## 2018-01-01 RX ADMIN — IPRATROPIUM BROMIDE AND ALBUTEROL SULFATE 3 ML: .5; 3 SOLUTION RESPIRATORY (INHALATION) at 15:26

## 2018-01-01 RX ADMIN — ATORVASTATIN CALCIUM 40 MG: 40 TABLET, FILM COATED ORAL at 16:26

## 2018-01-01 RX ADMIN — ASPIRIN 81 MG 81 MG: 81 TABLET ORAL at 09:26

## 2018-01-01 RX ADMIN — QUETIAPINE FUMARATE 200 MG: 200 TABLET, FILM COATED ORAL at 21:10

## 2018-01-01 RX ADMIN — GABAPENTIN 300 MG: 300 CAPSULE ORAL at 22:01

## 2018-01-01 RX ADMIN — IPRATROPIUM BROMIDE AND ALBUTEROL SULFATE 3 ML: .5; 3 SOLUTION RESPIRATORY (INHALATION) at 15:08

## 2018-01-01 RX ADMIN — IPRATROPIUM BROMIDE AND ALBUTEROL SULFATE 3 ML: .5; 3 SOLUTION RESPIRATORY (INHALATION) at 15:58

## 2018-01-01 RX ADMIN — ISOSORBIDE MONONITRATE 30 MG: 30 TABLET, EXTENDED RELEASE ORAL at 08:02

## 2018-01-01 RX ADMIN — QUETIAPINE FUMARATE 50 MG: 50 TABLET, FILM COATED ORAL at 08:55

## 2018-01-01 RX ADMIN — QUETIAPINE FUMARATE 50 MG: 50 TABLET, FILM COATED ORAL at 08:27

## 2018-01-01 RX ADMIN — QUETIAPINE FUMARATE 200 MG: 200 TABLET, FILM COATED ORAL at 21:19

## 2018-01-01 RX ADMIN — Medication 5 MG: at 20:54

## 2018-01-01 RX ADMIN — BUPROPION HYDROCHLORIDE 300 MG: 150 TABLET, FILM COATED, EXTENDED RELEASE ORAL at 07:43

## 2018-01-01 RX ADMIN — GUAIFENESIN 600 MG: 600 TABLET, EXTENDED RELEASE ORAL at 19:54

## 2018-01-01 RX ADMIN — ARIPIPRAZOLE 5 MG: 5 TABLET ORAL at 07:46

## 2018-01-01 RX ADMIN — LURASIDONE HYDROCHLORIDE 60 MG: 20 TABLET, FILM COATED ORAL at 09:23

## 2018-01-01 RX ADMIN — METHYLPREDNISOLONE SODIUM SUCCINATE 40 MG: 40 INJECTION, POWDER, FOR SOLUTION INTRAMUSCULAR; INTRAVENOUS at 10:29

## 2018-01-01 RX ADMIN — BUPROPION HYDROCHLORIDE 300 MG: 150 TABLET, FILM COATED, EXTENDED RELEASE ORAL at 09:26

## 2018-01-01 RX ADMIN — BUPROPION HYDROCHLORIDE 300 MG: 150 TABLET, FILM COATED, EXTENDED RELEASE ORAL at 09:22

## 2018-01-01 RX ADMIN — ISOSORBIDE MONONITRATE 30 MG: 30 TABLET, EXTENDED RELEASE ORAL at 16:28

## 2018-01-01 RX ADMIN — IPRATROPIUM BROMIDE 0.5 MG: 0.5 SOLUTION RESPIRATORY (INHALATION) at 20:03

## 2018-01-01 RX ADMIN — METHYLPREDNISOLONE SODIUM SUCCINATE 40 MG: 40 INJECTION, POWDER, FOR SOLUTION INTRAMUSCULAR; INTRAVENOUS at 21:00

## 2018-01-01 RX ADMIN — ALBUTEROL SULFATE 10 MG: 2.5 SOLUTION RESPIRATORY (INHALATION) at 11:07

## 2018-01-01 RX ADMIN — ARIPIPRAZOLE 5 MG: 5 TABLET ORAL at 09:22

## 2018-01-01 RX ADMIN — DOCUSATE SODIUM 100 MG: 100 CAPSULE, LIQUID FILLED ORAL at 07:46

## 2018-01-01 RX ADMIN — ISOSORBIDE MONONITRATE 30 MG: 30 TABLET, EXTENDED RELEASE ORAL at 07:43

## 2018-01-01 RX ADMIN — PREDNISONE 60 MG: 20 TABLET ORAL at 07:46

## 2018-01-01 RX ADMIN — GABAPENTIN 300 MG: 300 CAPSULE ORAL at 21:13

## 2018-01-01 RX ADMIN — DOXYCYCLINE HYCLATE 100 MG: 100 TABLET, FILM COATED ORAL at 19:54

## 2018-01-01 RX ADMIN — ACETAMINOPHEN 650 MG: 325 TABLET, FILM COATED ORAL at 12:00

## 2018-01-01 RX ADMIN — GABAPENTIN 300 MG: 300 CAPSULE ORAL at 03:15

## 2018-01-01 RX ADMIN — METHYLPREDNISOLONE SODIUM SUCCINATE 40 MG: 40 INJECTION, POWDER, FOR SOLUTION INTRAMUSCULAR; INTRAVENOUS at 22:13

## 2018-01-01 RX ADMIN — IPRATROPIUM BROMIDE AND ALBUTEROL SULFATE 3 ML: .5; 3 SOLUTION RESPIRATORY (INHALATION) at 19:48

## 2018-01-01 RX ADMIN — ATORVASTATIN CALCIUM 40 MG: 40 TABLET, FILM COATED ORAL at 20:43

## 2018-01-01 RX ADMIN — PREDNISONE 40 MG: 20 TABLET ORAL at 08:54

## 2018-01-01 RX ADMIN — DOCUSATE SODIUM 100 MG: 100 CAPSULE, LIQUID FILLED ORAL at 07:45

## 2018-01-01 RX ADMIN — HYDROCODONE BITARTRATE AND ACETAMINOPHEN 2 TABLET: 5; 325 TABLET ORAL at 20:52

## 2018-01-01 RX ADMIN — BUPROPION HYDROCHLORIDE 300 MG: 150 TABLET, FILM COATED, EXTENDED RELEASE ORAL at 07:47

## 2018-01-01 RX ADMIN — AZITHROMYCIN MONOHYDRATE 250 MG: 250 TABLET ORAL at 08:42

## 2018-01-01 RX ADMIN — DULOXETINE HYDROCHLORIDE 90 MG: 60 CAPSULE, DELAYED RELEASE ORAL at 07:45

## 2018-01-01 RX ADMIN — DOXYCYCLINE HYCLATE 100 MG: 100 TABLET, FILM COATED ORAL at 20:43

## 2018-01-01 RX ADMIN — ISOSORBIDE MONONITRATE 30 MG: 30 TABLET, EXTENDED RELEASE ORAL at 17:12

## 2018-01-01 RX ADMIN — IPRATROPIUM BROMIDE AND ALBUTEROL SULFATE 3 ML: .5; 3 SOLUTION RESPIRATORY (INHALATION) at 19:03

## 2018-01-01 RX ADMIN — Medication 5 MG: at 21:12

## 2018-01-01 RX ADMIN — DULOXETINE HYDROCHLORIDE 90 MG: 60 CAPSULE, DELAYED RELEASE ORAL at 08:26

## 2018-01-01 RX ADMIN — AZITHROMYCIN MONOHYDRATE 250 MG: 250 TABLET ORAL at 09:04

## 2018-01-01 RX ADMIN — QUETIAPINE FUMARATE 200 MG: 200 TABLET ORAL at 20:43

## 2018-01-01 RX ADMIN — QUETIAPINE FUMARATE 200 MG: 200 TABLET, FILM COATED ORAL at 21:04

## 2018-01-01 RX ADMIN — DOXYCYCLINE 100 MG: 100 INJECTION, POWDER, LYOPHILIZED, FOR SOLUTION INTRAVENOUS at 12:01

## 2018-01-01 RX ADMIN — PREDNISONE 60 MG: 20 TABLET ORAL at 08:45

## 2018-01-01 RX ADMIN — GUAIFENESIN 600 MG: 600 TABLET, EXTENDED RELEASE ORAL at 21:22

## 2018-01-01 RX ADMIN — DOCUSATE SODIUM 100 MG: 100 CAPSULE, LIQUID FILLED ORAL at 10:20

## 2018-01-01 RX ADMIN — DOXYCYCLINE HYCLATE 100 MG: 100 TABLET, FILM COATED ORAL at 21:13

## 2018-01-01 RX ADMIN — ASPIRIN 81 MG 81 MG: 81 TABLET ORAL at 10:20

## 2018-01-01 RX ADMIN — GUAIFENESIN 600 MG: 600 TABLET, EXTENDED RELEASE ORAL at 09:25

## 2018-01-01 RX ADMIN — ATORVASTATIN CALCIUM 40 MG: 40 TABLET, FILM COATED ORAL at 10:19

## 2018-01-01 RX ADMIN — QUETIAPINE FUMARATE 50 MG: 50 TABLET, FILM COATED ORAL at 09:22

## 2018-01-01 RX ADMIN — DOXYCYCLINE HYCLATE 100 MG: 100 TABLET, FILM COATED ORAL at 08:55

## 2018-01-01 RX ADMIN — QUETIAPINE FUMARATE 50 MG: 25 TABLET ORAL at 07:47

## 2018-01-01 RX ADMIN — HUMAN ALBUMIN MICROSPHERES AND PERFLUTREN 3 ML: 10; .22 INJECTION, SOLUTION INTRAVENOUS at 09:00

## 2018-01-01 RX ADMIN — GABAPENTIN 300 MG: 300 CAPSULE ORAL at 20:48

## 2018-01-01 RX ADMIN — ENOXAPARIN SODIUM 40 MG: 40 INJECTION SUBCUTANEOUS at 19:41

## 2018-01-01 RX ADMIN — HEPARIN SODIUM 5000 UNITS: 5000 INJECTION, SOLUTION INTRAVENOUS; SUBCUTANEOUS at 23:56

## 2018-01-01 RX ADMIN — BUPROPION HYDROCHLORIDE 300 MG: 150 TABLET, FILM COATED, EXTENDED RELEASE ORAL at 08:55

## 2018-01-01 RX ADMIN — METHYLPREDNISOLONE SODIUM SUCCINATE 40 MG: 40 INJECTION, POWDER, FOR SOLUTION INTRAMUSCULAR; INTRAVENOUS at 10:35

## 2018-01-01 RX ADMIN — GABAPENTIN 300 MG: 300 CAPSULE ORAL at 21:11

## 2018-01-01 RX ADMIN — IPRATROPIUM BROMIDE AND ALBUTEROL SULFATE 3 ML: .5; 3 SOLUTION RESPIRATORY (INHALATION) at 13:02

## 2018-01-01 RX ADMIN — SODIUM CHLORIDE: 9 INJECTION, SOLUTION INTRAVENOUS at 19:42

## 2018-01-01 RX ADMIN — ASPIRIN 81 MG 81 MG: 81 TABLET ORAL at 08:55

## 2018-01-01 RX ADMIN — VITAMIN D, TAB 1000IU (100/BT) 2000 UNITS: 25 TAB at 08:51

## 2018-01-01 RX ADMIN — ASPIRIN 81 MG 324 MG: 81 TABLET ORAL at 11:37

## 2018-01-01 RX ADMIN — DOCUSATE SODIUM 100 MG: 100 CAPSULE, LIQUID FILLED ORAL at 20:55

## 2018-01-01 RX ADMIN — DOCUSATE SODIUM 100 MG: 100 CAPSULE, LIQUID FILLED ORAL at 11:15

## 2018-01-01 RX ADMIN — QUETIAPINE FUMARATE 50 MG: 25 TABLET ORAL at 09:04

## 2018-01-01 RX ADMIN — Medication 5 MG: at 21:20

## 2018-01-01 RX ADMIN — GUAIFENESIN 600 MG: 600 TABLET, EXTENDED RELEASE ORAL at 08:02

## 2018-01-01 RX ADMIN — QUETIAPINE FUMARATE 50 MG: 25 TABLET ORAL at 08:02

## 2018-01-01 RX ADMIN — ENOXAPARIN SODIUM 40 MG: 40 INJECTION SUBCUTANEOUS at 21:07

## 2018-01-01 RX ADMIN — GABAPENTIN 300 MG: 300 CAPSULE ORAL at 21:10

## 2018-01-01 RX ADMIN — METHYLPREDNISOLONE SODIUM SUCCINATE 125 MG: 125 INJECTION, POWDER, FOR SOLUTION INTRAMUSCULAR; INTRAVENOUS at 16:03

## 2018-01-01 RX ADMIN — ATORVASTATIN CALCIUM 40 MG: 40 TABLET, FILM COATED ORAL at 21:13

## 2018-01-01 RX ADMIN — DOCUSATE SODIUM 100 MG: 100 CAPSULE, LIQUID FILLED ORAL at 09:21

## 2018-01-01 RX ADMIN — GABAPENTIN 300 MG: 300 CAPSULE ORAL at 21:19

## 2018-01-01 RX ADMIN — BUPROPION HYDROCHLORIDE 300 MG: 150 TABLET, FILM COATED, EXTENDED RELEASE ORAL at 08:04

## 2018-01-01 RX ADMIN — IPRATROPIUM BROMIDE AND ALBUTEROL SULFATE 3 ML: .5; 3 SOLUTION RESPIRATORY (INHALATION) at 00:02

## 2018-01-01 RX ADMIN — ASPIRIN 81 MG 81 MG: 81 TABLET ORAL at 08:41

## 2018-01-01 RX ADMIN — ASPIRIN 81 MG 81 MG: 81 TABLET ORAL at 09:27

## 2018-01-01 RX ADMIN — FLUTICASONE FUROATE AND VILANTEROL TRIFENATATE 1 PUFF: 200; 25 POWDER RESPIRATORY (INHALATION) at 06:18

## 2018-01-01 RX ADMIN — ENOXAPARIN SODIUM 40 MG: 40 INJECTION SUBCUTANEOUS at 19:49

## 2018-01-01 RX ADMIN — QUETIAPINE FUMARATE 50 MG: 50 TABLET, FILM COATED ORAL at 13:44

## 2018-01-01 RX ADMIN — GABAPENTIN 300 MG: 300 CAPSULE ORAL at 21:21

## 2018-01-01 RX ADMIN — DULOXETINE HYDROCHLORIDE 120 MG: 30 CAPSULE, DELAYED RELEASE ORAL at 11:15

## 2018-01-01 RX ADMIN — GUAIFENESIN 600 MG: 600 TABLET, EXTENDED RELEASE ORAL at 21:01

## 2018-01-01 RX ADMIN — SODIUM CHLORIDE 500 ML: 9 INJECTION, SOLUTION INTRAVENOUS at 10:36

## 2018-01-01 RX ADMIN — LURASIDONE HYDROCHLORIDE 60 MG: 20 TABLET, FILM COATED ORAL at 09:26

## 2018-01-01 RX ADMIN — QUETIAPINE FUMARATE 200 MG: 200 TABLET ORAL at 20:52

## 2018-01-01 RX ADMIN — QUETIAPINE FUMARATE 200 MG: 200 TABLET ORAL at 20:48

## 2018-01-01 RX ADMIN — IOPAMIDOL 82 ML: 755 INJECTION, SOLUTION INTRAVENOUS at 00:10

## 2018-01-01 RX ADMIN — METHYLPREDNISOLONE SODIUM SUCCINATE 62.5 MG: 125 INJECTION, POWDER, FOR SOLUTION INTRAMUSCULAR; INTRAVENOUS at 21:03

## 2018-01-01 RX ADMIN — FUROSEMIDE 20 MG: 10 INJECTION, SOLUTION INTRAMUSCULAR; INTRAVENOUS at 01:04

## 2018-01-01 RX ADMIN — DOXYCYCLINE HYCLATE 100 MG: 100 TABLET, FILM COATED ORAL at 08:27

## 2018-01-01 RX ADMIN — IPRATROPIUM BROMIDE AND ALBUTEROL SULFATE 3 ML: .5; 3 SOLUTION RESPIRATORY (INHALATION) at 08:28

## 2018-01-01 RX ADMIN — IPRATROPIUM BROMIDE AND ALBUTEROL SULFATE 3 ML: .5; 3 SOLUTION RESPIRATORY (INHALATION) at 01:50

## 2018-01-01 RX ADMIN — IPRATROPIUM BROMIDE AND ALBUTEROL SULFATE 3 ML: .5; 3 SOLUTION RESPIRATORY (INHALATION) at 15:06

## 2018-01-01 RX ADMIN — IPRATROPIUM BROMIDE AND ALBUTEROL SULFATE 3 ML: .5; 3 SOLUTION RESPIRATORY (INHALATION) at 11:43

## 2018-01-01 RX ADMIN — DOCUSATE SODIUM 100 MG: 100 CAPSULE, LIQUID FILLED ORAL at 08:51

## 2018-01-01 RX ADMIN — ISOSORBIDE MONONITRATE 30 MG: 30 TABLET, EXTENDED RELEASE ORAL at 10:18

## 2018-01-01 RX ADMIN — IPRATROPIUM BROMIDE AND ALBUTEROL SULFATE 3 ML: .5; 3 SOLUTION RESPIRATORY (INHALATION) at 08:26

## 2018-01-01 RX ADMIN — METHYLPREDNISOLONE SODIUM SUCCINATE 125 MG: 125 INJECTION, POWDER, FOR SOLUTION INTRAMUSCULAR; INTRAVENOUS at 22:40

## 2018-01-01 RX ADMIN — DULOXETINE HYDROCHLORIDE 90 MG: 60 CAPSULE, DELAYED RELEASE ORAL at 08:30

## 2018-01-01 RX ADMIN — Medication 5 MG: at 20:44

## 2018-01-01 RX ADMIN — DULOXETINE HYDROCHLORIDE 90 MG: 30 CAPSULE, DELAYED RELEASE ORAL at 07:44

## 2018-01-01 RX ADMIN — ARIPIPRAZOLE 5 MG: 5 TABLET ORAL at 09:27

## 2018-01-01 RX ADMIN — QUETIAPINE FUMARATE 200 MG: 200 TABLET, FILM COATED ORAL at 20:55

## 2018-01-01 RX ADMIN — BUPROPION HYDROCHLORIDE 300 MG: 300 TABLET, FILM COATED, EXTENDED RELEASE ORAL at 09:04

## 2018-01-01 RX ADMIN — METHYLPREDNISOLONE SODIUM SUCCINATE 40 MG: 40 INJECTION, POWDER, FOR SOLUTION INTRAMUSCULAR; INTRAVENOUS at 20:56

## 2018-01-01 RX ADMIN — IPRATROPIUM BROMIDE AND ALBUTEROL SULFATE 3 ML: .5; 3 SOLUTION RESPIRATORY (INHALATION) at 19:11

## 2018-01-01 RX ADMIN — DOXYCYCLINE HYCLATE 100 MG: 100 TABLET, FILM COATED ORAL at 08:02

## 2018-01-01 RX ADMIN — DOCUSATE SODIUM 100 MG: 100 CAPSULE, LIQUID FILLED ORAL at 21:10

## 2018-01-01 RX ADMIN — IPRATROPIUM BROMIDE AND ALBUTEROL SULFATE 3 ML: .5; 3 SOLUTION RESPIRATORY (INHALATION) at 11:44

## 2018-01-01 RX ADMIN — DOXYCYCLINE HYCLATE 100 MG: 100 TABLET, FILM COATED ORAL at 21:21

## 2018-01-01 RX ADMIN — DOCUSATE SODIUM 100 MG: 100 CAPSULE, LIQUID FILLED ORAL at 21:03

## 2018-01-01 RX ADMIN — IPRATROPIUM BROMIDE AND ALBUTEROL SULFATE 3 ML: .5; 3 SOLUTION RESPIRATORY (INHALATION) at 15:37

## 2018-01-01 RX ADMIN — IPRATROPIUM BROMIDE 0.5 MG: 0.5 SOLUTION RESPIRATORY (INHALATION) at 20:21

## 2018-01-01 RX ADMIN — GABAPENTIN 300 MG: 300 CAPSULE ORAL at 21:12

## 2018-01-01 RX ADMIN — IPRATROPIUM BROMIDE AND ALBUTEROL SULFATE 3 ML: .5; 3 SOLUTION RESPIRATORY (INHALATION) at 10:16

## 2018-01-01 RX ADMIN — DULOXETINE HYDROCHLORIDE 90 MG: 60 CAPSULE, DELAYED RELEASE ORAL at 16:27

## 2018-01-01 RX ADMIN — DOCUSATE SODIUM 100 MG: 100 CAPSULE, LIQUID FILLED ORAL at 08:55

## 2018-01-01 RX ADMIN — IPRATROPIUM BROMIDE AND ALBUTEROL SULFATE 3 ML: .5; 3 SOLUTION RESPIRATORY (INHALATION) at 19:19

## 2018-01-01 RX ADMIN — DOXYCYCLINE HYCLATE 100 MG: 100 TABLET, FILM COATED ORAL at 21:10

## 2018-01-01 RX ADMIN — QUETIAPINE FUMARATE 50 MG: 50 TABLET, FILM COATED ORAL at 10:18

## 2018-01-01 RX ADMIN — ISOSORBIDE MONONITRATE 30 MG: 30 TABLET, EXTENDED RELEASE ORAL at 11:15

## 2018-01-01 RX ADMIN — ARIPIPRAZOLE 5 MG: 5 TABLET ORAL at 09:25

## 2018-01-01 RX ADMIN — QUETIAPINE FUMARATE 50 MG: 25 TABLET ORAL at 08:45

## 2018-01-01 RX ADMIN — DULOXETINE HYDROCHLORIDE 90 MG: 30 CAPSULE, DELAYED RELEASE ORAL at 09:21

## 2018-01-01 RX ADMIN — QUETIAPINE FUMARATE 200 MG: 200 TABLET ORAL at 21:01

## 2018-01-01 RX ADMIN — IPRATROPIUM BROMIDE 0.5 MG: 0.5 SOLUTION RESPIRATORY (INHALATION) at 08:05

## 2018-01-01 RX ADMIN — ASPIRIN 81 MG 81 MG: 81 TABLET ORAL at 07:45

## 2018-01-01 RX ADMIN — ISOSORBIDE MONONITRATE 30 MG: 30 TABLET, EXTENDED RELEASE ORAL at 09:04

## 2018-01-01 RX ADMIN — IPRATROPIUM BROMIDE AND ALBUTEROL SULFATE 3 ML: .5; 3 SOLUTION RESPIRATORY (INHALATION) at 11:35

## 2018-01-01 RX ADMIN — QUETIAPINE FUMARATE 50 MG: 25 TABLET ORAL at 07:46

## 2018-01-01 RX ADMIN — IPRATROPIUM BROMIDE AND ALBUTEROL SULFATE 3 ML: .5; 3 SOLUTION RESPIRATORY (INHALATION) at 11:36

## 2018-01-01 RX ADMIN — METHYLPREDNISOLONE SODIUM SUCCINATE 62.5 MG: 125 INJECTION, POWDER, FOR SOLUTION INTRAMUSCULAR; INTRAVENOUS at 11:13

## 2018-01-01 RX ADMIN — IPRATROPIUM BROMIDE 0.5 MG: 0.5 SOLUTION RESPIRATORY (INHALATION) at 11:36

## 2018-01-01 RX ADMIN — IPRATROPIUM BROMIDE AND ALBUTEROL SULFATE 3 ML: .5; 3 SOLUTION RESPIRATORY (INHALATION) at 23:05

## 2018-01-01 RX ADMIN — IPRATROPIUM BROMIDE AND ALBUTEROL SULFATE 3 ML: .5; 3 SOLUTION RESPIRATORY (INHALATION) at 11:41

## 2018-01-01 RX ADMIN — DULOXETINE HYDROCHLORIDE 120 MG: 30 CAPSULE, DELAYED RELEASE ORAL at 08:51

## 2018-01-01 RX ADMIN — IPRATROPIUM BROMIDE AND ALBUTEROL SULFATE 3 ML: .5; 3 SOLUTION RESPIRATORY (INHALATION) at 07:53

## 2018-01-01 RX ADMIN — QUETIAPINE FUMARATE 200 MG: 200 TABLET ORAL at 21:21

## 2018-01-01 RX ADMIN — SULFUR HEXAFLUORIDE 3 ML: KIT at 10:32

## 2018-01-01 RX ADMIN — QUETIAPINE FUMARATE 50 MG: 50 TABLET, FILM COATED ORAL at 09:26

## 2018-01-01 RX ADMIN — ISOSORBIDE MONONITRATE 30 MG: 30 TABLET, EXTENDED RELEASE ORAL at 09:21

## 2018-01-01 RX ADMIN — BUPROPION HYDROCHLORIDE 300 MG: 150 TABLET, FILM COATED, EXTENDED RELEASE ORAL at 08:26

## 2018-01-01 RX ADMIN — ASPIRIN 81 MG 81 MG: 81 TABLET ORAL at 08:31

## 2018-01-01 RX ADMIN — DULOXETINE HYDROCHLORIDE 90 MG: 60 CAPSULE, DELAYED RELEASE ORAL at 08:53

## 2018-01-01 RX ADMIN — IPRATROPIUM BROMIDE AND ALBUTEROL SULFATE 3 ML: .5; 3 SOLUTION RESPIRATORY (INHALATION) at 07:38

## 2018-01-01 RX ADMIN — TIOTROPIUM BROMIDE 18 MCG: 18 CAPSULE ORAL; RESPIRATORY (INHALATION) at 13:44

## 2018-01-01 RX ADMIN — ALBUTEROL SULFATE 2.5 MG: 2.5 SOLUTION RESPIRATORY (INHALATION) at 03:28

## 2018-01-01 RX ADMIN — ATORVASTATIN CALCIUM 40 MG: 40 TABLET, FILM COATED ORAL at 11:14

## 2018-01-01 RX ADMIN — ARIPIPRAZOLE 5 MG: 5 TABLET ORAL at 08:03

## 2018-01-01 RX ADMIN — IPRATROPIUM BROMIDE AND ALBUTEROL SULFATE 3 ML: .5; 3 SOLUTION RESPIRATORY (INHALATION) at 19:18

## 2018-01-01 RX ADMIN — ATORVASTATIN CALCIUM 40 MG: 40 TABLET, FILM COATED ORAL at 09:27

## 2018-01-01 RX ADMIN — IPRATROPIUM BROMIDE AND ALBUTEROL SULFATE 3 ML: .5; 3 SOLUTION RESPIRATORY (INHALATION) at 11:34

## 2018-01-01 RX ADMIN — METHYLPREDNISOLONE SODIUM SUCCINATE 62.5 MG: 125 INJECTION, POWDER, FOR SOLUTION INTRAMUSCULAR; INTRAVENOUS at 10:14

## 2018-01-01 RX ADMIN — ASPIRIN 81 MG 81 MG: 81 TABLET ORAL at 07:47

## 2018-01-01 RX ADMIN — HEPARIN SODIUM 5000 UNITS: 5000 INJECTION, SOLUTION INTRAVENOUS; SUBCUTANEOUS at 16:29

## 2018-01-01 RX ADMIN — ISOSORBIDE MONONITRATE 30 MG: 30 TABLET, EXTENDED RELEASE ORAL at 08:44

## 2018-01-01 RX ADMIN — AZITHROMYCIN MONOHYDRATE 500 MG: 500 INJECTION, POWDER, LYOPHILIZED, FOR SOLUTION INTRAVENOUS at 12:14

## 2018-01-01 RX ADMIN — QUETIAPINE FUMARATE 50 MG: 50 TABLET, FILM COATED ORAL at 08:04

## 2018-01-01 RX ADMIN — ISOSORBIDE MONONITRATE 30 MG: 30 TABLET, EXTENDED RELEASE ORAL at 09:25

## 2018-01-01 RX ADMIN — IPRATROPIUM BROMIDE AND ALBUTEROL SULFATE 3 ML: .5; 3 SOLUTION RESPIRATORY (INHALATION) at 19:10

## 2018-01-01 RX ADMIN — VITAMIN D, TAB 1000IU (100/BT) 2000 UNITS: 25 TAB at 09:27

## 2018-01-01 RX ADMIN — BUPROPION HYDROCHLORIDE 300 MG: 300 TABLET, FILM COATED, EXTENDED RELEASE ORAL at 08:42

## 2018-01-01 RX ADMIN — DULOXETINE HYDROCHLORIDE 90 MG: 60 CAPSULE, DELAYED RELEASE ORAL at 10:16

## 2018-01-01 RX ADMIN — Medication 5 MG: at 21:02

## 2018-01-01 ASSESSMENT — ENCOUNTER SYMPTOMS
VOMITING: 0
CHEST TIGHTNESS: 0
COUGH: 0
LIGHT-HEADEDNESS: 0
DIARRHEA: 0
CHILLS: 0
SHORTNESS OF BREATH: 1
FATIGUE: 0
SHORTNESS OF BREATH: 1
DIZZINESS: 0
VOMITING: 0
COUGH: 0
CHILLS: 0
NAUSEA: 0
FEVER: 0
FEVER: 0
VOMITING: 0
SORE THROAT: 0
SHORTNESS OF BREATH: 1
FEVER: 0
COUGH: 0
SHORTNESS OF BREATH: 1
RHINORRHEA: 1
FEVER: 0
ABDOMINAL PAIN: 0
COUGH: 1
DIARRHEA: 0
HEADACHES: 0
DIAPHORESIS: 0
WEAKNESS: 0
NAUSEA: 0
SHORTNESS OF BREATH: 1
ABDOMINAL PAIN: 0

## 2018-01-01 ASSESSMENT — ACTIVITIES OF DAILY LIVING (ADL)
NUMBER_OF_TIMES_PATIENT_HAS_FALLEN_WITHIN_LAST_SIX_MONTHS: 1
COGNITION: 0 - NO COGNITION ISSUES REPORTED
AMBULATION: 1-->ASSISTIVE EQUIPMENT
COGNITION: 0 - NO COGNITION ISSUES REPORTED
FALL_HISTORY_WITHIN_LAST_SIX_MONTHS: YES
PREVIOUS_RESPONSIBILITIES: MEAL PREP;HOUSEKEEPING;LAUNDRY;SHOPPING;MEDICATION MANAGEMENT;DRIVING
ADLS_ACUITY_SCORE: 12
ADLS_ACUITY_SCORE: 12
SWALLOWING: 0-->SWALLOWS FOODS/LIQUIDS WITHOUT DIFFICULTY
ADLS_ACUITY_SCORE: 13
ADLS_ACUITY_SCORE: 13
ADLS_ACUITY_SCORE: 12
TOILETING: 1-->ASSISTIVE EQUIPMENT
FALL_HISTORY_WITHIN_LAST_SIX_MONTHS: YES
RETIRED_EATING: 0-->INDEPENDENT
ADLS_ACUITY_SCORE: 12
ADLS_ACUITY_SCORE: 13
SWALLOWING: 0 - SWALLOWS FOODS/LIQUIDS WITHOUT DIFFICULTY
TRANSFERRING: 1-->ASSISTIVE EQUIPMENT
COMMUNICATION: 0 - UNDERSTANDS/COMMUNICATES WITHOUT DIFFICULTY
BATHING: 0-->INDEPENDENT
CHANGE_IN_FUNCTIONAL_STATUS_SINCE_ONSET_OF_CURRENT_ILLNESS/INJURY: YES
RETIRED_EATING: 0-->INDEPENDENT
RETIRED_COMMUNICATION: 0-->UNDERSTANDS/COMMUNICATES WITHOUT DIFFICULTY
ADLS_ACUITY_SCORE: 12
BATHING: 0-->INDEPENDENT
ADLS_ACUITY_SCORE: 13
NUMBER_OF_TIMES_PATIENT_HAS_FALLEN_WITHIN_LAST_SIX_MONTHS: 1
SWALLOWING: 0-->SWALLOWS FOODS/LIQUIDS WITHOUT DIFFICULTY
BATHING: 2 - ASSISTIVE PERSON
TOILETING: 1-->ASSISTIVE EQUIPMENT
TRANSFERRING: 0-->INDEPENDENT
COGNITION: 0 - NO COGNITION ISSUES REPORTED
ADLS_ACUITY_SCORE: 14
AMBULATION: 0-->INDEPENDENT
TRANSFERRING: 2 - ASSISTIVE PERSON
RETIRED_COMMUNICATION: 0-->UNDERSTANDS/COMMUNICATES WITHOUT DIFFICULTY
DRESS: 0-->INDEPENDENT
ADLS_ACUITY_SCORE: 12
RETIRED_COMMUNICATION: 0-->UNDERSTANDS/COMMUNICATES WITHOUT DIFFICULTY
ADLS_ACUITY_SCORE: 13
RETIRED_EATING: 0-->INDEPENDENT
TRANSFERRING: 0-->INDEPENDENT
WHICH_OF_THE_ABOVE_FUNCTIONAL_RISKS_HAD_A_RECENT_ONSET_OR_CHANGE?: FALL HISTORY
DRESS: 2 - ASSISTIVE PERSON
NUMBER_OF_TIMES_PATIENT_HAS_FALLEN_WITHIN_LAST_SIX_MONTHS: 1
COGNITION: 0 - NO COGNITION ISSUES REPORTED
TOILETING: 2 - ASSISTIVE PERSON
DRESS: 0-->INDEPENDENT
FALL_HISTORY_WITHIN_LAST_SIX_MONTHS: YES
AMBULATION: 0 - INDEPENDENT
ADLS_ACUITY_SCORE: 14
ADLS_ACUITY_SCORE: 12
ADLS_ACUITY_SCORE: 13
DRESS: 0-->INDEPENDENT
ADLS_ACUITY_SCORE: 12
ADLS_ACUITY_SCORE: 11
ADLS_ACUITY_SCORE: 13
ADLS_ACUITY_SCORE: 12
ADLS_ACUITY_SCORE: 12
BATHING: 1-->ASSISTIVE EQUIPMENT
ADLS_ACUITY_SCORE: 12
AMBULATION: 0-->INDEPENDENT
TOILETING: 0-->INDEPENDENT
SWALLOWING: 0-->SWALLOWS FOODS/LIQUIDS WITHOUT DIFFICULTY
EATING: 2 - ASSISTIVE PERSON

## 2018-01-01 ASSESSMENT — ANXIETY QUESTIONNAIRES
IF YOU CHECKED OFF ANY PROBLEMS ON THIS QUESTIONNAIRE, HOW DIFFICULT HAVE THESE PROBLEMS MADE IT FOR YOU TO DO YOUR WORK, TAKE CARE OF THINGS AT HOME, OR GET ALONG WITH OTHER PEOPLE: SOMEWHAT DIFFICULT
6. BECOMING EASILY ANNOYED OR IRRITABLE: NEARLY EVERY DAY
1. FEELING NERVOUS, ANXIOUS, OR ON EDGE: MORE THAN HALF THE DAYS
7. FEELING AFRAID AS IF SOMETHING AWFUL MIGHT HAPPEN: MORE THAN HALF THE DAYS
3. WORRYING TOO MUCH ABOUT DIFFERENT THINGS: NEARLY EVERY DAY
5. BEING SO RESTLESS THAT IT IS HARD TO SIT STILL: NEARLY EVERY DAY
3. WORRYING TOO MUCH ABOUT DIFFERENT THINGS: NEARLY EVERY DAY
1. FEELING NERVOUS, ANXIOUS, OR ON EDGE: NEARLY EVERY DAY
3. WORRYING TOO MUCH ABOUT DIFFERENT THINGS: NEARLY EVERY DAY
IF YOU CHECKED OFF ANY PROBLEMS ON THIS QUESTIONNAIRE, HOW DIFFICULT HAVE THESE PROBLEMS MADE IT FOR YOU TO DO YOUR WORK, TAKE CARE OF THINGS AT HOME, OR GET ALONG WITH OTHER PEOPLE: VERY DIFFICULT
7. FEELING AFRAID AS IF SOMETHING AWFUL MIGHT HAPPEN: MORE THAN HALF THE DAYS
6. BECOMING EASILY ANNOYED OR IRRITABLE: NEARLY EVERY DAY
IF YOU CHECKED OFF ANY PROBLEMS ON THIS QUESTIONNAIRE, HOW DIFFICULT HAVE THESE PROBLEMS MADE IT FOR YOU TO DO YOUR WORK, TAKE CARE OF THINGS AT HOME, OR GET ALONG WITH OTHER PEOPLE: EXTREMELY DIFFICULT
2. NOT BEING ABLE TO STOP OR CONTROL WORRYING: NEARLY EVERY DAY
1. FEELING NERVOUS, ANXIOUS, OR ON EDGE: NEARLY EVERY DAY
IF YOU CHECKED OFF ANY PROBLEMS ON THIS QUESTIONNAIRE, HOW DIFFICULT HAVE THESE PROBLEMS MADE IT FOR YOU TO DO YOUR WORK, TAKE CARE OF THINGS AT HOME, OR GET ALONG WITH OTHER PEOPLE: EXTREMELY DIFFICULT
6. BECOMING EASILY ANNOYED OR IRRITABLE: NEARLY EVERY DAY
2. NOT BEING ABLE TO STOP OR CONTROL WORRYING: NEARLY EVERY DAY
2. NOT BEING ABLE TO STOP OR CONTROL WORRYING: NEARLY EVERY DAY
GAD7 TOTAL SCORE: 16
GAD7 TOTAL SCORE: 20
1. FEELING NERVOUS, ANXIOUS, OR ON EDGE: NEARLY EVERY DAY
GAD7 TOTAL SCORE: 17
2. NOT BEING ABLE TO STOP OR CONTROL WORRYING: NEARLY EVERY DAY
GAD7 TOTAL SCORE: 17
GAD7 TOTAL SCORE: 16
5. BEING SO RESTLESS THAT IT IS HARD TO SIT STILL: NEARLY EVERY DAY
7. FEELING AFRAID AS IF SOMETHING AWFUL MIGHT HAPPEN: NOT AT ALL
5. BEING SO RESTLESS THAT IT IS HARD TO SIT STILL: MORE THAN HALF THE DAYS
2. NOT BEING ABLE TO STOP OR CONTROL WORRYING: NEARLY EVERY DAY
GAD7 TOTAL SCORE: 20
6. BECOMING EASILY ANNOYED OR IRRITABLE: NEARLY EVERY DAY
5. BEING SO RESTLESS THAT IT IS HARD TO SIT STILL: MORE THAN HALF THE DAYS
IF YOU CHECKED OFF ANY PROBLEMS ON THIS QUESTIONNAIRE, HOW DIFFICULT HAVE THESE PROBLEMS MADE IT FOR YOU TO DO YOUR WORK, TAKE CARE OF THINGS AT HOME, OR GET ALONG WITH OTHER PEOPLE: VERY DIFFICULT
1. FEELING NERVOUS, ANXIOUS, OR ON EDGE: NEARLY EVERY DAY
5. BEING SO RESTLESS THAT IT IS HARD TO SIT STILL: NEARLY EVERY DAY
GAD7 TOTAL SCORE: 17
7. FEELING AFRAID AS IF SOMETHING AWFUL MIGHT HAPPEN: NOT AT ALL
3. WORRYING TOO MUCH ABOUT DIFFERENT THINGS: NEARLY EVERY DAY
GAD7 TOTAL SCORE: 20
GAD7 TOTAL SCORE: 17
7. FEELING AFRAID AS IF SOMETHING AWFUL MIGHT HAPPEN: NOT AT ALL
6. BECOMING EASILY ANNOYED OR IRRITABLE: NEARLY EVERY DAY
3. WORRYING TOO MUCH ABOUT DIFFERENT THINGS: NEARLY EVERY DAY
GAD7 TOTAL SCORE: 20

## 2018-01-01 ASSESSMENT — PATIENT HEALTH QUESTIONNAIRE - PHQ9
5. POOR APPETITE OR OVEREATING: NEARLY EVERY DAY
SUM OF ALL RESPONSES TO PHQ QUESTIONS 1-9: 24
SUM OF ALL RESPONSES TO PHQ QUESTIONS 1-9: 20
5. POOR APPETITE OR OVEREATING: MORE THAN HALF THE DAYS
SUM OF ALL RESPONSES TO PHQ QUESTIONS 1-9: 21
5. POOR APPETITE OR OVEREATING: NEARLY EVERY DAY
SUM OF ALL RESPONSES TO PHQ QUESTIONS 1-9: 22
SUM OF ALL RESPONSES TO PHQ QUESTIONS 1-9: 19
5. POOR APPETITE OR OVEREATING: NEARLY EVERY DAY
5. POOR APPETITE OR OVEREATING: NEARLY EVERY DAY

## 2018-01-01 ASSESSMENT — PAIN DESCRIPTION - DESCRIPTORS
DESCRIPTORS: ACHING
DESCRIPTORS: ACHING
DESCRIPTORS: TIGHTNESS
DESCRIPTORS: ACHING

## 2018-01-01 ASSESSMENT — ROUTINE ASSESSMENT OF PATIENT INDEX DATA (RAPID3)
TOTAL RAPID3 SCORE: 4
RAPID3 INTERPRETATION: LOW 3.1-6

## 2018-01-03 NOTE — TELEPHONE ENCOUNTER
Controlled Substance Refill Request for Norco  Problem List Complete:  Yes  Patient is followed by OKSANA ANTONIO for ongoing prescription of pain medication.  All refills should be approved by this provider, or covering partner.    Medication(s): Norco.   Maximum quantity per month: 40  Clinic visit frequency required: Q 3 months     Controlled substance agreement on file: Yes       Date(s): 7/16/15    Pain Clinic evaluation in the past: No       Date(s):  n/a       Location(s):  n/a    DIRE Total Score(s):  No flowsheet data found.    Last [facility] website verification: 8/1/17   [email or web address]     checked in past 6 months?  Yes 8/1/*17     Georgie Shabazz, Pharmacy [facility]

## 2018-01-08 NOTE — MR AVS SNAPSHOT
MRN:[id number]                      After Visit Summary   1/8/2018    Karen Alex    MRN: [id number]           Visit Information        Provider Department      1/8/2018 2:30 PM Lupis Mcgee [facility] Generic      Your next 10 appointments already scheduled     Feb 07, 2018  1:30 PM CST   Return Visit with Lupis Mcgee [facility] ([facility])    [address] [place] [address]   [phone]            Feb 28, 2018  1:30 PM CST   Return Visit with Lupis Mcgee [facility] ([facility])    [address] [place] [address]   [phone]            Mar 05, 2018 10:15 AM CST   Return Visit with Cj Tapia MD   [facility] ([facility])    [address] [place] [address] [phone]              MyChart Information     [email or web address] gives you secure access to your electronic health record. If you see a primary care provider, you can also send messages to your care team and make appointments. If you have questions, please call your primary care clinic.  If you do not have a primary care provider, please call [phone] and they will assist you.        Care EveryWhere ID     This is your Care EveryWhere ID. This could be used by other organizations to access your [place] medical records  [id number]        Equal Access to Services     BRANDYN TURNER : Hadii aad ku hadasho Soomaali, waaxda luqadaha, qaybta kaalmada adeegyada, rj idiin haysophia sanonarasilvestre ramirez . So [facility] [phone].    ATENCIÓN: Si habla español, tiene a zaragoza disposición servicios gratuitos de asistencia lingüística. Llame al [phone].    We comply with applicable federal civil rights laws and [place] laws. We do not  discriminate on the basis of race, color, national origin, age, disability, sex, sexual orientation, or gender identity.

## 2018-01-08 NOTE — PROGRESS NOTES
Progress Note    Client Name: aKren Alxe  Date: 1/8/2018                                        Service Type: Individual      Session Start Time:  2:40  Session End Time: 3:30      Session Length: 45 - 50     Session #: 28     Attendees: Client attended alone    Treatment Plan Last Completed Date: 11/1/17  PHQ-9 / HUNG-7 Last Completed Date: last completed 12/12/17                DATA      Progress Since Last Session (Related to Symptoms / Goals / Homework):   Symptoms: Stable-continues to grieve for sister, anger about ex-bf     Homework: Partially completed- has been challenging with current health      Episode of Care Goals: Minimal progress - ACTION (Actively working towards change); Intervened by reinforcing change plan / affirming steps taken     Current / Ongoing Stressors and Concerns:   Client reports that she is stable.  Client reports more struggle with her medical condition.  It is difficult for her to breathe and ambulate.  She gets exhausted easily.  Son continues to live with her and she finds this helpful.  BF is out of the hospital and he is doing his own thing.  She continues to mourn her sister and continues to be fixated at times about the past.  Has not followed through on socialization- she states that she will call this week.  Upcoming challenges are her physical health and son will be out of town for the five days for [employer].      Treatment Objective(s) Addressed in This Session:   focus on being socially active and not isolating at home due to medical condition   Using acceptance skills to understand her level of depression and what she has control over       Intervention:   CBT: Following through with action steps        ASSESSMENT: Current Emotional / Mental Status (status of significant symptoms):   Risk status (Self / Other harm or suicidal ideation)  Client denies a history of suicidal ideation, suicide attempts, self-injurious  "behavior, homicidal ideation, homicidal behavior and and other safety concerns   Client denies current fears or concerns for personal safety.   Client reports the following current or recent suicidal ideation or behaviors: passive thoughts of not wanting to live.  Denies any plan or intent.  \"Would not do that to my son\".   Client denies current or recent homicidal ideation or behaviors.   Client denies current or recent self injurious behavior or ideation.   Client denies other safety concerns.   A safety and risk management plan has not been developed at this time, however client was given the after-hours number should there be a change in any of these risk factors.     Appearance:   Appropriate    Eye Contact:   Fair    Psychomotor Behavior: Normal    Attitude:   Cooperative    Orientation:   All   Speech    Rate / Production: Normal     Volume:  Soft    Mood:    Depressed  Sad    Affect:    Flat    Thought Content:  Hallucinations    Thought Form:  Focused on grief   Insight:    Fair      Medication Review:   Changes to psychiatric medications, see updated Medication List in EPIC.      Medication Compliance:   Yes- set up appt with psychiatry in Jan 30th with Sara Leonardo PA-C     Changes in Health Issues:   Yes: Respiratory Disease, No Psychological Distress-struggling with breathing, fluid retention     Chemical Use Review:   Substance Use: Chemical use reviewed, no active concerns identified      Tobacco Use: No change in amount of tobacco use since last session.  Action on nicotine patches- struggles with cravings- Continues to not smoke     Collateral Reports Completed:   Not Applicable    PLAN: (Client Tasks / Therapist Tasks / Other)  1.   Client will follow through with referrals fort a tour for [facility] for additional MH support.      Lupis Mcgee, MAURY     __________________________________________________________________________________________________                        "                                    Treatment Plan    Client's Name: Karen Alex  YOB: 1955    Date: 9/22/2015       DSM-V Diagnoses: 296.32 - Major Depressive Disorder, Recurrent, Moderate    300.02 - Generalized Anxiety Disorder    309.81 - Posttraumatic Stress Disorder By History; 799.9 - Deferred Diagnosis   WHODAS:  35    Referral / Collaboration:  Referral to another professional/service is not indicated at this time..    Anticipated number of session or this episode of care: 12      MeasurableTreatment Goal(s) related to diagnosis / functional impairment(s)  Goal 1: Client will have stability with her mental health as evidenced by PHQ-9 and HUNG-7 scores as well as self-report.      I will know I've met my goal when I start feeling better about myself.      Objective #A (Client Action)      Status: Continued - Date(s): 11/1/2017           Client will Decrease frequency and intensity of feeling down, depressed, hopeless.    Intervention(s)  Therapist will assign homework by learning to build awareness of her triggers which activate her mental health symptoms.  Therapist will introduce and have client implement strategies to address triggers.    Objective #B  Client will Identify negative self-talk and behaviors: challenge core beliefs, myths, and actions.  Status: Continued - Date(s): 11/1/2017         Intervention(s)  Therapist will continue to help client identify and reframe negative perceptions of self.  Work on ways to increase self-esteem.    Objective #C  Client will Feel less tired and more energy during the day .  Status: Completed - Date: 3/2/16     Intervention(s)  Therapist will assign homework 1.  Complete at least one household activity daily; 2.  Focus on getting out of the house at least three times weekly; 3.  Look into entering social groups.      Client has reviewed and agreed to the above plan.      Lupis Mcgee, [facility]  September 22, 2015

## 2018-01-12 NOTE — TELEPHONE ENCOUNTER
Referral sent, call for appointment.  It will help them if I see you and do some labwork before you go there. It takes months to get is so we have time.   See me sometime this month , non fasting is ok.

## 2018-01-12 NOTE — TELEPHONE ENCOUNTER
Pt called in requesting a rheumatology referral. She reports she has been having ongoing joint pain for quite sometime now. Pain is present all day long including in the AM.   The Pt has never seen a rheumatologist for this before.     Please advise.     Leyda Martinez RN -- [facility]

## 2018-01-12 NOTE — TELEPHONE ENCOUNTER
"Called the Pt and relayed the below information. Provided her with the Rheumatology referral number and she will call and schedule with them. She will also call back into AV and schedule with PCP \"when the weather warms up a bit\"    Leyda Martinez RN -- [facility]       "

## 2018-01-12 NOTE — TELEPHONE ENCOUNTER
Reason for call:  Other   Patient called regarding (reason for call): patient is requesting a [facility] to be faxed to [phone].  Additional comments: none      Phone number to reach patient:  Home number on file [phone] (home)    Best Time:  anytime    Can we leave a detailed message on this number?  YES

## 2018-01-15 NOTE — TELEPHONE ENCOUNTER
gabapentin (NEURONTIN) 300 MG capsule (Discontinued)  Controlled Substance Refill Request    Last refill: 10/30/17, 11/30/17 Per [place]    Last clinic visit: 10/30/17     Next appt: none    Documentation in problem list reviewed:  Yes    Processing:  [place]    RX [place] ([place] reviewed:  reviewed- no concerns  [place]:  [email or web address]      Janes Luque RN

## 2018-01-15 NOTE — TELEPHONE ENCOUNTER
Controlled Substance Refill Request for Norco  Problem List Complete:  Yes  Patient is followed by OKSANA ANTONIO for ongoing prescription of pain medication.  All refills should be approved by this provider, or covering partner.    Medication(s): Norco.   Maximum quantity per month: 40  Clinic visit frequency required: Q 3 months     Controlled substance agreement on file: Yes       Date(s): 7/16/15    Pain Clinic evaluation in the past: No       Date(s):  n/a       Location(s):  n/a    DIRE Total Score(s):  No flowsheet data found.    Last [facility] website verification: 8/1/17   [email or web address]     checked in past 6 months?  Yes 8/1/17     Georgie Shabazz, Pharmacy [facility]

## 2018-01-19 NOTE — PROGRESS NOTES
SUBJECTIVE:   Karen Alex is a 62 year old female who presents to clinic today for the following health issues:      Concern - arthritis   Onset: 1 month     Description:   All over the body     Intensity: severe    Progression of Symptoms:  worsening    Accompanying Signs & Symptoms:  Swollen in the feet     Previous history of similar problem:   None     Precipitating factors:   Worsened by: knees are worse all day     Alleviating factors:  Improved by:     Therapies Tried and outcome: takes prednisone intermittently for her chest but knees stiffen up and bother her , to a lesser extent both hips and both shoulders.   Current Outpatient Prescriptions   Medication     isosorbide mononitrate (IMDUR) 30 MG 24 hr tablet     gabapentin (NEURONTIN) 300 MG capsule     HYDROcodone-acetaminophen (NORCO) 5-325 MG per tablet     GABAPENTIN PO     atorvastatin (LIPITOR) 40 MG tablet     QUEtiapine (SEROQUEL) 200 MG tablet     buPROPion (WELLBUTRIN XL) 300 MG 24 hr tablet     DULoxetine (CYMBALTA) 60 MG EC capsule     diazepam (VALIUM) 10 MG tablet     ADVAIR DISKUS 250-50 MCG/DOSE diskus inhaler     VENTOLIN  (90 BASE) MCG/ACT Inhaler     tiotropium (SPIRIVA) 18 MCG capsule     nitroGLYcerin (NITROSTAT) 0.4 MG sublingual tablet     ibuprofen (ADVIL,MOTRIN) 600 MG tablet     Cholecalciferol (VITAMIN D3) 2000 UNITS CHEW     Docusate Calcium (STOOL SOFTENER PO)     albuterol (2.5 MG/3ML) 0.083% nebulizer solution     ASPIRIN PO     No current facility-administered medications for this visit.      Chronic pain related to low back and neck, intemittent meds, reviewed CSA  SHEs never seen orthopedics for the knees but left knee is more trouble now  SHE MAY benefit from knee injection therapy    Past Medical History:   Diagnosis Date     Anxiety      Arthritis     generalized     Asthma      Bipolar 2 disorder (H)      Cervical dysplasia      Chronic back pain     low back     COPD (chronic obstructive pulmonary  disease) (H)     O2 at night     HTN, goal below 140/90 2015     Hypercholesterolemia      Hyperlipidaemia      Other chronic pain      Pneumothorax 2012    left sided      Varicose veins      BOBBY III (vulvar intraepithelial neoplasia III) 2007       Past Surgical History:   Procedure Laterality Date     BACK SURGERY       CONIZATION  10/23/07    Vulvar excision for BOBBY 3, cold knife conization     EXCISE VULVA WIDE LOCAL  2012    Procedure:EXCISE VULVA WIDE LOCAL; Wide Local Excision Of Vulvar Lesion; Surgeon:RE ALDRIDGE; Location:[facility] OR     Foot surgeries       HERNIORRHAPHY INCISIONAL (LOCATION)  2012    Procedure:HERNIORRHAPHY INCISIONAL (LOCATION); Incisional Hernia Repair with mesh ; Surgeon:KMI QUICK; Location:[facility] OR     HYSTERECTOMY TOTAL ABDOMINAL, BILATERAL SALPINGO-OOPHORECTOMY, COMBINED       LAPAROSCOPIC HYSTERECTOMY TOTAL, BILATERAL SALPINGO-OOPHORECTOMY, COMBINED  3/6/2012    Procedure:COMBINED LAPAROSCOPIC HYSTERECTOMY TOTAL, BILATERAL SALPINGO-OOPHORECTOMY; Total laparoscopic Hysterectomy, Bilateral Salpingo Ooporectomy, Pubovaginal Sling, Biopsy Left Volva; Surgeon:RE ALDRIDGE; Location:[facility] OR     repair of anal fissures       SLING BLADDER SUSPENSION WITH FASCIA ALESHA       SLING TRANSPUBO WITHOUT ANTERIOR COLPORRHAPHY  3/6/2012    Procedure:SLING TRANSPUBO WITHOUT ANTERIOR COLPORRHAPHY; Surgeon:JOLANTA ENRIQUEZ; Location:[facility] OR     TUBAL LIGATION         Family History   Problem Relation Age of Onset     CANCER Father      asbestos lung disease     Hypertension Mother      DIABETES Paternal Aunt      HEART DISEASE Maternal Grandfather      CANCER Maternal Grandmother      unsure of what kind,  at the age of 86     Circulatory Paternal Grandmother       of brain aneurysm     Asthma Son      HEART DISEASE Sister        Social History   Substance Use Topics     Smoking status: Former Smoker     Packs/day: 0.50     Years: 26.00     Types:  Cigarettes     Quit date: 7/3/2015     Smokeless tobacco: Never Used      Comment: sometimes     Alcohol use No     On exam the vital signs are stable  Weight is Body mass index is 33.67 kg/(m^2).   Eyes show edith, her lumgs are more clear today, she uses chronic oxygen for copd   No neck masses or thyromegaly.Ear nose and throat shows normal   No bruits, murmers, rubs or extrasounds. No cardiomegaly or chest wall tenderness. Lungs clear, no abdominal masses or organomegaly. No CVA tenderness.  Skin eval no rash  Slow gait with antalgic towards left knee  No edema  (M25.562,  G89.29) Chronic pain of left knee  (primary encounter diagnosis)  Comment: bilateral knee pain   Plan: XR Knee AP Standing Bilateral, ORTHO          REFERRAL        Consider oa treatment    (I25.118) Coronary artery disease of native heart with stable angina pectoris, unspecified vessel or lesion type (H)  Comment:   Plan: atorvastatin (LIPITOR) 40 MG tablet        No chest pain, lipids improved, NO LONGER a smoker

## 2018-01-19 NOTE — MR AVS SNAPSHOT
After Visit Summary   1/19/2018    Karen Alex    MRN: [id number]           Patient Information     Date Of Birth          1955        Visit Information        Provider Department      1/19/2018 1:30 PM Eros Rebolledo MD [facility]        Today's Diagnoses     Chronic pain of left knee    -  1       Follow-ups after your visit        Additional Services     ORTHO  REFERRAL       [facility] is referring you to the Orthopedic  Services at [facility].       The  Representative will assist you in the coordination of your Orthopedic and Musculoskeletal Care as prescribed by your physician.    The  Representative will call you within 1 business day to help schedule your appointment, or you may contact the  Representative at:    All areas ~ [phone]     Type of Referral : Surgical / Specialist       Timeframe requested: 3 - 5 days    Coverage of these services is subject to the terms and limitations of your health insurance plan.  Please call member services at your health plan with any benefit or coverage questions.      If X-rays, CT or MRI's have been performed, please contact the facility where they were done to arrange for , prior to your scheduled appointment.  Please bring this referral request to your appointment and present it to your specialist.                  Your next 10 appointments already scheduled     Feb 07, 2018  1:30 PM CST   Return Visit with MAURY Lemos   [facility] ([facility])    [address] [place] [address] [id number] [phone]            Feb 27, 2018  1:30 PM CST   New Visit with Mauricio Hernandez MD   [facility]    [address] [place] [address] [id number] [phone]            Feb 28,  "2018  1:30 PM CST   Return Visit with MAURY Lemos   [facility] ([facility])    [address] [address] [phone]            Mar 05, 2018 10:15 AM CST   Return Visit with Cj Tapia MD   [facility] ([facility])    [address]  [facility] [address] [phone]              Who to contact     If you have questions or need follow up information about today's clinic visit or your schedule please contact [facility] directly at [phone].  Normal or non-critical lab and imaging results will be communicated to you by MyChart, letter or phone within 4 business days after the clinic has received the results. If you do not hear from us within 7 days, please contact the clinic through [employer] or phone. If you have a critical or abnormal lab result, we will notify you by phone as soon as possible.  Submit refill requests through [employer] or call your pharmacy and they will forward the refill request to us. Please allow 3 business days for your refill to be completed.          Additional Information About Your Visit        [employer] Information     [employer] gives you secure access to your electronic health record. If you see a primary care provider, you can also send messages to your care team and make appointments. If you have questions, please call your primary care clinic.  If you do not have a primary care provider, please call [phone] and they will assist you.        Care EveryWhere ID     This is your Care EveryWhere ID. This could be used by other organizations to access your [facility] medical records  [id number]        Your Vitals Were     Pulse Temperature Respirations Height Last Period Pulse Oximetry    102 97.6  F (36.4  C) (Oral) 12 5' 7\" (1.702 m) 12/01/2007 80%    BMI (Body Mass Index)                   33.67 kg/m2  "           Blood Pressure from Last 3 Encounters:   01/19/18 138/83   12/22/17 118/82   12/20/17 160/81    Weight from Last 3 Encounters:   01/19/18 215 lb (97.5 kg)   12/22/17 215 lb (97.5 kg)   12/20/17 220 lb (99.8 kg)              We Performed the Following     ORTHO  REFERRAL     XR Knee AP Standing Bilateral          Where to get your medicines      These medications were sent to [facility] [address]  [address] [facility] [address]     Phone:  [phone]     isosorbide mononitrate 30 MG 24 hr tablet          Primary Care Provider Office Phone # Fax #    Eros Rebolledo [phone] [address] [facility] [address]        Equal Access to Services     [facility]: Hadii omid woodard hadasho Soomaali, waaxda luqadaha, qaybta kaalmada adeegyada, rj burgess haysophia ramirez . So [facility] [phone].    ATENCIÓN: Si habla español, tiene a zaragoza disposición servicios gratuitos de asistencia lingüística. Llame al [phone].    We comply with applicable federal civil rights laws and [place] laws. We do not discriminate on the basis of race, color, national origin, age, disability, sex, sexual orientation, or gender identity.            Thank you!     Thank you for choosing [facility]  for your care. Our goal is always to provide you with excellent care. Hearing back from our patients is one way we can continue to improve our services. Please take a few minutes to complete the written survey that you may receive in the mail after your visit with us. Thank you!             Your Updated Medication List - Protect others around you: Learn how to safely use, store and throw away your medicines at www.disposemymeds.org.          This list is accurate as of: 1/19/18  2:45 PM.  Always use your most recent med list.                   Brand Name Dispense Instructions for use Diagnosis    ADVAIR DISKUS  250-50 MCG/DOSE diskus inhaler   Generic drug:  fluticasone-salmeterol     3 Inhaler    INHALE ONE PUFF BY MOUTH TWO TIMES A DAY    Chronic obstructive pulmonary disease, unspecified COPD type (H)       * albuterol (2.5 MG/3ML) 0.083% neb solution     120 vial    Take 1 vial (2.5 mg) by nebulization 4 times daily as needed    COPD (chronic obstructive pulmonary disease) (H)       * VENTOLIN  (90 BASE) MCG/ACT Inhaler   Generic drug:  albuterol     18 g    SHAKE WELL AND INHALE 1 TO 2 PUFFS INTO THE LUNGS EVERY 4 HOURS AS NEEDED FOR SHORTNESS OF BREATH, DIFFICULTY BREATHING OR WHEEZING.    Panlobular emphysema (H)       ASPIRIN PO      Take 81 mg by mouth daily        atorvastatin 40 MG tablet    LIPITOR    90 tablet    Take 1 tablet (40 mg) by mouth daily    CAD (coronary artery disease)       buPROPion 300 MG 24 hr tablet    WELLBUTRIN XL    90 tablet    Take 1 tablet (300 mg) by mouth every morning    Major depressive disorder, recurrent episode, in partial remission (H)       diazepam 10 MG tablet    VALIUM    60 tablet    Take 1 tablet (10 mg) by mouth every 12 hours as needed for anxiety    HUNG (generalized anxiety disorder)       DULoxetine 60 MG EC capsule    CYMBALTA    180 capsule    Take 2 capsules (120 mg) by mouth daily For mood and anxiety and pain.    HUNG (generalized anxiety disorder), Major depressive disorder, recurrent episode, in partial remission (H)       * GABAPENTIN PO      Take 300 mg by mouth At Bedtime        * gabapentin 300 MG capsule    NEURONTIN    90 capsule    Take 1 capsule (300 mg) by mouth At Bedtime    Recurrent major depressive disorder, in partial remission (H)       HYDROcodone-acetaminophen 5-325 MG per tablet    NORCO    30 tablet    Take one two times daily as needed    Mechanical low back pain       ibuprofen 600 MG tablet    ADVIL/MOTRIN    20 tablet    Take 1 tablet (600 mg) by mouth every 6 hours as needed for moderate pain        isosorbide mononitrate 30 MG 24  hr tablet    IMDUR    90 tablet    Take 1 tablet (30 mg) by mouth daily    Chest pain, CAD (coronary artery disease)       nitroGLYcerin 0.4 MG sublingual tablet    NITROSTAT    25 tablet    Place 1 tablet (0.4 mg) under the tongue every 5 minutes as needed for chest pain prn    Chest pain       QUEtiapine 200 MG tablet    SEROquel    90 tablet    Take 1 tablet (200 mg) by mouth At Bedtime    HUNG (generalized anxiety disorder)       STOOL SOFTENER PO      Take 100 mg by mouth 2 times daily        tiotropium 18 MCG capsule    SPIRIVA    90 capsule    Inhale 1 capsule (18 mcg) into the lungs daily    Generalized anxiety disorder, Chronic obstructive pulmonary disease, unspecified COPD type (H)       Vitamin D3 2000 UNITS Chew      Take 2,000 mg by mouth daily        * Notice:  This list has 4 medication(s) that are the same as other medications prescribed for you. Read the directions carefully, and ask your doctor or other care provider to review them with you.

## 2018-01-19 NOTE — NURSING NOTE
"Chief Complaint   Patient presents with     Arthritis     all over body        Initial /83 (BP Location: Right arm, Patient Position: Chair, Cuff Size: Adult Large)  Pulse 102  Temp 97.6  F (36.4  C) (Oral)  Resp 12  Ht 5' 7\" (1.702 m)  Wt 215 lb (97.5 kg)  LMP 12/01/2007  SpO2 (!) 80%  BMI 33.67 kg/m2 Estimated body mass index is 33.67 kg/(m^2) as calculated from the following:    Height as of this encounter: 5' 7\" (1.702 m).    Weight as of this encounter: 215 lb (97.5 kg).  Medication Reconciliation: complete   "

## 2018-01-24 NOTE — PROGRESS NOTES
HISTORY OF PRESENT ILLNESS:    Karen Alex is a 62 year old female who is seen in consultation at the request of Dr. Rebolledo for left knee pain    Present symptoms: Pt states left knee pain has been present for over 6 months, pt states pain is in the anterior knee, describes pain as sharp, has difficulty with transitions - sit to stand.    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       Past Surgical History:   Procedure Laterality Date     BACK SURGERY       CONIZATION  10/23/07    Vulvar excision for BOBBY 3, cold knife conization     EXCISE VULVA WIDE LOCAL  5/25/2012    Procedure:EXCISE VULVA WIDE LOCAL; Wide Local Excision Of Vulvar Lesion; Surgeon:RE ALDRIDGE; Location:[facility] OR     Foot surgeries       HERNIORRHAPHY INCISIONAL (LOCATION)  4/25/2012    Procedure:HERNIORRHAPHY INCISIONAL (LOCATION); Incisional Hernia Repair with mesh ; Surgeon:KIM QUICK; Location:[facility]     HYSTERECTOMY TOTAL ABDOMINAL, BILATERAL SALPINGO-OOPHORECTOMY, COMBINED       LAPAROSCOPIC HYSTERECTOMY TOTAL, BILATERAL SALPINGO-OOPHORECTOMY, COMBINED  3/6/2012    Procedure:COMBINED LAPAROSCOPIC HYSTERECTOMY TOTAL, BILATERAL SALPINGO-OOPHORECTOMY; Total laparoscopic Hysterectomy, Bilateral Salpingo Ooporectomy, Pubovaginal Sling, Biopsy Left Volva; Surgeon:RE ALDRIDGE; Location:[facility] OR     repair of anal fissures  2005     SLING BLADDER SUSPENSION WITH FASCIA ALESHA       SLING  TRANSPUBO WITHOUT ANTERIOR COLPORRHAPHY  3/6/2012    Procedure:SLING TRANSPUBO WITHOUT ANTERIOR COLPORRHAPHY; Surgeon:JOLANTA ENRIQUEZ; Location:[facility]     TUBAL LIGATION         Family History   Problem Relation Age of Onset     CANCER Father      asbestos lung disease     Hypertension Mother      DIABETES Paternal Aunt      HEART DISEASE Maternal Grandfather      CANCER Maternal Grandmother      unsure of what kind,  at the age of 86     Circulatory Paternal Grandmother       of brain aneurysm     Asthma Son      HEART DISEASE Sister        Social History     Social History     Marital status:      Spouse name: N/A     Number of children: N/A     Years of education: N/A     Occupational History     Not on file.     Social History Main Topics     Smoking status: Former Smoker     Packs/day: 0.50     Years: 26.00     Types: Cigarettes     Quit date: 7/3/2015     Smokeless tobacco: Never Used      Comment: sometimes     Alcohol use No     Drug use: No     Sexual activity: Not Currently     Birth control/ protection: Surgical     Other Topics Concern     Parent/Sibling W/ Cabg, Mi Or Angioplasty Before 65f 55m? No     Caffeine Concern No     2 cans per day     Sleep Concern Yes     sometimes     Special Diet No     Exercise No     walking in halls at least 3 days per week     Social History Narrative       Current Outpatient Prescriptions   Medication Sig Dispense Refill     atorvastatin (LIPITOR) 40 MG tablet Take 1 tablet (40 mg) by mouth daily 90 tablet 3     isosorbide mononitrate (IMDUR) 30 MG 24 hr tablet Take 1 tablet (30 mg) by mouth daily 90 tablet 0     gabapentin (NEURONTIN) 300 MG capsule Take 1 capsule (300 mg) by mouth At Bedtime 90 capsule 1     HYDROcodone-acetaminophen (NORCO) 5-325 MG per tablet Take one two times daily as needed 30 tablet 0     QUEtiapine (SEROQUEL) 200 MG tablet Take 1 tablet (200 mg) by mouth At Bedtime 90 tablet 1     buPROPion (WELLBUTRIN XL) 300 MG 24 hr  tablet Take 1 tablet (300 mg) by mouth every morning 90 tablet 1     DULoxetine (CYMBALTA) 60 MG EC capsule Take 2 capsules (120 mg) by mouth daily For mood and anxiety and pain. 180 capsule 1     diazepam (VALIUM) 10 MG tablet Take 1 tablet (10 mg) by mouth every 12 hours as needed for anxiety 60 tablet 1     ADVAIR DISKUS 250-50 MCG/DOSE diskus inhaler INHALE ONE PUFF BY MOUTH TWO TIMES A DAY 3 Inhaler 2     VENTOLIN  (90 BASE) MCG/ACT Inhaler SHAKE WELL AND INHALE 1 TO 2 PUFFS INTO THE LUNGS EVERY 4 HOURS AS NEEDED FOR SHORTNESS OF BREATH, DIFFICULTY BREATHING OR WHEEZING. 18 g 5     tiotropium (SPIRIVA) 18 MCG capsule Inhale 1 capsule (18 mcg) into the lungs daily 90 capsule 2     Cholecalciferol (VITAMIN D3) 2000 UNITS CHEW Take 2,000 mg by mouth daily        Docusate Calcium (STOOL SOFTENER PO) Take 100 mg by mouth 2 times daily        ASPIRIN PO Take 81 mg by mouth daily       GABAPENTIN PO Take 300 mg by mouth At Bedtime       nitroGLYcerin (NITROSTAT) 0.4 MG sublingual tablet Place 1 tablet (0.4 mg) under the tongue every 5 minutes as needed for chest pain prn (Patient not taking: Reported on 1/24/2018) 25 tablet 1     ibuprofen (ADVIL,MOTRIN) 600 MG tablet Take 1 tablet (600 mg) by mouth every 6 hours as needed for moderate pain (Patient not taking: Reported on 1/24/2018) 20 tablet 1     albuterol (2.5 MG/3ML) 0.083% nebulizer solution Take 1 vial (2.5 mg) by nebulization 4 times daily as needed (Patient not taking: Reported on 1/24/2018) 120 vial 5       Allergies   Allergen Reactions     No Known Drug Allergies        REVIEW OF SYSTEMS:  CONSTITUTIONAL:  NEGATIVE for fever, chills, change in weight  INTEGUMENTARY/SKIN:  NEGATIVE for worrisome rashes, moles or lesions  EYES:  NEGATIVE for vision changes or irritation  ENT/MOUTH:  NEGATIVE for ear, mouth and throat problems  RESP:  NEGATIVE for significant cough or SOB  BREAST:  NEGATIVE for masses, tenderness or discharge  CV:  NEGATIVE for chest  "pain, palpitations or peripheral edema  GI:  NEGATIVE for nausea, abdominal pain, heartburn, or change in bowel habits  :  Negative   MUSCULOSKELETAL:  See HPI above  NEURO:  NEGATIVE for weakness, dizziness or paresthesias  ENDOCRINE:  NEGATIVE for temperature intolerance, skin/hair changes  HEME/ALLERGY/IMMUNE:  NEGATIVE for bleeding problems  PSYCHIATRIC:  NEGATIVE for changes in mood or affect      PHYSICAL EXAM:  /86 (BP Location: Right arm, Patient Position: Sitting, Cuff Size: Adult Regular)  Ht 5' 7\" (1.702 m)  Wt 215 lb (97.5 kg)  LMP 12/01/2007  BMI 33.67 kg/m2  Body mass index is 33.67 kg/(m^2).   GENERAL APPEARANCE: healthy, alert and no distress   SKIN: no suspicious lesions or rashes  NEURO: Normal strength and tone, mentation intact and speech normal  VASCULAR: Good pulses, and capillary refill   LYMPH: no lymphadenopathy   PSYCH:  mentation appears normal and affect normal/bright    MSK:  A&OX3, NAD  Neck supple, no lymphadenopathy  The patient ambulates without an antalgic gait.  The patient is able to get on and off the exam table without difficulty.      Examination of the spine reveals a normal lordosis to the cervical and lumbar spine, and a normal kyphosis to the thoracic spine.  There is no clinical evidence of scoliosis.    The pelvis is clinically level.  Trendelenberg is negative.  The patient is non-tender to palpation over the greater trochanteric bursal region or piriformis fossa.  With the hip flexed to 90 degrees, internal and external rotation is 30/60 respectively,  with no pain .      KNEE: Examination of the left knee reveals the lower extremity to have a Normal anatomic alignment.  There is no erythema, ecchymosis nor edema.  There is an unclear effusion present clinically.  There is no significant warmth.  Range of motion is 0 to 120 degrees, without crepitus.  There is mild tenderness to palpation at the both medial and lateral joint line.  Katheryn's is negative " without crepitus. The knee is ligamentously stable. Patello-femoral grind test is positive.      The calves and thighs are symmetric, without atrophy and non-tender to palpation. Bessy's sign is negative, bilaterally.   CMS is intact to the toes.        ASSESSMENT / PLAN: Primary osteoarthritis left knee. I offered her corticosteroid injection, describing the potential risks as well as benefits. She accepted.    Procedure Note:  After risks and benefits were explained and questions answered, pt agreed to undergo a left knee injection. Using aseptic technique and a inferolateral parapatellar tendon approach, the joint space was infiltrated with 4 mg of Dexamethasone, 40 mg of Depo Medrol, and 3 cc of 1% Lidocaine.  There were no complications and the patient tolerated the procedure well.    Imaging Interpretation:         Peña Millan MD  Department of Orthopedic Surgery        Disclaimer: This note consists of symbols derived from keyboarding, dictation and/or voice recognition software. As a result, there may be errors in the script that have gone undetected. Please consider this when interpreting information found in this chart.

## 2018-01-24 NOTE — LETTER
1/24/2018         RE: Karen Alex  [address]   [facility] [address]        Dear Colleague,    Thank you for referring your patient, Karen Alex, to the [facility] ORTHOPEDIC SURGERY. Please see a copy of my visit note below.    HISTORY OF PRESENT ILLNESS:    Karen Alex is a 62 year old female who is seen in consultation at the request of Dr. Rebolledo for left knee pain    Present symptoms: Pt states left knee pain has been present for over 6 months, pt states pain is in the anterior knee, describes pain as sharp, has difficulty with transitions - sit to stand.    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       Past Surgical History:   Procedure Laterality Date     BACK SURGERY       CONIZATION  10/23/07    Vulvar excision for BOBBY 3, cold knife conization     EXCISE VULVA WIDE LOCAL  5/25/2012    Procedure:EXCISE VULVA WIDE LOCAL; Wide Local Excision Of Vulvar Lesion; Surgeon:RE ALDRIDGE; Location:[facility] OR     Foot surgeries       HERNIORRHAPHY INCISIONAL (LOCATION)  4/25/2012    Procedure:HERNIORRHAPHY INCISIONAL (LOCATION); Incisional Hernia Repair with mesh ; Surgeon:KIM QUICK; Location:[facility] OR     HYSTERECTOMY TOTAL ABDOMINAL, BILATERAL SALPINGO-OOPHORECTOMY, COMBINED       LAPAROSCOPIC HYSTERECTOMY TOTAL, BILATERAL SALPINGO-OOPHORECTOMY, COMBINED  3/6/2012    Procedure:COMBINED LAPAROSCOPIC HYSTERECTOMY TOTAL, BILATERAL  SALPINGO-OOPHORECTOMY; Total laparoscopic Hysterectomy, Bilateral Salpingo Ooporectomy, Pubovaginal Sling, Biopsy Left Volva; Surgeon:RE ALDRIDGE; Location:[facility] OR     repair of anal fissures       SLING BLADDER SUSPENSION WITH FASCIA ALESHA       SLING TRANSPUBO WITHOUT ANTERIOR COLPORRHAPHY  3/6/2012    Procedure:SLING TRANSPUBO WITHOUT ANTERIOR COLPORRHAPHY; Surgeon:JOLANTA ENRIQUEZ; Location:[facility] OR     TUBAL LIGATION         Family History   Problem Relation Age of Onset     CANCER Father      asbestos lung disease     Hypertension Mother      DIABETES Paternal Aunt      HEART DISEASE Maternal Grandfather      CANCER Maternal Grandmother      unsure of what kind,  at the age of 86     Circulatory Paternal Grandmother       of brain aneurysm     Asthma Son      HEART DISEASE Sister        Social History     Social History     Marital status:      Spouse name: N/A     Number of children: N/A     Years of education: N/A     Occupational History     Not on file.     Social History Main Topics     Smoking status: Former Smoker     Packs/day: 0.50     Years: 26.00     Types: Cigarettes     Quit date: 7/3/2015     Smokeless tobacco: Never Used      Comment: sometimes     Alcohol use No     Drug use: No     Sexual activity: Not Currently     Birth control/ protection: Surgical     Other Topics Concern     Parent/Sibling W/ Cabg, Mi Or Angioplasty Before 65f 55m? No     Caffeine Concern No     2 cans per day     Sleep Concern Yes     sometimes     Special Diet No     Exercise No     walking in halls at least 3 days per week     Social History Narrative       Current Outpatient Prescriptions   Medication Sig Dispense Refill     atorvastatin (LIPITOR) 40 MG tablet Take 1 tablet (40 mg) by mouth daily 90 tablet 3     isosorbide mononitrate (IMDUR) 30 MG 24 hr tablet Take 1 tablet (30 mg) by mouth daily 90 tablet 0     gabapentin (NEURONTIN) 300 MG capsule Take 1 capsule (300 mg) by mouth At Bedtime  90 capsule 1     HYDROcodone-acetaminophen (NORCO) 5-325 MG per tablet Take one two times daily as needed 30 tablet 0     QUEtiapine (SEROQUEL) 200 MG tablet Take 1 tablet (200 mg) by mouth At Bedtime 90 tablet 1     buPROPion (WELLBUTRIN XL) 300 MG 24 hr tablet Take 1 tablet (300 mg) by mouth every morning 90 tablet 1     DULoxetine (CYMBALTA) 60 MG EC capsule Take 2 capsules (120 mg) by mouth daily For mood and anxiety and pain. 180 capsule 1     diazepam (VALIUM) 10 MG tablet Take 1 tablet (10 mg) by mouth every 12 hours as needed for anxiety 60 tablet 1     ADVAIR DISKUS 250-50 MCG/DOSE diskus inhaler INHALE ONE PUFF BY MOUTH TWO TIMES A DAY 3 Inhaler 2     VENTOLIN  (90 BASE) MCG/ACT Inhaler SHAKE WELL AND INHALE 1 TO 2 PUFFS INTO THE LUNGS EVERY 4 HOURS AS NEEDED FOR SHORTNESS OF BREATH, DIFFICULTY BREATHING OR WHEEZING. 18 g 5     tiotropium (SPIRIVA) 18 MCG capsule Inhale 1 capsule (18 mcg) into the lungs daily 90 capsule 2     Cholecalciferol (VITAMIN D3) 2000 UNITS CHEW Take 2,000 mg by mouth daily        Docusate Calcium (STOOL SOFTENER PO) Take 100 mg by mouth 2 times daily        ASPIRIN PO Take 81 mg by mouth daily       GABAPENTIN PO Take 300 mg by mouth At Bedtime       nitroGLYcerin (NITROSTAT) 0.4 MG sublingual tablet Place 1 tablet (0.4 mg) under the tongue every 5 minutes as needed for chest pain prn (Patient not taking: Reported on 1/24/2018) 25 tablet 1     ibuprofen (ADVIL,MOTRIN) 600 MG tablet Take 1 tablet (600 mg) by mouth every 6 hours as needed for moderate pain (Patient not taking: Reported on 1/24/2018) 20 tablet 1     albuterol (2.5 MG/3ML) 0.083% nebulizer solution Take 1 vial (2.5 mg) by nebulization 4 times daily as needed (Patient not taking: Reported on 1/24/2018) 120 vial 5       Allergies   Allergen Reactions     No Known Drug Allergies        REVIEW OF SYSTEMS:  CONSTITUTIONAL:  NEGATIVE for fever, chills, change in weight  INTEGUMENTARY/SKIN:  NEGATIVE for worrisome  "rashes, moles or lesions  EYES:  NEGATIVE for vision changes or irritation  ENT/MOUTH:  NEGATIVE for ear, mouth and throat problems  RESP:  NEGATIVE for significant cough or SOB  BREAST:  NEGATIVE for masses, tenderness or discharge  CV:  NEGATIVE for chest pain, palpitations or peripheral edema  GI:  NEGATIVE for nausea, abdominal pain, heartburn, or change in bowel habits  :  Negative   MUSCULOSKELETAL:  See HPI above  NEURO:  NEGATIVE for weakness, dizziness or paresthesias  ENDOCRINE:  NEGATIVE for temperature intolerance, skin/hair changes  HEME/ALLERGY/IMMUNE:  NEGATIVE for bleeding problems  PSYCHIATRIC:  NEGATIVE for changes in mood or affect      PHYSICAL EXAM:  /86 (BP Location: Right arm, Patient Position: Sitting, Cuff Size: Adult Regular)  Ht 5' 7\" (1.702 m)  Wt 215 lb (97.5 kg)  LMP 12/01/2007  BMI 33.67 kg/m2  Body mass index is 33.67 kg/(m^2).   GENERAL APPEARANCE: healthy, alert and no distress   SKIN: no suspicious lesions or rashes  NEURO: Normal strength and tone, mentation intact and speech normal  VASCULAR: Good pulses, and capillary refill   LYMPH: no lymphadenopathy   PSYCH:  mentation appears normal and affect normal/bright    MSK:  A&OX3, NAD  Neck supple, no lymphadenopathy  The patient ambulates without an antalgic gait.  The patient is able to get on and off the exam table without difficulty.      Examination of the spine reveals a normal lordosis to the cervical and lumbar spine, and a normal kyphosis to the thoracic spine.  There is no clinical evidence of scoliosis.    The pelvis is clinically level.  Trendelenberg is negative.  The patient is non-tender to palpation over the greater trochanteric bursal region or piriformis fossa.  With the hip flexed to 90 degrees, internal and external rotation is 30/60 respectively,  with no pain .      KNEE: Examination of the left knee reveals the lower extremity to have a Normal anatomic alignment.  There is no erythema, ecchymosis " nor edema.  There is an unclear effusion present clinically.  There is no significant warmth.  Range of motion is 0 to 120 degrees, without crepitus.  There is mild tenderness to palpation at the both medial and lateral joint line.  Katheryn's is negative without crepitus. The knee is ligamentously stable. Patello-femoral grind test is positive.      The calves and thighs are symmetric, without atrophy and non-tender to palpation. Bessy's sign is negative, bilaterally.   CMS is intact to the toes.        ASSESSMENT / PLAN: Primary osteoarthritis left knee. I offered her corticosteroid injection, describing the potential risks as well as benefits. She accepted.    Procedure Note:  After risks and benefits were explained and questions answered, pt agreed to undergo a left knee injection. Using aseptic technique and a inferolateral parapatellar tendon approach, the joint space was infiltrated with 4 mg of Dexamethasone, 40 mg of Depo Medrol, and 3 cc of 1% Lidocaine.  There were no complications and the patient tolerated the procedure well.    Imaging Interpretation:         Peña Millan MD  Department of Orthopedic Surgery        Disclaimer: This note consists of symbols derived from keyboarding, dictation and/or voice recognition software. As a result, there may be errors in the script that have gone undetected. Please consider this when interpreting information found in this chart.      Again, thank you for allowing me to participate in the care of your patient.        Sincerely,        Sandip Millan MD

## 2018-01-24 NOTE — MR AVS SNAPSHOT
After Visit Summary   1/24/2018    Karen Alex    MRN: [id number]           Patient Information     Date Of Birth          1955        Visit Information        Provider Department      1/24/2018 3:20 PM Sandip Millan MD [facility] ORTHOPEDIC SURGERY        Today's Diagnoses     Primary osteoarthritis of left knee    -  1       Follow-ups after your visit        Your next 10 appointments already scheduled     Feb 07, 2018  1:30 PM CST   Return Visit with MAURY Lemos   [facility] ([facility])    [address] [id number] [phone]            Feb 19, 2018  8:00 AM CST   Pulmonary Eval with  Pulmonary Rehab [address] [place] [address])    [address], [address]  [facility] [id number] [phone]            Feb 27, 2018  1:30 PM CST   New Visit with Maruicio Hernandez MD   [facility]    [address] [id number] [phone]            Feb 28, 2018  1:30 PM CST   Return Visit with MAURY Lemos   [facility] ([facility])    [address] [id number] [phone]            Mar 05, 2018 10:15 AM CST   Return Visit with Cj Tapia MD   [facility] ([facility])    [address]  [facility] [id number] [phone]              Who to contact     If you have questions or need follow up information about today's clinic visit or your schedule please contact [facility] ORTHOPEDIC SURGERY directly at [phone].  Normal or non-critical lab and imaging results will be communicated to you by MyChart, letter or phone within 4 business days after the clinic has received the results. If you  "do not hear from us within 7 days, please contact the clinic through [employer] or phone. If you have a critical or abnormal lab result, we will notify you by phone as soon as possible.  Submit refill requests through [employer] or call your pharmacy and they will forward the refill request to us. Please allow 3 business days for your refill to be completed.          Additional Information About Your Visit        [employer] Information     [employer] gives you secure access to your electronic health record. If you see a primary care provider, you can also send messages to your care team and make appointments. If you have questions, please call your primary care clinic.  If you do not have a primary care provider, please call [phone] and they will assist you.        Care EveryWhere ID     This is your Care EveryWhere ID. This could be used by other organizations to access your [place] medical records  [id number]        Your Vitals Were     Height Last Period BMI (Body Mass Index)             5' 7\" (1.702 m) 12/01/2007 33.67 kg/m2          Blood Pressure from Last 3 Encounters:   01/24/18 136/86   01/19/18 138/83   12/22/17 118/82    Weight from Last 3 Encounters:   01/24/18 215 lb (97.5 kg)   01/19/18 215 lb (97.5 kg)   12/22/17 215 lb (97.5 kg)              We Performed the Following     DEXAMETHASONE SODIUM PHOS PER 1 MG     DRAIN/INJECT LARGE JOINT/BURSA     METHYLPREDNISOLONE 40 MG INJ          Today's Medication Changes          These changes are accurate as of 1/24/18 11:59 PM.  If you have any questions, ask your nurse or doctor.               Start taking these medicines.        Dose/Directions    dexamethasone 4 MG/ML injection   Commonly known as:  DECADRON   Used for:  Primary osteoarthritis of left knee   Started by:  Sandip Millan MD        Dose:  4 mg   Inject 1 mL (4 mg) as directed once for 1 dose   Quantity:  1 mL   Refills:  0       lidocaine 1 % injection   Used for:  Primary osteoarthritis " of left knee   Started by:  Sandip Millan MD        Dose:  3 mL   3 mLs by INTRA-ARTICULAR route once for 1 dose   Quantity:  3 mL   Refills:  0       methylPREDNISolone acetate 40 MG/ML injection   Commonly known as:  DEPO-MEDROL   Used for:  Primary osteoarthritis of left knee   Started by:  Sandip Millan MD        Dose:  40 mg   1 mL (40 mg) by INTRA-ARTICULAR route once for 1 dose   Quantity:  1 mL   Refills:  0            Where to get your medicines      Some of these will need a paper prescription and others can be bought over the counter.  Ask your nurse if you have questions.     You don't need a prescription for these medications     dexamethasone 4 MG/ML injection    lidocaine 1 % injection    methylPREDNISolone acetate 40 MG/ML injection                Primary Care Provider Office Phone # Fax #    Eros Bayron Rebolledo [phone] [address] [facility] [address]        Equal Access to Services     [facility]: Hadii omid woodard hadasho Sonatalie, waaxda luqadaha, qaybta kaalmada adeegyada, rj ramirez . So [facility] [phone].    ATENCIÓN: Si habla español, tiene a zaragoza disposición servicios gratuitos de asistencia lingüística. Llame al [phone].    We comply with applicable federal civil rights laws and [place] laws. We do not discriminate on the basis of race, color, national origin, age, disability, sex, sexual orientation, or gender identity.            Thank you!     Thank you for choosing [facility] ORTHOPEDIC SURGERY  for your care. Our goal is always to provide you with excellent care. Hearing back from our patients is one way we can continue to improve our services. Please take a few minutes to complete the written survey that you may receive in the mail after your visit with us. Thank you!             Your Updated Medication List - Protect others around you: Learn how to safely use, store and throw away your  medicines at www.disposemymeds.org.          This list is accurate as of 1/24/18 11:59 PM.  Always use your most recent med list.                   Brand Name Dispense Instructions for use Diagnosis    ADVAIR DISKUS 250-50 MCG/DOSE diskus inhaler   Generic drug:  fluticasone-salmeterol     3 Inhaler    INHALE ONE PUFF BY MOUTH TWO TIMES A DAY    Chronic obstructive pulmonary disease, unspecified COPD type (H)       * albuterol (2.5 MG/3ML) 0.083% neb solution     120 vial    Take 1 vial (2.5 mg) by nebulization 4 times daily as needed    COPD (chronic obstructive pulmonary disease) (H)       * VENTOLIN  (90 BASE) MCG/ACT Inhaler   Generic drug:  albuterol     18 g    SHAKE WELL AND INHALE 1 TO 2 PUFFS INTO THE LUNGS EVERY 4 HOURS AS NEEDED FOR SHORTNESS OF BREATH, DIFFICULTY BREATHING OR WHEEZING.    Panlobular emphysema (H)       ASPIRIN PO      Take 81 mg by mouth daily        atorvastatin 40 MG tablet    LIPITOR    90 tablet    Take 1 tablet (40 mg) by mouth daily    Coronary artery disease of native heart with stable angina pectoris, unspecified vessel or lesion type (H)       buPROPion 300 MG 24 hr tablet    WELLBUTRIN XL    90 tablet    Take 1 tablet (300 mg) by mouth every morning    Major depressive disorder, recurrent episode, in partial remission (H)       dexamethasone 4 MG/ML injection    DECADRON    1 mL    Inject 1 mL (4 mg) as directed once for 1 dose    Primary osteoarthritis of left knee       diazepam 10 MG tablet    VALIUM    60 tablet    Take 1 tablet (10 mg) by mouth every 12 hours as needed for anxiety    HUNG (generalized anxiety disorder)       DULoxetine 60 MG EC capsule    CYMBALTA    180 capsule    Take 2 capsules (120 mg) by mouth daily For mood and anxiety and pain.    HUNG (generalized anxiety disorder), Major depressive disorder, recurrent episode, in partial remission (H)       * GABAPENTIN PO      Take 300 mg by mouth At Bedtime        * gabapentin 300 MG capsule    NEURONTIN     90 capsule    Take 1 capsule (300 mg) by mouth At Bedtime    Recurrent major depressive disorder, in partial remission (H)       HYDROcodone-acetaminophen 5-325 MG per tablet    NORCO    30 tablet    Take one two times daily as needed    Mechanical low back pain       ibuprofen 600 MG tablet    ADVIL/MOTRIN    20 tablet    Take 1 tablet (600 mg) by mouth every 6 hours as needed for moderate pain        isosorbide mononitrate 30 MG 24 hr tablet    IMDUR    90 tablet    Take 1 tablet (30 mg) by mouth daily    Chest pain, CAD (coronary artery disease)       lidocaine 1 % injection     3 mL    3 mLs by INTRA-ARTICULAR route once for 1 dose    Primary osteoarthritis of left knee       methylPREDNISolone acetate 40 MG/ML injection    DEPO-MEDROL    1 mL    1 mL (40 mg) by INTRA-ARTICULAR route once for 1 dose    Primary osteoarthritis of left knee       nitroGLYcerin 0.4 MG sublingual tablet    NITROSTAT    25 tablet    Place 1 tablet (0.4 mg) under the tongue every 5 minutes as needed for chest pain prn    Chest pain       QUEtiapine 200 MG tablet    SEROquel    90 tablet    Take 1 tablet (200 mg) by mouth At Bedtime    HUNG (generalized anxiety disorder)       STOOL SOFTENER PO      Take 100 mg by mouth 2 times daily        tiotropium 18 MCG capsule    SPIRIVA    90 capsule    Inhale 1 capsule (18 mcg) into the lungs daily    Generalized anxiety disorder, Chronic obstructive pulmonary disease, unspecified COPD type (H)       Vitamin D3 2000 UNITS Chew      Take 2,000 mg by mouth daily        * Notice:  This list has 4 medication(s) that are the same as other medications prescribed for you. Read the directions carefully, and ask your doctor or other care provider to review them with you.

## 2018-01-24 NOTE — NURSING NOTE
"Chief Complaint   Patient presents with     Knee Pain     Left knee       Initial /86 (BP Location: Right arm, Patient Position: Sitting, Cuff Size: Adult Regular)  Ht 5' 7\" (1.702 m)  Wt 215 lb (97.5 kg)  LMP 12/01/2007  BMI 33.67 kg/m2 Estimated body mass index is 33.67 kg/(m^2) as calculated from the following:    Height as of this encounter: 5' 7\" (1.702 m).    Weight as of this encounter: 215 lb (97.5 kg).  Medication Reconciliation: complete    "

## 2018-01-29 NOTE — TELEPHONE ENCOUNTER
Pt calls back, wants Dr Millan phone number, wants cortisone injection in other knee, provided, will call and inquire  Estelle Haynes, RN, BSN  Message handled by Nurse Triage.

## 2018-02-05 NOTE — TELEPHONE ENCOUNTER
Controlled Substance Refill Request for Norco  Problem List Complete:  Yes    Patient is followed by OKSANA ANTONIO for ongoing prescription of pain medication.  All refills should be approved by this provider, or covering partner.    Medication(s): Norco.   Maximum quantity per month: 40  Clinic visit frequency required: Q 3 months Last Office visit: 1/19/18    Controlled substance agreement on file: Yes       Date(s): 7/16/15    Pain Clinic evaluation in the past: No       Date(s):  n/a       Location(s):  n/a    DIRE Total Score(s):  No flowsheet data found.    Last [facility] website verification: 8/1/17   [email or web address]     checked in past 6 months?  Yes 8/1/17     Georgie Shabazz, Pharmacy [facility]

## 2018-02-12 NOTE — TELEPHONE ENCOUNTER
Luanne informed. But diagnosis not covered by Medicare: ASVD (arteriosclerotic vascular disease) [I70.90]  - Primary      Pt reported to them H/O MI but none of us find this in chart. Luanne had [facility] search chart too but no diagnosis found that would cover cardiac rehab.   December 20, 2017                Reason for your hospital stay         You were admitted to the observation unit for chest pain. Your heart was monitored overnight with no abnormal findings. Your cardiac enzymes were negative for heart attack. You had a chemical stress test that was negative for heart disease so we do not believe your chest pain was related to your heart. Suspect your symptoms were related to recent stressors and increased anxiety. We recommend you follow up with your primary doctor if you continue to have pain.              Please advise.   Chaim Slaughter RN

## 2018-02-12 NOTE — TELEPHONE ENCOUNTER
[facility] cardiac rehab . Pt there now for pulmonary rehab but they cannot accommodate today dory Armenta's pulmonologist ordered pulmonary rehab but first  wanted her to to go to cardiac rehab.   Kathi does not have a cardiologist.  Would Dr. Rebolledo order Phase II cardiac rehab in [facility]? Pulmonary MD ordered pulmonary referral.    ROUTE TO [place] FOR CB:  Luanne [phone]  - she will call Kathi to schedule  Chaim Slaughter RN

## 2018-02-12 NOTE — TELEPHONE ENCOUNTER
Dr. Rebolledo reviewed and doned 12:30 without change in dx. Left message for Luanne no change in order dx.  Chaim Slaughter RN

## 2018-02-13 NOTE — TELEPHONE ENCOUNTER
She showed no sign of MI but asvd is still a diagnosis for her I'll request the rehab again to make sure she's safe to exercise.

## 2018-02-13 NOTE — TELEPHONE ENCOUNTER
"Pt requests referral to cardiologist, Dr. Buchanan   Pt states she had a \"heart attack\" about 6 weeks ago diagnosed and treated at [facility] in [place].   Informed chart shows had chest pain but we do not see diagnosis heart attack.  Pt now states they gave her about 3 nitro and sent her home.      Route to [place] for : [facility]  [phone]  Chaim Slaughter RN      "

## 2018-02-14 NOTE — TELEPHONE ENCOUNTER
"----- Message from Diamante Ramirez OT sent at 2/14/2018 10:10 AM CST -----  Regarding: Cardiac Rehab order  Dr Rebolledo  The cardiac rehab order on Ms Alex was sent to me to review. The diagnosis of arteriosclerotic vascular disease is not a covered diagnosis by medicare. I think she would qualify as \"stable angina\" dx. If you agree please write order with that diagnosis as it is covered by medicare.  Thanks  Diamante Ramirez  Cardiac Rehab Supervisor    "

## 2018-02-19 NOTE — LETTER
Transition Communication Hand-off for Care Transitions to Next Level of Care Provider    Name: Karen Alex  MRN #: [id number]  Primary Care Provider: Eros Rebolledo  Primary Care MD Name: Kesha   Primary Clinic: [address] [facility] [address]  Primary Care Clinic Name: [place]   Reason for Hospitalization:  COPD exacerbation (H) [J44.1]  Admit Date/Time: 2/19/2018 10:12 PM  Discharge Date: 2/24  Payor Source: Payor: MEDICARE / Plan: MEDICARE / Product Type: Medicare /     Readmission Assessment Measure (RICHIE) Risk Score/category: average           Reason for Communication Hand-off Referral: Admission diagnoses: COPD    Discharge Plan:  Discharge Plan:       Most Recent Value    Concerns To Be Addressed discharge planning concerns           Concern for non-adherence with plan of care:   Y/N n  Discharge Needs Assessment:  Needs       Most Recent Value    Anticipated Changes Related to Illness inability to care for self    Transportation Available car, family or friend will provide    # of Referrals Placed by CTS MTM, Inpatient Pharmacy, Scheduled Follow-up appointments, Communication hand-offs to next level of Care Providers    Other Resources Home Care    Home Care [facility] [phone], Fax: [phone]            Follow-up specialty is recommended: Yes    Follow-up plan:  Future Appointments  Date Time Provider Department Center   2/25/2018 10:30 AM Reji Tong OT RHOOT [place] RID   2/27/2018 1:30 PM Mauricio Hernandez MD [facility] OX   2/28/2018 11:15 AM Eros Rebolledo MD [facility]   2/28/2018 1:30 PM Lupis Mcgee [facility] O[facility]   3/5/2018 10:15 AM Cj Tapia MD [facility] CLIN       Any outstanding tests or procedures:    Procedures     Future Labs/Procedures    Oxygen Adult     Comments:    New Home Oxygen Order 4 liter(s) by nasal cannula continuously with use of portable tank. Expected treatment length is  indefinite (99 months).. Test on conserving device as applicable.    Patients who qualify for home O2 coverage under the CMS guidelines require ABG tests or O2 sat readings obtained closest to, but no earlier than 2 days prior to the discharge, as evidence of the need for home oxygen therapy. Testing must be performed while patient is in the chronic stable state. See notes for O2 sats.    I certify that this patient, Karen Alex has been under my care and that I, or a nurse practitioner or physician's assistant working with me, had a face-to-face encounter that meets the face-to-face encounter requirements with this patient on 2/24/2018. The patient, Karen Alex was evaluated or treated in whole, or in part, for the following medical condition, which necessitates the use of the ordered oxygen. Treatment Diagnosis: COPD    Attending Provider: Duane Suarez  Physician signature: See electronic signature associated with these discharge orders  Date of Order: February 24, 2018          Referrals     Future Labs/Procedures    Home care nursing referral     Comments:    RN skilled nursing visit. RN to assess vital signs and weight, respiratory and cardiac status, pain level and activity tolerance, hydration, nutrition and bowel status and home safety.    Your provider has ordered home care nursing services. If you have not been contacted within 2 days of your discharge please call the inpatient department phone number at [phone] .    Home Care OT Referral for Hospital Discharge     Comments:    OT to eval and treat    Your provider has ordered home care - occupational therapy. If you have not been contacted within 2 days of your discharge please call the department phone number listed on the top of this document.    Home Care PT Referral for Hospital Discharge     Comments:    PT to eval and treat    Your provider has ordered home care - physical therapy. If you have not been contacted within 2  days of your discharge please call the department phone number listed on the top of this document.             Key Recommendations:  Pt admitted for COPD excarebration in setting of Advanced COPD and emphysema.   Inpatient Pharmacy referral was placed for education on inhalers and medications changes on DC.   MTM for on-going education and assessment of further educational need regarding PTA medications.    Recommend advance care planning for Advanced COPD if not already addressed.   Pt potential for discharge home with HHC RN/PT/OT Agency [facility] was ordered at discharge.     Chula Daugherty  AVS/Discharge Summary is the source of truth; this is a helpful guide for improved communication of patient story

## 2018-02-19 NOTE — IP AVS SNAPSHOT
[place] [address] [facility]    [address]    [facility] [address]    Phone:  [phone]    Fax:  [phone]                                       After Visit Summary   2/19/2018    Karen Alex    MRN: [id number]           After Visit Summary Signature Page     I have received my discharge instructions, and my questions have been answered. I have discussed any challenges I see with this plan with the nurse or doctor.    ..........................................................................................................................................  Patient/Patient Representative Signature      ..........................................................................................................................................  Patient Representative Print Name and Relationship to Patient    ..................................................               ................................................  Date                                            Time    ..........................................................................................................................................  Reviewed by Signature/Title    ...................................................              ..............................................  Date                                                            Time

## 2018-02-19 NOTE — TELEPHONE ENCOUNTER
Controlled Substance Refill Request for Norco  Problem List Complete:  Yes    Patient is followed by OKSANA ANTONIO for ongoing prescription of pain medication.  All refills should be approved by this provider, or covering partner.    Medication(s): Norco.   Maximum quantity per month: 40  Clinic visit frequency required: Q 3 months     Controlled substance agreement on file: Yes       Date(s): 7/16/15    Pain Clinic evaluation in the past: No       Date(s):  n/a       Location(s):  n/a    DIRE Total Score(s):  No flowsheet data found.    Last [facility] website verification: 8/1/17   [email or web address]     checked in past 6 months?  Yes 8/1/17

## 2018-02-19 NOTE — LETTER
Key Recommendations:  Pt admitted for COPD excarebration in setting of Advanced COPD and emphysema.   Inpatient Pharmacy referral was placed for education on inhalers and medications changes on DC.   MTM for on-going education and assessment of further educational need regarding PTA medications.    Recommend advance care planning for Advanced COPD if not already addressed.   Pt potential for discharge home with C ** Agency**   Chula Daugherty    AVS/Discharge Summary is the source of truth; this is a helpful guide for improved communication of patient story

## 2018-02-19 NOTE — IP AVS SNAPSHOT
MRN:[id number]                      After Visit Summary   2/19/2018    Karen Alex    MRN: [id number]           Thank you!     Thank you for choosing [facility] for your care. Our goal is always to provide you with excellent care. Hearing back from our patients is one way we can continue to improve our services. Please take a few minutes to complete the written survey that you may receive in the mail after you visit. If you would like to speak to someone directly about your visit please contact Patient Relations at [phone]. Thank you!          Patient Information     Date Of Birth          1955        Designated Caregiver       Most Recent Value    Caregiver    Will someone help with your care after discharge? yes    Name of designated caregiver Rustam    Phone number of caregiver [phone]    Caregiver address [address] [facility], [address], [place], [place]      About your hospital stay     You were admitted on:  February 20, 2018 You last received care in the:  [place] [address] [facility]    You were discharged on:  February 24, 2018        Reason for your hospital stay       You were admitted for respiratory failure due to copd exacerbation.  You were treated with IV steroids, nebulizers and bipap.  You now have improved to near your baseline and will continue treatment at home with your baseline supplemental O2, prednisone taper, inhalers and nebs.                  Who to Call     For medical emergencies, please call 911.  For non-urgent questions about your medical care, please call your primary care provider or clinic, [phone]          Attending Provider     Provider Specialty    Olga Olivares MD Emergency Medicine    [facility], Dominic Dyre MD Internal Medicine    Sudhir Sloan MD Internal Medicine       Primary Care Provider Office Phone # Fax #    Eros Rebolledo [phone]      After Care Instructions     Activity        Your activity upon discharge: gradually resume light activity as tolerated            Diet       Follow this diet upon discharge: Orders Placed This Encounter      Combination Diet Regular Diet Adult; Thin Liquids (water, ice chips, juice, milk, gelatin, ice cream, etc)            Oxygen Adult       [place] Oxygen Order 4 liter(s) by nasal cannula continuously with use of portable tank. Expected treatment length is indefinite (99 months).. Test on conserving device as applicable.    Patients who qualify for home O2 coverage under the CMS guidelines require ABG tests or O2 sat readings obtained closest to, but no earlier than 2 days prior to the discharge, as evidence of the need for home oxygen therapy. Testing must be performed while patient is in the chronic stable state. See notes for O2 sats.    I certify that this patient, Karen Alex has been under my care and that I, or a nurse practitioner or physician's assistant working with me, had a face-to-face encounter that meets the face-to-face encounter requirements with this patient on 2/24/2018. The patient, Karen Alex was evaluated or treated in whole, or in part, for the following medical condition, which necessitates the use of the ordered oxygen. Treatment Diagnosis: COPD    Attending Provider: Duane Suarez  Physician signature: See electronic signature associated with these discharge orders  Date of Order: February 24, 2018                  Follow-up Appointments     Follow-up and recommended labs and tests        Follow up with primary care provider, Eros Rebolledo, within 7 days for hospital follow- up.                  Your next 10 appointments already scheduled     Feb 27, 2018  1:30 PM CST   [facility] with Mauricoi [facility]    [address] [address]   [phone]            Feb 28, 2018 11:15 AM CST   Office  Visit with Eros Rebolledo MD   [facility]    [address] [facility] [address] [phone]           Bring a current list of meds and any records pertaining to this visit. For Physicals, please bring immunization records and any forms needing to be filled out. Please arrive 10 minutes early to complete paperwork.            Feb 28, 2018  1:30 PM CST   Return Visit with MAURY Lemos   [facility] ([facility]    [address] [address] [phone]            Mar 05, 2018 10:15 AM CST   Return Visit with Cj Tapia MD   [facility]    [address] [facility] [address]  [facility] [address] [phone]              Additional Services     Home Care OT Referral for Hospital Discharge       OT to eval and treat    Your provider has ordered home care - occupational therapy. If you have not been contacted within 2 days of your discharge please call the department phone number listed on the top of this document.            Home Care PT Referral for Hospital Discharge       PT to eval and treat    Your provider has ordered home care - physical therapy. If you have not been contacted within 2 days of your discharge please call the department phone number listed on the top of this document.            Home care nursing referral       RN skilled nursing visit. RN to assess vital signs and weight, respiratory and cardiac status, pain level and activity tolerance, hydration, nutrition and bowel status and home safety.    Your provider has ordered home care nursing services. If you have not been contacted within 2 days of your discharge please call the inpatient department phone number at [phone] .                  Pending Results     Date and Time Order Name Status Description     2/19/2018 2231 Blood culture Preliminary     2/19/2018 2220 Blood culture Preliminary             Statement of Approval     Ordered          02/24/18 1045  I have reviewed and agree with all the recommendations and orders detailed in this document.  EFFECTIVE NOW     Approved and electronically signed by:  Duane Suarez MD             Admission Information     Date & Time Provider Department Dept. Phone    2/19/2018 Sudhir Sloan MD [place] [address] [facility] [phone]      Your Vitals Were     Blood Pressure Pulse Temperature Respirations Weight Last Period    141/78 (BP Location: Right arm) 80 97.1  F (36.2  C) (Axillary) 18 99.2 kg (218 lb 12.2 oz) 12/01/2007    Pulse Oximetry BMI (Body Mass Index)                98% 34.26 kg/m2          MyChart Information     [facility] gives you secure access to your electronic health record. If you see a primary care provider, you can also send messages to your care team and make appointments. If you have questions, please call your primary care clinic.  If you do not have a primary care provider, please call [phone] and they will assist you.        Care EveryWhere ID     This is your Care EveryWhere ID. This could be used by other organizations to access your [facility] medical records  [id number]        Equal Access to Services     BRANDYN TURNER : Ej Calixto, waaxda luqadaha, qaybta kaalmada aderaymundoyada, rj ace. So [facility] [phone].    ATENCIÓN: Si habla español, tiene a zaragoza disposición servicios gratuitos de asistencia lingüística. Llame al [phone].    We comply with applicable federal civil rights laws and [place] laws. We do not discriminate on the basis of race, color, national origin, age, disability, sex, sexual orientation, or gender identity.               Review of your medicines      START taking        Dose / Directions    predniSONE 10 MG tablet   Commonly known as:   DELTASONE   Notes to Patient:  See instructions for taper        4 tabs daily for 2 days, then 3 tabs daily for 2 days, then 2 tabs daily for 2 days, then 1 tab daily for 2 days, then stop   Quantity:  20 tablet   Refills:  0         CONTINUE these medicines which have NOT CHANGED        Dose / Directions    ABILIFY 5 MG tablet   Generic drug:  ARIPiprazole        Dose:  5 mg   Take 5 mg by mouth daily   Refills:  0       ADVAIR DISKUS 250-50 MCG/DOSE diskus inhaler   Used for:  Chronic obstructive pulmonary disease, unspecified COPD type (H)   Generic drug:  fluticasone-salmeterol        INHALE ONE PUFF BY MOUTH TWO TIMES A DAY   Quantity:  3 Inhaler   Refills:  2       * albuterol (2.5 MG/3ML) 0.083% neb solution   Used for:  COPD (chronic obstructive pulmonary disease) (H)        Dose:  1 vial   Take 1 vial (2.5 mg) by nebulization 4 times daily as needed   Quantity:  120 vial   Refills:  5       * VENTOLIN  (90 BASE) MCG/ACT Inhaler   Used for:  Panlobular emphysema (H)   Generic drug:  albuterol        SHAKE WELL AND INHALE 1 TO 2 PUFFS INTO THE LUNGS EVERY 4 HOURS AS NEEDED FOR SHORTNESS OF BREATH, DIFFICULTY BREATHING OR WHEEZING.   Quantity:  18 g   Refills:  5       ASPIRIN PO        Dose:  81 mg   Take 81 mg by mouth daily   Refills:  0       atorvastatin 40 MG tablet   Commonly known as:  LIPITOR   Used for:  Coronary artery disease of native heart with stable angina pectoris, unspecified vessel or lesion type (H)        Dose:  40 mg   Take 1 tablet (40 mg) by mouth daily   Quantity:  90 tablet   Refills:  3       buPROPion 300 MG 24 hr tablet   Commonly known as:  WELLBUTRIN XL   Used for:  Major depressive disorder, recurrent episode, in partial remission (H)        Dose:  300 mg   Take 1 tablet (300 mg) by mouth every morning   Quantity:  90 tablet   Refills:  1       diazepam 10 MG tablet   Commonly known as:  VALIUM   Used for:  HUNG (generalized anxiety disorder)        Dose:  10 mg   Take 1  tablet (10 mg) by mouth every 12 hours as needed for anxiety   Quantity:  60 tablet   Refills:  1       DULOXETINE HCL PO        Dose:  90 mg   Take 90 mg by mouth daily   Refills:  0       gabapentin 300 MG capsule   Commonly known as:  NEURONTIN   Used for:  Recurrent major depressive disorder, in partial remission (H)        Dose:  300 mg   Take 1 capsule (300 mg) by mouth At Bedtime   Quantity:  90 capsule   Refills:  1       HYDROcodone-acetaminophen 5-325 MG per tablet   Commonly known as:  NORCO   Used for:  Mechanical low back pain        Take one two times daily as needed   Quantity:  20 tablet   Refills:  0       IBUPROFEN PO        Dose:  200-300 mg   Take 200-300 mg by mouth every 6 hours as needed for moderate pain   Refills:  0       isosorbide mononitrate 30 MG 24 hr tablet   Commonly known as:  IMDUR   Used for:  Chest pain, CAD (coronary artery disease)        Dose:  30 mg   Take 1 tablet (30 mg) by mouth daily   Quantity:  90 tablet   Refills:  0       LATUDA PO        Dose:  60 mg   Take 60 mg by mouth daily   Refills:  0       MELATONIN PO        Dose:  5 mg   Take 5 mg by mouth At Bedtime   Refills:  0       nitroGLYcerin 0.4 MG sublingual tablet   Commonly known as:  NITROSTAT   Used for:  Chest pain        Dose:  0.4 mg   Place 1 tablet (0.4 mg) under the tongue every 5 minutes as needed for chest pain prn   Quantity:  25 tablet   Refills:  1       * QUETIAPINE FUMARATE PO        Dose:  50 mg   Take 50 mg by mouth every morning   Refills:  0       * QUEtiapine 200 MG tablet   Commonly known as:  SEROquel   Used for:  HUNG (generalized anxiety disorder)        Dose:  200 mg   Take 1 tablet (200 mg) by mouth At Bedtime   Quantity:  90 tablet   Refills:  1       STOOL SOFTENER PO        Dose:  100 mg   Take 100 mg by mouth 2 times daily   Refills:  0       tiotropium 18 MCG capsule   Commonly known as:  SPIRIVA   Used for:  Generalized anxiety disorder, Chronic obstructive pulmonary disease,  unspecified COPD type (H)        Dose:  18 mcg   Inhale 1 capsule (18 mcg) into the lungs daily   Quantity:  90 capsule   Refills:  2       Vitamin D3 2000 UNITS Chew   Indication:  prn        Dose:  2000 mg   Take 2,000 mg by mouth daily   Refills:  0       * Notice:  This list has 4 medication(s) that are the same as other medications prescribed for you. Read the directions carefully, and ask your doctor or other care provider to review them with you.         Where to get your medicines      [place] [facility] [address] [facility]  [address] [facility] [address]     Phone:  [phone]     predniSONE 10 MG tablet         Some of these will need a paper prescription and others can be bought over the counter. Ask your nurse if you have questions.     Bring a paper prescription for each of these medications     HYDROcodone-acetaminophen 5-325 MG per tablet                Protect others around you: Learn how to safely use, store and throw away your medicines at www.disposemymeds.org.             Medication List: This is a list of all your medications and when to take them. Check marks below indicate your daily home schedule. Keep this list as a reference.      Medications           Morning Afternoon Evening Bedtime As Needed    ABILIFY 5 MG tablet   Take 5 mg by mouth daily   Last time this was given:  5 mg on 2/24/2018  9:27 AM   Generic drug:  ARIPiprazole                                   ADVAIR DISKUS 250-50 MCG/DOSE diskus inhaler   INHALE ONE PUFF BY MOUTH TWO TIMES A DAY   Last time this was given:  1 puff on 2/24/2018  8:02 AM   Generic drug:  fluticasone-salmeterol                                      * albuterol (2.5 MG/3ML) 0.083% neb solution   Take 1 vial (2.5 mg) by nebulization 4 times daily as needed   Last time this was given:  2.5 mg on 2/21/2018  3:28 AM                                   * VENTOLIN  (90 BASE) MCG/ACT  Inhaler   SHAKE WELL AND INHALE 1 TO 2 PUFFS INTO THE LUNGS EVERY 4 HOURS AS NEEDED FOR SHORTNESS OF BREATH, DIFFICULTY BREATHING OR WHEEZING.   Generic drug:  albuterol                                   ASPIRIN PO   Take 81 mg by mouth daily   Last time this was given:  81 mg on 2/24/2018  9:27 AM                                   atorvastatin 40 MG tablet   Commonly known as:  LIPITOR   Take 1 tablet (40 mg) by mouth daily   Last time this was given:  40 mg on 2/24/2018  9:27 AM                                   buPROPion 300 MG 24 hr tablet   Commonly known as:  WELLBUTRIN XL   Take 1 tablet (300 mg) by mouth every morning   Last time this was given:  300 mg on 2/24/2018  9:26 AM                                   diazepam 10 MG tablet   Commonly known as:  VALIUM   Take 1 tablet (10 mg) by mouth every 12 hours as needed for anxiety                                   DULOXETINE HCL PO   Take 90 mg by mouth daily   Last time this was given:  90 mg on 2/24/2018  9:27 AM                                   gabapentin 300 MG capsule   Commonly known as:  NEURONTIN   Take 1 capsule (300 mg) by mouth At Bedtime   Last time this was given:  300 mg on 2/23/2018  8:54 PM                                   HYDROcodone-acetaminophen 5-325 MG per tablet   Commonly known as:  NORCO   Take one two times daily as needed   Last time this was given:  1 tablet on 2/21/2018  5:16 PM                                   IBUPROFEN PO   Take 200-300 mg by mouth every 6 hours as needed for moderate pain                                   isosorbide mononitrate 30 MG 24 hr tablet   Commonly known as:  IMDUR   Take 1 tablet (30 mg) by mouth daily   Last time this was given:  30 mg on 2/24/2018  9:27 AM                                   LATUDA PO   Take 60 mg by mouth daily   Last time this was given:  60 mg on 2/24/2018  9:26 AM                                   MELATONIN PO   Take 5 mg by mouth At Bedtime   Last time this was given:  5 mg on  2/23/2018  8:54 PM                                   nitroGLYcerin 0.4 MG sublingual tablet   Commonly known as:  NITROSTAT   Place 1 tablet (0.4 mg) under the tongue every 5 minutes as needed for chest pain prn                                   predniSONE 10 MG tablet   Commonly known as:  DELTASONE   4 tabs daily for 2 days, then 3 tabs daily for 2 days, then 2 tabs daily for 2 days, then 1 tab daily for 2 days, then stop   Last time this was given:  60 mg on 2/24/2018  9:27 AM   Notes to Patient:  See instructions for taper                                * QUETIAPINE FUMARATE PO   Take 50 mg by mouth every morning   Last time this was given:  50 mg on 2/24/2018  9:26 AM                                   * QUEtiapine 200 MG tablet   Commonly known as:  SEROquel   Take 1 tablet (200 mg) by mouth At Bedtime   Last time this was given:  50 mg on 2/24/2018  9:26 AM                                   STOOL SOFTENER PO   Take 100 mg by mouth 2 times daily   Last time this was given:  100 mg on 2/24/2018  9:27 AM                                      tiotropium 18 MCG capsule   Commonly known as:  SPIRIVA   Inhale 1 capsule (18 mcg) into the lungs daily   Last time this was given:  18 mcg on 2/21/2018  1:44 PM                                   Vitamin D3 2000 UNITS Chew   Take 2,000 mg by mouth daily                                   * Notice:  This list has 4 medication(s) that are the same as other medications prescribed for you. Read the directions carefully, and ask your doctor or other care provider to review them with you.

## 2018-02-20 PROBLEM — J44.1 COPD EXACERBATION (H): Status: ACTIVE | Noted: 2018-01-01

## 2018-02-20 NOTE — ED NOTES
[facility]  ED Nurse Handoff Report    Karen Alex is a 62 year old female   ED Chief complaint: No chief complaint on file.  . ED Diagnosis:   Final diagnoses:   None     Allergies:   Allergies   Allergen Reactions     No Known Drug Allergies        Code Status: Full Code  Activity level - Baseline/Home:  Independent. Activity Level - Current:   Stand with Assist. Lift room needed: No. Bariatric: No   Needed: No   Isolation: No. Infection: Not Applicable.     Vital Signs:   Vitals:    02/19/18 2334 02/19/18 2340 02/19/18 2341 02/19/18 2353   BP:       Pulse:       Resp:  20     Temp:       TempSrc:       SpO2: 98% 97% 98% 98%       Cardiac Rhythm:  ,      Pain level: 0-10 Pain Scale: 0  Patient confused: No. Patient Falls Risk: Yes.   Elimination Status: Has voided   Patient Report - Initial Complaint: SOB. Focused Assessment: Pt A&O x 4 with cough and SOB, pt had diminished air movement on arrival and now has slight expiratory wheezing. Pt feeling improved at this time, SpO2 in mid 90's, pt able to speak in complete sentences.   Tests Performed: labs, xray, CT. Abnormal Results:   Labs Ordered and Resulted from Time of ED Arrival Up to the Time of Departure from the ED   CBC WITH PLATELETS DIFFERENTIAL - Abnormal; Notable for the following:        Result Value    MCHC 29.1 (*)     All other components within normal limits   BASIC METABOLIC PANEL - Abnormal; Notable for the following:     Carbon Dioxide 39 (*)     Glucose 116 (*)     Urea Nitrogen 6 (*)     Calcium 8.4 (*)     All other components within normal limits   TROPONIN I   PULSE OXIMETRY NURSING   CARDIAC CONTINUOUS MONITORING   PERIPHERAL IV CATHETER   PATIENT CARE ORDER   BLOOD CULTURE   INFLUENZA A/B ANTIGEN   BLOOD CULTURE     Treatments provided: Duonebx3 Solumedrol   Family Comments: N/A  OBS brochure/video discussed/provided to patient:  N/A  ED Medications:   Medications   0.9% sodium chloride BOLUS (1,000 mLs  Intravenous New Bag 2/19/18 2240)     Followed by   sodium chloride 0.9% infusion (not administered)   ipratropium - albuterol 0.5 mg/2.5 mg/3 mL (DUONEB) 0.5-2.5 (3) MG/3ML neb solution (not administered)   methylPREDNISolone sodium succinate (solu-MEDROL) injection 125 mg (125 mg Intravenous Given 2/19/18 2240)   ipratropium - albuterol 0.5 mg/2.5 mg/3 mL (DUONEB) 0.5-2.5 (3) MG/3ML neb solution (9 mLs  Given 2/19/18 2225)     Drips infusing:  No  For the majority of the shift, the patient's behavior Green. Interventions performed were .     Severe Sepsis OR Septic Shock Diagnosis Present: No      ED Nurse Name/Phone Number: Panda Nava,   12:02 AM    RECEIVING UNIT ED HANDOFF REVIEW    Above ED Nurse Handoff Report was reviewed: Yes  Reviewed by: Katy Scott on February 20, 2018 at 12:51 AM

## 2018-02-20 NOTE — ED NOTES
"Pt RR21, O2 sats % on 4L NC. Usually on 3.5L NC at home 24/7, pt well appearing, no longer appearing dusky in fingers. Pt says \"I feel a lot better.\"  "

## 2018-02-20 NOTE — PROGRESS NOTES
"   02/20/18 1125   General Information   Onset Date 02/19/18   Start of Care Date 02/20/18   Referring Physician Sudhir Sloan MD   Patient Profile Review/OT: Additional Occupational Profile Info See Profile for full history and prior level of function   Patient/Family Goals Statement Pt states she has choked in the past when eating too fast   Swallowing Evaluation Bedside swallow evaluation   Behaviorial Observations WFL (within functional limits)   Mode of current nutrition NPO   Respiratory Status O2 Supply   Type of O2 supply Nasal cannula  (4L (baseline))   Comments Karen Alex is a 62 year old female who was admitted on 2/19/2018 with acute on chronic respiratory walls to COPD exacerbation.  Her past medical history significant for COPD on home O2 3 L, anxiety, hypertension, hyperlipidemia, chronic emphysematous changes with history of pneumothorax.  Overnight patient was hallucinating and was confused intermittently.  She was forgetful this morning.  ABG showed CO2 retention.  She had  increased work of breathing was unable to finish her sentences.  Given her respiratory distress she was placed on BiPAP and transferred to ICU. Pt with improved respiratory status and is now on her baseline 4L nasal cannula. Pt states she notices some trouble swallowing when she is eating too fast. She reports x1 instance of \"choking\" and \"her boyfriend didn't even know the heimlich.\" Pt states she was able to regurgitate and cough the food up. Pt states these occcurences are rare and she typically eats regular textures and thin liquids at baseline. Bedside swallow eval completed per MD orders to further assess oropharyngeal swallow function.    Clinical Swallow Evaluation   Oral Musculature generally intact   Structural Abnormalities none present   Dentition present and adequate   Mucosal Quality adequate   Mandibular Strength and Mobility intact   Oral Labial Strength and Mobility WFL   Lingual Strength and Mobility " WFL   Velar Elevation intact   Buccal Strength and Mobility intact   Laryngeal Function Cough;Throat clear;Swallow;Voicing initiated   Oral Musculature Comments WFL   Additional Documentation Yes   Clinical Swallow Eval: Thin Liquid Texture Trial   Mode of Presentation, Thin Liquids cup;self-fed   Volume of Liquid or Food Presented 6 oz   Oral Phase of Swallow WFL   Pharyngeal Phase of Swallow intact   Diagnostic Statement Pt tolerated thin liquids via cup with no overt s/sx of aspiration    Clinical Swallow Eval: Puree Solid Texture Trial   Mode of Presentation, Puree spoon;self-fed   Volume of Puree Presented 4 tbsp   Oral Phase, Puree WFL   Pharyngeal Phase, Puree intact   Diagnostic Statement Pt tolerated pureed textures via spoon with no overt s/sx of aspiration    Clinical Swallow Eval: Solid Food Texture Trial   Mode of Presentation, Solid self-fed   Volume of Solid Food Presented 3 crackers   Oral Phase, Solid other (see comments)  (midlly prolonged but functionla time for mastication )   Pharyngeal Phase, Solid intact   Diagnostic Statement Pt required mildly prolonged but functional time for mastication; no overt s/sx of aspiration    VFSS Evaluation   VFSS Additional Documentation No   FEES Evaluation   Additional Documentation No   Swallow Compensations   Swallow Compensations Alternate viscosity of consistencies;Effortful swallow;Reduce amounts;Multiple swallow;Pacing   Results Oral difficulties only   Esophageal Phase of Swallow   Patient reports or presents with symptoms of esophageal dysphagia No   General Therapy Interventions   Planned Therapy Interventions Dysphagia Treatment   Dysphagia treatment Compensatory strategies for swallowing;Instruction of safe swallow strategies   Swallow Eval: Clinical Impressions   Skilled Criteria for Therapy Intervention Skilled criteria met.  Treatment indicated.   Functional Assessment Scale (FAS) 6   Treatment Diagnosis Minimal oropharyngeal dysphagia   Diet  texture recommendations Regular diet;Thin liquids   Recommended Feeding/Eating Techniques alternate between small bites and sips of food/liquid;hard swallow w/ each bite or sip;check mouth frequently for oral residue/pocketing;maintain upright posture during/after eating for 30 mins;small sips/bites   Demonstrates Need for Referral to Another Service occupational therapy;physical therapy;respiratory therapy   Therapy Frequency 3 times/wk   Predicted Duration of Therapy Intervention (days/wks) 1 week   Anticipated Discharge Disposition extended care facility   Risks and Benefits of Treatment have been explained. Yes   Patient, family and/or staff in agreement with Plan of Care Yes   Clinical Impression Comments SLP: Bedside swallow eval completed per MD orders. Pt presents with minimal oropharyngeal dysphagia. Pt tolerated thin liquids via cup, pureed textures, and regular solid textures with no overt s/sx of aspiration. Regular solid textures resulted in mildly prolonged but functional time for mastication. Recommend pt initiate regular textures and thin liquids. Pt should be fully upright and alert for all PO, slow pacing, alternate between consistenicies, and take small single sips/bites. Hold PO should pt demonstrate overt s/sx of aspiration or if change in pts respiratory status observed. ST to continue to follow for diet tolerance. Anticipate pt will meet goals prior to discharge.    Total Evaluation Time   Total Evaluation Time (Minutes) 10

## 2018-02-20 NOTE — ED NOTES
Report from Shiraz ROY, Pt resting comfortably at this time, states feeling improved. CT chest added on by MD, IV flushed and patent.

## 2018-02-20 NOTE — PHARMACY-ADMISSION MEDICATION HISTORY
Admission medication history interview status for this patient is complete. See [facility] admission navigator for allergy information, prior to admission medications and immunization status.     Medication history interview source(s):Patient  Medication history resources (including written lists, pill bottles, clinic record):None  [facility]    Changes made to PTA medication list:  Added: Latuda (just started last week), Abilify, melatonin, quetiapine 50 mg  Deleted: duplicate gabapentin  Changed: Duloxetine from 120 mg to 90 mg     Actions taken by pharmacist (provider contacted, etc):Verified meds with  Pharmacy     Additional medication history information:Pt started Latuda last week and she was unclear on the dose. Her prescription with  states 60 mg daily.     Medication reconciliation/reorder completed by provider prior to medication history? Yes    Do you take OTC medications (eg tylenol, ibuprofen, fish oil, eye/ear drops, etc)? Y(Y/N)    For patients on insulin therapy: N (Y/N)    Time spent in this activity: 25 min    Prior to Admission medications    Medication Sig Last Dose Taking? Auth Provider   Lurasidone HCl (LATUDA PO) Take 60 mg by mouth daily 2/19/2018 at Unknown time Yes Unknown, Entered By History   ARIPiprazole (ABILIFY) 5 MG tablet Take 5 mg by mouth daily 2/19/2018 at Unknown time Yes Unknown, Entered By History   MELATONIN PO Take 5 mg by mouth At Bedtime 2/18/2018 Yes Unknown, Entered By History   QUETIAPINE FUMARATE PO Take 50 mg by mouth every morning 2/19/2018 at Unknown time Yes Unknown, Entered By History   DULOXETINE HCL PO Take 90 mg by mouth daily 2/19/2018 at Unknown time Yes Unknown, Entered By History   IBUPROFEN PO Take 200-300 mg by mouth every 6 hours as needed for moderate pain  at prn Yes Unknown, Entered By History   HYDROcodone-acetaminophen (NORCO) 5-325 MG per tablet Take one two times daily as needed  at prn Yes Eros Rebolledo MD    atorvastatin (LIPITOR) 40 MG tablet Take 1 tablet (40 mg) by mouth daily 2/18/2018 Yes Eros Rebolledo MD   isosorbide mononitrate (IMDUR) 30 MG 24 hr tablet Take 1 tablet (30 mg) by mouth daily 2/19/2018 at Unknown time Yes Cj Tapia MD   gabapentin (NEURONTIN) 300 MG capsule Take 1 capsule (300 mg) by mouth At Bedtime 2/18/2018 Yes Eros Rebolledo MD   QUEtiapine (SEROQUEL) 200 MG tablet Take 1 tablet (200 mg) by mouth At Bedtime 2/18/2018 Yes Leana Patel NP   buPROPion (WELLBUTRIN XL) 300 MG 24 hr tablet Take 1 tablet (300 mg) by mouth every morning 2/19/2018 at Unknown time Yes Leana Patel NP   diazepam (VALIUM) 10 MG tablet Take 1 tablet (10 mg) by mouth every 12 hours as needed for anxiety  at prn Yes Leana Patel NP   ADVAIR DISKUS 250-50 MCG/DOSE diskus inhaler INHALE ONE PUFF BY MOUTH TWO TIMES A DAY 2/19/2018 at Unknown time Yes Eros Rebolledo MD   VENTOLIN  (90 BASE) MCG/ACT Inhaler SHAKE WELL AND INHALE 1 TO 2 PUFFS INTO THE LUNGS EVERY 4 HOURS AS NEEDED FOR SHORTNESS OF BREATH, DIFFICULTY BREATHING OR WHEEZING.  at prn Yes Eros Rebolledo MD   tiotropium (SPIRIVA) 18 MCG capsule Inhale 1 capsule (18 mcg) into the lungs daily 2/19/2018 at Unknown time Yes Eros Rebolledo MD   nitroGLYcerin (NITROSTAT) 0.4 MG sublingual tablet Place 1 tablet (0.4 mg) under the tongue every 5 minutes as needed for chest pain prn  at prn Yes Eros Rebolledo MD   Cholecalciferol (VITAMIN D3) 2000 UNITS CHEW Take 2,000 mg by mouth daily  2/19/2018 at Unknown time Yes Reported, Patient   Docusate Calcium (STOOL SOFTENER PO) Take 100 mg by mouth 2 times daily  2/19/2018 at Unknown time Yes Reported, Patient   albuterol (2.5 MG/3ML) 0.083% nebulizer solution Take 1 vial (2.5 mg) by nebulization 4 times daily as needed  at prn Yes Eros Rebolledo MD   ASPIRIN PO Take 81 mg by mouth daily 2/19/2018 at Unknown time Yes Unknown, Entered By History

## 2018-02-20 NOTE — H&P
[facility]  Hospitalist Admission Note  Name: Karen Alex    MRN: [id number]  YOB: 1955    Age: 62 year old  Date of admission: 2/19/2018  Primary care provider: Eros Rebolledo    Chief Complaint:  Shortness of breath    Assessment and Plan:   1.   Acute on chronic hypoxemic and hypercapnic respiratory failure: chronic dyspnea on exertion with minimal ambulation using 4 L continuous O2.  2-3 days of rhinorrhea, mild cough but has not brought up sputum, and increase sob with wheezing.  No fevers or chills.  Profoundly hypoxic on arrival and cyanosis noted in fingers.  Only speaking in few word sentences, but much better after nebs.  Suspect COPD exacerbation and started on appropriate therapy.  Has couple of days of some viral URI symptoms as possible trigger.  Rapid influenza antigen negative and does not have myalgias or fevers to go with this.  Chest xray without infiltrate.  No fevers or leukocytosis to suggest pneumonia.  CTPA showing large apical blebs L > R but no infiltrate or PE.  Bicarb is 38 and similar to previous so suspect chronic hypercapnia as well.     -steroids/nebs  -O2 prn, baseline uses 4 L  -will start bipap if decompensates, but risk for pneumothorax with any positive pressure ventilation given large blebs, discussed with patient  -vbg in am    2.   Advanced COPD, emphysema with acute exacerbation: chronically on 4 L O2 continuous.  Former smoker.  Has bilateral large blebs that are chronic.  Initial poor air movement and wheezing responsive to duonebs consistent with COPD exacerbation due to possible viral URI.  pta on Advair, Spiriva, and albuterol HFA and nebs.  -solumedrol 62.5mg IV q 12 hrs  -duonebs q 4 hr  -albuterol nebs prn  -continue pta Advair, Spiriva, can use her own inhalers if brought with    3.   Confusion:  She reports thinking it was 9am after waking up at home and drove here to go to her clinic appointment.  She was wandering around  outside and trying to get in.  However it was night time so there was clearly some confusion here.  She recognizes her error now and is oriented.  Possibility of hypoxemia or hypercapnia playing a role with her COPD exacerbation.    4.   Bipolar disorder, anxiety: pta on wellbutrin XL 300mg daily, seroquel 200mg hs, cymbalta 120mg daily, and valium 10mg q 12 hr prn for anxiety.   -resume pta meds    5.   Chronic low back pain: pta on gabapentin 300mg hs, prn norco 1 tab 5-325mg bid prn, and ibuprofen.  -tylenol prn along with pta prn norco and gabapentin  -hold ibuprofen while on prophylactic lovenox    6.   Chronic LE edema: present for at least 1 year.  Much better with leg elevation normally.  No hx of CHF, but would not be surprising if R sided failure or pulmonary HTN given advanced COPD.  No pulmonary edema on CT and I do not think the sob is due to acute chf.  -tte tomorrow    7.   HLD: continue pta lipitor and 81mg aspirin    8.   HTN: hx of HTN per chart review, however not hypertensive here.  Is on imdur chronically and also has prn sublingual nitro that was given in the past for chest pain.  However lexiscan from last year was normal and no hx of heart disease.  -continue pta imdur for now    # Pain Assessment:   Current Pain Score 2/19/2018 12/19/2017 11/18/2016   Pain score (0-10) 0 2 8   Pain location - - -   Pain descriptors - - -   Karen s pain level was assessed and she currently denies pain.      DVT Prophylaxis: Enoxaparin (Lovenox) SQ  Code Status: Full Code  FEN: regular diet  Discharge Dispo: home  Estimated Disch Date / # of Days until Disch: admit to inpatient for hypoxia due to COPD exacerbation, anticipate minimal 2 night hospitalization      History of Present Illness:  Karen Alex is a 62 year old female with PMH including COPD/asthma on 4L O2 with chronic large blebs form emphysema, bipolar d/o, anxiety, HTN, HLD, and chronic low back pain who presents with shortness of breath.   She actually reports that she came in to the hospital because she thought it was 9 am and she was coming for her clinic appointment.  She had woken from sleeping and thought it was morning earlier tonight.  She drove here for her appointment.  She noticed only few cars in the parking lot and try to come in but the doors were locked.  She wandered around outside for a short period until someone brought her in to the ED.  She recalls the event with specific details.  She was very sob after being outside.  For the past 2-3 days she has had increased sob with exertion from her baseline.  Normally can only walk short distances on her portable 4 L of O2 before becoming sob.  She also has had rhinorrhea and increased wheezing for these 2-3 days as well.  She has had some cough, but has not brought up any sputum.  She denies any fevers, myalgias, headaches, new joint pains.  She does have chronic bilateral leg swelling for at least the past 1 year that improves with elevating her legs.  The edema is stable.  She lives alone, but her sone is coming back from being in [facility] for the past year in one week to take care of her.  She denies any new sick contacts and mostly stays indoors at home.     History obtained from patient, medical record, and from Dr. Olivares in the emergency department.  Patient had cyanotic fingers on arrival and was tachypneic.  She could only speak in a few word sentences.  Her O2 was increased and she was given 2 duonebs with significant improvement in her respiratory status so she did not end up needing bipap.  Her cyanosis resolved.  Her bmp is notable for a bicarb of 39. Her cbc is wnl.  Troponin and rapid influenza antigen negative.  EKG unchanged from previous without ischemic changes.  Her chest xray shows what are suspect of large bilateral blebs seen on previous imaging without any new infiltrates.  A RLE US did not show any DVT.  To better evaluate her blebs and to rule out  pneumothorax, although unlikely, have asked for CT chest.  Performed with contrast to rule out PE given her significant cyanosis on arrival.  No PE or pneumothorax.  Also no infiltrates.  Admit for steroids and and nebs for COPD exacerbation.       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 Surgeon:  MILADY BASS MD     CONIZATION  10/23/07    Vulvar excision for BOBBY 3, cold knife conization     EXCISE VULVA WIDE LOCAL  5/25/2012    Procedure:EXCISE VULVA WIDE LOCAL; Wide Local Excision Of Vulvar Lesion; Surgeon:RE ALDRIDGE; Location:[facility] OR     FOOT SURGERY Right 01/24/2005    OPERATION: Excision of soft tissue mass right foot. Surgeon:  KHARI DUEÑAS DPM     FOOT SURGERY Right 02/28/2005    OPERATION: Excision of ganglion cyst right foot. Surgeon:  KHARI DUEÑAS DPM     HERNIORRHAPHY INCISIONAL (LOCATION)  4/25/2012    Procedure:HERNIORRHAPHY INCISIONAL (LOCATION); Incisional Hernia Repair with mesh ; Surgeon:KIM QUICK; Location:[facility] OR     HYSTERECTOMY TOTAL ABDOMINAL, BILATERAL SALPINGO-OOPHORECTOMY, COMBINED       LAPAROSCOPIC HYSTERECTOMY TOTAL, BILATERAL SALPINGO-OOPHORECTOMY, COMBINED  3/6/2012    Procedure:COMBINED LAPAROSCOPIC HYSTERECTOMY TOTAL, BILATERAL SALPINGO-OOPHORECTOMY; Total laparoscopic Hysterectomy, Bilateral Salpingo Ooporectomy, Pubovaginal Sling, Biopsy Left  Duc; Surgeon:RE ALDRIDGE; Location:[facility] OR     repair of anal fissures       SLING BLADDER SUSPENSION WITH FASCIA ALESHA       SLING TRANSPUBO WITHOUT ANTERIOR COLPORRHAPHY  3/6/2012    Procedure:SLING TRANSPUBO WITHOUT ANTERIOR COLPORRHAPHY; Surgeon:JOLANTA ENRIQUEZ; Location:[facility] OR     TUBAL LIGATION       Social History reviewed:  Social History   Substance Use Topics     Smoking status: Former Smoker     Packs/day: 0.50     Years: 26.00     Types: Cigarettes     Quit date: 7/3/2015     Smokeless tobacco: Never Used      Comment: sometimes     Alcohol use No     Social History     Social History Narrative     Family History reviewed:  Family History   Problem Relation Age of Onset     CANCER Father      asbestos lung disease     Hypertension Mother      DIABETES Paternal Aunt      HEART DISEASE Maternal Grandfather      CANCER Maternal Grandmother      unsure of what kind,  at the age of 86     Circulatory Paternal Grandmother       of brain aneurysm     Asthma Son      HEART DISEASE Sister      Allergies:  Allergies   Allergen Reactions     No Known Drug Allergies      Medications:  Prior to Admission medications    Medication Sig Last Dose Taking? Auth Provider   HYDROcodone-acetaminophen (NORCO) 5-325 MG per tablet Take one two times daily as needed   Eros Rebolledo MD   atorvastatin (LIPITOR) 40 MG tablet Take 1 tablet (40 mg) by mouth daily   Eros Rebolledo MD   isosorbide mononitrate (IMDUR) 30 MG 24 hr tablet Take 1 tablet (30 mg) by mouth daily   Cj Tapia MD   gabapentin (NEURONTIN) 300 MG capsule Take 1 capsule (300 mg) by mouth At Bedtime   Eros Rebolledo MD   GABAPENTIN PO Take 300 mg by mouth At Bedtime   Unknown, Entered By History   QUEtiapine (SEROQUEL) 200 MG tablet Take 1 tablet (200 mg) by mouth At Bedtime   Leana Patel NP   buPROPion (WELLBUTRIN XL) 300 MG 24 hr tablet Take 1 tablet (300 mg) by mouth every morning   Leana Patel NP    DULoxetine (CYMBALTA) 60 MG EC capsule Take 2 capsules (120 mg) by mouth daily For mood and anxiety and pain.   Leana Patel NP   diazepam (VALIUM) 10 MG tablet Take 1 tablet (10 mg) by mouth every 12 hours as needed for anxiety   Leana Patel NP   ADVAIR DISKUS 250-50 MCG/DOSE diskus inhaler INHALE ONE PUFF BY MOUTH TWO TIMES A DAY   Eros Rebolledo MD   VENTOLIN  (90 BASE) MCG/ACT Inhaler SHAKE WELL AND INHALE 1 TO 2 PUFFS INTO THE LUNGS EVERY 4 HOURS AS NEEDED FOR SHORTNESS OF BREATH, DIFFICULTY BREATHING OR WHEEZING.   Eros Rebolledo MD   tiotropium (SPIRIVA) 18 MCG capsule Inhale 1 capsule (18 mcg) into the lungs daily   Eros Rebolledo MD   nitroGLYcerin (NITROSTAT) 0.4 MG sublingual tablet Place 1 tablet (0.4 mg) under the tongue every 5 minutes as needed for chest pain prn  Patient not taking: Reported on 1/24/2018   Eros Rebolledo MD   ibuprofen (ADVIL,MOTRIN) 600 MG tablet Take 1 tablet (600 mg) by mouth every 6 hours as needed for moderate pain  Patient not taking: Reported on 1/24/2018   Dalton Cardenas MD   Cholecalciferol (VITAMIN D3) 2000 UNITS CHEW Take 2,000 mg by mouth daily    Reported, Patient   Docusate Calcium (STOOL SOFTENER PO) Take 100 mg by mouth 2 times daily    Reported, Patient   albuterol (2.5 MG/3ML) 0.083% nebulizer solution Take 1 vial (2.5 mg) by nebulization 4 times daily as needed  Patient not taking: Reported on 1/24/2018   Eros Rebolledo MD   ASPIRIN PO Take 81 mg by mouth daily   Unknown, Entered By History     Review of Systems:  A Comprehensive greater than 10 system review of systems was carried out.  Pertinent positives and negatives are noted above.  Otherwise negative.     Physical Exam:  Blood pressure 111/62, pulse 89, temperature 96.3  F (35.7  C), temperature source Temporal, resp. rate 20, last menstrual period 12/01/2007, SpO2 98 %, not currently breastfeeding.  Wt Readings from Last 1 Encounters:    01/24/18 97.5 kg (215 lb)     Exam:  Constitutional: Awake, NAD  Eyes: sclera white   HEENT: atraumatic, MMM  Respiratory: pursed lip breathing at times.  Mild bilateral expiratory wheeze with prolonged expiratory phase.  No crackles  Cardiovascular: RRR.  No murmur   GI: non-tender, not distended, bowel sounds present  Skin: no rash, acyanotic  Musculoskeletal/extremities: atraumatic, no major deformities. 1-2+ bilateral pitting LE edema  Neurologic: A&Ox3, speech clear, strength and light touch sensation grossly normal  Psychiatric: calm, cooperative     Lab and imaging data personally reviewed:  Labs:    Recent Labs  Lab 02/19/18  2226   WBC 7.9   HGB 12.2   HCT 41.9   MCV 99          Recent Labs  Lab 02/19/18 2226      POTASSIUM 3.7   CHLORIDE 97   CO2 39*   ANIONGAP 3   *   BUN 6*   CR 0.56   GFRESTIMATED >90   GFRESTBLACK >90   MAHOGANY 8.4*       Recent Labs  Lab 02/19/18 2226   TROPI <0.015     Rapid influenza antigen negative    EKG:  NSR, no ST elevation or depression    Imaging:  Results for orders placed or performed during the hospital encounter of 02/19/18   XR Chest Port 1 View    Narrative    PORTABLE CHEST ONE VIEW   2/19/2018 10:35 PM     HISTORY: Chest pain.    COMPARISON: 12/19/2017 and 10/22/2014.    FINDINGS: Marked bullous changes of emphysema both lungs, greater on  the left. Bullous changes on the left simulate a pneumothorax but are  unchanged from 12/19/2017 and 10/22/2014. Crowding of vessels in the  left lung base from the emphysematous disease. Appearance has not  changed in the interval. Nothing clearly acute.      Impression    IMPRESSION: Marked emphysematous changes with large bulla especially  in the left lung as previously described and unchanged dating back to  10/22/2014. This simulates a pneumothorax in the left but presumably  is due to bullous change as the appearance has not changed in the  interval. Nothing acute.    MUKESH HILL MD   [facility] [place] [facility]  Extremity Venous Duplex Right    Narrative    US LOWER EXTREMITY VENOUS DUPLEX RIGHT  2/19/2018 11:29 PM     HISTORY: Right leg swelling. Shortness of breath.    COMPARISON: None.    FINDINGS: Gray-scale, color and Doppler spectral analysis ultrasound  was performed of the right leg. Compression and augmentation imaging  was performed.    There is no evidence for deep venous thrombosis. The veins compress  and augment normally. There is a Baker's cyst measuring up to 4.6 cm.      Impression    IMPRESSION: No DVT.   Chest CT, IV contrast only - PE protocol    Narrative    CT CHEST PULMONARY EMBOLISM W CONTRAST  2/20/2018 12:18 AM    HISTORY: Shortness of breath.    TECHNIQUE: Scans obtained from the apices through the diaphragm with  IV contrast. 82 mL Isovue-370 injected. Radiation dose for this scan  was reduced using automated exposure control, adjustment of the mA  and/or kV according to patient size, or iterative reconstruction  technique.    COMPARISON: 8/27/2012.    FINDINGS: Evaluation of the pulmonary arterial system shows no  evidence of embolus. The main pulmonary artery is large which may be  due to pulmonary artery hypertension. There is no aortic aneurysm or  dissection. There are coronary artery atherosclerotic calcifications.  The heart is at the upper limits of normal in size. There is no  mediastinal, hilar or axillary lymph node enlargement. There is  advanced emphysematous disease with bullous disease bilaterally,  particularly on the left. Probable chronic scarring in the right  middle lobe. Dependent atelectasis and scarring at the lung bases. No  pneumothorax or pleural effusion. Images through the upper abdomen  show no acute abnormalities. There is degenerative disease in the  spine.      Impression    IMPRESSION:  1. There is no pulmonary embolus, aortic aneurysm or dissection.  2. Advanced emphysema.  3. Coronary artery atherosclerotic calcifications.       Dominic Amber,  MD  Hospitalist  [facility]

## 2018-02-20 NOTE — ED PROVIDER NOTES
History     Chief Complaint:  Shortness of breath     HPI   Kraen Alex is a 62 year old female with history of COPD, bipolar II disorder, hypertension, hyperlipidemia, asthma, among others, who presents to the emergency department today for evaluation of shortness of breath. The patient presented to the ED in severe respiratory distress and could only speak in 1-2 word sentences.     Allergies:  No Known Drug Allergies      Medications:    Norco  Lipitor   imdur   Gabapentin   Seroquel   Wellbutrin   Cymbalta   Valium   Advair   Ventolin   Spiriva   Nitroglycerin   Albuterol neb   Aspirin     Past Medical History:    Anxiety  Arthritis  Asthma  Bipolar 2 disorder  Cervical dysplasia  Chronic back pain  COPD  Hypertension  Hypercholesterolemia  Hyperlipidemia  Other chronic pain  Vulvar intraepithelial neoplasia III    Past Surgical History:    Left L5/S1 microdiscectomy   Vulvar excision for BOBBY III  Excise vulva  Right foot excision of soft tissue  Right foot ganglion cyst excision  Herniorrhaphy   Hysterectomy with bilateral slapingo-oophorectomy   Anal fissure repair   Bladder sling suspension with fascia lázaro  Transpubo sling without anterior colporrhaphy   Tubal ligation     Family History:    Hypertension   Lung cancer   Diabetes   Heart disease   Cancer - other  Heart disease   Brain aneurysm     Social History:  The patient was alone in the ED.  Smoking Status: former, QD 2015  Alcohol Use: sometimes    Marital Status:   [4]     Review of Systems   Respiratory: Positive for shortness of breath.    All other systems reviewed and are negative.    Physical Exam     Patient Vitals for the past 24 hrs:   BP Temp Temp src Pulse Heart Rate Resp SpO2   02/20/18 0030 94/76 - - - 80 - 97 %   02/20/18 0000 115/78 - - - 80 - 97 %   02/19/18 2353 - - - - - - 98 %   02/19/18 2341 - - - - 83 - 98 %   02/19/18 2340 - - - - 81 20 97 %   02/19/18 2334 - - - - - - 98 %   02/19/18 2330 111/62 - - - - - -    02/19/18 2304 - - - - - - (!) 86 %   02/19/18 2259 - - - - - - 98 %   02/19/18 2252 - - - - - - 99 %   02/19/18 2251 - - - 89 89 - 99 %   02/19/18 2250 - - - - - - 100 %   02/19/18 2245 - - - - - - 99 %   02/19/18 2230 - - - - - - 99 %   02/19/18 2225 - - - - - - 100 %   02/19/18 2221 (!) 146/97 - - - - - 100 %   02/19/18 2218 - 96.3  F (35.7  C) Temporal 100 100 28 (!) 52 %      Physical Exam   Constitutional: She is cooperative.   HENT:   Right Ear: Tympanic membrane normal.   Left Ear: Tympanic membrane normal.   Mouth/Throat: Oropharynx is clear and moist and mucous membranes are normal.   Eyes: Conjunctivae are normal.   Neck: Normal range of motion.   Cardiovascular: Regular rhythm and normal heart sounds.    Pulmonary/Chest: She is in respiratory distress. She has decreased breath sounds. She has wheezes.   Abdominal: Soft. Normal appearance and bowel sounds are normal. There is no rebound and no guarding.   Musculoskeletal: Normal range of motion.        Right ankle: She exhibits swelling.        Right lower leg: She exhibits no tenderness.   Lymphadenopathy:     She has no cervical adenopathy.   Neurological: She is alert.   Skin: Skin is warm and dry.   Psychiatric: She has a normal mood and affect.       Emergency Department Course     ECG:  ECG taken at 2226, ECG read at 2250  Normal sinus rhythm  Low voltage QRS  Borderline ECG   No significant change from ECG dated 12/07/17.    Rate 90 bpm. AL interval 150. QRS duration 80. QT/QTc 380/464. P-R-T axes 78,76,65.     Imaging:  Radiology findings were communicated with the Patient who voiced understanding of the findings.    Chest CT, IV contrast only - PE protocol  1. There is no pulmonary embolus, aortic aneurysm or dissection.  2. Advanced emphysema.  3. Coronary artery atherosclerotic calcifications.  Reading per radiology    US Lower Extremity Venous Duplex Right  No DVT.  Reading per radiology    XR Chest Port 1 View  Marked emphysematous changes  with large bulla especially  in the left lung as previously described and unchanged dating back to  10/22/2014. This simulates a pneumothorax in the left but presumably  is due to bullous change as the appearance has not changed in the  interval. Nothing acute.  MUKESH HILL MD    Laboratory:  Laboratory findings were communicated with the Patient who voiced understanding of the findings.  CBC: WBC 7.9, HGB 12.2,   BMP: BUN 6 (L), CO2 39 (H), Ca 8.4 (L), Glucose 116 (H) o/w WNL (Creatinine 0.56)  Troponin (Collected 2226): <00.15  Influenza A/B Antigen: Influenza A negative, Influenza B negative     Blood Culture 1: pending  Blood Culture 2: pending    Interventions:  2225 DuoNeb 9 mL nebulizer   2240 NS, 1 L, IV   2240 Solu-Medrol, 125 mg, IV     Emergency Department Course:  Nursing notes and vitals reviewed.  I entered the room.  I performed an exam of the patient as documented above.   IV was inserted and blood was drawn for laboratory testing, results above.  The patient's nose was swabbed and this sample was sent for influenza screen, findings above.    The patient was sent for Ultrasound and X-ray while in the emergency department, results above.   The patient received the above intervention(s).   2236 the patient was rechecked and updated the patient regarding the results of the laboratory and imaging studies.    2350 I spoke with Dr. Clark of the hospitalist service regarding patient's presentation, findings, and plan of care.   The patient was sent for a CT scan while in the emergency department, results above.  I discussed the treatment plan with the patient. They expressed understanding of this plan and consented to admission. I discussed the patient with Dr. Clark, who will admit the patient to a monitored bed for further evaluation and treatment.     Impression & Plan      Medical Decision Making:  Karen Alex is a 62 year old female with oxygen dependent COPD who presents to the  emergency department today in marked respiratory distress, though has rapidly improved with treatment here. It sounds as if she has had recent cold symptoms as well as being exposed to cold air and anxious just prior to  Arrival, which I am sure contributed. Her chest x-ray is markedly abnormal, though appeared unchanged from previous. We did obtain CT to rule out pulmonary embolism or occult pneumothorax and this returned negative. She had mentioned some recurrent right lower extremity swelling. Doppler ultrasound is negative. I obtained influenza testing in view of the current epidemic, but this is negative. EKG and troponin are normal arguing against cardiac etiology. As noted she was given multiple nebs and steroids. With this she is keeping her oxygen saturations up on her usual home oxygen of 4 L. I discussed the case with Dr. Clark of the hospitalist service. We will admit the patient to a medical bed for continued evaluation and management.     Diagnosis:    ICD-10-CM    1. COPD exacerbation (H) J44.1      Disposition:   The patient was admitted to the hospital for further evaluation and care.    Scribe Disclosure:  I, Edmund Anderson, am serving as a scribe on 2/19/2018 to document services personally performed by Olga Olivares MD, based on my observations and the provider's statements to me.   [facility] EMERGENCY DEPARTMENT       Olga Olivares MD  02/20/18 0103

## 2018-02-20 NOTE — PROGRESS NOTES
[facility]    Hospitalist Progress Note  Name: Karen Alex    MRN: [id number]  Provider: Lotus Aguilera MD  Date of Service: 02/20/2018     Called to assess as patient was SOB and PCO2 85    Assessment & Plan   Summary of Stay: Karen Alex is a 62 year old female who was admitted on 2/19/2018 with acute on chronic respiratory walls to COPD exacerbation.  Her past medical history significant for COPD on home O2 3 L, anxiety, hypertension, hyperlipidemia, chronic emphysematous changes with history of pneumothorax.  Overnight patient was hallucinating and was confused intermittently.  She was forgetful this morning.  ABG showed CO2 retention.  She had  increased work of breathing was unable to finish her sentences.  Given her respiratory distress she was placed on BiPAP and transferred to ICU.      1.   Acute on chronic hypoxemic and hypercapnic respiratory failure:   --worsened overnight with hallucinations, forgetful in am ABG with PCO2 85, increased work of breathing  -- transferred to ICU  --started on BIPAP  -steroids/nebs  --O2 prn, baseline uses 3 L at home was using 4 liters PTA with worsening SOB  -- has never required intubation in past.       2.   Advanced COPD, emphysema with acute exacerbation: chronically on 3 L O2 continuous.   -- Former smoker.    -- Ct shows bilateral large blebs that are chronic  -solumedrol 62.5mg IV q 12 hrs  -duonebs q 4 hr  -albuterol nebs prn  -continue pta Advair, Spiriva     3.   Confusion:    --  Is oriented, per report was hallucinating   --  Possibly sec to COPD exacerbation CO2  Retention  -- her meds could likely contribute in her changing mental status.     4.   Bipolar disorder, anxiety: pta on wellbutrin XL 300mg daily, seroquel 200mg hs, cymbalta 120mg daily, and valium 10mg q 12 hr prn for anxiety.   -will continue for now     5.   Chronic low back pain: pta on gabapentin 300mg hs, prn norco 1 tab 5-325mg bid prn, and ibuprofen.  -tylenol prn  along with pta prn norco and gabapentin  -held ibuprofen while on prophylactic lovenox     6.   Chronic LE edema:   --  No hx of CHF, cannot exclude R sided failure or pulmonary HTN given advanced COPD.    --No pulmonary edema on CT  --Echo ordered and pending     7.   HLD: continue pta lipitor and 81mg aspirin     8.   HTN: ? CAD likely nonobstructive with normal lexiscan  --hx of HTN per chart review. No clear diagnosis of CAD  --PTA on imdur chronically and also has prn sublingual nitro that was given in the past for chest pain.   --continue pta imdur for now       DVT Prophylaxis: Enoxaparin (Lovenox) SQ  Code Status: Full Code    Disposition: transferred to ICU with respiratory failure      Interval History   Patient visibly tachypneic unable to talk in full sentences.  Complained of increased weakness lethargy.  Unable to sleep but feels tired and sleepy.  Denies any chest pain no palpitations no lightheadedness no dizziness.  Review of all other systems completely negative    -Data reviewed today: I reviewed all new labs and imaging reports over the last 24 hours. I personally reviewed the chest x-ray image(s) showing ch emphysematous chnages.    Physical Exam   Temp: 96.2  F (35.7  C) Temp src: Oral BP: (!) 151/110 Pulse: 88 Heart Rate: 78 Resp: 22 SpO2: 96 % O2 Device: BiPAP/CPAP Oxygen Delivery: 3 LPM  Vitals:    02/20/18 0456   Weight: 102.8 kg (226 lb 9.6 oz)     Vital Signs with Ranges  Temp:  [96.2  F (35.7  C)-97.7  F (36.5  C)] 96.2  F (35.7  C)  Pulse:  [] 88  Heart Rate:  [] 78  Resp:  [16-28] 22  BP: ()/() 151/110  FiO2 (%):  [40 %] 40 %  SpO2:  [52 %-100 %] 96 %  I/O last 3 completed shifts:  In: 120 [P.O.:120]  Out: -       GEN:  Alert, oriented x 3, tachypneic, increased work of breathing and cannot talk in full sentences  HEENT:  Normocephalic/atraumatic, no scleral icterus, no nasal discharge, mouth moist.  CV:  Regular rate and rhythm, no murmur or JVD.  S1 + S2  noted, no S3 or S4.  LUNGS: Bronchospasm, wheezing prolonged expiration and wheezing.  Symmetric chest rise on inhalation noted.  ABD:  Active bowel sounds, soft, non-tender/non-distended.  No rebound/guarding/rigidity.  EXT:  + edema.  No cyanosis.    SKIN:  Dry to touch, no exanthems noted in the visualized areas.    Medications     - MEDICATION INSTRUCTIONS -       - MEDICATION INSTRUCTIONS -         fluticasone-salmeterol  1 puff Inhalation Q12H     aspirin chewable tablet 81 mg  81 mg Oral Daily     atorvastatin  40 mg Oral Daily     buPROPion  300 mg Oral QAM     Vitamin D3  2,000 mg Oral Daily     DULoxetine  120 mg Oral Daily     docusate sodium  100 mg Oral BID     gabapentin  300 mg Oral At Bedtime     isosorbide mononitrate  30 mg Oral Daily     QUEtiapine  200 mg Oral At Bedtime     tiotropium  18 mcg Inhalation Daily     ipratropium - albuterol 0.5 mg/2.5 mg/3 mL  3 mL Nebulization 4x daily     methylPREDNISolone  62.5 mg Intravenous Q12H     enoxaparin  40 mg Subcutaneous Q24H     Data       Recent Labs  Lab 02/20/18  0648   PHV 7.31*   PO2V 46   PCO2V 85*   HCO3V 43*       Recent Labs  Lab 02/20/18  0648 02/19/18  2226   WBC  --  7.9   HGB  --  12.2   HCT  --  41.9   MCV  --  99    214       Recent Labs  Lab 02/20/18  0648 02/19/18  2226    139   POTASSIUM 4.6 3.7   CHLORIDE 101 97   CO2 40* 39*   ANIONGAP 1* 3   * 116*   BUN 7 6*   CR 0.44* 0.56   GFRESTIMATED >90 >90   GFRESTBLACK >90 >90   MAHOGANY 8.0* 8.4*       Recent Labs  Lab 02/19/18  2249 02/19/18  2228   CULT No growth after 5 hours No growth after 5 hours       Recent Labs  Lab 02/20/18  0648   NTBNPI 1210*     No results for input(s): AST, ALT, GGT, ALKPHOS, BILITOTAL, BILICONJ, BILIDIRECT, ERNESTO in the last 168 hours.    Invalid input(s): BILIRUBININDIRECT  No results for input(s): INR in the last 168 hours.  No results for input(s): LACT in the last 168 hours.  No results for input(s): CHOL, HDL, LDL, TRIG, CHOLHDLRATIO  in the last 168 hours.  No results for input(s): TSH in the last 168 hours.    Recent Labs  Lab 02/19/18  2226   TROPI <0.015     No results for input(s): COLOR, APPEARANCE, URINEGLC, URINEBILI, URINEKETONE, SG, UBLD, URINEPH, PROTEIN, UROBILINOGEN, NITRITE, LEUKEST, RBCU, WBCU in the last 168 hours.    Recent Results (from the past 24 hour(s))   XR Chest Port 1 View    Narrative    PORTABLE CHEST ONE VIEW   2/19/2018 10:35 PM     HISTORY: Chest pain.    COMPARISON: 12/19/2017 and 10/22/2014.    FINDINGS: Marked bullous changes of emphysema both lungs, greater on  the left. Bullous changes on the left simulate a pneumothorax but are  unchanged from 12/19/2017 and 10/22/2014. Crowding of vessels in the  left lung base from the emphysematous disease. Appearance has not  changed in the interval. Nothing clearly acute.      Impression    IMPRESSION: Marked emphysematous changes with large bulla especially  in the left lung as previously described and unchanged dating back to  10/22/2014. This simulates a pneumothorax in the left but presumably  is due to bullous change as the appearance has not changed in the  interval. Nothing acute.    MUKESH HILL MD   US Lower Extremity Venous Duplex Right    Narrative    US LOWER EXTREMITY VENOUS DUPLEX RIGHT  2/19/2018 11:29 PM     HISTORY: Right leg swelling. Shortness of breath.    COMPARISON: None.    FINDINGS: Gray-scale, color and Doppler spectral analysis ultrasound  was performed of the right leg. Compression and augmentation imaging  was performed.    There is no evidence for deep venous thrombosis. The veins compress  and augment normally. There is a Baker's cyst measuring up to 4.6 cm.      Impression    IMPRESSION: No DVT.    ROSALINA CHANCE MD   Chest CT, IV contrast only - PE protocol    Narrative    CT CHEST PULMONARY EMBOLISM W CONTRAST  2/20/2018 12:18 AM    HISTORY: Shortness of breath.    TECHNIQUE: Scans obtained from the apices through the diaphragm  with  IV contrast. 82 mL Isovue-370 injected. Radiation dose for this scan  was reduced using automated exposure control, adjustment of the mA  and/or kV according to patient size, or iterative reconstruction  technique.    COMPARISON: 8/27/2012.    FINDINGS: Evaluation of the pulmonary arterial system shows no  evidence of embolus. The main pulmonary artery is large which may be  due to pulmonary artery hypertension. There is no aortic aneurysm or  dissection. There are coronary artery atherosclerotic calcifications.  The heart is at the upper limits of normal in size. There is no  mediastinal, hilar or axillary lymph node enlargement. There is  advanced emphysematous disease with bullous disease bilaterally,  particularly on the left. Probable chronic scarring in the right  middle lobe. Dependent atelectasis and scarring at the lung bases. No  pneumothorax or pleural effusion. Images through the upper abdomen  show no acute abnormalities. There is degenerative disease in the  spine.      Impression    IMPRESSION:  1. There is no pulmonary embolus, aortic aneurysm or dissection.  2. Advanced emphysema.  3. Coronary artery atherosclerotic calcifications.    ROSALINA CHANCE MD

## 2018-02-20 NOTE — PLAN OF CARE
Problem: Patient Care Overview  Goal: Plan of Care/Patient Progress Review  Discharge Planner SLP   Patient plan for discharge: none stated  Current status: Bedside swallow eval completed today. Pt presents with minimal oropharyngeal dysphagia. Recommend pt initiate regular textures and thin liquids. Pt should be fully upright and alert for all PO, slow pacing, alternate between consistenicies, and take small single sips/bites. Hold PO should pt demonstrate overt s/sx of aspiration or if change in pts respiratory status observed.  Barriers to return to prior living situation: none from ST standpoint   Recommendations for discharge: anticipate pt will meet goals prior to discharge  Rationale for recommendations: minimal dysphagia; suspect pt is close to baseline diet level        Entered by: Briseida Gomez 02/20/2018 11:35 AM

## 2018-02-20 NOTE — PROGRESS NOTES
"[facility]     Date: 2-20-18    Admission Dx: COPD Exacerbation    Pulmonary History: Asthma, CIOD    Home Nebulizer/MDI Use: Advair, Spiriva, Albuterol prn    Home Oxygen: 4L    Acuity Level (RCAT flow sheet): 3    Aerosol Therapy initiated: Duoneb QID, Albuterol Q4 pr n      Pulmonary Hygiene initiated: Deep Breathe and Cough      Volume Expansion initiated: IS      Current Oxygen Requirements: 4L NC  Current SpO2: 98%    Re-evaluation date: 2-23-18    Patient Education: educated patient on bronchodilators      See \"RT Assessments\" flow sheet for patient assessment scoring and Acuity Level Details.             "

## 2018-02-20 NOTE — PROGRESS NOTES
"Pt was placed on the BiPAP (12/6 RR 12 @40%)  this morning for her respiratory distress and SOB. She has since been transitioned to a 4 LPM NC and is tolerating it well. Will continue to monitor and assess.    Vital signs:  Temp: 98.3  F (36.8  C) Temp src: Axillary BP: 137/87 Pulse: 88 Heart Rate: 98 Resp: 20 SpO2: 95 % O2 Device: Nasal cannula Oxygen Delivery: 4 LPM   Weight: 102.8 kg (226 lb 9.6 oz)  Estimated body mass index is 35.49 kg/(m^2) as calculated from the following:    Height as of 1/24/18: 1.702 m (5' 7\").    Weight as of this encounter: 102.8 kg (226 lb 9.6 oz).    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 Surgeon:  MILADY BASS MD     CONIZATION  10/23/07    Vulvar excision for BOBBY 3, cold knife conization     EXCISE VULVA WIDE LOCAL  5/25/2012    Procedure:EXCISE VULVA WIDE LOCAL; Wide Local Excision Of Vulvar Lesion; Surgeon:RE ALDRIDGE; Location:[facility] OR     FOOT SURGERY Right 01/24/2005    OPERATION: Excision of soft tissue mass right foot. Surgeon:  KHARI DUEÑAS DPM     FOOT SURGERY Right 02/28/2005    OPERATION: Excision of ganglion cyst right foot. Surgeon:  KHARI DUEÑAS DPM     HERNIORRHAPHY INCISIONAL (LOCATION)  4/25/2012    Procedure:HERNIORRHAPHY INCISIONAL (LOCATION); Incisional Hernia Repair with mesh ; Surgeon:KIM QUICK; Location:[facility] OR     HYSTERECTOMY TOTAL " ABDOMINAL, BILATERAL SALPINGO-OOPHORECTOMY, COMBINED       LAPAROSCOPIC HYSTERECTOMY TOTAL, BILATERAL SALPINGO-OOPHORECTOMY, COMBINED  3/6/2012    Procedure:COMBINED LAPAROSCOPIC HYSTERECTOMY TOTAL, BILATERAL SALPINGO-OOPHORECTOMY; Total laparoscopic Hysterectomy, Bilateral Salpingo Ooporectomy, Pubovaginal Sling, Biopsy Left Volva; Surgeon:RE ALDRIDGE; Location:[facility] OR     repair of anal fissures       SLING BLADDER SUSPENSION WITH FASCIA ALESHA       SLING TRANSPUBO WITHOUT ANTERIOR COLPORRHAPHY  3/6/2012    Procedure:SLING TRANSPUBO WITHOUT ANTERIOR COLPORRHAPHY; Surgeon:JOLANTA ENRIQUEZ; Location:[facility] OR     TUBAL LIGATION         FAMILY HISTORY:   Family History   Problem Relation Age of Onset     CANCER Father      asbestos lung disease     Hypertension Mother      DIABETES Paternal Aunt      HEART DISEASE Maternal Grandfather      CANCER Maternal Grandmother      unsure of what kind,  at the age of 86     Circulatory Paternal Grandmother       of brain aneurysm     Asthma Son      HEART DISEASE Sister        SOCIAL HISTORY:   Social History   Substance Use Topics     Smoking status: Former Smoker     Packs/day: 0.50     Years: 26.00     Types: Cigarettes     Quit date: 7/3/2015     Smokeless tobacco: Never Used      Comment: sometimes     Alcohol use Brynn Collier RT  2018

## 2018-02-20 NOTE — PLAN OF CARE
"Problem: Patient Care Overview  Goal: Plan of Care/Patient Progress Review  Outcome: No Change  Patient arrived on unit at 0100. A&O, forgetful. /71, other VSS. LS diminished, wheezes on expiration, GARCIA, tachypnea. 98% on 4L NC, O2 sats drop to low 80s with activity- O2 sats returns quickly to high 90s with rest and deep breathing. IS education completed, IS completed x5 up to 500. 2+ edema to BLE. C/o \"muscle\" pain to L shoulder-tylenol given-effective. Up with assist of 1. Brought inhaler with bronson-MD stated pt can use. Denies nausea. L PIV saline locked. . Pt stated, \"My bipolar medications were stopped yesterday and I am worried about how that is going to go. I see people from the 1940s at my house having a party all the time. I had to turn the TV off here because I started hearing voices that I know aren't there.\" Scheduled seroquel given at 0316. Continue with POC.       "

## 2018-02-20 NOTE — PLAN OF CARE
Report given on pt status of severe dyspnea with any activity  Bipap on and will transport when room readsy.  Son informed {micah]  of transfer

## 2018-02-21 NOTE — CONSULTS
Care Transition Initial Assessment - NASIR  Reason For Consult: discharge planning  Met with: Patient    Active Problems:    COPD exacerbation (H)       DATA  Lives With: child(cris), adult   Description of Support System: Supportive, Involved  Who is your support system?: Children, Sibling(s)  Support Assessment: Adequate family and caregiver support.     Quality Of Family Relationships: supportive, involved  Transportation Available: family or friend will provide    ASSESSMENT  Cognitive Status:  Alert and oriented.  Concerns to be addressed: SW consult to assist with dc planning. Chart reviewed and PT/OT consults pending. SW met with pt who reports that she lives in an apartment in [place]. Pt currently uses 4L O2 at home and states that she receives her oxygen through [employer].  Pt reports feeling weak at this time. SW mentioned that often when pt's are feeling weak in the hospital therapies may recommend a short stay in a TCU.    Pt declines TCU placement and reports that her sister plans to stay with her at dc. Pt would agree to home care services at dc if needed and would like to use [facility].        PLAN  Financial costs for the patient includes: None at this time.  Patient given options and choices for discharge: Yes.  Patient/family is agreeable to the plan? Yes  Patient Goals and Preferences: Home.  Patient anticipates discharging to:  Home with possible Home Care.

## 2018-02-21 NOTE — PLAN OF CARE
Problem: Patient Care Overview  Goal: Plan of Care/Patient Progress Review  Outcome: Improving  ICU End of Shift Summary.  For vital signs and complete assessments, please see documentation flowsheets.     Pertinent assessments: A&Ox4. VSS, Afebrile. Denies pain. Get very dyspneic and short of breath with any activity. Takes few minutes to recover. LS: dim with exp wheezes. +BS. Tele NSR. Up with SBA to bedside commode. Trace edema to bilateral BLE.   Major Shift Events: Rested most of the night, tolerated bipap all night at 30% FIO2.   Plan (Upcoming Events): continue pulmonary toileting, steroids, neds, encourage IS, CDB  Discharge/Transfer Needs: continue ICU cares for now    Bedside Shift Report Completed : yes  Bedside Safety Check Completed: yes

## 2018-02-21 NOTE — PROGRESS NOTES
Patient wore BiPAP 12/6 30% overnight without issues when off on 4L Nasal Cannula, breath sounds very diminished bilaterally, received scheduled Duoneb, RT will continue to follow.    Eve Serrano  February 21, 2018.5:01 AM

## 2018-02-21 NOTE — PLAN OF CARE
Problem: Patient Care Overview  Goal: Plan of Care/Patient Progress Review  Discharge Planner SLP   Patient plan for discharge: none stated  Current status: Pt tolerated thin liquids via straw and regular solid textures with no overt s/sx of aspiration. Recommend pt continue regular textures and thin liquids. Pt was independently taking small sips/bites and pacing self. Goals met.   Barriers to return to prior living situation: none from ST standpoint  Recommendations for discharge: no further ST needs indicated  Rationale for recommendations: pt is currently tolerating regular diet w/o difficulty; goals met.        Entered by: Briseida Gomez 02/21/2018 10:30 AM       Speech Language Therapy Discharge Summary    Reason for therapy discharge:    All goals and outcomes met, no further needs identified.    Progress towards therapy goal(s). See goals on Care Plan in [facility] electronic health record for goal details.  Goals met    Therapy recommendation(s):    No further therapy is recommended.

## 2018-02-21 NOTE — PROGRESS NOTES
Patient was seen and examined by me this morning.  Care was assumed.  She is feeling short of breath and dyspnea on exertion and lower extremity edema as well.  She denies any chest pain.  She has no fever or chills.  She is able to eat and had a bowel movement.  Chest auscultation revealed basilar rales, trace peripheral edema.  We will continue oxygen, nebs, steroids, add Lasix for the next 24 hours for diuresis and reassess again.  Intensivist following and Advair dose to be increased as well.

## 2018-02-21 NOTE — PROGRESS NOTES
Hospitalist Progress Note  [facility]      Sudhir Sloan MD 02/21/2018      Reason for Stay / current hospital problem list:    Acute respiratory failure secondary to COPD exacerbation admitted to ICU after she declined on the floor for continuous BiPAP           Assessment and Plan:        Summary of Stay:     Karen Alex is a 62 year old  female with a significant past medical history of multiple medical problems including COPD/asthma on 4 L of oxygen at home, bipolar disorder and anxiety, hypertension and hyperlipidemia as well as chronic low back pain who presents with worsening of shortness of breath.  Patient was initially admitted to medical floor with acute on chronic hypoxemic and hypercapnic respiratory failure and eventually transferred to intensive care unit for close monitoring.  Patient condition significantly improved after transfer          Hospital Problem List,assessment,plan and hospital course        1. Acute on chronic respiratory failure: Secondary to COPD exacerbation, continue nebs, steroid and oxygen supplement.  Patient is not yet at her baseline.  --She wants to take her oral medications including her Xopenex and Advair due to financial reasons.  This was discussed with pharmacy staff and patient was allowed to take at home medications once verified by pharmacy to    2. Advanced COPD and emphysema with acute COPD exacerbation: Continue steroids, nebs, oxygen and monitor closely.  She was recommended to increase the dose of her Advair on discharge and this needs to be addressed on discharge.    3. Acute encephalopathy: Toxic metabolic, improved significantly and patient is very pleasant and alert and oriented ×3.      4. Bipolar disorder, anxiety: Continue PT medications including gabapentin  5. Dyspnea on exertion likely multifactorial including COPD exacerbation with severe emphysema, suspect diastolic dysfunction with basal rates and lower extremity edema  contributing or shortness of breath  --We will start her on Lasix for the next 24 hours and reevaluate patient for symptom improvement, intake and output monitoring and daily weight checks        # Pain Assessment:   Current Pain Score 2/21/2018 2/21/2018 2/21/2018   Patient currently in pain? - yes denies   Pain score (0-10) 0 5 -   Pain location - Head -   Pain descriptors - Aching -   Karen s pain level was assessed and she currently denies pain.          DVT Prophylaxis: Mechanical DVT prophylaxis with pneumatic compression device due to concern for subcutaneous bleeding      Code Status: Full Code      Discharge Plan: Patient can be discharged in the next 2 days if she continues to improve.  She is not at her baseline yet.  May transfer to the floor to telemetry        Interval History (Subjective):             Patient was seen and examined by me today and medical record reviewed.Overnight events noted and care discussed with nursing staff and treatment team.  Patient remained very weak and especially her dyspnea chronic with exertion.  She feels that she is not back to her baseline yet.  She is about 60% of her baseline breathing.  He denies chest pain or fever.  No nausea vomiting or diarrhea.  She wants to take her on Advair and Spiriva due to cost issues                   Physical Exam:      Last Vital Signs:  Temp:  [98  F (36.7  C)-98.7  F (37.1  C)] 98.6  F (37  C)  Pulse:  [87] 87  Heart Rate:  [] 97  Resp:  [17-23] 22  BP: (111-137)/() 128/117  FiO2 (%):  [30 %-40 %] 30 %  SpO2:  [91 %-99 %] 91 %  I/O last 3 completed shifts:  In: 1080 [P.O.:1080]  Out: 870 [Urine:870]    Wt Readings from Last 5 Encounters:   02/21/18 101.3 kg (223 lb 5.9 oz)   01/24/18 97.5 kg (215 lb)   01/19/18 97.5 kg (215 lb)   12/22/17 97.5 kg (215 lb)   12/20/17 99.8 kg (220 lb)       GEN:   Alert, oriented x 3, appears mild respiratory distress   NECK:Supple ,no mass or thyromegaly   HEENT:    Normocephalic/atraumatic, no scleral icterus, no nasal discharge, mouth moist.  CV:  Regular rate and rhythm, no murmur or JVD.  S1 + S2 noted, no S3 or S4.  LUNGS: Mildly elevated work of breathing, no significant wheezing, she has basilar rales  ABD:  Active bowel sounds, soft, non-tender/non-distended.  No rebound/guarding/rigidity.  EXT: Trace lower extremity edema.  No cyanosis.  No joint synovitis noted.  SKIN: Dry to touch, no exanthems noted in the visualized areas.  Neurologic:Grossly intact,non focal                    Medications:      All current medications were reviewed with changes reflected in problem list.    Medications     - MEDICATION INSTRUCTIONS -       - MEDICATION INSTRUCTIONS -         furosemide  20 mg Intravenous Q8H     melatonin tablet 5 mg  5 mg Oral At Bedtime     [START ON 2/22/2018] DULoxetine (CYMBALTA) EC capsule 90 mg  90 mg Oral Daily     ARIPiprazole  5 mg Oral Daily     lurasidone  60 mg Oral Daily     QUEtiapine (SEROquel) tablet 50 mg  50 mg Oral QAM     tiotropium  18 mcg Inhalation Daily     ipratropium  0.5 mg Nebulization 4x daily     fluticasone-salmeterol  1 puff Inhalation Q12H     aspirin chewable tablet 81 mg  81 mg Oral Daily     atorvastatin  40 mg Oral Daily     buPROPion  300 mg Oral QAM     cholecalciferol  2,000 Units Oral Daily     docusate sodium  100 mg Oral BID     gabapentin  300 mg Oral At Bedtime     isosorbide mononitrate  30 mg Oral Daily     QUEtiapine  200 mg Oral At Bedtime     methylPREDNISolone  62.5 mg Intravenous Q12H                Data:          Data reviewed today: I reviewed all new labs and imaging results over the last 24 hours. I personally reviewed no images or EKG's today        Recent Labs  Lab 02/21/18  0943 02/20/18  0648 02/19/18  2226   WBC 9.1  --  7.9   HGB 11.6*  --  12.2   HCT 38.7  --  41.9   MCV 96  --  99    160 214       Recent Labs  Lab 02/19/18  2249 02/19/18  2228   CULT No growth after 1 day No growth after 1 day        Recent Labs  Lab 02/20/18  0648 02/19/18  2226    139   POTASSIUM 4.6 3.7   CHLORIDE 101 97   CO2 40* 39*   ANIONGAP 1* 3   * 116*   BUN 7 6*   CR 0.44* 0.56   GFRESTIMATED >90 >90   GFRESTBLACK >90 >90   MAHOGANY 8.0* 8.4*         Recent Labs  Lab 02/20/18  0648 02/19/18  2226   * 116*             Recent Labs  Lab 02/21/18  0943   INR 0.95           Recent Labs  Lab 02/19/18  2226   TROPI <0.015       Recent Results (from the past 48 hour(s))   XR Chest Port 1 View    Narrative    PORTABLE CHEST ONE VIEW   2/19/2018 10:35 PM     HISTORY: Chest pain.    COMPARISON: 12/19/2017 and 10/22/2014.    FINDINGS: Marked bullous changes of emphysema both lungs, greater on  the left. Bullous changes on the left simulate a pneumothorax but are  unchanged from 12/19/2017 and 10/22/2014. Crowding of vessels in the  left lung base from the emphysematous disease. Appearance has not  changed in the interval. Nothing clearly acute.      Impression    IMPRESSION: Marked emphysematous changes with large bulla especially  in the left lung as previously described and unchanged dating back to  10/22/2014. This simulates a pneumothorax in the left but presumably  is due to bullous change as the appearance has not changed in the  interval. Nothing acute.    MUKESH HILL MD   US Lower Extremity Venous Duplex Right    Narrative    US LOWER EXTREMITY VENOUS DUPLEX RIGHT  2/19/2018 11:29 PM     HISTORY: Right leg swelling. Shortness of breath.    COMPARISON: None.    FINDINGS: Gray-scale, color and Doppler spectral analysis ultrasound  was performed of the right leg. Compression and augmentation imaging  was performed.    There is no evidence for deep venous thrombosis. The veins compress  and augment normally. There is a Baker's cyst measuring up to 4.6 cm.      Impression    IMPRESSION: No DVT.    ROSALINA CHANCE MD   Chest CT, IV contrast only - PE protocol    Narrative    CT CHEST PULMONARY EMBOLISM W CONTRAST   2/20/2018 12:18 AM    HISTORY: Shortness of breath.    TECHNIQUE: Scans obtained from the apices through the diaphragm with  IV contrast. 82 mL Isovue-370 injected. Radiation dose for this scan  was reduced using automated exposure control, adjustment of the mA  and/or kV according to patient size, or iterative reconstruction  technique.    COMPARISON: 8/27/2012.    FINDINGS: Evaluation of the pulmonary arterial system shows no  evidence of embolus. The main pulmonary artery is large which may be  due to pulmonary artery hypertension. There is no aortic aneurysm or  dissection. There are coronary artery atherosclerotic calcifications.  The heart is at the upper limits of normal in size. There is no  mediastinal, hilar or axillary lymph node enlargement. There is  advanced emphysematous disease with bullous disease bilaterally,  particularly on the left. Probable chronic scarring in the right  middle lobe. Dependent atelectasis and scarring at the lung bases. No  pneumothorax or pleural effusion. Images through the upper abdomen  show no acute abnormalities. There is degenerative disease in the  spine.      Impression    IMPRESSION:  1. There is no pulmonary embolus, aortic aneurysm or dissection.  2. Advanced emphysema.  3. Coronary artery atherosclerotic calcifications.    ROSALINA CHANCE MD               Disclaimer: This note consists of symbols derived from keyboarding, dictation and/or voice recognition software. As a result, there may be errors in the script that have gone undetected. Please consider this when interpreting information found in this chart

## 2018-02-21 NOTE — PROGRESS NOTES
"SPIRITUAL HEALTH SERVICES Progress Note  [facility] ICU    SH visit with pt, Karen, per request for chaplaincy support.   Karen shares that she had an exacerbation of her COPD that required BiPap support, but she is using NC oxygen now. She is happy about this as \"It is so hard to talk with BiPap - it was so frustrating.\"  Karen goes on to note that she hopes to move out of ICU soon.   Karen notes that she has family in the area (son, sister, brother, nephew) but that do to family dynamics as well as health challenges her nephew is her primary support.   Karen lives alone in her own apartment and enjoys watching TV, doing word finds, and reading. Karen refected on the time she worked as a  for [employer] and how much she enjoyed the community and the people.   Karen asked for prayer and we prayed together. Inquired if she would appreciate word finds to work on while in the hospital and she was excited to have the opportunity.  Will provide word finds for patient per her request. Will f/u per length of stay.     NAVID Church.  Staff    Pager #[phone]     "

## 2018-02-21 NOTE — TELEPHONE ENCOUNTER
Client called requesting a call back.    2/21/2018 9:30 client called.  States she is the hospital due to respiratory concerns.  Had low oxygen levels.  She was driving on her own and had difficulty with driving.  Client is currently hospitalized at [facility].  Has appt for next week.

## 2018-02-21 NOTE — PLAN OF CARE
Problem: Patient Care Overview  Goal: Plan of Care/Patient Progress Review  ICU End of Shift Summary.  For vital signs and complete assessments, please see documentation flowsheets.     Pertinent assessments: A&Ox4, anxious at times r/t SOB, GARCIA with wheezing, LS dim at rest, 4L NC, NSR, BP high when dyspneic, up with Ax1 to BSC or recliner and sits at side of bed independently, tolerating regular diet, headache pain relieved with tylenol, BLE edema improved with elevation  Major Shift Events: transferred to ICU r/t SOB, weaned off BiPAP, passed swallow eval, completed echo which showed EF 55-60%  Plan (Upcoming Events): wean O2 to home level of 3L, tolerate activity   Discharge/Transfer Needs: home when returned to baseline, could return to medical floor tomorrow    Bedside Shift Report Completed : y  Bedside Safety Check Completed: y

## 2018-02-21 NOTE — TELEPHONE ENCOUNTER
Fax from [employer], med alert, pt taking norco 5-325 and diazepam 10 mg (from Jany Leonardo), see raina fax at [facility]    That physician is an [facility] psychiatrist . Hopefully  Hospital will orient the patient she still has prn diazepam in the hospital.    No opioid refills until evaluated here. They are continuing her opioids in the hospital.  We will assess at discharge.       Estelle Haynes RN, BSN  Message handled by Nurse Triage.

## 2018-02-22 NOTE — PLAN OF CARE
Problem: Patient Care Overview  Goal: Plan of Care/Patient Progress Review  ICU End of Shift Summary.  For vital signs and complete assessments, please see documentation flowsheets.     Pertinent assessments: A&Ox4, LS dim, 4L NC, NSR with ST in low 100s after activity, GARCIA, occasional pain relieved with tylenol or norco, up with Ax1 to BSC, voiding well with lasix, large BM today, tolerating diet well  Major Shift Events: diuresing with lasix, tolerating more activity, seen by PT and speech  Plan (Upcoming Events): continue steroids, nebs, and diuresis  Discharge/Transfer Needs: home when returned to baseline    Bedside Shift Report Completed : y  Bedside Safety Check Completed: y

## 2018-02-22 NOTE — PROGRESS NOTES
[facility]    Hospitalist Progress Note    Date of Service (when I saw the patient): 02/22/2018    Assessment & Plan     62 year old  female with a significant past medical history of multiple medical problems including COPD/asthma on 4 L of oxygen at home, bipolar disorder and anxiety, hypertension and hyperlipidemia as well as chronic low back pain who presents with worsening of shortness of breath.  Patient was initially admitted to medical floor with acute on chronic hypoxemic and hypercapnic respiratory failure and eventually transferred to intensive care unit for close monitoring and BiPAP.  Patient condition significantly improved and now transferred to floor.      Hospital Problem List,assessment,plan and hospital course:    # Acute on chronic hypoxic respiratory failure: Secondary to COPD exacerbation. CT chest was neg for PE.  -- Continue nebs, steroid and oxygen supplementation. Oxygen requirements improved but still dyspneic with minimal activity   -- Patient was given a few doses of IV lasix, appears close to euvolemia, intake and output monitoring and daily weight checks    # Advanced COPD and emphysema with acute COPD exacerbation: Consider increasing the dose of her Advair on discharge    # Acute encephalopathy: Toxic metabolic, improved significantly and patient at baseline now     # Bipolar disorder, anxiety: Continue PTA medications Wellbutrin XL 300mg daily, seroquel 200mg hs, cymbalta, and valium 10mg q 12 hr prn for anxiety.    # Chronic low back pain: PTA on gabapentin 300mg hs, prn norco 1 tab 5-325mg bid prn     # Chronic LE edema    # HLD: continue pta lipitor and 81mg aspirin, On Imdur     DVT Prophylaxis: Pneumatic Compression Devices    Code Status: Full Code    Disposition: Expected discharge in 2 days once work of breathing improving. Declining TCU     Tauseef Ur Gurwinder    Interval History   Chart reviewed and patient seen. Case discussed with nursing staff.      Breathing is improved, although still quite dyspneic with minimal activity, no chest pain, some cough, No nausea, vomiting or diarrhea. No other complaints voiced.       -Data reviewed today: I reviewed all new labs and imaging results over the last 24 hours. I personally reviewed .  # Pain Assessment:   Current Pain Score 2/22/2018 2/22/2018 2/22/2018   Patient currently in pain? denies denies denies   Pain score (0-10) - - -   Pain location - - -   Pain descriptors - - -   Karen gonzalez pain level was assessed and she currently denies pain.       Physical Exam   Temp: 97.9  F (36.6  C) Temp src: Oral BP: 144/90 Pulse: 80 Heart Rate: 85 Resp: 18 SpO2: 98 % O2 Device: Nasal cannula Oxygen Delivery: 4 LPM  Vitals:    02/20/18 0456 02/21/18 0400 02/22/18 0400   Weight: 102.8 kg (226 lb 9.6 oz) 101.3 kg (223 lb 5.9 oz) 99.2 kg (218 lb 12.2 oz)     Vital Signs with Ranges  Temp:  [97.9  F (36.6  C)-99.2  F (37.3  C)] 97.9  F (36.6  C)  Pulse:  [80-88] 80  Heart Rate:  [] 85  Resp:  [16-18] 18  BP: (112-144)/() 144/90  SpO2:  [95 %-99 %] 98 %  I/O last 3 completed shifts:  In: 2500 [P.O.:2500]  Out: 1200 [Urine:1200]      Constitutional: Awake, alert, no apparent distress   Respiratory: Mild exp wheezing, gets SOB while talking for longer time, no crackles , symmetric chest rise bilaterally   Cardiovascular: Regular rate and rhythm, normal S1 and S2, no loud murmur, rubs or gallops noted   Abdomen: Bowel sounds present, soft, non-distended, non-tender, no rebound or guarding is noted   Skin: No exanthems noted on exposed areas, no cyanosis, dry to touch   Neuro: Alert and oriented x3, no focal weakness or numbness   Extremities: +1 edema, skin is warm to touch with signs of adequate peripheral perfusion    Psychiatric: Calm, not agitated, no active hallucinations             Medications     - MEDICATION INSTRUCTIONS -       - MEDICATION INSTRUCTIONS -         melatonin tablet 5 mg  5 mg Oral At Bedtime      DULoxetine (CYMBALTA) EC capsule 90 mg  90 mg Oral Daily     ARIPiprazole  5 mg Oral Daily     lurasidone  60 mg Oral Daily     QUEtiapine (SEROquel) tablet 50 mg  50 mg Oral QAM     ipratropium  0.5 mg Nebulization 4x daily     fluticasone-salmeterol  1 puff Inhalation Q12H     aspirin chewable tablet 81 mg  81 mg Oral Daily     atorvastatin  40 mg Oral Daily     buPROPion  300 mg Oral QAM     cholecalciferol  2,000 Units Oral Daily     docusate sodium  100 mg Oral BID     gabapentin  300 mg Oral At Bedtime     isosorbide mononitrate  30 mg Oral Daily     QUEtiapine  200 mg Oral At Bedtime     methylPREDNISolone  62.5 mg Intravenous Q12H       Data   All new lab data and imaging results from today have been reviewed       Recent Labs  Lab 02/22/18  0520 02/21/18  0943 02/20/18  0648 02/19/18  2226   WBC 7.0 9.1  --  7.9   HGB 10.5* 11.6*  --  12.2   MCV 95 96  --  99    211 160 214   INR  --  0.95  --   --      --  142 139   POTASSIUM 4.1  --  4.6 3.7   CHLORIDE 95  --  101 97   CO2 43*  --  40* 39*   BUN 19  --  7 6*   CR 0.56  --  0.44* 0.56   ANIONGAP <1*  --  1* 3   MAHOGANY 8.3*  --  8.0* 8.4*   *  --  137* 116*   TROPI  --   --   --  <0.015       No results found for this or any previous visit (from the past 24 hour(s)).

## 2018-02-22 NOTE — PROGRESS NOTES
02/22/18 0812   Quick Adds   Type of Visit Initial Occupational Therapy Evaluation   Living Environment   Lives With alone   Living Arrangements apartment   Home Accessibility no concerns   Number of Stairs to Enter Home 0   Number of Stairs Within Home 0   Transportation Available car;family or friend will provide   Living Environment Comment pt lives alone in an apartment, states her sister and her son can move in with her to provide assist as needed. Pt stated they had already been assisting with IADLs such as cleaning, laundry, and grocery shopping   Self-Care   Dominant Hand right   Usual Activity Tolerance fair   Regular Exercise no   Functional Level Prior   Ambulation 0-->independent   Transferring 0-->independent   Toileting 1-->assistive equipment  (comfort height toilet, vanity and tub next to toilet)   Bathing 0-->independent  (tub shower )   Dressing 0-->independent   Fall history within last six months yes   Number of times patient has fallen within last six months 1   Prior Functional Level Comment pt reports independent at baseline, pt's sister had been coming over to supervise during pt's showers as she had a near fall in recent past   General Information   Onset of Illness/Injury or Date of Surgery - Date 02/21/18   Referring Physician Luther HOLLIS   Patient/Family Goals Statement return home   Additional Occupational Profile Info/Pertinent History of Current Problem Patient with PMH including anxiety, arthritis, asthma, bipolar 2 disorder, chronic back pain, COPD, emphysema, HTN, hypercholesterolemia, hyperlipidemia now admitted with acute on chronic hypoxemic and hypercapnic respiratory failure, also noted with confusion at time of admission as pt got mixed up with 9am vs pm and drive to clinic at night with doors locked to building and few cars in parking lot, pt then drove herself to hospital      Precautions/Limitations oxygen therapy device and L/min   General Observations pt has O2  concentrator and portable    General Info Comments pt wears 4 Lpm O2 at baseline and at time of eval   Cognitive Status Examination   Orientation orientation to person, place and time  (stated Wednesday with day of week being Thursday)   Level of Consciousness alert   Able to Follow Commands WNL/WFL   Cognitive Comment appropriate during session, able to recall her previous confusion and stated improved to her baseline, monitor and complete assessment as needed   Visual Perception   Visual Perception No deficits were identified   Sensory Examination   Sensory Comments numbness in R thigh, has been there for months   Range of Motion (ROM)   ROM Comment B UE AROM intact   Strength   Strength Comments B UE grossly 5/5   Mobility   Bed Mobility Comments SBA with elevated head of bed   Transfer Skills   Transfer Comments CGA   Transfer Skill: Sit to Stand   Level of Arnold: Sit/Stand contact guard   Balance   Balance Comments impaired, reaching out for furniture in room   Toileting   Level of Arnold: Toilet contact guard   Grooming   Level of Arnold: Grooming contact guard   Instrumental Activities of Daily Living (IADL)   Previous Responsibilities meal prep;housekeeping;laundry;shopping;medication management;driving   IADL Comments pt stated she had been having assist at times for IADLs from her sister and her son   Activities of Daily Living Analysis   Impairments Contributing to Impaired Activities of Daily Living balance impaired;cognition impaired;strength decreased   General Therapy Interventions   Planned Therapy Interventions ADL retraining;cognition;transfer training   Clinical Impression   Criteria for Skilled Therapeutic Interventions Met yes, treatment indicated   OT Diagnosis impaired ability to manage ADL/IADLs safely    Influenced by the following impairments confusion, impaired balance and activity tolerance   Assessment of Occupational Performance 1-3 Performance Deficits   Identified  "Performance Deficits impaired ability to manage bathing tasks, dressing tasks and household management   Clinical Decision Making (Complexity) Low complexity   Therapy Frequency daily   Predicted Duration of Therapy Intervention (days/wks) 3 days   Anticipated Discharge Disposition Home with Assist;Home with Home Therapy   Risks and Benefits of Treatment have been explained. Yes   Patient, Family & other staff in agreement with plan of care Yes   Clinical Impression Comments pt demonstrates ability to benefit from skilled OT services   [employer] TM \"6 Clicks\"   2016, Trustees of [facility], under license to [employer].  All rights reserved.   6 Clicks Short Forms Daily Activity Inpatient Short Form   [facility] AM-PAC  \"6 Clicks\" Daily Activity Inpatient Short Form   1. Putting on and taking off regular lower body clothing? 3 - A Little   2. Bathing (including washing, rinsing, drying)? 3 - A Little   3. Toileting, which includes using toilet, bedpan or urinal? 3 - A Little   4. Putting on and taking off regular upper body clothing? 4 - None   5. Taking care of personal grooming such as brushing teeth? 3 - A Little   6. Eating meals? 4 - None   Daily Activity Raw Score (Score out of 24.Lower scores equate to lower levels of function) 20   Total Evaluation Time   Total Evaluation Time (Minutes) 10     "

## 2018-02-22 NOTE — PROGRESS NOTES
02/21/18 1707   Quick Adds   Type of Visit Initial PT Evaluation   Living Environment   Lives With child(cris), adult   Living Arrangements apartment   Home Accessibility no concerns   Number of Stairs to Enter Home 0   Number of Stairs Within Home 0   Transportation Available family or friend will provide   Living Environment Comment Patient lives alone in 3rd floor apartment; has elevator.   Self-Care   Usual Activity Tolerance fair   Current Activity Tolerance poor   Regular Exercise no   Activity/Exercise/Self-Care Comment Patient reports that she is able to perform ambulation within household distance only.  Reports significant fatigue and SOB going between car and apartment.   Functional Level Prior   Ambulation 0-->independent   Transferring 0-->independent   Toileting 0-->independent   Bathing 0-->independent   Dressing 0-->independent   Eating 0-->independent   Communication 0-->understands/communicates without difficulty   Swallowing 0-->swallows foods/liquids without difficulty   Cognition 0 - no cognition issues reported   Fall history within last six months yes   Number of times patient has fallen within last six months 1   Which of the above functional risks had a recent onset or change? ambulation   Prior Functional Level Comment Patient reports that she is independent with mobility at baseline, however reports furniture walking within apartment and will use the wall to lean against   General Information   Onset of Illness/Injury or Date of Surgery - Date 02/19/18   Patient/Family Goals Statement return to home   Pertinent History of Current Problem (include personal factors and/or comorbidities that impact the POC) Patient with PMH including anxiety, arthritis, asthma, bipolar 2 disorder, chronic back pain, COPD, emphysema, HTN, hypercholesterolemia, hyperlipidemia now admitted with acute on chronic hypoxemic and hypercapnic respiratory failure.     Precautions/Limitations fall precautions;oxygen  "therapy device and L/min  (4L NC)   Cognitive Status Examination   Orientation orientation to person, place and time   Pain Assessment   Patient Currently in Pain No   Integumentary/Edema   Integumentary/Edema Comments mild LE edema   Posture    Posture Forward head position;Protracted shoulders   Range of Motion (ROM)   ROM Comment WFL   Strength   Strength Comments moderate strength deficits, able to complete functional transfers without support   Bed Mobility   Bed Mobility Comments independent   Transfer Skills   Transfer Comments CGA   Gait   Gait Comments Patient amb 15 feet within room with CGA.  Patient actively seeking out furniture to hold onto, experienced one loss of balance requiring min A to recover.     Balance   Balance Comments Please see above   Sensory Examination   Sensory Perception no deficits were identified   Coordination   Coordination no deficits were identified   Muscle Tone   Muscle Tone no deficits were identified   General Therapy Interventions   Planned Therapy Interventions balance training;gait training;strengthening;transfer training;home program guidelines;progressive activity/exercise   Clinical Impression   Criteria for Skilled Therapeutic Intervention yes, treatment indicated   PT Diagnosis decreased activity tolerance, decreased   Clinical Presentation Stable/Uncomplicated   Clinical Presentation Rationale complex PMH, stable presentation, limited social support   Clinical Decision Making (Complexity) Low complexity   Therapy Frequency` daily   Predicted Duration of Therapy Intervention (days/wks) 7 days   Anticipated Discharge Disposition Transitional Care Facility  (patient declines, will return to home)   Risk & Benefits of therapy have been explained Yes   Patient, Family & other staff in agreement with plan of care Yes   [facility] AM-PAC TM \"6 Clicks\"   2016, Trustees of [facility], under license to [employer].  All rights reserved.   6 Clicks Short Forms " "Basic Mobility Inpatient Short Form   [facility] AM-PAC  \"6 Clicks\" V.2 Basic Mobility Inpatient Short Form   1. Turning from your back to your side while in a flat bed without using bedrails? 4 - None   2. Moving from lying on your back to sitting on the side of a flat bed without using bedrails? 4 - None   3. Moving to and from a bed to a chair (including a wheelchair)? 3 - A Little   4. Standing up from a chair using your arms (e.g., wheelchair, or bedside chair)? 3 - A Little   5. To walk in hospital room? 2 - A Lot   6. Climbing 3-5 steps with a railing? 1 - Total   Basic Mobility Raw Score (Score out of 24.Lower scores equate to lower levels of function) 17   Total Evaluation Time   Total Evaluation Time (Minutes) 5     "

## 2018-02-22 NOTE — PLAN OF CARE
"Problem: Patient Care Overview  Goal: Plan of Care/Patient Progress Review  OT: Evaluation completed, pt admitted with acute on chronic hypoxemic and hypercapnic respiratory failure after experiencing confusion which led pt to drive to clinic for a 9am appt at 9pm. Patient reports that she is independent with mobility and ADLs at baseline; however, reports that she has been taking sponge baths due to fear of falling in the shower after a near fall in her recent past. Pt stated her sister had been coming over to be present when she was bathing. Pt states her son is a support in her life, he drives and does grocery shopping with her.     Discharge Planner OT   Patient plan for discharge: return home, open to home care services  Current status: Pt wearing 4 Lpm O2 via NC during session, pt reports this is her baseline and has no troubles managing her tubing at home; pt completed bed mobility, supine>sitting EOB, SBA; completed sit<>stand transfers and functional mobility in room with CGA for safety, pt demonstrates impaired balance, frequently reaching out for furniture; pt required CGA for toilet transfer and use of grab bar; able to complete 2 standing g/h tasks, CGA, unable to complete full morning routine, reported fatigue and need to rest; pt transferred to chair with environment set up for independence and safety including tab alarm; recommendation provided for pt have homecare therapy in order to assess environment and provide modifications and recommendations to improve safety and independence including likely a need for shower chair vs tub bench, pt receptive to discussion  Barriers to return to prior living situation: lives alone, states her assist could be from her sister who \"has the bad kind of bipolar\" or else her son but that he is in army and will be going out of country this year  Recommendations for discharge: pt could benefit from TCU however pt is adamantly refusing. If pt returns home would " strongly recommend family assist and supervision with ADL/IADLs including medication management. Would recommend home therapies including safety assessment  Rationale for recommendations: pt demonstrates potential to benefit from therapy, will follow daily to assist in pt's ability to recover and return home safely       Entered by: Ynes Harvey 02/22/2018 3:45 PM

## 2018-02-22 NOTE — PLAN OF CARE
Problem: Patient Care Overview  Goal: Plan of Care/Patient Progress Review  PT: Patient seen by physical therapy for evaluation and treatment.  Patient with PMH including anxiety, arthritis, asthma, bipolar 2 disorder, chronic back pain, COPD, emphysema, HTN, hypercholesterolemia, hyperlipidemia now admitted with acute on chronic hypoxemic and hypercapnic respiratory failure.  Patient reports that she lives alone in a 3rd story apartment (has elevator to access 3rd floor).  Patient reports that she is independent with mobility and ADLs at baseline; however, reports that she has been taking sponge baths due to fear of falling in the shower, has difficulty walking farther than 50 feet and holds onto furniture or leaning on wall while ambulating in apartment.      Discharge Planner PT   Patient plan for discharge: Patient reports plan is to return home with assist from sister.  Declines TCU.  Current status:Patient supine upon arrival; on 4L O2 during session.  Patient transferred to sitting at EOB.  Transferred to standing with CGA.  Patient amb 15 feet within room with CGA.  Patient actively seeking out furniture to hold onto, experienced one loss of balance requiring min A to recover.  Patient transferred to sitting in bedside chair, reported significant fatigue and SOB; O2 sats remained above 88% during activity.  Education provided regarding recommended home medical equipment including 4WW, shower seat, hand held shower head.  Patient in agreement with equipment required.  Barriers to return to prior living situation: severely limited activity tolerance, lives alone  Recommendations for discharge: TCU, however per medical chart, patient has declined and reports she will be discharging to home.  If patient discharges to home, recommend assist with meal preparation, during bathing, home PT for safety evaluation and to increase activity tolerance and purchase of 4WW, shower seat and hand held shower head for  increased safety.  Rationale for recommendations: Patient would continue to benefit from inpatient physical therapy in order to increase activity tolerance and independence with mobility.       Entered by: Ana Lilia Rangel 02/21/2018 6:09 PM

## 2018-02-22 NOTE — PLAN OF CARE
Problem: Patient Care Overview  Goal: Plan of Care/Patient Progress Review    PT:  Attempted to see pt for scheduled PT session, although she declined as she walked earlier with nursing staff and just had food arrive.  Encouraged pt to amb with [facility] staff again this evening, pt in agreement.  Will try back as schedule allows, otherwise reschedule for tomorrow.

## 2018-02-22 NOTE — PLAN OF CARE
Problem: Patient Care Overview  Goal: Plan of Care/Patient Progress Review  Outcome: No Change  Pt A&Ox4, up assist 1, regular diet.  VSS, on 4 LPM nasal cannula at BL.  1-2+ edema LE.  Tele NSR. MD wants to continue IV steroids d/t expiratory wheezes, and increase ambulation to progress towards baseline, plan to d/c 1-2 days.  PT/OT scheduled, pt makes her needs known, will continue POC.

## 2018-02-22 NOTE — PROGRESS NOTES
Care Transitions Team: Following for CC, discharge planning, and disposition.      Pt. was assessed by SWS services 2/21, noted.   IP/MTM placed   Follow up scheduled for    2/28/18 (Wed) 11:15 AM 30 min Eros Rebolledo MD [facility]    Copay: $0.00     PCP handoff pended     Chula Daugherty RN   Care Transitions Team  [phone]

## 2018-02-22 NOTE — PLAN OF CARE
Pt transferred from ICU at 0630, situated in room, on 4L NC, denies pain, VSS.  Will continue with POC.

## 2018-02-23 NOTE — PROGRESS NOTES
[facility]  Hospitalist Progress Note  Date of Service (when I saw the patient): 02/23/2018    Assessment & Plan     62 year old  female with a significant past medical history of multiple medical problems including COPD/asthma on 4 L of oxygen at home, bipolar disorder and anxiety, hypertension and hyperlipidemia as well as chronic low back pain who presents with worsening of shortness of breath.  Patient was initially admitted to medical floor with acute on chronic hypoxemic and hypercapnic respiratory failure and eventually transferred to intensive care unit for close monitoring and BiPAP.  She was treated with IV steroids, nebs and briefly diuretics and with these cares her condition significantly improved and she transferred to floor 12/22.     She has been on IV solu-medrol which we are tapering down.  She is back to baseline 4L O2 and her mobility is improving.  She feels she is nearly back to baseline respiratory status and closing in on discharge.     Hospital Problem List,assessment,plan and hospital course:    # Acute on chronic hypoxic respiratory failure: Secondary to COPD exacerbation. CT chest was neg for PE.  No clear infectious etiology.  -- Continue nebs, steroid and oxygen supplementation. Oxygen requirements improved but still dyspneic with minimal activity   -- Patient was given a few doses of IV lasix, appears close to euvolemia, intake and output monitoring and daily weight checks  --taper to 40 mg IV solu-medrol BID now, tentatively plan on 60 mg pred PO tomorrow.    # Advanced COPD and emphysema with acute COPD exacerbation: Consider increasing the dose of her Advair on discharge.  ?steroid taper.    # Acute encephalopathy: Toxic metabolic, improved significantly and patient at baseline now     # Bipolar disorder, anxiety: Continue PTA medications Wellbutrin XL 300mg daily, seroquel 200mg hs, cymbalta, and valium 10mg q 12 hr prn for anxiety.    # Chronic low back  pain: PTA on gabapentin 300mg hs, prn norco 1 tab 5-325mg bid prn     # Chronic LE edema    # HLD: continue pta lipitor and 81mg aspirin, On Imdur     DVT Prophylaxis: Pneumatic Compression Devices    Code Status: Full Code    Disposition: ?home tomorrow if she continues to improve.  Declined TCU when discussed earlier in her stay.    Duane Suarez    Interval History   Chart reviewed and patient seen. Case discussed with nursing staff.  I assumed care today  She transferred out of the ICU - feels she is improving and mobility is getting better.  No chest pain, no cough  Tapering steroids  Down to 4L O2      -Data reviewed today: I reviewed all new labs and imaging results over the last 24 hours. I personally reviewed .  # Pain Assessment:   Current Pain Score 2/22/2018 2/22/2018 2/22/2018   Patient currently in pain? no denies denies   Pain score (0-10) - - -   Pain location - - -   Pain descriptors - - -   Karen gonzalez pain level was assessed and she currently denies pain.       Physical Exam   Temp: 97.7  F (36.5  C) Temp src: Oral BP: 126/68   Heart Rate: 85 Resp: 18 SpO2: 97 % O2 Device: Nasal cannula Oxygen Delivery: 4 LPM  Vitals:    02/20/18 0456 02/21/18 0400 02/22/18 0400   Weight: 102.8 kg (226 lb 9.6 oz) 101.3 kg (223 lb 5.9 oz) 99.2 kg (218 lb 12.2 oz)     Vital Signs with Ranges  Temp:  [97.7  F (36.5  C)-98.9  F (37.2  C)] 97.7  F (36.5  C)  Heart Rate:  [] 85  Resp:  [18] 18  BP: (100-148)/(50-97) 126/68  SpO2:  [92 %-98 %] 97 %  I/O last 3 completed shifts:  In: 440 [P.O.:440]  Out: -       Constitutional: Awake, alert, no apparent distress   Respiratory: Mild exp wheezing, gets SOB while talking for longer time, no crackles , symmetric chest rise bilaterally   Cardiovascular: Regular rate and rhythm, normal S1 and S2, no loud murmur, rubs or gallops noted   Abdomen: Bowel sounds present, soft, non-distended, non-tender, no rebound or guarding is noted   Skin: No exanthems noted on  exposed areas, no cyanosis, dry to touch   Neuro: Alert and oriented x3, no focal weakness or numbness   Extremities: +1 edema, skin is warm to touch with signs of adequate peripheral perfusion    Psychiatric: Calm, not agitated, no active hallucinations             Medications     - MEDICATION INSTRUCTIONS -       - MEDICATION INSTRUCTIONS -         methylPREDNISolone  40 mg Intravenous Q12H     [START ON 2/24/2018] predniSONE  60 mg Oral Daily     melatonin tablet 5 mg  5 mg Oral At Bedtime     DULoxetine (CYMBALTA) EC capsule 90 mg  90 mg Oral Daily     ARIPiprazole  5 mg Oral Daily     lurasidone  60 mg Oral Daily     QUEtiapine (SEROquel) tablet 50 mg  50 mg Oral QAM     ipratropium  0.5 mg Nebulization 4x daily     fluticasone-salmeterol  1 puff Inhalation Q12H     aspirin chewable tablet 81 mg  81 mg Oral Daily     atorvastatin  40 mg Oral Daily     buPROPion  300 mg Oral QAM     cholecalciferol  2,000 Units Oral Daily     docusate sodium  100 mg Oral BID     gabapentin  300 mg Oral At Bedtime     isosorbide mononitrate  30 mg Oral Daily     QUEtiapine  200 mg Oral At Bedtime       Data   All new lab data and imaging results from today have been reviewed       Recent Labs  Lab 02/23/18  0707 02/22/18  0520 02/21/18  0943 02/20/18  0648 02/19/18  2226   WBC  --  7.0 9.1  --  7.9   HGB  --  10.5* 11.6*  --  12.2   MCV  --  95 96  --  99    178 211 160 214   INR  --   --  0.95  --   --     138  --  142 139   POTASSIUM 4.2 4.1  --  4.6 3.7   CHLORIDE 97 95  --  101 97   CO2 38* 43*  --  40* 39*   BUN 20 19  --  7 6*   CR 0.49* 0.56  --  0.44* 0.56   ANIONGAP 3 <1*  --  1* 3   MAHOGANY 8.3* 8.3*  --  8.0* 8.4*   * 138*  --  137* 116*   TROPI  --   --   --   --  <0.015       No results found for this or any previous visit (from the past 24 hour(s)).

## 2018-02-23 NOTE — PLAN OF CARE
Problem: Patient Care Overview  Goal: Plan of Care/Patient Progress Review  Discharge Planner PT   Patient plan for discharge: home with family moving in  Current status: 1 assist for mobility was able to greatly increase walking today but still needs larger amount of )2 and does not remain at O2 levels greater than 90% with 4l during activity.  Barriers to return to prior living situation: family support need for O2 tolerance to activity levels needed for safe return home  Recommendations for discharge: PT- Per plan established by the Physical Therapist, according to functional mobility the discharge recommendation is TCU.        Rationale for recommendations: Will need 24/7 line of sight assist and some physical assist for needs at home, is declining recommendations for TCU currently.       Entered by: Modesta Castro 02/23/2018 2:40 PM

## 2018-02-23 NOTE — PLAN OF CARE
Problem: Chronic Obstructive Pulmonary Disease (Adult)  Goal: Signs and Symptoms of Listed Potential Problems Will be Absent, Minimized or Managed (Chronic Obstructive Pulmonary Disease)  Signs and symptoms of listed potential problems will be absent, minimized or managed by discharge/transition of care (reference Chronic Obstructive Pulmonary Disease (Adult) CPG).   Outcome: No Change  AOx4, SBA with transfers using a walker. Pt is dyspneic upon exertion, O2 93-95% 4L. Lungs diminished bilaterally. ST on tele. VS WNL.

## 2018-02-23 NOTE — PLAN OF CARE
Problem: Patient Care Overview  Goal: Plan of Care/Patient Progress Review  Outcome: Improving  Pain: No c/o pain this shift  LOC: alert and oriented  Mobility: Assist of 1 and walker.    Lungs:98% 4L O2.  Usually uses oxygen at home too.  Tele: SR  GI: regular diet  : voiding without difficulty  IV: PIV saline locked  Other: Poss d/c home tomorrow if continues to improve.  Continue solumedrol per orders

## 2018-02-23 NOTE — PLAN OF CARE
Problem: Chronic Obstructive Pulmonary Disease (Adult)  Goal: Signs and Symptoms of Listed Potential Problems Will be Absent, Minimized or Managed (Chronic Obstructive Pulmonary Disease)  Signs and symptoms of listed potential problems will be absent, minimized or managed by discharge/transition of care (reference Chronic Obstructive Pulmonary Disease (Adult) CPG).   Outcome: Improving  Pt slept overnight, 4L nc, which is also baseline at home.Up with ax1 and walker, pt stated feels better and improving.

## 2018-02-23 NOTE — PLAN OF CARE
Problem: Patient Care Overview  Goal: Plan of Care/Patient Progress Review  Outcome: Therapy, progress toward functional goals as expected  Discharge Planner OT   Patient plan for discharge: Home with A from her sister and son  Current status: Pt is SBA for functional ambulation with FWW. Completed g/h task at sink O2 sats remained stable on 4LPM  92% O2 and HR 85 BPR.   Barriers to return to prior living situation: Resides alone, reports her sister and son will come to live with her.   Recommendations for discharge: TCU to improve safety and endurance bPer chart, pt declines TCU, recommend home with supervision for ADL's/IADLs and home OT for safety eval.   Rationale for recommendations: Pt demonstrating decreased strength and endurance and would benefit from continued skilled OT in order to advance safety and independence with ADLs. Pt does demonstrate that she is advancing to SBA for ADL's with stable O2.        Entered by: Cinthia Campbell 02/23/2018 10:50 AM

## 2018-02-24 NOTE — PLAN OF CARE
"Problem: Chronic Obstructive Pulmonary Disease (Adult)  Goal: Signs and Symptoms of Listed Potential Problems Will be Absent, Minimized or Managed (Chronic Obstructive Pulmonary Disease)  Signs and symptoms of listed potential problems will be absent, minimized or managed by discharge/transition of care (reference Chronic Obstructive Pulmonary Disease (Adult) CPG).   Outcome: Improving  AOx4, SBA with transfers, pt uses walker. Lungs diminished upon auscultation, O2 95% and above 4L. Pt reports \"feeling better and is less SOB today\". NSR on tele. VSS.       "

## 2018-02-24 NOTE — PLAN OF CARE
Problem: Patient Care Overview  Goal: Plan of Care/Patient Progress Review  Outcome: Therapy, progress toward functional goals as expected  Discharge Planner OT   Patient plan for discharge: Home with A from her sister and son  Current status: Pt up in bed, willing to participate.  Needed reminder to put sock on, for some reason took right sock off her foot before she tried to stand to go into the bathroom.  Donned it independently and then walked into the bathroom where she completed 3 grooming tasks at the sink.  Some coughing and wheezing but no overt SOB, on 4L O2.  Walked around bed to sit in recliner.  Provided with handouts on PLB and EC.  Did not remember talking about it with the therapist during previous session although knew what I meant by PLB once I explained it to her.  Admitted she did not have a good memory.  O2 sats at 95% at end of session.  Barriers to return to prior living situation: Resides alone, reports her sister and son will come to live with her.   Recommendations for discharge: TCU to improve safety and endurance bPer chart, pt declines TCU, recommend home with supervision for ADL's/IADLs and home OT for safety eval.   Rationale for recommendations: Pt demonstrating decreased strength and endurance and would benefit from continued skilled OT in order to advance safety and independence with ADLs. Pt does demonstrate that she is advancing to SBA for ADL's with stable O2.        Entered by: Reji Tong 02/24/2018 8:12 AM

## 2018-02-24 NOTE — PROGRESS NOTES
Transition Communication Hand-off for Care Transitions to Next Level of Care Provider    Name: Karen Alex  MRN #: [id number]  Primary Care Provider: Eros Rebolledo  Primary Care MD Name: Kesha   Primary Clinic: [address] [facility] [address]  Primary Care Clinic Name: [place]   Reason for Hospitalization:  COPD exacerbation (H) [J44.1]  Admit Date/Time: 2/19/2018 10:12 PM  Discharge Date: 2/24  Payor Source: Payor: MEDICARE / Plan: MEDICARE / Product Type: Medicare /     Readmission Assessment Measure (RICHIE) Risk Score/category: average           Reason for Communication Hand-off Referral: Admission diagnoses: COPD    Discharge Plan:  Discharge Plan:       Most Recent Value    Concerns To Be Addressed discharge planning concerns           Concern for non-adherence with plan of care:   Y/N n  Discharge Needs Assessment:  Needs       Most Recent Value    Anticipated Changes Related to Illness inability to care for self    Transportation Available car, family or friend will provide    # of Referrals Placed by CTS MTM, Inpatient Pharmacy, Scheduled Follow-up appointments, Communication hand-offs to next level of Care Providers    Other Resources Home Care    Home Care [facility] [phone], Fax: [phone]            Follow-up specialty is recommended: Yes    Follow-up plan:  Future Appointments  Date Time Provider Department Center   2/25/2018 10:30 AM Reji Tong OT RHOOT [place] RID   2/27/2018 1:30 PM Mauricio Hernandez MD [facility] OX   2/28/2018 11:15 AM Eros Rebolledo MD [facility]   2/28/2018 1:30 PM Lupis Mcgee [facility] OX   3/5/2018 10:15 AM Cj Tapia MD [facility] PSA CLIN       Any outstanding tests or procedures:    Procedures     Future Labs/Procedures    Oxygen Adult     Comments:    New Home Oxygen Order 4 liter(s) by nasal cannula continuously with use of portable tank. Expected treatment length is  indefinite (99 months).. Test on conserving device as applicable.    Patients who qualify for home O2 coverage under the CMS guidelines require ABG tests or O2 sat readings obtained closest to, but no earlier than 2 days prior to the discharge, as evidence of the need for home oxygen therapy. Testing must be performed while patient is in the chronic stable state. See notes for O2 sats.    I certify that this patient, Karen Alex has been under my care and that I, or a nurse practitioner or physician's assistant working with me, had a face-to-face encounter that meets the face-to-face encounter requirements with this patient on 2/24/2018. The patient, Karen Alex was evaluated or treated in whole, or in part, for the following medical condition, which necessitates the use of the ordered oxygen. Treatment Diagnosis: COPD    Attending Provider: Duane Suarez  Physician signature: See electronic signature associated with these discharge orders  Date of Order: February 24, 2018          Referrals     Future Labs/Procedures    Home care nursing referral     Comments:    RN skilled nursing visit. RN to assess vital signs and weight, respiratory and cardiac status, pain level and activity tolerance, hydration, nutrition and bowel status and home safety.    Your provider has ordered home care nursing services. If you have not been contacted within 2 days of your discharge please call the inpatient department phone number at [phone] .    Home Care OT Referral for Hospital Discharge     Comments:    OT to eval and treat    Your provider has ordered home care - occupational therapy. If you have not been contacted within 2 days of your discharge please call the department phone number listed on the top of this document.    Home Care PT Referral for Hospital Discharge     Comments:    PT to eval and treat    Your provider has ordered home care - physical therapy. If you have not been contacted within 2  days of your discharge please call the department phone number listed on the top of this document.             Key Recommendations:  Pt admitted for COPD excarebration in setting of Advanced COPD and emphysema.   Inpatient Pharmacy referral was placed for education on inhalers and medications changes on DC.   MTM for on-going education and assessment of further educational need regarding PTA medications.    Recommend advance care planning for Advanced COPD if not already addressed.   Pt potential for discharge home with HHC RN/PT/OT Agency [facility] was ordered at discharge.     Chula Daugherty  AVS/Discharge Summary is the source of truth; this is a helpful guide for improved communication of patient story

## 2018-02-24 NOTE — PROGRESS NOTES
D: SWS has been following to coordinate d/c. Pt has a d/c order to return home today. Pt needs HC for RN/PT/OT.   A: Per nursing, pt is an existing [facility] O2 pt, the tank is [facility] at [facility] but is empty, pt needs O2 for transportation home. SW obtained a [facility] tank. SW sent the HC order to [facility] as requested.   P: Pt discharged home with [facility] and [facility] O2.     Sridevi Almonte, MSW  Casual  [phone]

## 2018-02-24 NOTE — PLAN OF CARE
Problem: Patient Care Overview  Goal: Plan of Care/Patient Progress Review  Outcome: Improving  Pain: No c/o pain this shift  LOC: alert and oriented  Mobility:SBA and walker. Ambulated in hallway and maintained sats in the 90's  Lungs:100% 4L O2.  Usually uses oxygen at home too.  Tele: SR  GI: regular diet  : voiding without difficulty  IV: PIV saline locked  Other:D/C home later today.     14:10  Pt d/c home at this time.  Pt verbalizes understanding of d/c instructions and medications.  Pt acknowledges receipt of all belongings.

## 2018-02-24 NOTE — PLAN OF CARE
Problem: Patient Care Overview  Goal: Plan of Care/Patient Progress Review     Discharge Planner PT   Patient plan for discharge: Patient reports plan is to return home with assist from sister.  Declines TCU.  Current status:Patient seated in bedside chair upon arrival; on 4L O2 during session.  Transferred to standing with SBA with 2WW.  Patient amb 200 feet with 2WW and SBA.  Patient with slow gait speed, reliance noted on UE support at walker, decreased heel strike.  Patient's O2 sats remained above 89% during activity; patient reported minor dizziness following 100 feet ambulation, improved with seated rest break for 2 min.  Reviewed need for walker at discharge  Barriers to return to prior living situation: severely limited activity tolerance, lives alone  Recommendations for discharge: TCU, however per medical chart, patient has declined and reports she will be discharging to home.  If patient discharges to home, recommend assist with meal preparation, during bathing, home PT for safety evaluation and to increase activity tolerance and purchase of 4WW, shower seat and hand held shower head for increased safety.  Rationale for recommendations: Patient would continue to benefit from inpatient physical therapy in order to increase activity tolerance and independence with mobility.       Entered by: Ana Lilia Rangel 02/24/2018 8:59 AM         Physical Therapy Discharge Summary    Reason for therapy discharge:    Discharged to home with home therapy.    Progress towards therapy goal(s). See goals on Care Plan in [facility] electronic health record for goal details.  Goals partially met.  Barriers to achieving goals:   discharge from facility.    Therapy recommendation(s):    Continued therapy is recommended.  Rationale/Recommendations:  Patient would benefit from home PT for safety eval.  Patient will require a walker for safe mobility

## 2018-02-24 NOTE — DISCHARGE SUMMARY
[facility]  Discharge Summary  Name: Karen Alex    MRN: [id number]  YOB: 1955    Age: 62 year old  Date of Discharge:  2/24/2018  Date of Admission: 2/19/2018  Primary Care Provider: Eros Rebolledo  Discharge Physician:  Rico Suarez MD  Discharging Service:  Hospitalist      Hospital Course/Discharge Diagnoses:  62 year old  female with a significant past medical history of multiple medical problems including COPD/asthma on 4 L of oxygen at home, bipolar disorder and anxiety, hypertension and hyperlipidemia as well as chronic low back pain who presents with worsening of shortness of breath.  Patient was initially admitted to medical floor with acute on chronic hypoxemic and hypercapnic respiratory failure and eventually transferred to intensive care unit for close monitoring and BiPAP.  She was treated with IV steroids, nebs and briefly diuretics and with these cares her condition significantly improved and she transferred to floor 12/22.      She has been on IV solu-medrol which we are tapering down and now transitioned to oral prednisone.  She is back to baseline 4L O2 and her mobility is improving.  She feels she is nearly back to baseline respiratory status and closing in on discharge.  We did recommend TCU but she declines this and instead requests discharge home.  I have ordered home RN/PT/OT referrals to help her be successful there.  She will continue a prednisone taper and follow up with her PCP in about a week and continue frequent nebs at home as needed.        # Acute on chronic hypoxic respiratory failure: Secondary to COPD exacerbation. CT chest was neg for PE.  No clear infectious etiology.  -- Continue nebs, steroid and oxygen supplementation. Oxygen requirements improved to baseline, mobility improving.   -- Patient was given a few doses of IV lasix, appears euvolemic at this point  --8 day prednisone taper upon DC     # Advanced COPD and  emphysema with acute COPD exacerbation: continue Advair on discharge, steroid taper.     # Acute encephalopathy: Toxic metabolic, improved significantly and patient at baseline now      # Bipolar disorder, anxiety: Continue PTA medications Wellbutrin XL 300mg daily, seroquel 200mg hs, cymbalta, and valium 10mg q 12 hr prn for anxiety.     # Chronic low back pain: PTA on gabapentin 300mg hs, prn norco 1 tab 5-325mg bid prn.  Home doses resumed, no new Rx provided.      # Chronic LE edema     # HLD: continue pta lipitor and 81mg aspirin, On Imdur      DVT Prophylaxis: Pneumatic Compression Devices         # Discharge Pain Plan:   - During her hospitalization, Karen experienced pain due to chronic pain syndrome.  The pain plan for discharge was discussed with Karen and the plan was created in a collaborative fashion.    - Chronic/continued opioids: home norco      Discharge Disposition:  Discharged to home     Allergies:  No Known Allergies     Discharge Medications:        Review of your medicines      START taking       Dose / Directions    predniSONE 10 MG tablet   Commonly known as:  DELTASONE        4 tabs daily for 2 days, then 3 tabs daily for 2 days, then 2 tabs daily for 2 days, then 1 tab daily for 2 days, then stop   Quantity:  20 tablet   Refills:  0         CONTINUE these medicines which have NOT CHANGED       Dose / Directions    ABILIFY 5 MG tablet   Generic drug:  ARIPiprazole        Dose:  5 mg   Take 5 mg by mouth daily   Refills:  0       ADVAIR DISKUS 250-50 MCG/DOSE diskus inhaler   Used for:  Chronic obstructive pulmonary disease, unspecified COPD type (H)   Generic drug:  fluticasone-salmeterol        INHALE ONE PUFF BY MOUTH TWO TIMES A DAY   Quantity:  3 Inhaler   Refills:  2       * albuterol (2.5 MG/3ML) 0.083% neb solution   Used for:  COPD (chronic obstructive pulmonary disease) (H)        Dose:  1 vial   Take 1 vial (2.5 mg) by nebulization 4 times daily as needed   Quantity:  120  vial   Refills:  5       * VENTOLIN  (90 BASE) MCG/ACT Inhaler   Used for:  Panlobular emphysema (H)   Generic drug:  albuterol        SHAKE WELL AND INHALE 1 TO 2 PUFFS INTO THE LUNGS EVERY 4 HOURS AS NEEDED FOR SHORTNESS OF BREATH, DIFFICULTY BREATHING OR WHEEZING.   Quantity:  18 g   Refills:  5       ASPIRIN PO        Dose:  81 mg   Take 81 mg by mouth daily   Refills:  0       atorvastatin 40 MG tablet   Commonly known as:  LIPITOR   Used for:  Coronary artery disease of native heart with stable angina pectoris, unspecified vessel or lesion type (H)        Dose:  40 mg   Take 1 tablet (40 mg) by mouth daily   Quantity:  90 tablet   Refills:  3       buPROPion 300 MG 24 hr tablet   Commonly known as:  WELLBUTRIN XL   Used for:  Major depressive disorder, recurrent episode, in partial remission (H)        Dose:  300 mg   Take 1 tablet (300 mg) by mouth every morning   Quantity:  90 tablet   Refills:  1       diazepam 10 MG tablet   Commonly known as:  VALIUM   Used for:  HUNG (generalized anxiety disorder)        Dose:  10 mg   Take 1 tablet (10 mg) by mouth every 12 hours as needed for anxiety   Quantity:  60 tablet   Refills:  1       DULOXETINE HCL PO        Dose:  90 mg   Take 90 mg by mouth daily   Refills:  0       gabapentin 300 MG capsule   Commonly known as:  NEURONTIN   Used for:  Recurrent major depressive disorder, in partial remission (H)        Dose:  300 mg   Take 1 capsule (300 mg) by mouth At Bedtime   Quantity:  90 capsule   Refills:  1       HYDROcodone-acetaminophen 5-325 MG per tablet   Commonly known as:  NORCO   Used for:  Mechanical low back pain        Take one two times daily as needed   Quantity:  20 tablet   Refills:  0       IBUPROFEN PO        Dose:  200-300 mg   Take 200-300 mg by mouth every 6 hours as needed for moderate pain   Refills:  0       isosorbide mononitrate 30 MG 24 hr tablet   Commonly known as:  IMDUR   Used for:  Chest pain, CAD (coronary artery disease)         Dose:  30 mg   Take 1 tablet (30 mg) by mouth daily   Quantity:  90 tablet   Refills:  0       LATUDA PO        Dose:  60 mg   Take 60 mg by mouth daily   Refills:  0       MELATONIN PO        Dose:  5 mg   Take 5 mg by mouth At Bedtime   Refills:  0       nitroGLYcerin 0.4 MG sublingual tablet   Commonly known as:  NITROSTAT   Used for:  Chest pain        Dose:  0.4 mg   Place 1 tablet (0.4 mg) under the tongue every 5 minutes as needed for chest pain prn   Quantity:  25 tablet   Refills:  1       * QUETIAPINE FUMARATE PO        Dose:  50 mg   Take 50 mg by mouth every morning   Refills:  0       * QUEtiapine 200 MG tablet   Commonly known as:  SEROquel   Used for:  HUNG (generalized anxiety disorder)        Dose:  200 mg   Take 1 tablet (200 mg) by mouth At Bedtime   Quantity:  90 tablet   Refills:  1       STOOL SOFTENER PO        Dose:  100 mg   Take 100 mg by mouth 2 times daily   Refills:  0       tiotropium 18 MCG capsule   Commonly known as:  SPIRIVA   Used for:  Generalized anxiety disorder, Chronic obstructive pulmonary disease, unspecified COPD type (H)        Dose:  18 mcg   Inhale 1 capsule (18 mcg) into the lungs daily   Quantity:  90 capsule   Refills:  2       Vitamin D3 2000 UNITS Chew   Indication:  prn        Dose:  2000 mg   Take 2,000 mg by mouth daily   Refills:  0       * Notice:  This list has 4 medication(s) that are the same as other medications prescribed for you. Read the directions carefully, and ask your doctor or other care provider to review them with you.         Where to get your medicines      These medications were sent to [facility] [place], [place] - [address] [address]  [address] [facility] [address]     Phone:  [phone]      predniSONE 10 MG tablet         Some of these will need a paper prescription and others can be bought over the counter. Ask your nurse if you have questions.     Bring a paper prescription for each of these medications  "     HYDROcodone-acetaminophen 5-325 MG per tablet             Condition on Discharge:  Discharge condition: Stable       Code status on discharge: Full Code     History of Illness:  See detailed admission note for full details.    Physical Exam:  Vital signs:  Temp: 97.1  F (36.2  C) Temp src: Axillary BP: 141/78   Heart Rate: 92 Resp: 18 SpO2: 98 % O2 Device: Nasal cannula Oxygen Delivery: 4 LPM   Weight: 99.2 kg (218 lb 12.2 oz)  Estimated body mass index is 34.26 kg/(m^2) as calculated from the following:    Height as of 1/24/18: 1.702 m (5' 7\").    Weight as of this encounter: 99.2 kg (218 lb 12.2 oz).    Wt Readings from Last 1 Encounters:   02/22/18 99.2 kg (218 lb 12.2 oz)     General: Alert, awake, no acute distress.  Obese, pleasant woman.  HEENT: nasal O2 in place. NC/AT, eyes anicteric, external occular movements intact, face symmetric.   Cardiac: RRR, S1, S2.  No murmurs appreciated.  Pulmonary: diffusely mildly reduced air movement, no wheezes.  Normal chest rise, normal work of breathing.  Lungs CTA BL  Abdomen: soft, non-tender, non-distended.  Bowel Sounds Present.  No guarding.  Extremities: trace bilateral LE edema.  no deformities.  Warm, well perfused.  Skin: no rashes or lesions noted.  Warm and Dry.  Neuro: No focal deficits noted.  Speech clear.  Coordination and strength grossly normal.  Psych: Appropriate affect.    Procedures other than Imaging:  bipap     Imaging:  Results for orders placed or performed during the hospital encounter of 02/19/18   XR Chest Port 1 View    Narrative    PORTABLE CHEST ONE VIEW   2/19/2018 10:35 PM     HISTORY: Chest pain.    COMPARISON: 12/19/2017 and 10/22/2014.    FINDINGS: Marked bullous changes of emphysema both lungs, greater on  the left. Bullous changes on the left simulate a pneumothorax but are  unchanged from 12/19/2017 and 10/22/2014. Crowding of vessels in the  left lung base from the emphysematous disease. Appearance has not  changed in the " interval. Nothing clearly acute.      Impression    IMPRESSION: Marked emphysematous changes with large bulla especially  in the left lung as previously described and unchanged dating back to  10/22/2014. This simulates a pneumothorax in the left but presumably  is due to bullous change as the appearance has not changed in the  interval. Nothing acute.    MUKESH HILL MD   US Lower Extremity Venous Duplex Right    Narrative    US LOWER EXTREMITY VENOUS DUPLEX RIGHT  2/19/2018 11:29 PM     HISTORY: Right leg swelling. Shortness of breath.    COMPARISON: None.    FINDINGS: Gray-scale, color and Doppler spectral analysis ultrasound  was performed of the right leg. Compression and augmentation imaging  was performed.    There is no evidence for deep venous thrombosis. The veins compress  and augment normally. There is a Baker's cyst measuring up to 4.6 cm.      Impression    IMPRESSION: No DVT.    ROSALINA CHANCE MD   Chest CT, IV contrast only - PE protocol    Narrative    CT CHEST PULMONARY EMBOLISM W CONTRAST  2/20/2018 12:18 AM    HISTORY: Shortness of breath.    TECHNIQUE: Scans obtained from the apices through the diaphragm with  IV contrast. 82 mL Isovue-370 injected. Radiation dose for this scan  was reduced using automated exposure control, adjustment of the mA  and/or kV according to patient size, or iterative reconstruction  technique.    COMPARISON: 8/27/2012.    FINDINGS: Evaluation of the pulmonary arterial system shows no  evidence of embolus. The main pulmonary artery is large which may be  due to pulmonary artery hypertension. There is no aortic aneurysm or  dissection. There are coronary artery atherosclerotic calcifications.  The heart is at the upper limits of normal in size. There is no  mediastinal, hilar or axillary lymph node enlargement. There is  advanced emphysematous disease with bullous disease bilaterally,  particularly on the left. Probable chronic scarring in the right  middle lobe.  Dependent atelectasis and scarring at the lung bases. No  pneumothorax or pleural effusion. Images through the upper abdomen  show no acute abnormalities. There is degenerative disease in the  spine.      Impression    IMPRESSION:  1. There is no pulmonary embolus, aortic aneurysm or dissection.  2. Advanced emphysema.  3. Coronary artery atherosclerotic calcifications.    ROSALINA CHANCE MD        Consultations:  No consultations were requested during this admission.       Recent Lab Results:    Recent Labs  Lab 02/23/18  0707 02/22/18  0520 02/21/18  0943  02/19/18  2226   WBC  --  7.0 9.1  --  7.9   HGB  --  10.5* 11.6*  --  12.2   HCT  --  33.8* 38.7  --  41.9   MCV  --  95 96  --  99    178 211  < > 214   < > = values in this interval not displayed.       Lab Results   Component Value Date     02/23/2018     02/22/2018     02/20/2018    Lab Results   Component Value Date    CHLORIDE 97 02/23/2018    CHLORIDE 95 02/22/2018    CHLORIDE 101 02/20/2018    Lab Results   Component Value Date    BUN 20 02/23/2018    BUN 19 02/22/2018    BUN 7 02/20/2018      Lab Results   Component Value Date    POTASSIUM 4.2 02/23/2018    POTASSIUM 4.1 02/22/2018    POTASSIUM 4.6 02/20/2018    Lab Results   Component Value Date    CO2 38 02/23/2018    CO2 43 02/22/2018    CO2 40 02/20/2018    Lab Results   Component Value Date    CR 0.49 02/23/2018    CR 0.56 02/22/2018    CR 0.44 02/20/2018             Pending Results:    Unresulted Labs Ordered in the Past 30 Days of this Admission     Date and Time Order Name Status Description    2/19/2018 2231 Blood culture Preliminary     2/19/2018 2220 Blood culture Preliminary            Discharge Instructions and Follow-Up:     Discharge Procedure Orders  Home care nursing referral   Referral Type: Home Health Therapies & Aides     Home Care PT Referral for Hospital Discharge   Referral Type: Home Health Therapies & Aides     Home Care OT Referral for Hospital  Discharge     Reason for your hospital stay   Order Comments: You were admitted for respiratory failure due to copd exacerbation.  You were treated with IV steroids, nebulizers and bipap.  You now have improved to near your baseline and will continue treatment at home with your baseline supplemental O2, prednisone taper, inhalers and nebs.     Follow-up and recommended labs and tests    Order Comments: Follow up with primary care provider, Eros Rebolledo, within 7 days for hospital follow- up.     Activity   Order Comments: Your activity upon discharge: gradually resume light activity as tolerated   Order Specific Question Answer Comments   Is discharge order? Yes      MD face to face encounter   Order Comments: Documentation of Face to Face and Certification for Home Health Services    I certify that patient: Karen Alex is under my care and that I, or a nurse practitioner or physician's assistant working with me, had a face-to-face encounter that meets the physician face-to-face encounter requirements with this patient on: 2/24/2018.    This encounter with the patient was in whole, or in part, for the following medical condition, which is the primary reason for home health care: advanced copd, physical deconditioning.    I certify that, based on my findings, the following services are medically necessary home health services: Nursing, Occupational Therapy and Physical Therapy.    My clinical findings support the need for the above services because: Nurse is needed: To assess pulmonary status and home safety after changes in medications or other medical regimen.., Occupational Therapy Services are needed to assess and treat cognitive ability and address ADL safety due to impairment in mobility and strength. and Physical Therapy Services are needed to assess and treat the following functional impairments: mobility and strength.    Further, I certify that my clinical findings support that this patient is  homebound (i.e. absences from home require considerable and taxing effort and are for medical reasons or [employer] services or infrequently or of short duration when for other reasons) because: Leaving home is medically contraindicated for the following reason(s): Dyspnea on exertion that makes it so they cannot leave their home for needed services without clinical deterioration...    Based on the above findings. I certify that this patient is confined to the home and needs intermittent skilled nursing care, physical therapy and/or speech therapy.  The patient is under my care, and I have initiated the establishment of the plan of care.  This patient will be followed by a physician who will periodically review the plan of care.  Physician/Provider to provide follow up care: Eros Rebolledo    Attending hospital physician (the Medicare certified [facility] provider): Duane Suarez  Physician Signature: See electronic signature associated with these discharge orders.  Date: 2/24/2018     Full Code     Oxygen Adult   Order Comments: New Home Oxygen Order 4 liter(s) by nasal cannula continuously with use of portable tank. Expected treatment length is indefinite (99 months).. Test on conserving device as applicable.    Patients who qualify for home O2 coverage under the CMS guidelines require ABG tests or O2 sat readings obtained closest to, but no earlier than 2 days prior to the discharge, as evidence of the need for home oxygen therapy. Testing must be performed while patient is in the chronic stable state. See notes for O2 sats.    I certify that this patient, Karen Alex has been under my care and that I, or a nurse practitioner or physician's assistant working with me, had a face-to-face encounter that meets the face-to-face encounter requirements with this patient on 2/24/2018. The patient, Karen Alex was evaluated or treated in whole, or in part, for the following medical condition, which  necessitates the use of the ordered oxygen. Treatment Diagnosis: COPD    Attending Provider: Duane Suarez  Physician signature: See electronic signature associated with these discharge orders  Date of Order: February 24, 2018     Diet   Order Comments: Follow this diet upon discharge: Orders Placed This Encounter     Combination Diet Regular Diet Adult; Thin Liquids (water, ice chips, juice, milk, gelatin, ice cream, etc)   Order Specific Question Answer Comments   Is discharge order? Yes          Total time spent in face to face contact with the patient and coordinating discharge was:  60 Minutes.

## 2018-02-24 NOTE — PLAN OF CARE
"Problem: Patient Care Overview  Goal: Plan of Care/Patient Progress Review  Outcome: Improving  O2 96% on 4L. Lung sounds are diminished.  Pt up with SBA to bathroom,  becomes dyspneic with exertion but recovers quickly. Pt states \"this is normal for me\"  BLE have +2 edema.   Tele is SR.       "

## 2018-02-25 NOTE — PLAN OF CARE
Problem: Patient Care Overview  Goal: Plan of Care/Patient Progress Review  Occupational Therapy Discharge Summary    Reason for therapy discharge:    Discharged to home with home therapy.    Progress towards therapy goal(s). See goals on Care Plan in [facility] electronic health record for goal details.  Goals not met.  Barriers to achieving goals:   limited tolerance for therapy and discharge from facility.    Therapy recommendation(s):    Continued therapy is recommended.  Rationale/Recommendations:  Recommend continued therapy to increase endurance and monitor safety for ADLS.

## 2018-02-25 NOTE — LETTER
[facility] CARE COORDINATION  [address] [address]  [phone]    February 26, 2018    Karen Alex  [id number] [address]      Dear Karen,    I am a clinic care coordinator who works with Eros Rebolledo MD at [facility] I wanted to introduce myself and provide you with my contact information for you to be able to call me with any questions or concerns. I wanted to thank you for spending the time to talk with me.  I wanted to introduce myself and provide you with my contact information so that you can call me with questions or concerns about your health care. Below is a description of clinic care coordination and how I can further assist you.     The clinic care coordinator is a registered nurse and/or  who understand the health care system. The goal of clinic care coordination is to help you manage your health and improve access to the [facility] in the most efficient manner. The registered nurse can assist you in meeting your health care goals by providing education, coordinating services, and strengthening the communication among your providers. The  can assist you with financial, behavioral, psychosocial, chemical dependency, counseling, and/or psychiatric resources.    Please feel free to contact me at [phone], with any questions or concerns. We at [facility] are focused on providing you with the highest-quality healthcare experience possible and that all starts with you.     Sincerely,     Korin Wang Care Coordinator RN  [facility]  [phone]  February 26, 2018     Enclosed: I have enclosed a copy of a 24 Hour Access Plan. This has helpful phone numbers for you to call when needed. Please keep this in an easy to access place to use as needed.

## 2018-02-25 NOTE — PROGRESS NOTES
Clinic Care Coordination Contact    Situation: Patient chart reviewed by RN care coordinator.    Background: Patient admitted from 2/19 - 2/24 with COPD     Assessment:   Discharged with orders for [facility] home care   Prednisone   4 LPM oxygen through [facility]   Per chart review had been attending pulmonary rehab   F/u with Dr. Rebolledo 2/28/18   Lives on 3rd floor of apartment with elevator - son and sister assisting with IADL's     Plan/Recommendations: CCRN will f/u with patient and home care next business day     Korin Wnag Care Coordinator RN  [facility]  [phone]  February 25, 2018

## 2018-02-25 NOTE — LETTER
Health Care Home - Access Care Plan    About Me  Patient Name:  Karen Alex    YOB: 1955  Age:                             62 year old   Fam MRN:            [id number] Telephone Information:     Home Phone [phone]   Mobile [phone]       Address:    [address] [place] [address] Email address:  [email or web address]      Emergency Contact(s)  Name Relationship Lgl Grd Work Phone Home Phone Mobile Phone   1. MONICA EDOUARD Son  none [phone]   2. LILA CASTAÑEDA Sister    [phone]             Health Maintenance: Routine Health maintenance Reviewed: Due/Overdue   Health Maintenance Due   Topic Date Due     COPD ACTION PLAN Q1 YR  1955     URINE DRUG SCREEN Q1 YR  05/18/1972     ADVANCE DIRECTIVE PLANNING Q5 YRS  05/18/2012     LIPID MONITORING Q6 MO  11/15/2017     DEXA Q3 YR  01/29/2018       My Access Plan  Medical Emergency 911   Questions or concerns during clinic hours Primary Clinic Line, I will call the clinic directly: Primary Clinic: [facility]- [phone]   24 Hour Appointment Line [phone] or  [phone]) (toll free)   24 Hour Nurse Line [phone] (toll free)   Questions or concerns outside clinic hours 24 Hour Appointment Line, I will call the after-hours on-call line:   [facility] [phone] or [phone]) (toll-free)   Preferred Urgent Care Preferred Urgent Care: [facility], [phone]   Preferred Hospital Preferred Hospital: [facility]  [phone]   Preferred Pharmacy [facility] [place], [place] - [address]     Behavioral Health Crisis Line The National Suicide Prevention Lifeline at 1-639.366.2905 or 911     My Care Team Members   Eros Rebolledo MD PCP - General  8/18/04    Phone: [phone] Fax: [phone]         Meena Abreu MD MD Pulmonary 10/27/14     Phone: [phone] Fax: [phone]         Sandip Millan MD MD Orthopedics 1/29/18    Phone: [phone] Fax: [phone]         Korin Wang RN Clinic Care Coordinator Primary Care - CC 2/26/18    Phone: [phone] Fax: [phone]       My Medical and Care Information  Problem List   Patient Active Problem List   Diagnosis     Degeneration of lumbar or lumbosacral intervertebral disc     Personal history of tobacco use, presenting hazards to health     Hyperlipidemia LDL goal <130     COPD (chronic obstructive pulmonary disease) (H)     Health Care Home     Generalized anxiety disorder     Acute on chronic respiratory failure with hypoxia (H)     Chest pain     HTN, goal below 140/90     Chronic pain     PTSD (post-traumatic stress disorder)     HUNG (generalized anxiety disorder)     Severe episode of recurrent major depressive disorder, with psychotic features (H)     Tobacco dependence syndrome     Grief     Chronic respiratory failure with hypoxia and hypercapnia (H)     COPD exacerbation (H)      Current Medications and Allergies:  See printed Medication Report

## 2018-02-25 NOTE — LETTER
My COPD Action Plan     Name: Karen Alex    YOB: 1955   Date: 2/26/2018    My doctor: Eros Rebolledo MD   My clinic: [address] [address] [phone]  My Controller Medicine: Tiotropium (Spiriva)  Serevent/Fluticasone (Advair)     My Rescue Medicine: Albuterol nebulizer solution Albuterol Inhaler      My Flare Up Medicine: Call [phone] Dr. Rebolledo or Dr. Abreu [facility] [phone]       My COPD Severity: Severe due to frequent exacerbations ( > 2 per year)      Use of Oxygen: 4 LPM Liters continuously     Make sure you've had your pneumonia   vaccines.          GREEN ZONE       Doing well today      Usual level of activity and exercise    Usual amount of cough and mucus    No shortness of breath    Usual level of health (thinking clearly, sleeping well, feel like eating) Actions:      Take daily medicines    Use oxygen as prescribed    Follow regular exercise and diet plan    Avoid cigarette smoke and other irritants that harm the lungs           YELLOW ZONE          Having a bad day or flare up      Short of breath more than usual    A lot more sputum (mucus) than usual    Sputum looks yellow, green, tan, brown or bloody    More coughing or wheezing    Fever or chills    Less energy; trouble completing activities    Trouble thinking or focusing    Using quick relief inhaler or nebulizer more often    Poor sleep; symptoms wake me up    Do not feel like eating Actions:      Get plenty of rest    Take daily medicines    Use quick relief inhaler every 4 hours    If you use oxygen, call you doctor to see if you should adjust your oxygen    Do breathing exercises or other things to help you relax    Let a loved one, friend or neighbor know you are feeling worse    Call your care team if you have 2 or more symptoms.  Start taking steroids or antibiotics if directed by your care team           RED ZONE       Need medical care  now      Severe shortness of breath (feel you can't breathe)    Fever, chills    Not enough breath to do any activity    Trouble coughing up mucus, walking or talking    Blood in mucus    Frequent coughing   Rescue medicines are not working    Not able to sleep because of breathing    Feel confused or drowsy    Chest pain    Actions:      Call your health care team.  If you cannot reach your care team, call 911 or go to the emergency room.        Electronically signed by: Korin Wang, February 26, 2018  Annual Reminders:  Meet with Care Team, Flu Shot every Fall  Pharmacy:    [facility] [place], [place] - [address] [facility]ACY [id number] [place], [place] - [address] [address]

## 2018-02-26 NOTE — TELEPHONE ENCOUNTER
MTM referral from: Transitions of Care (recent hospital discharge or ED visit)    MTM referral outreach attempt #1 on February 26, 2018 at 11:01 AM      Outcome: Left Message    Stevie Blancas MTM Coordinator

## 2018-02-26 NOTE — PROGRESS NOTES
[place] - Rheumatology Clinic Visit     Karen Alex MRN# [id number]   YOB: 1955    Primary care provider: Eros Rebolledo  Feb 27, 2018          Assessment and Plan:   # Polyarthralgia  # Oxygen dependent COPD  # Chronic anemia    Creat within normal limits  AST, ALT within normal limits  Low hemoglobin    Patient does not recall why she was referred to rheumatology. However based on 1/12/18 phone note, it appears that patient called with polyarthralgia and requested a referral to rheumatology. I see that patient has been on prednisone since then for her COPD. Since it is possible that her joint pains may be currently masked by the prednisone, we will get the following work up done for inflammatory arthropathies. If negative, follow up PRN with rheumatology.     The labs from patient records are reviewed.     I will be back in touch with the patient through mychart/letter when results are available.     Most Recent Immunizations   Administered Date(s) Administered     Influenza (H1N1) 12/01/2009     Influenza (IIV3) PF 10/25/2012     Influenza Vaccine IM 3yrs+ 4 Valent IIV4 10/25/2014     Pneumococcal 23 valent 10/07/2014     TD (ADULT, 7+) 02/23/2005     TDAP Vaccine (Adacel) 10/07/2014       Orders Placed This Encounter   Procedures     Erythrocyte sedimentation rate auto     CRP inflammation     Rheumatoid factor     Cyclic Citrullinated Peptide Antibody IgG     Antineutrophil cytoplasmic Kathia IgG     Uric acid       Data Unavailable    Medications Discontinued During This Encounter   Medication Reason     IBUPROFEN PO      Current Outpatient Prescriptions   Medication Sig Dispense Refill     predniSONE (DELTASONE) 10 MG tablet 4 tabs daily for 2 days, then 3 tabs daily for 2 days, then 2 tabs daily for 2 days, then 1 tab daily for 2 days, then stop 20 tablet 0     HYDROcodone-acetaminophen (NORCO) 5-325 MG per tablet Take one two times daily as needed 20 tablet 0     Lurasidone HCl  (LATUDA PO) Take 60 mg by mouth daily       ARIPiprazole (ABILIFY) 5 MG tablet Take 5 mg by mouth daily       MELATONIN PO Take 5 mg by mouth At Bedtime       QUETIAPINE FUMARATE PO Take 50 mg by mouth every morning       DULOXETINE HCL PO Take 90 mg by mouth daily       isosorbide mononitrate (IMDUR) 30 MG 24 hr tablet Take 1 tablet (30 mg) by mouth daily 90 tablet 0     gabapentin (NEURONTIN) 300 MG capsule Take 1 capsule (300 mg) by mouth At Bedtime 90 capsule 1     QUEtiapine (SEROQUEL) 200 MG tablet Take 1 tablet (200 mg) by mouth At Bedtime 90 tablet 1     buPROPion (WELLBUTRIN XL) 300 MG 24 hr tablet Take 1 tablet (300 mg) by mouth every morning 90 tablet 1     diazepam (VALIUM) 10 MG tablet Take 1 tablet (10 mg) by mouth every 12 hours as needed for anxiety 60 tablet 1     ADVAIR DISKUS 250-50 MCG/DOSE diskus inhaler INHALE ONE PUFF BY MOUTH TWO TIMES A DAY 3 Inhaler 2     VENTOLIN  (90 BASE) MCG/ACT Inhaler SHAKE WELL AND INHALE 1 TO 2 PUFFS INTO THE LUNGS EVERY 4 HOURS AS NEEDED FOR SHORTNESS OF BREATH, DIFFICULTY BREATHING OR WHEEZING. 18 g 5     tiotropium (SPIRIVA) 18 MCG capsule Inhale 1 capsule (18 mcg) into the lungs daily 90 capsule 2     nitroGLYcerin (NITROSTAT) 0.4 MG sublingual tablet Place 1 tablet (0.4 mg) under the tongue every 5 minutes as needed for chest pain prn 25 tablet 1     Cholecalciferol (VITAMIN D3) 2000 UNITS CHEW Take 2,000 mg by mouth daily        Docusate Calcium (STOOL SOFTENER PO) Take 100 mg by mouth 2 times daily        albuterol (2.5 MG/3ML) 0.083% nebulizer solution Take 1 vial (2.5 mg) by nebulization 4 times daily as needed 120 vial 5     ASPIRIN PO Take 81 mg by mouth daily       atorvastatin (LIPITOR) 40 MG tablet Take 1 tablet (40 mg) by mouth daily 90 tablet 3       Mauricio Hernandez MD  [facility]          Active Problem List:     Patient Active Problem List    Diagnosis Date Noted     Health Care Home 09/04/2012     Priority: High      Status:  Closed   Care Coordinator:  Korin Wang [phone]  See Letters for [facility] Care Plan  March 16, 2015               COPD exacerbation (H) 02/20/2018     Priority: Medium     Chronic respiratory failure with hypoxia and hypercapnia (H) 12/12/2017     Priority: Medium     Grief 08/11/2017     Priority: Medium     Tobacco dependence syndrome 06/06/2017     Priority: Medium     HUNG (generalized anxiety disorder) 01/10/2017     Priority: Medium     Severe episode of recurrent major depressive disorder, with psychotic features (H) 01/10/2017     Priority: Medium     PTSD (post-traumatic stress disorder) 09/22/2015     Priority: Medium     Chronic pain 08/31/2015     Priority: Medium     Patient is followed by OKSANA ANTONIO for ongoing prescription of pain medication.  All refills should be approved by this provider, or covering partner.    Medication(s): Norco.   Maximum quantity per month: 40  Clinic visit frequency required: Q 3 months     Controlled substance agreement on file: Yes       Date(s): 7/16/15    Pain Clinic evaluation in the past: No       Date(s):  n/a       Location(s):  n/a    DIRE Total Score(s):  No flowsheet data found.    Last [facility] website verification: 8/1/17   [email or web address]       HTN, goal below 140/90 01/29/2015     Priority: Medium     Chest pain      Priority: Medium     Acute on chronic respiratory failure with hypoxia (H) 10/26/2014     Priority: Medium     Generalized anxiety disorder 08/21/2013     Priority: Medium     Patient is followed by OKSANA ANTONIO for ongoing prescription of benzodiazepines.  All refills should be approved by this provider, or covering partner.    Medication(s): Lorazepam.   Maximum quantity per month: 90  Clinic visit frequency required: Q 3 months     Controlled substance agreement on file: Yes-7/17/15  Benzodiazepine use reviewed by psychiatry:  No    Last [facility] website verification:  done on 6/24/16    [email or web address]           COPD (chronic obstructive pulmonary disease) (H) 09/01/2011     Priority: Medium     Hyperlipidemia LDL goal <130 10/31/2010     Priority: Medium     Degeneration of lumbar or lumbosacral intervertebral disc 02/23/2005     Priority: Medium     Personal history of tobacco use, presenting hazards to health 02/23/2005     Priority: Medium            History of Present Illness:     Chief Complaint   Patient presents with     Establish Care       February 26, 2018    Have you ever seen a rheumatologist No Who NA WhenNA  Joint pain history  Onset: pt was not sure why she was here, see phone message dated 1/12/18. Pt has some joint pain in right pointer knuckle. For years.pt does have edema in both ankles.   Involved joints: see above  Pain scale:  0/10     Wakes the patient from sleep : Yes/ left hip and left arm are painful in the beginning of winter   Morning stiffness:No  Meds used:none     Interim history  Since last visit:  1. Infections - No  2. New symptoms/medical problem - Yes/ hoping to get on transplant list soon  3. Any side effects from Rheum medications -NA  3. ER visits/Hospitalizations/surgeries - Yes  4. Last PCP visit: was in [facility] for 1 week.     Wt Readings from Last 4 Encounters:   02/27/18 103.4 kg (227 lb 14.4 oz)   02/22/18 99.2 kg (218 lb 12.2 oz)   01/24/18 97.5 kg (215 lb)   01/19/18 97.5 kg (215 lb)     No h/o unintentional weight loss, fevers, rash, swollen glands  No h/o gout  No family or personal history of psoriasis, ulcerative colitis or chron's disease. .   Patient denies any raynauds  No h/o persistent cough, chest pain  No h/o persistent vomiting, constipation, diarrhea, abdominal pain  No h/o hematochezia, hematuria, hemoptysis  No h/o seizures     BP Readings from Last 3 Encounters:   02/27/18 116/72   02/24/18 141/78   01/24/18 136/86              Review of Systems:   Complete ROS negative except for symptoms mentioned in the HPI          Past  Medical History:     Past Medical History:   Diagnosis Date     Anxiety      Arthritis     generalized     Asthma      Bipolar 2 disorder (H)      Cervical dysplasia      Chronic back pain     low back     COPD (chronic obstructive pulmonary disease) (H)     O2 at night     HTN, goal below 140/90 1/29/2015     Hypercholesterolemia      Hyperlipidaemia      Other chronic pain      Pneumothorax 8/2012    left sided      Varicose veins      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 Surgeon:  MILADY BASS MD     CONIZATION  10/23/07    Vulvar excision for BOBBY 3, cold knife conization     EXCISE VULVA WIDE LOCAL  5/25/2012    Procedure:EXCISE VULVA WIDE LOCAL; Wide Local Excision Of Vulvar Lesion; Surgeon:RE ALDRIDGE; Location:[facility] OR     FOOT SURGERY Right 01/24/2005    OPERATION: Excision of soft tissue mass right foot. Surgeon:  KHARI DUEÑAS DPM     FOOT SURGERY Right 02/28/2005    OPERATION: Excision of ganglion cyst right foot. Surgeon:  KHARI DUEÑAS DPM     HERNIORRHAPHY INCISIONAL (LOCATION)  4/25/2012    Procedure:HERNIORRHAPHY INCISIONAL (LOCATION); Incisional Hernia Repair with mesh ; Surgeon:KIM QUICK; Location:[facility] OR     HYSTERECTOMY TOTAL ABDOMINAL, BILATERAL SALPINGO-OOPHORECTOMY, COMBINED       LAPAROSCOPIC HYSTERECTOMY TOTAL, BILATERAL SALPINGO-OOPHORECTOMY, COMBINED  3/6/2012    Procedure:COMBINED LAPAROSCOPIC HYSTERECTOMY TOTAL, BILATERAL SALPINGO-OOPHORECTOMY; Total laparoscopic Hysterectomy, Bilateral Salpingo Ooporectomy, Pubovaginal Sling, Biopsy Left Volva; Surgeon:RE ALDRIDGE; Location:[facility] OR     repair of anal fissures  2005     SLING BLADDER SUSPENSION WITH FASCIA ALESHA       SLING TRANSPUBO WITHOUT ANTERIOR COLPORRHAPHY  3/6/2012    Procedure:SLING TRANSPUBO WITHOUT ANTERIOR COLPORRHAPHY; Surgeon:JOLANTA ENRIQUEZ; Location: OR      TUBAL LIGATION              Social History:     Social History     Occupational History     Not on file.     Social History Main Topics     Smoking status: Former Smoker     Packs/day: 0.50     Years: 26.00     Types: Cigarettes     Quit date: 7/3/2015     Smokeless tobacco: Never Used      Comment: sometimes     Alcohol use No     Drug use: No     Sexual activity: Not Currently     Birth control/ protection: Surgical            Family History:     Family History   Problem Relation Age of Onset     CANCER Father      asbestos lung disease     Hypertension Mother      DIABETES Paternal Aunt      HEART DISEASE Maternal Grandfather      CANCER Maternal Grandmother      unsure of what kind,  at the age of 86     Circulatory Paternal Grandmother       of brain aneurysm     Asthma Son      HEART DISEASE Sister             Allergies:   No Known Allergies         Medications:     Current Outpatient Prescriptions   Medication Sig Dispense Refill     predniSONE (DELTASONE) 10 MG tablet 4 tabs daily for 2 days, then 3 tabs daily for 2 days, then 2 tabs daily for 2 days, then 1 tab daily for 2 days, then stop 20 tablet 0     HYDROcodone-acetaminophen (NORCO) 5-325 MG per tablet Take one two times daily as needed 20 tablet 0     Lurasidone HCl (LATUDA PO) Take 60 mg by mouth daily       ARIPiprazole (ABILIFY) 5 MG tablet Take 5 mg by mouth daily       MELATONIN PO Take 5 mg by mouth At Bedtime       QUETIAPINE FUMARATE PO Take 50 mg by mouth every morning       DULOXETINE HCL PO Take 90 mg by mouth daily       isosorbide mononitrate (IMDUR) 30 MG 24 hr tablet Take 1 tablet (30 mg) by mouth daily 90 tablet 0     gabapentin (NEURONTIN) 300 MG capsule Take 1 capsule (300 mg) by mouth At Bedtime 90 capsule 1     QUEtiapine (SEROQUEL) 200 MG tablet Take 1 tablet (200 mg) by mouth At Bedtime 90 tablet 1     buPROPion (WELLBUTRIN XL) 300 MG 24 hr tablet Take 1 tablet (300 mg) by mouth every morning 90 tablet 1     diazepam  "(VALIUM) 10 MG tablet Take 1 tablet (10 mg) by mouth every 12 hours as needed for anxiety 60 tablet 1     ADVAIR DISKUS 250-50 MCG/DOSE diskus inhaler INHALE ONE PUFF BY MOUTH TWO TIMES A DAY 3 Inhaler 2     VENTOLIN  (90 BASE) MCG/ACT Inhaler SHAKE WELL AND INHALE 1 TO 2 PUFFS INTO THE LUNGS EVERY 4 HOURS AS NEEDED FOR SHORTNESS OF BREATH, DIFFICULTY BREATHING OR WHEEZING. 18 g 5     tiotropium (SPIRIVA) 18 MCG capsule Inhale 1 capsule (18 mcg) into the lungs daily 90 capsule 2     nitroGLYcerin (NITROSTAT) 0.4 MG sublingual tablet Place 1 tablet (0.4 mg) under the tongue every 5 minutes as needed for chest pain prn 25 tablet 1     Cholecalciferol (VITAMIN D3) 2000 UNITS CHEW Take 2,000 mg by mouth daily        Docusate Calcium (STOOL SOFTENER PO) Take 100 mg by mouth 2 times daily        albuterol (2.5 MG/3ML) 0.083% nebulizer solution Take 1 vial (2.5 mg) by nebulization 4 times daily as needed 120 vial 5     ASPIRIN PO Take 81 mg by mouth daily       atorvastatin (LIPITOR) 40 MG tablet Take 1 tablet (40 mg) by mouth daily 90 tablet 3            Physical Exam:   Blood pressure 116/72, pulse 99, temperature 98.5  F (36.9  C), temperature source Oral, height 1.702 m (5' 7\"), weight 103.4 kg (227 lb 14.4 oz), last menstrual period 12/01/2007, SpO2 95 %, not currently breastfeeding.  Wt Readings from Last 4 Encounters:   02/27/18 103.4 kg (227 lb 14.4 oz)   02/22/18 99.2 kg (218 lb 12.2 oz)   01/24/18 97.5 kg (215 lb)   01/19/18 97.5 kg (215 lb)       Constitutional: well-developed, appearing stated age; cooperative  Eyes: PERRLA, normal conjunctiva, sclera  ENT: nl external ears, nose, lips.No mucous membrane lesions, normal saliva pool  Neck: no cervical lymphadenopathy  Resp: lungs clear to auscultation in the bases- on oxygen  CV: RRR, no added sounds, no edema  GI: Abdomen soft and no tenderness  : not tested  Lymph: no cervical, supraclavicular or epitrochlear nodes  MS: All shoulder, elbow, wrist, " MCP/PIP/DIP, hip, knee, ankle, and foot MTP/IP joints were examined and  found normal. No active synovitis or deformity. Full ROM.  No dactylitis,  tenosynovitis, enthesopathy.  Skin: no rash in exposed areas  Psych: nl judgement, orientation, memory, affect.         Data:         Mauricio Hernandez MD    [facility]

## 2018-02-26 NOTE — PROGRESS NOTES
"Clinic Care Coordination Contact  OUTREACH    Referral Information:  Referral Source: CTS  Reason for Contact: post hospital follow up   Care Conference: No     Universal Utilization:   ED Visits in last year: 0  Hospital visits in last year: 2  Last PCP appointment: 01/19/18  Missed Appointments:  (NA)  Concerns:  (NA)  Multiple Providers or Specialists: YES     Clinical Concerns:  Current Medical Concerns: patient admitted from 2/19 - 2/24 with COPD exacerbation     Clinical Pathway: Clinic Care Coordination COPD Assessment    Discharge:    Hospital summary: 2/19  Day of hospital discharge: 2/24  What recommendations were made for follow up after your recent hospitalization? Follow up with Dr. Rebolledo   Have the follow up appointments been scheduled? Yes  If not, can I help you set up theses appointments? No  Transportation concerns? patient does drive - however when she is not feeling well she does not like to drive. She states she does not qualify for transportation assistance. CCRN advised there are different options for transportation and we likely could fine one that would work for her. She will let CCRN know in the future if needs arise Patient lives right across the street from the clinic and feels she will be able to drive to her clinic appt.   Is there a referral for Pulmonary Rehab? No - per chart review there was orders for rehab - however looks as if it was cancelled. Patient does not wish to discuss this now as she has just returned home from the hospital     Symptom Review:  How have you been feeling now that you are home? Feeling pretty good. \"almost think I could d/c oxygen to 3.5LPM\"   Are you having any increased shortness of breath? No  When you cough, are you coughing up anything? No - Unable to \"hack up loogies like men do\" - discussed drinking extra liquids to thin mucous   Color non productive   Consistency non productive to thick per patient   Frequency coughing throughout the day - non " productive   Do you have a COPD Action Plan?  Yes will mail another copy   Is the COPD Action Plan on refrigerator or available: Yes   What COPD Zone currently in:Green  What number would you call if you were in the YELLOW zones: [phone]      Medications:  Were you started on any new medications?  Yes,   Were any of your previous medications changed? No  Do you have all of your medications? Yes,   Have you had trouble filling your prescriptions? No  Are you medications effective in controlling your symptoms? Yes,   Are you currently on Prednisone (* does pt understand the tapering instructions)?  Yes -     For patients with DM:     Are you monitoring your blood sugars, if so how are your blood sugars? NA  Did you start on insulin in the hospital or has your Insulin dose changed? NA  Medication reconciliation completed? Yes  Was MTM or Diabetic Education referral placed (Make sure to put transitions as reason for referral)? MTM placed by inpatient staff per notes however do not see an order, CCRN will place     Oxygen/DME:    Are you currently on oxygen? Yes   Is this new for you? No   Is it set up at home? Yes   What liter flow were you instructed to use and how often do you use it? 4 LPM   What type of home oxygen system do you have (*ask about portability and ability to manage a portable oxygen delivery)? Both stable and portable   Who is your supply company? [employer]   Review with patient how to use/maintain nebulizers and inhalers: yes     Activity:  How much activity can you do before you are SOB? Not doing much activity now - still tired and weak, but starting to get more energy   Have you had to reduce your activities because of dyspnea or other symptoms? Yes weakness     Diet:  Do you weigh yourself daily? No    Has there been a recent change in your weight? No  Are there any current diet restrictions or changes per discharge instructions? No - advised to increase liquids per CCRN      Emotions/Lifestyle:  Do you smoke? Former smoker quit 2015  If so, how many cigarettes a day are you smoking? NA  Would you like to try to quit smoking? NA    Medication Management:  Has all meds, states compliance - no questions   MTM referral placed     Functional Status:  Mobility Status: Independent  Equipment Currently Used at Home: oxygen, raised toilet (OXYGEN 4 LPM)  Transportation: drives   Patient is open to [facility] RN - PT/OT   Patient's sister is staying with her right now - sister does not drive   Patient lives in an apartment 3rd floor with elevator   Patient's son is in the [employer] in [place] - he will be returning home for a short visit soon prior to going to [facility]   Patient has plenty of groceries at this time       Psychosocial:  Current living arrangement:: I live alone (IN [address]LO[address] WITH ELEVATOR )  Financial/Insurance: No concerns      Resources and Interventions:  Current Resources: DME, Home Care; Home Care  PAS Number:  (NA)  Senior Linkage Line Referral Placed:  (NA)  Advanced Care Plans/Directives on file:: No  Referrals Placed:  (NA)     Goals: COPD Action Plan - call with symptoms in the YELLOW ZONE      Patient/Caregiver understanding: yes   Frequency of Care Coordination: Q 30 DAYS HOME CARE   Upcoming appointment: 02/28/18     Plan:  Patient will f/u with pcp as scheduled 2/28/18  CCRN will mail [facility] care plan and care coordination letter   CCRN will contact [facility] and ask to be notified when patient discharges from home care - CCRN will f/u with home care Q 30 days   Patient to call with questions/concerns     Korin Wang Care Coordinator RN  [facility]  [phone]  February 26, 2018

## 2018-02-27 NOTE — TELEPHONE ENCOUNTER
MTM referral from: Transitions of Care (recent hospital discharge or ED visit)    MTM referral outreach attempt #2 on February 27, 2018 at 11:41 AM      Outcome: Patient not reachable after several attempts, will route to MTM Pharmacist/Provider as an FYI. Thank you for the referral.    Stevie Blancas, MTM Coordinator

## 2018-02-27 NOTE — PROGRESS NOTES
[facility] now requests orders and shares plan of care/discharge summaries for some patients through [employer].  Please REPLY TO THIS MESSAGE in order to give authorization for orders when needed.  This is considered a verbal order, you will still receive a faxed copy of orders for signature.  Thank you for your assistance in improving collaboration for our patients.    ORDER    Requesting SOC orders.     Skilled Nursing 3 week x 1, 2 week x 2, 1 week x 2, 4 as needed visits for CP assessment, fall prevention, med mgmt, disease process education.   PT to eval and treat for fall prevention and strengthening.   OT to eval and treat for home safety and energy conservation.   SW to assist with community resources.     Thank you,   Ynes Rolon, RN  [phone]

## 2018-02-27 NOTE — PROGRESS NOTES
Ynes calls to check status, verbal orders provided, MD KEO GONZALEZ, RN, BSN  Message handled by Nurse Triage.

## 2018-02-27 NOTE — MR AVS SNAPSHOT
After Visit Summary   2/27/2018    Karen Alex    MRN: [id number]           Patient Information     Date Of Birth          1955        Visit Information        Provider Department      2/27/2018 1:30 PM Mauricio Hernandez MD [facility]        Today's Diagnoses     Arthralgia, unspecified joint    -  1       Follow-ups after your visit        Your next 10 appointments already scheduled     Feb 28, 2018 11:15 AM CST   Office Visit with Eros Rebolledo MD   [facility]    [address] [place] [address] [phone]           Bring a current list of meds and any records pertaining to this visit. For Physicals, please bring immunization records and any forms needing to be filled out. Please arrive 10 minutes early to complete paperwork.            Feb 28, 2018  1:30 PM CST   Return Visit with MAURY Lemos   [facility] ([facility])    [address] [address] [phone]            Mar 05, 2018 10:15 AM CST   Return Visit with Cj Tapia MD   [facility] ([facility])    [address] [address] [phone]              Who to contact     If you have questions or need follow up information about today's clinic visit or your schedule please contact [facility] directly at [phone].  Normal or non-critical lab and imaging results will be communicated to you by MyChart, letter or phone within 4 business days after the clinic has received the results. If you do not hear from us within 7 days, please contact the clinic through MyChart or phone. If you have a critical or abnormal lab result, we will notify you by phone as soon as possible.  Submit refill requests through MyChart or call  "your pharmacy and they will forward the refill request to us. Please allow 3 business days for your refill to be completed.          Additional Information About Your Visit        MyChart Information     [email or web address] gives you secure access to your electronic health record. If you see a primary care provider, you can also send messages to your care team and make appointments. If you have questions, please call your primary care clinic.  If you do not have a primary care provider, please call [phone] and they will assist you.        Care EveryWhere ID     This is your Care EveryWhere ID. This could be used by other organizations to access your [place] medical records  [id number]        Your Vitals Were     Pulse Temperature Height Last Period Pulse Oximetry BMI (Body Mass Index)    99 98.5  F (36.9  C) (Oral) 1.702 m (5' 7\") 12/01/2007 95% 35.69 kg/m2       Blood Pressure from Last 3 Encounters:   02/27/18 116/72   02/24/18 141/78   01/24/18 136/86    Weight from Last 3 Encounters:   02/27/18 103.4 kg (227 lb 14.4 oz)   02/22/18 99.2 kg (218 lb 12.2 oz)   01/24/18 97.5 kg (215 lb)              We Performed the Following     Antineutrophil cytoplasmic Kathia IgG     CRP inflammation     Cyclic Citrullinated Peptide Antibody IgG     Erythrocyte sedimentation rate auto     Rheumatoid factor     Uric acid          Today's Medication Changes          These changes are accurate as of 2/27/18  2:15 PM.  If you have any questions, ask your nurse or doctor.               Stop taking these medicines if you haven't already. Please contact your care team if you have questions.     IBUPROFEN PO   Stopped by:  Mauricio Hernandez MD                    Primary Care Provider Office Phone # Fax #    Eros Rebloledo [phone] [address] [facility] [address]        Equal Access to Services     BRANDYN TURNER AH: Ej Calixto, rupert sifuentes, qarj lofton " jenifer souza leanna garcía'aan ah. So [facility] [phone].    ATENCIÓN: Si benignola kylah, tiene a zaragoza disposición servicios gratuitos de asistencia lingüística. Farshad al [phone].    We comply with applicable federal civil rights laws and [place] laws. We do not discriminate on the basis of race, color, national origin, age, disability, sex, sexual orientation, or gender identity.            Thank you!     Thank you for choosing [facility]  for your care. Our goal is always to provide you with excellent care. Hearing back from our patients is one way we can continue to improve our services. Please take a few minutes to complete the written survey that you may receive in the mail after your visit with us. Thank you!             Your Updated Medication List - Protect others around you: Learn how to safely use, store and throw away your medicines at www.disposemymeds.org.          This list is accurate as of 2/27/18  2:15 PM.  Always use your most recent med list.                   Brand Name Dispense Instructions for use Diagnosis    ABILIFY 5 MG tablet   Generic drug:  ARIPiprazole      Take 5 mg by mouth daily        ADVAIR DISKUS 250-50 MCG/DOSE diskus inhaler   Generic drug:  fluticasone-salmeterol     3 Inhaler    INHALE ONE PUFF BY MOUTH TWO TIMES A DAY    Chronic obstructive pulmonary disease, unspecified COPD type (H)       * albuterol (2.5 MG/3ML) 0.083% neb solution     120 vial    Take 1 vial (2.5 mg) by nebulization 4 times daily as needed    COPD (chronic obstructive pulmonary disease) (H)       * VENTOLIN  (90 BASE) MCG/ACT Inhaler   Generic drug:  albuterol     18 g    SHAKE WELL AND INHALE 1 TO 2 PUFFS INTO THE LUNGS EVERY 4 HOURS AS NEEDED FOR SHORTNESS OF BREATH, DIFFICULTY BREATHING OR WHEEZING.    Panlobular emphysema (H)       ASPIRIN PO      Take 81 mg by mouth daily        atorvastatin 40 MG tablet    LIPITOR    90 tablet    Take 1 tablet (40 mg) by mouth  daily    Coronary artery disease of native heart with stable angina pectoris, unspecified vessel or lesion type (H)       buPROPion 300 MG 24 hr tablet    WELLBUTRIN XL    90 tablet    Take 1 tablet (300 mg) by mouth every morning    Major depressive disorder, recurrent episode, in partial remission (H)       diazepam 10 MG tablet    VALIUM    60 tablet    Take 1 tablet (10 mg) by mouth every 12 hours as needed for anxiety    HUNG (generalized anxiety disorder)       DULOXETINE HCL PO      Take 90 mg by mouth daily        gabapentin 300 MG capsule    NEURONTIN    90 capsule    Take 1 capsule (300 mg) by mouth At Bedtime    Recurrent major depressive disorder, in partial remission (H)       HYDROcodone-acetaminophen 5-325 MG per tablet    NORCO    20 tablet    Take one two times daily as needed    Mechanical low back pain       isosorbide mononitrate 30 MG 24 hr tablet    IMDUR    90 tablet    Take 1 tablet (30 mg) by mouth daily    Chest pain, CAD (coronary artery disease)       LATUDA PO      Take 60 mg by mouth daily        MELATONIN PO      Take 5 mg by mouth At Bedtime        nitroGLYcerin 0.4 MG sublingual tablet    NITROSTAT    25 tablet    Place 1 tablet (0.4 mg) under the tongue every 5 minutes as needed for chest pain prn    Chest pain       predniSONE 10 MG tablet    DELTASONE    20 tablet    4 tabs daily for 2 days, then 3 tabs daily for 2 days, then 2 tabs daily for 2 days, then 1 tab daily for 2 days, then stop    COPD exacerbation (H)       * QUETIAPINE FUMARATE PO      Take 50 mg by mouth every morning        * QUEtiapine 200 MG tablet    SEROquel    90 tablet    Take 1 tablet (200 mg) by mouth At Bedtime    HUNG (generalized anxiety disorder)       STOOL SOFTENER PO      Take 100 mg by mouth 2 times daily        tiotropium 18 MCG capsule    SPIRIVA    90 capsule    Inhale 1 capsule (18 mcg) into the lungs daily    Generalized anxiety disorder, Chronic obstructive pulmonary disease, unspecified COPD  type (H)       Vitamin D3 2000 UNITS Chew      Take 2,000 mg by mouth daily        * Notice:  This list has 4 medication(s) that are the same as other medications prescribed for you. Read the directions carefully, and ask your doctor or other care provider to review them with you.

## 2018-02-27 NOTE — NURSING NOTE
"Chief Complaint   Patient presents with     [facility]       Initial /72 (BP Location: Left arm, Patient Position: Chair, Cuff Size: Adult Regular)  Pulse 99  Temp 98.5  F (36.9  C) (Oral)  Ht 1.702 m (5' 7\")  Wt 103.4 kg (227 lb 14.4 oz)  LMP 12/01/2007  SpO2 95%  BMI 35.69 kg/m2 Estimated body mass index is 35.69 kg/(m^2) as calculated from the following:    Height as of this encounter: 1.702 m (5' 7\").    Weight as of this encounter: 103.4 kg (227 lb 14.4 oz).  Medication Reconciliation: complete    Have you ever seen a rheumatologist No Who NA WhenNA  Joint pain history  Onset: pt was not sure why she was here, see phone message dated 1/12/18. Pt has some joint pain in right pointer knuckle. For years.pt does have edema in both ankles.   Involved joints: see above  Pain scale:  0/10     Wakes the patient from sleep : Yes/ left hip and left arm are painful in the beginning of winter   Morning stiffness:No  Meds used:none    Interim history  Since last visit:  1. Infections - No  2. New symptoms/medical problem - Yes/ hoping to get on transplant list soon  3. Any side effects from Rheum medications -NA  3. ER visits/Hospitalizations/surgeries - Yes  4. Last PCP visit: was in [facility] for 1 week.   Wt Readings from Last 4 Encounters:   02/27/18 103.4 kg (227 lb 14.4 oz)   02/22/18 99.2 kg (218 lb 12.2 oz)   01/24/18 97.5 kg (215 lb)   01/19/18 97.5 kg (215 lb)     BP Readings from Last 3 Encounters:   02/27/18 116/72   02/24/18 141/78   01/24/18 136/86       "

## 2018-02-28 NOTE — PROGRESS NOTES
SUBJECTIVE:   Karen Alex is a 62 year old female who presents to clinic today for the following health issues:          Hospital Follow-up Visit:    Hospital/Nursing Home/IP Rehab Facility: [facility]  Date of Admission: 2/19/18  Date of Discharge: 2/24/18  Reason(s) for Admission: COPD exacerbation            Problems taking medications regularly:  None       Medication changes since discharge: None       Problems adhering to non-medication therapy:  None  hypercapnea and confusion  Summary of hospitalization:  [facility] discharge summary reviewed  Diagnostic Tests/Treatments reviewed.  Follow up needed:   Other Healthcare Providers Involved in Patient s Care:         None  Update since discharge: improved. She and her sister will live together now in apartment close to the clinic     Post Discharge Medication Reconciliation: discharge medications reconciled, continue medications without change.  Plan of care communicated with patient and family     Coding guidelines for this visit:  Type of Medical   Decision Making Face-to-Face Visit       within 7 Days of discharge Face-to-Face Visit        within 14 days of discharge   Moderate Complexity [id number]   High Complexity [id number]            PROBLEM LIST:                   Patient Active Problem List   Diagnosis     Degeneration of lumbar or lumbosacral intervertebral disc     Personal history of tobacco use, presenting hazards to health     Hyperlipidemia LDL goal <130     COPD (chronic obstructive pulmonary disease) (H)     Health Care Home     Generalized anxiety disorder     Acute on chronic respiratory failure with hypoxia (H)     Chest pain     HTN, goal below 140/90     Chronic pain     PTSD (post-traumatic stress disorder)     HUNG (generalized anxiety disorder)     Severe episode of recurrent major depressive disorder, with psychotic features (H)     Tobacco dependence syndrome     Grief     Chronic respiratory failure with  hypoxia and hypercapnia (H)     COPD exacerbation (H)       PAST MEDICAL HISTORY:                  Past Medical History:   Diagnosis Date     Anxiety      Arthritis     generalized     Asthma      Bipolar 2 disorder (H)      Cervical dysplasia      Chronic back pain     low back     COPD (chronic obstructive pulmonary disease) (H)     O2 at night     HTN, goal below 140/90 1/29/2015     Hypercholesterolemia      Hyperlipidaemia      Other chronic pain      Pneumothorax 8/2012    left sided      Varicose veins      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 Surgeon:  MILADY BASS MD     CONIZATION  10/23/07    Vulvar excision for BOBBY 3, cold knife conization     EXCISE VULVA WIDE LOCAL  5/25/2012    Procedure:EXCISE VULVA WIDE LOCAL; Wide Local Excision Of Vulvar Lesion; Surgeon:RE ALDRIDGE; Location:[facility] OR     FOOT SURGERY Right 01/24/2005    OPERATION: Excision of soft tissue mass right foot. Surgeon:  KHARI DUEÑAS DPM     FOOT SURGERY Right 02/28/2005    OPERATION: Excision of ganglion cyst right foot. Surgeon:  KHARI DUEÑAS DPM     HERNIORRHAPHY INCISIONAL (LOCATION)  4/25/2012    Procedure:HERNIORRHAPHY INCISIONAL (LOCATION); Incisional Hernia Repair with mesh ; Surgeon:KIM QUICK; Location:[facility] OR     HYSTERECTOMY TOTAL ABDOMINAL, BILATERAL SALPINGO-OOPHORECTOMY, COMBINED       LAPAROSCOPIC HYSTERECTOMY TOTAL, BILATERAL SALPINGO-OOPHORECTOMY, COMBINED  3/6/2012    Procedure:COMBINED LAPAROSCOPIC HYSTERECTOMY TOTAL, BILATERAL SALPINGO-OOPHORECTOMY; Total laparoscopic Hysterectomy, Bilateral Salpingo Ooporectomy, Pubovaginal Sling, Biopsy Left Volva; Surgeon:RE ALDRIDGE; Location:[facility] OR     repair of anal fissures  2005     SLING BLADDER SUSPENSION WITH FASCIA ALESHA       SLING TRANSPUBO WITHOUT ANTERIOR  COLPORRHAPHY  3/6/2012    Procedure:SLING TRANSPUBO WITHOUT ANTERIOR COLPORRHAPHY; Surgeon:JOLANTA ENRIQUEZ; Location:[facility] OR     TUBAL LIGATION         CURRENT MEDICATIONS:                  Current Outpatient Prescriptions   Medication Sig Dispense Refill     predniSONE (DELTASONE) 10 MG tablet 4 tabs daily for 2 days, then 3 tabs daily for 2 days, then 2 tabs daily for 2 days, then 1 tab daily for 2 days, then stop 20 tablet 0     Lurasidone HCl (LATUDA PO) Take 60 mg by mouth daily       ARIPiprazole (ABILIFY) 5 MG tablet Take 5 mg by mouth daily       MELATONIN PO Take 5 mg by mouth At Bedtime       QUETIAPINE FUMARATE PO Take 50 mg by mouth every morning       DULOXETINE HCL PO Take 90 mg by mouth daily       atorvastatin (LIPITOR) 40 MG tablet Take 1 tablet (40 mg) by mouth daily 90 tablet 3     isosorbide mononitrate (IMDUR) 30 MG 24 hr tablet Take 1 tablet (30 mg) by mouth daily 90 tablet 0     gabapentin (NEURONTIN) 300 MG capsule Take 1 capsule (300 mg) by mouth At Bedtime 90 capsule 1     QUEtiapine (SEROQUEL) 200 MG tablet Take 1 tablet (200 mg) by mouth At Bedtime 90 tablet 1     buPROPion (WELLBUTRIN XL) 300 MG 24 hr tablet Take 1 tablet (300 mg) by mouth every morning 90 tablet 1     diazepam (VALIUM) 10 MG tablet Take 1 tablet (10 mg) by mouth every 12 hours as needed for anxiety 60 tablet 1     ADVAIR DISKUS 250-50 MCG/DOSE diskus inhaler INHALE ONE PUFF BY MOUTH TWO TIMES A DAY 3 Inhaler 2     VENTOLIN  (90 BASE) MCG/ACT Inhaler SHAKE WELL AND INHALE 1 TO 2 PUFFS INTO THE LUNGS EVERY 4 HOURS AS NEEDED FOR SHORTNESS OF BREATH, DIFFICULTY BREATHING OR WHEEZING. 18 g 5     tiotropium (SPIRIVA) 18 MCG capsule Inhale 1 capsule (18 mcg) into the lungs daily 90 capsule 2     nitroGLYcerin (NITROSTAT) 0.4 MG sublingual tablet Place 1 tablet (0.4 mg) under the tongue every 5 minutes as needed for chest pain prn 25 tablet 1     Cholecalciferol (VITAMIN D3) 2000 UNITS CHEW Take 2,000 mg by mouth daily  "       Docusate Calcium (STOOL SOFTENER PO) Take 100 mg by mouth 2 times daily        albuterol (2.5 MG/3ML) 0.083% nebulizer solution Take 1 vial (2.5 mg) by nebulization 4 times daily as needed 120 vial 5     ASPIRIN PO Take 81 mg by mouth daily               FAMILY HISTORY:                   Family History   Problem Relation Age of Onset     CANCER Father      asbestos lung disease     Hypertension Mother      DIABETES Paternal Aunt      HEART DISEASE Maternal Grandfather      CANCER Maternal Grandmother      unsure of what kind,  at the age of 86     Circulatory Paternal Grandmother       of brain aneurysm     Asthma Son      HEART DISEASE Sister        HEALTH MAINTENANCE:                    REVIEW OF OUTSIDE RECORDS: YES - Date: today     REVIEW OF SYSTEMS:  CONSTITUTIONAL: NEGATIVE for fever, chills  EYES: NEGATIVE for vision changes   RESP: NEGATIVE for significant cough or SOB  CV: NEGATIVE for chest pain, palpitations   GI: NEGATIVE for nausea, abdominal pain, heartburn, or change in bowel habits  : NEGATIVE for frequency, dysuria, or hematuria  MUSCULOSKELETAL: NEGATIVE for significant arthralgias or myalgia  NEURO: NEGATIVE for weakness, dizziness or paresthesias or headache  NVS:no headache or balance issus  INTEG:no moles or new rashes  LYMPH:no nodes or night sweats    EXAM:    /80 (BP Location: Left arm, Patient Position: Chair, Cuff Size: Adult Large)  Pulse 115  Temp 98.2  F (36.8  C) (Oral)  Resp 16  Ht 5' 7\" (1.702 m)  Wt 219 lb (99.3 kg)  LMP 2007  SpO2 90%  BMI 34.3 kg/m2  GENERAL APPEARANCE: healthy, alert and no distress   EXAM:  GENERAL APPEARANCE: healthy, alert and no distress  EYES: EOMI,  PERRL  HENT: ear canals and TM's normal and nose and mouth without ulcers or lesions  RESP: lungs clear to auscultation - no rales, rhonchi or wheezes and expiratory wheezes throughout  CV: regular rates and rhythm, normal S1 S2, no S3 or S4 and no murmur, click or rub " -  ABDOMEN:  soft, nontender, no HSM or masses and bowel sounds normal  BACK:no tenderness or pain on straight let raise           ASSESSMENT/PLAN  Copd and psychiatric issues     I told her that hypercapnea and valium may be incompatible and we highlighted all of the depressant medications she takes:  Seroquel, gabapenint, valium, hydrocodone, and abilify   I asked her to wean away from the norco, and consider tylenol 500 mg instead, we may need to comprimise on half pill norco     One month follow up here, sooner at her Psychiatrist    (J42) Chronic bronchitis, unspecified chronic bronchitis type (H)  (primary encounter diagnosis)  Comment:   Plan:     (J44.0) Chronic obstructive pulmonary disease with acute lower respiratory infection (H)  Comment:   Plan:     (F41.1) Generalized anxiety disorder  Comment:   Plan: med advice as above     (Z87.891) Personal history of tobacco use, presenting hazards to health  Comment:   Plan: non smoker now                                  I have discussed with patient the risks, benefits, medications, treatment options and modalities.   I have instructed the patient to call or schedule a follow-up appointment if any problems or failure to improve.

## 2018-02-28 NOTE — MR AVS SNAPSHOT
After Visit Summary   2/28/2018    Karen Alex    MRN: [id number]           Patient Information     Date Of Birth          1955        Visit Information        Provider Department      2/28/2018 11:15 AM Eros Rebolledo MD [facility]         Follow-ups after your visit        Your next 10 appointments already scheduled     Feb 28, 2018  1:30 PM CST   Return Visit with MAURY Lemos   [facility]    [address] [address] [phone]            Mar 05, 2018 10:15 AM CST   Return Visit with Cj Tapia MD   [facility] ([facility])    [address]  [facility] [address] [phone]              Who to contact     If you have questions or need follow up information about today's clinic visit or your schedule please contact [facility] directly at [phone].  Normal or non-critical lab and imaging results will be communicated to you by eBayhart, letter or phone within 4 business days after the clinic has received the results. If you do not hear from us within 7 days, please contact the clinic through eBayhart or phone. If you have a critical or abnormal lab result, we will notify you by phone as soon as possible.  Submit refill requests through [email or web address] or call your pharmacy and they will forward the refill request to us. Please allow 3 business days for your refill to be completed.          Additional Information About Your Visit        MyChart Information     [email or web address] gives you secure access to your electronic health record. If you see a primary care provider, you can also send messages to your care team and make appointments. If you have questions, please call your primary care clinic.  If you do not have a primary care provider, please call [phone] and they will  "assist you.        Care EveryWhere ID     This is your Care EveryWhere ID. This could be used by other organizations to access your [facility] medical records  [id number]        Your Vitals Were     Pulse Temperature Respirations Height Last Period Pulse Oximetry    115 98.2  F (36.8  C) (Oral) 16 5' 7\" (1.702 m) 12/01/2007 90%    BMI (Body Mass Index)                   34.3 kg/m2            Blood Pressure from Last 3 Encounters:   02/28/18 131/80   02/27/18 116/72   02/24/18 141/78    Weight from Last 3 Encounters:   02/28/18 219 lb (99.3 kg)   02/27/18 227 lb 14.4 oz (103.4 kg)   02/22/18 218 lb 12.2 oz (99.2 kg)              Today, you had the following     No orders found for display         Today's Medication Changes          These changes are accurate as of 2/28/18 11:46 AM.  If you have any questions, ask your nurse or doctor.               Stop taking these medicines if you haven't already. Please contact your care team if you have questions.     HYDROcodone-acetaminophen 5-325 MG per tablet   Commonly known as:  NORCO   Stopped by:  Eros Rebolledo MD                    Primary Care Provider Office Phone # Fax #    Eros Rebolledo [phone]        Equal Access to Services     BRETT TURNER : Hadivett dodsono Sonatalie, waaxda luqadaha, qaybta kaalmada maría, rj ace. So [facility] [phone].    ATENCIÓN: Si habla español, tiene a zaragoza disposición servicios gratuitos de asistencia lingüística. Farshad al [phone].    We comply with applicable federal civil rights laws and [place] laws. We do not discriminate on the basis of race, color, national origin, age, disability, sex, sexual orientation, or gender identity.            Thank you!     Thank you for choosing [facility]  for your care. Our goal is always to provide you with excellent care. Hearing back from our patients " is one way we can continue to improve our services. Please take a few minutes to complete the written survey that you may receive in the mail after your visit with us. Thank you!             Your Updated Medication List - Protect others around you: Learn how to safely use, store and throw away your medicines at www.disposemymeds.org.          This list is accurate as of 2/28/18 11:46 AM.  Always use your most recent med list.                   Brand Name Dispense Instructions for use Diagnosis    ABILIFY 5 MG tablet   Generic drug:  ARIPiprazole      Take 5 mg by mouth daily        ADVAIR DISKUS 250-50 MCG/DOSE diskus inhaler   Generic drug:  fluticasone-salmeterol     3 Inhaler    INHALE ONE PUFF BY MOUTH TWO TIMES A DAY    Chronic obstructive pulmonary disease, unspecified COPD type (H)       * albuterol (2.5 MG/3ML) 0.083% neb solution     120 vial    Take 1 vial (2.5 mg) by nebulization 4 times daily as needed    COPD (chronic obstructive pulmonary disease) (H)       * VENTOLIN  (90 BASE) MCG/ACT Inhaler   Generic drug:  albuterol     18 g    SHAKE WELL AND INHALE 1 TO 2 PUFFS INTO THE LUNGS EVERY 4 HOURS AS NEEDED FOR SHORTNESS OF BREATH, DIFFICULTY BREATHING OR WHEEZING.    Panlobular emphysema (H)       ASPIRIN PO      Take 81 mg by mouth daily        atorvastatin 40 MG tablet    LIPITOR    90 tablet    Take 1 tablet (40 mg) by mouth daily    Coronary artery disease of native heart with stable angina pectoris, unspecified vessel or lesion type (H)       buPROPion 300 MG 24 hr tablet    WELLBUTRIN XL    90 tablet    Take 1 tablet (300 mg) by mouth every morning    Major depressive disorder, recurrent episode, in partial remission (H)       diazepam 10 MG tablet    VALIUM    60 tablet    Take 1 tablet (10 mg) by mouth every 12 hours as needed for anxiety    HUNG (generalized anxiety disorder)       DULOXETINE HCL PO      Take 90 mg by mouth daily        gabapentin 300 MG capsule    NEURONTIN    90 capsule     Take 1 capsule (300 mg) by mouth At Bedtime    Recurrent major depressive disorder, in partial remission (H)       isosorbide mononitrate 30 MG 24 hr tablet    IMDUR    90 tablet    Take 1 tablet (30 mg) by mouth daily    Chest pain, CAD (coronary artery disease)       LATUDA PO      Take 60 mg by mouth daily        MELATONIN PO      Take 5 mg by mouth At Bedtime        nitroGLYcerin 0.4 MG sublingual tablet    NITROSTAT    25 tablet    Place 1 tablet (0.4 mg) under the tongue every 5 minutes as needed for chest pain prn    Chest pain       predniSONE 10 MG tablet    DELTASONE    20 tablet    4 tabs daily for 2 days, then 3 tabs daily for 2 days, then 2 tabs daily for 2 days, then 1 tab daily for 2 days, then stop    COPD exacerbation (H)       * QUETIAPINE FUMARATE PO      Take 50 mg by mouth every morning        * QUEtiapine 200 MG tablet    SEROquel    90 tablet    Take 1 tablet (200 mg) by mouth At Bedtime    HUNG (generalized anxiety disorder)       STOOL SOFTENER PO      Take 100 mg by mouth 2 times daily        tiotropium 18 MCG capsule    SPIRIVA    90 capsule    Inhale 1 capsule (18 mcg) into the lungs daily    Generalized anxiety disorder, Chronic obstructive pulmonary disease, unspecified COPD type (H)       Vitamin D3 2000 UNITS Chew      Take 2,000 mg by mouth daily        * Notice:  This list has 4 medication(s) that are the same as other medications prescribed for you. Read the directions carefully, and ask your doctor or other care provider to review them with you.

## 2018-03-02 NOTE — TELEPHONE ENCOUNTER
"Kathi has a psychiatrist--they should ask that person about the psych meds     The other interactions and \"duplications\" are not significant (everybody with asvd takes asa and it doesn't preclude ibuprofen)   The latuda//seropquel question we're hot the prescriber.  "

## 2018-03-02 NOTE — TELEPHONE ENCOUNTER
LMOVM informing below, call only if ?'s  Estelle Haynes RN, BSN  Message handled by Nurse Triage.

## 2018-03-02 NOTE — TELEPHONE ENCOUNTER
Ynes calling from [facility]    Wants to update that when assessed PHQ9 score elevated.  Scored 6 on PHQ2 and 24 on PHQ9.  Denied any thoughts of suicide per Ynes.  Ynes [phone].  Kailey Guevara RN    Med interactions-    Abilify interacts with Duloxetine, Norco and Wellbutrin.  Moderate reaction.      Albuterol and Ventolin duplicate therapy.    Aspirin interacts with Duloxetine, Ibuprofen and Prednisone.    Advair interacts with Seroquel.    Abilify, Seroquel and Latuda coming up duplicate therapy.    Multiple NSAIDS-Aspirin and Ibuprofen    Imdur and Nitro coming up duplicate therapy.    Duloxetine, Wellbutrin and Ibuprofen interact.    Norco with Seroquel, Valium and Wellbutrin interact.    Prednisone with Wellbutrin moderate interaction.     OK to continue all medications as ordered?    Kailey Guevara RN

## 2018-03-05 NOTE — LETTER
3/5/2018      Eros Rebolledo MD  [address] [facility] [address]      RE: Karen Alex       Dear Colleague,    I had the pleasure of seeing Karen Alex in the [facility].    Service Date: 03/05/2018      REASON FOR CARDIOLOGY VISIT:  Follow up CAD.      HISTORY OF PRESENT ILLNESS:  Ms. Alex is a very pleasant 62-year-old female with history of tobacco abuse, severe COPD on home oxygen, hypertension, dyslipidemia, probable CAD on the basis of abnormal stress test in 2010 that showed mild anteroapical ischemia.  She also has family history of hypertrophic cardiomyopathy involving her 2 sisters and her 2 brothers do not have hypertrophic cardiomyopathy.  Today, the patient is coming in for routine followup.  To be noted, last month the patient was admitted with COPD exacerbation.  She had an echocardiogram at that time that showed normal LV function.  LV thickness was normal.  These images were personally reviewed.  She also had a stress test done in 12/2017 that was overall a technically difficult study, but no evidence of ischemia was noted.  The patient tells me that she has slowly recovered from the COPD exacerbation.  She has no chest complaint.  No dizziness, presyncope or syncope.  She is on atorvastatin 40 mg daily.  LDL is well controlled.  She is on baby aspirin.  She is on low-dose Imdur.      PHYSICAL EXAMINATION:   VITAL SIGNS:  Blood pressure 94/68, heart rate 84, regular, weight 227 pounds, clinically well perfused.   NECK:  JVP not elevated   EXTREMITIES:  No bruit.   CARDIOVASCULAR SYSTEM:  S1, S2 normal.  No murmur, rub or gallop.   RESPIRATORY SYSTEM:  Decreased bilateral air entry, no rhonchi or crackles.   ABDOMEN:  Soft, obese, nontender.  Negative.   EXTREMITIES:  No pitting pedal edema.   NEUROLOGICAL:  Alert, oriented x3.   PSYCHIATRIC:  Normal affect.   SKIN:  No obvious lesions. No pallor or icterus.      IMPRESSION AND PLAN:  A pleasant  62-year-old female with history of possible CAD on the basis of abnormal stress test in  with subsequent stress test not revealing any ischemia.  Clinically, no anginal symptoms or congestive heart failure symptoms, but severe COPD on home oxygen.  Other comorbidities are history of hypertension, dyslipidemia, anxiety, family history of hypertrophic cardiomyopathy.  Echocardiogram showed normal LV thickness, including the one done last month.  These images were personally reviewed.  Her sisters were diagnosed with hypertrophic cardiomyopathy when they were in their 40s and early 50s.  At this time, I recommended a repeat echocardiogram in 4-5 years' time to look for any interval change in LV thickness, development of hypertrophy.  Regarding possible coronary artery disease, she is asymptomatic without any anginal symptoms.  LDL and blood pressure are both well controlled.  We can see her back in 2 years and can repeat an echocardiogram in 4-5 years' time as noted above.  However, if the patient notices any change in clinical status, especially if any new exertional symptoms, for example chest tightness with exertion, she should call our clinic.         KENRICK AHUJA MD             D: 2018   T: 2018   MT: BOB      Name:     JODY BALES   MRN:      [id number]        Account:      [id number]   :      1955           Service Date: 2018      Document: [id number]         Outpatient Encounter Prescriptions as of 3/5/2018   Medication Sig Dispense Refill     Lurasidone HCl (LATUDA PO) Take 60 mg by mouth daily       ARIPiprazole (ABILIFY) 5 MG tablet Take 5 mg by mouth daily       MELATONIN PO Take 5 mg by mouth At Bedtime       QUETIAPINE FUMARATE PO Take 50 mg by mouth every morning       DULOXETINE HCL PO Take 90 mg by mouth daily       atorvastatin (LIPITOR) 40 MG tablet Take 1 tablet (40 mg) by mouth daily 90 tablet 3     isosorbide mononitrate (IMDUR) 30 MG 24 hr tablet Take 1 tablet  (30 mg) by mouth daily 90 tablet 0     gabapentin (NEURONTIN) 300 MG capsule Take 1 capsule (300 mg) by mouth At Bedtime 90 capsule 1     QUEtiapine (SEROQUEL) 200 MG tablet Take 1 tablet (200 mg) by mouth At Bedtime 90 tablet 1     buPROPion (WELLBUTRIN XL) 300 MG 24 hr tablet Take 1 tablet (300 mg) by mouth every morning 90 tablet 1     diazepam (VALIUM) 10 MG tablet Take 1 tablet (10 mg) by mouth every 12 hours as needed for anxiety 60 tablet 1     ADVAIR DISKUS 250-50 MCG/DOSE diskus inhaler INHALE ONE PUFF BY MOUTH TWO TIMES A DAY 3 Inhaler 2     VENTOLIN  (90 BASE) MCG/ACT Inhaler SHAKE WELL AND INHALE 1 TO 2 PUFFS INTO THE LUNGS EVERY 4 HOURS AS NEEDED FOR SHORTNESS OF BREATH, DIFFICULTY BREATHING OR WHEEZING. 18 g 5     tiotropium (SPIRIVA) 18 MCG capsule Inhale 1 capsule (18 mcg) into the lungs daily 90 capsule 2     nitroGLYcerin (NITROSTAT) 0.4 MG sublingual tablet Place 1 tablet (0.4 mg) under the tongue every 5 minutes as needed for chest pain prn 25 tablet 1     Cholecalciferol (VITAMIN D3) 2000 UNITS CHEW Take 2,000 mg by mouth daily        Docusate Calcium (STOOL SOFTENER PO) Take 100 mg by mouth daily        albuterol (2.5 MG/3ML) 0.083% nebulizer solution Take 1 vial (2.5 mg) by nebulization 4 times daily as needed 120 vial 5     ASPIRIN PO Take 81 mg by mouth daily       [DISCONTINUED] predniSONE (DELTASONE) 10 MG tablet 4 tabs daily for 2 days, then 3 tabs daily for 2 days, then 2 tabs daily for 2 days, then 1 tab daily for 2 days, then stop 20 tablet 0     No facility-administered encounter medications on file as of 3/5/2018.        Again, thank you for allowing me to participate in the care of your patient.      Sincerely,    Cj Tapia MD     [facility]

## 2018-03-05 NOTE — PROGRESS NOTES
Service Date: 03/05/2018      REASON FOR CARDIOLOGY VISIT:  Follow up CAD.      HISTORY OF PRESENT ILLNESS:  Ms. Alex is a very pleasant 62-year-old female with history of tobacco abuse, severe COPD on home oxygen, hypertension, dyslipidemia, probable CAD on the basis of abnormal stress test in 2010 that showed mild anteroapical ischemia.  She also has family history of hypertrophic cardiomyopathy involving her 2 sisters and her 2 brothers do not have hypertrophic cardiomyopathy.  Today, the patient is coming in for routine followup.  To be noted, last month the patient was admitted with COPD exacerbation.  She had an echocardiogram at that time that showed normal LV function.  LV thickness was normal.  These images were personally reviewed.  She also had a stress test done in 12/2017 that was overall a technically difficult study, but no evidence of ischemia was noted.  The patient tells me that she has slowly recovered from the COPD exacerbation.  She has no chest complaint.  No dizziness, presyncope or syncope.  She is on atorvastatin 40 mg daily.  LDL is well controlled.  She is on baby aspirin.  She is on low-dose Imdur.      PHYSICAL EXAMINATION:   VITAL SIGNS:  Blood pressure 94/68, heart rate 84, regular, weight 227 pounds, clinically well perfused.   NECK:  JVP not elevated   EXTREMITIES:  No bruit.   CARDIOVASCULAR SYSTEM:  S1, S2 normal.  No murmur, rub or gallop.   RESPIRATORY SYSTEM:  Decreased bilateral air entry, no rhonchi or crackles.   ABDOMEN:  Soft, obese, nontender.  Negative.   EXTREMITIES:  No pitting pedal edema.   NEUROLOGICAL:  Alert, oriented x3.   PSYCHIATRIC:  Normal affect.   SKIN:  No obvious lesions. No pallor or icterus.      IMPRESSION AND PLAN:  A pleasant 62-year-old female with history of possible CAD on the basis of abnormal stress test in 2010 with subsequent stress test not revealing any ischemia.  Clinically, no anginal symptoms or congestive heart failure symptoms, but  severe COPD on home oxygen.  Other comorbidities are history of hypertension, dyslipidemia, anxiety, family history of hypertrophic cardiomyopathy.  Echocardiogram showed normal LV thickness, including the one done last month.  These images were personally reviewed.  Her sisters were diagnosed with hypertrophic cardiomyopathy when they were in their 40s and early 50s.  At this time, I recommended a repeat echocardiogram in 4-5 years' time to look for any interval change in LV thickness, development of hypertrophy.  Regarding possible coronary artery disease, she is asymptomatic without any anginal symptoms.  LDL and blood pressure are both well controlled.  We can see her back in 2 years and can repeat an echocardiogram in 4-5 years' time as noted above.  However, if the patient notices any change in clinical status, especially if any new exertional symptoms, for example chest tightness with exertion, she should call our clinic.         KENRICK AHUJA MD             D: 2018   T: 2018   MT: BOB      Name:     JODY BALES   MRN:      [id number]        Account:      [id number]   :      1955           Service Date: 2018      Document: [id number]

## 2018-03-05 NOTE — LETTER
3/5/2018    Eros Rebolledo MD  [address] [facility] [address]    RE: Karen FORREST Abi       Dear Colleague,    I had the pleasure of seeing Karen FORREST Abi in the [facility].    HPI and Plan:   See dictation(#[id number])    Orders Placed This Encounter   Procedures     Follow-Up with Cardiologist       No orders of the defined types were placed in this encounter.      Medications Discontinued During This Encounter   Medication Reason     predniSONE (DELTASONE) 10 MG tablet Therapy completed         Encounter Diagnosis   Name Primary?     Coronary artery disease involving native coronary artery of native heart without angina pectoris Yes       CURRENT MEDICATIONS:  Current Outpatient Prescriptions   Medication Sig Dispense Refill     Lurasidone HCl (LATUDA PO) Take 60 mg by mouth daily       ARIPiprazole (ABILIFY) 5 MG tablet Take 5 mg by mouth daily       MELATONIN PO Take 5 mg by mouth At Bedtime       QUETIAPINE FUMARATE PO Take 50 mg by mouth every morning       DULOXETINE HCL PO Take 90 mg by mouth daily       atorvastatin (LIPITOR) 40 MG tablet Take 1 tablet (40 mg) by mouth daily 90 tablet 3     isosorbide mononitrate (IMDUR) 30 MG 24 hr tablet Take 1 tablet (30 mg) by mouth daily 90 tablet 0     gabapentin (NEURONTIN) 300 MG capsule Take 1 capsule (300 mg) by mouth At Bedtime 90 capsule 1     QUEtiapine (SEROQUEL) 200 MG tablet Take 1 tablet (200 mg) by mouth At Bedtime 90 tablet 1     buPROPion (WELLBUTRIN XL) 300 MG 24 hr tablet Take 1 tablet (300 mg) by mouth every morning 90 tablet 1     diazepam (VALIUM) 10 MG tablet Take 1 tablet (10 mg) by mouth every 12 hours as needed for anxiety 60 tablet 1     ADVAIR DISKUS 250-50 MCG/DOSE diskus inhaler INHALE ONE PUFF BY MOUTH TWO TIMES A DAY 3 Inhaler 2     VENTOLIN  (90 BASE) MCG/ACT Inhaler SHAKE WELL AND INHALE 1 TO 2 PUFFS INTO THE LUNGS EVERY 4 HOURS AS NEEDED FOR SHORTNESS OF BREATH, DIFFICULTY BREATHING OR  WHEEZING. 18 g 5     tiotropium (SPIRIVA) 18 MCG capsule Inhale 1 capsule (18 mcg) into the lungs daily 90 capsule 2     nitroGLYcerin (NITROSTAT) 0.4 MG sublingual tablet Place 1 tablet (0.4 mg) under the tongue every 5 minutes as needed for chest pain prn 25 tablet 1     Cholecalciferol (VITAMIN D3) 2000 UNITS CHEW Take 2,000 mg by mouth daily        Docusate Calcium (STOOL SOFTENER PO) Take 100 mg by mouth daily        albuterol (2.5 MG/3ML) 0.083% nebulizer solution Take 1 vial (2.5 mg) by nebulization 4 times daily as needed 120 vial 5     ASPIRIN PO Take 81 mg by mouth daily         ALLERGIES   No Known Allergies    PAST MEDICAL HISTORY:  Past Medical History:   Diagnosis Date     Anxiety      Arthritis     generalized     Asthma      Bipolar 2 disorder (H)      Cervical dysplasia      Chronic back pain     low back     COPD (chronic obstructive pulmonary disease) (H)     O2 at night     HTN, goal below 140/90 1/29/2015     Hypercholesterolemia      Hyperlipidaemia      Other chronic pain      Pneumothorax 8/2012    left sided      Varicose veins      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 Surgeon:  MILADY BASS MD     CONIZATION  10/23/07    Vulvar excision for BOBBY 3, cold knife conization     EXCISE VULVA WIDE LOCAL  5/25/2012    Procedure:EXCISE VULVA WIDE LOCAL; Wide Local Excision Of Vulvar Lesion; Surgeon:RE ALDRIDGE; Location:[facility] OR     FOOT SURGERY Right 01/24/2005    OPERATION: Excision of soft tissue mass right foot. Surgeon:  KHARI DUEÑAS DPM     FOOT SURGERY Right 02/28/2005    OPERATION: Excision of ganglion cyst right foot. Surgeon:  KHARI DUEÑAS DPM     HERNIORRHAPHY INCISIONAL (LOCATION)  4/25/2012    Procedure:HERNIORRHAPHY INCISIONAL (LOCATION); Incisional Hernia Repair with mesh ; Surgeon:KIM QUICK;  Location:[facility] OR     HYSTERECTOMY TOTAL ABDOMINAL, BILATERAL SALPINGO-OOPHORECTOMY, COMBINED       LAPAROSCOPIC HYSTERECTOMY TOTAL, BILATERAL SALPINGO-OOPHORECTOMY, COMBINED  3/6/2012    Procedure:COMBINED LAPAROSCOPIC HYSTERECTOMY TOTAL, BILATERAL SALPINGO-OOPHORECTOMY; Total laparoscopic Hysterectomy, Bilateral Salpingo Ooporectomy, Pubovaginal Sling, Biopsy Left Volva; Surgeon:RE ALDRIDGE; Location:[facility] OR     repair of anal fissures       SLING BLADDER SUSPENSION WITH FASCIA ALESHA       SLING TRANSPUBO WITHOUT ANTERIOR COLPORRHAPHY  3/6/2012    Procedure:SLING TRANSPUBO WITHOUT ANTERIOR COLPORRHAPHY; Surgeon:JOLANTA ENRIQUEZ; Location:[facility] OR     TUBAL LIGATION         FAMILY HISTORY:  Family History   Problem Relation Age of Onset     CANCER Father      asbestos lung disease     Hypertension Mother      DIABETES Paternal Aunt      HEART DISEASE Maternal Grandfather      CANCER Maternal Grandmother      unsure of what kind,  at the age of 86     Circulatory Paternal Grandmother       of brain aneurysm     Asthma Son      HEART DISEASE Sister      No Known Problems Brother      Pacemaker Sister      No Known Problems Brother        SOCIAL HISTORY:  Social History     Social History     Marital status:      Spouse name: N/A     Number of children: N/A     Years of education: N/A     Social History Main Topics     Smoking status: Former Smoker     Packs/day: 0.50     Years: 26.00     Types: Cigarettes     Quit date: 7/3/2015     Smokeless tobacco: Never Used      Comment: sometimes     Alcohol use No     Drug use: No     Sexual activity: Not Currently     Birth control/ protection: Surgical     Other Topics Concern     Parent/Sibling W/ Cabg, Mi Or Angioplasty Before 65f 55m? No     Caffeine Concern No     2 cans per day     Sleep Concern Yes     sometimes     Special Diet No     Exercise No     walking in halls at least 3 days per week     Social History Narrative       Review of  "Systems:  Skin:  Positive for nail changes;bruising     Eyes:  Positive for glasses cataract extraction of both eyes , readers sometimes  ENT:  Negative      Respiratory:  Positive for dyspnea on exertion;shortness of breath uses oxygen 24/7,   emphysema    Cardiovascular:    Positive for;chest pain;edema;fatigue;lightheadedness;dizziness chest tightness/ pain  -once in a while - had an episode over a month ago ,   edema in ankles   Gastroenterology: Negative   bowels are pretty regular   Genitourinary:  Negative      Musculoskeletal:  Positive for   thigh numgness - pain sometimes   Neurologic:  Positive for memory problems some memory issues   Psychiatric:  Positive for excessive stress;sleep disturbances;anxiety;depression    Heme/Lymph/Imm:  Positive for easy bruising    Endocrine:  Negative        Physical Exam:  Vitals: BP 94/68 (BP Location: Right arm, Patient Position: Sitting, Cuff Size: Adult Large)  Pulse 84  Ht 1.702 m (5' 7\")  LMP 12/01/2007    Constitutional:           Skin:             Head:           Eyes:           Lymph:      ENT:           Neck:           Respiratory:            Cardiac:                                                           GI:           Extremities and Muscular Skeletal:                 Neurological:           Psych:             CC  No referring provider defined for this encounter.                    Service Date: 03/05/2018      REASON FOR CARDIOLOGY VISIT:  Follow up CAD.      HISTORY OF PRESENT ILLNESS:  Ms. Alex is a very pleasant 62-year-old female with history of tobacco abuse, severe COPD on home oxygen, hypertension, dyslipidemia, probable CAD on the basis of abnormal stress test in 2010 that showed mild anteroapical ischemia.  She also has family history of hypertrophic cardiomyopathy involving her 2 sisters and her 2 brothers do not have hypertrophic cardiomyopathy.  Today, the patient is coming in for routine followup.  To be noted, last month the patient was " admitted with COPD exacerbation.  She had an echocardiogram at that time that showed normal LV function.  LV thickness was normal.  These images were personally reviewed.  She also had a stress test done in 12/2017 that was overall a technically difficult study, but no evidence of ischemia was noted.  The patient tells me that she has slowly recovered from the COPD exacerbation.  She has no chest complaint.  No dizziness, presyncope or syncope.  She is on atorvastatin 40 mg daily.  LDL is well controlled.  She is on baby aspirin.  She is on low-dose Imdur.      PHYSICAL EXAMINATION:   VITAL SIGNS:  Blood pressure 94/68, heart rate 84, regular, weight 227 pounds, clinically well perfused.   NECK:  JVP not elevated   EXTREMITIES:  No bruit.   CARDIOVASCULAR SYSTEM:  S1, S2 normal.  No murmur, rub or gallop.   RESPIRATORY SYSTEM:  Decreased bilateral air entry, no rhonchi or crackles.   ABDOMEN:  Soft, obese, nontender.  Negative.   EXTREMITIES:  No pitting pedal edema.   NEUROLOGICAL:  Alert, oriented x3.   PSYCHIATRIC:  Normal affect.   SKIN:  No obvious lesions. No pallor or icterus.      IMPRESSION AND PLAN:  A pleasant 62-year-old female with history of possible CAD on the basis of abnormal stress test in 2010 with subsequent stress test not revealing any ischemia.  Clinically, no anginal symptoms or congestive heart failure symptoms, but severe COPD on home oxygen.  Other comorbidities are history of hypertension, dyslipidemia, anxiety, family history of hypertrophic cardiomyopathy.  Echocardiogram showed normal LV thickness, including the one done last month.  These images were personally reviewed.  Her sisters were diagnosed with hypertrophic cardiomyopathy when they were in their 40s and early 50s.  At this time, I recommended a repeat echocardiogram in 4-5 years' time to look for any interval change in LV thickness, development of hypertrophy.  Regarding possible coronary artery disease, she is asymptomatic  without any anginal symptoms.  LDL and blood pressure are both well controlled.  We can see her back in 2 years and can repeat an echocardiogram in 4-5 years' time as noted above.  However, if the patient notices any change in clinical status, especially if any new exertional symptoms, for example chest tightness with exertion, she should call our clinic.         CJ TAPIA MD             D: 2018   T: 2018   MT: BOB      Name:     JODY BALES   MRN:      [id number]        Account:      [id number]   :      1955           Service Date: 2018      Document: [id number]         Thank you for allowing me to participate in the care of your patient.      Sincerely,     Cj Tapia MD     [facility]    cc:   No referring provider defined for this encounter.

## 2018-03-05 NOTE — MR AVS SNAPSHOT
After Visit Summary   3/5/2018    Karen Alex    MRN: [id number]           Patient Information     Date Of Birth          1955        Visit Information        Provider Department      3/5/2018 10:15 AM Cj Tapia MD [facility]        Today's Diagnoses     Coronary artery disease involving native coronary artery of native heart without angina pectoris    -  1       Follow-ups after your visit        Additional Services     Follow-Up with Cardiologist                 Your next 10 appointments already scheduled     Apr 17, 2018  1:30 PM CDT   Return Visit with Lupis Mcge[facility] ([facility])    [address] [address]   [phone]            May 01, 2018  1:30 PM CDT   Return Visit with Lupis Mcgee [facility] ([facility])    [address] [address]   [phone]            May 15, 2018  1:30 PM CDT   Return Visit with Lupis Mcgee [facility] ([facility])    [address] [address]   [phone]              Future tests that were ordered for you today     Open Future Orders        Priority Expected Expires Ordered    Follow-Up with Cardiologist Routine 3/4/2020 3/24/2020 3/5/2018            Who to contact     If you have questions or need follow up information about today's clinic visit or your schedule please contact [facility] directly at [phone].  Normal or non-critical lab and imaging results will be communicated to you by MyChart, letter or phone within 4 business days after the clinic has received the results. If you do not hear from us within 7 days, please contact the clinic through MyChart or phone.  "If you have a critical or abnormal lab result, we will notify you by phone as soon as possible.  Submit refill requests through [employer] or call your pharmacy and they will forward the refill request to us. Please allow 3 business days for your refill to be completed.          Additional Information About Your Visit        [employer] Information     [employer] gives you secure access to your electronic health record. If you see a primary care provider, you can also send messages to your care team and make appointments. If you have questions, please call your primary care clinic.  If you do not have a primary care provider, please call [phone] and they will assist you.        Care EveryWhere ID     This is your Care EveryWhere ID. This could be used by other organizations to access your [place] medical records  [id number]        Your Vitals Were     Pulse Height Last Period             84 1.702 m (5' 7\") 12/01/2007          Blood Pressure from Last 3 Encounters:   03/05/18 94/68   02/28/18 131/80   02/27/18 116/72    Weight from Last 3 Encounters:   02/28/18 99.3 kg (219 lb)   02/27/18 103.4 kg (227 lb 14.4 oz)   02/22/18 99.2 kg (218 lb 12.2 oz)               Primary Care Provider Office Phone # Fax #    Eros Bayron Rebolledo [phone] [address] [facility] [address]        Equal Access to Services     [facility]: Hadii aad ku hadasho Soomaali, waaxda luqadaha, qaybta kaalmada adeegyada, rj burgess hayblaken leanna ramirez . So [facility] [phone].    ATENCIÓN: Si habla español, tiene a zaragoza disposición servicios gratuitos de asistencia lingüística. Farshad al [phone].    We comply with applicable federal civil rights laws and [place] laws. We do not discriminate on the basis of race, color, national origin, age, disability, sex, sexual orientation, or gender identity.            Thank you!     Thank you for choosing [facility]" for your care. Our goal is always to provide you with excellent care. Hearing back from our patients is one way we can continue to improve our services. Please take a few minutes to complete the written survey that you may receive in the mail after your visit with us. Thank you!             Your Updated Medication List - Protect others around you: Learn how to safely use, store and throw away your medicines at www.disposemymeds.org.          This list is accurate as of 3/5/18 10:48 AM.  Always use your most recent med list.                   Brand Name Dispense Instructions for use Diagnosis    ABILIFY 5 MG tablet   Generic drug:  ARIPiprazole      Take 5 mg by mouth daily        ADVAIR DISKUS 250-50 MCG/DOSE diskus inhaler   Generic drug:  fluticasone-salmeterol     3 Inhaler    INHALE ONE PUFF BY MOUTH TWO TIMES A DAY    Chronic obstructive pulmonary disease, unspecified COPD type (H)       * albuterol (2.5 MG/3ML) 0.083% neb solution     120 vial    Take 1 vial (2.5 mg) by nebulization 4 times daily as needed    COPD (chronic obstructive pulmonary disease) (H)       * VENTOLIN  (90 BASE) MCG/ACT Inhaler   Generic drug:  albuterol     18 g    SHAKE WELL AND INHALE 1 TO 2 PUFFS INTO THE LUNGS EVERY 4 HOURS AS NEEDED FOR SHORTNESS OF BREATH, DIFFICULTY BREATHING OR WHEEZING.    Panlobular emphysema (H)       ASPIRIN PO      Take 81 mg by mouth daily        atorvastatin 40 MG tablet    LIPITOR    90 tablet    Take 1 tablet (40 mg) by mouth daily    Coronary artery disease of native heart with stable angina pectoris, unspecified vessel or lesion type (H)       buPROPion 300 MG 24 hr tablet    WELLBUTRIN XL    90 tablet    Take 1 tablet (300 mg) by mouth every morning    Major depressive disorder, recurrent episode, in partial remission (H)       diazepam 10 MG tablet    VALIUM    60 tablet    Take 1 tablet (10 mg) by mouth every 12 hours as needed for anxiety    HUNG (generalized anxiety disorder)        DULOXETINE HCL PO      Take 90 mg by mouth daily        gabapentin 300 MG capsule    NEURONTIN    90 capsule    Take 1 capsule (300 mg) by mouth At Bedtime    Recurrent major depressive disorder, in partial remission (H)       isosorbide mononitrate 30 MG 24 hr tablet    IMDUR    90 tablet    Take 1 tablet (30 mg) by mouth daily    Chest pain, CAD (coronary artery disease)       LATUDA PO      Take 60 mg by mouth daily        MELATONIN PO      Take 5 mg by mouth At Bedtime        nitroGLYcerin 0.4 MG sublingual tablet    NITROSTAT    25 tablet    Place 1 tablet (0.4 mg) under the tongue every 5 minutes as needed for chest pain prn    Chest pain       * QUETIAPINE FUMARATE PO      Take 50 mg by mouth every morning        * QUEtiapine 200 MG tablet    SEROquel    90 tablet    Take 1 tablet (200 mg) by mouth At Bedtime    HUNG (generalized anxiety disorder)       STOOL SOFTENER PO      Take 100 mg by mouth daily        tiotropium 18 MCG capsule    SPIRIVA    90 capsule    Inhale 1 capsule (18 mcg) into the lungs daily    Generalized anxiety disorder, Chronic obstructive pulmonary disease, unspecified COPD type (H)       Vitamin D3 2000 UNITS Chew      Take 2,000 mg by mouth daily        * Notice:  This list has 4 medication(s) that are the same as other medications prescribed for you. Read the directions carefully, and ask your doctor or other care provider to review them with you.

## 2018-03-05 NOTE — PROGRESS NOTES
Results released to [facility]:  Dear Ms. Alex,  Inflammatory markers are normal.   Uric acid gout test is normal.   Rheumatoid antibodies are normal.  ANCA vasculitis antibodies are normal.   Based on your clinical and laboratory findings, there is no evidence of systemic inflammatory autoimmune rheumatic condition in you at this time. Please follow up with rheumatology as needed.     Sincerely    Mauricio Hernandez MD  [facility]

## 2018-03-05 NOTE — TELEPHONE ENCOUNTER
Pt is requesting a refill of Norco 5/325mg 1 tab two times daily as needed. No longer on current med list.      Controlled Substance Refill Request for Norco  Problem List Complete:  Yes  Patient is followed by OKSANA ANTONIO for ongoing prescription of pain medication.  All refills should be approved by this provider, or covering partner.    Medication(s): Norco.   Maximum quantity per month: 40  Clinic visit frequency required: Q 3 months     Controlled substance agreement on file: Yes       Date(s): 7/16/15    Pain Clinic evaluation in the past: No       Date(s):  n/a       Location(s):  n/a    DIRE Total Score(s):  No flowsheet data found.    Last [facility] website verification: 8/1/17   [email or web address]     checked in past 6 months?  Yes 8/1/17     Georgie Shabazz, Pharmacy [facility]

## 2018-03-05 NOTE — PROGRESS NOTES
HPI and Plan:   See dictation(#[id number])    Orders Placed This Encounter   Procedures     Follow-Up with Cardiologist       No orders of the defined types were placed in this encounter.      Medications Discontinued During This Encounter   Medication Reason     predniSONE (DELTASONE) 10 MG tablet Therapy completed         Encounter Diagnosis   Name Primary?     Coronary artery disease involving native coronary artery of native heart without angina pectoris Yes       CURRENT MEDICATIONS:  Current Outpatient Prescriptions   Medication Sig Dispense Refill     Lurasidone HCl (LATUDA PO) Take 60 mg by mouth daily       ARIPiprazole (ABILIFY) 5 MG tablet Take 5 mg by mouth daily       MELATONIN PO Take 5 mg by mouth At Bedtime       QUETIAPINE FUMARATE PO Take 50 mg by mouth every morning       DULOXETINE HCL PO Take 90 mg by mouth daily       atorvastatin (LIPITOR) 40 MG tablet Take 1 tablet (40 mg) by mouth daily 90 tablet 3     isosorbide mononitrate (IMDUR) 30 MG 24 hr tablet Take 1 tablet (30 mg) by mouth daily 90 tablet 0     gabapentin (NEURONTIN) 300 MG capsule Take 1 capsule (300 mg) by mouth At Bedtime 90 capsule 1     QUEtiapine (SEROQUEL) 200 MG tablet Take 1 tablet (200 mg) by mouth At Bedtime 90 tablet 1     buPROPion (WELLBUTRIN XL) 300 MG 24 hr tablet Take 1 tablet (300 mg) by mouth every morning 90 tablet 1     diazepam (VALIUM) 10 MG tablet Take 1 tablet (10 mg) by mouth every 12 hours as needed for anxiety 60 tablet 1     ADVAIR DISKUS 250-50 MCG/DOSE diskus inhaler INHALE ONE PUFF BY MOUTH TWO TIMES A DAY 3 Inhaler 2     VENTOLIN  (90 BASE) MCG/ACT Inhaler SHAKE WELL AND INHALE 1 TO 2 PUFFS INTO THE LUNGS EVERY 4 HOURS AS NEEDED FOR SHORTNESS OF BREATH, DIFFICULTY BREATHING OR WHEEZING. 18 g 5     tiotropium (SPIRIVA) 18 MCG capsule Inhale 1 capsule (18 mcg) into the lungs daily 90 capsule 2     nitroGLYcerin (NITROSTAT) 0.4 MG sublingual tablet Place 1 tablet (0.4 mg) under the tongue every 5  minutes as needed for chest pain prn 25 tablet 1     Cholecalciferol (VITAMIN D3) 2000 UNITS CHEW Take 2,000 mg by mouth daily        Docusate Calcium (STOOL SOFTENER PO) Take 100 mg by mouth daily        albuterol (2.5 MG/3ML) 0.083% nebulizer solution Take 1 vial (2.5 mg) by nebulization 4 times daily as needed 120 vial 5     ASPIRIN PO Take 81 mg by mouth daily         ALLERGIES   No Known Allergies    PAST MEDICAL HISTORY:  Past Medical History:   Diagnosis Date     Anxiety      Arthritis     generalized     Asthma      Bipolar 2 disorder (H)      Cervical dysplasia      Chronic back pain     low back     COPD (chronic obstructive pulmonary disease) (H)     O2 at night     HTN, goal below 140/90 1/29/2015     Hypercholesterolemia      Hyperlipidaemia      Other chronic pain      Pneumothorax 8/2012    left sided      Varicose veins      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 Surgeon:  MILADY BASS MD     CONIZATION  10/23/07    Vulvar excision for BOBBY 3, cold knife conization     EXCISE VULVA WIDE LOCAL  5/25/2012    Procedure:EXCISE VULVA WIDE LOCAL; Wide Local Excision Of Vulvar Lesion; Surgeon:RE ALDRIDGE; Location:[facility] OR     FOOT SURGERY Right 01/24/2005    OPERATION: Excision of soft tissue mass right foot. Surgeon:  KHARI DUEÑAS DPM     FOOT SURGERY Right 02/28/2005    OPERATION: Excision of ganglion cyst right foot. Surgeon:  KHARI DUEÑAS DPM     HERNIORRHAPHY INCISIONAL (LOCATION)  4/25/2012    Procedure:HERNIORRHAPHY INCISIONAL (LOCATION); Incisional Hernia Repair with mesh ; Surgeon:KIM QUICK; Location:[facility] OR     HYSTERECTOMY TOTAL ABDOMINAL, BILATERAL SALPINGO-OOPHORECTOMY, COMBINED       LAPAROSCOPIC HYSTERECTOMY TOTAL, BILATERAL SALPINGO-OOPHORECTOMY, COMBINED  3/6/2012    Procedure:COMBINED LAPAROSCOPIC  HYSTERECTOMY TOTAL, BILATERAL SALPINGO-OOPHORECTOMY; Total laparoscopic Hysterectomy, Bilateral Salpingo Ooporectomy, Pubovaginal Sling, Biopsy Left Volva; Surgeon:RE ALDRIDGE; Location:[facility] OR     repair of anal fissures       SLING BLADDER SUSPENSION WITH FASCIA ALESHA       SLING TRANSPUBO WITHOUT ANTERIOR COLPORRHAPHY  3/6/2012    Procedure:SLING TRANSPUBO WITHOUT ANTERIOR COLPORRHAPHY; Surgeon:JOLANTA ENRIQUEZ; Location:[facility] OR     TUBAL LIGATION         FAMILY HISTORY:  Family History   Problem Relation Age of Onset     CANCER Father      asbestos lung disease     Hypertension Mother      DIABETES Paternal Aunt      HEART DISEASE Maternal Grandfather      CANCER Maternal Grandmother      unsure of what kind,  at the age of 86     Circulatory Paternal Grandmother       of brain aneurysm     Asthma Son      HEART DISEASE Sister      No Known Problems Brother      Pacemaker Sister      No Known Problems Brother        SOCIAL HISTORY:  Social History     Social History     Marital status:      Spouse name: N/A     Number of children: N/A     Years of education: N/A     Social History Main Topics     Smoking status: Former Smoker     Packs/day: 0.50     Years: 26.00     Types: Cigarettes     Quit date: 7/3/2015     Smokeless tobacco: Never Used      Comment: sometimes     Alcohol use No     Drug use: No     Sexual activity: Not Currently     Birth control/ protection: Surgical     Other Topics Concern     Parent/Sibling W/ Cabg, Mi Or Angioplasty Before 65f 55m? No     Caffeine Concern No     2 cans per day     Sleep Concern Yes     sometimes     Special Diet No     Exercise No     walking in halls at least 3 days per week     Social History Narrative       Review of Systems:  Skin:  Positive for nail changes;bruising     Eyes:  Positive for glasses cataract extraction of both eyes , readers sometimes  ENT:  Negative      Respiratory:  Positive for dyspnea on exertion;shortness of breath  "uses oxygen 24/7,   emphysema    Cardiovascular:    Positive for;chest pain;edema;fatigue;lightheadedness;dizziness chest tightness/ pain  -once in a while - had an episode over a month ago ,   edema in ankles   Gastroenterology: Negative   bowels are pretty regular   Genitourinary:  Negative      Musculoskeletal:  Positive for   thigh numgness - pain sometimes   Neurologic:  Positive for memory problems some memory issues   Psychiatric:  Positive for excessive stress;sleep disturbances;anxiety;depression    Heme/Lymph/Imm:  Positive for easy bruising    Endocrine:  Negative        Physical Exam:  Vitals: BP 94/68 (BP Location: Right arm, Patient Position: Sitting, Cuff Size: Adult Large)  Pulse 84  Ht 1.702 m (5' 7\")  LMP 12/01/2007    Constitutional:           Skin:             Head:           Eyes:           Lymph:      ENT:           Neck:           Respiratory:            Cardiac:                                                           GI:           Extremities and Muscular Skeletal:                 Neurological:           Psych:             CC  No referring provider defined for this encounter.                  "

## 2018-03-09 NOTE — PROGRESS NOTES
Clinic Care Coordination Contact  [facility] Care Coordination Outreach    Patient was enrolled in [facility] upon Discharged from: Hospital (NA)    Program Type: COPD (NA)    Program Length: 30 days post discharge    Clinical Data:  Outreach Type: Variance follow up call (NON COMPLIANCE DUE TO NOT ANSWERING SURVEYS )    Request, Priority 2, Mar 9, 2018  Category: Note Clinical Information  Hold added per non-compliance.    Action: Actions: Stable/No Action Required (PER PATIENT SHE DOES NOT HAVE A CODE - TO DO THE SURVEY - CCRN placed engager request to contact patient to troubleshoot )      Plan: RN CC will continue to monitor patients responses. Will plan to outreach as needed and with any new variances or concerns.  Home care has also discharged patient - CCRN received call from Ynes ROY CM with [facility] - patient is not home bound - patient is doing well. No increase in SOB/Cough - Using oxygen at 4 LPM - CCRN will f/u 2 weeks     Korin Wang Care Coordinator RN  [facility] and [facility]  [phone]  March 9, 2018

## 2018-03-15 NOTE — ED NOTES
Bed: ED19  Expected date: 3/15/18  Expected time: 12:36 PM  Means of arrival: Ambulance  Comments:  [place] [address]- 62y, F, ran out of oxygen

## 2018-03-15 NOTE — ED NOTES
Patient was out to lunch when she noticed that her oxygen tank had run out. EMS was called and her stats where 66% when EMS got there. They applied oxygen and they came back up to mid 90's. No other complaints at this time.

## 2018-03-15 NOTE — ED AVS SNAPSHOT
[facility] Emergency Department    [address]    [facility] [address]    Phone:  [phone]    Fax:  [phone]                                       Karen Alex   MRN: [id number]    Department:  [facility] Emergency Department   Date of Visit:  3/15/2018           After Visit Summary Signature Page     I have received my discharge instructions, and my questions have been answered. I have discussed any challenges I see with this plan with the nurse or doctor.    ..........................................................................................................................................  Patient/Patient Representative Signature      ..........................................................................................................................................  Patient Representative Print Name and Relationship to Patient    ..................................................               ................................................  Date                                            Time    ..........................................................................................................................................  Reviewed by Signature/Title    ...................................................              ..............................................  Date                                                            Time

## 2018-03-15 NOTE — ED AVS SNAPSHOT
[facility] Emergency Department    [address]    [facility] [address]    Phone:  [phone]    Fax:  [phone]                                       Karen Alex   MRN: [id number]    Department:  [facility] Emergency Department   Date of Visit:  3/15/2018           Patient Information     Date Of Birth          1955        Your diagnoses for this visit were:     Hypoxia        You were seen by Ronnie Baptiste MD.      Follow-up Information     Follow up with Eros Rebolledo MD.    Specialty:  Family Practice    Why:  As needed    Contact information:    [address] [facility] [address]  [phone]        Future Appointments        Provider Department Dept Phone Center    4/17/2018 1:30 PM Lupis Mcgee [facility] [phone] [facility]    5/1/2018 1:30 PM Lupis Mcgee [place] [phone] [facility]    5/15/2018 1:30 PM Lupis Mcgee [place] [phone] [facility]      24 Hour Appointment Hotline       To make an appointment at any [facility], call [phone] ([phone]). If you don't have a family doctor or clinic, we will help you find one. [facility] clinics are conveniently located to serve the needs of you and your family.             Review of your medicines      Our records show that you are taking the medicines listed below. If these are incorrect, please call your family doctor or clinic.        Dose / Directions Last dose taken    ABILIFY 5 MG tablet   Dose:  5 mg   Generic drug:  ARIPiprazole        Take 5 mg by mouth daily   Refills:  0        ADVAIR DISKUS 250-50 MCG/DOSE diskus inhaler   Quantity:  3 Inhaler   Generic drug:  fluticasone-salmeterol        INHALE ONE PUFF BY MOUTH TWO TIMES A DAY   Refills:  2        * albuterol (2.5 MG/3ML)  0.083% neb solution   Dose:  1 vial   Quantity:  120 vial        Take 1 vial (2.5 mg) by nebulization 4 times daily as needed   Refills:  5        * VENTOLIN  (90 BASE) MCG/ACT Inhaler   Quantity:  18 g   Generic drug:  albuterol        SHAKE WELL AND INHALE 1 TO 2 PUFFS INTO THE LUNGS EVERY 4 HOURS AS NEEDED FOR SHORTNESS OF BREATH, DIFFICULTY BREATHING OR WHEEZING.   Refills:  5        ASPIRIN PO   Dose:  81 mg        Take 81 mg by mouth daily   Refills:  0        atorvastatin 40 MG tablet   Commonly known as:  LIPITOR   Dose:  40 mg   Quantity:  90 tablet        Take 1 tablet (40 mg) by mouth daily   Refills:  3        buPROPion 300 MG 24 hr tablet   Commonly known as:  WELLBUTRIN XL   Dose:  300 mg   Quantity:  90 tablet        Take 1 tablet (300 mg) by mouth every morning   Refills:  1        diazepam 10 MG tablet   Commonly known as:  VALIUM   Dose:  10 mg   Quantity:  60 tablet        Take 1 tablet (10 mg) by mouth every 12 hours as needed for anxiety   Refills:  1        DULOXETINE HCL PO   Dose:  90 mg        Take 90 mg by mouth daily   Refills:  0        gabapentin 300 MG capsule   Commonly known as:  NEURONTIN   Dose:  300 mg   Quantity:  90 capsule        Take 1 capsule (300 mg) by mouth At Bedtime   Refills:  1        HYDROcodone-acetaminophen 5-325 MG per tablet   Commonly known as:  NORCO   Quantity:  30 tablet        Take one pill  daily as needed, not to be taken with diazepam   Refills:  0        isosorbide mononitrate 30 MG 24 hr tablet   Commonly known as:  IMDUR   Dose:  30 mg   Quantity:  90 tablet        Take 1 tablet (30 mg) by mouth daily   Refills:  0        LATUDA PO   Dose:  60 mg        Take 60 mg by mouth daily   Refills:  0        MELATONIN PO   Dose:  5 mg        Take 5 mg by mouth At Bedtime   Refills:  0        nitroGLYcerin 0.4 MG sublingual tablet   Commonly known as:  NITROSTAT   Dose:  0.4 mg   Quantity:  25 tablet        Place 1 tablet (0.4 mg) under the tongue every 5  minutes as needed for chest pain prn   Refills:  1        * QUETIAPINE FUMARATE PO   Dose:  50 mg        Take 50 mg by mouth every morning   Refills:  0        * QUEtiapine 200 MG tablet   Commonly known as:  SEROquel   Dose:  200 mg   Quantity:  90 tablet        Take 1 tablet (200 mg) by mouth At Bedtime   Refills:  1        STOOL SOFTENER PO   Dose:  100 mg        Take 100 mg by mouth daily   Refills:  0        tiotropium 18 MCG capsule   Commonly known as:  SPIRIVA   Dose:  18 mcg   Quantity:  90 capsule        Inhale 1 capsule (18 mcg) into the lungs daily   Refills:  2        Vitamin D3 2000 UNITS Chew   Dose:  2000 mg   Indication:  prn        Take 2,000 mg by mouth daily   Refills:  0        * Notice:  This list has 4 medication(s) that are the same as other medications prescribed for you. Read the directions carefully, and ask your doctor or other care provider to review them with you.            Orders Needing Specimen Collection     None      Pending Results     No orders found from 3/13/2018 to 3/16/2018.            Pending Culture Results     No orders found from 3/13/2018 to 3/16/2018.            Pending Results Instructions     If you had any lab results that were not finalized at the time of your Discharge, you can call the ED Lab Result RN at [phone]. You will be contacted by this team for any positive Lab results or changes in treatment. The nurses are available 7 days a week from 10A to 6:30P.  You can leave a message 24 hours per day and they will return your call.        Test Results From Your Hospital Stay               Clinical Quality Measure: Blood Pressure Screening     Your blood pressure was checked while you were in the emergency department today. The last reading we obtained was  BP: 136/89 . Please read the guidelines below about what these numbers mean and what you should do about them.  If your systolic blood pressure (the top number) is less than 120 and your diastolic blood  pressure (the bottom number) is less than 80, then your blood pressure is normal. There is nothing more that you need to do about it.  If your systolic blood pressure (the top number) is 120-139 or your diastolic blood pressure (the bottom number) is 80-89, your blood pressure may be higher than it should be. You should have your blood pressure rechecked within a year by a primary care provider.  If your systolic blood pressure (the top number) is 140 or greater or your diastolic blood pressure (the bottom number) is 90 or greater, you may have high blood pressure. High blood pressure is treatable, but if left untreated over time it can put you at risk for heart attack, stroke, or kidney failure. You should have your blood pressure rechecked by a primary care provider within the next 4 weeks.  If your provider in the emergency department today gave you specific instructions to follow-up with your doctor or provider even sooner than that, you should follow that instruction and not wait for up to 4 weeks for your follow-up visit.        Thank you for choosing [facility]       Thank you for choosing [facility] for your care. Our goal is always to provide you with excellent care. Hearing back from our patients is one way we can continue to improve our services. Please take a few minutes to complete the written survey that you may receive in the mail after you visit with us. Thank you!        [facility] Information     [facility] gives you secure access to your electronic health record. If you see a primary care provider, you can also send messages to your care team and make appointments. If you have questions, please call your primary care clinic.  If you do not have a primary care provider, please call [phone] and they will assist you.        Care EveryWhere ID     This is your Care EveryWhere ID. This could be used by other organizations to access your [facility] medical records  [id number]        Equal Access to Services      BRANDYN TURNER : Hadii omid Calixto, waaxda luqadaha, qaybta kaalmada maría, rj ace. So [facility] [phone].    ATENCIÓN: Si habla español, tiene a zaragoza disposición servicios gratuitos de asistencia lingüística. Llame al [phone].    We comply with applicable federal civil rights laws and [place] laws. We do not discriminate on the basis of race, color, national origin, age, disability, sex, sexual orientation, or gender identity.            After Visit Summary       This is your record. Keep this with you and show to your community pharmacist(s) and doctor(s) at your next visit.

## 2018-03-15 NOTE — ED PROVIDER NOTES
History     Chief Complaint:  Ran Out Of Oxygen    HPI   Karen Alex is a 62 year old female who presents via EMS due to running out of supplemental oxygen. The patient reports she was out to eat at [employer] and ran out of oxygen. She states she started to feel unwell and was likely out of oxygen for about 10 minutes before EMS arrived. She notes she has been coughing and taking nebulizers at home. She denies fevers or any other comcerns. She states she is feeling better already    Allergies:  No known drug allergies     Medications:    Latuda  Abilify  Quetiapine fumarate  Duloxetine  Lipitor  Imdur  Gabapentin  Seroquel  Wellbutrin  Valium  Advair  Ventolin  Spiriva  Nitroglycerin  Docusate calcium  Albuterol  Aspirin    Past Medical History:    Anxiety  Arthritis  Asthma  Bipolar 2 disorder  Cervical dysplasia  Chronic back pain  COPD  Hypertension  Hypercholesterolemia  Hyperlipidemia  Pneumothorax  Varicose veins  Vulvar intraepithelial neoplasia III  PTSD    Past Surgical History:    Back surgery  Conization  Excise vulvar lesion  Foot surgery  Incisional herniorrhaphy  Hysterectomy  Oophorectomy  Repair of anal fissures  Sling bladder suspension with fascia lázaro  Tubal ligation  Sling transpubo without anterior colporrhaphy    Family History:    Cancer  Hypertension  Asthma  Heart disease    Social History:  Smoking status: Former smoker, quit 2015  Alcohol use: No   Marital Status:   [4]     Review of Systems   Constitutional: Negative for fever.   Respiratory: Positive for cough and shortness of breath.    All other systems reviewed and are negative.    Physical Exam     Patient Vitals for the past 24 hrs:   BP Temp Pulse Heart Rate Resp SpO2   03/15/18 1530 - - - - - 96 %   03/15/18 1519 - - - - - 96 %   03/15/18 1442 - - - - - 93 %   03/15/18 1430 - - - - - 93 %   03/15/18 1425 - - - - - 92 %   03/15/18 1405 - - - - - 92 %   03/15/18 1310 - - - - - 91 %   03/15/18 1306 - - - - - 98 %    03/15/18 1301 - - - - - 96 %   03/15/18 1300 - - - - - 94 %   03/15/18 1259 136/89 98.2  F (36.8  C) 116 116 20 94 %   03/15/18 1258 - - - - - 91 %      Physical Exam   Constitutional: She appears well-developed and well-nourished.   HENT:   Right Ear: External ear normal.   Left Ear: External ear normal.   Mouth/Throat: Oropharynx is clear and moist. No oropharyngeal exudate.   TM's clear bilaterally   Eyes: Conjunctivae and EOM are normal. Pupils are equal, round, and reactive to light. No scleral icterus.   Neck: Normal range of motion. Neck supple.   Cardiovascular: Normal rate, regular rhythm, normal heart sounds and intact distal pulses.  Exam reveals no gallop and no friction rub.    No murmur heard.  Pulmonary/Chest: Effort normal and breath sounds normal. No respiratory distress. She has no wheezes. She has no rales.   Abdominal: Soft. Bowel sounds are normal. She exhibits no distension and no mass. There is no tenderness.   Musculoskeletal: She exhibits no edema.   Neurological: She is alert.   Skin: Skin is warm and dry. No rash noted.   Psychiatric: She has a normal mood and affect.     Emergency Department Course   Interventions:  1302: DuoNeb, 2.5mg/3 mL, Inhalation solution, Nebulizer    Emergency Department Course:  The patient arrived in the emergency department via EMS.  Past medical records, nursing notes, and vitals reviewed.  1255: I performed an exam of the patient and obtained history, as documented above.     1421: I rechecked the patient. Explained findings to the patient.    Findings and plan explained to the Patient. Patient discharged home with instructions regarding supportive care, medications, and reasons to return. The importance of close follow-up was reviewed.     Impression & Plan    Medical Decision Making:  Karen Alex is a 62 year old female who presents after running out of her oxygen at [facility]. She states that this has happened before and once we got her back on her  oxygen she is coming around and looking a lot better and back to her baseline. Patient does have oxygen tanks at home. She is waiting for a ride so that she can go home. We are just watching the patient until her ride gets here. She does not require any social work visit as she does have oxygen and will follow up with her primary doctor.    Diagnosis:    ICD-10-CM   1. Hypoxia R09.02     Disposition:  discharged to home    Diana Toure  3/15/2018   [facility] EMERGENCY DEPARTMENT    I, Diana Toure, am serving as a scribe at 12:55 PM on 3/15/2018 to document services personally performed by Ronnie Baptiste MD based on my observations and the provider's statements to me.        Ronnie Baptiste MD  03/15/18 6121

## 2018-03-16 NOTE — PROGRESS NOTES
Clinic Care Coordination Contact  OUTREACH    Referral Information:  Referral Source: ED Follow-Up  Reason for Contact: ER F/U hypoxia  Care Conference: No     Universal Utilization:   ED Visits in last year: 1  Hospital visits in last year: 2  Last PCP appointment: 02/28/18  Missed Appointments:  (NA)  Concerns:  (NA)  Multiple Providers or Specialists: YES     Clinical Concerns:  Current Medical Concerns: ER visit 3/15/18 hypoxia   Patient stated she was out at [facility] with her sister - she ran out of oxygen   CCRN asked patient how much oxygen she took with her and what type - patient brought the small tank that she carries over her shoulder with her. Which only lasts approx 1 hour. CCRN asked patient if she had a larger tank on wheels ? And she stated yes and that one will last about 8 hours she thinks. Patient notes that this is not the first time this has happened at [facility].   CCRN suggested when patient go on an outting that she take the larger tank with her - even if she doesn't need it. Patient goes out with her sister - CCRN asked if she would be able to put the oxygen tank in the car for her and she states yes.   Patient states that they do park in the handicap parking spot - however even then she has to go up a slant which takes a lot of energy away from her and she has to rest.   Patient is utilizing scooter when she goes into a store   CCRN suggested having sister drop her off right at the front door - even the small walk from the handicap spot may be to much for her. Her sister will bring the scooter out for her sometimes as well.   Patient last saw [facility] 1/30/18  CCRN asked patient if she was using her nebulizer treatments - she stated 2-3 times per day. CCRN encouraged to use every 4 hours as on directions. Patient is just not feeling well - however does not feel that she needs to come in today.   Patient states her sister was planning on dusting today - CCRN suggested that may not be a good idea  today while she is struggling, and asked if she could wear a mask when she does dust but suggested that be a project for a different day.   CCRN discussed urgent care hours / locations for this weekend however if severe shortness of breath needs to go to ER     Current Behavioral Concerns: very talkative patient - in good spirits however she is just wanting to feel better.   History of PTSD and depression      Clinical Pathway Name: COPD    Medication Management:  States compliance - reviewed inhalers and neb directions - states compliance      Functional Status:  Mobility Status: Independent  Equipment Currently Used at Home: oxygen, raised toilet (OXYGEN 4 LPM )  Transportation: sister      Psychosocial:  Current living arrangement:: I live alone (IN [address]LO[address] WITH ELEVATOR) - sister currently staying with her   Financial/Insurance: Patient concerned about the cost of the ambulance bill. She no longer qualifies for MA - she states she makes $1300 a month and this puts her over the income limit. Patient does receive SNAP food benefits but no other services through the [facility]. CCRN will discuss with Stacey CCSW to see if there are other options for patient available - Patient aware that CCSW may not call until next week - but welcomes the call      Resources and Interventions:  Current Resources: DME, Home Care;  (CLOSED NOT HOME BOUND )  PAS Number:  (NA)  Senior Linkage Line Referral Placed:  (NA)  Advanced Care Plans/Directives on file:: No  Referrals Placed:  (NA)     Goals:   Goal 1 Statement: USE COPD ACTION PLAN TO MONITOR SYMPTOMS - CALL WITH SYMPTOMS IN THE YELLLOW ZONE   Goal 1 Supportive Steps: PROVIDE COPD ACTION PLAN AND CC MONITORING   Goal 1 Progression Percent: 30%  Goal 1 Progression Date: 03/09/18     Patient/Caregiver understanding: Yes   Frequency of Care Coordination: 2 weeks   Upcoming appointment:  (NA)     Plan:  Patient to use neb treatments every 4 hours as directed by rx she has at  home   Patient will call with questions/concerns - has COPD action plan - CCRN reminded to watch this closely   CCRN will f/u with patient in  2 weeks   CCRN discussed with Stacey FRIED who will call patient to discuss insurance options/county benefits     Korin Wang Care Coordinator RN  [facility]  [phone]  March 16, 2018

## 2018-03-19 NOTE — PROGRESS NOTES
Clinic Care Coordination Contact  [facility]/Voicemail    Clinical Data: asked by CCRN to outreach. Pt interested in knowing more about [facility] benefits. Makes roughly $1300 a month.     Outreach attempted x 1.  Left message on voicemail with call back information and requested return call.  Plan: Care coordinator will try again in 3-5 business days.    Stacey Aguayo, Social Work Care Coordinator, [facility], [facility]: [place], [place], and [place]   P:[phone] / Signed March 19, 2018

## 2018-03-26 PROBLEM — Z71.89 ACP (ADVANCE CARE PLANNING): Chronic | Status: ACTIVE | Noted: 2018-01-01

## 2018-03-26 PROBLEM — J44.1 ACUTE EXACERBATION OF CHRONIC OBSTRUCTIVE PULMONARY DISEASE (H): Status: ACTIVE | Noted: 2018-01-01

## 2018-03-26 NOTE — IP AVS SNAPSHOT
MRN:[id number]                      After Visit Summary   3/26/2018    Karen Alex    MRN: [id number]           Thank you!     Thank you for choosing [facility] for your care. Our goal is always to provide you with excellent care. Hearing back from our patients is one way we can continue to improve our services. Please take a few minutes to complete the written survey that you may receive in the mail after you visit with us. Thank you!        Patient Information     Date Of Birth          1955        Designated Caregiver       Most Recent Value    Caregiver    Will someone help with your care after discharge? yes    Name of designated caregiver Shannan (sister)    Phone number of caregiver [phone]    Caregiver address [address] [place] [address]      About your hospital stay     You were admitted on:  March 26, 2018 You last received care in the:  [place] [address] [facility]    You were discharged on:  March 28, 2018        Reason for your hospital stay       This is a 62 year old female admitted with a COPD exacerbation.                  Who to Call     For medical emergencies, please call 911.  For non-urgent questions about your medical care, please call your primary care provider or clinic, [phone]          Attending Provider     Provider Specialty    Anil Roblero MD Critical Care Medicine       Primary Care Provider Office Phone # Fax #    Eros Rebolledo [phone]      After Care Instructions     Activity       Your activity upon discharge: activity as tolerated            Diet       Follow this diet upon discharge: Orders Placed This Encounter      Advance Diet as Tolerated: Regular Diet Adult            Oxygen Adult       Renew Home Oxygen Order  Renew previous prescription.  Expected treatment length is indefinite (99 months).    Attending Provider: Jesus Valle  Physician signature: See electronic  signature associated with these discharge orders  Date of Order: March 28, 2018                  Follow-up Appointments     Follow-up and recommended labs and tests        Follow up with primary care provider, Eros Rebolledo, as scheduled for you on Monday 4/2/18 at 2:30pm, for hospital follow- up.  No follow up labs or test are needed.                  Your next 10 appointments already scheduled     Apr 02, 2018  2:30 PM CDT   Office Visit with Eros Rebolledo MD   [facility]    [address] [place] [id number] [phone]           Bring a current list of meds and any records pertaining to this visit. For Physicals, please bring immunization records and any forms needing to be filled out. Please arrive 10 minutes early to complete paperwork.            Apr 17, 2018  1:30 PM CDT   Return Visit with MAURY Lemos   [facility] ([facility])    [address] [id number] [phone]            May 01, 2018  1:30 PM CDT   Return Visit with MAURY Lemos   [facility] ([facility])    [address] [id number] [phone]            May 15, 2018  1:30 PM CDT   Return Visit with MAURY Lemos   [facility] ([facility])    [address] [place] [id number] [phone]              Additional Services     Home Care OT Referral for Hospital Discharge       OT to eval and treat    Your provider has ordered home care - occupational therapy. If you have not been contacted within 2 days of your discharge please call the department phone number listed on the top of this document.            Home Care PT Referral for Hospital Discharge       PT to eval and treat    Your provider  "has ordered home care - physical therapy. If you have not been contacted within 2 days of your discharge please call the department phone number listed on the top of this document.            Home care nursing referral       RN skilled nursing visit. RN to assess respiratory and cardiac status.    Your provider has ordered home care nursing services. If you have not been contacted within 2 days of your discharge please call the inpatient department phone number at [phone] .                  Further instructions from your care team       A referral has been sent to [facility] for home RN, Physical and Occupational Therapy.  Their number is [phone].    Pending Results     Date and Time Order Name Status Description    3/26/2018 1046 Blood culture Preliminary     3/26/2018 1013 Blood culture Preliminary             Statement of Approval     Ordered          03/28/18 1151  I have reviewed and agree with all the recommendations and orders detailed in this document.  EFFECTIVE NOW     Approved and electronically signed by:  Jesus Valle MD             Admission Information     Date & Time Provider Department Dept. Phone    3/26/2018 Anil Roblero MD [place] [address] [facility] [phone]      Your Vitals Were     Blood Pressure Pulse Temperature Respirations Height Weight    115/68 (BP Location: Right arm) 90 98.4  F (36.9  C) (Oral) 18 1.702 m (5' 7\") 100.8 kg (222 lb 3.6 oz)    Last Period Pulse Oximetry BMI (Body Mass Index)             12/01/2007 97% 34.81 kg/m2         MyChart Information     [facility] gives you secure access to your electronic health record. If you see a primary care provider, you can also send messages to your care team and make appointments. If you have questions, please call your primary care clinic.  If you do not have a primary care provider, please call [phone] and they will assist you.        Care EveryWhere ID     This is your Care " EveryWhere ID. This could be used by other organizations to access your [place] medical records  [id number]        Equal Access to Services     BRANDYN TURNER : Hadii omid Calixto, rupert sifuentes, fede jonasmakike daniel, rj burgess charlycassidy wassermanraymundo fabjoesilvestre aceRadha [facility] [phone].    ATENCIÓN: Si habla español, tiene a zaragoza disposición servicios gratuitos de asistencia lingüística. Llame al [phone].    We comply with applicable federal civil rights laws and [place] laws. We do not discriminate on the basis of race, color, national origin, age, disability, sex, sexual orientation, or gender identity.               Review of your medicines      START taking        Dose / Directions    azithromycin 250 MG tablet   Commonly known as:  ZITHROMAX   Indication:  copd exac   Used for:  Chronic obstructive pulmonary disease with acute exacerbation (H)        Dose:  250 mg   Start taking on:  3/29/2018   Take 1 tablet (250 mg) by mouth daily   Quantity:  2 tablet   Refills:  0       order for DME   Used for:  Chronic obstructive pulmonary disease with acute exacerbation (H)        Equipment being ordered: Walker Wheels () and Walker () Treatment Diagnosis: Gait instability   Quantity:  1 each   Refills:  0       predniSONE 10 MG tablet   Commonly known as:  DELTASONE   Used for:  Chronic obstructive pulmonary disease with acute exacerbation (H)        4 tabs daily for 3 days, then 3 tabs daily for 3 days, then 2 tabs daily for 3 days, then 1 tab daily for 3 days, then stop   Quantity:  30 tablet   Refills:  0         CONTINUE these medicines which have NOT CHANGED        Dose / Directions    ABILIFY 5 MG tablet   Generic drug:  ARIPiprazole        Dose:  5 mg   Take 5 mg by mouth daily   Refills:  0       ADVAIR DISKUS 250-50 MCG/DOSE diskus inhaler   Used for:  Chronic obstructive pulmonary disease, unspecified COPD type (H)   Generic drug:  fluticasone-salmeterol        INHALE ONE PUFF BY  MOUTH TWO TIMES A DAY   Quantity:  3 Inhaler   Refills:  2       * albuterol (2.5 MG/3ML) 0.083% neb solution   Used for:  COPD (chronic obstructive pulmonary disease) (H)        Dose:  1 vial   Take 1 vial (2.5 mg) by nebulization 4 times daily as needed   Quantity:  120 vial   Refills:  5       * VENTOLIN  (90 BASE) MCG/ACT Inhaler   Used for:  Panlobular emphysema (H)   Generic drug:  albuterol        SHAKE WELL AND INHALE 1 TO 2 PUFFS INTO THE LUNGS EVERY 4 HOURS AS NEEDED FOR SHORTNESS OF BREATH, DIFFICULTY BREATHING OR WHEEZING.   Quantity:  18 g   Refills:  5       ASPIRIN PO        Dose:  81 mg   Take 81 mg by mouth daily   Refills:  0       atorvastatin 40 MG tablet   Commonly known as:  LIPITOR   Used for:  Coronary artery disease of native heart with stable angina pectoris, unspecified vessel or lesion type (H)        Dose:  40 mg   Take 1 tablet (40 mg) by mouth daily   Quantity:  90 tablet   Refills:  3       buPROPion 300 MG 24 hr tablet   Commonly known as:  WELLBUTRIN XL   Used for:  Major depressive disorder, recurrent episode, in partial remission (H)        Dose:  300 mg   Take 1 tablet (300 mg) by mouth every morning   Quantity:  90 tablet   Refills:  1       diazepam 10 MG tablet   Commonly known as:  VALIUM   Used for:  HUNG (generalized anxiety disorder)        Dose:  10 mg   Take 1 tablet (10 mg) by mouth every 12 hours as needed for anxiety   Quantity:  60 tablet   Refills:  1       DULOXETINE HCL PO        Dose:  90 mg   Take 90 mg by mouth daily   Refills:  0       gabapentin 300 MG capsule   Commonly known as:  NEURONTIN   Used for:  Recurrent major depressive disorder, in partial remission (H)        Dose:  300 mg   Take 1 capsule (300 mg) by mouth At Bedtime   Quantity:  90 capsule   Refills:  1       HYDROcodone-acetaminophen 5-325 MG per tablet   Commonly known as:  NORCO   Used for:  Mechanical low back pain, Other chronic pain        Take one pill  daily as needed, not to be  taken with diazepam   Quantity:  30 tablet   Refills:  0       isosorbide mononitrate 30 MG 24 hr tablet   Commonly known as:  IMDUR   Used for:  Chest pain, CAD (coronary artery disease)        Dose:  30 mg   Take 1 tablet (30 mg) by mouth daily   Quantity:  90 tablet   Refills:  0       MELATONIN PO        Dose:  5 mg   Take 5 mg by mouth At Bedtime   Refills:  0       nitroGLYcerin 0.4 MG sublingual tablet   Commonly known as:  NITROSTAT   Used for:  Chest pain        Dose:  0.4 mg   Place 1 tablet (0.4 mg) under the tongue every 5 minutes as needed for chest pain prn   Quantity:  25 tablet   Refills:  1       * QUETIAPINE FUMARATE PO        Dose:  50 mg   Take 50 mg by mouth every morning   Refills:  0       * QUEtiapine 200 MG tablet   Commonly known as:  SEROquel   Used for:  HUNG (generalized anxiety disorder)        Dose:  200 mg   Take 1 tablet (200 mg) by mouth At Bedtime   Quantity:  90 tablet   Refills:  1       STOOL SOFTENER PO        Dose:  100 mg   Take 100 mg by mouth daily   Refills:  0       tiotropium 18 MCG capsule   Commonly known as:  SPIRIVA   Used for:  Generalized anxiety disorder, Chronic obstructive pulmonary disease, unspecified COPD type (H)        Dose:  18 mcg   Inhale 1 capsule (18 mcg) into the lungs daily   Quantity:  90 capsule   Refills:  2       Vitamin D3 2000 UNITS Chew   Indication:  prn        Dose:  2000 mg   Take 2,000 mg by mouth daily   Refills:  0       * Notice:  This list has 4 medication(s) that are the same as other medications prescribed for you. Read the directions carefully, and ask your doctor or other care provider to review them with you.         Where to get your medicines      These medications were sent to [facility] - [address]  [address] [place] [address]     Phone:  [phone]     azithromycin 250 MG tablet    predniSONE 10 MG tablet         Some of these will need a paper prescription and others can  be bought over the counter. Ask your nurse if you have questions.     Bring a paper prescription for each of these medications     order for DME                Protect others around you: Learn how to safely use, store and throw away your medicines at www.disposemymeds.org.        ANTIBIOTIC INSTRUCTION     You've Been Prescribed an Antibiotic - Now What?  Your healthcare team thinks that you or your loved one might have an infection. Some infections can be treated with antibiotics, which are powerful, life-saving drugs. Like all medications, antibiotics have side effects and should only be used when necessary. There are some important things you should know about your antibiotic treatment.      Your healthcare team may run tests before you start taking an antibiotic.    Your team may take samples (e.g., from your blood, urine or other areas) to run tests to look for bacteria. These test can be important to determine if you need an antibiotic at all and, if you do, which antibiotic will work best.      Within a few days, your healthcare team might change or even stop your antibiotic.    Your team may start you on an antibiotic while they are working to find out what is making you sick.    Your team might change your antibiotic because test results show that a different antibiotic would be better to treat your infection.    In some cases, once your team has more information, they learn that you do not need an antibiotic at all. They may find out that you don't have an infection, or that the antibiotic you're taking won't work against your infection. For example, an infection caused by a virus can't be treated with antibiotics. Staying on an antibiotic when you don't need it is more likely to be harmful than helpful.      You may experience side effects from your antibiotic.    Like all medications, antibiotics have side effects. Some of these can be serious.    Let you healthcare team know if you have any known  allergies when you are admitted to the hospital.    One significant side effect of nearly all antibiotics is the risk of severe and sometimes deadly diarrhea caused by Clostridium difficile (C. Difficile). This occurs when a person takes antibiotics because some good germs are destroyed. Antibiotic use allows C. diificile to take over, putting patients at high risk for this serious infection.    As a patient or caregiver, it is important to understand your or your loved one's antibiotic treatment. It is especially important for caregivers to speak up when patients can't speak for themselves. Here are some important questions to ask your healthcare team.    What infection is this antibiotic treating and how do you know I have that infection?    What side effects might occur from this antibiotic?    How long will I need to take this antibiotic?    Is it safe to take this antibiotic with other medications or supplements (e.g., vitamins) that I am taking?     Are there any special directions I need to know about taking this antibiotic? For example, should I take it with food?    How will I be monitored to know whether my infection is responding to the antibiotic?    What tests may help to make sure the right antibiotic is prescribed for me?      Information provided by:  www.cdc.gov/getsmart  U.S. Department of Health and Human Services  Centers for disease Control and Prevention  National Center for Emerging and Zoonotic Infectious Diseases  Division of Healthcare Quality Promotion             Medication List: This is a list of all your medications and when to take them. Check marks below indicate your daily home schedule. Keep this list as a reference.      Medications           Morning Afternoon Evening Bedtime As Needed    ABILIFY 5 MG tablet   Take 5 mg by mouth daily   Last time this was given:  5 mg on 3/28/2018  9:04 AM   Generic drug:  ARIPiprazole                                ADVAIR DISKUS 250-50 MCG/DOSE  diskus inhaler   INHALE ONE PUFF BY MOUTH TWO TIMES A DAY   Generic drug:  fluticasone-salmeterol                                * albuterol (2.5 MG/3ML) 0.083% neb solution   Take 1 vial (2.5 mg) by nebulization 4 times daily as needed                                * VENTOLIN  (90 BASE) MCG/ACT Inhaler   SHAKE WELL AND INHALE 1 TO 2 PUFFS INTO THE LUNGS EVERY 4 HOURS AS NEEDED FOR SHORTNESS OF BREATH, DIFFICULTY BREATHING OR WHEEZING.   Generic drug:  albuterol                                ASPIRIN PO   Take 81 mg by mouth daily   Last time this was given:  81 mg on 3/28/2018  9:04 AM                                atorvastatin 40 MG tablet   Commonly known as:  LIPITOR   Take 1 tablet (40 mg) by mouth daily   Last time this was given:  40 mg on 3/28/2018  9:04 AM                                azithromycin 250 MG tablet   Commonly known as:  ZITHROMAX   Take 1 tablet (250 mg) by mouth daily   Start taking on:  3/29/2018   Last time this was given:  250 mg on 3/28/2018  9:04 AM                                buPROPion 300 MG 24 hr tablet   Commonly known as:  WELLBUTRIN XL   Take 1 tablet (300 mg) by mouth every morning   Last time this was given:  300 mg on 3/28/2018  9:04 AM                                diazepam 10 MG tablet   Commonly known as:  VALIUM   Take 1 tablet (10 mg) by mouth every 12 hours as needed for anxiety                                DULOXETINE HCL PO   Take 90 mg by mouth daily   Last time this was given:  90 mg on 3/28/2018  9:03 AM                                gabapentin 300 MG capsule   Commonly known as:  NEURONTIN   Take 1 capsule (300 mg) by mouth At Bedtime   Last time this was given:  300 mg on 3/27/2018  8:52 PM                                HYDROcodone-acetaminophen 5-325 MG per tablet   Commonly known as:  NORCO   Take one pill  daily as needed, not to be taken with diazepam   Last time this was given:  2 tablets on 3/27/2018  8:52 PM                                 isosorbide mononitrate 30 MG 24 hr tablet   Commonly known as:  IMDUR   Take 1 tablet (30 mg) by mouth daily   Last time this was given:  30 mg on 3/28/2018  9:04 AM                                MELATONIN PO   Take 5 mg by mouth At Bedtime                                nitroGLYcerin 0.4 MG sublingual tablet   Commonly known as:  NITROSTAT   Place 1 tablet (0.4 mg) under the tongue every 5 minutes as needed for chest pain prn                                order for DME   Equipment being ordered: Walker Wheels () and Walker () Treatment Diagnosis: Gait instability                                predniSONE 10 MG tablet   Commonly known as:  DELTASONE   4 tabs daily for 3 days, then 3 tabs daily for 3 days, then 2 tabs daily for 3 days, then 1 tab daily for 3 days, then stop   Last time this was given:  60 mg on 3/28/2018  9:03 AM                                * QUETIAPINE FUMARATE PO   Take 50 mg by mouth every morning   Last time this was given:  50 mg on 3/28/2018  9:04 AM                                * QUEtiapine 200 MG tablet   Commonly known as:  SEROquel   Take 1 tablet (200 mg) by mouth At Bedtime   Last time this was given:  50 mg on 3/28/2018  9:04 AM                                STOOL SOFTENER PO   Take 100 mg by mouth daily                                tiotropium 18 MCG capsule   Commonly known as:  SPIRIVA   Inhale 1 capsule (18 mcg) into the lungs daily                                Vitamin D3 2000 UNITS Chew   Take 2,000 mg by mouth daily                                * Notice:  This list has 4 medication(s) that are the same as other medications prescribed for you. Read the directions carefully, and ask your doctor or other care provider to review them with you.

## 2018-03-26 NOTE — LETTER
Transition Communication Hand-off for Care Transitions to Next Level of Care Provider    Name: Karen Alex  : 1955  MRN #: [id number]  Primary Care Provider: Eros Rebolledo  Primary Care MD Name: Dr Rebolledo  Primary Clinic: [address] [facility] [address]  Primary Care Clinic Name: [facility]  Reason for Hospitalization:  COPD exacerbation (H) [J44.1]  Admit Date/Time: 3/26/2018  2:52 PM  Discharge Date: 3/28/18  Payor Source: Payor: MEDICARE / Plan: MEDICARE / Product Type: Medicare /       Readmission Assessment Measure (RICHIE) Risk Score/category:Elevated        Reason for Communication Hand-off Referral: Admission diagnoses: COPD  Fragility  Other Elevated RICHIE    Discharge Plan:Home with sister and [facility].  Pt's son lives there intermittently       Concern for non-adherence with plan of care: Possibly, due to fragility and little support at home, as sister is also frail       Discharge Needs Assessment:  Needs       Most Recent Value    Anticipated Changes Related to Illness none    Equipment Currently Used at Home oxygen, raised toilet    Transportation Available family or friend will provide    # of Referrals Placed by [facility] Care Coordination, Homecare, Scheduled Follow-up appointments        Follow-up specialty is recommended: NA    Follow-up plan:  Future Appointments  Date Time Provider Department Center   2018 2:30 PM Eros Rebolledo MD [facility]   2018 1:30 PM Ghate, Lupis Shiledar Baxi, LICSW [facility]   2018 1:30 PM Ghate, Lupis Shiledar Baxi, LICSW [facility]   5/15/2018 1:30 PM Ghate, Lupis Shiledar Baxi, LICSW [facility]         Any outstanding tests or procedures:  No  Procedures     Future Labs/Procedures    Oxygen Adult     Comments:    Renew Home Oxygen Order  Renew previous prescription.  Expected treatment length is indefinite (99 months).    Attending Provider: Jesus Obrien  Leonard  Physician signature: See electronic signature associated with these discharge orders  Date of Order: March 28, 2018          Referrals     Future Labs/Procedures    Home care nursing referral     Comments:    RN skilled nursing visit. RN to assess respiratory and cardiac status.    Your provider has ordered home care nursing services. If you have not been contacted within 2 days of your discharge please call the inpatient department phone number at [phone] .    Home Care OT Referral for Hospital Discharge     Comments:    OT to eval and treat    Your provider has ordered home care - occupational therapy. If you have not been contacted within 2 days of your discharge please call the department phone number listed on the top of this document.    Home Care PT Referral for Hospital Discharge     Comments:    PT to eval and treat    Your provider has ordered home care - physical therapy. If you have not been contacted within 2 days of your discharge please call the department phone number listed on the top of this document.            Key Recommendations:  The EMS found pt with oxygen sats in the 50's.  Pt was transferred from [facility] ED to [facility] on bipap.  Pt was in the ICU one day and then transferred to the Medical floor where she remained on her baseline oxygen at 4-5L/NC.  Pt has only Medicare without a secondary insurance. She has little assets, so would probably qualify for assistance.  Writer encouraged pt to look into this.  Pt does not have a Pulmonologist.  Feel she is at great risk for readmission due to her fragility.   Pt is a poor historian.  She does not know her medications.   Pt has a home nebulizer and reports she uses this as Dr Rebolledo had directed her.  Pt drives.  Her sister, that lives with her, does not drive and appears very frail.  Pt has chronic back pain and is on Norco for this .  Pt was discharged with [facility] RN/PT and OT.  Pt is aware of home bound status.   Soon after pt discharged, received a notice from [facility]'s RN liaison that they had been involved before, but had to stop due to not homebound.  Hopefully, they will be involved, as feel she really needs this added support to prevent readmission.  Pt was given a wheeled walker at discharge.      Thank-you,  Alana Dunn  RN Care Coordinator  [facility]      AVS/Discharge Summary is the source of truth; this is a helpful guide for improved communication of patient story

## 2018-03-26 NOTE — H&P
Critical Care  Note      03/26/2018    Name: Karen Alex MRN#: [id number]   Age: 62 year old YOB: 1955     [facility]                 Problem List:   Active Problems:    Acute exacerbation of chronic obstructive pulmonary disease (H)           Summary/Hospital Course:     62F h/o copd on 4-5L home o2, h/o ptx, hld/htn, bipolar/anxiety/chronic back pain now presents with copd exacerbation.  Per ER note her symptoms started suddenly this AM, but in discussion with the patient she notes she's been sob for more like a day.  On EMS arrival she was satting in 50s on home 4-5L o2 and had gray/blue color.  Denies sputum output, some dry cough.  Denies sick contacts, new home/work situation, new pets, exposure to birds.  No fevers or other infectious sxs.  No anginal sxs (cp, n/v, dizzy/lh, diaphoresis).  Has some LE swelling pain on R, but US at OSH ED showed no dvt, but rather baker's cyst.  Otherwise no risk factors for PE.      Assessment and plan :     Karen Alex IS a 62 year old female admitted on 3/26/2018 for copd exacerbation.   I have personally reviewed the daily labs, imaging studies, cultures and discussed the case with referring physician and consulting physicians.   My assessment and plan by system for this patient is as follows:    Neurology/Psychiatry:   1. H/o bipolar:  Continue home doses of:  Latuda, abilify, quetipine, wellbutrin  2.  Chronic pain:  Continue home gabapentin, duloxetine, norco  3.  Anxiety:  Continue home prn valium.    Cardiovascular:   1.h/o HTN:  Continue home imdur  2.  H/o hld:  Continue home atorvastatin    Pulmonary/Ventilator Management:   1. Acute hypoxemic resp failure requiring nippv:  Likely copd exacerbation.  History less consistent with angina, PE, and no pna on cxr.    2.  Copd exac:  Continue nebs, azithro (5 day course), transition steroids to prednisone.  Continue home advair and spiriva.  Wean of bipap when able.  Checking viral  panel, rapid flu neg.  No infiltrate on xr to suggest pna.  3.  Acute on chronic hypercapnia:  Improved with bipap:  7.25/123 on admission, now 7.31/112. Bicarb >45 suggests lots of chronicity.    GI and Nutrition :   1. Advance diet as breathing allows.    Renal/Fluids/Electrolytes:   1. Creat ok at 0.50.    2. Lytes ok.    Infectious Disease:   1. Azithro empirically for copd exac.     Endocrine:   1.Monitor fsg:  Prn insulin vs dextrose as needed.    Hematology/Oncology:   1. Normal wbc 5.9  2. hgb ok 11.7  3.  pltlts ok 241.  4.  RLE edema:  No DVT on u/s.  Baker's cyst visualized.    ICU Prophylaxis:   1. DVT: Hep Subq/ mechanical  2. Feeding - ADAT  3. Family Update: pt herself at bedside  4. 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 Surgeon:  MILADY BASS MD     CONIZATION  10/23/07    Vulvar excision for BOBBY 3, cold knife conization     EXCISE VULVA WIDE LOCAL  5/25/2012    Procedure:EXCISE VULVA WIDE LOCAL; Wide Local Excision Of Vulvar Lesion; Surgeon:RE ALDRIDGE; Location:[facility] OR     FOOT SURGERY Right 01/24/2005    OPERATION: Excision of soft tissue mass right foot. Surgeon:  KHARI DUEÑAS DPM     FOOT SURGERY Right 02/28/2005    OPERATION: Excision of ganglion cyst right foot. Surgeon:  KHARI DUEÑAS DPM     HERNIORRHAPHY INCISIONAL (LOCATION)  4/25/2012    Procedure:HERNIORRHAPHY  INCISIONAL (LOCATION); Incisional Hernia Repair with mesh ; Surgeon:KIM QUICK; Location:[facility] OR     HYSTERECTOMY TOTAL ABDOMINAL, BILATERAL SALPINGO-OOPHORECTOMY, COMBINED       LAPAROSCOPIC HYSTERECTOMY TOTAL, BILATERAL SALPINGO-OOPHORECTOMY, COMBINED  3/6/2012    Procedure:COMBINED LAPAROSCOPIC HYSTERECTOMY TOTAL, BILATERAL SALPINGO-OOPHORECTOMY; Total laparoscopic Hysterectomy, Bilateral Salpingo Ooporectomy, Pubovaginal Sling, Biopsy Left Volva; Surgeon:RE ALDRIDGE; Location:[facility] OR     repair of anal fissures  2005     SLING BLADDER SUSPENSION WITH FASCIA ALESHA       SLING TRANSPUBO WITHOUT ANTERIOR COLPORRHAPHY  3/6/2012    Procedure:SLING TRANSPUBO WITHOUT ANTERIOR COLPORRHAPHY; Surgeon:JOLANTA ENRIQUEZ; Location:[facility] OR     TUBAL LIGATION       Social History     Social History     Marital status:      Spouse name: N/A     Number of children: N/A     Years of education: N/A     Occupational History     Not on file.     Social History Main Topics     Smoking status: Former Smoker     Packs/day: 0.50     Years: 26.00     Types: Cigarettes     Quit date: 7/3/2015     Smokeless tobacco: Never Used      Comment: sometimes     Alcohol use No     Drug use: No     Sexual activity: Not Currently     Birth control/ protection: Surgical     Other Topics Concern     Parent/Sibling W/ Cabg, Mi Or Angioplasty Before 65f 55m? No     Caffeine Concern No     2 cans per day     Sleep Concern Yes     sometimes     Special Diet No     Exercise No     walking in halls at least 3 days per week     Social History Narrative      No Known Allergies    The patient has a family history of CAD/MI in multiple 1st degree relatives.    ROS:  10-point ROS negative except as noted in hpi.         Key Medications:       ipratropium - albuterol 0.5 mg/2.5 mg/3 mL  3 mL Nebulization Q4H     heparin  5,000 Units Subcutaneous Q8H     lurasidone  60 mg Oral Daily     ARIPiprazole  5 mg Oral Daily     [START ON 3/27/2018]  QUEtiapine  50 mg Oral QAM     QUEtiapine  200 mg Oral At Bedtime     DULoxetine  90 mg Oral Daily     isosorbide mononitrate  30 mg Oral Daily     atorvastatin  40 mg Oral Daily     gabapentin  300 mg Oral At Bedtime     buPROPion  300 mg Oral Daily     fluticasone-salmeterol  1 puff Inhalation Q12H     tiotropium  18 mcg Inhalation Daily     aspirin  81 mg Oral Daily     [START ON 3/27/2018] predniSONE  60 mg Oral Daily     [START ON 3/27/2018] azithromycin  250 mg Oral Daily          Home Meds    Current Facility-Administered Medications on File Prior to Encounter:  [COMPLETED] methylPREDNISolone sodium succinate (solu-MEDROL) injection 125 mg   [COMPLETED] ipratropium - albuterol 0.5 mg/2.5 mg/3 mL (DUONEB) neb solution 6 mL   [COMPLETED] azithromycin (ZITHROMAX) 500 mg in sodium chloride 0.9 % 250 mL intermittent infusion   [DISCONTINUED] lidocaine 1 % 1 mL   [DISCONTINUED] lidocaine (LMX4) kit   [DISCONTINUED] sodium chloride (PF) 0.9% PF flush 3 mL   [DISCONTINUED] sodium chloride (PF) 0.9% PF flush 3 mL   [DISCONTINUED] 0.9% sodium chloride BOLUS     Current Outpatient Prescriptions on File Prior to Encounter:  HYDROcodone-acetaminophen (NORCO) 5-325 MG per tablet Take one pill  daily as needed, not to be taken with diazepam   Lurasidone HCl (LATUDA PO) Take 60 mg by mouth daily   ARIPiprazole (ABILIFY) 5 MG tablet Take 5 mg by mouth daily   MELATONIN PO Take 5 mg by mouth At Bedtime   QUETIAPINE FUMARATE PO Take 50 mg by mouth every morning   DULOXETINE HCL PO Take 90 mg by mouth daily   atorvastatin (LIPITOR) 40 MG tablet Take 1 tablet (40 mg) by mouth daily   isosorbide mononitrate (IMDUR) 30 MG 24 hr tablet Take 1 tablet (30 mg) by mouth daily   gabapentin (NEURONTIN) 300 MG capsule Take 1 capsule (300 mg) by mouth At Bedtime   QUEtiapine (SEROQUEL) 200 MG tablet Take 1 tablet (200 mg) by mouth At Bedtime   buPROPion (WELLBUTRIN XL) 300 MG 24 hr tablet Take 1 tablet (300 mg) by mouth every morning    diazepam (VALIUM) 10 MG tablet Take 1 tablet (10 mg) by mouth every 12 hours as needed for anxiety   ADVAIR DISKUS 250-50 MCG/DOSE diskus inhaler INHALE ONE PUFF BY MOUTH TWO TIMES A DAY   VENTOLIN  (90 BASE) MCG/ACT Inhaler SHAKE WELL AND INHALE 1 TO 2 PUFFS INTO THE LUNGS EVERY 4 HOURS AS NEEDED FOR SHORTNESS OF BREATH, DIFFICULTY BREATHING OR WHEEZING.   tiotropium (SPIRIVA) 18 MCG capsule Inhale 1 capsule (18 mcg) into the lungs daily   nitroGLYcerin (NITROSTAT) 0.4 MG sublingual tablet Place 1 tablet (0.4 mg) under the tongue every 5 minutes as needed for chest pain prn   Cholecalciferol (VITAMIN D3) 2000 UNITS CHEW Take 2,000 mg by mouth daily    Docusate Calcium (STOOL SOFTENER PO) Take 100 mg by mouth daily    albuterol (2.5 MG/3ML) 0.083% nebulizer solution Take 1 vial (2.5 mg) by nebulization 4 times daily as needed   ASPIRIN PO Take 81 mg by mouth daily              Physical Examination:   Temp:  [98.5  F (36.9  C)-98.9  F (37.2  C)] 98.5  F (36.9  C)  Pulse:  [103] 103  Heart Rate:  [] 82  Resp:  [17-29] 18  BP: (105-139)/() 137/95  FiO2 (%):  [40 %] 40 %  SpO2:  [92 %-99 %] 98 %  No intake or output data in the 24 hours ending 03/26/18 1528  Wt Readings from Last 4 Encounters:   03/26/18 100.9 kg (222 lb 7.1 oz)   03/26/18 108.9 kg (240 lb)   02/28/18 99.3 kg (219 lb)   02/27/18 103.4 kg (227 lb 14.4 oz)     BP - Mean:  [] 108  FiO2 (%): 40 %  Resp: 18    Recent Labs  Lab 03/26/18  1013   O2PER BIPAP       GEN: no acute distress, pleasant   HEENT: head ncat, sclera anicteric, OP patent, trachea midline   NECK: supple  PULM: unlabored synchronous with bipap, clear anteriorly    CV/COR: RRR S1S2 no gallop,  No rub, no murmur  ABD: soft nontender, normal bowel sounds, no mass  EXT:  Mild RLE Edema,   Warm x4  NEURO: awake, alert, rapid/fluent/appropriate speech, cn 2-12 grossly intact, normal str/sens x4 grossly.  SKIN: no obvious rash  LINES: clean, dry intact         Data:    All data and imaging reviewed     ROUTINE ICU LABS (Last four results)  CMP  Recent Labs  Lab 03/26/18  1022 03/26/18  1013   NA  --  140   POTASSIUM  --  4.1   CHLORIDE  --  94   CO2  --  >45*   ANIONGAP  --  Not Calculated   GLC  --  147*   BUN  --  10   CR  --  0.50*   GFRESTIMATED 73 >90   GFRESTBLACK 89 >90   MAHOGANY  --  9.1   PROTTOTAL  --  7.4   ALBUMIN  --  3.4   BILITOTAL  --  0.3   ALKPHOS  --  104   AST  --  12   ALT  --  18     CBC  Recent Labs  Lab 03/26/18  1013   WBC 5.9   RBC 4.01   HGB 11.7   HCT 40.7   *   MCH 29.2   MCHC 28.7*   RDW 13.2        INRNo lab results found in last 7 days.  Arterial Blood Gas  Recent Labs  Lab 03/26/18  1013   O2PER BIPAP       All cultures:    Recent Labs  Lab 03/26/18  1028 03/26/18  1013   CULT No growth after 2 hours No growth after 1 hour     Recent Results (from the past 24 hour(s))   XR Chest Port 1 View    Narrative    XR CHEST PORT 1 VW 3/26/2018 10:53 AM    HISTORY: resp distress;       Impression    IMPRESSION: Prominent emphysematous changes in the upper lobes  bilaterally. Fibrotic changes in the lung bases. No change compared to  2/19/2018.    LOS JUNE MD   US Lower Extremity Venous Duplex Right Port    Narrative    ULTRASOUND RIGHT LOWER EXTREMITY VENOUS DUPLEX PORTABLE March 26, 2018  11:35 AM     HISTORY: Swelling, shortness of breath, evaluate for deep vein  thrombosis.     COMPARISON: None.    FINDINGS: Gray-scale, color and Doppler spectral analysis ultrasound  was performed of the right leg. Compression and augmentation imaging  was performed.    There is no evidence for deep venous thrombosis. There is a Baker's  cyst that is 5.7 x 3.0 x 2.8 cm noted.      Impression    IMPRESSION: No evidence for deep vein thrombosis within the right leg.  There is a Baker cyst at the right popliteal fossa measuring up to 5.7  cm.     Labs (personally reviewed/interpreted):  See a/p.  Also, trops and lactate normal.  Rapid flu neg.     CXR  (personally reviewed/interpreted):   No infiltrates.    LE US:  KELSEY rand    D/w Dr. Mayberry of OSH ED.    Billing: This patient is critically ill: Yes. Total critical care time today 35 min.    # Pain Assessment:  Current Pain Score 3/26/2018   Patient currently in pain? denies   Pain score (0-10) -   Pain location -   Pain descriptors -   - Karen is experiencing pain due to chronic back pain, unchanged from baseline. Pain management was discussed and the plan was created in a collaborative fashion.  Karen's response to the current recommendations: engaged  - continue outpatient pain regimen (see a/p).

## 2018-03-26 NOTE — ADDENDUM NOTE
Encounter addended by: Shauna Saravia RT on: 3/26/2018  4:45 PM<BR>     Actions taken: Episode resolved, Sign clinical note

## 2018-03-26 NOTE — TELEPHONE ENCOUNTER
"SF refers call to me, c/o weakness, \"can't hold anything down\" denies v-d but c/o   Stool incontinence and not eating, trouble standing? Pt Called 911 yesterday as was not feeling well, VSS stable and no further w/o needed per paramedics, pt sounds weak on the phone, advised to go to ER, sibling agrees to take her, MD KEO GONZALEZ, RN, BSN  Message handled by Nurse Triage.    "
50

## 2018-03-26 NOTE — PLAN OF CARE
Problem: Patient Care Overview  Goal: Plan of Care/Patient Progress Review  Outcome: Therapy, progress toward functional goals as expected  Patient is alert and oriented .  Patient is picking at leads and dropping things on floor,and restless.  Up to commode to void with assist of one. Monitor SR, heart tones are distant.  Lungs are diminshed bilaterally. Patient states her breathing has improved.  Lips are slightly blue in color.  Skin pale and dry.  A few buised areas on right arm.  Continue close assessments and support.  Push pulmonary toilet.

## 2018-03-26 NOTE — PLAN OF CARE
Problem: Chronic Respiratory Difficulty Comorbidity  Goal: Chronic Respiratory Difficulty  Patient comorbidity will be monitored for signs and symptoms of Respiratory Difficulty (Chronic) condition.  Problems will be absent, minimized or managed by discharge/transition of care.  Outcome: Therapy, progress toward functional goals as expected  Patient states that breathing is better.  Patient denies shortness of breath.

## 2018-03-26 NOTE — ED NOTES
Pt arrives via EMS for respiratory distress. Difficulty breathing since yesterday. Has not been using inhalers/nebs. Sats in 50s for EMS. O2 dependent on 4 LPM. RR 40s, not able to speak in full sentences. Arived on Cpap. Pt reports breathing much improved.

## 2018-03-26 NOTE — PHARMACY-ADMISSION MEDICATION HISTORY
"Admission medication history interview status for the 3/26/2018  admission is complete. See EPIC admission navigator for prior to admission medications     Medication history source reliability:Good    Actions taken by pharmacist (provider contacted, etc):None     Additional medication history information not noted on PTA med list :  -She reports provider instructed her to stop Latuda about 2 days ago due to patient feeling \"over medicated\".    Medication reconciliation/reorder completed by provider prior to medication history? Yes    Time spent in this activity: 15 min    Prior to Admission medications    Medication Sig Last Dose Taking? Auth Provider   HYDROcodone-acetaminophen (NORCO) 5-325 MG per tablet Take one pill  daily as needed, not to be taken with diazepam Past Week at Unknown time Yes Eros Rebolledo MD   ARIPiprazole (ABILIFY) 5 MG tablet Take 5 mg by mouth daily 3/25/2018 at Unknown time Yes Unknown, Entered By History   MELATONIN PO Take 5 mg by mouth At Bedtime 3/25/2018 at Unknown time Yes Unknown, Entered By History   QUETIAPINE FUMARATE PO Take 50 mg by mouth every morning 3/25/2018 at Unknown time Yes Unknown, Entered By History   DULOXETINE HCL PO Take 90 mg by mouth daily 3/25/2018 at Unknown time Yes Unknown, Entered By History   atorvastatin (LIPITOR) 40 MG tablet Take 1 tablet (40 mg) by mouth daily 3/25/2018 at Unknown time Yes Eros Rebolledo MD   isosorbide mononitrate (IMDUR) 30 MG 24 hr tablet Take 1 tablet (30 mg) by mouth daily 3/25/2018 at Unknown time Yes Cj Tapia MD   gabapentin (NEURONTIN) 300 MG capsule Take 1 capsule (300 mg) by mouth At Bedtime 3/25/2018 at Unknown time Yes Eros Rebolledo MD   QUEtiapine (SEROQUEL) 200 MG tablet Take 1 tablet (200 mg) by mouth At Bedtime 3/25/2018 at Unknown time Yes Leana Patel NP   buPROPion (WELLBUTRIN XL) 300 MG 24 hr tablet Take 1 tablet (300 mg) by mouth every morning 3/25/2018 at Unknown time Yes " Leana Patel NP   diazepam (VALIUM) 10 MG tablet Take 1 tablet (10 mg) by mouth every 12 hours as needed for anxiety Past Week at Unknown time Yes Leana Patel NP   ADVAIR DISKUS 250-50 MCG/DOSE diskus inhaler INHALE ONE PUFF BY MOUTH TWO TIMES A DAY 3/25/2018 at pm Yes Eros Rebolledo MD   VENTOLIN  (90 BASE) MCG/ACT Inhaler SHAKE WELL AND INHALE 1 TO 2 PUFFS INTO THE LUNGS EVERY 4 HOURS AS NEEDED FOR SHORTNESS OF BREATH, DIFFICULTY BREATHING OR WHEEZING. Past Week at Unknown time Yes Eros Rebolledo MD   tiotropium (SPIRIVA) 18 MCG capsule Inhale 1 capsule (18 mcg) into the lungs daily 3/25/2018 at Unknown time Yes Eros Rebolledo MD   nitroGLYcerin (NITROSTAT) 0.4 MG sublingual tablet Place 1 tablet (0.4 mg) under the tongue every 5 minutes as needed for chest pain prn prn Yes Eros Rebolledo MD   Cholecalciferol (VITAMIN D3) 2000 UNITS CHEW Take 2,000 mg by mouth daily  3/25/2018 at Unknown time Yes Reported, Patient   Docusate Calcium (STOOL SOFTENER PO) Take 100 mg by mouth daily  3/25/2018 at Unknown time Yes Reported, Patient   albuterol (2.5 MG/3ML) 0.083% nebulizer solution Take 1 vial (2.5 mg) by nebulization 4 times daily as needed Past Week at Unknown time Yes Eros Rebolledo MD   ASPIRIN PO Take 81 mg by mouth daily 3/25/2018 at Unknown time Yes Unknown, Entered By History

## 2018-03-26 NOTE — ED PROVIDER NOTES
History     Chief Complaint:  Respiratory Distress    HPI   Karen Alex is a 62 year old female who presents with respiratory distress.  Patient is brought in via EMS who reports that the patient started experiencing increased shortness of breath at 0700 yesterday.  She was seen yesterday and was discharged home after no significant pathology found.  Her symptoms returned this morning, as patient started experiencing shortness of breath while on her at home supplemental oxygen of 4.5 L.  On EMS arrival, the patient was reportedly at 50% oxygen saturation while on this oxygen supplementation and appeared dusky and cyanotic.  She had a respiratory rate of 40 breaths per minute.  EMS reports that they were able to get her oxygen saturation up to 94% and arrived on BiPAP.  On presentation, patient is noticeably in distress and states that her symptoms started suddenly this morning.  She has not had increased sputum production.  Patient noticed leg swelling, worse in right leg for the past week.  She has not used any home nebulizers.  She denies any fevers, cough, chest pain, weakness, lightheadedness, syncope, visual complications, or any other associated symptoms.    Allergies:  No known drug allergies.    Medications:    HYDROcodone-acetaminophen (NORCO) 5-325 MG per tablet  Lurasidone HCl (LATUDA PO)  ARIPiprazole (ABILIFY) 5 MG tablet  MELATONIN PO  QUETIAPINE FUMARATE PO  DULOXETINE HCL PO  atorvastatin (LIPITOR) 40 MG tablet  isosorbide mononitrate (IMDUR) 30 MG 24 hr tablet  gabapentin (NEURONTIN) 300 MG capsule  QUEtiapine (SEROQUEL) 200 MG tablet  buPROPion (WELLBUTRIN XL) 300 MG 24 hr tablet  diazepam (VALIUM) 10 MG tablet  ADVAIR DISKUS 250-50 MCG/DOSE diskus inhaler  VENTOLIN  (90 BASE) MCG/ACT Inhaler  tiotropium (SPIRIVA) 18 MCG capsule  nitroGLYcerin (NITROSTAT) 0.4 MG sublingual tablet  Cholecalciferol (VITAMIN D3) 2000 UNITS CHEW  Docusate Calcium (STOOL SOFTENER PO)  albuterol (2.5 MG/3ML)  0.083% nebulizer solution  ASPIRIN PO    Past Medical History:    Anxiety   Arthritis   Asthma   Bipolar 2 disorder   Cervical dysplasia   Chronic back pain   COPD   Hypertension   Hypercholesterolemia   Hyperlipidaemia   Other chronic pain   Pneumothorax   Varicose veins   BOBBY III (vulvar intraepithelial neoplasia III)     Past Surgical History:    Back surgery  Conization  Vulva excision  Foot surgery  Herniorrhaphy   Hysterectomy  Laparoscopic salpingo-oophorectomy   Sling bladder  Tubal ligation    Family History:    Father-lung cancer  Mother-hypertension  Sister-heart disease  Grandfather-heart disease  Grandmother-aneurysm  Aunt-diabetes    Social History:  Smoking status: Former smoker  Alcohol use: No  Marital Status:        Review of Systems   Constitutional: Negative for fatigue and fever.   Eyes: Negative for visual disturbance.   Respiratory: Positive for shortness of breath. Negative for cough.    Cardiovascular: Positive for leg swelling (right>left). Negative for chest pain.   Gastrointestinal: Negative for diarrhea and vomiting.   Neurological: Negative for dizziness, weakness, light-headedness and headaches.   All other systems reviewed and are negative.    Physical Exam     Patient Vitals for the past 24 hrs:   BP Temp Temp src Pulse Heart Rate Resp SpO2 Weight   03/26/18 1245 135/78 - - - 93 - 95 % -   03/26/18 1230 (!) 139/102 - - - 94 - 95 % -   03/26/18 1227 - - - - 100 - 95 % -   03/26/18 1215 135/87 - - - 90 - 97 % -   03/26/18 1200 127/88 - - - - - 98 % -   03/26/18 1145 131/78 - - - 90 23 98 % -   03/26/18 1130 114/74 - - - - - - -   03/26/18 1115 112/74 - - - 92 20 98 % -   03/26/18 1100 113/64 - - - 92 23 99 % 108.9 kg (240 lb)   03/26/18 1045 105/63 - - - 92 - 98 % -   03/26/18 1030 110/76 - - - 97 17 97 % -   03/26/18 1015 113/70 - - - 98 22 97 % -   03/26/18 1009 (!) 135/99 98.9  F (37.2  C) Temporal 103 103 26 92 % -       Physical Exam  Constitutional: Alert, attentive,  middle aged woman in moderate respiratory distress   HENT: BiPAP mask on face    Nose: Nose normal.    Mouth/Throat: Oropharynx is clear, mucous membranes are moist   Eyes: Normal conjunctiva. Pupils are equal, round, and reactive to light.   CV: tachycardic rate and regular rhythm; no murmurs, rubs or gallups  Chest: Tachypnic, increased work of breathing with accessory muscle use, diminished breath sounds bilaterally with poor air movement and faint expiratory wheezing  GI:  There is no tenderness. No distension. Normal bowel sounds  MSK: Normal range of motion, 2+ pitting edema in right leg, 1+ pitting edema in left leg, no calf tenderness to palpation or skin changes of legs   Neurological: Alert, attentive, oriented x3, answers questions appropriately  Skin: Skin is warm and dry.      Emergency Department Course     ECG:  @ 1011  Indication: respiratory distress  Vent. Rate 100 bpm. MI interval 132 ms. QRS duration 80 ms. QT/QTc 358/461 ms. P-R-T axis 81 97 -10.   Normal sinus rhythm. Rightward axis. Pulmonary disease pattern. T wave abnormality, consider lateral ischemia. Prolonged QT. Positive for artifact. Abnormal ECG.  No significant change when compared to previous ECG from 2/19/18   Read @ 1013 by Dr. Mayberry.    Imaging:  XR Chest PORT  IMPRESSION: Prominent emphysematous changes in the upper lobes bilaterally. Fibrotic changes in the lung bases. No change compared to 2/19/2018.  Report per radiology.     US Lower Extremity Venous Duplex Right, portable:   IMPRESSION: No evidence for deep vein thrombosis within the right leg. There is a Baker cyst at the right popliteal fossa measuring up to 5.7 cm.  Report per radiology.    Laboratory:  CBC:  WBC 5.9, HGB 11.7,   CMP: Carbon Dioxide >45 (HH), Glucose 147 (H), Creatinine 0.50 (L) otherwise WNL  Pro-BNP: 6688 (H)   (1011) Glucose by meter: 135 (H)   (1013) Lactic Acid: 0.7  (1013) Blood gas venous: pH: 7.25 (L); pCO2: 123 (HH); pO2: 35; Bicarb: 54  (HH); FIO2: BIPAP; Oxyhemoglobin: 58; Base deficit: 20.9  (1013) Bilirubin direct: <0.1  (1014) Troponin POCT: 0.01  (1022) Creatinine POCT: creatinine 0.8, GFR 73      (1042) ISTAT gases lactate venous POCT: lactic acid 0.5 (L), pH 7.31 (L), pCO2 112 (HH), pO2 32, bicarbonate 57 (HH), O2 saturation 48%    Respiratory Virus Panel by PCR: PENDING     Blood culture #1: pending  Blood culture #2: pending    Influenza A/B Antigen: Influenza A negative, Influenza B negative    Interventions:  (1013) Albuterol-Ipratropium inhalation solution, 2.5 mg-0.5 mg/3 ml; 6 mL, inhalation  (1013) Solu-Medrol, 125 mg, IV injection  (1214) Azithromycin, 500 mg, IV infusion    Emergency Department Course:  Nursing notes and vitals reviewed.  (1010) I performed an exam of the patient as documented above.    1008 IV placed and blood drawn  1010 Switched over to ER Bi-Pap  1011 Placed on cardiac monitor  1012 EKG taken    Blood was drawn from the patient. This was sent for laboratory testing, findings above.   EKG was done, interpretation as above.  The patient was sent for a x-ray and ultrasound while in the emergency department, findings above.      (1030) I rechecked the patient  Findings and plan explained to the patient. Patient will be transferred to [facility] via EMS.   (1058) I discussed the case with Dr. Roblero, who will admit the patient to a monitored bed for further monitoring, evaluation, and treatment.    (1130) I rechecked the patient.      Impression & Plan    Medical Decision Making:  Karen Alex is a 62 year old female with history of COPD on 4.5 L NC at home, who presents for evaluation of shortness of breath and respiratory distress.  Signs and symptoms are consistent with COPD exacerbation.  A broad differential was considered including foreign body, reactive airway disease, pneumothorax, cardiac equivalent/ACS, viral induced wheezing, allergic phenomena, pneumonia, etc. Patient was originally cyanotic  appearing with oxygen saturations of 50% on her home oxygen according to EMS.  On BiPAP with settings of 12/6, her work of breathing has greatly improved and she has normal oxygen saturations.  She was given Solu-Medrol and DuoNeb treatments as well as azithromycin for COPD exacerbation.  There is no evidence of pneumonia, pleural effusion, pneumothorax, or pulmonary edema on her chest x-ray.  Patient has acute on chronic respiratory failure with hypercarbia.  I discussed this with the [facility] intensivist, who agreed that the patient does not require intubation prior to EMS transport.  Influenza and mini virus respiratory panel  were sent.  I also considered pulmonary embolism as she has tachycardia and some right lower extremity swelling.  Unfortunately we are unable to get a CT scan of her chest as she would not tolerate laying down.  Duplex ultrasound of her right leg shows a ruptured Baker's cyst, but no evidence of DVT, therefore pulmonary embolism is less likely.  I think this is most likely COPD exacerbation and she appears to have stabilized with BiPAP.  She will be transferred to [facility] ICU as she is requiring new BiPAP and there are no ICU beds available at [facility].  This plan was discussed with the patient and all questions were answered.      Critical Care time:  was 35 minutes for this patient excluding procedures.    Diagnosis:    ICD-10-CM   1. Chronic respiratory failure with hypoxia and hypercapnia (H) J96.11       Disposition:  Patient is transferred to [facility].      Dashawn CARTER, am serving as a scribe on 3/26/2018 at 10:10 AM to personally document services performed by Dr. Mayberry based on my observations and the provider's statements to me.          Dashawn Park  3/26/2018   [facility] EMERGENCY DEPARTMENT       Viola Mayberry MD  03/26/18 0825

## 2018-03-26 NOTE — PROGRESS NOTES
Pulmonary Rehab Discharge Summary    Reason for discharge:    Patient/family request discontinuation of services.    Progress towards goals:  Goals not met.  Barriers to achieving goals:   discharge on same date as initial evaluation.    Recommendation(s):    Continued therapy is recommended.  Rationale/Recommendations:  Order states to complete Cardiac Rehab before Pulmonary Rehab..      Shauna Saravia, RT on 3/26/2018 at 4:39 PM

## 2018-03-26 NOTE — IP AVS SNAPSHOT
[place] [address] [facility]    [address] [place] [address]    Phone:  [phone]                                       After Visit Summary   3/26/2018    Karen Alex    MRN: [id number]           After Visit Summary Signature Page     I have received my discharge instructions, and my questions have been answered. I have discussed any challenges I see with this plan with the nurse or doctor.    ..........................................................................................................................................  Patient/Patient Representative Signature      ..........................................................................................................................................  Patient Representative Print Name and Relationship to Patient    ..................................................               ................................................  Date                                            Time    ..........................................................................................................................................  Reviewed by Signature/Title    ...................................................              ..............................................  Date                                                            Time

## 2018-03-26 NOTE — LETTER
Transition Communication Hand-off for Care Transitions to Next Level of Care Provider    Name: Karen Alex  : 1955  MRN #: [id number]  Primary Care Provider: Eros Rebolledo  Primary Care MD Name: Dr Rebolledo  Primary Clinic: [address] [facility] [address]  Primary Care Clinic Name: [facility]  Reason for Hospitalization:  COPD exacerbation (H) [J44.1]  Admit Date/Time: 3/26/2018  2:52 PM  Discharge Date: 3/28/18  Payor Source: Payor: MEDICARE / Plan: MEDICARE / Product Type: Medicare /     Readmission Assessment Measure (RICHIE) Risk Score/category:Elevated           Reason for Communication Hand-off Referral: Admission diagnoses: COPD  Fragility  Other Elevated RICHIE     Discharge Plan:Home with sister and [facility].  Pt's son lives there intermittently         Concern for non-adherence with plan of care: Possibly, due to fragility and little support at home, as sister is also frail                            Discharge Needs Assessment:  Needs        Most Recent Value     Anticipated Changes Related to Illness none     Equipment Currently Used at Home oxygen, raised toilet     Transportation Available family or friend will provide     # of Referrals Placed by [facility] Care Coordination, Homecare, Scheduled Follow-up appointments         Follow-up specialty is recommended: NA     Follow-up plan:  Future Appointments  Date Time Provider Department Center   2018 2:30 PM Eros Rebolledo MD [facility]   2018 1:30 PM Ghate, Lupis Shiledar Baxi, LICSW [facility]   2018 1:30 PM Ghate, Lupis Shiledar Baxi, LICSW [facility]   5/15/2018 1:30 PM Ghate, Lupis Shiledar Baxi, LICSW [facility]            Any outstanding tests or procedures:  No  Procedures     Future Labs/Procedures     Oxygen Adult      Comments:     Renew Home Oxygen Order  Renew previous prescription.  Expected treatment length is indefinite (99 months).     Attending  Provider: Jesus Valle  Physician signature: See electronic signature associated with these discharge orders  Date of Order: March 28, 2018          Referrals     Future Labs/Procedures     Home care nursing referral      Comments:     RN skilled nursing visit. RN to assess respiratory and cardiac status.     Your provider has ordered home care nursing services. If you have not been contacted within 2 days of your discharge please call the inpatient department phone number at [phone] .     Home Care OT Referral for Hospital Discharge      Comments:     OT to eval and treat     Your provider has ordered home care - occupational therapy. If you have not been contacted within 2 days of your discharge please call the department phone number listed on the top of this document.     Home Care PT Referral for Hospital Discharge      Comments:     PT to eval and treat     Your provider has ordered home care - physical therapy. If you have not been contacted within 2 days of your discharge please call the department phone number listed on the top of this document.             Key Recommendations:  The EMS found pt with oxygen sats in the 50's.  Pt was transferred from [facility] ED to [facility] on bipap.  Pt was in the ICU one day and then transferred to the Medical floor where she remained on her baseline oxygen at 4-5L/NC.  Pt has only Medicare without a secondary insurance. She has little assets, so would probably qualify for assistance.  Writer encouraged pt to look into this.  Pt does not have a Pulmonologist.  Feel she is at great risk for readmission due to her fragility.   Pt is a poor historian.  She does not know her medications.   Pt has a home nebulizer and reports she uses this as Dr Rebolledo had directed her.  Pt drives.  Her sister, that lives with her, does not drive and appears very frail.  Pt has chronic back pain and is on Norco for this .  Pt was discharged with [facility] Care RN/PT and OT.   Pt is aware of home bound status.  Soon after pt discharged, received a notice from [facility]'s RN liaison that they had been involved before, but had to stop due to not homebound.  Hopefully, they will be involved, as feel she really needs this added support to prevent readmission.  Pt was given a wheeled walker at discharge.        Thank-you,  Alana Dunn  RN Care Coordinator  [facility]        AVS/Discharge Summary is the source of truth; this is a helpful guide for improved communication of patient story

## 2018-03-27 NOTE — PLAN OF CARE
Problem: Patient Care Overview  Goal: Plan of Care/Patient Progress Review  Discharge Planner OT   Patient plan for discharge: Home with son ADRIANE   Current status: Orders received, eval completed. Pt admitted with COPD exacerbation. Baseline home 02 use at 4-5L, current on 4L for eval. Pt alert, oriented to month/year/place/situation, not to date. Noted occasional confusion and impulsivity. Bed mobility with CGA, sit/stand with CGA. Hand held assist with walking in room- does not use AD at baseline. Standing g/h at sink x 4 mins with SBA. Chair transfer with SBA/CGA. Pt reports feeling dizzy, SOB, and weak. VSS with exception of 02, difficulty with sensor reading, noted drop to mid 80s with activity, educated on PLB to increase 02 sats. Noted max  during activity. Educated on continued POC.   Barriers to return to prior living situation: Functional activity/tolerance, balance, current needs for A with ADLs/IADLs and mobility.  Recommendations for discharge: Anticipate progress with skilled therapy, recommend home with son ADRIANE with IADLs (cooking, cleaning, laundry, transportation) and ADLs prn. OT to address tub/shower transfer and ADLs with EC/WS technique.   Rationale for recommendations: Pt lives with son in apt, with elevator access. Reports sedentary at baseline with exception of cooking and occasional errands with son ADRIANE. Pt currently on baseline home 02 of 4-5L.        Entered by: Leana Feliciano 03/27/2018 9:28 AM

## 2018-03-27 NOTE — PROGRESS NOTES
SPIRITUAL HEALTH SERVICES Progress Note  [facility]    SH, referral visit. The patient talked about communication with her son being hard as they make a decision about her staying at current apartment residence of moving. The patient shared about family transportation needs as well with her sister not driving. The patient is interested in Yazidism communion and interested to speak with the  further about relationship with her son.     provided care in presence, non judgemental reflective listening, and facilitated connection to .      Struggling to cope with considerations of moving residence but appreciates Yazidism nicole practices    SH will follow as LOS persists.            Ken Grossman  Chaplain Resident

## 2018-03-27 NOTE — PLAN OF CARE
Problem: Patient Care Overview  Goal: Plan of Care/Patient Progress Review  Outcome: Improving  VSS. No c/o pain. Pt tolerated bipap well overnight- now on 5L oxymask. Good output from connor.

## 2018-03-27 NOTE — PROGRESS NOTES
SPIRITUAL HEALTH SERVICES  Spiritual Assessment Progress Note  [facility]   had a brief visit with pt and gave her a copy of honoring choices to complete.    SH is available to answer any questions.  Please page us.                                                                                                                                           Shani Baltazar M.Div., [facility]  Staff    Pager [phone]

## 2018-03-27 NOTE — PLAN OF CARE
Problem: Patient Care Overview  Goal: Plan of Care/Patient Progress Review  Outcome: Improving  VSS on 4 LPM NC O2, baseline 4-5 L at home. Up with SBA and GB. A&Ox4. Denies pain. Very GARCIA, sats drop to low-mid 80's with movement. LS diminished. Continue to monitor. ICARE written regarding missing blue coat.

## 2018-03-27 NOTE — PROGRESS NOTES
Pt. Transferred to 66 around noon from ICU. Pt missing blue puffy coat, RN called ICU and ED and security no coat found, security took description of coat and will call 66 if they find it. Pt. Necklace locked in red pod locker #8.

## 2018-03-27 NOTE — CONSULTS
Consult Note     Karen Alex MRN# [id number]   YOB: 1955 Age: 62 year old      Date of Admission:  3/26/2018    Primary care provider: Eros Rebolledo          Assessment and Plan:   This is a  62 year old female admitted with COPD exacerbation.    1.  COPD exacerbation.  Patient has weaned off BiPAP.  Continue duo nebs every 4 hours.  Continue prednisone and azithromycin.  Continue Brio elliptical.    2.  Acute on chronic hypoxic and hypercapnic respiratory failure.  Patient was initially on BiPAP overnight.  She is subsequently weaned down to her baseline 4 L nasal cannula.  The patient will transfer to a medical floor on continuous pulse ox.    3.  Bipolar disorder, anxiety, and chronic pain disorder.  Continue bupropion gabapentin duloxetine Abilify and quetiapine.  Continue home doses of Valium and Norco as needed.    4.  Hyperlipidemia.  Continue atorvastatin.     # Pain Assessment:  Current Pain Score 3/27/2018   Patient currently in pain? denies   Pain score (0-10) -   Pain location -   Pain descriptors -   Karen gonzalez pain level was assessed and she currently denies pain.          Attestation:  This patient has been seen and evaluated by me, Jesus Valle MD.           Chief Complaint:   This patient is a 62 year old female who presents with shortness of breath.    History is obtained from the patient         History of Present Illness:   Yesterday evening EMS was called to the patient's home.  Patient was noted to be satting around 50% on home nasal cannula at 45 L of oxygen.  She was gray blue in color.  The patient is a poor historian.  She denies any recent upper respiratory infections.  No nasal congestion runny nose or sore throat.  No recent change in sputum production.  Patient denies chest pain.  She denies nausea vomiting lightheadedness or dizziness.  She presented to [facility] emergency room, but was transferred to [facility] due to lack of beds.  She was  noted to have some right lower extremity swelling but ultrasound demonstrated no DVT and a Baker's cyst.  She arrived on BiPAP and was quickly weaned off.  She was briefly on BiPAP overnight but off again this morning.  She is currently on her home oxygen regimen of 4 L nasal cannula.  Patient states she is currently back to her baseline.             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 Surgeon:  MILADY BASS MD     CONIZATION  10/23/07    Vulvar excision for BOBBY 3, cold knife conization     EXCISE VULVA WIDE LOCAL  5/25/2012    Procedure:EXCISE VULVA WIDE LOCAL; Wide Local Excision Of Vulvar Lesion; Surgeon:RE ALDRIDGE; Location:[facility] OR     FOOT SURGERY Right 01/24/2005    OPERATION: Excision of soft tissue mass right foot. Surgeon:  KHARI DUEÑAS DPM     FOOT SURGERY Right 02/28/2005    OPERATION: Excision of ganglion cyst right foot. Surgeon:  KHARI DUEÑAS DPM     HERNIORRHAPHY INCISIONAL (LOCATION)  4/25/2012    Procedure:HERNIORRHAPHY INCISIONAL (LOCATION); Incisional Hernia Repair with mesh ; Surgeon:KIM QUICK; Location:[facility] OR     HYSTERECTOMY TOTAL ABDOMINAL, BILATERAL SALPINGO-OOPHORECTOMY, COMBINED       LAPAROSCOPIC HYSTERECTOMY TOTAL, BILATERAL SALPINGO-OOPHORECTOMY, COMBINED  3/6/2012    Procedure:COMBINED LAPAROSCOPIC HYSTERECTOMY TOTAL, BILATERAL SALPINGO-OOPHORECTOMY;  Total laparoscopic Hysterectomy, Bilateral Salpingo Ooporectomy, Pubovaginal Sling, Biopsy Left Volva; Surgeon:RE ALDRIDGE; Location:[facility] OR     repair of anal fissures       SLING BLADDER SUSPENSION WITH FASCIA ALESHA       SLING TRANSPUBO WITHOUT ANTERIOR COLPORRHAPHY  3/6/2012    Procedure:SLING TRANSPUBO WITHOUT ANTERIOR COLPORRHAPHY; Surgeon:JOLANTA ENRIQUEZ; Location:[facility] OR     TUBAL LIGATION               Social History:     Social History   Substance Use Topics     Smoking status: Former Smoker     Packs/day: 0.50     Years: 26.00     Types: Cigarettes     Quit date: 7/3/2015     Smokeless tobacco: Never Used      Comment: sometimes     Alcohol use No             Family History:     Family History   Problem Relation Age of Onset     CANCER Father      asbestos lung disease     Hypertension Mother      DIABETES Paternal Aunt      HEART DISEASE Maternal Grandfather      CANCER Maternal Grandmother      unsure of what kind,  at the age of 86     Circulatory Paternal Grandmother       of brain aneurysm     Asthma Son      HEART DISEASE Sister      No Known Problems Brother      Pacemaker Sister      No Known Problems Brother              Immunizations:     Immunization History   Administered Date(s) Administered     Influenza (H1N1) 2009     Influenza (IIV3) PF 10/28/2004, 2005, 2006, 2007, 10/16/2008, 10/12/2009, 10/18/2010, 2011, 10/25/2012     Influenza Vaccine IM 3yrs+ 4 Valent IIV4 10/25/2014     Pneumococcal 23 valent 2007, 10/07/2014     TD (ADULT, 7+) 2005     TDAP Vaccine (Adacel) 10/07/2014            Allergies:   No Known Allergies          Medications:     Prior to Admission medications    Medication Sig Start Date End Date Taking? Authorizing Provider   HYDROcodone-acetaminophen (NORCO) 5-325 MG per tablet Take one pill  daily as needed, not to be taken with diazepam 3/6/18  Yes Eros Rebolledo MD   ARIPiprazole (ABILIFY) 5 MG  tablet Take 5 mg by mouth daily   Yes Unknown, Entered By History   MELATONIN PO Take 5 mg by mouth At Bedtime   Yes Unknown, Entered By History   QUETIAPINE FUMARATE PO Take 50 mg by mouth every morning   Yes Unknown, Entered By History   DULOXETINE HCL PO Take 90 mg by mouth daily   Yes Unknown, Entered By History   atorvastatin (LIPITOR) 40 MG tablet Take 1 tablet (40 mg) by mouth daily 1/20/18  Yes Eros Rebolledo MD   isosorbide mononitrate (IMDUR) 30 MG 24 hr tablet Take 1 tablet (30 mg) by mouth daily 1/19/18  Yes Cj Tapia MD   gabapentin (NEURONTIN) 300 MG capsule Take 1 capsule (300 mg) by mouth At Bedtime 1/16/18  Yes Eros Rebolledo MD   QUEtiapine (SEROQUEL) 200 MG tablet Take 1 tablet (200 mg) by mouth At Bedtime 9/29/17  Yes Leana Patel NP   buPROPion (WELLBUTRIN XL) 300 MG 24 hr tablet Take 1 tablet (300 mg) by mouth every morning 9/29/17  Yes Leana Patel NP   diazepam (VALIUM) 10 MG tablet Take 1 tablet (10 mg) by mouth every 12 hours as needed for anxiety 9/29/17  Yes Leana Patel NP   ADVAIR DISKUS 250-50 MCG/DOSE diskus inhaler INHALE ONE PUFF BY MOUTH TWO TIMES A DAY 9/21/17  Yes Eros Rebolledo MD   VENTOLIN  (90 BASE) MCG/ACT Inhaler SHAKE WELL AND INHALE 1 TO 2 PUFFS INTO THE LUNGS EVERY 4 HOURS AS NEEDED FOR SHORTNESS OF BREATH, DIFFICULTY BREATHING OR WHEEZING. 9/21/17  Yes Eros Rebolledo MD   tiotropium (SPIRIVA) 18 MCG capsule Inhale 1 capsule (18 mcg) into the lungs daily 9/21/17  Yes Eros Rebolledo MD   nitroGLYcerin (NITROSTAT) 0.4 MG sublingual tablet Place 1 tablet (0.4 mg) under the tongue every 5 minutes as needed for chest pain prn 9/15/17  Yes Eros Rebolledo MD   Cholecalciferol (VITAMIN D3) 2000 UNITS CHEW Take 2,000 mg by mouth daily    Yes Reported, Patient   Docusate Calcium (STOOL SOFTENER PO) Take 100 mg by mouth daily    Yes Reported, Patient   albuterol (2.5 MG/3ML) 0.083% nebulizer solution  Take 1 vial (2.5 mg) by nebulization 4 times daily as needed 5/4/15  Yes Eros Rebolledo MD   ASPIRIN PO Take 81 mg by mouth daily   Yes Unknown, Entered By History            Review of Systems:   The 10 point Review of Systems is negative other than noted in the HPI           Physical Exam:   Vitals were reviewed  Temp: 98.2  F (36.8  C) Temp src: Oral BP: 126/79   Heart Rate: 109 Resp: 24 SpO2: (!) 87 % (during standing ADLs. increased to 94% rest) O2 Device: Nasal cannula Oxygen Delivery: 4 LPM    This is a well nourished well developed female resting comfortably in bed, no acute distress  Head: atraumatic normocephalic, sclera noninjected and anicterric, oral mucosa moist without lesions or exudates.  Neck: supple without spinal abnormality  Chest: clear to auscultation bilaterally, without wheezes, rhonchi, or rales.  Cardiovascular: regular rate and rhythm, no murmurs, gallops, or rubs, no edema  Abdomen: bowel sounds normal, nontender and nondistended, no hepatosplenomegaly or masses.  Musculoskeletal: normal muscle mass and tone  Skin: no rashes  Lymph: no lymphadenopathy.  Neuro: cranial nerves II-XII intact, strength in all four extremities normal, reflexes normal, coordination normal.         Data:   All laboratory data reviewed  All cardiac studies reviewed by me.  All imaging studies reviewed by me.

## 2018-03-27 NOTE — PROGRESS NOTES
Critical Care Progress Note      03/27/2018    Name: Karen Alex MRN#: [id number]   Age: 62 year old YOB: 1955     [facility]                 Problem List:   Active Problems:    Acute exacerbation of chronic obstructive pulmonary disease (H)           Summary/Hospital Course:     62F h/o copd on 4-5L home o2, h/o ptx, hld/htn, bipolar/anxiety/chronic back pain now presents with copd exacerbation.  Per ER note her symptoms started suddenly this AM, but in discussion with the patient she notes she's been sob for more like a day.  On EMS arrival she was satting in 50s on home 4-5L o2 and had gray/blue color.  Denies sputum output, some dry cough.  Denies sick contacts, new home/work situation, new pets, exposure to birds.  No fevers or other infectious sxs.  No anginal sxs (cp, n/v, dizzy/lh, diaphoresis).  Has some LE swelling pain on R, but US at OSH ED showed no dvt, but rather baker's cyst.  Otherwise no risk factors for PE.  Since arrival to the ICU she has done well.  Has weaned off of bipap, now breathing well on her home O2 4-5L nasal cannula.      Assessment and plan :     Karen Alex IS a 62 year old female admitted on 3/26/2018 for copd exacerbation.   I have personally reviewed the daily labs, imaging studies, cultures and discussed the case with referring physician and consulting physicians.   My assessment and plan by system for this patient is as follows:     Neurology/Psychiatry:   1. H/o bipolar:  Continue home doses of:  Latuda, abilify, quetipine, wellbutrin  2.  Chronic pain:  Continue home gabapentin, duloxetine, norco  3.  Anxiety:  Continue home prn valium.     Cardiovascular:   1.h/o HTN:  Continue home imdur  2.  H/o hld:  Continue home atorvastatin     Pulmonary/Ventilator Management:   1. Acute hypoxemic resp failure requiring nippv:  Likely copd exacerbation.  Improved.  Now off nippv.  2.  Copd exac:  Continue nebs, azithro (5 day course), transition  steroids to prednisone.  Continue home advair and spiriva.  Checking viral panel, rapid flu neg.  No infiltrate on xr to suggest pna.   3.  Acute on chronic hypercapnia:  Improved with bipap, now weaned off.  Bicarb >45 suggests lots of chronicity.     GI and Nutrition :   1. Advance diet as breathing allows.     Renal/Fluids/Electrolytes:   1. Creat ok at 0.51.    2. Lytes ok.     Infectious Disease:   1. Azithro empirically for copd exac.      Endocrine:   1.Monitor fsg:  Prn insulin vs dextrose as needed.     Hematology/Oncology:   1. Normal wbc 5.9  2. hgb ok 10.0 (no active bleeding)  3.  pltlts ok 205  4.  RLE edema:  No DVT on u/s.  Baker's cyst visualized.     ICU Prophylaxis:   1. DVT: Hep Subq/ mechanical  2. Feeding - reg diet  3. Family Update: pt herself at bedside  4. Disposition - may leave icu today             Interim History:     Feeling much improved this morning.  Breathing is much better--feels back to baseline.  Denies cp, sob, n/v/d, abd pain.  Good appetite.         Key Medications:       ipratropium - albuterol 0.5 mg/2.5 mg/3 mL  3 mL Nebulization Q4H     heparin  5,000 Units Subcutaneous Q8H     ARIPiprazole  5 mg Oral Daily     QUEtiapine  50 mg Oral QAM     QUEtiapine  200 mg Oral At Bedtime     DULoxetine  90 mg Oral Daily     isosorbide mononitrate  30 mg Oral Daily     atorvastatin  40 mg Oral Daily     gabapentin  300 mg Oral At Bedtime     buPROPion  300 mg Oral Daily     aspirin  81 mg Oral Daily     predniSONE  60 mg Oral Daily     azithromycin  250 mg Oral Daily     fluticasone-vilanterol  1 puff Inhalation Daily                 Physical Examination:   Temp:  [98  F (36.7  C)-98.9  F (37.2  C)] 98.4  F (36.9  C)  Pulse:  [103] 103  Heart Rate:  [] 89  Resp:  [10-39] 24  BP: ()/() 108/97  FiO2 (%):  [40 %] 40 %  SpO2:  [87 %-99 %] 92 %    Intake/Output Summary (Last 24 hours) at 03/27/18 0821  Last data filed at 03/27/18 0600   Gross per 24 hour   Intake               200 ml   Output              770 ml   Net             -570 ml     Wt Readings from Last 4 Encounters:   03/27/18 100.6 kg (221 lb 12.5 oz)   03/26/18 108.9 kg (240 lb)   02/28/18 99.3 kg (219 lb)   02/27/18 103.4 kg (227 lb 14.4 oz)     BP - Mean:  [] 99  FiO2 (%): 40 %  Resp: 24    Recent Labs  Lab 03/26/18  1013   O2PER BIPAP       GEN: no acute distress, pleasant   HEENT: head ncat, sclera anicteric, OP patent, trachea midline   NECK: supple  PULM: unlabored, clear anteriorly    CV/COR: RRR S1S2 no gallop,  No rub, no murmur  ABD: soft nontender, normal bowel sounds, no mass  EXT:  Mild RLE Edema,   Warm x4  NEURO: awake, alert, rapid/fluent/appropriate speech, cn 2-12 grossly intact, normal str/sens x4 grossly.  SKIN: no obvious rash  LINES: clean, dry intact         Data:   All data and imaging reviewed     ROUTINE ICU LABS (Last four results)  CMP  Recent Labs  Lab 03/27/18  0611 03/26/18  1022 03/26/18  1013     --  140   POTASSIUM 3.9  --  4.1   CHLORIDE 95  --  94   CO2 >45*  --  >45*   ANIONGAP Not Calculated  --  Not Calculated   *  --  147*   BUN 12  --  10   CR 0.51*  --  0.50*   GFRESTIMATED >90 73 >90   GFRESTBLACK >90 89 >90   MAHOGANY 8.3*  --  9.1   MAG 2.1  --   --    PHOS 2.4*  --   --    PROTTOTAL  --   --  7.4   ALBUMIN  --   --  3.4   BILITOTAL  --   --  0.3   ALKPHOS  --   --  104   AST  --   --  12   ALT  --   --  18     CBC  Recent Labs  Lab 03/27/18  0611 03/26/18  1610 03/26/18  1013   WBC 5.9  --  5.9   RBC 3.39*  --  4.01   HGB 10.0*  --  11.7   HCT 34.0*  --  40.7     --  102*   MCH 29.5  --  29.2   MCHC 29.4*  --  28.7*   RDW 13.6  --  13.2    217 241     INRNo lab results found in last 7 days.  Arterial Blood Gas  Recent Labs  Lab 03/26/18  1013   O2PER BIPAP       All cultures:    Recent Labs  Lab 03/26/18  1028 03/26/18  1013   CULT No growth after 17 hours No growth after 17 hours     Recent Results (from the past 24 hour(s))   XR Chest Port 1 View     Narrative    XR CHEST PORT 1 VW 3/26/2018 10:53 AM    HISTORY: resp distress;       Impression    IMPRESSION: Prominent emphysematous changes in the upper lobes  bilaterally. Fibrotic changes in the lung bases. No change compared to  2/19/2018.    LOS JUNE MD   US Lower Extremity Venous Duplex Right Port    Narrative    ULTRASOUND RIGHT LOWER EXTREMITY VENOUS DUPLEX PORTABLE March 26, 2018  11:35 AM     HISTORY: Swelling, shortness of breath, evaluate for deep vein  thrombosis.     COMPARISON: None.    FINDINGS: Gray-scale, color and Doppler spectral analysis ultrasound  was performed of the right leg. Compression and augmentation imaging  was performed.    There is no evidence for deep venous thrombosis. There is a Baker's  cyst that is 5.7 x 3.0 x 2.8 cm noted.      Impression    IMPRESSION: No evidence for deep vein thrombosis within the right leg.  There is a Baker cyst at the right popliteal fossa measuring up to 5.7  cm.    MIKE LIVE MD     # Pain Assessment:  Current Pain Score 3/27/2018   Patient currently in pain? denies   Pain score (0-10) -   Pain location -   Pain descriptors -   - Karen is experiencing pain due to chronic back pain. Pain management was discussed and the plan was created in a collaborative fashion.  Karen's response to the current recommendations: engaged  - Continue home norco.    Labs personally reviewed/interpreted:  See a/p.    D/w on-call hospitalist.

## 2018-03-27 NOTE — PLAN OF CARE
Problem: Patient Care Overview  Goal: Plan of Care/Patient Progress Review  Outcome: No Change  Pt weaned to 5L oxymask, sats mid 90's. No c/o pain or SOB. Pt a&o, forgetful at times. Tele SR. Wynn placed for urinary retention. Patent with good u/o. Will continue to monitor.

## 2018-03-27 NOTE — PROGRESS NOTES
03/27/18 1447   Quick Adds   Type of Visit Initial PT Evaluation   Living Environment   Lives With child(cris), adult   Living Arrangements apartment   Home Accessibility no concerns   Number of Stairs to Enter Home 0   Number of Stairs Within Home 0   Self-Care   Usual Activity Tolerance moderate   Current Activity Tolerance fair   Regular Exercise no   Equipment Currently Used at Home oxygen;raised toilet   Functional Level Prior   Ambulation 0-->independent   Transferring 0-->independent   Toileting 1-->assistive equipment   Bathing 0-->independent   Dressing 0-->independent   Eating 0-->independent   Communication 0-->understands/communicates without difficulty   Swallowing 0-->swallows foods/liquids without difficulty   Cognition 0 - no cognition issues reported   Fall history within last six months no   Which of the above functional risks had a recent onset or change? dressing;bathing;toileting;transferring;ambulation;cognition   Prior Functional Level Comment indep with mobility and ADLs using 4L O2 at baseline   General Information   Onset of Illness/Injury or Date of Surgery - Date 03/26/18   Referring Physician Anil Roblero MD   Patient/Family Goals Statement return home   Pertinent History of Current Problem (include personal factors and/or comorbidities that impact the POC) 62F h/o copd on 4-5L home o2, h/o ptx, hld/htn, bipolar/anxiety/chronic back pain now presents with copd exacerbation   Precautions/Limitations fall precautions;oxygen therapy device and L/min   Cognitive Status Examination   Orientation orientation to person, place and time   Level of Consciousness alert   Follows Commands and Answers Questions 100% of the time   Personal Safety and Judgment at risk behaviors demonstrated   Posture    Posture Protracted shoulders;Forward head position   Range of Motion (ROM)   ROM Comment LE ROM is WFL   Strength   Strength Comments LE strength is 4-/5 grossly, pt demonstrates functional  "weakness   Bed Mobility   Bed Mobility Comments CGA   Transfer Skills   Transfer Comments CGA w/ WW   Gait   Gait Comments min A w/ WW, dec step length and reduced gait speed, dec foot clearance, SOB   Balance   Balance Comments unsteady upon standing and when walking in room, reaching for UE support of furnishing   General Therapy Interventions   Planned Therapy Interventions bed mobility training;gait training;strengthening;transfer training   Clinical Impression   Criteria for Skilled Therapeutic Intervention yes, treatment indicated   PT Diagnosis difficulty walking   Influenced by the following impairments dec functional endurance, impaired gait, dec strength   Functional limitations due to impairments impaired mobility   Clinical Presentation Evolving/Changing   Clinical Presentation Rationale clinical observation   Clinical Decision Making (Complexity) Moderate complexity   Therapy Frequency` 5 times/week   Predicted Duration of Therapy Intervention (days/wks) 4 days   Anticipated Equipment Needs at Discharge front wheeled walker  (4 WW)   Anticipated Discharge Disposition Home with Assist;Home with Home Therapy   Risk & Benefits of therapy have been explained Yes   Patient, Family & other staff in agreement with plan of care Yes   [facility] [employer] \"6 Clicks\"   2016, Trustees of [facility], under license to [employer].  All rights reserved.   6 Clicks Short Forms Daily Activity Inpatient Short Form   [facility] AM-PAC  \"6 Clicks\" Daily Activity Inpatient Short Form   1. Putting on and taking off regular lower body clothing? 3 - A Little   2. Bathing (including washing, rinsing, drying)? 3 - A Little   3. Toileting, which includes using toilet, bedpan or urinal? 3 - A Little   4. Putting on and taking off regular upper body clothing? 4 - None   5. Taking care of personal grooming such as brushing teeth? 4 - None   6. Eating meals? 4 - None   Daily Activity Raw Score (Score out of " 24.Lower scores equate to lower levels of function) 21   Total Evaluation Time   Total Evaluation Time (Minutes) 15

## 2018-03-27 NOTE — PLAN OF CARE
Problem: Patient Care Overview  Goal: Plan of Care/Patient Progress Review  PT:  Discharge Planner PT   Patient plan for discharge: home with son to assist  Current status:  Orders received, eval completed. Pt admitted with COPD exacerbation. Baseline home 02 use at 4-5L, current on 4L for eval. Pt completing bed mobility with CGA and sit to stand transfers with CGA. Pt reporting SOB ambulating to BR with nursing. Pt ambulated 150ft with min A and WW, 4L O2 via NC, standing rest break x 2 with SAO2 dropping to 85% from 94%, recovering to 92% after one minute seated rest.   Barriers to return to prior living situation: Tolerance for functional mobility, need for assist, falls risk  Recommendations for discharge: home with son and home PT as indicated  Rationale for recommendations: Pt would benefit from skilled PT to improve functional mobility and determine need for AD at MD       Entered by: Maame Swenson 03/27/2018 4:03 PM

## 2018-03-27 NOTE — PROGRESS NOTES
03/27/18 0852   Quick Adds   Type of Visit Initial Occupational Therapy Evaluation   Living Environment   Lives With child(cris), adult   Living Arrangements apartment   Home Accessibility no concerns   Number of Stairs to Enter Home 0   Number of Stairs Within Home 0   Transportation Available family or friend will provide;car   Living Environment Comment Pt will have son living with her, currently sister is staying with her. Reports son to be home and able to A with cooking/cleaning/laundry. Pt has handicapped height toilets, vanity within reach. Pt ususally sponge bathes- will occasional soak in tub.    Self-Care   Usual Activity Tolerance moderate   Current Activity Tolerance fair   Regular Exercise no   Equipment Currently Used at Home oxygen;raised toilet   Functional Level Prior   Ambulation 0-->independent   Transferring 0-->independent   Toileting 1-->assistive equipment   Bathing 0-->independent   Dressing 0-->independent   Eating 0-->independent   Communication 0-->understands/communicates without difficulty   Swallowing 0-->swallows foods/liquids without difficulty   Cognition 0 - no cognition issues reported   Fall history within last six months no   Which of the above functional risks had a recent onset or change? dressing;bathing;toileting;transferring;ambulation;cognition   Prior Functional Level Comment Pt IND with ADLs- ususally sponge bathes. Pt IND with medication management, cooking, driving. Pt has A with cleaning/laundry.    General Information   Onset of Illness/Injury or Date of Surgery - Date 03/26/18   Referring Physician Annika   Patient/Family Goals Statement to d/c home with son   Additional Occupational Profile Info/Pertinent History of Current Problem Pt is a 62F h/o copd on 4-5L home o2, h/o ptx, hld/htn, bipolar/anxiety/chronic back pain now presents with copd exacerbation.  Per ER note her symptoms started suddenly this AM, but in discussion with the patient she notes she's been  sob for more like a day.  On EMS arrival she was satting in 50s on home 4-5L o2 and had gray/blue color.    Precautions/Limitations oxygen therapy device and L/min   General Observations Pt agreeable to OT.    Cognitive Status Examination   Level of Consciousness alert   Able to Follow Commands WNL/WFL   Personal Safety (Cognitive) impulsive   Cognitive Comment Pt oriented to month/year/place/situation, not to date. Pt reports that her cognition had changed- will continue to monitor for safe d/c placement.    Pain Assessment   Patient Currently in Pain No   Mobility   Bed Mobility Comments CGA d/t impulsivity   Transfer Skills   Transfer Comments CGA/SBA d/t impulsivity and weakness   Balance   Balance Comments SBA static   Grooming   Level of Pope: Grooming stand-by assist   Activities of Daily Living Analysis   Impairments Contributing to Impaired Activities of Daily Living balance impaired;cognition impaired  (functional activity tolerance)   General Therapy Interventions   Planned Therapy Interventions ADL retraining;cognition;transfer training;home program guidelines;progressive activity/exercise   Clinical Impression   Criteria for Skilled Therapeutic Interventions Met yes, treatment indicated   OT Diagnosis Impaired ADLs/IADLs and functional mobility   Influenced by the following impairments Impaired functional activity tolerance, pulmonary, safety/impulsivity, impaired balance   Assessment of Occupational Performance 3-5 Performance Deficits   Identified Performance Deficits dressing, toileting, bathing, functional mobility, IADLs   Clinical Decision Making (Complexity) Low complexity   Therapy Frequency 5 times/wk   Predicted Duration of Therapy Intervention (days/wks) 5 days   Anticipated Discharge Disposition Home with Assist;Home with Home Therapy;Home with Outpatient Therapy   Risks and Benefits of Treatment have been explained. Yes   Patient, Family & other staff in agreement with plan of care  "Yes   [facility] TM \"6 Clicks\"   2016, Trustees of [facility], under license to [employer].  All rights reserved.   6 Clicks Short Forms Daily Activity Inpatient Short Form   [facility]  \"6 Clicks\" Daily Activity Inpatient Short Form   1. Putting on and taking off regular lower body clothing? 3 - A Little   2. Bathing (including washing, rinsing, drying)? 3 - A Little   3. Toileting, which includes using toilet, bedpan or urinal? 3 - A Little   4. Putting on and taking off regular upper body clothing? 4 - None   5. Taking care of personal grooming such as brushing teeth? 4 - None   6. Eating meals? 4 - None   Daily Activity Raw Score (Score out of 24.Lower scores equate to lower levels of function) 21   Total Evaluation Time   Total Evaluation Time (Minutes) 12     "

## 2018-03-28 NOTE — PLAN OF CARE
Problem: Patient Care Overview  Goal: Plan of Care/Patient Progress Review  Outcome: Improving  Pt is A&O x4, occasionally forgetful.  VSS on 4L via NC. LS clear bilaterally, no cough noted this shift. GARCIA.  Up with SBA to bathroom x1 for voiding, No Bm. Denies pain or any other discomfort. On reg diet. +2 edema on BLE. PCD s on. Slept well throughout the night. Will continue to monitor.   Full code

## 2018-03-28 NOTE — PLAN OF CARE
Problem: PT General Care Plan  Goal: PT target date for goal attainment  Discharge Planner PT   Patient plan for discharge: home with assist from family  Current status: O2 sat 94% in sitting before activity. Sit <> stand with walker and SBA. Denies dizziness. Portable oxygen set at 4.0 L at beginning of ambulation. Pt. Ambulated 150 ft x 1 with FWW and CGA. After 50 ft O2 sat dropped to 87%. Portable oxygen increased to 6.0 L. Oxygen sat recovered to 91%. Upon completion of activity, O2 sat recovered to baseline after 1 minute.   Barriers to return to prior living situation: decreased activity tolerance, fall risk, use of AD  Recommendations for discharge: home with family assist, FWW and home PT per plan established by Physical Therapist  Rationale for recommendations: Patient requires supervision with activity. Will benefit from continued PT to increase strength, endurance and functional independence.        Entered by: Jonelle Ambriz 03/28/2018 12:16 PM       Patient discharged to home. PT goals not met

## 2018-03-28 NOTE — DISCHARGE INSTRUCTIONS
A referral has been sent to [facility] for home RN, Physical and Occupational Therapy.  Their number is [phone].

## 2018-03-28 NOTE — PLAN OF CARE
Problem: Patient Care Overview  Goal: Plan of Care/Patient Progress Review  Occupational Therapy Discharge Summary    Reason for therapy discharge:    Discharged to home with home therapy.    Progress towards therapy goal(s). [facility] for goal details.  Goals not met.  Barriers to achieving goals:   discharge from facility.    Therapy recommendation(s):    Continued therapy is recommended.  Rationale/Recommendations:  Anticipate progress with skilled therapy, recommend home with son A with IADLs (cooking, cleaning, laundry, transportation) and ADLs prn. OT to address tub/shower transfer and ADLs with EC/WS technique. .

## 2018-03-28 NOTE — PLAN OF CARE
Problem: Patient Care Overview  Goal: Plan of Care/Patient Progress Review  Outcome: Improving  A&O x4, occasionally forgetful.  SBA.  Tolerates regular diet, cont pulse ox, and PCD's.  VSS on 4 LPM via NC.  Reportedly uses 4.5 LPM at home.  LS diminished.  Non-productive cough.  +2 edema in right ankle/foot.  K 4.3.  PRN Jackpot at HS for bilateral ankle pain, patient sleeping after.  Discharge pending.

## 2018-03-28 NOTE — PLAN OF CARE
Problem: Patient Care Overview  Goal: Plan of Care/Patient Progress Review  Outcome: Adequate for Discharge Date Met: 03/28/18  PT A&O, VSS this shift on 4L NC.  Up with SBA.  Discharge paperwork and education completed, home care PT, OT, RN ordered.  Discharge medications with patient at bedside.  Delivery of walker from PT pending.  Pt sister scheduled to  patient with home O2 [id number].

## 2018-03-28 NOTE — PROGRESS NOTES
Pt was resting on 4LPM NC with SpO2 of 98%. Pt refused BIPAP/CPAP for the night, Schedule Q4wa nebs given. BBS diminished. Will continue to follow pt.   3/28/2018  Flori Van

## 2018-03-28 NOTE — PLAN OF CARE
Problem: Patient Care Overview  Goal: Plan of Care/Patient Progress Review  OT: Attempted to see pt, pts breakfast tray had just arrived, pt requested to eat prior to OT. A to set up tray. Will reschedule.

## 2018-03-28 NOTE — PROGRESS NOTES
MD Notification    Person notified: MD    Person Name: Dr Valle    Date/Time: 3/28/18 1001    Interaction: Text page    Purpose of Notification: FYI Critical lab value CO2>45. Pt appears comfortable and breathing non-labored.    Orders Received: Continue to monitor

## 2018-03-28 NOTE — DISCHARGE SUMMARY
"Discharge Summary    Karen Alex MRN# [id number]   YOB: 1955 Age: 62 year old     Date of Admission:  3/26/2018  Date of Discharge:  3/28/2018  Admitting Physician:  Anil Roblero MD  Discharge Physician:  Jesus Valle MD  Discharging Service:  Hospitalist     Primary Provider: Eros Rebolledo          Admission Diagnoses:   COPD exacerbation (H) [J44.1]          Discharge Diagnosis:   Patient Active Problem List   Diagnosis     Degeneration of lumbar or lumbosacral intervertebral disc     Personal history of tobacco use, presenting hazards to health     Hyperlipidemia LDL goal <130     COPD (chronic obstructive pulmonary disease) (H)     Health Care Home     Generalized anxiety disorder     Acute on chronic respiratory failure with hypoxia (H)     Chest pain     HTN, goal below 140/90     Chronic pain     PTSD (post-traumatic stress disorder)     HUNG (generalized anxiety disorder)     Severe episode of recurrent major depressive disorder, with psychotic features (H)     Tobacco dependence syndrome     Grief     Chronic respiratory failure with hypoxia and hypercapnia (H)     COPD exacerbation (H)     Acute exacerbation of chronic obstructive pulmonary disease (H)     ACP (advance care planning)             Condition on Discharge:       Discharge vitals: Blood pressure 115/68, pulse 90, temperature 98.4  F (36.9  C), temperature source Oral, resp. rate 18, height 1.702 m (5' 7\"), weight 100.8 kg (222 lb 3.6 oz), last menstrual period 12/01/2007, SpO2 97 %, not currently breastfeeding.         Gen: Debilitated female, alert and oriented x 3, no acute distressed  HEENT: Atraumatic, normocephalic  Lungs: Bibasilar rales and rhonchi with faint biapical end expiratory wheezes  Heart: Regular rate and rhythm, no murmurs, gallops, or rubs  GI: Bowel sound normal, no hepatosplenomegaly or masses  Lymph: No lymphadenopathy or edema  Skin: No rashes    # Discharge Pain Plan:   - During her " hospitalization, Karen experienced pain due to chronic pain.  The pain plan for discharge was discussed with Karen and her family and the plan was created in a collaborative fashion.    - Chronic/continued opioids: Norco  - Adjuvant meds: gabapentin, Cymbalta            Procedures / Labs / Imaging:   Most Recent 3 CBC's:  Recent Labs   Lab Test  03/28/18   0829  03/27/18   0611  03/26/18   1610  03/26/18   1013   WBC  5.4  5.9   --   5.9   HGB  10.0*  10.0*   --   11.7   MCV  99  100   --   102*   PLT  213  205  217  241      Most Recent 3 BMP's:  Recent Labs   Lab Test  03/28/18   0829  03/27/18   1655  03/27/18   0611  03/26/18   1013   NA  142   --   142  140   POTASSIUM  3.6  4.3  3.9  4.1   CHLORIDE  96   --   95  94   CO2  >45*   --   >45*  >45*   BUN  16   --   12  10   CR  0.65   --   0.51*  0.50*   ANIONGAP  Not Calculated   --   Not Calculated  Not Calculated   MAHOGANY  8.5   --   8.3*  9.1   GLC  98   --   132*  147*     Most Recent 3 Troponin's:  Recent Labs   Lab Test  03/26/18   1014  03/26/18   1013  02/19/18   2226  12/20/17   0520   TROPI   --   0.018  <0.015  <0.015   TROPONIN  0.01   --    --    --      Most Recent 3 INR's:  Recent Labs   Lab Test  02/21/18   0943   INR  0.95     Most Recent 2 LFT's:  Recent Labs   Lab Test  03/26/18   1013  12/19/17   2200   AST  12  22   ALT  18  24   ALKPHOS  104  103   BILITOTAL  0.3  0.2     Most Recent Cholesterol Panel:  Recent Labs   Lab Test  05/15/17   1113   CHOL  135   LDL  66   HDL  48*   TRIG  104     Most Recent 6 Bacteria Isolates From Any Culture (See EPIC Reports for Culture Details):  Recent Labs   Lab Test  03/26/18   1028  03/26/18   1013  02/19/18   2249  02/19/18   2228  10/22/14   1020  10/22/14   0950   CULT  No growth after 2 days  No growth after 2 days  No growth  No growth  No growth  Normal orlin     Most Recent TSH, T4 and HgbA1c:   Recent Labs   Lab Test  10/17/11   1050  10/03/11   1427   TSH  3.02  8.17*   T4   --   0.84      Results for orders placed or performed during the hospital encounter of 03/26/18   XR Chest Port 1 View    Narrative    XR CHEST PORT 1 VW 3/26/2018 10:53 AM    HISTORY: resp distress;       Impression    IMPRESSION: Prominent emphysematous changes in the upper lobes  bilaterally. Fibrotic changes in the lung bases. No change compared to  2/19/2018.    LOS JUNE MD   US Lower Extremity Venous Duplex Right Port    Narrative    ULTRASOUND RIGHT LOWER EXTREMITY VENOUS DUPLEX PORTABLE March 26, 2018  11:35 AM     HISTORY: Swelling, shortness of breath, evaluate for deep vein  thrombosis.     COMPARISON: None.    FINDINGS: Gray-scale, color and Doppler spectral analysis ultrasound  was performed of the right leg. Compression and augmentation imaging  was performed.    There is no evidence for deep venous thrombosis. There is a Baker's  cyst that is 5.7 x 3.0 x 2.8 cm noted.      Impression    IMPRESSION: No evidence for deep vein thrombosis within the right leg.  There is a Baker cyst at the right popliteal fossa measuring up to 5.7  cm.    MIKE LIVE MD             Medications Prior to Admission:     Prescriptions Prior to Admission   Medication Sig Dispense Refill Last Dose     HYDROcodone-acetaminophen (NORCO) 5-325 MG per tablet Take one pill  daily as needed, not to be taken with diazepam 30 tablet 0 Past Week at Unknown time     ARIPiprazole (ABILIFY) 5 MG tablet Take 5 mg by mouth daily   3/25/2018 at Unknown time     MELATONIN PO Take 5 mg by mouth At Bedtime   3/25/2018 at Unknown time     QUETIAPINE FUMARATE PO Take 50 mg by mouth every morning   3/25/2018 at Unknown time     DULOXETINE HCL PO Take 90 mg by mouth daily   3/25/2018 at Unknown time     atorvastatin (LIPITOR) 40 MG tablet Take 1 tablet (40 mg) by mouth daily 90 tablet 3 3/25/2018 at Unknown time     isosorbide mononitrate (IMDUR) 30 MG 24 hr tablet Take 1 tablet (30 mg) by mouth daily 90 tablet 0 3/25/2018 at Unknown time     gabapentin  (NEURONTIN) 300 MG capsule Take 1 capsule (300 mg) by mouth At Bedtime 90 capsule 1 3/25/2018 at Unknown time     QUEtiapine (SEROQUEL) 200 MG tablet Take 1 tablet (200 mg) by mouth At Bedtime 90 tablet 1 3/25/2018 at Unknown time     buPROPion (WELLBUTRIN XL) 300 MG 24 hr tablet Take 1 tablet (300 mg) by mouth every morning 90 tablet 1 3/25/2018 at Unknown time     diazepam (VALIUM) 10 MG tablet Take 1 tablet (10 mg) by mouth every 12 hours as needed for anxiety 60 tablet 1 Past Week at Unknown time     ADVAIR DISKUS 250-50 MCG/DOSE diskus inhaler INHALE ONE PUFF BY MOUTH TWO TIMES A DAY 3 Inhaler 2 3/25/2018 at pm     VENTOLIN  (90 BASE) MCG/ACT Inhaler SHAKE WELL AND INHALE 1 TO 2 PUFFS INTO THE LUNGS EVERY 4 HOURS AS NEEDED FOR SHORTNESS OF BREATH, DIFFICULTY BREATHING OR WHEEZING. 18 g 5 Past Week at Unknown time     tiotropium (SPIRIVA) 18 MCG capsule Inhale 1 capsule (18 mcg) into the lungs daily 90 capsule 2 3/25/2018 at Unknown time     nitroGLYcerin (NITROSTAT) 0.4 MG sublingual tablet Place 1 tablet (0.4 mg) under the tongue every 5 minutes as needed for chest pain prn 25 tablet 1 prn     Cholecalciferol (VITAMIN D3) 2000 UNITS CHEW Take 2,000 mg by mouth daily    3/25/2018 at Unknown time     Docusate Calcium (STOOL SOFTENER PO) Take 100 mg by mouth daily    3/25/2018 at Unknown time     albuterol (2.5 MG/3ML) 0.083% nebulizer solution Take 1 vial (2.5 mg) by nebulization 4 times daily as needed 120 vial 5 Past Week at Unknown time     ASPIRIN PO Take 81 mg by mouth daily   3/25/2018 at Unknown time             Discharge Medications:     Current Discharge Medication List      START taking these medications    Details   azithromycin (ZITHROMAX) 250 MG tablet Take 1 tablet (250 mg) by mouth daily  Qty: 2 tablet, Refills: 0    Associated Diagnoses: Chronic obstructive pulmonary disease with acute exacerbation (H)      predniSONE (DELTASONE) 10 MG tablet 4 tabs daily for 3 days, then 3 tabs daily for  3 days, then 2 tabs daily for 3 days, then 1 tab daily for 3 days, then stop  Qty: 30 tablet, Refills: 0    Associated Diagnoses: Chronic obstructive pulmonary disease with acute exacerbation (H)      !! order for DME Equipment being ordered: Walker Wheels () and Walker ()  Treatment Diagnosis: Gait instability  Qty: 1 each, Refills: 0    Associated Diagnoses: Chronic obstructive pulmonary disease with acute exacerbation (H)      !! order for DME Equipment being ordered: Walker Wheels ()  Treatment Diagnosis: severe COPD  Qty: 1 Units, Refills: 0    Associated Diagnoses: Chronic obstructive pulmonary disease with acute exacerbation (H)       !! - Potential duplicate medications found. Please discuss with provider.      CONTINUE these medications which have NOT CHANGED    Details   HYDROcodone-acetaminophen (NORCO) 5-325 MG per tablet Take one pill  daily as needed, not to be taken with diazepam  Qty: 30 tablet, Refills: 0    Comments: New directions now that she's on diazepam  Associated Diagnoses: Mechanical low back pain; Other chronic pain      ARIPiprazole (ABILIFY) 5 MG tablet Take 5 mg by mouth daily      MELATONIN PO Take 5 mg by mouth At Bedtime      !! QUETIAPINE FUMARATE PO Take 50 mg by mouth every morning      DULOXETINE HCL PO Take 90 mg by mouth daily      atorvastatin (LIPITOR) 40 MG tablet Take 1 tablet (40 mg) by mouth daily  Qty: 90 tablet, Refills: 3    Associated Diagnoses: Coronary artery disease of native heart with stable angina pectoris, unspecified vessel or lesion type (H)      isosorbide mononitrate (IMDUR) 30 MG 24 hr tablet Take 1 tablet (30 mg) by mouth daily  Qty: 90 tablet, Refills: 0    Comments: Additional refills may be requested at annual office visit on 3/5/18. Thank you.  Associated Diagnoses: Chest pain; CAD (coronary artery disease)      gabapentin (NEURONTIN) 300 MG capsule Take 1 capsule (300 mg) by mouth At Bedtime  Qty: 90 capsule, Refills: 1    Associated  Diagnoses: Recurrent major depressive disorder, in partial remission (H)      !! QUEtiapine (SEROQUEL) 200 MG tablet Take 1 tablet (200 mg) by mouth At Bedtime  Qty: 90 tablet, Refills: 1    Associated Diagnoses: HUNG (generalized anxiety disorder)      buPROPion (WELLBUTRIN XL) 300 MG 24 hr tablet Take 1 tablet (300 mg) by mouth every morning  Qty: 90 tablet, Refills: 1    Associated Diagnoses: Major depressive disorder, recurrent episode, in partial remission (H)      diazepam (VALIUM) 10 MG tablet Take 1 tablet (10 mg) by mouth every 12 hours as needed for anxiety  Qty: 60 tablet, Refills: 1    Associated Diagnoses: HUNG (generalized anxiety disorder)      ADVAIR DISKUS 250-50 MCG/DOSE diskus inhaler INHALE ONE PUFF BY MOUTH TWO TIMES A DAY  Qty: 3 Inhaler, Refills: 2    Associated Diagnoses: Chronic obstructive pulmonary disease, unspecified COPD type (H)      VENTOLIN  (90 BASE) MCG/ACT Inhaler SHAKE WELL AND INHALE 1 TO 2 PUFFS INTO THE LUNGS EVERY 4 HOURS AS NEEDED FOR SHORTNESS OF BREATH, DIFFICULTY BREATHING OR WHEEZING.  Qty: 18 g, Refills: 5    Associated Diagnoses: Panlobular emphysema (H)      tiotropium (SPIRIVA) 18 MCG capsule Inhale 1 capsule (18 mcg) into the lungs daily  Qty: 90 capsule, Refills: 2    Associated Diagnoses: Generalized anxiety disorder; Chronic obstructive pulmonary disease, unspecified COPD type (H)      nitroGLYcerin (NITROSTAT) 0.4 MG sublingual tablet Place 1 tablet (0.4 mg) under the tongue every 5 minutes as needed for chest pain prn  Qty: 25 tablet, Refills: 1    Associated Diagnoses: Chest pain      Cholecalciferol (VITAMIN D3) 2000 UNITS CHEW Take 2,000 mg by mouth daily       Docusate Calcium (STOOL SOFTENER PO) Take 100 mg by mouth daily       albuterol (2.5 MG/3ML) 0.083% nebulizer solution Take 1 vial (2.5 mg) by nebulization 4 times daily as needed  Qty: 120 vial, Refills: 5    Associated Diagnoses: COPD (chronic obstructive pulmonary disease) (H)      ASPIRIN PO  Take 81 mg by mouth daily       !! - Potential duplicate medications found. Please discuss with provider.                Brief History of Illness:   Karen Alex is a 62 year old female who was admitted for a COPD exacerbation.          Hospital Course:   Patient was admitted to the intensive care unit on BiPAP due to COPD exacerbation.  She was treated with Solu-Medrol and duo nebs and azithromycin.  She was quickly able to be weaned off BiPAP to her usual 4 L nasal cannula, but remained in the intensive care unit overnight for close monitoring.  She remained stable overnight and was then transferred to the floor.  She had an uneventful night on the medical floor and was discharged home the following day on a two-week steroid taper and a course of azithromycin.  Home health care including physical occupational therapy as well as nursing was set up at time of discharge.  The patient's oxygen prescription is unchanged from admission.  Patient is also discharged home with a wheeled walker.    Greater than 35 minutes were spent in discharge planning.             Pending Results:   Unresulted Labs Ordered in the Past 30 Days of this Admission     Date and Time Order Name Status Description    3/26/2018 1046 Blood culture Preliminary     3/26/2018 1013 Blood culture Preliminary

## 2018-03-29 NOTE — TELEPHONE ENCOUNTER
Patient called in through FAN and retuning message from Leyda ROY, it appears clinic staff was trying to get a hold of her of post hospital f/u and she noted she is doing okay and would prefer if they call her tomorrow morning at her number in chart. Declined any triage needs at this time.    Joyce Moore RN, BSN  [facility]

## 2018-03-29 NOTE — LETTER
[facility]  Complex Care Plan  About Me  Patient Name:  Kaern Alex    YOB: 1955  Age:     62 year old   Fam MRN:   [id number] Telephone Information:    Home Phone [phone]   Mobile [phone]       Address:    [address] [address] Email address:  [email or web address]      Emergency Contact(s)  Name Relationship Lgl Grd Work Phone Home Phone Mobile Phone   1. JESSICA CASTAÑEDA Sister No none [phone]   2. MONIAC EDOUARD Son No none [phone]           Primary language:  English     needed? No   [facility]:  [phone] op. 1  Other communication barriers:    Preferred Method of Communication:  Mail  Current living arrangement: I live in a private home with family, I live in a private home, I live alone (in 3rd floor apartment - sister visits often she lives in [place] - sister does not drive but will stay with patient when she is not doig well )  Mobility Status/ Medical Equipment: Independent    Health Maintenance  Health Maintenance Reviewed: Due/Overdue   Health Maintenance Due   Topic Date Due     URINE DRUG SCREEN Q1 YR  05/18/1972     LIPID MONITORING Q6 MO  11/15/2017     DEXA Q3 YR  01/29/2018     My Access Plan  Medical Emergency 911   Primary Clinic Line [facility] - [phone]   24 Hour Appointment Line [phone] or  [phone] ([phone]) (toll-free)   24 Hour Nurse Line [phone] (toll-free)   Preferred Urgent Care [facility] [phone]   Preferred Hospital [facility]  [phone]   Preferred Pharmacy [facility] [place], [place] - [address]     Behavioral Health Crisis Line The National Suicide Prevention Lifeline at 1-412.672.7534 or 911     My Care Team Members   Eros Rebolledo MD PCP - General  8/18/04    Phone: [phone] Fax: [phone]          Meena Abreu MD MD Pulmonary 10/27/14    Phone: [phone] Fax: [phone]         Sandip Millan MD MD Orthopedics 1/29/18    Phone: [phone] Fax: [phone]         Korin Wang, RN Lead Care Coordinator Primary Care - CC 2/26/18    Phone: [phone] Fax: [phone]      Stacey Aguayo [facility] Care Coordinator Primary Care - CC 3/19/18    Phone: [phone] Fax: [phone]        My Care Plans  Self Management and Treatment Plan - COPD Action Plan   Goals and (Comments)  Goals        Lifestyle    Increase physical activity     Notes - Note created  3/30/2018  9:43 AM by Korin Wang, RN    Goal Statement: I will walk 1/2 down apartment hallway 3 times per week - increasing as tolerated to full hallway and more days per week   Measure of Success: patient able to meet goal of 3 times per week   Supportive Steps to Achieve: CCRN encouragement - support by weekly calls   Barriers: COPD   Strengths: wanting to increase endurance   Date to Achieve By: 4/30/18                Action Plans on File: COPD    Advance Care Plans/Directives Type: NA        My Medical and Care Information  Problem List   Patient Active Problem List   Diagnosis     Degeneration of lumbar or lumbosacral intervertebral disc     Personal history of tobacco use, presenting hazards to health     Hyperlipidemia LDL goal <130     COPD (chronic obstructive pulmonary disease) (H)     Health Care Home     Generalized anxiety disorder     Acute on chronic respiratory failure with hypoxia (H)     Chest pain     HTN, goal below 140/90     Chronic pain     PTSD (post-traumatic stress disorder)     HUNG (generalized anxiety disorder)     Severe episode of recurrent major depressive disorder, with psychotic features (H)     Tobacco dependence syndrome     Grief     Chronic respiratory failure with hypoxia and hypercapnia (H)     COPD exacerbation (H)     Acute exacerbation of chronic obstructive pulmonary disease  (H)     ACP (advance care planning)      Current Medications and Allergies:  See printed Medication Report.    Care Coordination Start Date: 3/29/2018   Frequency of Care Coordination: weekly   Form Last Updated: 03/30/2018

## 2018-03-29 NOTE — TELEPHONE ENCOUNTER
Chief Complaint   Patient presents with     Hospital F/U     Inpatient discharge from [facility] on 3/28/2018 Chronic Obstructive Pulmonary Disease With Acute Exacerbation (H)       Modesta Davies/

## 2018-03-29 NOTE — TELEPHONE ENCOUNTER
ED / Discharge Outreach Protocol    Patient Contact    Attempt # 1    Was call answered?  No.  Left message on voicemail with information to call me back.    Leyda Martinez RN -- [facility]

## 2018-03-29 NOTE — PROGRESS NOTES
Clinic Care Coordination Contact  Care Coordination Communication    Referral Source: IP Report    Clinical Data: Patient was hospitalized at [facility] from 3/26 to 3/28 with diagnosis of COPD exacerbation     Home Care Contact:              Home Care Agency: [facility]               Contact name () and phone number: not yet assigned - will call next business day               Care Coordination contacted home care: next business day               Anticipated start of care date: 3/30/18    Patient Contact: Message left for patient to return call to CCRN               Introduced self and role of care coordination. - already engaged with patient              Discharge instructions were reviewed with patient/caregiver.               Do you have any questions about your medications? Discharged with prednisone - zithromax and order for walker              Follow up appointment is scheduled for 4/2/18              Provided 24 Hour Nurse Line and/or 24 Hour Appointment Scheduling: care plan mailed recently to patient               Home care has contacted patient:               Patient questions/concerns:     Plan: RN/SW Care Coordinator will await notification from home care staff informing RN/SW Care Coordinator of patients discharge plans/needs. RN/SW Care Coordinator will review chart and outreach to home care every 4 weeks and as needed.      Korin Wang Care Coordinator RN  [facility]  [phone]  March 29, 2018

## 2018-03-30 NOTE — PROGRESS NOTES
Clinic Care Coordination Contact  [facility]/Voicemail    Referral Source: IP Report  Clinical Data: Care Coordinator Outreach - post hospital follow up   Outreach attempted x 2.  Left message on voicemail with call back information and requested return call.  Plan:  Care Coordinator will await call from patient - CCRN will f/u with home care Q 30 days to ensure still active .  Korin Wang Care Coordinator RN  [facility]  [phone]  March 30, 2018

## 2018-03-30 NOTE — PROGRESS NOTES
Clinic Care Coordination Contact    OUTREACH    Referral Information:  Referral Source: IP Report    Primary Diagnosis: COPD    Chief Complaint   Patient presents with     Clinic Care Coordination - Post Hospital     COPD exacerbation - CCRN      [employer] Utilization:   Utilization    Last refreshed: 3/30/2018  9:33 AM:  No Show Count (past year) 1       Last refreshed: 3/30/2018  9:33 AM:  ED visits 2       Last refreshed: 3/30/2018  9:33 AM:  Hospital admissions 3          Current as of: 3/30/2018  9:33 AM           Clinical Concerns:  Current Medical Concerns:  COPD admission   Patient has not yet heard from home care team - she is worried that they will not keep her as she drives - discussed this - patient currently NOT driving, son is here until next week assisting. Prior to son leaving will pick her sister up in [place] to help her - patient does feel it would be beneficial to have home care RN - she will advise the home care team that at baseline she drives however right now she is not going out due to illness     Clinical Pathway Name: COPD  Clinic Care Coordination COPD Assessment  Discharge:    Hospital summary: admitted 3/26    Day of hospital discharge:  3/28    What recommendations were made for follow up after your recent hospitalization? Take prednisone, antibiotics and f/u with pcp     Have the follow up appointments been scheduled? Yes4/2/18 with pc     If not, can I help you set up the appointments? discussed calling pulmonology to advise of the two recent hospitalizations     Transportation concerns? drives at baseline - currenlty not driving due to illness     Is there a referral for Pulmonary Rehab? No - patient has tried this in the past - for now wants to try increasing activity in home     Symptom Review:    How have you been feeling now that you are home?  Still feeling lightheaded     Are you having any increased shortness of breath? with activity - such as going to the bathroom     When  you cough, are you coughing up anything? No  o Color NA  o Consistency NA  o Frequency NA    Do you have a COPD Action Plan?  Yes  send pt a copy    Is the COPD Action Plan on refrigerator or available: Yes   reviewed    What COPD Zone currently in:Yellow    What number would you call if you were in the YELLOW zones: does not feel the need to come in to see pcp today - discussed calling pulmonology      Medications:     Were you started on any new medications?  Yes, zithromax, prednisone,     Were any of your previous medications changed? No    Do you have all of your medications? Yes,     Have you had trouble filling your prescriptions? No    Are you medications effective in controlling your symptoms? Yes, prednisone helps, but it does make me gain weight     Are you currently on Prednisone (* does pt understand the tapering instructions)?  Yes has taken many times before     For patients with DM:     o Are you monitoring your blood sugars, if so how are your blood sugars? NA  Did you start on insulin in the hospital or has your Insulin dose changed? NA      Medication reconciliation completed? Yes    Was MTM or Diabetic Education referral placed (Make sure to put transitions as reason for referral)? Not needed at this time per patient - home care RN will be out to review meds as well   Oxygen/DME    Are you currently on oxygen? Yes     Is this new for you? No     Is it set up at home? Yes     What liter flow were you instructed to use and how often do you use it? 4-5 LPM     What type of home oxygen system do you have (*ask about portability and ability to manage a portable oxygen delivery)?  Small portable that goes over shoulder and larger one on wheels, also concentrator large at home use     Who is your supply company? [employer]     Review with patient how to use/maintain nebulizers and inhalers: Yes  Activity:    How much activity can you do before you are SOB? Having shortness of breath with most activity -  not doing a lot - but even going to the bathroom causes increase sob     Have you had to reduce your activities because of dyspnea or other symptoms? Yes   Diet:    Do you weigh yourself daily? No      Has there been a recent change in your weight? No    Are there any current diet restrictions or changes per discharge instructions? No  Emotions/Lifestyle:      Do you smoke? Former   o If so, how many cigarettes a day are you smoking? Na  o Would you like to try to quit smoking? NA    Health Maintenance Reviewed: Due/Overdue   Health Maintenance Due   Topic Date Due     URINE DRUG SCREEN Q1 YR  05/18/1972     LIPID MONITORING Q6 MO  11/15/2017     DEXA Q3 YR  01/29/2018     Medication Management:  States compliance - no questions/concerns     Functional Status:  Mobility Status: Independent  Equipment Currently Used at Home: oxygen, raised toilet, walker, rolling (4 LPM - [facility] )  Transportation means: Regular car - drives at baseline      Psychosocial:  Current living arrangement:: I live in a private home with family, I live in a private home, I live alone (in 3rd floor apartment - sister visits often she lives in [place] - sister does not drive but will stay with patient when she is not doing well )  Type of residence:: Apartment 3rd floor with elevator   [facility] out to [facility] to see if she qualifies for [facility] programs      Resources and Interventions:  Current Resources: Skilled Nursing, Physicial Therapy, Occupational Therapy;    Advanced Care Plans/Directives on file:: No     Goals:   Goals        Lifestyle    Increase physical activity     Notes - Note created  3/30/2018  9:43 AM by Korin Wang RN    Goal Statement: I will walk 1/2 down apartment hallway 3 times per week - increasing as tolerated to full hallway and more days per week   Measure of Success: patient able to meet goal of 3 times per week   Supportive Steps to Achieve: SERENA  encouragement - support by weekly calls   Barriers: COPD   Strengths: wanting to increase endurance   Date to Achieve By: 4/30/18              Patient/Caregiver understanding: yes     Outreach Frequency: weekly    Next 5 appointments (look out 90 days)     Apr 02, 2018  2:30 PM CDT   Office Visit with Eros Re[facility]ledo MD   [facility]    [address] [place] [address]   [phone]            Apr 17, 2018  1:30 PM CDT   [facility]    [address] [id number] [phone]            May 01, 2018  1:30 PM CDT   Return Visit with MAURY Lemos   [facility] ([facility])    [address] [id number] [phone]            May 15, 2018  1:30 PM CDT   Return Visit with Lupis Mcgee [facility] ([facility])    [address] [id number] [phone]                 Plan:   Patient will work with home care team   Patient will continue to monitor COPD symptoms using COPD action plan   CCRN gave patient 24 hour nurse line number for this weekend however advised that if emergent breathing problems proceed to ER   CCRN will keep monitoring for home care discharge   CCRN will f/u with patient 1 week - on goal     Korin Wang Care Coordinator RN  [facility]  [phone]  March 30, 2018

## 2018-04-02 NOTE — PROGRESS NOTES
SUBJECTIVE:   Karen Alex is a 62 year old female who presents to clinic today for the following health issues:          Hospital Follow-up Visit:    Hospital/Nursing Home/IP Rehab Facility: [facility]  Date of Admission: 3/26/18  Date of Discharge: 3/28/18  Reason(s) for Admission: acute exacerbation of chronic obstructive pulmonary disease            Problems taking medications regularly:  None       Medication changes since discharge: None       Problems adhering to non-medication therapy:  None    Summary of hospitalization:  [facility] discharge summary reviewed  Diagnostic Tests/Treatments reviewed.  Follow up needed: none  Other Healthcare Providers Involved in Patient s Care:         None  Update since discharge: improved.     Post Discharge Medication Reconciliation: yes  Discussed using half pills to wean latuda, half pills of diazepam to start weaning that   Plan of care communicated with patient     Coding guidelines for this visit:  Type of Medical   Decision Making Face-to-Face Visit       within 7 Days of discharge Face-to-Face Visit        within 14 days of discharge   Moderate Complexity [id number]   High Complexity 10067 67417          Past Medical History:   Diagnosis Date     Anxiety      Arthritis     generalized     Asthma      Bipolar 2 disorder (H)      Cervical dysplasia      Chronic back pain     low back     COPD (chronic obstructive pulmonary disease) (H)     O2 at night     HTN, goal below 140/90 1/29/2015     Hypercholesterolemia      Hyperlipidaemia      Other chronic pain      Pneumothorax 8/2012    left sided      Varicose veins      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 Surgeon:  MILADY BASS MD     CONIZATION  10/23/07    Vulvar excision for BOBBY 3, cold knife conization     EXCISE VULVA WIDE LOCAL  5/25/2012     Procedure:EXCISE VULVA WIDE LOCAL; Wide Local Excision Of Vulvar Lesion; Surgeon:RE ALDRIDGE; Location:[facility] OR     FOOT SURGERY Right 2005    OPERATION: Excision of soft tissue mass right foot. Surgeon:  KHARI DUEÑAS DPM     FOOT SURGERY Right 2005    OPERATION: Excision of ganglion cyst right foot. Surgeon:  KHARI DUEÑAS DPM     HERNIORRHAPHY INCISIONAL (LOCATION)  2012    Procedure:HERNIORRHAPHY INCISIONAL (LOCATION); Incisional Hernia Repair with mesh ; Surgeon:KIM QUICK; Location:[facility] OR     HYSTERECTOMY TOTAL ABDOMINAL, BILATERAL SALPINGO-OOPHORECTOMY, COMBINED       LAPAROSCOPIC HYSTERECTOMY TOTAL, BILATERAL SALPINGO-OOPHORECTOMY, COMBINED  3/6/2012    Procedure:COMBINED LAPAROSCOPIC HYSTERECTOMY TOTAL, BILATERAL SALPINGO-OOPHORECTOMY; Total laparoscopic Hysterectomy, Bilateral Salpingo Ooporectomy, Pubovaginal Sling, Biopsy Left Volva; Surgeon:RE ALDRIDGE; Location:[facility] OR     repair of anal fissures       SLING BLADDER SUSPENSION WITH FASCIA ALESHA       SLING TRANSPUBO WITHOUT ANTERIOR COLPORRHAPHY  3/6/2012    Procedure:SLING TRANSPUBO WITHOUT ANTERIOR COLPORRHAPHY; Surgeon:JOLANTA ENRIQUEZ; Location:[facility] OR     TUBAL LIGATION         Family History   Problem Relation Age of Onset     CANCER Father      asbestos lung disease     Hypertension Mother      DIABETES Paternal Aunt      HEART DISEASE Maternal Grandfather      CANCER Maternal Grandmother      unsure of what kind,  at the age of 86     Circulatory Paternal Grandmother       of brain aneurysm     Asthma Son      HEART DISEASE Sister      No Known Problems Brother      Pacemaker Sister      No Known Problems Brother        Social History   Substance Use Topics     Smoking status: Former Smoker     Packs/day: 0.50     Years: 26.00     Types: Cigarettes     Quit date: 7/3/2015     Smokeless tobacco: Never Used      Comment: sometimes     Alcohol use No          REVIEW OF SYSTEMS    Generally has  been slowly started  feeling well until this episode. No problems with vision, hearing, dental or neck pain.Has hph airborne or ingestion allergy  No chest pain, palpitations, dyspnea, change in bowel habits, blood  in stool or dyspepsia.  No rashes, changing moles, weakness, lassitude or back problems.  No chronic issues . No dysuria  Patient not  a smoker. No problems with significant headaches.  On exam the vital signs are stable  Weight is Body mass index is 34.77 kg/(m^2).   Eyes show edith   No neck masses or thyromegaly.Ear nose and throat shows normal   No bruits, murmers, rubs or extrasounds. No cardiomegaly or chest wall tenderness. Lungs clear, no abdominal masses or organomegaly. No CVA tenderness.  Skin eval no rash   No hernias, good range of motion neck, back and extremities. No abnormal skin lesions.\. Good peripheral pulses. No adenopathy.  Normal gait and stance. Neck is supple.  Back exam shows good rom     Current Outpatient Prescriptions   Medication     azithromycin (ZITHROMAX) 250 MG tablet     predniSONE (DELTASONE) 10 MG tablet     order for DME     order for DME     order for DME     HYDROcodone-acetaminophen (NORCO) 5-325 MG per tablet     ARIPiprazole (ABILIFY) 5 MG tablet     MELATONIN PO     QUETIAPINE FUMARATE PO     DULOXETINE HCL PO     atorvastatin (LIPITOR) 40 MG tablet     isosorbide mononitrate (IMDUR) 30 MG 24 hr tablet     gabapentin (NEURONTIN) 300 MG capsule     QUEtiapine (SEROQUEL) 200 MG tablet     buPROPion (WELLBUTRIN XL) 300 MG 24 hr tablet     diazepam (VALIUM) 10 MG tablet     ADVAIR DISKUS 250-50 MCG/DOSE diskus inhaler     VENTOLIN  (90 BASE) MCG/ACT Inhaler     tiotropium (SPIRIVA) 18 MCG capsule     nitroGLYcerin (NITROSTAT) 0.4 MG sublingual tablet     Cholecalciferol (VITAMIN D3) 2000 UNITS CHEW     Docusate Calcium (STOOL SOFTENER PO)     albuterol (2.5 MG/3ML) 0.083% nebulizer solution     ASPIRIN PO     No current facility-administered medications for  this visit.        (M54.5) Mechanical low back pain  Comment:   Plan: HYDROcodone-acetaminophen (NORCO) 5-325 MG per         tablet        Severe, ambulation limitied     (G89.29) Other chronic pain  Comment:   Plan: HYDROcodone-acetaminophen (NORCO) 5-325 MG per         tablet          (J44.1) COPD exacerbation (H)  (primary encounter diagnosis)  Comment:   Plan: responds to oxygen      (G89.29) Other chronic pain  Comment:   Plan: HYDROcodone-acetaminophen (NORCO) 5-325 MG per         tablet        Limited dosing, not with benzodiazepine    (J96.01) Acute respiratory failure with hypoxia (H)  Comment:   Plan: improved, on oxygen, ambulates comfortably now

## 2018-04-02 NOTE — MR AVS SNAPSHOT
After Visit Summary   4/2/2018    Karen Alex    MRN: [id number]           Patient Information     Date Of Birth          1955        Visit Information        Provider Department      4/2/2018 2:30 PM Eros Rebolledo MD [facility]        Today's Diagnoses     COPD exacerbation (H)    -  1    Mechanical low back pain        Other chronic pain        Acute respiratory failure with hypoxia (H)           Follow-ups after your visit        Your next 10 appointments already scheduled     Apr 17, 2018  1:30 PM CDT   Return Visit with Lupis Mcge[facility]    [address] [id number] [phone]            May 01, 2018  1:30 PM CDT   Return Visit with Lupis Mcge[facility] ([facility])    [address] [id number] [phone]            May 15, 2018  1:30 PM CDT   Return Visit with Lupis Mcgee [facility] ([facility])    [address] [id number] [phone]              Who to contact     If you have questions or need follow up information about today's clinic visit or your schedule please contact [facility] directly at [phone].  Normal or non-critical lab and imaging results will be communicated to you by MyChart, letter or phone within 4 business days after the clinic has received the results. If you do not hear from us within 7 days, please contact the clinic through MyChart or phone. If you have a critical or abnormal lab result, we will notify you by phone as soon as possible.  Submit refill requests through [employer] or call your pharmacy and they will forward the refill request to us. Please allow 3 business days for your refill to be  completed.          Additional Information About Your Visit        [employer] Information     [employer] gives you secure access to your electronic health record. If you see a primary care provider, you can also send messages to your care team and make appointments. If you have questions, please call your primary care clinic.  If you do not have a primary care provider, please call [phone] and they will assist you.        Care EveryWhere ID     This is your Care EveryWhere ID. This could be used by other organizations to access your [facility] medical records  [id number]        Your Vitals Were     Pulse Temperature Respirations Last Period Pulse Oximetry BMI (Body Mass Index)    119 98  F (36.7  C) (Oral) 14 12/01/2007 95% 34.77 kg/m2       Blood Pressure from Last 3 Encounters:   04/02/18 139/88   03/28/18 115/68   03/26/18 (!) 137/95    Weight from Last 3 Encounters:   04/02/18 222 lb (100.7 kg)   03/27/18 222 lb 3.6 oz (100.8 kg)   03/26/18 240 lb (108.9 kg)              Today, you had the following     No orders found for display         Where to get your medicines      Some of these will need a paper prescription and others can be bought over the counter.  Ask your nurse if you have questions.     Bring a paper prescription for each of these medications     HYDROcodone-acetaminophen 5-325 MG per tablet          Primary Care Provider Office Phone # Fax #    Eros Bayron Rebolledo [phone] [id number] [facility] [address]        Goals        Lifestyle    Increase physical activity     Notes - Note created  3/30/2018  9:43 AM by Korin Wang, MANUELA    Goal Statement: I will walk 1/2 down apartment hallway 3 times per week - increasing as tolerated to full hallway and more days per week   Measure of Success: patient able to meet goal of 3 times per week   Supportive Steps to Achieve: CCRN encouragement - support by weekly calls   Barriers: COPD   Strengths: wanting to increase  endurance   Date to Achieve By: 4/30/18            Equal Access to Services     BRANDYN TURNER : Hadii aad ku hadtoyandel Calixto, rupert sifuentes, richardfox jonasmarj de los santosjoesilvestre ace. So [facility] [phone].    ATENCIÓN: Si habla español, tiene a zaragoza disposición servicios gratuitos de asistencia lingüística. Llame al [phone].    We comply with applicable federal civil rights laws and [place] laws. We do not discriminate on the basis of race, color, national origin, age, disability, sex, sexual orientation, or gender identity.            Thank you!     Thank you for choosing [facility]  for your care. Our goal is always to provide you with excellent care. Hearing back from our patients is one way we can continue to improve our services. Please take a few minutes to complete the written survey that you may receive in the mail after your visit with us. Thank you!             Your Updated Medication List - Protect others around you: Learn how to safely use, store and throw away your medicines at www.disposemymeds.org.          This list is accurate as of 4/2/18  3:10 PM.  Always use your most recent med list.                   Brand Name Dispense Instructions for use Diagnosis    ABILIFY 5 MG tablet   Generic drug:  ARIPiprazole      Take 5 mg by mouth daily        ADVAIR DISKUS 250-50 MCG/DOSE diskus inhaler   Generic drug:  fluticasone-salmeterol     3 Inhaler    INHALE ONE PUFF BY MOUTH TWO TIMES A DAY    Chronic obstructive pulmonary disease, unspecified COPD type (H)       * albuterol (2.5 MG/3ML) 0.083% neb solution     120 vial    Take 1 vial (2.5 mg) by nebulization 4 times daily as needed    COPD (chronic obstructive pulmonary disease) (H)       * VENTOLIN  (90 BASE) MCG/ACT Inhaler   Generic drug:  albuterol     18 g    SHAKE WELL AND INHALE 1 TO 2 PUFFS INTO THE LUNGS EVERY 4 HOURS AS NEEDED FOR SHORTNESS OF BREATH, DIFFICULTY BREATHING OR WHEEZING.     Panlobular emphysema (H)       ASPIRIN PO      Take 81 mg by mouth daily        atorvastatin 40 MG tablet    LIPITOR    90 tablet    Take 1 tablet (40 mg) by mouth daily    Coronary artery disease of native heart with stable angina pectoris, unspecified vessel or lesion type (H)       azithromycin 250 MG tablet    ZITHROMAX    2 tablet    Take 1 tablet (250 mg) by mouth daily    Chronic obstructive pulmonary disease with acute exacerbation (H)       buPROPion 300 MG 24 hr tablet    WELLBUTRIN XL    90 tablet    Take 1 tablet (300 mg) by mouth every morning    Major depressive disorder, recurrent episode, in partial remission (H)       diazepam 10 MG tablet    VALIUM    60 tablet    Take 1 tablet (10 mg) by mouth every 12 hours as needed for anxiety    HUNG (generalized anxiety disorder)       DULOXETINE HCL PO      Take 90 mg by mouth daily        gabapentin 300 MG capsule    NEURONTIN    90 capsule    Take 1 capsule (300 mg) by mouth At Bedtime    Recurrent major depressive disorder, in partial remission (H)       HYDROcodone-acetaminophen 5-325 MG per tablet    NORCO    30 tablet    Take one pill  daily as needed, not to be taken with diazepam    Mechanical low back pain, Other chronic pain       isosorbide mononitrate 30 MG 24 hr tablet    IMDUR    90 tablet    Take 1 tablet (30 mg) by mouth daily    Chest pain, CAD (coronary artery disease)       MELATONIN PO      Take 5 mg by mouth At Bedtime        nitroGLYcerin 0.4 MG sublingual tablet    NITROSTAT    25 tablet    Place 1 tablet (0.4 mg) under the tongue every 5 minutes as needed for chest pain prn    Chest pain       order for DME     1 each    Equipment being ordered: Walker Wheels () and Walker () Treatment Diagnosis: Gait instability    Chronic obstructive pulmonary disease with acute exacerbation (H)       order for DME     1 Units    Equipment being ordered: Walker Wheels () Treatment Diagnosis: severe COPD    Chronic obstructive  pulmonary disease with acute exacerbation (H)       order for DME     1 Units    Equipment being ordered: Walker () Treatment Diagnosis: severe COPD    Chronic obstructive pulmonary disease with acute exacerbation (H)       predniSONE 10 MG tablet    DELTASONE    30 tablet    4 tabs daily for 3 days, then 3 tabs daily for 3 days, then 2 tabs daily for 3 days, then 1 tab daily for 3 days, then stop    Chronic obstructive pulmonary disease with acute exacerbation (H)       * QUETIAPINE FUMARATE PO      Take 50 mg by mouth every morning        * QUEtiapine 200 MG tablet    SEROquel    90 tablet    Take 1 tablet (200 mg) by mouth At Bedtime    HUNG (generalized anxiety disorder)       STOOL SOFTENER PO      Take 100 mg by mouth daily        tiotropium 18 MCG capsule    SPIRIVA    90 capsule    Inhale 1 capsule (18 mcg) into the lungs daily    Generalized anxiety disorder, Chronic obstructive pulmonary disease, unspecified COPD type (H)       Vitamin D3 2000 UNITS Chew      Take 2,000 mg by mouth daily        * Notice:  This list has 4 medication(s) that are the same as other medications prescribed for you. Read the directions carefully, and ask your doctor or other care provider to review them with you.

## 2018-04-06 NOTE — TELEPHONE ENCOUNTER
"Last Written Prescription Date:  9/21/17  Last Fill Quantity: 18 g,  # refills: 5   Last office visit: 4/2/2018 with prescribing provider:  Kesha   Future Office Visit:   Next 5 appointments (look out 90 days)     Apr 17, 2018  1:30 PM CDT   Return Visit with Lupis Mcgee [facility]    [address] [address]   [phone]            May 01, 2018  1:30 PM CDT   Return Visit with Lupis Mcgee [facility]    [address] [address]   [phone]            May 15, 2018  1:30 PM CDT   [facility] [address]   [phone]                 Requested Prescriptions   Pending Prescriptions Disp Refills     VENTOLIN  (90 BASE) MCG/ACT Inhaler [Pharmacy Med Name: VENTOLIN HFA 108MCG/ACT AERS] 18 g 5     Sig: SHAKE WELL AND INHALE 1 TO 2 PUFFS INTO THE LUNGS EVERY 4 HOURS AS NEEDED FOR SHORTNESS OF BREATH, DIFFICULTY BREATHING OR WHEEZING.    Asthma Maintenance Inhalers - Anticholinergics Failed    4/6/2018 10:44 AM       Failed - Asthma control assessment score within normal limits in last 6 months    Please review ACT score.          Passed - Patient is age 12 years or older       Passed - Recent (6 mo) or future (30 days) visit within the authorizing provider's specialty    Patient had office visit in the last 6 months or has a visit in the next 30 days with authorizing provider or within the authorizing provider's specialty.  See \"Patient Info\" tab in inbasket, or \"Choose Columns\" in Meds & Orders section of the refill encounter.            ACT Total Scores 1/2/2014 1/27/2014 10/16/2014   ACT TOTAL SCORE 13 14 16   ASTHMA ER VISITS 0 = None 0 = " None 0 = None   ASTHMA HOSPITALIZATIONS 0 = None 0 = None 0 = None

## 2018-04-06 NOTE — TELEPHONE ENCOUNTER
Prescription approved per [facility] Refill Protocol.  Maribeth Mullen RN

## 2018-04-09 NOTE — PROGRESS NOTES
Clinic Care Coordination Contact  [facility]/Voicemail    Referral Source: IP Report  Clinical Data: Care Coordinator Outreach - COPD f/u   Outreach attempted x 1.  Left message on voicemail with call back information and requested return call.  Plan: Care Coordinator will try to reach patient again in 1-2 business days.    Korin Wang Care Coordinator RN  [facility]  [phone]  April 9, 2018

## 2018-04-10 NOTE — PROGRESS NOTES
Clinic Care Coordination Contact    OUTREACH    Referral Information:  Referral Source: IP Report    Primary Diagnosis: COPD    Chief Complaint   Patient presents with     Clinic Care Coordination - Follow-up     COPD - CCRN       [place] Utilization:   Utilization    Last refreshed: 4/9/2018  3:20 PM:  No Show Count (past year) 1       Last refreshed: 4/9/2018  3:20 PM:  ED visits 2       Last refreshed: 4/9/2018  3:20 PM:  Hospital admissions 3          Current as of: 4/9/2018  3:20 PM           Clinical Concerns:  Current Medical Concerns:  COPD follow up   Patient states she is doing fairly well   Using nebulizer treatments and inhalers as directed   Patient is on oxygen 4-5 LPM   Patient has not been contacted by home care team - order was placed on 3/26/18, CCRN contacted  home care and they did leave patient a message and her son Rustam and did not hear back from either. New verbal order given for home care RN / PT/ OT just as ordered at hospital discharge, face to face still good in their files. They will fax for signature. Discussed that patient is not currently out driving. Patient would like home care services to start tomorrow. CCRN spoke with Ivett ROY with [facility].   Patient has been trying to get in some walking - she still has some lightheadedness at times.   Patient's son was able to increase the length of time he is going to be there with her by 1 1/2 weeks, she is very happy about this, he is helping her tremendously   Patient saw pcp 4/2/18 - she is stable at this time no increase in symptoms - no cough, still having some sob with activity and is working on this     Current Behavioral Concerns: no concerns noted - patient is in good spirits     Education Provided to patient: continue using COPD action plan and call with concerns     Clinical Pathway Name: COPD  Health Maintenance Reviewed: Due/Overdue   Health Maintenance Due   Topic Date Due     URINE DRUG SCREEN Q1 YR  05/18/1972     LIPID  MONITORING Q6 MO  11/15/2017     DEXA Q3 YR  01/29/2018     Medication Management:  States compliance - no questions     Functional Status:  Mobility Status: Independent w/Device  Equipment Currently Used at Home: oxygen, raised toilet, walker, rolling (4 LPM - [facility] )  Transportation means:: Regular car (patient drives at baseline)     Psychosocial:  Current living arrangement:: I live in a private home with family, I live in a private home, I live alone (in 3rd floor apartment - sister visits often she lives in [place] - sister does not drive but will stay with patient when she is not doig well )  Type of residence:: Apartment  Financial/Insurance: No concerns      Resources and Interventions:  Current Resources: Skilled Nursing, Physicial Therapy, Occupational Therapy - RN will go out tomorrow   Advanced Care Plans/Directives on file:: No        Goals:   Goals        Lifestyle    Increase physical activity     Notes - Note created  3/30/2018  9:43 AM by Korin Wang RN    Goal Statement: I will walk 1/2 down apartment hallway 3 times per week - increasing as tolerated to full hallway and more days per week   Measure of Success: patient able to meet goal of 3 times per week   Supportive Steps to Achieve: CCRN encouragement - support by weekly calls   Barriers: COPD   Strengths: wanting to increase endurance   Date to Achieve By: 4/30/18              As of today's date 4/10/2018 goal is met at 26 - 50%.   Goal Status:  Active    Patient/Caregiver understanding: yes     Outreach Frequency: q 2 weeks - home care active   Future Appointments              In 1 week Lupis Mcgee [facility]    In 3 weeks Lupis Mcgee [facility]    In 1 month Lupis Mcgee [facility]         Plan:    Patient will work with  home care team   Patient will continue to utilize COPD action plan to monitor symptoms   CCRN will f/u with patient in 2 weeks to check on goal status - for now patient has been able to get all the way down her apartment hallway which she feels is 1/4 mile     Korin Wang Care Coordinator RN  [facility]  [phone]  April 10, 2018

## 2018-04-12 NOTE — TELEPHONE ENCOUNTER
Richelle calling from  Home Care  Calling to start home care services  March 28 was dc'd from hospital  PT OT eval/TX  SNV 1x/wk 1 wk, 2wk 2 wk, 1 w, 1 wk, 2 prn    Approved per protocol    Chinyere Taylor RN, BS  Clinical Nurse Triage.

## 2018-04-13 NOTE — LETTER
April 13, 2018      Karen Alex  [address]   [place] [address]        Dear Karen,    I am a clinic care coordinator who works with your primary physician, Eros Rebolledo, at the [facility]. I wanted to thank you for spending the time to talk with me.  I wanted to provide you with my contact information so that you can call me with questions or concerns about your health care. Below is a description of clinic care coordination and how I can further assist you.     The clinic care coordinator is a registered nurse and/or  who understand the health care system. The goal of clinic care coordination is to help you manage your health and improve access to the [facility] in the most efficient manner. The registered nurse can assist you in meeting your health care goals by providing education, coordinating services, and strengthening the communication among your providers. The  can assist you with financial, behavioral, psychosocial, chemical dependency, counseling, and/or psychiatric resources.    Please feel free to contact me at [phone], with any questions or concerns. We at [facility] are focused on providing you with the highest-quality healthcare experience possible and that all starts with you.     Sincerely,    Stacey Aguayo, [facility], [facility]  Social Work Care Coordinator  P:[phone]                                [employer]  [employer] understands the importance of home. We make it possible for people to live comfortably in their homes and stay connected to their communities by providing practical, reliable help with basic household chores, such as:  Laundry   Ironing   Dusting   Vacuuming   Light meal preparation   Baking   Cleaning closets   Grocery shopping / Errands   Decluttering/Organizing  [employer] house[employer] staff are carefully screened, including background and reference checks. To best serve you, we  can provide housekeeping at your convenience -- the minimum is two hours every other week. This fee-based service is provided on a sliding scale (based on the older adult's income). As needs change, we can link you with other programs and services that fit your situation.   For more information, call [phone]     Aging & Disability Services   (See the long term care brochure)  [facility] helps people of all ages and abilities find resources to live successfully in the community.   The Community Living Services unit can help you get started on finding solutions that will help you or a loved one live as independently as possible.   Getting an assessment   Contact [facility] Services by email at [email or web address]. or phone at [phone] to schedule a time for an assessment. A  and/or public health nurse will come to your home to gather information about your health and how you are managing at home. The visit usually takes about two hours. You will receive information about long-term care options and programs. There is no charge for the visit.      Please Sign the release of information in this packet and send back ot me in the prepaid envelope

## 2018-04-13 NOTE — PROGRESS NOTES
Clinic Care Coordination Contact  OUTREACH    Referral Information:     Primary Diagnosis: COPD  Chief Complaint   Patient presents with     Clinic Care Coordination - Initial     [facility] benefits        [place] Utilization:   Utilization    Last refreshed: 4/12/2018 11:39 AM:  No Show Count (past year) 1       Last refreshed: 4/12/2018 11:39 AM:  ED visits 2       Last refreshed: 4/12/2018 11:39 AM:  Hospital admissions 3          Current as of: 4/12/2018 11:39 AM             Clinical Concerns:  Health Maintenance Reviewed:    Health Maintenance Due   Topic Date Due     URINE DRUG SCREEN Q1 YR  05[birth year]/1972     LIPID MONITORING Q6 MO  11/15/2017     DEXA Q3 YR  01/29/2018          Current Medical Concerns:  Not discussed    Current Behavioral Concerns: not discussed    Education Provided to patient: none     Medication Management:  Patient manages     Functional Status:  Transportation means:: Regular car (patient drives at baseline)     Psychosocial:  Financial/Insurance: Medicare/ no supplement     Patient is interested in [facility] services. Only identifies needing assistance with housekeeping, denies ADL assistance.  Patient is on SSDI and makes roughly $1300 a month. Unsure where her SSDI letter is.  Not sure if she is on a medicare savings program and denies having a part D medication plan. She does get SNAP for $80 a month.  Agrees to sign an DEREJE for [facility] so this CCS wcan outreach to them to get more information.    CCSW discussed MA and that the pt would most likely have a spend down. CCSW will send information on a long term care consult and will discuss on the next outreach if the pt would like to be assessed.     Also discussed [employer] home [employer] and will sned out information.    Pt identifies a support network. Has a friend where she lives and a brother, sister, and son who help with her care.        Resources and Interventions:  Current Resources:  ;             Goals:   Goals        General     Financial Wellbeing (pt-stated)     Notes - Note created  4/13/2018 11:43 AM by Stacey Aguayo LGSW    Goal Statement: I will apply for [facility] assistance  Measure of Success: evaluation for extra help, medic[place] program, and [place]  Supportive Steps to Achieve: I will sign the release for the  to speak to [facility]  Barriers: Unsure about finances  Strengths: motivated for help  Date to Achieve By: TBD          Lifestyle    Increase physical activity     Notes - Note edited  4/10/2018  1:36 PM by Korin Wang, MANUELA    Goal Statement: I will walk 1/2 down apartment hallway 3 times per week - increasing as tolerated to full hallway and more days per week   Measure of Success: patient able to meet goal of 3 times per week   Supportive Steps to Achieve: CCRN encouragement - support by weekly calls   Barriers: COPD   Strengths: wanting to increase endurance   Date to Achieve By: 4/30/18  As of today's date 4/10/2018 goal is met at 26 - 50%.   Goal Status:  Active                  Patient/Caregiver understanding: Fair      Next 5 appointments (look out 90 days)     Apr 17, 2018  1:30 PM CDT   Return Visit with MAURY Lemos   [facility] ([facility]    [address] [address]   [phone]            May 01, 2018  1:30 PM CDT   Return Visit with MAURY Lemos   [facility] ([facility])    [address] [address]   [phone]            May 15, 2018  1:30 PM CDT   Return Visit with MAURY Lemos   [facility] ([facility]    [address] [address]   [phone]                   PLAN:  See letter for resources  Pt will outreach as needed  CCSW will follow up in 2-3 weeks or sooner when DEREJE is  recieved    Stacey Aguayo, Social Work Care Coordinator, [facility], MSW  [facility]: [place], [place], and [place]   P:[phone]/ Signed April 13, 2018

## 2018-04-16 NOTE — TELEPHONE ENCOUNTER
Nitish Tam PT with Home Care [phone] requests verbal orders for   PT 1 x week x 3 weeks to work on activity tolerance   Informed approved per standing orders.   Home Care staff will fax these orders for MD arredondo.   Chaim Slaughter RN

## 2018-04-17 NOTE — MR AVS SNAPSHOT
MRN:[id number]                      After Visit Summary   4/17/2018    Karen Alex    MRN: [id number]           Visit Information        Provider Department      4/17/2018 1:30 PM Siddharth Mcgeeargavi Symonenatividadroger Marquez [facility] Generic      Your next 10 appointments already scheduled     May 01, 2018  1:30 PM CDT   Return Visit with Lupiskatina Friedman Alma Mcgee [facility] ([facility])    [address] [place] [address] [phone] [phone]            May 15, 2018  1:30 PM CDT   Return Visit with Lupis Friedman Alma Mcgee [facility] ([facility])    [address] [place] [address] [id number] [phone]              MyChart Information     [employer] gives you secure access to your electronic health record. If you see a primary care provider, you can also send messages to your care team and make appointments. If you have questions, please call your primary care clinic.  If you do not have a primary care provider, please call [phone] and they will assist you.        Care EveryWhere ID     This is your Care EveryWhere ID. This could be used by other organizations to access your [place] medical records  [id number]        Equal Access to Services     BRANDYN TURNER : Hadii omid woodard hadasho Soomaali, waaxda luqadaha, qaybta kaalmada adeegyada, rj ace. So [facility] [phone].    ATENCIÓN: Si habla español, tiene a zaragoza disposición servicios gratuitos de asistencia lingüística. Llame al [phone].    We comply with applicable federal civil rights laws and [place] laws. We do not discriminate on the basis of race, color, national origin, age, disability, sex, sexual orientation, or gender identity.

## 2018-04-17 NOTE — PROGRESS NOTES
Progress Note    Client Name: Karen Alex  Date: 4/17/2018                                        Service Type: Individual      Session Start Time:  1:35  Session End Time: 2:30      Session Length: 45 - 50     Session #: 29     Attendees: Client attended alone    Treatment Plan Last Completed Date: 11/1/17  PHQ-9 / HUNG-7 Last Completed Date: last completed 4/17/2018                DATA      Progress Since Last Session (Related to Symptoms / Goals / Homework):   Symptoms: Stable-scores remain high as it relates to her physical and emotional health     Homework: Partially completed- has been challenging with current health      Episode of Care Goals: Minimal progress - ACTION (Actively working towards change); Intervened by reinforcing change plan / affirming steps taken     Current / Ongoing Stressors and Concerns:   Client reports that her health condition has worsened.  Breathing has become more challenging and she has less physical stamina for activities.  Lately, she has been more exposed to thoughts about her ex-.  We linked it to possible worry about her son being gone over the weekend (Guard duty).  He also will be on leave starting in Oct 2018 for ten months.  She is quite concerned about that  And they are trying to game plan who may be able to stay with her so that she is not alone.  Minimal interaction with bf- he stays in his own home.      Treatment Objective(s) Addressed in This Session:   focus on being socially active and not isolating at home due to medical condition   Using acceptance skills to understand her level of depression and what she has control over       Intervention:   CBT: Understanding link to stress and activation of triggers        ASSESSMENT: Current Emotional / Mental Status (status of significant symptoms):   Risk status (Self / Other harm or suicidal ideation)  Client denies a history of suicidal ideation, suicide attempts,  "self-injurious behavior, homicidal ideation, homicidal behavior and and other safety concerns   Client denies current fears or concerns for personal safety.   Client reports the following current or recent suicidal ideation or behaviors: passive thoughts of not wanting to live.  Denies any plan or intent.  \"Would not do that to my son\".   Client denies current or recent homicidal ideation or behaviors.   Client denies current or recent self injurious behavior or ideation.   Client denies other safety concerns.   A safety and risk management plan has not been developed at this time, however client was given the after-hours number should there be a change in any of these risk factors.     Appearance:   Appropriate    Eye Contact:   Fair    Psychomotor Behavior: Normal    Attitude:   Cooperative    Orientation:   All   Speech    Rate / Production: Normal     Volume:  Soft    Mood:    Depressed  Sad    Affect:    Flat    Thought Content:  Clear    Thought Form:  Focused on grief   Insight:    Fair      Medication Review:   No changes to current psychiatric medication(s)     Medication Compliance:   Yes- see with Sara Leonardo [facility]-C at [facility]     Changes in Health Issues:   Yes: Respiratory Disease, No Psychological Distress-struggling with breathing, fluid retention     Chemical Use Review:   Substance Use: Chemical use reviewed, no active concerns identified      Tobacco Use: No change in amount of tobacco use since last session.  Action on nicotine patches- struggles with cravings- Continues to not smoke     Collateral Reports Completed:   Not Applicable    PLAN: (Client Tasks / Therapist Tasks / Other)  1.   Client will follow through with referrals and coordination with clinic SW.    Lupis Mcgee, [facility]     __________________________________________________________________________________________________                                                           Treatment Plan    Client's " Name: Karen Alex  YOB: 1955    Date: 9/22/2015       DSM-V Diagnoses: 296.32 - Major Depressive Disorder, Recurrent, Moderate    300.02 - Generalized Anxiety Disorder    309.81 - Posttraumatic Stress Disorder By History; 799.9 - Deferred Diagnosis   WHODAS:  35    Referral / Collaboration:  Referral to another professional/service is not indicated at this time..    Anticipated number of session or this episode of care: 12      MeasurableTreatment Goal(s) related to diagnosis / functional impairment(s)  Goal 1: Client will have stability with her mental health as evidenced by PHQ-9 and HUNG-7 scores as well as self-report.      I will know I've met my goal when I start feeling better about myself.      Objective #A (Client Action)      Status: Continued - Date(s): 11/1/2017           Client will Decrease frequency and intensity of feeling down, depressed, hopeless.    Intervention(s)  Therapist will assign homework by learning to build awareness of her triggers which activate her mental health symptoms.  Therapist will introduce and have client implement strategies to address triggers.    Objective #B  Client will Identify negative self-talk and behaviors: challenge core beliefs, myths, and actions.  Status: Continued - Date(s): 11/1/2017         Intervention(s)  Therapist will continue to help client identify and reframe negative perceptions of self.  Work on ways to increase self-esteem.    Objective #C  Client will Feel less tired and more energy during the day .  Status: Completed - Date: 3/2/16     Intervention(s)  Therapist will assign homework 1.  Complete at least one household activity daily; 2.  Focus on getting out of the house at least three times weekly; 3.  Look into entering social groups.      Client has reviewed and agreed to the above plan.      Lupis Mcgee, [facility]  September 22, 2015

## 2018-04-19 NOTE — TELEPHONE ENCOUNTER
Requested Prescriptions   Pending Prescriptions Disp Refills     QUEtiapine (SEROQUEL) 200 MG tablet 90 tablet 1     Sig: Take 1 tablet (200 mg) by mouth At Bedtime    There is no refill protocol information for this order        Last Written Prescription Date:  9/29/2017  Last Fill Quantity: 90,  # refills: 1   Last office visit: 10/30/2017 with prescribing provider:  Leana Patel   Future Office Visit:   Next 5 appointments (look out 90 days)     May 01, 2018  1:30 PM CDT   Return Visit with Lupis Mcgee [facility] ([facility])    [address] [place] [phone]            May 15, 2018  1:30 PM CDT   Return Visit with MAURY Lemos   [facility] ([facility])    [address] [place] [address] [phone]

## 2018-05-01 NOTE — MR AVS SNAPSHOT
MRN:[id number]                      After Visit Summary   5/1/2018    Karen Alex    MRN: [id number]           Visit Information        Provider Department      5/1/2018 1:30 PM Arely Lupiskatina Marquze [facility] Generic      Your next 10 appointments already scheduled     May 15, 2018  1:30 PM CDT   Return Visit with Lupiskatina Mcgee [facility] ([facility])    [address] [place] [phone]   [phone]            Jun 04, 2018 10:30 AM CDT   Return Visit with Lupis Mcgee [facility] ([facility])    [address] [place] [phone]   [phone]            Jun 25, 2018 12:30 PM CDT   Return Visit with Lupis Idalia Mcgee [facility] ([facility])    [address] [place] [address]   [phone]              MyChart Information     [employer] gives you secure access to your electronic health record. If you see a primary care provider, you can also send messages to your care team and make appointments. If you have questions, please call your primary care clinic.  If you do not have a primary care provider, please call [phone] and they will assist you.        Care EveryWhere ID     This is your Care EveryWhere ID. This could be used by other organizations to access your [place] medical records  [id number]        Equal Access to Services     [facility] YUE : Hadii aad ku hadasho Soomaali, waaxda luqadaha, qaybta kaalmada adeegyada, rj ace. So [facility] [phone].    ATENCIÓN: Si habla español, tiene a zaragoza disposición servicios gratuitos de asistencia lingüística. Llame al [phone].    We comply with applicable federal civil rights laws and [place] laws. We  do not discriminate on the basis of race, color, national origin, age, disability, sex, sexual orientation, or gender identity.
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
## 2018-05-02 NOTE — TELEPHONE ENCOUNTER
[facility] calls, [phone], verbal orders provided for lymphedema therapy, 1 week x 1, 2 x week x 5 weeks, MD KEO GONZALEZ RN, BSN  Message handled by Nurse Triage.

## 2018-05-02 NOTE — TELEPHONE ENCOUNTER
Honey calling from  Home Care  Requesting SW visit for today  Approved per protocol    Chinyere Taylor RN, BS  Clinical Nurse Triage.

## 2018-05-03 NOTE — TELEPHONE ENCOUNTER
Bel with [facility] calls ([phone]) verbal order provided for Weekly visits x 5 weeks and 2 prn, FYI to MD Estelle GONZALEZ, RN, BSN  Message handled by Nurse Triage.

## 2018-05-03 NOTE — PROGRESS NOTES
Clinic Care Coordination Contact    Received the signed DEREJE for [facility] from the patient.     CCSW called [facility]: Patient is active to SNAP, qualified individual (Medicare savings program).    PLAN:  Pt will outreach as needed  CCSW will follow up in 1 week with the patient    Stacey Aguayo, Social Work Care Coordinator, LGSW, MSW  [facility]: [place], [place], and [place]   P:[phone]/ Signed May 3, 2018

## 2018-05-03 NOTE — TELEPHONE ENCOUNTER
Bel calls back, also verbal order provided for speech therapy  Estelle Haynes RN, BSN  Message handled by Nurse Triage.

## 2018-05-03 NOTE — PROGRESS NOTES
Clinic Care Coordination Contact  OUTREACH    Referral Information:  Referral Source: IP Report  Primary Diagnosis: COPD  Chief Complaint   Patient presents with     Clinic Care Coordination - Follow-up     COPD - CCRN      [place] Utilization:   Utilization    Last refreshed: 5/2/2018 11:42 AM:  No Show Count (past year) 1       Last refreshed: 5/2/2018 11:42 AM:  ED visits 2       Last refreshed: 5/2/2018 11:42 AM:  Hospital admissions 3          Current as of: 5/2/2018 11:42 AM           Clinical Concerns:  Health Maintenance Reviewed: Due/Overdue   Health Maintenance Due   Topic Date Due     URINE DRUG SCREEN Q1 YR  05/18/1972     HIV SCREEN (SYSTEM ASSIGNED)  05/18/1975     LIPID MONITORING Q6 MO  11/15/2017     DEXA Q3 YR  01/29/2018     DEPRESSION ACTION PLAN Q1 YR  05/15/2018      Current Medical Concerns:  COPD follow up   Patient is having more shortness of breath today - she is speaking in very few words, stopping and catching her breath.   Oxygen at 3.5 LPM - CCRN asked patient to check pulse ox and patient states that it is 94%   Denies coughing   CCRN expressed concerns over shortness of breath - and asked patient if she could come into the clinic today? pcp is out however another provider could see her at 3:15 - patient states that she has home care RN coming to see her at 3:30 so she is unable to come in   Patient [facility] not talked with [facility] - CCRN suggested patient call [facility]  Patient will await home care nurse evaluataion - she feels that she is able to wait until that time, patient is resting, she is doing a lot of resting   Patient notes that her son has been concerned with her talking/breathing for a few days when she talks to him on the phone     Clinical Pathway Name: COPD - completed     Current Behavioral Concerns:   PHQ-9 SCORE 10/30/2017 12/12/2017 4/17/2018   Total Score - - -   Total Score MyChart 23 (Severe depression) - -   Total Score 23 23 19     HUNG-7 SCORE 10/30/2017 12/12/2017  4/17/2018   Total Score - - -   Total Score 20 (severe anxiety) - -   Total Score 20 20 17     Medication Management:  States compliance with inhalers/neb treatments     Functional Status:  Mobility Status: Independent w/Device  Equipment Currently Used at Home: oxygen, raised toilet, walker, rolling (4 LPM - [facility])  Transportation means:: Regular car (patient drives at baseline)     Psychosocial:  Financial/Insurance: CCSW working with patient to see if she is eligible for [facility] programs   Current living arrangement:: I live in a private home with family, I live in a private home, I live alone (in 3rd floor apartment - sister visits often she lives in [place] - sister does not drive but will stay with patient when she is not doig well)  Type of residence:: Apartment     Resources and Interventions:  Current Resources: Skilled Nursing, Physicial Therapy, Occupational Therapy;    Advanced Care Plans/Directives on file:: No      Goals:   Goals        General    Financial Wellbeing (pt-stated)     Notes - Note created  4/13/2018 11:43 AM by Stacey Aguayo LGSW    Goal Statement: I will apply for [facility] assistance  Measure of Success: evaluation for extra help, medicare [place], and [place]  Supportive Steps to Achieve: I will sign the release for the  to speak to [facility]  Barriers: Unsure about finances  Strengths: motivated for help  Date to Achieve By: TBD          Lifestyle    Increase physical activity     Notes - Note edited  4/10/2018  1:36 PM by Korin Wang, MANUELA    Goal Statement: I will walk 1/2 down apartment hallway 3 times per week - increasing as tolerated to full hallway and more days per week   Measure of Success: patient able to meet goal of 3 times per week   Supportive Steps to Achieve: CCRN encouragement - support by weekly calls   Barriers: COPD   Strengths: wanting to increase endurance   Date to Achieve By: 4/30/18  As of today's  date 4/10/2018 goal is met at 26 - 50%.   Goal Status:  Active                Patient/Caregiver understanding: Yes     Outreach Frequency: weekly  Next 5 appointments (look out 90 days)     May 15, 2018  1:30 PM CDT   Return Visit with Lupis Mcgee [facility] ([facility])    [address] [address]   [phone]            Jun 04, 2018 10:30 AM CDT   Return Visit with Lupis Mcgee [facility] ([facility])    [address] [address]   [phone]            Jun 25, 2018 12:30 PM CDT   Return Visit with Lupis Mcgee [facility] ([facility])    [address] [address]   [phone]                    Plan:   Patient will have assessment by [facility] RN today at 3:30 - advised patient that after assessment RN may want her to go to ER for evaluation due to breathing status   Patient will rest and use oxygen until RN gets to her home - if breathing status worsens she will need to go to ER via EMS   CCRN will call to check in with patient next week and monitor for ER/IP stays and f/u after discharge     Korin Wang Care Coordinator RN  [facility]  [phone]  May 3, 2018

## 2018-05-07 NOTE — TELEPHONE ENCOUNTER
Pt is looking for a refill of Seroquel 50mg tabs one tab daily.  Last written 9/29/17  Qty 90  Refills 1    Georgie Shabazz, Pharmacy [facility]

## 2018-05-07 NOTE — TELEPHONE ENCOUNTER
Forwarded to primary care for refill authorization.    Antonieta Rouse RN on 5/7/2018 at 9:09 AM

## 2018-05-08 NOTE — ED NOTES
[facility]  ED Nurse Handoff Report    Karen Alex is a 62 year old female   ED Chief complaint: Shortness of Breath  . ED Diagnosis:   Final diagnoses:   Hypercapnic respiratory failure, chronic (H)   COPD exacerbation (H)     Allergies: No Known Allergies    Code Status: Full Code  Activity level - Baseline/Home:  Independent. Activity Level - Current:   Stand with Assist of 2. Lift room needed: No. Bariatric: No   Needed: No   Isolation: No. Infection: Not Applicable.     Vital Signs:   Vitals:    05/08/18 1600 05/08/18 1615 05/08/18 1630 05/08/18 1645   BP: 112/78 135/77 125/81 129/86   Resp:  21     Temp:       TempSrc:       SpO2: 96% 95% 95%        Cardiac Rhythm:  ,      Pain level:  None  Patient confused: Yes. At times, does not seem to understand the question or appears to have come short term memory issues.  Patient Falls Risk: Yes.   Elimination Status: Due to void   Patient Report - Initial Complaint: Brother was notified to bring pt to clinic.  Brother visited patient and decided to have pt go to ED.  Patient having increased weakness and SOB. O2 70s with movement. 3.5 liters chronic O2.  No neb treatments today. EMS did one neb. H/O COPD. Pt. Lives at home with no services. Meeting set up Thursday to establish if patient needs more care per brother.   Focused Assessment: Musculoskeletal - Musculoskeletal WDL:  WDL except (Generalized weakness).Respiratory - Respiratory WDL:  WDL except (C/O SOB. Labored breathing. Increased work of breathing with speaking. ) Lung Fields: All Fields (Very diminished. TIght.)   Tests Performed: Chest xray, labs  Abnormal Results:    Labs Ordered and Resulted from Time of ED Arrival Up to the Time of Departure from the ED   CBC WITH PLATELETS DIFFERENTIAL - Abnormal; Notable for the following:        Result Value    MCHC 29.6 (*)     All other components within normal limits   COMPREHENSIVE METABOLIC PANEL - Abnormal; Notable for the  following:     Carbon Dioxide 42 (*)     Anion Gap 1 (*)     Glucose 107 (*)     All other components within normal limits   BLOOD GAS VENOUS AND OXYHGB - Abnormal; Notable for the following:     Ph Venous 7.31 (*)     PCO2 Venous 88 (*)     Bicarbonate Venous 44 (*)     All other components within normal limits   NT PROBNP INPATIENT   TROPONIN I     Chest XR,  PA & LAT   Final Result   IMPRESSION: Advanced emphysema with bullous changes in the left upper   lung. Scattered areas of linear atelectasis or scarring in the lower   lungs. No new focal airspace disease or significant change. Normal   heart size.      APOORVA ALONZO MD        Treatments provided: Nebs, IV magnesium, Solumedrol.    Family Comments: Brother states that a meeting is scheduled for Thursday to set up continuing care for patient at home. Currently, no in home services at this time  OBS brochure/video discussed/provided to patient:  N/A  ED Medications:   Medications   ipratropium - albuterol 0.5 mg/2.5 mg/3 mL (DUONEB) neb solution 6 mL (6 mLs Nebulization Given 5/8/18 1600)   methylPREDNISolone sodium succinate (solu-MEDROL) injection 125 mg (125 mg Intravenous Given 5/8/18 1603)   magnesium sulfate 2 g in NS intermittent infusion (PharMEDium or FV Cmpd) (2 g Intravenous New Bag 5/8/18 1648)     Drips infusing:  No  For the majority of the shift, the patient's behavior Green. Interventions performed were none.     Severe Sepsis OR Septic Shock Diagnosis Present: No    Patient had a lunch box and orange juice at 1715    ED Nurse Name/Phone Number: Alondra Vasquez,   5:35 PM    RECEIVING UNIT ED HANDOFF REVIEW    Above ED Nurse Handoff Report was reviewed:  YES  Reviewed by: Chris Carbajal on May 8, 2018 at 5:54 PM

## 2018-05-08 NOTE — TELEPHONE ENCOUNTER
Ynes calls back, pt on way to ER, O2 sats dropped to 70, also wants FYI to Eros Rebolledo MD, informing pt fell Sunday, no apparent injury, pt forgot to tell them but they have to inform Eros Rebolledo MD since has cognitive decline today, FYI to MD Estelle GONZALEZ RN, BSN  Message handled by Nurse Triage.

## 2018-05-08 NOTE — LETTER
Transition Communication Hand-off for Care Transitions to Next Level of Care Provider    Name: Karen Alex  : 1955  MRN #: [id number]  Primary Care Provider: Eros Rebolledo  Primary Care MD Name: Kesha   Primary Clinic: [address] [facility] [address]  Primary Care Clinic Name: [facility]   Reason for Hospitalization:  COPD exacerbation (H) [J44.1]  Hypercapnic respiratory failure, chronic (H) [J96.12]  Admit Date/Time: 2018  3:27 PM  Discharge Date: 18  Payor Source: Payor: MEDICARE / Plan: MEDICARE / Product Type: Medicare /     Readmission Assessment Measure (RICHIE) Risk Score/category: Elevated         Reason for Communication Hand-off Referral: Admission diagnoses: COPD    Discharge Plan:       Concern for non-adherence with plan of care:   Y  Discharge Needs Assessment:  Needs       Most Recent Value    Current Discharge Risk chronically ill [COPD ]    # of Referrals Placed by [facility], Communication hand-offs to next level of Care Providers, Scheduled Follow-up appointments, Inpatient Pharmacy, MTM          Already enrolled in Tele-monitoring program and name of program:  NA  Follow-up specialty is recommended: No    Follow-up plan:  Future Appointments  Date Time Provider Department Center   5/15/2018 1:30 PM Ghate, Lupisted Brucear Baxi, LICSW [facility]   2018 11:00 AM [facility]   2018 10:30 AM Ghate, Lupis Shiledar Baxi, LICSW [facility]   2018 12:30 PM Ghate, Lupis Symoneledar Baxi, LICSW [facility]       Any outstanding tests or procedures:    Procedures     Future Labs/Procedures    Oxygen Adult     Comments:    Renew Home Oxygen Order  Renew previous prescription.  Expected treatment length is indefinite (99 months).    Attending Provider: Ken Alcaraz  Physician signature: See electronic signature associated with these discharge orders  Date of Order: May 12, 2018          Referrals      Future Labs/Procedures    Med Therapy Manage Referral     Comments:    Your provider has referred you to: **[place] [facility] Scheduling (numerous locations) [phone]   [email or web address]    Reason for Referral: COPD admission     The [place] [facility] department will contact you to schedule an appointment.  You may also schedule the appointment by calling [phone].  For [place] patients, please call [phone] to confirm/schedule your appointment on the next business day.    This service is designed to help you get the most from your medications.  A specially trained Pharmacist will work closely with you and your providers to solve any questions, concerns, issues or problems related to your medications.    Please bring all of your prescription and non-prescription medications (such as vitamins, over-the-counter medications, and herbals) or a detailed medication list to your appointment.    If you have a glucose meter or other home monitoring information, please also bring this to your appointment (i.e. blood glucose log, blood pressure log, pain log, etc.).                Key Recommendations:  Pt admitted for COPD exacerbation with hypercapnia resp failure. Met with pt at bedside and discussed hypercapnia and the results of.  Pt reports that she is sleepy all the time and at times when this happends she will just turn up her 02.  She has never had a sleep study.  Please Follow up with this pt for on going chronic illness management knowledge deficient in this area was identified.  Pt is open to [facility] RN - handoff to be provided to them as well for recommended gaps in pt knowledge as how hypercapnia is related to the lethargy and being sleeping all the time and why turning up her o2 is not a good idea in a copd pt.     Chula Daugherty    AVS/Discharge Summary is the source of truth; this is a helpful  guide for improved communication of patient story

## 2018-05-08 NOTE — ED PROVIDER NOTES
History     Chief Complaint:  Shortness of breath    HPI   Karen Alex is a 62 year old female who presents with shortness of breath. The patient has a history significant for COPD and is on 3.5 liters of home oxygen continuously. She has inhalers and nebulizers at home which she uses daily as directed by her physician. The patient states that approximately 4 days she has been experiencing increased shortness of breath and work of breathing that she thinks is from the change in humidity. She has tried to use air conditioning at home but this has not been able to help her symptoms at home. She had a home nurse visit today and it was noted that the patient was more confused and weak more than baseline. Given her work of breath and slight confusion, the patient's brother Peña was called. He went to pick her up to bring her to an outpatient clinic visit but he noted that her oxygen saturations dropped to 79% while she stood to put on her jacket and this prompted him to call EMS to bring her here. The patient denies any chest pain or pressure with her symptoms. She has some baseline leg swelling over the past year but denies a history of heart failure or heart conditions. She is not taking Prednisone nor antibiotics. She otherwise denies fevers, chills, cough, nausea, vomiting, abdominal pain.    CARDIAC RISK FACTORS:  Sex:    Female  Tobacco:   Former smoker  Hypertension:   Yes  Hyperlipidemia:  Yes  Diabetes:   No  Family History:  No    PE/DVT RISK FACTORS:  Sex:    Female  Hormones:   No  Tobacco:   Former smoker  Cancer:   Yes  Travel:   No  Surgery:   No  Other immobilization: No  Personal history:  No  Family history:  No     Allergies:  No known drug allergies     Medications:    Advair inhaler  Albuterol nebulizer  Abilify  Aspirin  Lipitor  Wellbutrin  Duloxetine  Neurontin  Imdur  Gowen  Spiriva inhaler  Quetiapine   Ventolin    Past Medical History:    COPD  Tobacco dependence  Generalized anxiety  disorder  Major depression  PTSD  Chronic pain  Hypertension  Hyperlipidemia  Arthritis  Bipolar 2 disorder  Pneumothorax   Varicose veins    Past Surgical History:    L5/21 microdiscectomy  Conization  Foot surgery, right x2  Excise vulva  Herniorrhaphy   NEENA-BSO  Sling bladder suspension  Tubal ligation   Partial lobectomy    Family History:    Lung cancer, hypertension, diabetes, asthma     Social History:  Smoking Status: Former Smoker  Alcohol Use: No  Patient presents via EMS.   Marital Status:      Presents with brother Peña.    Review of Systems   Constitutional: Negative for chills and fever.   Respiratory: Positive for shortness of breath. Negative for cough.    Cardiovascular: Positive for leg swelling.   Gastrointestinal: Negative for abdominal pain, nausea and vomiting.   All other systems reviewed and are negative.    Physical Exam     Patient Vitals for the past 24 hrs:   BP Temp Temp src Heart Rate Resp SpO2   05/08/18 1645 129/86 - - - - -   05/08/18 1630 125/81 - - 87 - 95 %   05/08/18 1615 135/77 - - 86 21 95 %   05/08/18 1600 112/78 - - 87 - 96 %   05/08/18 1545 125/80 - - 89 - 95 %   05/08/18 1532 (!) 148/93 98.2  F (36.8  C) Oral 94 22 93 %   05/08/18 1530 (!) 148/93 - - - - 90 %     Physical Exam  General: Alert and interactive. Tachypneic and using accessory muscles to breathe. Cooperative.   Eyes: The pupils are equal and round. EOMs intact. No scleral icterus.      CV: Regular rate and rhythm. S1 and S2 normal without murmur, click, gallop or rub.   Resp:  Decreased breath sounds bilaterally with some scant expiratory wheezes. Labored breathing, using subcostal muscles.   GI: Abdomen is soft without distension. No tenderness to palpation. No peritoneal signs.    MS: Moving all extremities well.   Skin:  Warm and dry. No rash or lesions noted.  Neuro:  Alert and oriented x 3. GCS 15.    Psych: Awake. Alert.  Normal affect. Appropriate interactions.  Lymph: No anterior or posterior  cervical lymphadenopathy noted.    Emergency Department Course     ECG (15:36:12):  Rate 90 bpm. MT interval 130. QRS duration 78. QT/QTc 358/437. P-R-T axes 63 267 72. Normal sinus rhythm. Right superior axis deviation. Right ventricular hypertrophy. Abnormal ECG. No significant change when compared to EKG dated 3/26/18.  Interpreted by Biju Stringer MD.     Imaging:  Radiographic findings were communicated with the patient who voiced understanding of the findings.    X-ray Chest, 2 views:  Advanced emphysema with bullous changes in the left upper  lung. Scattered areas of linear atelectasis or scarring in the lower  lungs. No new focal airspace disease or significant change. Normal  heart size.  Result per radiology.     Laboratory:  CBC: AWNL. (WBC 6.8, HGB 12.0, )   CMP: Carbon Dioxide 42 (H), Anion gap 1 (L), Glucose 107 (H) o/w WNL  BNP: 259  Troponin (1538): <0.015  Venous Blood Gas  Lab 05/08/18  1538   PHV 7.31*   PCO2V 88*   PO2V 39   HCO3V 44*   JULES 13.9   O2PER 3.5      Interventions:  1600 Duo-Neb 6 mL  1603 125 mg Solu-Medrol IV  1648 2g Mag Sulfate     Emergency Department Course:  The patient arrived in the emergency department via EMS.   Past medical records, nursing notes, and vitals reviewed.  I performed an exam of the patient and obtained history, as documented above.   IV inserted and blood drawn. The patient was placed on oxygen via nasal cannula and continuous cardiac monitoring and pulse oximetry.   I rechecked the patient, who was breathing more comfortably after the above inteventions.     Findings and plan explained to the Patient who consents to admission.     1719: Discussed the patient with Dr. Campos, who will admit the patient to a medical bed for further monitoring, evaluation, and treatment.      Patient will be admitted to a medical floor for further evaluation and treatment. She is in agreement to this plan and has no further questions nor concerns.     Impression &  Plan    Medical Decision Making: Karen Alex is a 62 year old female who presents for evaluation shortness of breath. The patient has a chronic history of COPD, with recent admission for COPD exacerbation at the end of March. The patient is chronically on 3.5 liters of oxygen at home. Signs and symptoms are consistent with COPD exacerbation.  A broad differential was considered including reactive airway disease, pneumothorax, cardiac equivalent/ACS, URI, allergic phenomena, or pneumonia. The patient feels improved after interventions here in ED but continues to have impairment in respiratory function, with evidence of hypercapnia with PCO2 venous of 88. She decompensated prior to arrival, with oxygen saturations in to the upper 70s with simple movement at home. Given this, I will hospitalize for further intervention.    There are no signs at this point of any other serious etiologies, including those mentioned above. I doubt this is ACS given the classic story of COPD exacerbation. EKG shows no acute ST segment changes, and troponin is negative. Chest x-ray shows no new evidence for airspace disease to suggest pneumonia. I discussed the patient with Dr. Campos, who will admit the patient for further monitoring and care. She will be admitted to the hospitalist team and agrees to this plan.     Diagnosis:    ICD-10-CM    1. Hypercapnic respiratory failure, chronic (H) J96.12 Comprehensive metabolic panel     BNP     Troponin I (now)   2. COPD exacerbation (H) J44.1      I, Aysha Higgins PA-C, interviewed the patient, explained the course of action and discussed the patient with Dr. Stringer, who then evaluated the patient.    IYuan, am serving as a scribe at 3:45 PM on 5/8/2018 to document services personally performed by Biju Stringer MD and Aysha Higgins PA-C, based on my observations and the provider's statements to me.      Aysha Higgins    EMERGENCY DEPARTMENT  05/08/18       Aysha Higgins  VANDANA Ryan  05/08/18 [id number]

## 2018-05-08 NOTE — TELEPHONE ENCOUNTER
Honey Christina, , verbal orders provided for 2 visits for subsidized housing and care conference, MD KEO Cottrell RN, BSN  Message handled by Nurse Triage.

## 2018-05-08 NOTE — ED NOTES
Bed: ED03  Expected date: 5/8/18  Expected time: 3:13 PM  Means of arrival: Ambulance  Comments:  Mynor Wharton

## 2018-05-08 NOTE — TELEPHONE ENCOUNTER
Caller Ynes [phone]   Pt is coming in for OV at 3:15.   FYI for visit   I am out of a nurse visit - lungs sounds diminished throughout   OT was here did cognition assessment. It was noted pt did poorly on assessment.   Ynes notes trent is having trouble finding words today. It just appears she is having more difficulty finding words and is not herself.     Oxygen SATS while walking decreased to 82% on 3.5 L O2  97% at rest.     Request VO:   HHA 1 x week x 5 weeks.   Informed approved per standing orders.   Home Care staff will fax these orders for MD signature.   Chaim Slaughter, RN        Chaim Slaughter, RN

## 2018-05-08 NOTE — TELEPHONE ENCOUNTER
Nitish Tam PT with [facility] [phone]   Requests PT   1 x week x 1 week  2 x week x 1 week  1 x week x 1 week  Informed approved per standing orders.   Home Care staff will fax these orders for MD arredondo.   Chaim Slaughter RN

## 2018-05-08 NOTE — IP AVS SNAPSHOT
MRN:[id number]                      After Visit Summary   5/8/2018    Karen Alex    MRN: [id number]           Thank you!     Thank you for choosing [facility] for your care. Our goal is always to provide you with excellent care. Hearing back from our patients is one way we can continue to improve our services. Please take a few minutes to complete the written survey that you may receive in the mail after you visit. If you would like to speak to someone directly about your visit please contact Patient Relations at [phone]. Thank you!          Patient Information     Date Of Birth          1955        Designated Caregiver       Most Recent Value    Caregiver    Will someone help with your care after discharge? yes    Name of designated caregiver Shreyas (son)    Phone number of caregiver [phone]    Caregiver address same as pt      About your hospital stay     You were admitted on:  May 8, 2018 You last received care in the:  [facility]    You were discharged on:  May 12, 2018        Reason for your hospital stay       COPD exacerbation                  Who to Call     For medical emergencies, please call 911.  For non-urgent questions about your medical care, please call your primary care provider or clinic, [phone]          Attending Provider     Provider Specialty    Biju Stringer MD Emergency Medicine    Jhonatan Campos DO Internal Medicine       Primary Care Provider Office Phone # Fax #    Eros Rebolledo [phone]      After Care Instructions     Activity       Your activity upon discharge: activity as tolerated            Diet       Follow this diet upon discharge: Regular            Oxygen Adult       Renew Home Oxygen Order  Renew previous prescription.  Expected treatment length is indefinite (99 months).    Attending Provider: Ken Alcaraz  Physician signature: See electronic signature associated  with these discharge orders  Date of Order: May 12, 2018                  Follow-up Appointments     Follow-up and recommended labs and tests        Follow up with primary care provider in 1-2 weeks for hospital follow up.                  Your next 10 appointments already scheduled     May 15, 2018  1:30 PM CDT   Return Visit with MAURY Lemos   [facility] ([facility]    [address] [place] [id number] [phone]            May 18, 2018 11:00 AM CDT   Office Visit with Eros Rebolledo MD   [facility]    [address] [place] [id number] [phone]           Bring a current list of meds and any records pertaining to this visit. For Physicals, please bring immunization records and any forms needing to be filled out. Please arrive 10 minutes early to complete paperwork.            Jun 04, 2018 10:30 AM CDT   Return Visit with MAURY Lemos   [facility] ([facility])    [address] [place] [address] [id number] [phone]            Jun 25, 2018 12:30 PM CDT   [facility]   [facility]    [address] [place] [address] [id number] [phone]              Additional Services     Med Therapy Manage Referral       Your provider has referred you to: **[facility] [facility] Scheduling (numerous locations) [phone]   [email or web address]    Reason for Referral: COPD admission     The [facility] [facility] department will contact you to schedule an appointment.  You may also schedule the appointment by calling [phone].  For [place] patients, please call [phone] to  "confirm/schedule your appointment on the next business day.    This service is designed to help you get the most from your medications.  A specially trained Pharmacist will work closely with you and your providers to solve any questions, concerns, issues or problems related to your medications.    Please bring all of your prescription and non-prescription medications (such as vitamins, over-the-counter medications, and herbals) or a detailed medication list to your appointment.    If you have a glucose meter or other home monitoring information, please also bring this to your appointment (i.e. blood glucose log, blood pressure log, pain log, etc.).                  Pending Results     No orders found from 5/6/2018 to 5/9/2018.            Statement of Approval     Ordered          05/12/18 0902  I have reviewed and agree with all the recommendations and orders detailed in this document.  EFFECTIVE NOW     Approved and electronically signed by:  Ken Alcaraz MD             Admission Information     Date & Time Provider Department Dept. Phone    5/8/2018 Jhonatan Campos, DO [facility] [phone]      Your Vitals Were     Blood Pressure Pulse Temperature Respirations Height Weight    152/84 (BP Location: Right arm) 76 96.1  F (35.6  C) (Axillary) 18 1.702 m (5' 7\") 103.7 kg (228 lb 11.2 oz)    Last Period Pulse Oximetry BMI (Body Mass Index)             12/01/2007 97% 35.82 kg/m2         MyChart Information     [employer] gives you secure access to your electronic health record. If you see a primary care provider, you can also send messages to your care team and make appointments. If you have questions, please call your primary care clinic.  If you do not have a primary care provider, please call [phone] and they will assist you.        Care EveryWhere ID     This is your Care EveryWhere ID. This could be used by other organizations to access your [place] medical records  [id number]   "      Equal Access to Services     [facility]: Hadii aad ku hadtoyandel Calixto, waaxda luqadaha, qaybta kacelinakike daniel, rj ace. So [facility] [phone].    ATENCIÓN: Si habla español, tiene a zaragoza disposición servicios gratuitos de asistencia lingüística. Llame al [phone].    We comply with applicable federal civil rights laws and [place] laws. We do not discriminate on the basis of race, color, national origin, age, disability, sex, sexual orientation, or gender identity.               Review of your medicines      CONTINUE these medicines which may have CHANGED, or have new prescriptions. If we are uncertain of the size of tablets/capsules you have at home, strength may be listed as something that might have changed.        Dose / Directions    predniSONE 10 MG tablet   Commonly known as:  DELTASONE   This may have changed:  additional instructions   Used for:  COPD exacerbation (H)        By mouth, take 6 tabs daily x 2 days, then 4 tabs daily x 3 days, then 2 tabs daily x 3 days, then 1 tab daily x 3 days, then stop.   Quantity:  33 tablet   Refills:  0         CONTINUE these medicines which have NOT CHANGED        Dose / Directions    ABILIFY 5 MG tablet   Generic drug:  ARIPiprazole        Dose:  5 mg   Take 5 mg by mouth daily   Refills:  0       ASPIRIN PO        Dose:  81 mg   Take 81 mg by mouth daily   Refills:  0       atorvastatin 40 MG tablet   Commonly known as:  LIPITOR   Used for:  Coronary artery disease of native heart with stable angina pectoris, unspecified vessel or lesion type (H)        Dose:  40 mg   Take 1 tablet (40 mg) by mouth daily   Quantity:  90 tablet   Refills:  3       buPROPion 300 MG 24 hr tablet   Commonly known as:  WELLBUTRIN XL   Used for:  Major depressive disorder, recurrent episode, in partial remission (H)        Dose:  300 mg   Take 1 tablet (300 mg) by mouth every morning   Quantity:  90 tablet   Refills:  1       diazepam 10 MG  tablet   Commonly known as:  VALIUM   Used for:  HUNG (generalized anxiety disorder)        Dose:  10 mg   Take 1 tablet (10 mg) by mouth every 12 hours as needed for anxiety   Quantity:  60 tablet   Refills:  1       DULOXETINE HCL PO        Dose:  90 mg   Take 90 mg by mouth daily   Refills:  0       gabapentin 300 MG capsule   Commonly known as:  NEURONTIN   Used for:  Recurrent major depressive disorder, in partial remission (H)        Dose:  300 mg   Take 1 capsule (300 mg) by mouth At Bedtime   Quantity:  90 capsule   Refills:  1       HYDROcodone-acetaminophen 5-325 MG per tablet   Commonly known as:  NORCO   Used for:  Mechanical low back pain, Other chronic pain        Take one pill  daily as needed, not to be taken with diazepam   Quantity:  30 tablet   Refills:  0       isosorbide mononitrate 30 MG 24 hr tablet   Commonly known as:  IMDUR   Used for:  Chest pain, CAD (coronary artery disease)        Dose:  30 mg   Take 1 tablet (30 mg) by mouth daily   Quantity:  90 tablet   Refills:  0       MELATONIN PO        Dose:  5 mg   Take 5 mg by mouth At Bedtime   Refills:  0       nitroGLYcerin 0.4 MG sublingual tablet   Commonly known as:  NITROSTAT   Used for:  Chest pain        Dose:  0.4 mg   Place 1 tablet (0.4 mg) under the tongue every 5 minutes as needed for chest pain prn   Quantity:  25 tablet   Refills:  1       order for DME   Used for:  Chronic obstructive pulmonary disease with acute exacerbation (H)        Equipment being ordered: Walker Wheels () and Walker () Treatment Diagnosis: Gait instability   Quantity:  1 each   Refills:  0       order for DME   Used for:  Chronic obstructive pulmonary disease with acute exacerbation (H)        Equipment being ordered: Walker Wheels () Treatment Diagnosis: severe COPD   Quantity:  1 Units   Refills:  0       order for DME   Used for:  Chronic obstructive pulmonary disease with acute exacerbation (H)        Equipment being ordered: Walker  () Treatment Diagnosis: severe COPD   Quantity:  1 Units   Refills:  0       * QUETIAPINE FUMARATE PO        Dose:  50 mg   Take 50 mg by mouth every morning   Refills:  0       * QUEtiapine 200 MG tablet   Commonly known as:  SEROquel   Used for:  HUNG (generalized anxiety disorder)        Dose:  200 mg   Take 1 tablet (200 mg) by mouth At Bedtime   Quantity:  90 tablet   Refills:  1       STOOL SOFTENER PO        Dose:  100 mg   Take 100 mg by mouth daily   Refills:  0       tiotropium 18 MCG capsule   Commonly known as:  SPIRIVA   Used for:  Generalized anxiety disorder, Chronic obstructive pulmonary disease, unspecified COPD type (H)        Dose:  18 mcg   Inhale 1 capsule (18 mcg) into the lungs daily   Quantity:  90 capsule   Refills:  2       * VENTOLIN  (90 Base) MCG/ACT Inhaler   Used for:  Panlobular emphysema (H)   Generic drug:  albuterol        SHAKE WELL AND INHALE 1 TO 2 PUFFS INTO THE LUNGS EVERY 4 HOURS AS NEEDED FOR SHORTNESS OF BREATH, DIFFICULTY BREATHING OR WHEEZING.   Quantity:  18 g   Refills:  3       * albuterol (2.5 MG/3ML) 0.083% neb solution   Used for:  COPD exacerbation (H)        Dose:  1 vial   Take 1 vial (2.5 mg) by nebulization 4 times daily as needed   Quantity:  120 vial   Refills:  5       Vitamin D3 2000 units Chew   Indication:  prn        Dose:  2000 mg   Take 2,000 mg by mouth daily   Refills:  0       * Notice:  This list has 4 medication(s) that are the same as other medications prescribed for you. Read the directions carefully, and ask your doctor or other care provider to review them with you.         Where to get your medicines      These medications were sent to [facility] [address]  [address] [facility] [address]     Phone:  [phone]     albuterol (2.5 MG/3ML) 0.083% neb solution    predniSONE 10 MG tablet                Protect others around you: Learn how to safely use, store and throw away your  medicines at www.disposemymeds.org.             Medication List: This is a list of all your medications and when to take them. Check marks below indicate your daily home schedule. Keep this list as a reference.      Medications           Morning Afternoon Evening Bedtime As Needed    ABILIFY 5 MG tablet   Take 5 mg by mouth daily   Last time this was given:  5 mg on 5/12/2018  8:02 AM   Generic drug:  ARIPiprazole                                   ASPIRIN PO   Take 81 mg by mouth daily   Last time this was given:  81 mg on 5/12/2018  8:02 AM                                   atorvastatin 40 MG tablet   Commonly known as:  LIPITOR   Take 1 tablet (40 mg) by mouth daily   Last time this was given:  40 mg on 5/12/2018  8:01 AM                                   buPROPion 300 MG 24 hr tablet   Commonly known as:  WELLBUTRIN XL   Take 1 tablet (300 mg) by mouth every morning   Last time this was given:  300 mg on 5/12/2018  8:01 AM                                   diazepam 10 MG tablet   Commonly known as:  VALIUM   Take 1 tablet (10 mg) by mouth every 12 hours as needed for anxiety                                   DULOXETINE HCL PO   Take 90 mg by mouth daily   Last time this was given:  90 mg on 5/12/2018  8:02 AM                                   gabapentin 300 MG capsule   Commonly known as:  NEURONTIN   Take 1 capsule (300 mg) by mouth At Bedtime   Last time this was given:  300 mg on 5/11/2018  9:19 PM                                   HYDROcodone-acetaminophen 5-325 MG per tablet   Commonly known as:  NORCO   Take one pill  daily as needed, not to be taken with diazepam                                   isosorbide mononitrate 30 MG 24 hr tablet   Commonly known as:  IMDUR   Take 1 tablet (30 mg) by mouth daily   Last time this was given:  30 mg on 5/12/2018  8:01 AM                                   MELATONIN PO   Take 5 mg by mouth At Bedtime   Last time this was given:  5 mg on 5/11/2018  9:20 PM                                 nitroGLYcerin 0.4 MG sublingual tablet   Commonly known as:  NITROSTAT   Place 1 tablet (0.4 mg) under the tongue every 5 minutes as needed for chest pain prn                                   order for DME   Equipment being ordered: Walker Wheels () and Walker () Treatment Diagnosis: Gait instability                                order for DME   Equipment being ordered: Walker Wheels () Treatment Diagnosis: severe COPD                                order for DME   Equipment being ordered: Walker () Treatment Diagnosis: severe COPD                                predniSONE 10 MG tablet   Commonly known as:  DELTASONE   By mouth, take 6 tabs daily x 2 days, then 4 tabs daily x 3 days, then 2 tabs daily x 3 days, then 1 tab daily x 3 days, then stop.   Last time this was given:  60 mg on 5/12/2018  8:02 AM            take 6 tabs daily x 2 days, then 4 tabs daily x 3 days, then 2 tabs daily x 3 days, then 1 tab daily x 3 days, then stop                       * QUETIAPINE FUMARATE PO   Take 50 mg by mouth every morning   Last time this was given:  50 mg on 5/12/2018  8:02 AM                                   * QUEtiapine 200 MG tablet   Commonly known as:  SEROquel   Take 1 tablet (200 mg) by mouth At Bedtime   Last time this was given:  50 mg on 5/12/2018  8:02 AM                                   STOOL SOFTENER PO   Take 100 mg by mouth daily   Last time this was given:  100 mg on 5/12/2018  8:01 AM                                   tiotropium 18 MCG capsule   Commonly known as:  SPIRIVA   Inhale 1 capsule (18 mcg) into the lungs daily                                   * VENTOLIN  (90 Base) MCG/ACT Inhaler   SHAKE WELL AND INHALE 1 TO 2 PUFFS INTO THE LUNGS EVERY 4 HOURS AS NEEDED FOR SHORTNESS OF BREATH, DIFFICULTY BREATHING OR WHEEZING.   Generic drug:  albuterol                                   * albuterol (2.5 MG/3ML) 0.083% neb solution   Take 1 vial (2.5 mg) by  nebulization 4 times daily as needed   Last time this was given:  2.5 mg on 5/9/2018  2:02 AM                                   Vitamin D3 2000 units Chew   Take 2,000 mg by mouth daily                                   * Notice:  This list has 4 medication(s) that are the same as other medications prescribed for you. Read the directions carefully, and ask your doctor or other care provider to review them with you.

## 2018-05-08 NOTE — PHARMACY-ADMISSION MEDICATION HISTORY
Admission medication history interview status for this patient is complete. See [facility] admission navigator for allergy information, prior to admission medications and immunization status.     Medication history interview source(s):Patient  Medication history resources (including written lists, pill bottles, clinic record):None  Primary pharmacy:[facility]    Changes made to PTA medication list:  Added: -  Deleted: advair, zithromax  Changed: -    Actions taken by pharmacist (provider contacted, etc):None     Additional medication history information:None    Medication reconciliation/reorder completed by provider prior to medication history? No    Do you take OTC medications (eg tylenol, ibuprofen, fish oil, eye/ear drops, etc)? yes(Y/N)    For patients on insulin therapy: no (Y/N)  Lantus/levemir/NPH/Mix 70/30 dose:   (Y/N) (see Med list for doses)   Sliding scale Novolog Y/N  If Yes, do you have a baseline novolog pre-meal dose:  units with meals  Patients eat three meals a day:   Y/N    How many episodes of hypoglycemia do you have per week: _______  How many missed doses do you have per week: ______  How many times do you check your blood glucose per day: _______   Any Barriers to therapy - Be specific :  cost of medications, comfortable with giving injections (if applicable), comfortable and confident with current diabetes regimen: Y/N ______________      Prior to Admission medications    Medication Sig Last Dose Taking? Auth Provider   albuterol (2.5 MG/3ML) 0.083% nebulizer solution Take 1 vial (2.5 mg) by nebulization 4 times daily as needed 5/8/2018 at Unknown time Yes Eros Rebolledo MD   ARIPiprazole (ABILIFY) 5 MG tablet Take 5 mg by mouth daily 5/8/2018 at Unknown time Yes Unknown, Entered By History   ASPIRIN PO Take 81 mg by mouth daily 5/8/2018 at Unknown time Yes Unknown, Entered By History   atorvastatin (LIPITOR) 40 MG tablet Take 1 tablet (40 mg) by mouth daily 5/8/2018 at Unknown time Yes  Eros Rebolledo MD   buPROPion (WELLBUTRIN XL) 300 MG 24 hr tablet Take 1 tablet (300 mg) by mouth every morning 5/7/2018 at Unknown time Yes Leana Patel NP   Cholecalciferol (VITAMIN D3) 2000 UNITS CHEW Take 2,000 mg by mouth daily  5/8/2018 at Unknown time Yes Reported, Patient   diazepam (VALIUM) 10 MG tablet Take 1 tablet (10 mg) by mouth every 12 hours as needed for anxiety 5/8/2018 at Unknown time Yes Leana Patel NP   Docusate Calcium (STOOL SOFTENER PO) Take 100 mg by mouth daily  5/8/2018 at Unknown time Yes Reported, Patient   DULOXETINE HCL PO Take 90 mg by mouth daily 5/8/2018 at Unknown time Yes Unknown, Entered By History   gabapentin (NEURONTIN) 300 MG capsule Take 1 capsule (300 mg) by mouth At Bedtime 5/7/2018 at Unknown time Yes Eros Rebolledo MD   HYDROcodone-acetaminophen (NORCO) 5-325 MG per tablet Take one pill  daily as needed, not to be taken with diazepam 5/8/2018 at Unknown time Yes Eros Rebolledo MD   isosorbide mononitrate (IMDUR) 30 MG 24 hr tablet Take 1 tablet (30 mg) by mouth daily 5/8/2018 at Unknown time Yes Cj Tapia MD   MELATONIN PO Take 5 mg by mouth At Bedtime 5/7/2018 at Unknown time Yes Unknown, Entered By History   QUEtiapine (SEROQUEL) 200 MG tablet Take 1 tablet (200 mg) by mouth At Bedtime 5/8/2018 at Unknown time Yes Eros Rebolledo MD   tiotropium (SPIRIVA) 18 MCG capsule Inhale 1 capsule (18 mcg) into the lungs daily 5/8/2018 at Unknown time Yes Eros Rebolledo MD   VENTOLIN  (90 BASE) MCG/ACT Inhaler SHAKE WELL AND INHALE 1 TO 2 PUFFS INTO THE LUNGS EVERY 4 HOURS AS NEEDED FOR SHORTNESS OF BREATH, DIFFICULTY BREATHING OR WHEEZING.  Yes Eros Rebolledo MD   nitroGLYcerin (NITROSTAT) 0.4 MG sublingual tablet Place 1 tablet (0.4 mg) under the tongue every 5 minutes as needed for chest pain prn   Eros Rebolledo MD   order for DME Equipment being ordered: Walker Wheels () and Walker  ()  Treatment Diagnosis: Gait instability   Jesus Valle MD   order for DME Equipment being ordered: Walker Wheels ()  Treatment Diagnosis: severe COPD   Jesus Valle MD   order for DME Equipment being ordered: Walker ()  Treatment Diagnosis: severe COPD   Jesus Valle MD   predniSONE (DELTASONE) 10 MG tablet 4 tabs daily for 3 days, then 3 tabs daily for 3 days, then 2 tabs daily for 3 days, then 1 tab daily for 3 days, then stop Unknown at Unknown time  Jesus Valle MD   QUETIAPINE FUMARATE PO Take 50 mg by mouth every morning   Unknown, Entered By History

## 2018-05-08 NOTE — ED PROVIDER NOTES
Emergency Department Attending Supervision Note  5/8/2018  6:32 PM      I evaluated this patient in conjunction with Aysha Higgins PA-C      Briefly, the patient presented with  shortness of breath with a history of COPD.      On my exam,     General: Lying on bed. Calm. Cooperative.  HEENT:   The scalp and head appear normal    The pupils are equal, round, and reactive to light    Extraocular muscles are intact.    The nose is normal.    The oropharynx is normal.      Uvula is in the midline.    Neck:  Normal range of motion.    Lungs:  Distant breath sounds    Expiratory wheezes. Speaking full sentences.  Cardiac: Regular rate.      Normal S1 and S2.      No pathological murmur/rub    Abdomen: Soft. No distension, no localized tenderness or rebound.  MS:  Normal tone. Normal movement of all extremities.   Neurologic:     Normal mentation.  No cranial nerve deficits.  No focal motor or sensory                            changes.      Speech normal.  Psych:  Awake.     Alert.      Normal affect.      Appropriate interactions.  Skin:  No rash.      No lesions.        Results:     X-ray Chest, 2 views:  Advanced emphysema with bullous changes in the left upper  lung. Scattered areas of linear atelectasis or scarring in the lower  lungs. No new focal airspace disease or significant change. Normal  heart size.  Result per radiology.      Laboratory:  CBC: AWNL. (WBC 6.8, HGB 12.0, )   CMP: Carbon Dioxide 42 (H), Anion gap 1 (L), Glucose 107 (H) o/w WNL  BNP: 259  Troponin (1538): <0.015  Venous Blood Gas  Lab 05/08/18  1538   PHV 7.31*   PCO2V 88*   PO2V 39   HCO3V 44*   JULES 13.9   O2PER 3.5        My impression is a 62-year-old female with a history of COPD on home oxygen who presents with COPD exacerbation.  This is likely triggered from pollen that she has had her windows open.  There does not appear to be any infectious symptoms.  She is being admitted for broccoli bronchodilator therapy consisting of beta-2  agonists and steroids.  There is no evidence of congestive heart failure coronary ischemia nor pneumonia.      Diagnosis    ICD-10-CM    1. Hypercapnic respiratory failure, chronic (H) J96.12 Comprehensive metabolic panel     BNP     Troponin I (now)   2. COPD exacerbation (H) J44.1        Dr. Biju Stringer, Biju HENAO MD  05/09/18 1131

## 2018-05-08 NOTE — IP AVS SNAPSHOT
[facility]    [address]    [facility] [address]    Phone:  [phone]    Fax:  [phone]                                       After Visit Summary   5/8/2018    Karen Alex    MRN: [id number]           After Visit Summary Signature Page     I have received my discharge instructions, and my questions have been answered. I have discussed any challenges I see with this plan with the nurse or doctor.    ..........................................................................................................................................  Patient/Patient Representative Signature      ..........................................................................................................................................  Patient Representative Print Name and Relationship to Patient    ..................................................               ................................................  Date                                            Time    ..........................................................................................................................................  Reviewed by Signature/Title    ...................................................              ..............................................  Date                                                            Time

## 2018-05-08 NOTE — H&P
[facility]  Hospitalist H&P    Name: Karen Alex      MRN: [id number]  YOB: 1955    Age: 62 year old  Date of admission: 5/8/2018  Primary care provider: Eros Rebolledo            Assessment and Plan:   Karen Alex is a 62 year old female with a history of COPD, hyperlipidemia, hypertension, chronic pain syndrome, cervical cancer, and bipolar disorder who presents with COPD exacerbation.    1.  Acute COPD exacerbation.  She was given a dose of IV Solu-Medrol in the emergency room.  Continue prednisone 60 mg a day.  Duo nebs 4 times a day.  Albuterol more often if needed.  She has not had any sputum production with her cough.  Hold off on antibiotics at this time.  Add a pro-calcitonin level laboratory testing.    2.  Acute on chronic hypercapnic rested failure.  Also with chronic hypoxic rest a failure.  Continue oxygen at home setting of 3.5 L/min per nasal cannula.  She appears to be improving since receiving nebs and Solu-Medrol in the emergency room.  Hold off on BiPAP therapy at this time.      3.  Bipolar disorder.  Restart quetiapine and Abilify.    4.  Hypertension.  Restart isosorbide mononitrate.    5.  Hyperlipidemia.  Restart atorvastatin.    Code status: Full code.  Admit to inpatient.  Prophylaxis: Lovenox.            Chief Complaint:   Shortness of breath.         History of Present Illness:   Karen Alex is a 62 year old female who presents with shortness of breath.  She has been feeling short of breath for the past 5 days.  Her breathing has gradually worsened during this time.  Get to the point where today she cannot catch her breath at all.  Presented to emergency room for further evaluation.  She has had an occasional cough with this.  No sputum production with the cough.  No fevers or chills.  She does have chronic leg pain.  No change in the pain recently.  No other pains recently.  She has not been eating well because of her shortness of breath  for the past several days.  Has also not been drinking as much fluid as usual.  Nothing at home was helping her breathing.  Medications given in the emergency room have improved breathing quite a bit.  She does feel that increased humidity over the past several days was a trigger for her shortness of breath.  She has had multiple COPD exacerbations in the past.  No other complaints.            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 Surgeon:  MILADY BASS MD     CONIZATION  10/23/07    Vulvar excision for BOBBY 3, cold knife conization     EXCISE VULVA WIDE LOCAL  5/25/2012    Procedure:EXCISE VULVA WIDE LOCAL; Wide Local Excision Of Vulvar Lesion; Surgeon:RE ALDRIDGE; Location:[facility] OR     FOOT SURGERY Right 01/24/2005    OPERATION: Excision of soft tissue mass right foot. Surgeon:  KHARI DUEÑAS DPM     FOOT SURGERY Right 02/28/2005    OPERATION: Excision of ganglion cyst right foot. Surgeon:  KHARI DUEÑAS DPM     HERNIORRHAPHY INCISIONAL (LOCATION)  4/25/2012    Procedure:HERNIORRHAPHY INCISIONAL (LOCATION); Incisional Hernia Repair with mesh ; Surgeon:KIM QUICK; Location:[facility] OR     HYSTERECTOMY TOTAL ABDOMINAL, BILATERAL SALPINGO-OOPHORECTOMY, COMBINED       LAPAROSCOPIC HYSTERECTOMY TOTAL, BILATERAL SALPINGO-OOPHORECTOMY, COMBINED  3/6/2012    Procedure:COMBINED LAPAROSCOPIC  HYSTERECTOMY TOTAL, BILATERAL SALPINGO-OOPHORECTOMY; Total laparoscopic Hysterectomy, Bilateral Salpingo Ooporectomy, Pubovaginal Sling, Biopsy Left Volva; Surgeon:RE ALDRIDGE; Location:[facility] OR     repair of anal fissures  2005     SLING BLADDER SUSPENSION WITH FASCIA ALESHA       SLING TRANSPUBO WITHOUT ANTERIOR COLPORRHAPHY  3/6/2012    Procedure:SLING TRANSPUBO WITHOUT ANTERIOR COLPORRHAPHY; Surgeon:JOLANTA ENRIQUEZ; Location:[facility] OR     TUBAL LIGATION               Social History:     Social History   Substance Use Topics     Smoking status: Former Smoker     Packs/day: 0.50     Years: 26.00     Types: Cigarettes     Quit date: 7/3/2015     Smokeless tobacco: Never Used      Comment: sometimes     Alcohol use No             Family History:   The family history was fully reviewed and non-contributory in this case.         Allergies:   No Known Allergies          Medications:     Prior to Admission medications    Medication Sig Last Dose Taking? Auth Provider   albuterol (2.5 MG/3ML) 0.083% nebulizer solution Take 1 vial (2.5 mg) by nebulization 4 times daily as needed 5/8/2018 at Unknown time Yes Eros Rebolledo MD   ARIPiprazole (ABILIFY) 5 MG tablet Take 5 mg by mouth daily 5/8/2018 at Unknown time Yes Unknown, Entered By History   ASPIRIN PO Take 81 mg by mouth daily 5/8/2018 at Unknown time Yes Unknown, Entered By History   atorvastatin (LIPITOR) 40 MG tablet Take 1 tablet (40 mg) by mouth daily 5/8/2018 at Unknown time Yes Eros Rebolledo MD   buPROPion (WELLBUTRIN XL) 300 MG 24 hr tablet Take 1 tablet (300 mg) by mouth every morning 5/7/2018 at Unknown time Yes Leana Patel NP   Cholecalciferol (VITAMIN D3) 2000 UNITS CHEW Take 2,000 mg by mouth daily  5/8/2018 at Unknown time Yes Reported, Patient   diazepam (VALIUM) 10 MG tablet Take 1 tablet (10 mg) by mouth every 12 hours as needed for anxiety 5/8/2018 at Unknown time Yes Leana Patel NP   Docusate Calcium (STOOL SOFTENER  PO) Take 100 mg by mouth daily  5/8/2018 at Unknown time Yes Reported, Patient   DULOXETINE HCL PO Take 90 mg by mouth daily 5/8/2018 at Unknown time Yes Unknown, Entered By History   gabapentin (NEURONTIN) 300 MG capsule Take 1 capsule (300 mg) by mouth At Bedtime 5/7/2018 at Unknown time Yes Eros Rebolledo MD   HYDROcodone-acetaminophen (NORCO) 5-325 MG per tablet Take one pill  daily as needed, not to be taken with diazepam 5/8/2018 at Unknown time Yes Eros Rebolledo MD   isosorbide mononitrate (IMDUR) 30 MG 24 hr tablet Take 1 tablet (30 mg) by mouth daily 5/8/2018 at Unknown time Yes Cj Tapia MD   MELATONIN PO Take 5 mg by mouth At Bedtime 5/7/2018 at Unknown time Yes Unknown, Entered By History   QUEtiapine (SEROQUEL) 200 MG tablet Take 1 tablet (200 mg) by mouth At Bedtime 5/8/2018 at Unknown time Yes Eros Rebolledo MD   tiotropium (SPIRIVA) 18 MCG capsule Inhale 1 capsule (18 mcg) into the lungs daily 5/8/2018 at Unknown time Yes Eros Rebolledo MD   VENTOLIN  (90 BASE) MCG/ACT Inhaler SHAKE WELL AND INHALE 1 TO 2 PUFFS INTO THE LUNGS EVERY 4 HOURS AS NEEDED FOR SHORTNESS OF BREATH, DIFFICULTY BREATHING OR WHEEZING.  Yes Eros Rebolledo MD   nitroGLYcerin (NITROSTAT) 0.4 MG sublingual tablet Place 1 tablet (0.4 mg) under the tongue every 5 minutes as needed for chest pain prn   Eros Rebolledo MD   order for DME Equipment being ordered: Walker Wheels () and Walker ()  Treatment Diagnosis: Gait instability   Jesus Valle MD   order for DME Equipment being ordered: Walker Wheels ()  Treatment Diagnosis: severe COPD   Jesus Valle MD   order for DME Equipment being ordered: Walker ()  Treatment Diagnosis: severe COPD   Jesus Valle MD   predniSONE (DELTASONE) 10 MG tablet 4 tabs daily for 3 days, then 3 tabs daily for 3 days, then 2 tabs daily for 3 days, then 1 tab daily for 3 days, then stop Unknown at  "Unknown time  Jesus Valle MD   QUETIAPINE FUMARATE PO Take 50 mg by mouth every morning   Unknown, Entered By History             Review of Systems:   A Comprehensive greater than 10 system review of systems was carried out.  Pertinent positives and negatives are noted above.  Otherwise negative for contributory information.           Physical Exam:   Blood pressure (!) 138/92, temperature 98.5  F (36.9  C), temperature source Oral, resp. rate 21, height 1.702 m (5' 7\"), weight 103.7 kg (228 lb 11.2 oz), last menstrual period 12/01/2007, SpO2 91 %, not currently breastfeeding.  Wt Readings from Last 1 Encounters:   05/08/18 103.7 kg (228 lb 11.2 oz)     Exam:  GENERAL: No apparent distress. Awake, alert, and fully oriented.  HEENT: Normocephalic, atraumatic. Extraocular movements intact.  CARDIOVASCULAR: Regular rate and rhythm without murmurs or rubs. No S3.  PULMONARY: Wheeze to auscultation bilaterally.  ABDOMINAL: Soft, non-tender, non-distended. Bowel sounds normoactive.   EXTREMITIES: No cyanosis or clubbing. No appreciable edema.  NEUROLOGICAL: CN 2-12 grossly intact, no focal neurological deficits.  DERMATOLOGICAL: No rash, ulcer, bruising, nor jaundice.          Data:       Laboratory:    Recent Labs  Lab 05/08/18  1538   WBC 6.8   HGB 12.0   HCT 40.5   MCV 98          Recent Labs  Lab 05/08/18  1538      POTASSIUM 4.3   CHLORIDE 97   CO2 42*   ANIONGAP 1*   *   BUN 13   CR 0.56   GFRESTIMATED >90   GFRESTBLACK >90   MAHOGANY 8.8     No results for input(s): CULT in the last 168 hours.    Imaging:  Recent Results (from the past 24 hour(s))   Chest XR,  PA & LAT    Narrative    CHEST TWO VIEWS 5/8/2018 4:58 PM     INDICATION: COPD exacerbation, hypoxic and tachypneic.    COMPARISON: 3/26/2018.      Impression    IMPRESSION: Advanced emphysema with bullous changes in the left upper  lung. Scattered areas of linear atelectasis or scarring in the lower  lungs. No new focal airspace " disease or significant change. Normal  heart size.    APOORVA ALONZO MD

## 2018-05-09 NOTE — PROGRESS NOTES
[facility]  Hospitalist Progress Note  Cee Garcia MD 05/09/18  Text Page  Pager: [phone] (7am-6pm)    Reason for Stay (Diagnosis): COPD Exacerbation         Assessment and Plan:      Summary of Stay: Karen Alex is a 62 year old female with past medical history of COPD with chronic hypoxic respiratory failure (3.5L O2), bipolar disorder, HLD, HTN, chronic pain who was admitted on 5/8/2018 with progressive shortness of breath and was found to have acute COPD exacerbation.    Problem List/Assessment and Plan:     1. Acute COPD Exacerbation: Progressive SOB despite home medications.  Notes that humid weather often will worsen her symptoms.  CXR without infiltrate and no productive cough.  Procalcitonin is negative.  No indication for antibiotics at this time.  She was given IV Solumedrol in the ER and transitioned to PO prednisone but has significant wheezing and poor air movement so will transition back to IV Solumedrol.  - Change to IV Solumedrol 60 mg Q24H and reassess whether she can transition to PO Prednisone tomorrow  - Continue scheduled nebulizers  - Continue PTA regimen  - PRN Albuterol neb    2. Acute on Chronic Hypercapnic Respiratory Failure with Chronic Hypoxic Respiratory Failure: Patient is worsening caffeine due to her COPD exacerbation.  See treatment as above.  She does have chronic hypoxic respiratory failure due to her COPD that she is on her baseline oxygen requirements.  Treatment as above.    3.  Bipolar disorder.  Restart quetiapine and Abilify.     4.  Hypertension.  Restart isosorbide mononitrate.     5.  Hyperlipidemia.  Restart atorvastatin.    6.  Chronic pain: Resume duloxetine and Neurontin.    # Pain Assessment:  Current Pain Score 5/9/2018   Patient currently in pain? denies   Pain score (0-10) -   Pain location -   Pain descriptors -   Chronic pain, PTA medications resumed.      DVT Prophylaxis: Enoxaparin (Lovenox) SQ  Code Status: Full Code  Discharge  "Dispo: Home  Estimated Disch Date / # of Days until Disch: 1-3 days pending improvement of respiratory status        Interval History (Subjective):      Patient was seen with her bedside nurse this morning.  She states her breathing still does not feel great.  She has bilateral lateral chest wall pain (has been present since she fell backwards against a wall on Sunday).  Pain is not pleuritic and no central CP.  Not having a cough.  Notes her COPD flares with any changes in weather (particularly humid and cold weather).  No nausea, emesis, abdominal pain.  eating and drinking well.                  Physical Exam:      Last Vital Signs:  /78 (BP Location: Right arm)  Pulse 83  Temp 97  F (36.1  C) (Oral)  Resp 16  Ht 1.702 m (5' 7\")  Wt 103.7 kg (228 lb 11.2 oz)  LMP 12/01/2007  SpO2 98%  BMI 35.82 kg/m2    General: Alert, awake, no acute distress.  HEENT: Normocephalic and atraumatic, eyes anicteric and without scleral injection, EOMI, face symmetric, MMM.  Cardiac: RRR, normal S1, S2. No m/g/r, no LE edema.  Pulmonary: Normal chest rise, slightly increased WOB.  Very poor air movement diffusely with faint expiratory wheezing, no crackles  Abdomen: soft, non-tender, non-distended.  Normoactive bowel sounds, no guarding or rebound tenderness.  Extremities: no deformities.  Warm, well perfused.  Skin: no rashes or lesions.  Warm and Dry.  Neuro: No focal deficits.  Speech clear.  Spontaneously moving all extremities in bed  Psych: Alert and oriented x3. Appropriate affect.         Medications:      All current medications were reviewed with changes reflected in problem list.         Data:      All new lab and imaging data was reviewed.   Labs:    Recent Labs  Lab 05/09/18  0713 05/08/18  1538    140   POTASSIUM 4.3 4.3   CHLORIDE 100 97   CO2 39* 42*   ANIONGAP 2* 1*   * 107*   BUN 11 13   CR 0.48* 0.56   GFRESTIMATED >90 >90   GFRESTBLACK >90 >90   MAHOGANY 8.4* 8.8       Recent Labs  Lab " 05/09/18  0713 05/08/18  1538   WBC 5.6 6.8   HGB 11.2* 12.0   HCT 37.4 40.5   MCV 96 98    185      Imaging:   Recent Results (from the past 24 hour(s))   Chest XR,  PA & LAT    Narrative    CHEST TWO VIEWS 5/8/2018 4:58 PM     INDICATION: COPD exacerbation, hypoxic and tachypneic.    COMPARISON: 3/26/2018.      Impression    IMPRESSION: Advanced emphysema with bullous changes in the left upper  lung. Scattered areas of linear atelectasis or scarring in the lower  lungs. No new focal airspace disease or significant change. Normal  heart size.    MD Cee COLON MD.

## 2018-05-09 NOTE — PLAN OF CARE
Problem: Patient Care Overview  Goal: Plan of Care/Patient Progress Review  Outcome: No Change  reji PO well, up with SBA to bathroom, 3.5 liters of Oxygen on at all times, lung sounds greatly diminished, on scheduled nebs and IV solumedrol, denies pain, voiding in good amts

## 2018-05-09 NOTE — PLAN OF CARE
Problem: Patient Care Overview  Goal: Plan of Care/Patient Progress Review  A/Ox4, vss: on 3.5 L oxygen per nc. LS diminished, SOB noted when ambulating to bathroom, PRN nebs given with relief. up with Ax1 gbelt, denies pain, voiding in good amts. Nursing continue to monitor.

## 2018-05-09 NOTE — CONSULTS
"Care Transition Initial Assessment - RN    ELEVATED RICHIE     Reason For Consult: care coordination/care conference, discharge planning   Met with: Patient.  DATA   Active Problems:    COPD exacerbation (H)       Cognitive Status: awake, alert and oriented.  Primary Care Clinic Name: [facility]    Primary Care MD Name: Kesha   Contact information and PCP information verified: Yes  Lives With: alone, child(cris), adult (Son just moved in wth her )  Living Arrangements: apartment                 Insurance concerns: No Insurance issues identified  ASSESSMENT  Patient currently receives the following services:  The patient is currently receiving RN PT OT SLP NASIR IZAGUIRRE through [facility]      Identified issues/concerns regarding health management:   Pt is admitted with COPD exacerbation and documentation of  Acute on chronic hypercapnic rested failure. Pt has home 02 through [facility]. Pt does not have a CPAP and has never had a sleep study.    Education provided on Hypercapnia and how it can occur was discussed with pt. And the resultant lethargy.  Pt explains that \"yes I could sleep all day\" adding that \"cant that be helped by increasing my oxygen--\"  Spoke about why this is not a solution to the sleepiness and why this may not be advised with COPD either.  Clearly pt needs on going chronic illness education in this regard. She does have an RN from [facility] in the home, will recommend on going chronic illness management .    Pt explains that she is understanding of her Inhalers and nebulizer's but then asks questions that conflict with this explanation of understanding.  Will also recommend Home RN assess for any gaps that are conflicting to her stating that she understandings them.   IP/MTM placed .   PCP follow up scheduled  PCP handoff pended.     Care  (CTS) will continue to follow as needed.            "

## 2018-05-09 NOTE — PLAN OF CARE
Problem: Chronic Obstructive Pulmonary Disease (Adult)  Goal: Signs and Symptoms of Listed Potential Problems Will be Absent, Minimized or Managed (Chronic Obstructive Pulmonary Disease)  Signs and symptoms of listed potential problems will be absent, minimized or managed by discharge/transition of care (reference Chronic Obstructive Pulmonary Disease (Adult) CPG).   Outcome: No Change  From ED at 1820.  Denies any pain.  No nausea.  LS diminished bilaterally, O2 baseline 3.5L at home, easily GARCIA but recovers quickly.  Denies N/T.  Up with A1 belt + walker, ambulating to bathroom.  Voiding.  Oriented to room and call system, denies questions.

## 2018-05-09 NOTE — PROGRESS NOTES
RT Note    Date: 5/08/2018  Admission Dx: COPD exacerbation  Pulmonary hx: COPD  Home nebulizer/MDI: Advair, Albuterol, Spiriva  Home oxygen: 3.5 LPM NC  Acuity level (RCAT flow sheet): 3  Aerosol therapy initiated: Duoneb QID, Albuterol Q2 PRN  Pulmonary hygiene initiated: None  Volume expansion initiated: IS TID  Current oxygen requirements: 3.5 LPM NC  Current spo2: 93%  Re-evaluation date: 5/11/2018  Patient education: Encourage IS use.    Bina Zavala, RRT

## 2018-05-09 NOTE — PROGRESS NOTES
[facility]  Patient is currently open to home care services with [facility].  The patient is currently receiving RN PT OT SLP SW A services.  [facility]  and team have been notified of patient admission.  [facility] liaison will continue to follow patient during stay.  If appropriate provide orders to resume home care at time of discharge.

## 2018-05-10 NOTE — PLAN OF CARE
Problem: Patient Care Overview  Goal: Plan of Care/Patient Progress Review  Outcome: Improving  A&O x4. VSS, 3.5L O2 sating mid 90s. SBA when oob. LS diminished, +SOB/GARCIA. BS audible/active x4, denies N/V, tolerating PO. Voiding. IV SL, getting IV steroids, nebs. Resting comfortably between cares. Continue with plan of care.

## 2018-05-10 NOTE — PLAN OF CARE
Problem: Patient Care Overview  Goal: Plan of Care/Patient Progress Review    On 3.5 L O2 at baseline. Desat to 76% after ambulating to bathroom. Denies pain. Up with SBA. IV solumedrol given today, switch to PO prednisone tomorrow. Tolerating regular diet, voiding. Will continue to monitor.

## 2018-05-10 NOTE — PROGRESS NOTES
[facility]    Hospitalist Progress Note      Assessment & Plan     Summary of Stay: Karen Alex is a 62 year old female with history of COPD with chronic hypoxic respiratory failure (3.5L O2), bipolar disorder, HLD, HTN, chronic pain.  She presented to the ED on 5/8/2018 with progressive shortness of breath and was found to have acute COPD exacerbation.     Problem List/Assessment and Plan:      1. Acute COPD Exacerbation:  Improving.  Change IV Solumedrol to oral Prednisone tomorrow. Continue nebs and O2.       2. Acute on Chronic Hypercapnic Respiratory Failure with Chronic Hypoxic Respiratory Failure:  Improving.      3.  Bipolar disorder.  Continue prior to admission quetiapine and Abilify.      4.  Hypertension.  Continue prior to admission isosorbide mononitrate.      5.  Hyperlipidemia.  Continue prior to admission atorvastatin.     6.  Chronic pain:  Continue prior to admission duloxetine and Neurontin.    # Pain Assessment:  Current Pain Score 5/10/2018   Patient currently in pain? denies   Pain score (0-10) -   Pain location -   Pain descriptors -   Karen s pain level was assessed and she currently denies pain.      DVT Prophylaxis: Enoxaparin (Lovenox) SQ  Code Status: Full Code    Disposition: Expected discharge in 1-2 days once breathing improves a bit more.    Ken Alcaraz    Interval History   Breathing is better today.  No new problems.    -Data reviewed today: I reviewed all new labs and imaging results over the last 24 hours. I personally reviewed no images or EKG's today.    Physical Exam   Temp: 98.1  F (36.7  C) Temp src: Oral BP: 105/62   Heart Rate: 89 Resp: 16 SpO2: 93 % O2 Device: Nasal cannula Oxygen Delivery: Other (Comments) (3.5 L)  Vitals:    05/08/18 1831   Weight: 103.7 kg (228 lb 11.2 oz)     Vital Signs with Ranges  Temp:  [96.6  F (35.9  C)-98.8  F (37.1  C)] 98.1  F (36.7  C)  Heart Rate:  [83-96] 89  Resp:  [16-18] 16  BP: (105-143)/(55-83)  105/62  SpO2:  [76 %-98 %] 93 %  I/O last 3 completed shifts:  In: 840 [P.O.:840]  Out: -     GENERAL:  Comfortable. Cooperative.  PSYCH: pleasant, oriented, No acute distress.  EYES: PERRLA, Normal conjunctiva.  HEART:  Regular rate and rhythm. No JVD. Pulses normal. No edema.  LUNGS:  Clear to auscultation, normal Respiratory effort.  ABDOMEN:  Soft, no hepatosplenomegaly, normal bowel sounds.  EXTREMETIES: No clubbing, cyanosis or ischemia  SKIN:  Dry to touch, No rash.      Medications       ARIPiprazole  5 mg Oral Daily     aspirin chewable tablet 81 mg  81 mg Oral Daily     atorvastatin  40 mg Oral Daily     buPROPion  300 mg Oral QAM     docusate sodium  100 mg Oral Daily     DULoxetine (CYMBALTA) EC capsule 90 mg  90 mg Oral Daily     enoxaparin  40 mg Subcutaneous Q24H     gabapentin  300 mg Oral At Bedtime     ipratropium - albuterol 0.5 mg/2.5 mg/3 mL  3 mL Nebulization 4x daily     isosorbide mononitrate  30 mg Oral Daily     melatonin tablet 5 mg  5 mg Oral At Bedtime     [START ON 5/11/2018] predniSONE  60 mg Oral Daily     QUEtiapine  200 mg Oral At Bedtime     QUEtiapine (SEROquel) tablet 50 mg  50 mg Oral QAM     sodium chloride (PF)  3 mL Intracatheter Q8H       Data     Recent Labs  Lab 05/10/18  0659 05/09/18  0713 05/08/18  1538   WBC  --  5.6 6.8   HGB  --  11.2* 12.0   MCV  --  96 98   PLT  --  151 185    141 140   POTASSIUM 3.7 4.3 4.3   CHLORIDE 103 100 97   CO2 39* 39* 42*   BUN 18 11 13   CR 0.57 0.48* 0.56   ANIONGAP 1* 2* 1*   MAHOGANY 8.6 8.4* 8.8   GLC 85 111* 107*   ALBUMIN  --   --  3.5   PROTTOTAL  --   --  7.2   BILITOTAL  --   --  0.3   ALKPHOS  --   --  93   ALT  --   --  22   AST  --   --  23   TROPI  --   --  <0.015       No results found for this or any previous visit (from the past 24 hour(s)).

## 2018-05-11 NOTE — PLAN OF CARE
Problem: Patient Care Overview  Goal: Plan of Care/Patient Progress Review  Discharge Planner PT   Patient plan for discharge: Home  Current status: Orders received. Per RN and pt, pt up SBA and limited secondary to deconditioning. Pt utilizing 3.5L O2 (which is baseline) and notes some fatigue with mobility. Pt reports that she has been walking too fast to match nursing speeds while IP, and believes this is why her activity tolerance is decreased. Pt does not report concerns about returning to her apartment after discharge and notes multiple family/friends that can assist if needed. No IPPT needs at this time. Rec nursing continue to ambulate 3x/day. Will complete IPPT orders. Nursing in agreement.   Barriers to return to prior living situation: None anticipated.   Recommendations for discharge: Home  Rationale for recommendations: Pt is mobilizing with SBA. Pt notes some fatigue with mobility, however declines concerns regarding mobility at home. Anticipate that pt is near baseline for mobility and activity tolerance.        Entered by: Camille Mcwilliams 05/11/2018 2:08 PM

## 2018-05-11 NOTE — PLAN OF CARE
Problem: Patient Care Overview  Goal: Plan of Care/Patient Progress Review  Outcome: Therapy, progress toward functional goals as expected  Orientation: Alert and oriented x4  VSS. 95% on 3.5 L NC. Baseline. afebrile.    LS: diminished throughout. Dyspnea on exertion.   GI: Passing gas. no BM. Denies N/V.   : Adequate urine output. Clear yellow  Skin: bruises noted throughout. Skin intact.   Activity: SBA. Pt slept comfortably throughout shift.   Pain: 0/10. Denies pain throughout shift. No PRN interventions utilized.   Plan: Continue with current cares. possible d/c later today pending transition to oral prednisone.

## 2018-05-11 NOTE — PLAN OF CARE
Problem: Patient Care Overview  Goal: Plan of Care/Patient Progress Review  Patient A&Ox4. VSS. Patient ambulates SBA. On 3.5 LPM (this is how much O2 patient is on at baseline). LS diminished. +BS/gas. Tolerating diet. Will continue to monitor.

## 2018-05-11 NOTE — PROGRESS NOTES
[facility]    Hospitalist Progress Note      Assessment & Plan   Summary of Stay: Karen Alex is a 62 year old female with history of COPD with chronic hypoxic respiratory failure (3.5L O2), bipolar disorder, HLD, HTN, chronic pain.  She presented to the ED on 5/8/2018 with progressive shortness of breath and was found to have acute COPD exacerbation.      Problem List/Assessment and Plan:       1. Acute COPD Exacerbation:  Improving.  Tolerated change from IV Solumedrol to oral Prednisone but pretty weak and still hypoxic with walking. Continue Prednisone, nebs, and O2.        2. Acute on Chronic Hypercapnic Respiratory Failure with Chronic Hypoxic Respiratory Failure:  Improving.       3.  Bipolar disorder.  Continue prior to admission quetiapine and Abilify.      4.  Hypertension.  Continue prior to admission isosorbide mononitrate.      5.  Hyperlipidemia.  Continue prior to admission atorvastatin.      6.  Chronic pain:  Continue prior to admission duloxetine and Neurontin.     # Pain Assessment:  Current Pain Score 5/11/2018   Patient currently in pain? denies   Pain score (0-10) 0   Pain location -   Pain descriptors -   Karen s pain level was assessed and she currently denies pain.      DVT Prophylaxis: Enoxaparin (Lovenox) SQ  Code Status: Full Code    Disposition: Expected discharge in 1-2 days once a bit better on her feet.    Ken Alcaraz    Interval History   Breathing is better but still SOB with walking and weak.     -Data reviewed today: I reviewed all new labs and imaging results over the last 24 hours. I personally reviewed no images or EKG's today.    Physical Exam   Temp: 98.7  F (37.1  C) Temp src: Oral BP: 131/79 Pulse: 76 Heart Rate: 93 Resp: 18 SpO2: 92 % O2 Device: Nasal cannula Oxygen Delivery: Other (Comments) (3.5LPM)  Vitals:    05/08/18 1831   Weight: 103.7 kg (228 lb 11.2 oz)     Vital Signs with Ranges  Temp:  [96.9  F (36.1  C)-98.7  F (37.1  C)] 98.7  F  (37.1  C)  Pulse:  [76] 76  Heart Rate:  [93-95] 93  Resp:  [16-18] 18  BP: (113-166)/(66-97) 131/79  SpO2:  [80 %-98 %] 92 %  I/O last 3 completed shifts:  In: 880 [P.O.:880]  Out: -     GENERAL:  Comfortable. Cooperative.  PSYCH: pleasant, oriented, No acute distress.  EYES: PERRLA, Normal conjunctiva.  HEART:  Regular rate and rhythm. No JVD. Pulses normal. No edema.  LUNGS:  Clear to auscultation, normal Respiratory effort.  ABDOMEN:  Soft, no hepatosplenomegaly, normal bowel sounds.  EXTREMETIES: No clubbing, cyanosis or ischemia  SKIN:  Dry to touch, No rash.      Medications       ARIPiprazole  5 mg Oral Daily     aspirin chewable tablet 81 mg  81 mg Oral Daily     atorvastatin  40 mg Oral Daily     buPROPion  300 mg Oral QAM     docusate sodium  100 mg Oral Daily     DULoxetine (CYMBALTA) EC capsule 90 mg  90 mg Oral Daily     enoxaparin  40 mg Subcutaneous Q24H     gabapentin  300 mg Oral At Bedtime     ipratropium - albuterol 0.5 mg/2.5 mg/3 mL  3 mL Nebulization 4x daily     isosorbide mononitrate  30 mg Oral Daily     melatonin tablet 5 mg  5 mg Oral At Bedtime     predniSONE  60 mg Oral Daily     QUEtiapine  200 mg Oral At Bedtime     QUEtiapine (SEROquel) tablet 50 mg  50 mg Oral QAM     sodium chloride (PF)  3 mL Intracatheter Q8H       Data     Recent Labs  Lab 05/11/18  0745 05/10/18  0659 05/09/18  0713 05/08/18  1538   WBC  --   --  5.6 6.8   HGB  --   --  11.2* 12.0   MCV  --   --  96 98   *  --  151 185   NA  --  143 141 140   POTASSIUM  --  3.7 4.3 4.3   CHLORIDE  --  103 100 97   CO2  --  39* 39* 42*   BUN  --  18 11 13   CR 0.55 0.57 0.48* 0.56   ANIONGAP  --  1* 2* 1*   MAHOGANY  --  8.6 8.4* 8.8   GLC  --  85 111* 107*   ALBUMIN  --   --   --  3.5   PROTTOTAL  --   --   --  7.2   BILITOTAL  --   --   --  0.3   ALKPHOS  --   --   --  93   ALT  --   --   --  22   AST  --   --   --  23   TROPI  --   --   --  <0.015       No results found for this or any previous visit (from the past 24  hour(s)).

## 2018-05-11 NOTE — PLAN OF CARE
Problem: Patient Care Overview  Goal: Plan of Care/Patient Progress Review  Outcome: Improving  BP elevated- scheduled meds, using her baseline 3.5L O2. LS ex wheezes, dim in bases. Occasional nonproductive cough. Scheduled PO prednisone and nebs. Per pt, she feels close to her baseline but does not feel comfortable going home today. Ambulated in halls with O2 sats dropping lowest to 80%. Up with SBA and walker.

## 2018-05-11 NOTE — PROGRESS NOTES
"[facility] RCAT     Date: 05/11/2018  Admission Dx: COPD Ex, SOB  Pulmonary History: COPD  Home Nebulizer/MDI Use: Albuterol QID prn, Spiriva QD  Home Oxygen: 3.5 LPM NC  Acuity Level (RCAT flow sheet): 3  Aerosol Therapy initiated: Duoneb QID, Albuterol Q2 prn  Pulmonary Hygiene initiated: Cough and deep breathing techniques  Volume Expansion initiated: IS TID  Current Oxygen Requirements: 3.5 LPM NC  Current SpO2: 94%  Re-evaluation date: 05/14/2018  Patient Education: Education was performed with the patient in regards to indications/benefits and possible side effects of bronchodilators. Will continue to do education with patient.       See \"RT Assessments\" flow sheet for patient assessment scoring and Acuity Level Details.     Vital signs:  Temp: 96.9  F (36.1  C) Temp src: Oral BP: (!) 166/97 Pulse: 76 Heart Rate: 95 Resp: 16 SpO2: 94 % O2 Device: Nasal cannula Oxygen Delivery:  (3.5 LPM) Height: 170.2 cm (5' 7\") Weight: 103.7 kg (228 lb 11.2 oz)  Estimated body mass index is 35.82 kg/(m^2) as calculated from the following:    Height as of this encounter: 1.702 m (5' 7\").    Weight as of this encounter: 103.7 kg (228 lb 11.2 oz).    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 Surgeon:  MILADY BASS MD     CONIZATION  10/23/07    Vulvar excision for BOBBY 3, cold knife conization     EXCISE VULVA WIDE LOCAL  5/25/2012    " Procedure:EXCISE VULVA WIDE LOCAL; Wide Local Excision Of Vulvar Lesion; Surgeon:RE ALDRIDGE; Location:[facility] OR     FOOT SURGERY Right 2005    OPERATION: Excision of soft tissue mass right foot. Surgeon:  KHARI DUEÑAS DPM     FOOT SURGERY Right 2005    OPERATION: Excision of ganglion cyst right foot. Surgeon:  KHARI DUEÑAS DPM     HERNIORRHAPHY INCISIONAL (LOCATION)  2012    Procedure:HERNIORRHAPHY INCISIONAL (LOCATION); Incisional Hernia Repair with mesh ; Surgeon:KIM QUICK; Location:[facility] OR     HYSTERECTOMY TOTAL ABDOMINAL, BILATERAL SALPINGO-OOPHORECTOMY, COMBINED       LAPAROSCOPIC HYSTERECTOMY TOTAL, BILATERAL SALPINGO-OOPHORECTOMY, COMBINED  3/6/2012    Procedure:COMBINED LAPAROSCOPIC HYSTERECTOMY TOTAL, BILATERAL SALPINGO-OOPHORECTOMY; Total laparoscopic Hysterectomy, Bilateral Salpingo Ooporectomy, Pubovaginal Sling, Biopsy Left Volva; Surgeon:RE ALDRIDGE; Location:[facility] OR     repair of anal fissures       SLING BLADDER SUSPENSION WITH FASCIA ALESHA       SLING TRANSPUBO WITHOUT ANTERIOR COLPORRHAPHY  3/6/2012    Procedure:SLING TRANSPUBO WITHOUT ANTERIOR COLPORRHAPHY; Surgeon:JOLANTA ENRIQUEZ; Location:[facility] OR     TUBAL LIGATION         FAMILY HISTORY:   Family History   Problem Relation Age of Onset     CANCER Father      asbestos lung disease     Hypertension Mother      DIABETES Paternal Aunt      HEART DISEASE Maternal Grandfather      CANCER Maternal Grandmother      unsure of what kind,  at the age of 86     Circulatory Paternal Grandmother       of brain aneurysm     Asthma Son      HEART DISEASE Sister      No Known Problems Brother      Pacemaker Sister      No Known Problems Brother        SOCIAL HISTORY:   Social History   Substance Use Topics     Smoking status: Former Smoker     Packs/day: 0.50     Years: 26.00     Types: Cigarettes     Quit date: 7/3/2015     Smokeless tobacco: Never Used      Comment: sometimes     Alcohol use No        Study Result      CHEST TWO VIEWS 5/8/2018 4:58 PM      INDICATION: COPD exacerbation, hypoxic and tachypneic.     COMPARISON: 3/26/2018.         IMPRESSION: Advanced emphysema with bullous changes in the left upper  lung. Scattered areas of linear atelectasis or scarring in the lower  lungs. No new focal airspace disease or significant change. Normal  heart size.     APOORVA ALONZO MD         Prior to Admission medications    Medication Sig Last Dose Taking? Auth Provider   albuterol (2.5 MG/3ML) 0.083% nebulizer solution Take 1 vial (2.5 mg) by nebulization 4 times daily as needed 5/8/2018 at Unknown time Yes Eros Rebolledo MD   ARIPiprazole (ABILIFY) 5 MG tablet Take 5 mg by mouth daily 5/8/2018 at Unknown time Yes Unknown, Entered By History   ASPIRIN PO Take 81 mg by mouth daily 5/8/2018 at Unknown time Yes Unknown, Entered By History   atorvastatin (LIPITOR) 40 MG tablet Take 1 tablet (40 mg) by mouth daily 5/8/2018 at Unknown time Yes Eros Rebolledo MD   buPROPion (WELLBUTRIN XL) 300 MG 24 hr tablet Take 1 tablet (300 mg) by mouth every morning 5/7/2018 at Unknown time Yes Leana Patel NP   Cholecalciferol (VITAMIN D3) 2000 UNITS CHEW Take 2,000 mg by mouth daily  5/8/2018 at Unknown time Yes Reported, Patient   diazepam (VALIUM) 10 MG tablet Take 1 tablet (10 mg) by mouth every 12 hours as needed for anxiety 5/8/2018 at Unknown time Yes Leana Patel NP   Docusate Calcium (STOOL SOFTENER PO) Take 100 mg by mouth daily  5/8/2018 at Unknown time Yes Reported, Patient   DULOXETINE HCL PO Take 90 mg by mouth daily 5/8/2018 at Unknown time Yes Unknown, Entered By History   gabapentin (NEURONTIN) 300 MG capsule Take 1 capsule (300 mg) by mouth At Bedtime 5/7/2018 at Unknown time Yes Eros Rebolledo MD   HYDROcodone-acetaminophen (NORCO) 5-325 MG per tablet Take one pill  daily as needed, not to be taken with diazepam 5/8/2018 at Unknown time Yes Eros Rebolledo MD    isosorbide mononitrate (IMDUR) 30 MG 24 hr tablet Take 1 tablet (30 mg) by mouth daily 5/8/2018 at Unknown time Yes Cj Tapia MD   MELATONIN PO Take 5 mg by mouth At Bedtime 5/7/2018 at Unknown time Yes Unknown, Entered By History   QUEtiapine (SEROQUEL) 200 MG tablet Take 1 tablet (200 mg) by mouth At Bedtime 5/8/2018 at Unknown time Yes Eros Rebolledo MD   tiotropium (SPIRIVA) 18 MCG capsule Inhale 1 capsule (18 mcg) into the lungs daily 5/8/2018 at Unknown time Yes Eros Rebolledo MD   VENTOLIN  (90 BASE) MCG/ACT Inhaler SHAKE WELL AND INHALE 1 TO 2 PUFFS INTO THE LUNGS EVERY 4 HOURS AS NEEDED FOR SHORTNESS OF BREATH, DIFFICULTY BREATHING OR WHEEZING.   Yes Eros Rebolledo MD   nitroGLYcerin (NITROSTAT) 0.4 MG sublingual tablet Place 1 tablet (0.4 mg) under the tongue every 5 minutes as needed for chest pain prn     Eros Rebolledo MD   order for DME Equipment being ordered: Walker Wheels () and Walker ()  Treatment Diagnosis: Gait instability     Jesus Valle MD   order for DME Equipment being ordered: Walker Wheels ()  Treatment Diagnosis: severe COPD     Jesus Valle MD   order for DME Equipment being ordered: Walker ()  Treatment Diagnosis: severe COPD     Jesus Valle MD   predniSONE (DELTASONE) 10 MG tablet 4 tabs daily for 3 days, then 3 tabs daily for 3 days, then 2 tabs daily for 3 days, then 1 tab daily for 3 days, then stop Unknown at Unknown time   Jesus Valle MD   QUETIAPINE FUMARATE PO Take 50 mg by mouth every morning     Unknown, Entered By History         Sandip Collier RT  5/11/2018

## 2018-05-12 NOTE — PLAN OF CARE
Problem: Patient Care Overview  Goal: Plan of Care/Patient Progress Review  Outcome: Improving  A&Ox4. Slighty tachycardic (HR 90s), VSS otherwise. 3.5L O2 nasal cannula. CMS intact. LS diminished. Infrequent, non-productive cough. Dyspnea w/ exertion. Up w/ SBA, gait belt and walker. Ambulated in hallway,  tolerated well, O2 sat 93% during walk w/ 3L of O2 nasal cannula w/ O2 tank. Continues scheduled neb tx and po prednisone. BS activex4, tolerating regular diet well, passing flatus. Voiding in adequate amts. Denies pain. Plans to DC tomorrow to home. Will continue to  monitor.

## 2018-05-12 NOTE — PROGRESS NOTES
D: [facility]WS has been following to coordinate d/c needs. Pt has a d/c order for today.   A: Pt was open to [facility] prior to admission for PT/OT/SLP/RN/SW/HHA. The d/c order only included HC for med management.   10:10am NASIR paged MD to get resumption orders if necessary.   1:55pm NASIR sent the Med Management HC order to [facility].   P: Pt d/c home with [facility].     RUTHIE Contreras  Casual  [phone]

## 2018-05-12 NOTE — PLAN OF CARE
"Problem: Patient Care Overview  Goal: Plan of Care/Patient Progress Review  VS /84 (BP Location: Right arm)  Pulse 76  Temp 96.1  F (35.6  C) (Axillary)  Resp 18  Ht 1.702 m (5' 7\")  Wt 103.7 kg (228 lb 11.2 oz)  LMP 12/01/2007  SpO2 97%  BMI 35.82 kg/m2   AO, up SBA. VSS, on 3.5L O2 (baseline). LS diminished, +2 BLE edema. Using IS. Encouraged ambulation. Tolerating reg diet. Will dc today.    Discharge instructions reviewed with patient. Patient verbalizes understanding, all questions were answered. Patient discharged with all personal belongings and discharge medications. IV removed.         "

## 2018-05-14 NOTE — PROGRESS NOTES
Clinic Care Coordination Contact  [facility]/Voicemail    Referral Source: IP Report  Clinical Data: Care Coordinator Outreach - post hospital follow up   Patient admitted from 5/8 - 5/12 COPD exacerbation   Discharged with prednisone taper   Resumption of [facility] orders   Pcp appt. 5/18/18     Outreach attempted x 1.  Left message on voicemail with call back information and requested return call.    Plan: Care Coordinator . Care Coordinator will try to reach patient again in 1-2 business days    Korin Wang Care Coordinator RN  [facility]  [phone]  May 14, 2018       .

## 2018-05-14 NOTE — LETTER
[place] CARE COORDINATION  [address] [address]  [phone]      Karen A [place]  [address] [facility]   [facility] [id number]    May 15, 2018      Shayne Armenta,     Please check out the information enclosed on palliative care team     If you would like to make an appt. or need any help with this please let me know.     You can also talk to Dr. Abreu about this at your upcoming appt. to see if she feels it is a good idea       Take Care and call if I can help with anything     Korinsa Wang Care Coordinator RN  [facility]  [phone]  May 15, 2018

## 2018-05-14 NOTE — PROGRESS NOTES
Transition Communication Hand-off for Care Transitions to Next Level of Care Provider    Name: Karen Alex  : 1955  MRN #: [id number]  Primary Care Provider: Eros Rebolledo  Primary Care MD Name: Kesha   Primary Clinic: [id number] [place] [facility] [address]  Primary Care Clinic Name: [facility]   Reason for Hospitalization:  COPD exacerbation (H) [J44.1]  Hypercapnic respiratory failure, chronic (H) [J96.12]  Admit Date/Time: 2018  3:27 PM  Discharge Date: 18  Payor Source: Payor: MEDICARE / Plan: MEDICARE / Product Type: Medicare /     Readmission Assessment Measure (RICHIE) Risk Score/category: Elevated         Reason for Communication Hand-off Referral: Admission diagnoses: COPD    Discharge Plan:       Concern for non-adherence with plan of care:   Y  Discharge Needs Assessment:  Needs       Most Recent Value    Current Discharge Risk chronically ill [COPD ]    # of Referrals Placed by [facility], Communication hand-offs to next level of Care Providers, Scheduled Follow-up appointments, Inpatient Pharmacy, MTM          Already enrolled in Tele-monitoring program and name of program:  NA  Follow-up specialty is recommended: No    Follow-up plan:  Future Appointments  Date Time Provider Department Center   5/15/2018 1:30 PM Ghate, Lupisted Brucear Baxi, LICSW [facility]   2018 11:00 AM [facility]   2018 10:30 AM Ghate, Lupis Shiledar Baxi, LICSW [facility]   2018 12:30 PM Ghate, Lupis Symoneledar Baxi, LICSW [facility]       Any outstanding tests or procedures:    Procedures     Future Labs/Procedures    Oxygen Adult     Comments:    Renew Home Oxygen Order  Renew previous prescription.  Expected treatment length is indefinite (99 months).    Attending Provider: Ken Alcaraz  Physician signature: See electronic signature associated with these discharge orders  Date of Order: May 12, 2018          Referrals      Future Labs/Procedures    Med Therapy Manage Referral     Comments:    Your provider has referred you to: **[facility] Scheduling (numerous locations) [phone]   [email or web address]    Reason for Referral: COPD admission     The [facility] department will contact you to schedule an appointment.  You may also schedule the appointment by calling [phone].  For [facility] [place] patients, please call [phone] to confirm/schedule your appointment on the next business day.    This service is designed to help you get the most from your medications.  A specially trained Pharmacist will work closely with you and your providers to solve any questions, concerns, issues or problems related to your medications.    Please bring all of your prescription and non-prescription medications (such as vitamins, over-the-counter medications, and herbals) or a detailed medication list to your appointment.    If you have a glucose meter or other home monitoring information, please also bring this to your appointment (i.e. blood glucose log, blood pressure log, pain log, etc.).                Key Recommendations:  Pt admitted for COPD exacerbation with hypercapnia resp failure. Met with pt at bedside and discussed hypercapnia and the results of.  Pt reports that she is sleepy all the time and at times when this happends she will just turn up her 02.  She has never had a sleep study.  Please Follow up with this pt for on going chronic illness management knowledge deficient in this area was identified.  Pt is open to [facility] RN - handoff to be provided to them as well for recommended gaps in pt knowledge as how hypercapnia is related to the lethargy and being sleeping all the time and why turning up her o2 is not a good idea in a copd pt.     Chula Daugherty    AVS/Discharge Summary is the source of truth; this is a helpful  guide for improved communication of patient story

## 2018-05-14 NOTE — TELEPHONE ENCOUNTER
Nitish Barrios RN with [facility] requests telephone orders to resume home care services.   He did not request specific orders, just resume care.   Informed approved per standing orders.   Home Care staff will fax these orders for MD signature.   Chaim Slaughter RN

## 2018-05-15 NOTE — MR AVS SNAPSHOT
MRN:[id number]                      After Visit Summary   5/15/2018    Karen Alex    MRN: [id number]           Visit Information        Provider Department      5/15/2018 1:30 PM Lupis Mcgee LICSW [facility]      Your next 10 appointments already scheduled     May 18, 2018 11:00 AM CDT   Office [facility]os [facility]    [address] [place] [phone]           Bring a current list of meds and any records pertaining to this visit. For Physicals, please bring immunization records and any forms needing to be filled out. Please arrive 10 minutes early to complete paperwork.            Jun 04, 2018 10:30 AM CDT   Return Visit with MAURY Lemos   [facility] ([facility])    [address] [place] [id number] [phone]            Jun 25, 2018 12:30 PM CDT   Return Visit with MAURY Lemos   [facility] ([facility])    [address] [place] [id number] [phone]              MyChart Information     [employer] gives you secure access to your electronic health record. If you see a primary care provider, you can also send messages to your care team and make appointments. If you have questions, please call your primary care clinic.  If you do not have a primary care provider, please call [phone] and they will assist you.        Care EveryWhere ID     This is your Care EveryWhere ID. This could be used by other organizations to access your [facility] medical records  [id number]        Equal Access to Services     BRANDYN TURNER : Ej Calixto, rupert sifuentes, rj guzman. So [facility]  [phone].    ATENCIÓN: Si habla español, tiene a zaragoza disposición servicios gratuitos de asistencia lingüística. Llame al [phone].    We comply with applicable federal civil rights laws and [place] laws. We do not discriminate on the basis of race, color, national origin, age, disability, sex, sexual orientation, or gender identity.

## 2018-05-15 NOTE — Clinical Note
Shayne Langley,  Garland CROWLEY on this client.  She has been getting home-care services.  She reported to me an incident where she felt pressured by the home care staff to move into assisted living and they opened and look at her financial paperwork (last Thursday).  At this time, she does not want to move into assisted living.  We discussed a plan of her checking in with other family members.  She does still want home care to help with some chores around the house.   RUTHIE Rosas, [facility] Therapist [facility] [phone]

## 2018-05-15 NOTE — PROGRESS NOTES
Clinic Care Coordination Contact  OUTREACH    Referral Information:  Referral Source: IP Report  Primary Diagnosis: COPD  Chief Complaint   Patient presents with     [facility]inic Care Coordination - [facility]     COPD exacerbation, CCRN        [place] Utilization:   Utilization    Last refreshed: 5/15/2018  9:49 AM:  No Show Count (past year) 1       Last refreshed: 5/15/2018  9:49 AM:  ED visits 2       Last refreshed: 5/15/2018  9:49 AM:  Hospital admissions 4          Current as of: 5/15/2018  9:49 AM           Clinical Concerns:  Health Maintenance Reviewed: Due/Overdue   Health Maintenance Due   Topic Date Due     URINE DRUG SCREEN Q1 YR  05/18/1972     HIV SCREEN (SYSTEM ASSIGNED)  05/18/1975     LIPID MONITORING Q6 MO  11/15/2017     DEXA Q3 YR  01/29/2018     DEPRESSION ACTION PLAN Q1 YR  05/15/2018      Current Medical Concerns:  Patient admitted from 5/8 - 5/12 with COPD exacerbation, hypercapnic respiratory failure chronic   Patient is feeling very tired, weak, frustrated with the amount of hospitalizations recently - plans to take it easy as she does not want to go back to the [facility] Care RN was out to see her this morning     Clinic Care Coordination COPD Assessment    Discharge:      What recommendations were made for follow up after your recent hospitalization? Follow up with Dr. Rebolledo on Friday 5/18     Have the follow up appointments been scheduled? Yes    If not, can I help you set up theses appointments? N/A    Transportation concerns? No    Is there a referral for Pulmonary Rehab? No     Symptom Review:      How have you been feeling now that you are home? Very tired, and weak     Are you having any increased shortness of breath? has not increased since hospital discharge     When you cough, are you coughing up anything? Yes  - Color clear/white  - Consistency varies    Do you have a COPD Action Plan?  Yes **send pt a copy    Is the COPD Action Plan on refrigerator or  available: Yes      What COPD Zone currently in:Yellow - still monitoring closely, feels okay to wait until appt. With pcp 5/18/18     What number would you call if you were in the YELLOW zones: [phone] or [facility] Dr. Abreu     Medications:      Were you started on any new medications?  Yes, prednisone     Were any of your previous medications changed? No    Do you have all of your medications? Yes,     Have you had trouble filling your prescriptions? No    Are you medications effective in controlling your symptoms? Better than I was - will keep doing what I am told     Are you currently on Prednisone (* does pt understand the tapering instructions)?  Has taken many times - no questions, also has [facility] RN in home to assist       Medication reconciliation completed? No,     Was MTM or Diabetic Education referral placed (*Make sure to put transitions as reason for referral)? Not needed     Oxygen/DME:      Are you currently on oxygen? Yes     Is this new for you? No     Is it set up at home? Yes     What liter flow were you instructed to use and how often do you use it? 3.5 LPM     What type of home oxygen system do you have (*ask about portability and ability to manage a portable oxygen delivery)?  Inogen, portable and stable tanks     Who is your supply company? [employer]     Review with patient how to use/maintain nebulizers and inhalers: completed     Activity:      How much activity can you do before you are SOB? Sob with all activities,     Have you had to reduce your activities because of dyspnea or other symptoms? Yes, weak and tired     Diet:      Do you weigh yourself daily? No      Has there been a recent change in your weight? Yes   Wt Readings from Last 4 Encounters:   05/08/18 228 lb 11.2 oz (103.7 kg)   04/02/18 222 lb (100.7 kg)   03/27/18 222 lb 3.6 oz (100.8 kg)   03/26/18 240 lb (108.9 kg)         Emotions/Lifestyle:      Do you smoke? Former smoker     If so, how many cigarettes a  day are you smoking?  NA    Would you like to try to quit smoking? NA    Clinical Pathway Name: COPD     Current Behavioral Concerns: PTSD, depression, anxiety   Patient has an appt. to meet with [facility] Lupis today - she feels okay to get to that appt. And plans to drive herself.   Discussed palliative care today - patient asks for more information on this - CCRN will mail today    Medication Management:  States compliance - no questions/concerns     Functional Status:  Mobility Status: Independent w/Device  Equipment Currently Used at Home: oxygen, raised toilet, walker, rolling ( [facility] )  Transportation means:: Regular car (patient drives at baseline)     Psychosocial:  Financial/Insurance:  Current living arrangement:: I live in a private home with family, I live in a private home, I live alone (in 3rd floor apartment - sister visits often she lives in [place] - sister does not drive but will stay with patient when she is not doig well ) plan for sister to come on 5/18/18 and stay for the weekend. Patient's son is in the  and will be gone for the next 6 weeks   Type of residence:: Apartment     Resources and Interventions:  Current Resources: Skilled Nursing, Physicial Therapy, Occupational Therapy;    Advanced Care Plans/Directives on file:: No  Palliative care information sent today       Goals:     Goal Statement: I will walk 1/2 down apartment hallway 3 times per week - increasing as tolerated to full hallway and more days per week   Measure of Success: patient able to meet goal of 3 times per week   Supportive Steps to Achieve: CCRN encouragement - support by weekly calls   Barriers: COPD   Strengths: wanting to increase endurance   Date to Achieve By: 6/30/18  As of today's date 5/15/2018 goal is met at 0 - 25%.   Goal Status:  Active - has not been able to work on goal due to hospitalizations, illness     Patient/Caregiver understanding: Yes   Outreach Frequency:  weekly  Next 5 appointments (look out 90 days)     May 15, 2018  1:30 PM CDT   Return Visit with MAURY Lemos   [facility] ([address] [address]   [phone]            May 18, 2018 11:00 AM CDT   Office Visit with Eros Rebolledo MD   [facility]    [address] [place] [id number]   [phone]            Jun 04, 2018 10:30 AM CDT   Return Visit with MAURY Lemos   [facility] ([facility])    [address] [place] [phone]   [phone]            Jun 25, 2018 12:30 PM CDT   Return Visit with AMURY Lemos   [facility] ([address] [address]   [phone]                    Plan:   Patient will work with  home care team - RN/PT/OT   Patient will visit with [facility] therapist today - if having shortness of breath once getting into the building go into the 1st floor office and ask for them to call for wheelchair assist to second floor   Patient will call pulmonology - Dr. Abreu to advise of recent hospitalizations - next appt. Scheduled July - see if they are able to move up appt.   [facility] will mail information on palliative care team to see if patient is interested in appt.   [facility] will f/u with patient in 1 week     Korin Wang Care Coordinator RN  [facility]  [phone]  May 15, 2018

## 2018-05-15 NOTE — TELEPHONE ENCOUNTER
Richelle calling from  Home Care for resumption of care orders-    Skilled Nursing  2xwk/4wks  2 as needed    , PT, OT and Speech therapy evaluations.  Also PT Lymphedema therapist.    Verbal orders given.  Will fax for MD signature.  Kailey Guevara RN

## 2018-05-15 NOTE — TELEPHONE ENCOUNTER
MTM referral from: Transitions of Care (recent hospital discharge or ED visit)    MTM referral outreach attempt #1 on May 15, 2018 at 11:18 AM      Outcome: Patient is not interested at this time because she has someone who goes over her medications with her already, will route to MTM Pharmacist/Provider as an FYI. Thank you for the referral.     Stevie Blancas, MTM Coordinator

## 2018-05-15 NOTE — TELEPHONE ENCOUNTER
Controlled Substance Refill Request for Norco  Problem List Complete:  Yes    Patient is followed by OKSANA ANTONIO for ongoing prescription of pain medication.  All refills should be approved by this provider, or covering partner.    Medication(s): Norco.   Maximum quantity per month: 30 --Clinic visit frequency required: Q 3 months     Controlled substance agreement on file: Yes       Date(s): 7/16/15    Pain Clinic evaluation in the past: No       Date(s):  n/a       Location(s):  n/a    DIRE Total Score(s):  No flowsheet data found.    Last [facility] website verification: 8/1/17   [email or web address]     checked in past 6 months?  Yes 3/26/18     Last Office Visit: 4/2/18      Georgie Shabazz, Pharmacy [facility]

## 2018-05-15 NOTE — PROGRESS NOTES
Progress Note    Client Name: Karen Alex  Date: 5/15/2018                                          Service Type: Individual      Session Start Time:  1:30  Session End Time: 2:25      Session Length: 45 - 50     Session #: 31     Attendees: Client attended alone    Treatment Plan Last Completed Date: 5/15/18  PHQ-9 / HUNG-7 Last Completed Date: last completed 5/15/2018                 DATA      Progress Since Last Session (Related to Symptoms / Goals / Homework):   Symptoms: Stable-scores remain high- son out of country on  duty     Homework: Partially completed- has been challenging with current health      Episode of Care Goals: Minimal progress - ACTION (Actively working towards change); Intervened by reinforcing change plan / affirming steps taken     Current / Ongoing Stressors and Concerns:   Client reports that son has left for [facility]. He will be back in six weeks.  Client reports that she was in the hospital last week.  She was assessed by home care nurses and was told that she had high Co2 levels.  Reports that at that same time, she was encouraged by one of the home care workers to go into assisted living- not realizing the cost.  She agreed at that time despite not understanding the conversation.  She also states that this worker also opened her financial mail.  Since being discharged, client is making it clear that she wants to remain in her home.  She does not want the option of assisted living.  She has family in the area that can be supportive while son is gone.  We discussed having one family member that she checks in daily with or can check in on her if she does not do her daily call.  She will use her nephew, Grabiel, to be this point person.  Her sister and boyfriend continue to be supportive as well.      Treatment Objective(s) Addressed in This Session:   focus on being socially active and not isolating at home due to medical condition  "  Using acceptance skills to understand her level of depression and what she has control over       Intervention:   CBT: Making decisions to stay independent        ASSESSMENT: Current Emotional / Mental Status (status of significant symptoms):   Risk status (Self / Other harm or suicidal ideation)  Client denies a history of suicidal ideation, suicide attempts, self-injurious behavior, homicidal ideation, homicidal behavior and and other safety concerns   Client denies current fears or concerns for personal safety.   Client reports the following current or recent suicidal ideation or behaviors: passive thoughts of not wanting to live.  Denies any plan or intent.  \"Would not do that to my son\".   Client denies current or recent homicidal ideation or behaviors.   Client denies current or recent self injurious behavior or ideation.   Client denies other safety concerns.   A safety and risk management plan has not been developed at this time, however client was given the after-hours number should there be a change in any of these risk factors.     Appearance:   Appropriate    Eye Contact:   Fair    Psychomotor Behavior: Normal    Attitude:   Cooperative    Orientation:   All   Speech    Rate / Production: Normal     Volume:  Soft    Mood:    Depressed  Sad    Affect:    Flat    Thought Content:  Clear    Thought Form:  Focused on grief   Insight:    Fair      Medication Review:   No changes to current psychiatric medication(s)     Medication Compliance:   Yes- see with Sara Leonardo PA-C at [facility]     Changes in Health Issues:   Yes: Respiratory Disease, No Psychological Distress-struggling with breathing, fluid retention     Chemical Use Review:   Substance Use: Chemical use reviewed, no active concerns identified      Tobacco Use: No current tobacco use.   Almost celebrating her one year anniversary     Collateral Reports Completed:   Not Applicable    PLAN: (Client Tasks / Therapist Tasks / Other)  1.   " Client will make contact and use family members to aide her while main provider, son, is out of town.     Lupis Mcgee, LICSW     __________________________________________________________________________________________________                                                           Treatment Plan    Client's Name: Karen Alex  YOB: 1955    Date: 9/22/2015       DSM-V Diagnoses: 296.32 - Major Depressive Disorder, Recurrent, Moderate    300.02 - Generalized Anxiety Disorder    309.81 - Posttraumatic Stress Disorder By History; 799.9 - Deferred Diagnosis   WHODAS:  35    Referral / Collaboration:  Referral to another professional/service is not indicated at this time..    Anticipated number of session or this episode of care: 12      MeasurableTreatment Goal(s) related to diagnosis / functional impairment(s)  Goal 1: Client will have stability with her mental health as evidenced by PHQ-9 and HUNG-7 scores as well as self-report.      I will know I've met my goal when I start feeling better about myself.      Objective #A (Client Action)      Status: Continued - Date(s): 5/15/2018           Client will Decrease frequency and intensity of feeling down, depressed, hopeless.    Intervention(s)  Therapist will assign homework by learning to build awareness of her triggers which activate her mental health symptoms.  Therapist will introduce and have client implement strategies to address triggers.    Objective #B  Client will Identify negative self-talk and behaviors: challenge core beliefs, myths, and actions.  Status: Continued - Date(s): 5/15/2018         Intervention(s)  Therapist will continue to help client identify and reframe negative perceptions of self.  Work on ways to increase self-esteem.    Objective #C  Client will Feel less tired and more energy during the day .  Status: Completed - Date: 3/2/16     Intervention(s)  Therapist will assign homework 1.  Complete at least  one household activity daily; 2.  Focus on getting out of the house at least three times weekly; 3.  Look into entering social groups.      Client has reviewed and agreed to the above plan.      Lupis Mcgee, MAURY  September 22, 2015

## 2018-05-18 NOTE — MR AVS SNAPSHOT
After Visit Summary   5/18/2018    Karen Alex    MRN: [id number]           Patient Information     Date Of Birth          1955        Visit Information        Provider Department      5/18/2018 11:00 AM Eros Rebolledo MD [facility]        Today's Diagnoses     Mucopurulent chronic bronchitis (H)    -  1    Chronic obstructive pulmonary disease with acute exacerbation (H)          Care Instructions    Lung challenges     Sedation     Infection -early intervention     Environment, not smoky or humid                 Follow-ups after your visit        Your next 10 appointments already scheduled     Jun 04, 2018 10:30 AM CDT   [facility]    [address] [place] [id number] [phone]            Jun 25, 2018 12:30 PM CDT   Return Visit with MAURY Lemos   [facility] ([address] [id number] [phone]              Who to contact     If you have questions or need follow up information about today's clinic visit or your schedule please contact [facility] directly at [phone].  Normal or non-critical lab and imaging results will be communicated to you by [employer], letter or phone within 4 business days after the clinic has received the results. If you do not hear from us within 7 days, please contact the clinic through MyChart or phone. If you have a critical or abnormal lab result, we will notify you by phone as soon as possible.  Submit refill requests through [employer] or call your pharmacy and they will forward the refill request to us. Please allow 3 business days for your refill to be completed.          Additional Information About Your Visit        [employer] Information     [employer] gives you secure access  to your electronic health record. If you see a primary care provider, you can also send messages to your care team and make appointments. If you have questions, please call your primary care clinic.  If you do not have a primary care provider, please call [phone] and they will assist you.        Care EveryWhere ID     This is your Care EveryWhere ID. This could be used by other organizations to access your [place] medical records  [id number]        Your Vitals Were     Pulse Temperature Respirations Last Period Pulse Oximetry BMI (Body Mass Index)    82 98  F (36.7  C) (Oral) 12 12/01/2007 88% 35.71 kg/m2       Blood Pressure from Last 3 Encounters:   05/18/18 128/86   05/12/18 152/84   04/02/18 139/88    Weight from Last 3 Encounters:   05/18/18 228 lb (103.4 kg)   05/08/18 228 lb 11.2 oz (103.7 kg)   04/02/18 222 lb (100.7 kg)              We Performed the Following     DEPRESSION ACTION PLAN (DAP)          Today's Medication Changes          These changes are accurate as of 5/18/18 11:44 AM.  If you have any questions, ask your nurse or doctor.               Start taking these medicines.        Dose/Directions    azithromycin 250 MG tablet   Commonly known as:  ZITHROMAX   Used for:  Mucopurulent chronic bronchitis (H), Chronic obstructive pulmonary disease with acute exacerbation (H)   Started by:  Eros Rebolledo MD        Two tablets first day, then one tablet daily for four days.   Quantity:  6 tablet   Refills:  0         These medicines have changed or have updated prescriptions.        Dose/Directions    * predniSONE 10 MG tablet   Commonly known as:  DELTASONE   This may have changed:  Another medication with the same name was added. Make sure you understand how and when to take each.   Used for:  COPD exacerbation (H)   Changed by:  Eros Rebolledo MD        By mouth, take 6 tabs daily x 2 days, then 4 tabs daily x 3 days, then 2 tabs daily x 3 days, then 1 tab daily x 3 days,  then stop.   Quantity:  33 tablet   Refills:  0       * predniSONE 10 MG tablet   Commonly known as:  DELTASONE   This may have changed:  You were already taking a medication with the same name, and this prescription was added. Make sure you understand how and when to take each.   Used for:  Chronic obstructive pulmonary disease with acute exacerbation (H)   Changed by:  Eros Rebolledo MD        Dose:  10 mg   Take 1 tablet (10 mg) by mouth daily   Quantity:  30 tablet   Refills:  1       * Notice:  This list has 2 medication(s) that are the same as other medications prescribed for you. Read the directions carefully, and ask your doctor or other care provider to review them with you.         Where to get your medicines      These medications were sent to [facility] [address]  [address] [facility] [address]     Phone:  [phone]     azithromycin 250 MG tablet    predniSONE 10 MG tablet                Primary Care Provider Office Phone # Fax #    Eros Rebolledo [phone] [address] [facility] [address]        Goals        General    Financial Wellbeing (pt-stated)     Notes - Note created  4/13/2018 11:43 AM by Stacey Aguayo LGSW    Goal Statement: I will apply for [facility] assistance  Measure of Success: evaluation for extra help, [place]  Supportive Steps to Achieve: I will sign the release for the  to speak to [facility]  Barriers: Unsure about finances  Strengths: motivated for help  Date to Achieve By: TBD          Lifestyle    Increase physical activity     Notes - Note edited  5/15/2018 11:41 AM by Korin Wang RN    Goal Statement: I will walk 1/2 down apartment hallway 3 times per week - increasing as tolerated to full hallway and more days per week   Measure of Success: patient able to meet goal of 3 times per week   Supportive Steps to  Achieve: CCRN encouragement - support by weekly calls   Barriers: COPD   Strengths: wanting to increase endurance   Date to Achieve By: 6/30/18  As of today's date 5/15/2018 goal is met at 0 - 25%.   Goal Status:  Active - has not been able to work on goal due to hospitalizations, illness                 Equal Access to Services     BRANDYN TURNER AH: Hadii aad ku hadtoyandel Calixto, waaxda luqadaha, qaybta kaalmada maría, rj sanonjoesilvestre ace. So [facility] [phone].    ATENCIÓN: Si habla español, tiene a zargaoza disposición servicios gratuitos de asistencia lingüística. Llame al [phone].    We comply with applicable federal civil rights laws and [place] laws. We do not discriminate on the basis of race, color, national origin, age, disability, sex, sexual orientation, or gender identity.            Thank you!     Thank you for choosing [facility]  for your care. Our goal is always to provide you with excellent care. Hearing back from our patients is one way we can continue to improve our services. Please take a few minutes to complete the written survey that you may receive in the mail after your visit with us. Thank you!             Your Updated Medication List - Protect others around you: Learn how to safely use, store and throw away your medicines at www.disposemymeds.org.          This list is accurate as of 5/18/18 11:44 AM.  Always use your most recent med list.                   Brand Name Dispense Instructions for use Diagnosis    ABILIFY 5 MG tablet   Generic drug:  ARIPiprazole      Take 5 mg by mouth daily        ASPIRIN PO      Take 81 mg by mouth daily        atorvastatin 40 MG tablet    LIPITOR    90 tablet    Take 1 tablet (40 mg) by mouth daily    Coronary artery disease of native heart with stable angina pectoris, unspecified vessel or lesion type (H)       azithromycin 250 MG tablet    ZITHROMAX    6 tablet    Two tablets first day, then one tablet daily for  four days.    Mucopurulent chronic bronchitis (H), Chronic obstructive pulmonary disease with acute exacerbation (H)       buPROPion 300 MG 24 hr tablet    WELLBUTRIN XL    90 tablet    Take 1 tablet (300 mg) by mouth every morning    Major depressive disorder, recurrent episode, in partial remission (H)       diazepam 10 MG tablet    VALIUM    60 tablet    Take 1 tablet (10 mg) by mouth every 12 hours as needed for anxiety    HUNG (generalized anxiety disorder)       DULOXETINE HCL PO      Take 90 mg by mouth daily        gabapentin 300 MG capsule    NEURONTIN    90 capsule    Take 1 capsule (300 mg) by mouth At Bedtime    Recurrent major depressive disorder, in partial remission (H)       HYDROcodone-acetaminophen 5-325 MG per tablet    NORCO    30 tablet    Take one pill  daily as needed, not to be taken with diazepam    Mechanical low back pain, Other chronic pain       isosorbide mononitrate 30 MG 24 hr tablet    IMDUR    90 tablet    Take 1 tablet (30 mg) by mouth daily    Chest pain, CAD (coronary artery disease)       MELATONIN PO      Take 5 mg by mouth At Bedtime        nitroGLYcerin 0.4 MG sublingual tablet    NITROSTAT    25 tablet    Place 1 tablet (0.4 mg) under the tongue every 5 minutes as needed for chest pain prn    Chest pain       order for DME     1 each    Equipment being ordered: Walker Wheels () and Walker () Treatment Diagnosis: Gait instability    Chronic obstructive pulmonary disease with acute exacerbation (H)       order for DME     1 Units    Equipment being ordered: Walker Wheels () Treatment Diagnosis: severe COPD    Chronic obstructive pulmonary disease with acute exacerbation (H)       order for DME     1 Units    Equipment being ordered: Walker () Treatment Diagnosis: severe COPD    Chronic obstructive pulmonary disease with acute exacerbation (H)       * predniSONE 10 MG tablet    DELTASONE    33 tablet    By mouth, take 6 tabs daily x 2 days, then 4 tabs daily  x 3 days, then 2 tabs daily x 3 days, then 1 tab daily x 3 days, then stop.    COPD exacerbation (H)       * predniSONE 10 MG tablet    DELTASONE    30 tablet    Take 1 tablet (10 mg) by mouth daily    Chronic obstructive pulmonary disease with acute exacerbation (H)       * QUETIAPINE FUMARATE PO      Take 50 mg by mouth every morning        * QUEtiapine 200 MG tablet    SEROquel    90 tablet    Take 1 tablet (200 mg) by mouth At Bedtime    HUNG (generalized anxiety disorder)       STOOL SOFTENER PO      Take 100 mg by mouth daily        tiotropium 18 MCG capsule    SPIRIVA    90 capsule    Inhale 1 capsule (18 mcg) into the lungs daily    Generalized anxiety disorder, Chronic obstructive pulmonary disease, unspecified COPD type (H)       * VENTOLIN  (90 Base) MCG/ACT Inhaler   Generic drug:  albuterol     18 g    SHAKE WELL AND INHALE 1 TO 2 PUFFS INTO THE LUNGS EVERY 4 HOURS AS NEEDED FOR SHORTNESS OF BREATH, DIFFICULTY BREATHING OR WHEEZING.    Panlobular emphysema (H)       * albuterol (2.5 MG/3ML) 0.083% neb solution     120 vial    Take 1 vial (2.5 mg) by nebulization 4 times daily as needed    COPD exacerbation (H)       Vitamin D3 2000 units Chew      Take 2,000 mg by mouth daily        * Notice:  This list has 6 medication(s) that are the same as other medications prescribed for you. Read the directions carefully, and ask your doctor or other care provider to review them with you.

## 2018-05-18 NOTE — PROGRESS NOTES
SUBJECTIVE:   Karen Alex is a 63 year old female who presents to clinic today for the following health issues:          Hospital Follow-up Visit:    Hospital/Nursing Home/IP Rehab Facility: [facility]  Date of Admission: 5/8/18  Date of Discharge: 5/12/18  Reason(s) for Admission: COPD exacerbation             Problems taking medications regularly:  None       Medication changes since discharge: None       Problems adhering to non-medication therapy:  None    Summary of hospitalization:    Diagnostic Tests/Treatments reviewed.  Follow up needed:   Other Healthcare Providers Involved in Patient s Care:           Update since discharge: improved.     Post Discharge Medication Reconciliation: discharge medications reconciled, continue medications without change.  Plan of care communicated with patient     Coding guidelines for this visit:  Type of Medical   Decision Making Face-to-Face Visit       within 7 Days of discharge Face-to-Face Visit        within 14 days of discharge   Moderate Complexity [id number]   High Complexity [id number]          Her sister lives with her and helps her now.  Dlscussed hypercapnia in terms of illness and exacerbations, meds, humidity, allergy and potential deterioration of her copd. I have her now with a zpak on hand at home, and prednisone 10 mg once garcia until she sees dr. Abreu in the next month     SUBJECTIVE:    CC: Karen Alex is a 63 year old female who presents for copd follow up     HPI: chronic severe CO2 retention, recent hospitalizations         PROBLEM LIST:                   Patient Active Problem List   Diagnosis     Degeneration of lumbar or lumbosacral intervertebral disc     Personal history of tobacco use, presenting hazards to health     Hyperlipidemia LDL goal <130     COPD (chronic obstructive pulmonary disease) (H)     Health Care Home     Generalized anxiety disorder     Acute on chronic respiratory failure with hypoxia (H)     Chest  pain     HTN, goal below 140/90     Chronic pain     PTSD (post-traumatic stress disorder)     HUNG (generalized anxiety disorder)     Severe episode of recurrent major depressive disorder, with psychotic features (H)     Tobacco dependence syndrome     Grief     Chronic respiratory failure with hypoxia and hypercapnia (H)     COPD exacerbation (H)     Acute exacerbation of chronic obstructive pulmonary disease (H)     ACP (advance care planning)       PAST MEDICAL HISTORY:                  Past Medical History:   Diagnosis Date     Anxiety      Arthritis     generalized     Asthma      Bipolar 2 disorder (H)      Cervical dysplasia      Chronic back pain     low back     COPD (chronic obstructive pulmonary disease) (H)     O2 at night     HTN, goal below 140/90 1/29/2015     Hypercholesterolemia      Hyperlipidaemia      Other chronic pain      Pneumothorax 8/2012    left sided      Varicose veins      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 Surgeon:  MILADY BASS MD     CONIZATION  10/23/07    Vulvar excision for BOBBY 3, cold knife conization     EXCISE VULVA WIDE LOCAL  5/25/2012    Procedure:EXCISE VULVA WIDE LOCAL; Wide Local Excision Of Vulvar Lesion; Surgeon:RE ALDRIDGE; Location:[facility] OR     FOOT SURGERY Right 01/24/2005    OPERATION: Excision of soft tissue mass right foot. Surgeon:  KHARI DUEÑAS DPM     FOOT SURGERY Right 02/28/2005    OPERATION: Excision of ganglion cyst right foot. Surgeon:  KHARI DUEÑAS DPM     HERNIORRHAPHY INCISIONAL (LOCATION)  4/25/2012    Procedure:HERNIORRHAPHY INCISIONAL (LOCATION); Incisional Hernia Repair with mesh ; Surgeon:KIM QUICK; Location:[facility] OR     HYSTERECTOMY TOTAL ABDOMINAL, BILATERAL SALPINGO-OOPHORECTOMY, COMBINED       LAPAROSCOPIC HYSTERECTOMY TOTAL, BILATERAL  SALPINGO-OOPHORECTOMY, COMBINED  3/6/2012    Procedure:COMBINED LAPAROSCOPIC HYSTERECTOMY TOTAL, BILATERAL SALPINGO-OOPHORECTOMY; Total laparoscopic Hysterectomy, Bilateral Salpingo Ooporectomy, Pubovaginal Sling, Biopsy Left Volva; Surgeon:RE ALDRIDGE; Location:[facility] OR     repair of anal fissures  2005     SLING BLADDER SUSPENSION WITH FASCIA ALESHA       SLING TRANSPUBO WITHOUT ANTERIOR COLPORRHAPHY  3/6/2012    Procedure:SLING TRANSPUBO WITHOUT ANTERIOR COLPORRHAPHY; Surgeon:JOLANTA ENRIQUEZ; Location:[facility] OR     TUBAL LIGATION         CURRENT MEDICATIONS:                  Current Outpatient Prescriptions   Medication Sig Dispense Refill     albuterol (2.5 MG/3ML) 0.083% neb solution Take 1 vial (2.5 mg) by nebulization 4 times daily as needed 120 vial 5     ARIPiprazole (ABILIFY) 5 MG tablet Take 5 mg by mouth daily       ASPIRIN PO Take 81 mg by mouth daily       atorvastatin (LIPITOR) 40 MG tablet Take 1 tablet (40 mg) by mouth daily 90 tablet 3     buPROPion (WELLBUTRIN XL) 300 MG 24 hr tablet Take 1 tablet (300 mg) by mouth every morning 90 tablet 1     Cholecalciferol (VITAMIN D3) 2000 UNITS CHEW Take 2,000 mg by mouth daily        diazepam (VALIUM) 10 MG tablet Take 1 tablet (10 mg) by mouth every 12 hours as needed for anxiety 60 tablet 1     Docusate Calcium (STOOL SOFTENER PO) Take 100 mg by mouth daily        DULOXETINE HCL PO Take 90 mg by mouth daily       gabapentin (NEURONTIN) 300 MG capsule Take 1 capsule (300 mg) by mouth At Bedtime 90 capsule 1     HYDROcodone-acetaminophen (NORCO) 5-325 MG per tablet Take one pill  daily as needed, not to be taken with diazepam 30 tablet 0     isosorbide mononitrate (IMDUR) 30 MG 24 hr tablet Take 1 tablet (30 mg) by mouth daily 90 tablet 0     MELATONIN PO Take 5 mg by mouth At Bedtime       nitroGLYcerin (NITROSTAT) 0.4 MG sublingual tablet Place 1 tablet (0.4 mg) under the tongue every 5 minutes as needed for chest pain prn 25 tablet 1     order for DME  Equipment being ordered: Walker Wheels () and Walker ()  Treatment Diagnosis: Gait instability 1 each 0     order for DME Equipment being ordered: Walker Wheels ()  Treatment Diagnosis: severe COPD 1 Units 0     order for DME Equipment being ordered: Walker ()  Treatment Diagnosis: severe COPD 1 Units 0     predniSONE (DELTASONE) 10 MG tablet By mouth, take 6 tabs daily x 2 days, then 4 tabs daily x 3 days, then 2 tabs daily x 3 days, then 1 tab daily x 3 days, then stop. 33 tablet 0     QUEtiapine (SEROQUEL) 200 MG tablet Take 1 tablet (200 mg) by mouth At Bedtime 90 tablet 1     QUETIAPINE FUMARATE PO Take 50 mg by mouth every morning       tiotropium (SPIRIVA) 18 MCG capsule Inhale 1 capsule (18 mcg) into the lungs daily 90 capsule 2     VENTOLIN  (90 BASE) MCG/ACT Inhaler SHAKE WELL AND INHALE 1 TO 2 PUFFS INTO THE LUNGS EVERY 4 HOURS AS NEEDED FOR SHORTNESS OF BREATH, DIFFICULTY BREATHING OR WHEEZING. 18 g 3             FAMILY HISTORY:                   Family History   Problem Relation Age of Onset     CANCER Father      asbestos lung disease     Hypertension Mother      DIABETES Paternal Aunt      HEART DISEASE Maternal Grandfather      CANCER Maternal Grandmother      unsure of what kind,  at the age of 86     Circulatory Paternal Grandmother       of brain aneurysm     Asthma Son      HEART DISEASE Sister      No Known Problems Brother      Pacemaker Sister      No Known Problems Brother        HEALTH MAINTENANCE:                    REVIEW OF OUTSIDE RECORDS: NO    REVIEW OF SYSTEMS:  CONSTITUTIONAL: NEGATIVE for fever, chills  EYES: NEGATIVE for vision changes   RESP: NEGATIVE for significant cough or SOB  CV: NEGATIVE for chest pain, palpitations   GI: NEGATIVE for nausea, abdominal pain, heartburn, or change in bowel habits  : NEGATIVE for frequency, dysuria, or hematuria  MUSCULOSKELETAL: NEGATIVE for significant arthralgias or myalgia  NEURO: NEGATIVE for weakness,  dizziness or paresthesias or headache  ENDOCRINE: NEGATIVE for temperature intolerance, skin/hair changes  NVS:no headache or balance issus  INTEG:no moles or new rashes  LYMPH:no nodes or night sweats    EXAM:    /86 (BP Location: Left arm, Patient Position: Chair, Cuff Size: Adult Large)  Pulse 82  Temp 98  F (36.7  C) (Oral)  Resp 12  Wt 228 lb (103.4 kg)  LMP 12/01/2007  SpO2 (!) 88%  BMI 35.71 kg/m2  GENERAL APPEARANCE: healthy, alert and no distress   EXAM:  GENERAL APPEARANCE: healthy, alert and no distress  EYES: EOMI,  PERRL  HENT: ear canals and TM's normal and nose and mouth without ulcers or lesions  RESP: lungs clear to auscultation - no rales, rhonchi or wheezes  CV: regular rates and rhythm, normal S1 S2, no S3 or S4 and no murmur, click or rub -  ABDOMEN:  soft, nontender, no HSM or masses and bowel sounds normal  MS: extremities normal- no gross deformities noted, no evidence of inflammation in joints, FROM in all extremities.  SKIN: no suspicious lesions or rashes  BACK:no tenderness or pain on straight let raise           ASSESSMENT/PLAN    (J41.1) Mucopurulent chronic bronchitis (H)  (primary encounter diagnosis)  Comment:   Plan: azithromycin (ZITHROMAX) 250 MG tablet        To keep on hand    (J44.1) Chronic obstructive pulmonary disease with acute exacerbation (H)  Comment:   Plan: DEPRESSION ACTION PLAN (DAP), predniSONE         (DELTASONE) 10 MG tablet, azithromycin         (ZITHROMAX) 250 MG tablet        mulitple issues, her sister keeps her meds on track now                                  I have discussed with patient the risks, benefits, medications, treatment options and modalities.   I have instructed the patient to call or schedule a follow-up appointment if any problems or failure to improve.

## 2018-05-18 NOTE — PATIENT INSTRUCTIONS
Lung challenges     Sedation     Infection -early intervention     Environment, not smoky or humid

## 2018-05-21 NOTE — TELEPHONE ENCOUNTER
Pt is asking for a refill of Seroquel 50mg tabs    LAST FILL DATE: 2/5/18  QTY: 90  Original date: 9/29/17  Qty: 90 with 1 refill    Rola Asencio  [facility]

## 2018-05-21 NOTE — TELEPHONE ENCOUNTER
Pt called back stating that she does have a new psychiatry provider who has been filling her medications, and she is up to date with her medications and doesn't need refills from [facility].     Sarina Tabares

## 2018-05-21 NOTE — TELEPHONE ENCOUNTER
Patient was to transfered to long term psychiatry provider back in October 2017.  No appointments scheduled at this time with [facility].     RN attempted to contact patient.     Detailed vm left requesting call back with name of new psychiatry provider.     Will hold refill at this time and wait for call back.

## 2018-05-21 NOTE — TELEPHONE ENCOUNTER
Alesha calling from  Home Care [facility] for orders-    PT  2xwk/2wks    Verbal order given.  Will fax for MD signature.  Kailey Guevara RN

## 2018-05-23 NOTE — TELEPHONE ENCOUNTER
"Caller Alethea Lymphedema OT with [facility]  Patient is confused about prednisone, \"She was off it and now restarting?\"     ER MD prescribed and pt completed:   predniSONE (DELTASONE) 10 MG tablet 33 tablet 0 5/12/2018     Sig: By mouth, take 6 tabs daily x 2 days, then 4 tabs daily x 3 days, then 2 tabs daily x 3 days, then 1 tab daily x 3 days, then stop      Dr. Rebolledo prescribed   predniSONE (DELTASONE) 10 MG tablet 30 tablet 1 5/18/2018     Sig - Route: Take 1 tablet (10 mg) by mouth daily - Oral    Class: E-Prescribe      Informed Dr. Rebolledo recommended patient continue on 10 mg daily.  But we will confirm with LAUREN and call Ynes ROY with reply, she is setting up meds in about one hour [phone]  Chaim Slaughter, MANUELA    "

## 2018-05-23 NOTE — PROGRESS NOTES
Clinic Care Coordination Contact  [facility]/Voicemail    Clinical Data: follow up on [facility]    Outreach attempted x 1.  Left message on voicemail with call back information and requested return call.  Plan: Care coordinator will try again in 3-4 weeks.    Stacey Aguayo, Social Work Care Coordinator, [facility], [facility] Clinics: [place], [place], and [place]   P:[phone] / Signed May 23, 2018

## 2018-05-23 NOTE — TELEPHONE ENCOUNTER
Caller Alethea Lymphedema OT with [facility] [phone]   Requests orders   1 x week x 1   2 x week x 1   1 x week x 1   Informed approved per standing orders.   Home Care staff will fax these orders for MD signature.   Chaim Slaughter RN

## 2018-05-25 NOTE — PROGRESS NOTES
[facility] now requests orders and shares plan of care/discharge summaries for some patients through [employer].  Please REPLY TO THIS MESSAGE OR ROUTE BACK TO THE AUTHOR in order to give authorization for orders when needed.  This is considered a verbal order, you will still receive a faxed copy of orders for signature.  Thank you for your assistance in improving collaboration for our patients.    ORDER Requesting ok to extend order for SW visit. Order ending 5/28. SW visit scheduled 6/1. Will be facilitating Care Conf on that date.

## 2018-05-25 NOTE — PROGRESS NOTES
Clinic Care Coordination Contact  [facility]/Voicemail    Clinical Data: patient returned this CCSW's and left a VM. CCSw returned the call    Outreach attempted x 1.  Left message on voicemail with call back information and requested return call.  Plan: Care coordinator explained she will be out of the office for 1 week and will try to follow up with the patient after she returns in 2-3 weeks.    Stacey Aguayo, Social Work Care Coordinator, [facility], [facility] [place], [place], and [place]   P:[phone] / Signed May 25, 2018

## 2018-05-25 NOTE — PROGRESS NOTES
Clinic Care Coordination Contact    Spoke with the pt.    She contacted Room [facility] and was approved for household help for $5 an hour. Pt is excited for and grateful for the service. Felt cleaning was her biggest concern.    Also discussed medicare supplements and [facility]  care. CCSw will send information on senior linkage line to help company medicare plans and will send information on the senior partners care program.    PLAN:  Pt will outreach as needed  CCSW will follow up in 1 month    Stacey Aguayo, Social Work Care Coordinator, [facility], MSW  [facility]: [facility], and [place]   P:[phone]/ Signed May 25, 2018

## 2018-05-29 NOTE — TELEPHONE ENCOUNTER
Pt is no longer being followed by Dr. Leana Patel so she asked us to send this request to Dr. Rebolledo.  Would you like to continue therapy?    Seroquel 50mg 1 tablet every day    LAST FILL DATE: 2/5/18  Last written date: 2/5/18  QTY: 90  Rola Asencio  [facility]

## 2018-05-31 NOTE — PROGRESS NOTES
Clinic Care Coordination Contact    Clinic Care Coordination Contact  OUTREACH    Referral Information:  Referral Source: IP Report    Primary Diagnosis: COPD    Chief Complaint   Patient presents with     Clinic Care Coordination - Follow-up     COPD, home care contact, CCRN      [place] Utilization:   Difficulty keeping appointments:: No  Utilization    Last refreshed: 5/29/2018  3:48 PM:  No Show Count (past year) 1       Last refreshed: 5/29/2018  3:48 PM:  ED visits 2       Last refreshed: 5/29/2018  3:48 PM:  Hospital admissions 4          Current as of: 5/29/2018  3:48 PM           Clinical Concerns:  Current Medical Concerns:  COPD   Patient has been doing well - staying inside in the air conditioning   States compliance with inhalers/nebs   Home care still active - care conference tomorrow - CCRN sent email to [facility] SW to ask for outcome of care conf.   Patient sounds good today on phone - able to speak in full sentences, no wheezing heard   No concerns from patient for pcp today     Current Behavioral Concerns: depression, working with Lupis, from [facility]   PHQ-9 SCORE 12/12/2017 4/17/2018 5/15/2018   Total Score - - -   Total Score MyChart - - -   Total Score 23 19 20     Education Provided to patient: continue using nebs/inhalers as directed, COPD action plan to monitor symptoms      Health Maintenance Reviewed: Due/Overdue   Health Maintenance Due   Topic Date Due     URINE DRUG SCREEN Q1 YR  05/18/1972     HIV SCREEN (SYSTEM ASSIGNED)  05/18/1975     LIPID MONITORING Q6 MO  11/15/2017     DEXA Q3 YR  01/29/2018     Clinical Pathway: COPD completed     Medication Management:  States compliance - no concerns at this time     Functional Status:  Dependent ADLs:: Ambulation-walker  Mobility Status: Independent w/Device    Patient states that she has someone coming tomorrow to clean. She states it is $5 per hour. She thinks it is [employer], although she does not remember setting this up. Per chart  review last note from Stacey FRIED patient had told her she set this up with [employer], CCRN advised patient of this and she states she did not remember that.     Living Situation:  Current living arrangement:: I live in a private home with family, I live in a private home, I live alone (in 3rd floor apartment - sister visits often she lives in [place] - sister does not drive but will stay with patient when she is not doig well )  Type of residence:: Apartment    Diet/Exercise/Sleep:  Diet:: Regular  Inadequate nutrition (GOAL):: No  Food Insecurity: No  Tube Feeding: No  Exercise:: Currently not exercising (walking in the [place] at apartment complex )  Inadequate activity/exercise (GOAL):: Yes  Significant changes in sleep pattern (GOAL): No    Transportation:  Transportation concerns (GOAL):: No  Transportation means:: Regular car (patient drives at baseline)     Psychosocial:  Mental health DX:: Yes  Mental health DX how managed:: Medication  Mental health management concern (GOAL):: No  Informal Support system:: Family     Financial/Insurance:   Financial/Insurance concerns (GOAL):: Yes     Resources and Interventions:  Current Resources:   List of home care services:: Skilled Nursing, Physicial Therapy, Occupational Therapy; SW      Supplies used at home:: Oxygen Tubing/Supplies (3.5 LPM inogen )  Equipment Currently Used at Home: oxygen, raised toilet, walker, rolling (4 LPM - [facility] )    Advance Care Plan/Directive  Advanced Care Plans/Directives on file:: No  Advanced Care Plan/Directive Status: Not Applicable       Goals:   Goals        General    Financial Wellbeing (pt-stated)     Notes - Note created  4/13/2018 11:43 AM by Stacey Aguayo LGSW    Goal Statement: I will apply for [facility] assistance  Measure of Success: evaluation for extra help, medicare savings program, and a [place]  Supportive Steps to Achieve: I will sign the release for the  to speak to [facility]  Barriers: Unsure about finances  Strengths: motivated for help  Date to Achieve By: TBD          Lifestyle    Increase physical activity     Notes - Note edited  5/31/2018  2:56 PM by Korin Wang RN    Goal Statement: I will walk 1/2 down apartment hallway 3 times per week - increasing as tolerated to full hallway and more days per week   Measure of Success: patient able to meet goal of 3 times per week   Supportive Steps to Achieve: CCRN encouragement - support by weekly calls   Barriers: COPD   Strengths: wanting to increase endurance   Date to Achieve By: 6/30/18  As of today's date 5/31/2018 goal is met at 0 - 25%.   Goal Status:  Active - has not been able to work on goal due to hospitalizations, illness                   Patient/Caregiver understanding: yes     Outreach Frequency: two weeks   Future Appointments              In 4 days Lupis Mcgee, [facility]    In 3 weeks Lupis Mcgee, [facility]          Plan:   Patient will continue working with [facility] team   Patient will continue using COPD action plan and using inhalers/nebs as directed - if any symptoms in the YELLOW zone patient is to call to report   CCRN sent email to [facility] SW asking for outcome of care conference   CCRN will f/u with patient in 2 weeks     Korin Wang Care Coordinator RN  [facility]  [phone]  May 31, 2018

## 2018-06-02 NOTE — PROGRESS NOTES
[facility] now requests orders and shares plan of care/discharge summaries for some patients through [employer].  Please REPLY TO THIS MESSAGE OR ROUTE BACK TO THE AUTHOR in order to give authorization for orders when needed.  This is considered a verbal order, you will still receive a faxed copy of orders for signature.  Thank you for your assistance in improving collaboration for our patients.    MD SUMMARY The following is SW visit note from today:    Assessment  LISW facilitated care conference this date with the following present Pt, brother, Peña, boyfriend, Emmanuel, Cinthia Miles, OT, Ynes Rolon, RN, Anel Marino, PT, and Alethea Mclain, Lymphedema OT present. Pt appeared well groomed. Friendly and receptive. Denied new falls and no new trips to ED reported. Purpose of meeting was to discuss concerns re Pts current living situation and need for more support. Pts insight re limitations is poor as is evidenced by her plan yesterday, per report from Cinthia Miles, OT to drive her sister to her home in [place] despite weakness and severe SOB. Pt is homebound due to extreme SOB with any exertion.     Interventions  OT   Educated re CPT completed yesterday. Score 4.5. OT explained test results show Pt is having problems with memory, judgement, and reasoning, all of which makes it challenging for her to live independently. LISW pointed out Pts difficulty with managing paperwork and finances ie Pt seemingly unaware that she has an JOSLYN worth a fairly substantial amount of money. AYAH discovered this when looking through Pts mail, trying to find needed verifications for an MA tay. In light of discovery, AYAH did not proceed with MA tay. OT recommended move to AL where Pt can receive assist with ADLs and IADLs. OT also recommended that Pt not drive due to cognitive impairment and extreme SOB with exertion. Pt upset about both recomnmendations, commenting that she feels like she is losing her life. [facility] Team  members all empathized with Pt, but also pointed out she might like being in an AL where there are activities and opportunities for socialization. Pt agreeable with plan to move to AL, though concerned about her lease which is not up until 05/2019. OT explained Pt should be able to be excused from her lease due to medical need. Need for motorized w/c identified by OT due to Pts breathing problems. OT stated plan to initiate that process, but explained that it can take many months. Brother stated he has a transport chair Pt can use for now/. Brother going home to get it and bring it back to Pt this a.m.    PT  Working on managing physical activity and O2. Stated last PT visit, Pt walked to/from bathroom and to/from kitchen and O2 sats were 74. PT reported that it took 3.5 minutes to get sats back up.    Lymphedema OT  Working on wrapping to reduce swelling in her legs. Pointed out swelling can exacerbate breathing problems. Stated in next few weeks will work on measuring for garments. Explained Pt will need to wear garments on a daily basis for the rest of her life.    RN  Also indicated her primary concern is Pts breathing problems and difficulty caring for herself. RN also recommending move to AL.    SW  Regarding recommendation that Pt not drive, AYAH asked if she is willing to commit to that. She responded that she is not initially, but then stated she is. LISW explored Pt getting rid of car. She stated she does not want to do that as she still wants to drive. Explained recommendation will be shared with MD and that MD may choose to send letter to DMV stating concerns about Pt driving. Pt listened. She stated that once she gets to her car and catches her breath, she feels fine to drive.    Regarding AL, LISW explained EW funding can help pay for AL services. Explained most facilities accept EW, but most also require private pay for 2 to 3 yrs first. Educated re 5 yr look back period from date of MA tay,  explaining Pt may only use assets for herself during that time frame. Specifically stated she can not give away assets. LISW educated re several AL facilities near Pts apt, offering to call and schedule tours. Explained AL facilities are expensive, but encouraged Pt to focus on choosing one where she will be able to stay when assets are spent down. Pt seemed to understand. Called [facility] and left a VM. Received return call. Scheduled tour this date at 3 pm. Emmanuel going to bring Pt following MD appt. Writer told there will be an efficiency apt available by 7/1. Pt asserted she is not moving to an efficiency. LISW encouraged her to keep an open mind and pointed out that if she likes the building, she could get on wait list for a 1 bedroom. Called [facility] and learned there is an opening. Informed Pt that when funding source becomes EW at [facility], residents move to a 2 bedroom apt. Pt asserted she wont do that, but agreed to the tour scheduled 6/2 at 10 am. Emmanuel to accompany Pt. Called [facility] and left VM asking about openings. Received return call, but no info about availability. Called again and left another message. Also provided info re [facility], but did not call there. Explained that Pt will need to be transparent about her funds and how long she will be able to pay out of pocket so that she can get an idea of whether she will be able to stay under EW. Pt and brother verbalized understanding. Brother walked writer out. He expressed concern that Pt will try to hide money.     AYAH called son following visit and left VM informing of interventions and plan for Pt to move to AL by 7/1. Informed Pt and brother that there are companies Pt can hire to pack and move. Brother stated Pts son should be able to do that. He is out of town until 6/25. Encouraged son to call writer with questions.

## 2018-06-04 NOTE — MR AVS SNAPSHOT
MRN:[id number]                      After Visit Summary   6/4/2018    Karen Alex    MRN: [id number]           Visit Information        Provider Department      6/4/2018 10:30 AM Lupis Mcgee LICSW [facility]      Your next 10 appointments already scheduled     Jun 25, 2018 12:30 PM CDT   Return Visit with MAURY Lemos   [facility]    [address] [address] [phone]              MyChart Information     [employer] gives you secure access to your electronic health record. If you see a primary care provider, you can also send messages to your care team and make appointments. If you have questions, please call your primary care clinic.  If you do not have a primary care provider, please call [phone] and they will assist you.        Care EveryWhere ID     This is your Care EveryWhere ID. This could be used by other organizations to access your [place] medical records  [id number]        Equal Access to Services     BRANDYN TURNER : Hadii omid reynolds Sonatalie, waaxda luqadaha, qaybta kaalmakike daniel, rj ace. So [facility] [phone].    ATENCIÓN: Si habla español, tiene a zaragoza disposición servicios grattalos de asistencia lingüística. Farshad al [phone].    We comply with applicable federal civil rights laws and [place] laws. We do not discriminate on the basis of race, color, national origin, age, disability, sex, sexual orientation, or gender identity.

## 2018-06-04 NOTE — TELEPHONE ENCOUNTER
Controlled Substance Refill Request for Norco  Problem List Complete:  Yes    Patient is followed by OKSANA ANTONIO for ongoing prescription of pain medication.  All refills should be approved by this provider, or covering partner.    Medication(s): Norco.   Maximum quantity per month: 30 --Clinic visit frequency required: Q 3 months Last Office Visit: 5/18/18    Controlled substance agreement on file: Yes       Date(s): 7/16/15    Pain Clinic evaluation in the past: No       Date(s):  n/a       Location(s):  n/a    DIRE Total Score(s):  No flowsheet data found.    Last [facility] website verification: 3/26/18   [email or web address]   checked in past 6 months?  Yes 3/26/18     Georgie Shabazz, Pharmacy [facility]

## 2018-06-04 NOTE — PROGRESS NOTES
Progress Note    Client Name: Karen Alex  Date: 5/15/2018                                          Service Type: Individual      Session Start Time:  10:45  Session End Time: 11:30      Session Length: 45 - 50     Session #: 31     Attendees: Client attended alone    Treatment Plan Last Completed Date: 5/15/18  PHQ-9 / HUNG-7 Last Completed Date: last completed 5/15/2018                 DATA      Progress Since Last Session (Related to Symptoms / Goals / Homework):   Symptoms: Stable-scores remain high- son out of country on  duty     Homework: Partially completed- has been challenging with current health      Episode of Care Goals: Minimal progress - ACTION (Actively working towards change); Intervened by reinforcing change plan / affirming steps taken     Current / Ongoing Stressors and Concerns:   Client reports three more weeks until son arrives.  Health continues to be a challenge.  She had a care conference with her current medical providers and it has been suggested that she move into assisted living.  The meeting involved her brother and bf, Emmanuel.  Client has reluctantly agreed- did tour and hopes to get into the [facility].  Client has sadness around the fact that she cannot follow through on plan to live with son and that her health is deteriorating to the point that she needs the additional help.  Discussed the pros/cons and ways to work on validating the emotions that are currently in play.        Treatment Objective(s) Addressed in This Session:   focus on being socially active and not isolating at home due to medical condition   Using acceptance skills to understand her level of depression and what she has control over       Intervention:   CBT: Honoring current emotions and potential losses        ASSESSMENT: Current Emotional / Mental Status (status of significant symptoms):   Risk status (Self / Other harm or suicidal ideation)  Client denies a  "history of suicidal ideation, suicide attempts, self-injurious behavior, homicidal ideation, homicidal behavior and and other safety concerns   Client denies current fears or concerns for personal safety.   Client reports the following current or recent suicidal ideation or behaviors: passive thoughts of not wanting to live.  Denies any plan or intent.  \"Would not do that to my son\".   Client denies current or recent homicidal ideation or behaviors.   Client denies current or recent self injurious behavior or ideation.   Client denies other safety concerns.   A safety and risk management plan has not been developed at this time, however client was given the after-hours number should there be a change in any of these risk factors.     Appearance:   Appropriate    Eye Contact:   Fair    Psychomotor Behavior: Normal    Attitude:   Cooperative    Orientation:   All   Speech    Rate / Production: Normal     Volume:  Soft    Mood:    Depressed  Sad    Affect:    Flat    Thought Content:  Clear    Thought Form:  Focused on grief   Insight:    Fair      Medication Review:   No changes to current psychiatric medication(s)     Medication Compliance:   Yes- see with Sara Leonardo PA-C at [facility]     Changes in Health Issues:   Yes: Respiratory Disease, No Psychological Distress-struggling with breathing, fluid retention     Chemical Use Review:   Substance Use: Chemical use reviewed, no active concerns identified      Tobacco Use: No current tobacco use.   Almost celebrating her one year anniversary     Collateral Reports Completed:   Not Applicable    PLAN: (Client Tasks / Therapist Tasks / Other)  1.   Client will follow through on plan as discussed in her care conference- work on recognizing her emotions.     Lupis Mcgee, DESTINYSW     __________________________________________________________________________________________________                                                           Treatment " Plan    Client's Name: Karen Alex  YOB: 1955    Date: 9/22/2015       DSM-V Diagnoses: 296.32 - Major Depressive Disorder, Recurrent, Moderate    300.02 - Generalized Anxiety Disorder    309.81 - Posttraumatic Stress Disorder By History; 799.9 - Deferred Diagnosis   WHODAS:  35    Referral / Collaboration:  Referral to another professional/service is not indicated at this time..    Anticipated number of session or this episode of care: 12      MeasurableTreatment Goal(s) related to diagnosis / functional impairment(s)  Goal 1: Client will have stability with her mental health as evidenced by PHQ-9 and HUNG-7 scores as well as self-report.      I will know I've met my goal when I start feeling better about myself.      Objective #A (Client Action)      Status: Continued - Date(s): 5/15/2018           Client will Decrease frequency and intensity of feeling down, depressed, hopeless.    Intervention(s)  Therapist will assign homework by learning to build awareness of her triggers which activate her mental health symptoms.  Therapist will introduce and have client implement strategies to address triggers.    Objective #B  Client will Identify negative self-talk and behaviors: challenge core beliefs, myths, and actions.  Status: Continued - Date(s): 5/15/2018         Intervention(s)  Therapist will continue to help client identify and reframe negative perceptions of self.  Work on ways to increase self-esteem.    Objective #C  Client will Feel less tired and more energy during the day .  Status: Completed - Date: 3/2/16     Intervention(s)  Therapist will assign homework 1.  Complete at least one household activity daily; 2.  Focus on getting out of the house at least three times weekly; 3.  Look into entering social groups.      Client has reviewed and agreed to the above plan.      Lupis Mcgee, [facility]  September 22, 2015

## 2018-06-05 NOTE — PROGRESS NOTES
RT- Patient placed on BIPAP due to results from [facility].     Recent Labs  Lab 06/05/18  0953   PHV 7.25*   PCO2V 104*   PO2V 36   HCO3V 45*     Current settings 18/6 R12 60%. Will wean down FIO2 as able. BS very diminished with some faint expiratory wheezes. Duoneb x3 given. Will continue to monitor.     Continuous Albuterol nebulizer started at 1107 and stopped at 1213. FIO2 weaned down to 45%.     1410- Patient taken up to the ICU on BIPAP without any issues. Currently on BIPAP 14/8 R12 40%.

## 2018-06-05 NOTE — IP AVS SNAPSHOT
[place] [address] [facility]    [address]    [facility] [address]    Phone:  [phone]    Fax:  [phone]                                       After Visit Summary   6/5/2018    Karen Alex    MRN: [id number]           After Visit Summary Signature Page     I have received my discharge instructions, and my questions have been answered. I have discussed any challenges I see with this plan with the nurse or doctor.    ..........................................................................................................................................  Patient/Patient Representative Signature      ..........................................................................................................................................  Patient Representative Print Name and Relationship to Patient    ..................................................               ................................................  Date                                            Time    ..........................................................................................................................................  Reviewed by Signature/Title    ...................................................              ..............................................  Date                                                            Time

## 2018-06-05 NOTE — LETTER
Key Recommendations:  Pt admitted again with copd exacerbation, her RICHIE is HIGH with multiple visits to the hospital already this year. She has home care services of RN/PT/OT/SW/HHA. She states that she manages her own medications at home? Pt states that her oxygen somehow came disconnected at home and her sats dropped to 48%. Although she states she feels things are going better at home now that she has scheduled nebs. Please continue to partner with HC to enable success in the home.    Elen Josue    AVS/Discharge Summary is the source of truth; this is a helpful guide for improved communication of patient story

## 2018-06-05 NOTE — ED PROVIDER NOTES
History     Chief Complaint:  Shortness of breath    HPI   Karen Alex is a 63 year old female with a history of hypertension, hyperlipidemia, asthma, and COPD who presents to the ED via EMS for evaluation of shortness of breath. Patient is on 2L of O2 at home. The patient reports feeling well going to bed last night. However, she woke up this morning at 0530 feeling very short of breath. She tried increased her O2 up to 4L and took her inhaler with mild relief. On EMS arrival, they found that her home O2 was disconnected but were not sure for how long. Her sats were in the 50's when they found her. EMS gave the patient 1 neb en route. Here in the ED, patient reports mild rhinorrhea. She denies any chest pain, leg swelling, cough, fever, nausea, vomiting, diarrhea, abdominal pain, or any other symptoms.    CARDIAC RISK FACTORS:  Sex:    Female  Tobacco:   Former  Hypertension:   Yes  Hyperlipidemia:  Yes  Diabetes:   No  Family History:  No    PE/DVT RISK FACTORS:  Sex:    Female  Hormones:   No  Tobacco:   Former  Cancer:   No  Travel:   No  Surgery:   No  Other immobilization: No  Personal history:  No  Family history:  No    Allergies:  No known drug allergies    Medications:    albuterol (2.5 MG/3ML) 0.083% neb solution  ARIPiprazole (ABILIFY) 5 MG tablet  ASPIRIN PO  atorvastatin (LIPITOR) 40 MG tablet  azithromycin (ZITHROMAX) 250 MG tablet  buPROPion (WELLBUTRIN XL) 300 MG 24 hr tablet  Cholecalciferol (VITAMIN D3) 2000 UNITS CHEW  diazepam (VALIUM) 10 MG tablet  Docusate Calcium (STOOL SOFTENER PO)  DULOXETINE HCL PO  gabapentin (NEURONTIN) 300 MG capsule  HYDROcodone-acetaminophen (NORCO) 5-325 MG per tablet  isosorbide mononitrate (IMDUR) 30 MG 24 hr tablet  MELATONIN PO  nitroGLYcerin (NITROSTAT) 0.4 MG sublingual tablet  predniSONE (DELTASONE) 10 MG tablet  QUEtiapine (SEROQUEL) 200 MG tablet  QUEtiapine (SEROQUEL) 50 MG tablet  QUETIAPINE FUMARATE PO  tiotropium (SPIRIVA) 18 MCG capsule  VENTOLIN   (90 BASE) MCG/ACT Inhaler     Past Medical History:    Arthritis   Asthma   PTSD  Tobacco dependence syndrome  Chronic pain  Acute on chronic respiratory failure with hypoxia and hypercapnia  Bipolar 2 disorder  Cervical dysplasia   Chronic back pain   COPD (chronic obstructive pulmonary disease)  Hypertension   Hypercholesterolemia   Hyperlipidaemia   Pneumothorax   Varicose veins   BOBBY III (vulvar intraepithelial neoplasia III)   Degeneration of lumbar or lumbosacral intervertebral disc  HUNG    Past Surgical History:    Microdiscectomy, left L5-S1  Conization - vulvar excision for BOBBY 3, cold knife conization  Excise vulva wide local  Foot surgery - excision of soft tissue mass, right foot  Foot surgery - excision of ganglion cyst, right foot  Laparoscopic hysterectomy total abdominal, bilateral salpingo-oophorectomy, combined  Repair of anal fissures  Sling bladder suspension with fascia lázaro  Sling transpubo without anterior colporrhaphy  Tubal ligation    Family History:    Cancer  Hypertension  Asthma  Heart disease  Pacemaker    Social History:  Smoking status: Former  Alcohol use: No  Marital Status:       Review of Systems   Constitutional: Negative for chills, diaphoresis and fever.   HENT: Positive for rhinorrhea. Negative for congestion and sore throat.    Respiratory: Positive for shortness of breath. Negative for cough and chest tightness.    Cardiovascular: Negative for chest pain and leg swelling.   Gastrointestinal: Negative for abdominal pain, diarrhea, nausea and vomiting.   All other systems reviewed and are negative.    Physical Exam   Patient Vitals for the past 24 hrs:   BP Temp Temp src Pulse Heart Rate Resp SpO2 Height Weight   06/05/18 1145 108/65 - - - 98 25 97 % - -   06/05/18 1136 96/46 - - - 98 9 98 % - -   06/05/18 1130 96/77 - - - 98 19 96 % - -   06/05/18 1124 102/72 - - - 101 17 (!) 84 % - -   06/05/18 1115 (!) 85/59 - - - 96 15 96 % - -   06/05/18 1111 (!) 82/56 - -  "- 98 13 96 % - -   06/05/18 1107 - - - - - - 97 % - -   06/05/18 1100 (!) 88/65 - - - - - - - -   06/05/18 1044 96/54 - - - 99 11 97 % - -   06/05/18 1034 (!) 88/61 - - - 96 12 97 % - -   06/05/18 1030 93/80 - - - 95 - 97 % - -   06/05/18 1016 - - - - - - 97 % - -   06/05/18 1015 - - - - 99 - 98 % - -   06/05/18 0955 - - - - 106 - 92 % - -   06/05/18 0950 - - - - 103 - 96 % - -   06/05/18 0945 - - - - 105 - 94 % - -   06/05/18 0944 106/74 98.6  F (37  C) Oral 107 107 (!) 36 93 % 1.702 m (5' 7\") 103.4 kg (228 lb)   06/05/18 0940 - - - - - - (!) 76 % - -     Physical Exam  Constitutional: The patient is oriented to person, place, and time. Alert and cooperative.  HENT:   Right Ear: External ear normal.   Left Ear: External ear normal.   Nose: Nose normal.   Mouth/Throat: Uvula is midline, oropharynx is clear and moist and mucous membranes are normal. No posterior oropharyngeal edema or erythema.   Eyes: Conjunctivae, EOM and lids are normal. Pupils are equal, round, and reactive to light.   Neck: Trachea normal. Normal range of motion. Neck supple.   Cardiovascular: tachycardia, regular rhythm, normal heart sounds, and intact distal pulses.    Pulmonary/Chest: Tachypnea present. Increased respiratory effort. Diminished breath sounds bilaterally with inspiratory and expiratory wheezes bilaterally. No rales or rhonchi.   Abdominal: Soft. No tenderness. No rebound and no guarding.   Musculoskeletal: Normal range of motion.  No extremity tenderness or significant edema.  Neurological: Alert and Oriented. Strength 5/5 in upper and lower extremities bilaterally. Sensation intact to light touch throughout.  Skin: Skin is dry. No rash noted.          Emergency Department Course   ECG (09:47:29):  Rate 104 bpm. SC interval 136. QRS duration 84. QT/QTc 330/433. P-R-T axes 80 82 80. Sinus tachycardia with occasional premature ventricular complexes and fusion complexes. Low voltage QRS. Nonspecific T wave abnormality. Abnormal " ECG. Interpreted at 1007 by Leana Singh MD.    Imaging:  Radiographic findings were communicated with the patient who voiced understanding of the findings.    X-ray Chest, 1 view PORTABLE:  IMPRESSION: No acute abnormality.  Result per radiology.     Laboratory:  0948 - Lactic Acid: 1.1  0953 - ISTAT Lactate: pH 7.25 (L), pCO2 104 (HH), pO2 36, Bicarbonate 45 (H), Lactic Acid 1.0  1101 - ISTAT Lactate: pH 7.24 (L), pCO2 103 (HH), pO2 33, Bicarbonate 44 (H), Lactic Acid 0.9  Blood Culture x2: Pending  BNP: 3471 (H)  CBC: WBC 14.0 (H) o/w WNL (HGB 11.8, )   CMP: Albumin 3.1 (L), Carbon dioxide 41 (H), Protein total 6.5 (L), Anion gap 2 (L) o/w WNL (Creatinine 0.77)  0948 - Troponin: 0.234 (HH)    Interventions:  1002: Duoneb 3 mL Nebulization  1013: NS 500mL IV Bolus  1013: Solu-Medrol 125mg IV injection  1016: Duoneb 3 mL Nebulization  1021: Duoneb 3 mL Nebulization  1036: NS 500mL IV Bolus  1107: Albuterol 0.083% 10mg solution Nebulization  1107: Albuterol 40mg in sodium chloride 0.9% 32mL solution continuous Nebulization at 10mg/hr  1136: NS 1L IV Bolus  1137: Aspirin 324mg chewable tablet PO  1201: Doxycycline 100mg vial to attach to NS 100mL bag IV  1222: Albuterol 40mg in sodium chloride 0.9% 32mL solution continuous Nebulization at 10mg/hr restarted    Emergency Department Course:  The patient arrived in the emergency department via EMS.  Past medical records, nursing notes, and vitals reviewed.  0947: I performed an exam of the patient and obtained history, as documented above. GCS 15.    IV inserted and blood drawn. The patient was placed on continuous cardiac monitoring and pulse oximetry.    RT was called for BiPAP administration.    1005: I rechecked the patient.    1044: I rechecked the patient. Explained findings to patient.    1110: I rechecked the patient.     Findings and plan explained to the Patient who consents to admission.     1134: Discussed the patient with Dr. Arias, who will  admit the patient to an inpatient bed for further monitoring, evaluation, and treatment.     1158: I rechecked the patient. Patient is sitting up on her phone. She appears much improved.    Impression & Plan      Medical Decision Making:  Karen Alex is a 63 year old female with a history of COPD, hypertension, hyperlipidemia, on 2L oxygen, who presents for evaluation of SOB. Upon presentation in the ED, the patient is tachypnic, tachycardic, and hypoxic with oxygen saturation of 76% on room air. She was placed on 3L nasal cannula and oxygen saturation did improve to within normal limits. On exam, she does have increased work of breathing. She is alert, oriented, and neuro exam is non-focal. Aside from tachycardia cardiac exam is unremarkable. On auscultation of the lungs, she has significantly decreased air movement bilaterally with inspiratory and expiratory wheezes. I did not appreciate any rales or rhonchi. Abdomen is soft and non-tender throughout. She has no significant lower extremity edema. The rest of her exam is as mentioned above. Given the patient's increased work of breathing, she was initiated on BiPAP and was given 3 duonebs.      EKG was obtained and demonstrates sinus rhythm. There are no concerning acute ischemic changes. Chest x-ray was obtained and demonstrates no evidence to suggest an acute abnormality. There is no evidence of pneumothorax or pneumonia. Labs were obtained and are as mentioned above. Notably, she does have acidemia with hypercapnia. Her white blood cell count was mildly elevated at 14, but lactate is within normal limits. Troponin is mildly elevated at 0.234.    Given the patient's history and presentation, I do feel that her symptoms are most consistent with a COPD exacerbation. Her oxygen was reportedly disconnected upon EMS arrival and it is unclear for how long, thus this could have triggered this episode. Given the unremarkable chest xray and the patient denying any  other significant symptoms, I am less suspicious for pneumonia. Clinically, she does not appear volume overloaded, therefore I am less suspicious for CHF. Her history and presentation is less suspicious for pulmonary embolism, thus I do not feel that further evaluation for this is indicated at this time. Given that she has significant inspiratory and expiratory wheezes, I do feel her symptoms are most consistent with COPD exacerbation. She does have a mildly elevated troponin but EKG demonstrates no concerning acute ischemic changes. I am more suspicious that this is secondary to demand ischemia . She denies any chest pain, therefore I do not feel heparin is indicated at this time. She was given aspirin. Throughout the patient's ED stay, her symptoms did improve with nebulizations and BiPAP. She was initiated on doxycyline. Given that she is requiring BiPAP, I do feel that admission is indicated at this time. The patient is in agreement with this plan. She was discussed with the hospitalist and he agreed to accept this patient on to his service. She was stable/improved at the time of admission.       Diagnosis:    ICD-10-CM    1. COPD exacerbation (H) J44.1 Blood culture     CANCELED: Blood gas venous and oxyhgb       Disposition:  Admitted to Dr. Hugo Cerrato  6/5/2018   [facility] EMERGENCY DEPARTMENT  [place], am serving as a scribe at 9:47 AM on 6/5/2018 to document services personally performed by Leana Singh MD based on my observations and the provider's statements to me.        Leana Singh MD  06/05/18 6800

## 2018-06-05 NOTE — PHARMACY-ADMISSION MEDICATION HISTORY
"Admission medication history interview status for this patient is complete. See [facility] admission navigator for allergy information, prior to admission medications and immunization status.     Medication history interview source(s):Patient  Medication history resources (including written lists, pill bottles, clinic record): Recent [facility] admit 5/8-12  Primary pharmacy: [facility]    Changes made to PTA medication list:  Added: none  Deleted: azithromycin, duplicated Rx for prednisone, quetiapine  Changed: none    Actions taken by pharmacist (provider contacted, etc):None     Additional medication history information:]   - Pt reports she ran out of prednisone \"more than a week ago\"    Medication reconciliation/reorder completed by provider prior to medication history? No    Do you take OTC medications (eg tylenol, ibuprofen, fish oil, eye/ear drops, etc)? N(Y/N)    For patients on insulin therapy: N (Y/N)    Prior to Admission medications    Medication Sig Last Dose Taking? Auth Provider   albuterol (2.5 MG/3ML) 0.083% neb solution Take 1 vial (2.5 mg) by nebulization 4 times daily as needed 6/4/2018 at Unknown time Yes Ken Alcaraz MD   ARIPiprazole (ABILIFY) 5 MG tablet Take 5 mg by mouth daily 6/4/2018 at Unknown time Yes Unknown, Entered By History   ASPIRIN PO Take 81 mg by mouth daily 6/4/2018 at Unknown time Yes Unknown, Entered By History   atorvastatin (LIPITOR) 40 MG tablet Take 1 tablet (40 mg) by mouth daily 6/4/2018 at Unknown time Yes Eros Rebolledo MD   buPROPion (WELLBUTRIN XL) 300 MG 24 hr tablet Take 1 tablet (300 mg) by mouth every morning 6/4/2018 at Unknown time Yes Leana Patel NP   Cholecalciferol (VITAMIN D3) 2000 UNITS CHEW Take 2,000 mg by mouth daily  6/4/2018 at Unknown time Yes Reported, Patient   diazepam (VALIUM) 10 MG tablet Take 1 tablet (10 mg) by mouth every 12 hours as needed for anxiety Past Week at Unknown time Yes Leana Patel NP   Docusate Calcium (STOOL " "SOFTENER PO) Take 100 mg by mouth daily  Past Week at Unknown time Yes Reported, Patient   DULOXETINE HCL PO Take 90 mg by mouth daily 6/4/2018 at Unknown time Yes Unknown, Entered By History   gabapentin (NEURONTIN) 300 MG capsule Take 1 capsule (300 mg) by mouth At Bedtime 6/4/2018 at Unknown time Yes Eros Rebolledo MD   HYDROcodone-acetaminophen (NORCO) 5-325 MG per tablet Take one pill  daily as needed, not to be taken with diazepam 6/4/2018 at Unknown time Yes Eros Rebolledo MD   isosorbide mononitrate (IMDUR) 30 MG 24 hr tablet Take 1 tablet (30 mg) by mouth daily 6/4/2018 at Unknown time Yes Cj Tapia MD   MELATONIN PO Take 5 mg by mouth At Bedtime Past Week at Unknown time Yes Unknown, Entered By History   predniSONE (DELTASONE) 10 MG tablet Take 1 tablet (10 mg) by mouth daily Past Month at \"ran out more than a week ago\" Yes Eros Rebolledo MD   QUEtiapine (SEROQUEL) 200 MG tablet Take 1 tablet (200 mg) by mouth At Bedtime 6/4/2018 at Unknown time Yes Eros Rebolledo MD   QUEtiapine (SEROQUEL) 50 MG tablet Take 1 tablet (50 mg) by mouth daily 6/4/2018 at Unknown time Yes Eros Rebolledo MD   tiotropium (SPIRIVA) 18 MCG capsule Inhale 1 capsule (18 mcg) into the lungs daily 6/4/2018 at Unknown time Yes Eros Rebolledo MD   VENTOLIN  (90 BASE) MCG/ACT Inhaler SHAKE WELL AND INHALE 1 TO 2 PUFFS INTO THE LUNGS EVERY 4 HOURS AS NEEDED FOR SHORTNESS OF BREATH, DIFFICULTY BREATHING OR WHEEZING. 6/5/2018 at Unknown time Yes Eros Rebolledo MD   nitroGLYcerin (NITROSTAT) 0.4 MG sublingual tablet Place 1 tablet (0.4 mg) under the tongue every 5 minutes as needed for chest pain prn   Eros Rebolledo MD   order for DME Equipment being ordered: Walker Wheels () and Walker ()  Treatment Diagnosis: Gait instability   Jesus Valle MD   order for DME Equipment being ordered: Walker Wheels ()  Treatment Diagnosis: severe " COPD   Jesus Valle MD   order for DME Equipment being ordered: Walker ()  Treatment Diagnosis: severe COPD   Jesus Valle MD

## 2018-06-05 NOTE — ED TRIAGE NOTES
Patient reports she woke up at 0530 and was short of breath. She uses home oxygen at 2 liters and she turned it up to 4 liters and did her inhalers. Didn't help. Called EMS, fire found oxygen disconnected from device and oxygen sats were in the 50's. Reconnected and sats 94% on 4 liters. Now sats 91% on 3 liters nasal canula. History of COPD. Has leg swelling as well and she states she has had that for the past year. Neb given en route by EMS.

## 2018-06-05 NOTE — PLAN OF CARE
Problem: Patient Care Overview  Goal: Plan of Care/Patient Progress Review  Outcome: Improving  ICU End of Shift Summary.  For vital signs and complete assessments, please see documentation flowsheets.     Pertinent assessments: Patient ED admit. Patient alert and oriented. Off bipap since admission. Vitals stable. Tolerating diet.   Major Shift Events: Plan to start heparin drip. Admission  Plan (Upcoming Events): continue to monitor  Discharge/Transfer Needs: to be determined    Bedside Shift Report Completed : yes  Bedside Safety Check Completed:yes

## 2018-06-05 NOTE — PROGRESS NOTES
Clinic Care Coordination Contact    [facility] received voicemail from patient at 7:10 am stating that she had checked her oxygen level and it was 48%     [facility] placed call back to patient and no answer at 8:30 - message left for patient to call back asap and if [facility] does not hear from her in the next few mins would be calling welfare check     [facility]N placed another call to patient at 8:34 and received voicemail again - message left stating CCRN will be calling police for welfare check     CCRN placed call to 911 at 8:35 asking for welfare check on patient - while on the phone with PD patient returned call - [facility]N advised to hold the welfare check     CCRN talked with patient 8:36 - verified that the information patient left on voicemail was correct - oxygen level was 48% - patient stated yes   CCRN asked what oxygen level was now and patient stated 59% - CCRN asked about oxygen and patient stated she could not get up to get it? CCRN asked do you have your oxygen on now? And patient stated yes CCRN asked what LPM and patient stated 3 LPM     CCRN instructed patient she needed to got to the hospital via ambulance now - asked if she wanted to call 911 or if she would like [facility]N to call and patient stated she would call herself     CCRN sent email to Honey BEST with [facility] to alert of concern (recent care conference held due to concerns)     Korin Wang Care Coordinator RN  [facility]  [phone]  June 5, 2018

## 2018-06-05 NOTE — PROGRESS NOTES
[facility]  Patient is currently open to home care services with [facility].  The patient is currently receiving RN PT OT NASIR HHA services.  [facility]  and team have been notified of patient admission.  [facility] liaison will continue to follow patient during stay.  If appropriate provide orders to resume home care at time of discharge.

## 2018-06-05 NOTE — IP AVS SNAPSHOT
MRN:[id number]                      After Visit Summary   6/5/2018    Karen Alex    MRN: [id number]           Thank you!     Thank you for choosing [facility] for your care. Our goal is always to provide you with excellent care. Hearing back from our patients is one way we can continue to improve our services. Please take a few minutes to complete the written survey that you may receive in the mail after you visit. If you would like to speak to someone directly about your visit please contact Patient Relations at [phone]. Thank you!          Patient Information     Date Of Birth          1955        Designated Caregiver       Most Recent Value    Caregiver    Will someone help with your care after discharge? no      About your hospital stay     You were admitted on:  June 5, 2018 You last received care in the:  [place] [address] [facility]    You were discharged on:  June 11, 2018       Who to Call     For medical emergencies, please call 911.  For non-urgent questions about your medical care, please call your primary care provider or clinic, [phone]          Attending Provider     Provider Specialty    Leana Singh MD Emergency Medicine    Matt Arias DO Internal Medicine       Primary Care Provider Office Phone # Fax #    Eros eRbolledo [phone]      After Care Instructions     Activity       Your activity upon discharge: activity as tolerated            Diet       Follow this diet upon discharge:       Combination Diet Dysphagia Diet Level 3: Advanced; Thin Liquids (water, ice chips, juice, milk gelatin, ice cream, etc)            Oxygen Adult       Renew Home Oxygen Order  Renew previous prescription.  Expected treatment length is indefinite (99 months).    Attending Provider: Jhonatan Campos  Physician signature: See electronic signature associated with these discharge orders  Date of Order: June 11, 2018                   Follow-up Appointments     Follow-up and recommended labs and tests        Follow up with primary care provider, Eros Rebolledo, within 7 days for hospital follow- up.  The following labs/tests are recommended: CBC and BMP in 7 days.  Consider cardiac stress testing once current acute pulmonary issues completely resolved.                  Your next 10 appointments already scheduled     Ky 15, 2018 11:15 AM CDT   Office Visit with Eros [facility]   [facility]    [address] [place] [id number] [phone]           Bring a current list of meds and any records pertaining to this visit. For Physicals, please bring immunization records and any forms needing to be filled out. Please arrive 10 minutes early to complete paperwork.            Jun 25, 2018 12:30 PM CDT   Return Visit with MAURY Lemos   formerly [facility] ([facility])    [address] [address] [phone]              Additional Services     Home Care OT Referral for Hospital Discharge       OT to [facility] and treat    Your provider has ordered home care - occupational therapy. If you have not been contacted within 2 days of your discharge please call the department phone number listed on the top of this document.            Home Care PT Referral for Hospital Discharge       PT to al and treat    Your provider has ordered home care - physical therapy. If you have not been contacted within 2 days of your discharge please call the department phone number listed on the top of this document.            Home Care SLP Referral for Hospital Discharge       SLP to [facility] and treat    Your provider has ordered home care - speech therapy. If you have not been contacted within 2 days of your discharge please call the department phone number listed on the top of this document.          "   Home Care Social Service Referral for Hospital Discharge        to coordinate care    Your provider has ordered home care - . If you have not been contacted within 2 days of your discharge please call the department phone number listed on the top of this document.            Home care nursing referral       RN skilled nursing visit. RN to assess respiratory and cardiac status.    Your provider has ordered home care nursing services. If you have not been contacted within 2 days of your discharge please call the inpatient department phone number at [phone] .                  Further instructions from your care team       Your hospital follow up appointment has been schedule for you with Dr. Rebolledo at [facility] for Friday 6/15/18 at 11:15am. Please bring your hospital discharge instructions and any new medications with you to your appointment. Please call the clinic at [phone] if you need to reschedule.      Pending Results     No orders found from 6/3/2018 to 6/6/2018.            Statement of Approval     Ordered          06/11/18 1125  I have reviewed and agree with all the recommendations and orders detailed in this document.  EFFECTIVE NOW     Approved and electronically signed by:  Jhonatan Campos DO             Admission Information     Date & Time Provider Department Dept. Phone    6/5/2018 Matt Arias,  [place] [address] [facility] [phone]      Your Vitals Were     Blood Pressure Pulse Temperature Respirations Height Weight    109/68 (BP Location: Right arm) 86 97.6  F (36.4  C) (Oral) 16 1.702 m (5' 7\") 110.3 kg (243 lb 3.2 oz)    Last Period Pulse Oximetry BMI (Body Mass Index)             12/01/2007 96% 38.09 kg/m2         MyChart Information     [employer] gives you secure access to your electronic health record. If you see a primary care provider, you can also send messages to your care team and make appointments. If you have " questions, please call your primary care clinic.  If you do not have a primary care provider, please call [phone] and they will assist you.        Care EveryWhere ID     This is your Care EveryWhere ID. This could be used by other organizations to access your [facility] medical records  [id number]        Equal Access to Services     BRANDYN TURNER : Ej reynolds Sonatalie, waaxda luqadaha, qaybta kaalmada maría, rj ace. So [facility] [phone].    ATENCIÓN: Si habla español, tiene a zaragoza disposición servicios gratuitos de asistencia lingüística. Farshad al [phone].    We comply with applicable federal civil rights laws and [place] laws. We do not discriminate on the basis of race, color, national origin, age, disability, sex, sexual orientation, or gender identity.               Review of your medicines      START taking        Dose / Directions    budesonide-formoterol 80-4.5 MCG/ACT Inhaler   Commonly known as:  SYMBICORT   Used for:  COPD exacerbation (H)        Dose:  2 puff   Inhale 2 puffs into the lungs 2 times daily   Quantity:  1 Inhaler   Refills:  0       predniSONE 10 MG tablet   Commonly known as:  DELTASONE   Used for:  COPD exacerbation (H)        4 tabs daily for 2 days, then 3 tabs daily for 2 days, then 2 tabs daily for 2 days, then 1 tab daily for 2 days, then stop   Quantity:  20 tablet   Refills:  0         CONTINUE these medicines which have NOT CHANGED        Dose / Directions    ABILIFY 5 MG tablet   Generic drug:  ARIPiprazole        Dose:  5 mg   Take 5 mg by mouth daily   Refills:  0       ASPIRIN PO        Dose:  81 mg   Take 81 mg by mouth daily   Refills:  0       atorvastatin 40 MG tablet   Commonly known as:  LIPITOR   Used for:  Coronary artery disease of native heart with stable angina pectoris, unspecified vessel or lesion type (H)        Dose:  40 mg   Take 1 tablet (40 mg) by mouth daily   Quantity:  90 tablet   Refills:  3        buPROPion 300 MG 24 hr tablet   Commonly known as:  WELLBUTRIN XL   Used for:  Major depressive disorder, recurrent episode, in partial remission (H)        Dose:  300 mg   Take 1 tablet (300 mg) by mouth every morning   Quantity:  90 tablet   Refills:  1       DULOXETINE HCL PO        Dose:  90 mg   Take 90 mg by mouth daily   Refills:  0       gabapentin 300 MG capsule   Commonly known as:  NEURONTIN   Used for:  Recurrent major depressive disorder, in partial remission (H)        Dose:  300 mg   Take 1 capsule (300 mg) by mouth At Bedtime   Quantity:  90 capsule   Refills:  1       isosorbide mononitrate 30 MG 24 hr tablet   Commonly known as:  IMDUR   Used for:  Chest pain, unspecified type, Coronary artery disease involving native coronary artery of native heart with angina pectoris (H)        Dose:  30 mg   Take 1 tablet (30 mg) by mouth daily   Quantity:  90 tablet   Refills:  3       MELATONIN PO        Dose:  5 mg   Take 5 mg by mouth At Bedtime   Refills:  0       nitroGLYcerin 0.4 MG sublingual tablet   Commonly known as:  NITROSTAT   Used for:  Chest pain        Dose:  0.4 mg   Place 1 tablet (0.4 mg) under the tongue every 5 minutes as needed for chest pain prn   Quantity:  25 tablet   Refills:  1       order for DME   Used for:  Chronic obstructive pulmonary disease with acute exacerbation (H)        Equipment being ordered: Walker Wheels () and Walker () Treatment Diagnosis: Gait instability   Quantity:  1 each   Refills:  0       order for DME   Used for:  Chronic obstructive pulmonary disease with acute exacerbation (H)        Equipment being ordered: Walker Wheels () Treatment Diagnosis: severe COPD   Quantity:  1 Units   Refills:  0       order for DME   Used for:  Chronic obstructive pulmonary disease with acute exacerbation (H)        Equipment being ordered: Walker () Treatment Diagnosis: severe COPD   Quantity:  1 Units   Refills:  0       * QUEtiapine 200 MG tablet    Commonly known as:  SEROquel   Used for:  HUNG (generalized anxiety disorder)        Dose:  200 mg   Take 1 tablet (200 mg) by mouth At Bedtime   Quantity:  90 tablet   Refills:  1       * QUEtiapine 50 MG tablet   Commonly known as:  SEROQUEL   Used for:  Major depressive disorder, recurrent episode, mild (H)        Dose:  50 mg   Take 1 tablet (50 mg) by mouth daily   Quantity:  30 tablet   Refills:  1       STOOL SOFTENER PO        Dose:  100 mg   Take 100 mg by mouth daily   Refills:  0       tiotropium 18 MCG capsule   Commonly known as:  SPIRIVA   Used for:  Generalized anxiety disorder, Chronic obstructive pulmonary disease, unspecified COPD type (H)        Dose:  18 mcg   Inhale 1 capsule (18 mcg) into the lungs daily   Quantity:  90 capsule   Refills:  2       * VENTOLIN  (90 Base) MCG/ACT Inhaler   Used for:  Panlobular emphysema (H)   Generic drug:  albuterol        SHAKE WELL AND INHALE 1 TO 2 PUFFS INTO THE LUNGS EVERY 4 HOURS AS NEEDED FOR SHORTNESS OF BREATH, DIFFICULTY BREATHING OR WHEEZING.   Quantity:  18 g   Refills:  3       * albuterol (2.5 MG/3ML) 0.083% neb solution   Used for:  COPD exacerbation (H)        Dose:  1 vial   Take 1 vial (2.5 mg) by nebulization 4 times daily as needed   Quantity:  120 vial   Refills:  5       Vitamin D3 2000 units Chew   Indication:  prn        Dose:  2000 mg   Take 2,000 mg by mouth daily   Refills:  0       * Notice:  This list has 4 medication(s) that are the same as other medications prescribed for you. Read the directions carefully, and ask your doctor or other care provider to review them with you.      STOP taking     diazepam 10 MG tablet   Commonly known as:  VALIUM           HYDROcodone-acetaminophen 5-325 MG per tablet   Commonly known as:  NORCO                Where to get your medicines      These medications were sent to [facility] [place], [place] - [address] [facility]  [address] [facility] [address]      Phone:  [phone]     budesonide-formoterol 80-4.5 MCG/ACT Inhaler    predniSONE 10 MG tablet                Protect others around you: Learn how to safely use, store and throw away your medicines at www.disposemymeds.org.             Medication List: This is a list of all your medications and when to take them. Check marks below indicate your daily home schedule. Keep this list as a reference.      Medications           Morning Afternoon Evening Bedtime As Needed    ABILIFY 5 MG tablet   Take 5 mg by mouth daily   Last time this was given:  5 mg on 6/11/2018  9:25 AM   Generic drug:  ARIPiprazole                                ASPIRIN PO   Take 81 mg by mouth daily   Last time this was given:  81 mg on 6/11/2018  9:26 AM                                atorvastatin 40 MG tablet   Commonly known as:  LIPITOR   Take 1 tablet (40 mg) by mouth daily   Last time this was given:  40 mg on 6/10/2018  9:02 PM                                budesonide-formoterol 80-4.5 MCG/ACT Inhaler   Commonly known as:  SYMBICORT   Inhale 2 puffs into the lungs 2 times daily                                buPROPion 300 MG 24 hr tablet   Commonly known as:  WELLBUTRIN XL   Take 1 tablet (300 mg) by mouth every morning   Last time this was given:  300 mg on 6/11/2018  9:25 AM                                DULOXETINE HCL PO   Take 90 mg by mouth daily   Last time this was given:  90 mg on 6/11/2018  9:25 AM                                gabapentin 300 MG capsule   Commonly known as:  NEURONTIN   Take 1 capsule (300 mg) by mouth At Bedtime   Last time this was given:  300 mg on 6/10/2018  9:01 PM                                isosorbide mononitrate 30 MG 24 hr tablet   Commonly known as:  IMDUR   Take 1 tablet (30 mg) by mouth daily   Last time this was given:  30 mg on 6/11/2018  9:25 AM                                MELATONIN PO   Take 5 mg by mouth At Bedtime   Last time this was given:  5 mg on 6/10/2018  9:01 PM                                 nitroGLYcerin 0.4 MG sublingual tablet   Commonly known as:  NITROSTAT   Place 1 tablet (0.4 mg) under the tongue every 5 minutes as needed for chest pain prn                                order for DME   Equipment being ordered: Walker Wheels () and Walker () Treatment Diagnosis: Gait instability                                order for DME   Equipment being ordered: Walker Wheels () Treatment Diagnosis: severe COPD                                order for DME   Equipment being ordered: Walker () Treatment Diagnosis: severe COPD                                predniSONE 10 MG tablet   Commonly known as:  DELTASONE   4 tabs daily for 2 days, then 3 tabs daily for 2 days, then 2 tabs daily for 2 days, then 1 tab daily for 2 days, then stop   Last time this was given:  40 mg on 6/11/2018  9:26 AM                                * QUEtiapine 200 MG tablet   Commonly known as:  SEROquel   Take 1 tablet (200 mg) by mouth At Bedtime   Last time this was given:  50 mg on 6/11/2018  9:25 AM                                * QUEtiapine 50 MG tablet   Commonly known as:  SEROQUEL   Take 1 tablet (50 mg) by mouth daily   Last time this was given:  50 mg on 6/11/2018  9:25 AM                                STOOL SOFTENER PO   Take 100 mg by mouth daily   Last time this was given:  100 mg on 6/11/2018  9:26 AM                                tiotropium 18 MCG capsule   Commonly known as:  SPIRIVA   Inhale 1 capsule (18 mcg) into the lungs daily                                * VENTOLIN  (90 Base) MCG/ACT Inhaler   SHAKE WELL AND INHALE 1 TO 2 PUFFS INTO THE LUNGS EVERY 4 HOURS AS NEEDED FOR SHORTNESS OF BREATH, DIFFICULTY BREATHING OR WHEEZING.   Generic drug:  albuterol                                * albuterol (2.5 MG/3ML) 0.083% neb solution   Take 1 vial (2.5 mg) by nebulization 4 times daily as needed   Last time this was given:  10 mg on 6/5/2018 11:07 AM                                 Vitamin D3 2000 units Chew   Take 2,000 mg by mouth daily                                * Notice:  This list has 4 medication(s) that are the same as other medications prescribed for you. Read the directions carefully, and ask your doctor or other care provider to review them with you.              More Information        Discharge Instructions: Diaphragmatic (Controlled) Breathing  When you have lung problems, you may find it harder to take deep breaths. Learning to use controlled breathing can help you get more air into and out of your lungs, which will help you with shortness of breath. Diaphragmatic breathing or belly breathing helps you breathe with your diaphragm. The diaphragm is a large muscle that plays an important part in breathing. It is located below your lungs. It separates your chest from your abdomen (belly).  Home care  Follow these steps to use controlled breathing:    Sit in a comfortable chair or lie on your back with a pillow under your head with your knees bent.    Relax the muscles in your neck and shoulders.    Place one hand on your stomach and place the other hand on your upper chest.    Breathe in slowly through your nose as deeply as you can. Count to 2. As you inhale, your stomach should move out against your hand. Your chest should stay still.    Breathe out slowly with your lips together (called pursed lips) to the count of 4. You should feel your stomach muscles move in.    Repeat the above steps until you feel relaxed or are no longer feeling short of breath.    Follow-up care  Make a follow-up appointment as directed by our staff.     When to call the healthcare provider  Call your healthcare provider right away if you have any of the following:    Shortness of breath that is not relieved by controlled breathing exercises or by your medicine    Wheezing or coughing    Increased mucus    Yellow, green, bloody, or smelly mucus    Fever or chills    Tightness in your chest that  does not go away with your normal medicines    Irregular heartbeat    Swollen ankles    Trouble doing your usual activities   Date Last Reviewed: 5/1/2016 2000-2017 The "[employer]. [address] [place] [address], [place] [address]. All rights reserved. This information is not intended as a substitute for professional medical care. Always follow your healthcare professional's instructions.

## 2018-06-05 NOTE — H&P
[facility]    Hospitalist History and Physical    Name: Karen Alex    MRN: [id number]  YOB: 1955    Age: 63 year old  Date of Admission:  6/5/2018    Assessment & Plan   Karen Alex is a 63 year old female who presented to the [facility] ER with acute SOB.  She has known advanced oxygen requiring COPD.  She has had multiple admissions the past year for respiratory complaints.    Acute on Chronic COPD exacerbation with acute hypercapnic and hypoxic respiratory failure superimposed on chronic respiratory failure:  -  BiPAP PRN at this point, doing much better after initial ER presentation.  VBG still with high CO2 but better.  This patient chronically lives at a very high CO2 with chronic retention. Continue duonebs, solumedrol IV.  Wean O2 back towards home 2.5 liters.  -  Unclear trigger for flare.  May be related to finishing a prednisone taper Friday, possible early bronchitis, also had home oxygen disconnected and O2 sats were very low for an unclear amount of time.  -  Hold spiriva while on duonebs.  Patient reports she recently was to switch to a new inhaled steroid/atrovent product.  We will need to contact her pharmacy tomorrow to clarify this for eventual discharge.      Possible bronchitis contributing to COPD flare:  -  Doxycycline 5 day course    Bipolar/Insomnia:  -  Continue home meds including seroquel    Elevated troponin, suspect demand leak:  -  Received ASA.  Plan to trend troponin.  Add heparin drip overnight.  (discussed with patient and she consented) Check echo in the AM.    -  On imdur, statin.  No beta blocker baseline given respiratory issues.  Given risk of exacerbating resp status with a beta blocker and my reduced suspicion of ACS I will hold off for one now.    Hyperlipidemia:  -  Statin    Vague report of some solid food dysphagia past couple weeks:  -  Denies trouble with soft foods or liquids.  Able to take meds.  I will order a soft diet and ask for a  swallow eval tomorrow.      DVT Prophylaxis: Heparin drip  Code Status: Full Code    Disposition: Expected discharge in 2+ days.  Monitor in ICU overnight given severity of presentation a few hours ago.    Primary Care Physician   Eros Rebolledo    Chief Complaint   SOB    History is obtained from the patient.  I also spoke with the ER provider about the history.     History of Present Illness   Karen Alex is a 63 year old female who presents with SOB.  She has chronic oxygen dependent COPD.  She was at home and awoke this AM very SOB and noted her O2 sat was 50%.  She called EMS and they found low saturations.  They noted her oxygen was disconnected.  It is unclear how long it was disconnected.  The patient was brought to the ER in distress and required a continous albuterol neb and BiPAP.  Her initial venous CO2 was over 100 which is a common presentation with multiple prior respiratory distress episodes.  She improved with BiPAP in the ER and was admitted to the ICU.  She now feels much better.  No chest pain reported.  No recent fevers/chills, increased cough production.  She recently finished a prednisone taper last Friday.    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 Surgeon:  MILADY BASS MD     CONIZATION  10/23/07    Vulvar excision for BOBBY 3, cold knife conization     EXCISE VULVA WIDE LOCAL   5/25/2012    Procedure:EXCISE VULVA WIDE LOCAL; Wide Local Excision Of Vulvar Lesion; Surgeon:RE ALDRIDGE; Location:[facility] OR     FOOT SURGERY Right 01/24/2005    OPERATION: Excision of soft tissue mass right foot. Surgeon:  KHARI DUEÑAS DPM     FOOT SURGERY Right 02/28/2005    OPERATION: Excision of ganglion cyst right foot. Surgeon:  KHARI DUEÑAS DPM     HERNIORRHAPHY INCISIONAL (LOCATION)  4/25/2012    Procedure:HERNIORRHAPHY INCISIONAL (LOCATION); Incisional Hernia Repair with mesh ; Surgeon:KIM QUICK; Location:[facility] OR     HYSTERECTOMY TOTAL ABDOMINAL, BILATERAL SALPINGO-OOPHORECTOMY, COMBINED       LAPAROSCOPIC HYSTERECTOMY TOTAL, BILATERAL SALPINGO-OOPHORECTOMY, COMBINED  3/6/2012    Procedure:COMBINED LAPAROSCOPIC HYSTERECTOMY TOTAL, BILATERAL SALPINGO-OOPHORECTOMY; Total laparoscopic Hysterectomy, Bilateral Salpingo Ooporectomy, Pubovaginal Sling, Biopsy Left Volva; Surgeon:RE ALDRIDGE; Location:[facility] OR     repair of anal fissures  2005     SLING BLADDER SUSPENSION WITH FASCIA ALESHA       SLING TRANSPUBO WITHOUT ANTERIOR COLPORRHAPHY  3/6/2012    Procedure:SLING TRANSPUBO WITHOUT ANTERIOR COLPORRHAPHY; Surgeon:JOLANTA ENRIQUEZ; Location:[facility] OR     TUBAL LIGATION         Prior to Admission Medications   Prior to Admission Medications   Prescriptions Last Dose Informant Patient Reported? Taking?   ARIPiprazole (ABILIFY) 5 MG tablet 6/4/2018 at Unknown time  Yes Yes   Sig: Take 5 mg by mouth daily   ASPIRIN PO 6/4/2018 at Unknown time Self Yes Yes   Sig: Take 81 mg by mouth daily   Cholecalciferol (VITAMIN D3) 2000 UNITS CHEW 6/4/2018 at Unknown time  Yes Yes   Sig: Take 2,000 mg by mouth daily    DULOXETINE HCL PO 6/4/2018 at Unknown time  Yes Yes   Sig: Take 90 mg by mouth daily   Docusate Calcium (STOOL SOFTENER PO) Past Week at Unknown time  Yes Yes   Sig: Take 100 mg by mouth daily    HYDROcodone-acetaminophen (NORCO) 5-325 MG per tablet 6/4/2018 at Unknown time  No Yes   Sig: Take  one pill  daily as needed, not to be taken with diazepam   MELATONIN PO Past Week at Unknown time  Yes Yes   Sig: Take 5 mg by mouth At Bedtime   QUEtiapine (SEROQUEL) 200 MG tablet 6/4/2018 at Unknown time  No Yes   Sig: Take 1 tablet (200 mg) by mouth At Bedtime   QUEtiapine (SEROQUEL) 50 MG tablet 6/4/2018 at Unknown time  No Yes   Sig: Take 1 tablet (50 mg) by mouth daily   VENTOLIN  (90 BASE) MCG/ACT Inhaler 6/5/2018 at Unknown time  No Yes   Sig: SHAKE WELL AND INHALE 1 TO 2 PUFFS INTO THE LUNGS EVERY 4 HOURS AS NEEDED FOR SHORTNESS OF BREATH, DIFFICULTY BREATHING OR WHEEZING.   albuterol (2.5 MG/3ML) 0.083% neb solution 6/4/2018 at Unknown time  No Yes   Sig: Take 1 vial (2.5 mg) by nebulization 4 times daily as needed   atorvastatin (LIPITOR) 40 MG tablet 6/4/2018 at Unknown time  No Yes   Sig: Take 1 tablet (40 mg) by mouth daily   buPROPion (WELLBUTRIN XL) 300 MG 24 hr tablet 6/4/2018 at Unknown time  No Yes   Sig: Take 1 tablet (300 mg) by mouth every morning   diazepam (VALIUM) 10 MG tablet Past Week at Unknown time  No Yes   Sig: Take 1 tablet (10 mg) by mouth every 12 hours as needed for anxiety   gabapentin (NEURONTIN) 300 MG capsule 6/4/2018 at Unknown time  No Yes   Sig: Take 1 capsule (300 mg) by mouth At Bedtime   isosorbide mononitrate (IMDUR) 30 MG 24 hr tablet 6/4/2018 at Unknown time  No Yes   Sig: Take 1 tablet (30 mg) by mouth daily   nitroGLYcerin (NITROSTAT) 0.4 MG sublingual tablet   No No   Sig: Place 1 tablet (0.4 mg) under the tongue every 5 minutes as needed for chest pain prn   order for DME   No No   Sig: Equipment being ordered: Walker Wheels () and Walker ()  Treatment Diagnosis: Gait instability   order for DME   No No   Sig: Equipment being ordered: Walker Wheels ()  Treatment Diagnosis: severe COPD   order for DME   No No   Sig: Equipment being ordered: Walker ()  Treatment Diagnosis: severe COPD   tiotropium (SPIRIVA) 18 MCG capsule 6/4/2018 at  Unknown time  No Yes   Sig: Inhale 1 capsule (18 mcg) into the lungs daily      Facility-Administered Medications: None     Allergies   No Known Allergies    Social History   Social History   Substance Use Topics     Smoking status: Former Smoker     Packs/day: 0.50     Years: 26.00     Types: Cigarettes     Quit date: 7/3/2015     Smokeless tobacco: Never Used      Comment: sometimes     Alcohol use No     Family History   Reviewed, non-contributory.    Review of Systems   A Comprehensive greater than 10 system review of systems was carried out.  Pertinent positives and negatives are noted above.  Otherwise negative for contributory information.    Physical Exam   Temp: 98.9  F (37.2  C) Temp src: Axillary BP: 117/73 Pulse: 107 Heart Rate: 92 Resp: 10 SpO2: 97 % O2 Device: Nasal cannula Oxygen Delivery: 3 LPM  Vital Signs with Ranges  Temp:  [98.6  F (37  C)-98.9  F (37.2  C)] 98.9  F (37.2  C)  Pulse:  [107] 107  Heart Rate:  [] 92  Resp:  [9-36] 10  BP: ()/(42-95) 117/73  FiO2 (%):  [40 %-70 %] 40 %  SpO2:  [76 %-98 %] 97 %  238 lbs 12.13 oz    GEN:  Alert, oriented x 3, appears ill but comfortable, no overt distress.  HEENT:  Normocephalic/atraumatic, no scleral icterus, no nasal discharge, mouth moist.  CV:  Regular rate and rhythm, distant.  No loud murmur or rub.  LUNGS:  Decreased breath sounds mildly with some bilateral wheezing.  No retractions.  Symmetric chest rise on inhalation noted.  ABD:  Active bowel sounds, soft, non-tender/non-distended.  No rebound/guarding/rigidity.  EXT:  Very trace LE edema.  No cyanosis.  No acute joint synovitis noted.  SKIN:  Dry to touch, no exanthems noted in the visualized areas.  NEURO:  Moving extremities well, sensation to touch grossly intact.  No new focal deficits appreciated.    # Pain Assessment:  Current Pain Score 6/5/2018   Patient currently in pain? denies   Pain score (0-10) -   Pain location -   Pain descriptors -   Karen s pain level was  assessed and she currently denies pain.        Data   Data reviewed today:  I personally reviewed the EKG tracing showing sinus, no marked ST elevation/depression.  CXR without an acute infiltrate..      Recent Labs  Lab 06/05/18  1559 06/05/18  1101 06/05/18  0953   PHV 7.29* 7.24* 7.25*   PO2V 64* 33 36   PCO2V 81* 103* 104*   HCO3V 39* 44* 45*       Recent Labs  Lab 06/05/18  0948   WBC 14.0*   HGB 11.8   HCT 41.0             Recent Labs  Lab 06/05/18  0948      POTASSIUM 3.9   CHLORIDE 99   CO2 41*   ANIONGAP 2*   GLC 95   BUN 14   CR 0.77   GFRESTIMATED 76   GFRESTBLACK >90   MAHOGANY 8.5   PROTTOTAL 6.5*   ALBUMIN 3.1*   BILITOTAL 0.5   ALKPHOS 93   AST 18   ALT 22       Recent Labs  Lab 06/05/18  1559 06/05/18  0948   TROPI 0.440* 0.234*       Recent Results (from the past 24 hour(s))   XR Chest Port 1 View    Narrative    XR CHEST PORT 1 VW 6/5/2018 10:14 AM    HISTORY: Short of breath.    COMPARISON: 5/8/2018    FINDINGS: Severe apical emphysema. No new consolidation, effusion or  evidence of acute pneumothorax. Normal heart size.      Impression    IMPRESSION: No acute abnormality.    JOLANTA RAMOS MD

## 2018-06-06 NOTE — PROGRESS NOTES
Patient seen and discussed with Duane Espinoza (Medical Student).  I personally examined the patient.    [facility]  Hospitalist ICU Progress Note  Duane Espinoza 06/06/18    Reason for Stay (Diagnosis): Acute on chronic COPD exacerbation with acute hypercapnic and hypoxic respiratory failure         Assessment and Plan:      Summary of Stay: Karen Alex is a 63 year old female w/a history of recurrent hospitalizations for COPD exacerbations admitted on 6/5/2018 with acute hypoxic and hypercarbic respiratory failure, likely 2/2 COPD exacerbation.    Problem List/Assessment and Plan:     Cardiovascular:  1. Elevated troponin- 0.234 on admission, peaked at 0.458 on night of 6/5; 0.332 this morning; EKG without changes concerning for MI  - Echo pending, doubt ACS.  Likely a demand leak with initial hypoxia.  Continue ASA.  -  Contine imdur, BP reasonable.  -  Heparin drip for now, await echo    Pulmonary:  1.  Acute on chronic COPD exacerbation- on 2.5 L oxygen at home; currently stable on 3 L via nasal cannula, but desaturating into 70s-80s on minimal exertion  - continue supplemental oxygen, weaning to home level as tolerated  - continue IV solumedrol  - continue duonebs q4h  - start albuterol inhaler PRN (patient request)  - hold home COPD meds  - BID glucose checks due to high-dose steroid use    Psych:  1.  Hx of depression with psychotic features, PTSD, anxiety- continue home aripiprazole, quetiapine, wellbutrin, duloxetine    ID:   1. Leukocytosis- WBC elevated to 14.0 on admission; no other signs or symptoms of infection besides increased shortness of breath; leukocytosis may be transient elevation due to corticosteroid administration  - blood cultures negative at 16 hours  -  Empiric doxycycline over concern for bronchitis/possible infectious trigger.  No pneumonia on imaging.    GI:  1: Dysphagia- intermittent difficulty swallowing solids  - No major swallow study problems.   "Regular diet planned.  Consider outpatient GI assessment when breathing better.    ENDO:  Watch glu trend, one near 190 range.  If more consistent high values we can add SSI.  Lines/drains/tubes: PIV  DVT Prophylaxis: heparin gtt  GI Prophylaxis: N/A  Code Status: Full Code  Discharge Dispo/Date: Discharge to prior living situation once respiratory status has improved/stabilized        Interval History (Subjective):      Resting comfortable in bed on approach with NC in place. Quickly becomes short of breath and desaturates in the 70s-80s when moving around in room or walking to bathroom. Denies any changes in symptoms- no productive cough, fevers/chills, myalgias. Tolerating soft diet well, but expresses desire for a cheeseburger.                  Physical Exam:      Last Vital Signs:  BP 97/69  Pulse 107  Temp 98.1  F (36.7  C) (Oral)  Resp 24  Ht 1.702 m (5' 7\")  Wt 108.6 kg (239 lb 6.7 oz)  LMP 12/01/2007  SpO2 94%  BMI 37.5 kg/m2    I/O last 3 completed shifts:  In: 110.57 [I.V.:110.57]  Out: -     General: Alert, awake, no acute distress.  HEENT: Eyes anicteric, extraocular movements grossly intact  Cardiac: RRR, S1, S2.  No murmurs appreciated. Peripheral pulses strong  Pulmonary: Diffuse expiratory wheezing and prolonged expiratory phase; no focal areas of decreased air flow  Abdomen: soft, non-tender, non-distended.  Bowel Sounds Present.  No guarding.  Extremities: no deformities.  Warm, well perfused. No edema  Skin: no rashes or lesions noted.  Warm and Dry.  Psych: Appropriate affect.         Medications:          ARIPiprazole  5 mg Oral Daily     aspirin chewable tablet 81 mg  81 mg Oral Daily     atorvastatin  40 mg Oral QPM     buPROPion  300 mg Oral QAM     docusate sodium  100 mg Oral Daily     doxycycline  100 mg Oral Q12H [facility]     DULoxetine (CYMBALTA) EC capsule 90 mg  90 mg Oral Daily     gabapentin  300 mg Oral At Bedtime     ipratropium - albuterol 0.5 mg/2.5 mg/3 mL  3 mL Nebulization " Q4H While awake     isosorbide mononitrate  30 mg Oral Daily     melatonin tablet 5 mg  5 mg Oral At Bedtime     methylPREDNISolone  40 mg Intravenous Q12H     QUEtiapine  200 mg Oral At Bedtime     QUEtiapine  50 mg Oral Daily              Data:      All new lab and imaging data was reviewed.   Labs:    Recent Labs  Lab 06/05/18  1141 06/05/18  0948   CULT No growth after 14 hours No growth after 17 hours       Recent Labs  Lab 06/06/18  0709 06/05/18  0948    142   POTASSIUM 4.7 3.9   CHLORIDE 102 99   CO2 36* 41*   ANIONGAP 3 2*   * 95   BUN 14 14   CR 0.52 0.77   GFRESTIMATED >90 76   GFRESTBLACK >90 >90   MAHOGANY 7.9* 8.5       Recent Labs  Lab 06/05/18  0948   WBC 14.0*   HGB 11.8   HCT 41.0            Imaging:   Recent Results (from the past 24 hour(s))   XR Chest Port 1 View    Narrative    XR CHEST PORT 1 VW 6/5/2018 10:14 AM    HISTORY: Short of breath.    COMPARISON: 5/8/2018    FINDINGS: Severe apical emphysema. No new consolidation, effusion or  evidence of acute pneumothorax. Normal heart size.      Impression    IMPRESSION: No acute abnormality.    JOLANTA RAMOS MD

## 2018-06-06 NOTE — PROGRESS NOTES
Please see my earlier joint note with the medical student.      Update:  Echo without any major RWMA and good LVEF.  I will stop heparin drip.  No overt ACS noted.  Continue pulmonary treatment.  The troponin mild elevation is likely a demand leak.

## 2018-06-06 NOTE — PLAN OF CARE
Problem: Chronic Obstructive Pulmonary Disease (Adult)  Goal: Signs and Symptoms of Listed Potential Problems Will be Absent, Minimized or Managed (Chronic Obstructive Pulmonary Disease)  Signs and symptoms of listed potential problems will be absent, minimized or managed by discharge/transition of care (reference Chronic Obstructive Pulmonary Disease (Adult) CPG).   Outcome: No Change  ICU End of Shift Summary.  For vital signs and complete assessments, please see documentation flowsheets.     Pertinent assessments:  Lungs with expiratory wheezes.  Continues to get sob with exertion.  Up to commode voiding with assist of one.  Major Shift Events:   Echo done, heparin now discontinued per Dr Santillan order.  Plan (Upcoming Events):  Continue to monitor decrease oxygen needs as able  Discharge/Transfer Needs: discharged to home    Bedside Shift Report Completed :   Bedside Safety Check Completed:

## 2018-06-06 NOTE — PROGRESS NOTES
64 yo female with history of COPD, oxygen requiring at home, admitted to ICU with COPD exacerbation and hypoxemic respiratory failure, supported with BiPAP overnight.  She was treated with doxyycline and duo nebs.  When  I saw the patient this am, she was at her baseline respiratory status and reported no SOB.  On exam not in any acute distress, Lungs with slight wheezed.  At this time, patient has improved and does not require my critical care services, however, I am available to follow if the patient's condition changes.    Lux Aguilera MD  [facility]

## 2018-06-06 NOTE — PLAN OF CARE
Problem: Patient Care Overview  Goal: Plan of Care/Patient Progress Review  Discharge Planner SLP   Patient plan for discharge: none stated  Current status: Clinical swallow eval completed today. Pt presents with mild oropharyngeal dysphagia with suspected ongoing esophageal dysphagia. Pt with baseline increased WOB. Pt tolerated thin liquids, pureed textures, and regular solid textures with no overt s/sx of aspiration. Regular solid textures resulted in mildly prolonged but functional time for mastication. Pts WOB during PO trials remained consistent with baseline WOB. Recommend advance to regular textures and thin liquids. Pt should be fully upright for all PO, take small single sips/bites, slow pacing, and remain upright for 30-60 minutes following PO. Pt would benefit from OP GI consult to further assess pts c/o food sticking at level of mid chest.  Barriers to return to prior living situation: respiratory status  Recommendations for discharge: anticipate pt will meet goals prior to discharge. Consider OP GI consult upon d/c   Rationale for recommendations: pt would benefit from continued ST to assess diet tolerance and train safe swallow strategies       Entered by: Briseida Gomez 06/06/2018 10:07 AM

## 2018-06-06 NOTE — PROGRESS NOTES
Respiratory Therapy    Patient seen resting in bed on nasal cannula 3 LPM, SpO2 93%. Respiratory rate 20 and breath sounds diminished with expiratory wheezes. Patient will continue to receive Duoneb Q4 while awake. RT to follow.    Diana Pedraza  June 6, 2018, 5:06 PM

## 2018-06-06 NOTE — PROGRESS NOTES
"   06/06/18 0944   General Information   Onset Date 06/05/18   Start of Care Date 06/06/18   Referring Physician Matt Arias DO   Patient Profile Review/OT: Additional Occupational Profile Info See Profile for full history and prior level of function   Patient/Family Goals Statement Pt sometimes feels PO stick in level of mid chest   Swallowing Evaluation Bedside swallow evaluation   Behaviorial Observations WFL (within functional limits)   Mode of current nutrition Oral diet   Type of oral diet Thin liquid;Dysphagia diet level 2   Respiratory Status O2 Supply   Type of O2 supply Nasal cannula   Comments Karen Alex is a 63 year old female who presented to the [facility] ER with acute SOB.  She has known advanced oxygen requiring COPD.  She has had multiple admissions the past year for respiratory complaints. Pt known to ST caseload with previous recommendations for regular textures and thin liquids with GERD precautions in 2/2018. Pt presents with similar c/o marked by some PO \"sticking\" at level of mid chest. Pt endorses \"slight improvement\" in swallowing since previous admission. Clinical swallow eval completed per MD orders to further assess oropharyngeal swallow function.    Clinical Swallow Evaluation   Oral Musculature generally intact   Structural Abnormalities none present   Dentition present and adequate   Mucosal Quality adequate   Mandibular Strength and Mobility intact   Oral Labial Strength and Mobility WFL   Lingual Strength and Mobility WFL   Velar Elevation intact   Buccal Strength and Mobility intact   Laryngeal Function Cough;Throat clear;Swallow;Voicing initiated   Oral Musculature Comments baseline increased WOB   Additional Documentation Yes   Clinical Swallow Eval: Thin Liquid Texture Trial   Mode of Presentation, Thin Liquids cup;straw;self-fed   Volume of Liquid or Food Presented 4 oz   Oral Phase of Swallow WFL   Pharyngeal Phase of Swallow intact   Diagnostic Statement pt tolerated thin " liquids via cup and straw with no overt s/sx of aspiration    Clinical Swallow Eval: Puree Solid Texture Trial   Mode of Presentation, Puree spoon;self-fed   Volume of Puree Presented 4 tbsp   Oral Phase, Puree WFL   Pharyngeal Phase, Puree intact   Diagnostic Statement Pt tolerated pureed textures via spoon with no overt s/sx of aspiration    Clinical Swallow Eval: Solid Food Texture Trial   Mode of Presentation, Solid self-fed   Volume of Solid Food Presented 3 tbsp   Oral Phase, Solid other (see comments)  (midly prolonged but functional mastication)   Pharyngeal Phase, Solid intact   Diagnostic Statement Pt tolerated regular solid textures with no overt s/sx of aspiration    VFSS Evaluation   VFSS Additional Documentation No   FEES Evaluation   Additional Documentation No   Swallow Compensations   Swallow Compensations Alternate viscosity of consistencies;Effortful swallow;Pacing;Reduce amounts;Multiple swallow  (GERD precautious)   Results Oral difficulties only   Esophageal Phase of Swallow   Patient reports or presents with symptoms of esophageal dysphagia Yes   Esophageal comments Pt with intermittent c/o PO sticking at level of mid chest   General Therapy Interventions   Planned Therapy Interventions Dysphagia Treatment   Dysphagia treatment Compensatory strategies for swallowing;Instruction of safe swallow strategies   Swallow Eval: Clinical Impressions   Skilled Criteria for Therapy Intervention Skilled criteria met.  Treatment indicated.   Functional Assessment Scale (FAS) 5   Treatment Diagnosis mild oropharyngeal dysphagia; suspected esophageal dysphagia   Diet texture recommendations Regular diet;Thin liquids   Recommended Feeding/Eating Techniques alternate between small bites and sips of food/liquid;check mouth frequently for oral residue/pocketing;hard swallow w/ each bite or sip;maintain upright posture during/after eating for 30 mins;small sips/bites  (GERD precautions)   Demonstrates Need for  Referral to Another Service other (see comments);physical therapy;occupational therapy;respiratory therapy  (OP GI)   Therapy Frequency 3 times/wk   Predicted Duration of Therapy Intervention (days/wks) 1 week   Anticipated Discharge Disposition extended care facility   Risks and Benefits of Treatment have been explained. Yes   Patient, family and/or staff in agreement with Plan of Care Yes   Clinical Impression Comments SLP: Clinical swallow eval completed per MD orders. Pt presents with mild oropharyngeal dysphagia with suspected ongoing esophageal dysphagia. Pt with baseline increased WOB. Pt tolerated thin liquids, pureed textures, and regular solid textures with no overt s/sx of aspiration. Regular solid textures resulted in mildly prolonged but functional time for mastication. Pts WOB during PO trials remained consistent with baseline WOB. Recommend advance to regular textures and thin liquids. Pt should be fully upright for all PO, take small single sips/bites, slow pacing, and remain upright for 30-60 minutes following PO. Pt would benefit from OP GI consult to further assess pts c/o food sticking at level of mid chest. ST to continue to follow for diet tolerance. Anticipate pt will meet goals prior to discharge.    Total Evaluation Time   Total Evaluation Time (Minutes) 8

## 2018-06-06 NOTE — PLAN OF CARE
Problem: Chronic Obstructive Pulmonary Disease (Adult)  Goal: Signs and Symptoms of Listed Potential Problems Will be Absent, Minimized or Managed (Chronic Obstructive Pulmonary Disease)  Signs and symptoms of listed potential problems will be absent, minimized or managed by discharge/transition of care (reference Chronic Obstructive Pulmonary Disease (Adult) CPG).   ICU End of Shift Summary.  For vital signs and complete assessments, please see documentation flowsheets.     Pertinent assessments: Slept most of night. Becomes SOB with minimal exertion ,sats drop onto 70s and takes some time to recover. Heparin gtt titrated per hep 10 level  Major Shift Events:   Plan (Upcoming Events): / tx to floor  Discharge/Transfer Needs: TBD    Bedside Shift Report Completed : YES  Bedside Safety Check Completed:YRS

## 2018-06-06 NOTE — DISCHARGE INSTRUCTIONS
Your hospital follow up appointment has been schedule for you with Dr. Rebolledo at [facility] for Friday 6/15/18 at 11:15am. Please bring your hospital discharge instructions and any new medications with you to your appointment. Please call the clinic at [phone] if you need to reschedule.

## 2018-06-06 NOTE — CONSULTS
Care Transition Initial Assessment - RN    Reason For Consult: care coordination/care conference, discharge planning   Met with: Patient.  DATA   Active Problems:    COPD exacerbation (H)       Cognitive Status: awake, alert and oriented.  Primary Care Clinic Name: [facility]   Primary Care MD Name: Velasquez Rebolledo  Contact information and PCP information verified: Yes  Lives With: alone  Living Arrangements: apartment     Description of Support System: Supportive, Involved   Who is your support system?: Sibling(s), Significant Other   Support Assessment: Adequate family and caregiver support   Insurance concerns: No Insurance issues identified  ASSESSMENT  Patient currently receives the following services:  [facility] RN/PT/OT/SW/HHA        Identified issues/concerns regarding health management: Pt admitted to the hospital with COPD exacerbation. Pt states her oxygen had somehow come disconnected and her o2 sats were 48%. At baseline she is on 2.5 liters of oxygen supplied by  Respiratory. She has the COPD action plan. She uses inhaler and nebs both scheduled and as needed. She feels that things at home are going better now that she is on scheduled nebs. She is independent with mobility in her apartment but uses a walker with any distance mobility. Her boyfriend Emmanuel provides transportation to her doctor appointments.     CM left message with CC RN Korin Collado [phone] informing her of pts admission.    PLAN  Patient anticipates discharging to home .        Patient anticipates needs for home equipment: No  Plan/Disposition: Home   Appointments: PCP hospital follow up Dr. Kesha DELCID [place] Friday 6/15/18 @ 11:15am.      Care  (CTS) will continue to follow as needed.    Elen Josue RN, BSN CTS  Care Coordinator  [phone]

## 2018-06-07 NOTE — PLAN OF CARE
"Problem: Patient Care Overview  Goal: Plan of Care/Patient Progress Review  Discharge Planner SLP   Patient plan for discharge: none stated  Current status: Pt tolerated thin liquids and regular solid textures with no overt s/sx of aspiration. However pt endorses dry textures getting \"stuck\" in throat with need to regurgitate this AM. Recommend downgrade to dysphagia diet 3 and thin liquids. Pt should be fully upright for all PO, avoid dry textures, slow pacing, alternate between consistencies, and remain upright for 1 hour following PO. Continue to recommend consideration of GI consult to further assess esophageal integrity given ongoing c/o PO \"sticking\" in esophagus.   Barriers to return to prior living situation: dysphagia; respiratory status   Recommendations for discharge: ongoing ST upon d/c   Rationale for recommendations: pt would benefit from continued ST to safely return to baseline diet level and train safe swallow strategies       Entered by: Briseida Gomez 06/07/2018 12:11 PM         "

## 2018-06-07 NOTE — PROGRESS NOTES
RT: Received patient on 4L nasal cannula. Patient weaned to home setting of 2.5 L. SPO2 95% +. BBS diminished with scattered wheezes. Pt very dyspneic with any activity, otherwise breathing has been unlabored. Duoneb given as scheduled. Will continue to follow and assess.

## 2018-06-07 NOTE — PROGRESS NOTES
Clinic Care Coordination Contact    Clinic Care Coordination Contact  OUTREACH    Referral Information:  Referral Source: IP Report    Primary Diagnosis: COPD    Chief Complaint   Patient presents with     Clinic Care Coordination - Follow-up     phone call from patient - CCRN       [place] Utilization:   Clinic Utilization  Difficulty keeping appointments:: No  Utilization    Last refreshed: 6/7/2018  3:18 PM:  No Show Count (past year) 1       Last refreshed: 6/7/2018  3:18 PM:  ED visits 2       Last refreshed: 6/7/2018  3:18 PM:  Hospital admissions 5          Current as of: 6/7/2018  3:18 PM           Clinical Concerns:  Current Medical Concerns:  Phone call from patient - she is currently in the ICU at the hospital   Patient wanted to tell CCRN that she was in the hospital and that her oxygen was unplugged and that is why her oxygen level was so low. CCRN reminded patient that CCRN had helped direct her to 911 prior to her hospitalization and patient wanted to thank CCRN for her help. CCRN advised patient that next time she has an issue with her oxygen being so low to call 911 right away and not CCRN as they can help her quicker that way.   CCRN advised patient that she would continue to monitor for discharge and call her when she gets home. CCRN advised that we will have to see if there is a way to prevent her oxygen tubing from coming off so easily.     Patient sounds very short of breath and tired. She reports that her chest is very tight.     CCRN has also spoken to Sakshi Farooq about plan of care and how patient has been doing     Current Behavioral Concerns:   PHQ-9 SCORE 4/17/2018 5/15/2018 6/4/2018   Total Score - - -   Total Score MyChart - - -   Total Score 19 20 24       Education Provided to patient: CCRN will call patient when she discharges from the hospital      Health Maintenance Reviewed:    Health Maintenance Due   Topic Date Due     URINE DRUG SCREEN Q1 YR  05/18/1972     HIV  SCREEN (SYSTEM ASSIGNED)  05/18/1975     LIPID MONITORING Q6 MO  11/15/2017     DEXA Q3 YR  01/29/2018     Clinical Pathway: None COPD completed     Medication Management:  Inpatient currently     Functional Status:  Dependent ADLs:: Ambulation-walker  Mobility Status: Independent w/Device    Living Situation:  Current living arrangement:: I live in a private home with family, I live in a private home, I live alone (in 3rd floor apartment - sister visits often she lives in [place] - sister does not drive but will stay with patient when she is not doig well )  Type of residence:: Apartment    Diet/Exercise/Sleep:  Diet:: Regular  Inadequate nutrition (GOAL):: No  Food Insecurity: No  Tube Feeding: No  Exercise:: Currently not exercising (walking [place] apartment complex )  Inadequate activity/exercise (GOAL):: Yes  Significant changes in sleep pattern (GOAL): No    Transportation:  Transportation concerns (GOAL):: No  Transportation means:: Regular car (patient drives at baseline)     Psychosocial:  Mental health DX:: Yes  Mental health DX how managed:: Medication  Mental health management concern (GOAL):: No  Informal Support system:: Family     Financial/Insurance:   Financial/Insurance concerns (GOAL):: Yes     Resources and Interventions:  Current Resources:   List of home care services:: Skilled Nursing, Physicial Therapy, Occupational Therapy;      Supplies used at home:: Oxygen Tubing/Supplies (3.5 LPM inogen )  Equipment Currently Used at Home: oxygen, raised toilet, walker, rolling (4 LPM - [facility] )    Advance Care Plan/Directive  Advanced Care Plans/Directives on file:: No  Advanced Care Plan/Directive Status: Not Applicable          Goals:   Goals        General    Financial Wellbeing (pt-stated)     Notes - Note created  4/13/2018 11:43 AM by Stacey Aguayo LGSW    Goal Statement: I will apply for [facility] assistance  Measure of Success: evaluation for extra help, medicare savings  program, and a [place]  Supportive Steps to Achieve: I will sign the release for the  to speak to [facility]  Barriers: Unsure about finances  Strengths: motivated for help  Date to Achieve By: TBD          Lifestyle    Increase physical activity     Notes - Note edited  5/31/2018  2:56 PM by Korin Wang RN    Goal Statement: I will walk 1/2 down apartment hallway 3 times per week - increasing as tolerated to full hallway and more days per week   Measure of Success: patient able to meet goal of 3 times per week   Supportive Steps to Achieve: CCRN encouragement - support by weekly calls   Barriers: COPD   Strengths: wanting to increase endurance   Date to Achieve By: 6/30/18  As of today's date 5/31/2018 goal is met at 0 - 25%.   Goal Status:  Active - has not been able to work on goal due to hospitalizations, illness                     Patient/Caregiver understanding: yes     Outreach Frequency: 2 weeks  Future Appointments              Today RH PT OVERFLOW [facility]    Tomorrow Briseida Gomez, SLP [facility]    Tomorrow RH PT OVERFLOW [facility]    In 1 week Eros Rebolledo MD [facility], [facility]    In 2 weeks Lupis Mcgee LICSW [facility], [facility]          Plan:   CCRN advised patient that the inpatient team will take good care of her and CCRN will call patient when she discharges   CCRN placed call to Sakshi PINO RN CTS team at [facility] - she will discuss palliative care consult with provider   CCRN will reach out when patient d/c     Korin Wang Care Coordinator RN  [facility]  [phone]  June 7, 2018

## 2018-06-07 NOTE — PROGRESS NOTES
[facility]  Hospitalist ICU Progress Note  Duane Espinoza 06/06/18    Hospitalist:  Patient with acute on chronic chronic obstructive pulmonary disease exacerbation on home oxygen for past 4.5 years.  Hypercapneic and hypoxic on presentation.  Is doing better and oxygen now at baseline.  Still with some short of breath and able to speak in full sentences with some short of breath.  At home can walk 40 feet and then has to stop.  Elevated troponin consistent with demand ischemia.  No chest pain or fever.    Acute on chronic chronic obstructive pulmonary disease exacerbation with hypercapneic/hypoxic respiratory failure.    Continue oxygen, nebs, steroids.    mucinex    Consider change to prednisone in am if doing better.     ok transfer to floor today.  Likely will need at least 2 more days in hospital  Increase activity.      Colt Miles       Reason for Stay (Diagnosis): Acute on chronic COPD exacerbation with acute hypercapnic and hypoxic respiratory failure         Assessment and Plan:      Summary of Stay: Karen Alex is a 63 year old female w/a history of recurrent hospitalizations for COPD exacerbations admitted on 6/5/2018 with acute hypoxic and hypercarbic respiratory failure, likely 2/2 COPD exacerbation.    Problem List/Assessment and Plan:     Cardiovascular:  1. Elevated troponin- Troponin was 0.234 on admission, peaked at 0.458 on night of 6/5. The patient did not have classic symptoms of ACS, and the EKG was without changes concerning for MI. Then trended down to 0.332 on 6/6. An echo revealed mild to moderate pulmonary hypertension, but no findings suggestive of ischemia. Heparin drip was stopped. Likely a demand leak with initial hypoxia.  Continue ASA.    2. Hx of hypertension  -  Contine imdur, BP reasonable.    Pulmonary:  1.  Acute on chronic COPD exacerbation-  Currently stable on home oxygen supplementation (2.5 L). Continues to get short of breath with  exertion  - continue supplemental oxygen  - continue IV solumedrol  - continue duonebs q4h  - start albuterol inhaler PRN (patient request)  - hold home COPD meds  - BID glucose checks due to high-dose steroid use    Psych:  1.  Hx of depression with psychotic features, PTSD, anxiety- continue home aripiprazole, quetiapine, wellbutrin, duloxetine    ID:   1. Leukocytosis- WBC elevated to 14.0 on admission. No other signs or symptoms of infection besides increased shortness of breath. No pneumonia on CXR. Leukocytosis may be transient elevation due to corticosteroid administration  - blood cultures negative at 2 days  -  Empiric doxycycline over concern for bronchitis/possible infectious trigger.  No pneumonia on imaging.    GI:  1. Dysphagia- intermittent difficulty swallowing solids. Swallow evaluation with no major problems. On normal diet. Brief episode this morning of food getting stuck- now resolved.  - Consider outpatient GI assessment when breathing better.    ENDO:  Watch glu trend, one near 190 range.  Has consistently been in mid 100s. If more consistent high values we can add SSI.  Lines/drains/tubes: PIV  DVT Prophylaxis: heparin gtt  GI Prophylaxis: N/A  Code Status: Full Code  Discharge Dispo/Date: Discharge to prior living situation once respiratory status has improved/stabilized        Interval History (Subjective):      The patient was in moderate distress on approach this morning. She states that she had just finished breakfast and a piece of childers had gotten stuck in her esophagus on the way down. Denied difficulty breathing, but was intermittently coughing up sputum that was unable to pass the obstruction. The obstruction passed with sips of water, and the patient was once again comfortable. States that her regular diet has otherwise been going well. Breathing fine on 3L NC, but still gets short of breath with exertion. Denies fevers, chills, N/V, myalgias.                  Physical Exam:     "  Last Vital Signs:  /83  Pulse 107  Temp 97.9  F (36.6  C) (Oral)  Resp 11  Ht 1.702 m (5' 7\")  Wt 110.4 kg (243 lb 6.2 oz)  LMP 12/01/2007  SpO2 97%  BMI 38.12 kg/m2    I/O last 3 completed shifts:  In: 1088 [P.O.:1000; I.V.:88]  Out: -     General: Alert, awake, no acute distress.  HEENT: Eyes anicteric, extraocular movements grossly intact  Cardiac: RRR, S1, S2.  No murmurs appreciated. Peripheral pulses strong  Pulmonary: Mild expiratory wheezing and prolonged expiratory phase; no focal areas of decreased air flow  Abdomen: soft, non-tender, non-distended.  Bowel Sounds Present.  No guarding.  Extremities: no deformities.  Warm, well perfused. No edema  Skin: no rashes or lesions noted.  Warm and Dry.  Psych: Appropriate affect.         Medications:          ARIPiprazole  5 mg Oral Daily     aspirin chewable tablet 81 mg  81 mg Oral Daily     atorvastatin  40 mg Oral QPM     buPROPion  300 mg Oral QAM     docusate sodium  100 mg Oral Daily     doxycycline  100 mg Oral Q12H [facility]     DULoxetine (CYMBALTA) EC capsule 90 mg  90 mg Oral Daily     gabapentin  300 mg Oral At Bedtime     ipratropium - albuterol 0.5 mg/2.5 mg/3 mL  3 mL Nebulization Q4H     isosorbide mononitrate  30 mg Oral Daily     melatonin tablet 5 mg  5 mg Oral At Bedtime     methylPREDNISolone  40 mg Intravenous Q12H     QUEtiapine  200 mg Oral At Bedtime     QUEtiapine  50 mg Oral Daily              Data:      All new lab and imaging data was reviewed.   Labs:    Recent Labs  Lab 06/05/18  1141 06/05/18  0948   CULT No growth after 2 days No growth after 2 days           "

## 2018-06-07 NOTE — PLAN OF CARE
"Problem: Patient Care Overview  Goal: Plan of Care/Patient Progress Review  PT: Orders received for lymph wraps.  Per chart, pt dyspneic with \"any activity\"; at this time, lymph wraps not appropriate until breathing status improves.  Will hold today and check on respiratory status/appropriateness for wrapping tomorrow (6/8).      "

## 2018-06-07 NOTE — PLAN OF CARE
Problem: Patient Care Overview  Goal: Plan of Care/Patient Progress Review  Outcome: No Change  ICU End of Shift Summary.  For vital signs and complete assessments, please see documentation flowsheets.     Pertinent assessments: A&O x4. VSS. LS dim with exp wheezes on 3 LPM per NC. Tele SR.   Major Shift Events: Dyspenic with activity up to bedside commode requiring several minutes recovery sitting on side of bed.   Plan (Upcoming Events): Wean O2 to home dose as able.   Discharge/Transfer Needs: TBD    Bedside Shift Report Completed :   Bedside Safety Check Completed:

## 2018-06-07 NOTE — PLAN OF CARE
Problem: Chronic Respiratory Difficulty Comorbidity  Goal: Chronic Respiratory Difficulty  Patient comorbidity will be monitored for signs and symptoms of Respiratory Difficulty (Chronic) condition.  Problems will be absent, minimized or managed by discharge/transition of care.   Outcome: Improving  ICU End of Shift Summary.  For vital signs and complete assessments, please see documentation flowsheets.     Pertinent assessments:  GARCIA. Back to baseline oxygen needs. Pt states she is feeling much better. Having trouble swallowing some foods. Speech saw again today. Diet changed to DD3.   Major Shift Events:  None. Pt up in the chair today.  Very GARCIA.    Plan (Upcoming Events):  Cont steroids, nebs and abx. Increase activity as able.  Discharge/Transfer Needs:  Pt to possibly go home on Saturday.  No needs at home.     Bedside Shift Report Completed :  yes  Bedside Safety Check Completed: yes

## 2018-06-08 NOTE — PLAN OF CARE
Problem: Patient Care Overview  Goal: Plan of Care/Patient Progress Review  Outcome: Improving  ICU End of Shift Summary.  For vital signs and complete assessments, please see documentation flowsheets.     Pertinent assessments: Pt slept well throughout shift. Back to baseline O2 needs but still remains more dyspneic on exertion than at home. Satting mid to high 90s at rest, but does drop with any movement out of bed. Bedside fan placed on patient when OOB, which patient said helped. Up with assist of 1 to BSC. Denies pain. +2 edema to ankles, but otherwise feels like LEs are at baseline.  Major Shift Events: None  Plan (Upcoming Events): Continue nebs/steroids/Mucinex and Doxycycline with possible switch to PO steroids today. PT to follow for lymphedema wraps. SP following for mechanically altered diet.   Discharge/Transfer Needs: Back home when stable with continued home care.    Bedside Shift Report Completed  Bedside Safety Check Completed          Problem: Chronic Obstructive Pulmonary Disease (Adult)  Goal: Signs and Symptoms of Listed Potential Problems Will be Absent, Minimized or Managed (Chronic Obstructive Pulmonary Disease)  Signs and symptoms of listed potential problems will be absent, minimized or managed by discharge/transition of care (reference Chronic Obstructive Pulmonary Disease (Adult) CPG).   Outcome: Improving  Pt states her SOB has improved and she feels closer to baseline.    Problem: Chronic Respiratory Difficulty Comorbidity  Goal: Chronic Respiratory Difficulty  Patient comorbidity will be monitored for signs and symptoms of Respiratory Difficulty (Chronic) condition.  Problems will be absent, minimized or managed by discharge/transition of care.   Outcome: Improving  Feels like her SOB is close to baseline.    Problem: Mood Alteration Comorbidity  Goal: Mood Alteration Comorbidity  Patient comorbidity will be monitored for signs and symptoms of Mood Alteration condition.  Problems will  be absent, minimized or managed by discharge/transition of care.   Outcome: Improving  Pt feels at baseline.

## 2018-06-08 NOTE — PLAN OF CARE
Problem: Chronic Obstructive Pulmonary Disease (Adult)  Goal: Signs and Symptoms of Listed Potential Problems Will be Absent, Minimized or Managed (Chronic Obstructive Pulmonary Disease)  Signs and symptoms of listed potential problems will be absent, minimized or managed by discharge/transition of care (reference Chronic Obstructive Pulmonary Disease (Adult) CPG).   Outcome: Improving  ICU End of Shift Summary.  For vital signs and complete assessments, please see documentation flowsheets.     Pertinent assessments: pt with decreased breath sounds and occasional wheezes.  Gets porras especially when getting up to commode.  Otherwise doing well.  Major Shift Events:  Neto hose placed on pt at this time.  Plan (Upcoming Events):  Pt now is a medical overflow can be transferred to the floor  Discharge/Transfer Needs: pt to go back home once medically stable    Bedside Shift Report Completed : y  Bedside Safety Check Completed: y

## 2018-06-08 NOTE — PLAN OF CARE
Problem: Patient Care Overview  Goal: Plan of Care/Patient Progress Review    PT: Physical therapist met with patient regarding lymphedema orders.  Patient reports that she has just started lymphedema wraps 2 weeks ago.  Patient has nursing that comes 2x per week that completes the lymphedema wraps (sister is back up for donning wraps).  Upon assessment, patient presents with 2+ edema in bilateral dorsum of feet and medial ankles, no pitting edema present in calves.  Patient presented with a constricted line around bilateral ankles from grippy socks; socks removed.  Patient stated that LEs actually look pretty good currently. Discussed options for lymphedema wrapping while inpatient; options are 1. To have wraps brought from home and have sister don wraps, 2. Have inpatient therapist don quick wraps (less complex than OP wraps), 3. Trial Neto Hose for gentle compression of feet and calves.  At this time, I recommend trial of Neto hose for gentle compression as patient's edema is mild.  Recommend removing grippy socks whenever patient is not standing/ambulating to avoid constriction around ankles.  Physical therapy will check back with patient tomorrow to reassess edema.  If patient does not tolerate or finds the Neto hose uncomfortable, please remove prior to PT follow up.  If edema increases, will wrap patient tomorrow.

## 2018-06-08 NOTE — PROGRESS NOTES
[facility]  Hospitalist ICU Progress Note  Duane Espinoza 06/08/18    Hospitalist:  Patient with acute on chronic chronic obstructive pulmonary disease exacerbation on home oxygen for past 4.5 years.  Hypercapneic and hypoxic on presentation.  Is doing better and oxygen now at baseline.  Still with some short of breath and able to speak in full sentences with some short of breath.  At home can walk 40 feet and then has to stop.  Elevated troponin consistent with demand ischemia.  No chest pain or fever.  Some dysphagia and on DD3     Acute on chronic chronic obstructive pulmonary disease exacerbation with hypercapneic/hypoxic respiratory failure.      Continue oxygen, nebs, steroids (change to prednisone    mucinex    Increase activity     ok transfer to floor today.  Likely will need at least 2 more days in hospital, if doing very well and closer to baseline maybe tomorrow afternoon.       Colt Miles       Reason for Stay (Diagnosis): Acute on chronic COPD exacerbation with acute hypercapnic and hypoxic respiratory failure.         Assessment and Plan:      Summary of Stay: Karen Alex is a 63 year old female w/a history of recurrent hospitalizations for COPD exacerbations admitted on 6/5/2018 with acute hypoxic and hypercarbic respiratory failure, likely 2/2 COPD exacerbation. At this time, the patient's respiratory status has improved to the point that she's suitable for transfer to the general medical unit.    Problem List/Assessment and Plan:     Cardiovascular:  1. Elevated troponin- Troponin was 0.234 on admission, peaked at 0.458 on night of 6/5. The patient did not have classic symptoms of ACS, and the EKG was without changes concerning for MI. Then trended down to 0.332 on 6/6. An echo revealed mild to moderate pulmonary hypertension, but no findings suggestive of ischemia. Heparin drip was stopped. Likely a demand leak with initial hypoxia.  Continue ASA.    2. Hx of  hypertension  -  Contine imdur, BP reasonable.    Pulmonary:  1.  Acute on chronic COPD exacerbation-  Currently stable on home oxygen supplementation (2.5 L). Continues to get short of breath with exertion  - continue supplemental oxygen. States that she's only able to walk about 40 feet at home without becoming short of breath.  - start prednisone 40 mg daily- recommend 14 day course of high-dose steroid due to chronic steroid use and frequent COPD exacerbations- has received 4 days thus far  - d/c IV solumedrol  - continue duonebs q4h  - continue albuterol inhaler  - hold home COPD meds  - BID glucose checks due to high-dose steroid use- has been adequate    Psych:  1.  Hx of depression with psychotic features, PTSD, anxiety- continue home aripiprazole, quetiapine, wellbutrin, duloxetine    ID:   1. Leukocytosis- WBC elevated to 14.0 on admission. No other signs or symptoms of infection besides increased shortness of breath. No pneumonia on CXR. Leukocytosis may have be transient elevation due to corticosteroid administration  - blood cultures negative at 3 days  -  Empiric doxycycline over concern for bronchitis/possible infectious trigger.      GI:  1. Dysphagia- intermittent difficulty swallowing solids. Swallow evaluation with no major problems. Had a brief, 15 minute episode of food getting stuck in her distal esophagus while on normal diet.  - Switch to dysphagia diet level 3- advanced thin liquids  - Consider outpatient GI assessment when breathing better.    ENDO:  High-dose steroid use- continuing to watch BG trend. Had one reading near 190 range, but has otherwise been in the mid-100s.  Lines/drains/tubes: PIV  DVT Prophylaxis: Not indicated- patient ambulatory  GI Prophylaxis: N/A  Code Status: Full Code  Discharge Dispo/Date: Discharge to prior living situation once respiratory status has improved/stabilized        Interval History (Subjective):      The patient is awake and sitting up watching TV on  "approach. Slept well overnight and had no further swallowing events. Continuing to become short of breath on minimal exertion, but states that she feels as though she's approaching her baseline respiratory status.                  Physical Exam:      Last Vital Signs:  /84  Pulse 107  Temp 97.2  F (36.2  C) (Oral)  Resp 14  Ht 1.702 m (5' 7\")  Wt 109.4 kg (241 lb 2.9 oz)  LMP 12/01/2007  SpO2 97%  BMI 37.77 kg/m2    I/O last 3 completed shifts:  In: 870 [P.O.:870]  Out: -     General: Alert, awake, no acute distress.  HEENT: Eyes anicteric, extraocular movements grossly intact  Cardiac: RRR, S1, S2.  No murmurs appreciated. Peripheral pulses strong  Pulmonary: Mild expiratory wheezing and prolonged expiratory phase; no focal areas of decreased air flow  Abdomen: soft, non-tender, non-distended.  Bowel Sounds Present.  No guarding.  Extremities: no deformities.  Warm, well perfused. Slight edema bilaterally in medial ankles with no tenderness or redness.  Skin: no rashes or lesions noted.  Warm and Dry.  Psych: Appropriate affect.         Medications:          ARIPiprazole  5 mg Oral Daily     aspirin chewable tablet 81 mg  81 mg Oral Daily     atorvastatin  40 mg Oral QPM     buPROPion  300 mg Oral QAM     docusate sodium  100 mg Oral Daily     doxycycline  100 mg Oral Q12H [facility]     DULoxetine (CYMBALTA) EC capsule 90 mg  90 mg Oral Daily     gabapentin  300 mg Oral At Bedtime     guaiFENesin  600 mg Oral BID     ipratropium - albuterol 0.5 mg/2.5 mg/3 mL  3 mL Nebulization Q4H     isosorbide mononitrate  30 mg Oral Daily     melatonin tablet 5 mg  5 mg Oral At Bedtime     methylPREDNISolone  40 mg Intravenous Q12H     QUEtiapine  200 mg Oral At Bedtime     QUEtiapine  50 mg Oral Daily              Data:      All new lab and imaging data was reviewed.   Labs:    Recent Labs  Lab 06/05/18  1141 06/05/18  0948   CULT No growth after 3 days No growth after 3 days           "

## 2018-06-08 NOTE — PLAN OF CARE
Problem: Patient Care Overview  Goal: Plan of Care/Patient Progress Review  Discharge Planner SLP   Patient plan for discharge: not stated  Current status: Pt sitting up in bed reports that she is tolerating DD3 diet with thin liquids and not feeling like she is choking on food in her esophagus.  Pt able to take bites of DD3 food and alternate with sips of thin liquid without overt s/s aspiration, no respiratory issues and no sensation of food sticking in her throat. Pt needs a few cues to remember to alternate textures.  Recommend:  Continue with DD3/thin diet alternating food and liquids, moist foods at slow rate and small bites and sips.  Upright fully all oral intake and for 60 minutes after.  Recommend also GI consult for pt c/o food sticking in her esophagus with regular texture diet.  SLP to follow.  Barriers to return to prior living situation: respiratory, dysphagia  Recommendations for discharge: Ongoing SLP services  Rationale for recommendations: SLP services to safely return to baseline diet level and train on safe swallow strategies       Entered by: Yonathan Teresa 06/08/2018 10:00 AM

## 2018-06-09 NOTE — PLAN OF CARE
Problem: Patient Care Overview  Goal: Plan of Care/Patient Progress Review  Outcome: No Change  ICU End of Shift Summary.  For vital signs and complete assessments, please see documentation flowsheets.     Pertinent assessments: Alert/oriented x4. Slept all night. Vital signs stable. Very pleasant. Lung sounds diminished throughout. Dyspneic on exertion. On 2.5 LPM via NC. Tele is SR. Abdomen is soft, non-tender. Urinating without difficulty. Skin intact with scattered bruising.   Major Shift Events: None, slept well.   Plan (Upcoming Events): Nebs, steroids, mucinex, increase activity.    Discharge/Transfer Needs: Medical overflow.     Bedside Shift Report Completed :   Bedside Safety Check Completed:

## 2018-06-09 NOTE — PLAN OF CARE
Problem: Patient Care Overview  Goal: Plan of Care/Patient Progress Review  LYMPH: Discussed with pt, plan is to continue with sudhir todd in hospital and then return to wrapping with sister assist once returns home. Pt agreeable. Order completed.

## 2018-06-09 NOTE — PROGRESS NOTES
[facility]  Hospitalist ICU Progress Note  Duane Espinoza 06/09/18      Hospitalist:  Seen and examined with medical student  Patient with acute on chronic chronic obstructive pulmonary disease exacerbation on home oxygen for past 4.5 years.  Hypercapneic and hypoxic on presentation.  Is doing better and oxygen now at baseline.  Still with some short of breath and able to speak in full sentences with some short of breath.  At home can walk 40 feet and then has to stop.  Elevated troponin consistent with demand ischemia.  No chest pain or fever.  Some dysphagia and on DD3.  Doing better with less short of breath.        Acute on chronic chronic obstructive pulmonary disease exacerbation with hypercapneic/hypoxic respiratory failure.      Continue oxygen, nebs, steroids (change to prednisone    mucinex    Increase activity today, therapy to see.  If unable to manage at home may need placement      Likely will need at least  more day in hospital, if doing very well and closer to baseline maybe tomorrow afternoon.  Sister can stay with her.         Colt Miles       Reason for Stay (Diagnosis): Acute on chronic COPD exacerbation with acute hypercapnic and hypoxic respiratory failure.         Assessment and Plan:      Summary of Stay: Karen Alex is a 63 year old female w/a history of recurrent hospitalizations for COPD exacerbations admitted on 6/5/2018 with acute hypoxic and hypercarbic respiratory failure, likely due to COPD exacerbation. She's been slowly improving day by day with treatment and is now back on her 2.5 L of home oxygen supplementation. She's also been having some dysphagia with solid foods and is on a dysphagia diet, with plans for outpatient GI follow-up. Likely will be suitable for discharge home in the next few days.    Problem List/Assessment and Plan:     Cardiovascular:  1. Elevated troponin- Troponin was 0.234 on admission, peaked at 0.458 on night of 6/5. The  patient did not have classic symptoms of ACS, and the EKG was without changes concerning for MI. Then trended down to 0.332 on 6/6. An echo revealed mild to moderate pulmonary hypertension, but no findings suggestive of ischemia. Heparin drip was stopped. Likely a demand leak with initial hypoxia.  Continue ASA.    2. Hx of hypertension  -  Contine imdur, BP reasonable.    Pulmonary:  1.  Acute on chronic COPD exacerbation-  Presented with hypoxic, hypercarbic respiratory failure. Has had numerous hospitalizations in the past year for COPD exacerbations with frequent steroid bursts. Currently stable on home oxygen supplementation (2.5 L). Continues to get short of breath with exertion  - continue supplemental oxygen. States that she's only able to walk about 40 feet at home without becoming short of breath.  - continue prednisone 60 mg daily- has received 5 days of high dose steroids so far; recommend prolonged taper due to frequent and prolonged steroid use  - continue duonebs q4h  - continue albuterol inhaler  - hold home COPD meds  - BID glucose checks due to high-dose steroid use- has not required insulin    Psych:  1.  Hx of depression with psychotic features, PTSD, anxiety- continue home aripiprazole, quetiapine, wellbutrin, duloxetine    ID:   1. Leukocytosis- WBC elevated to 14.0 on admission. No other signs or symptoms of infection besides increased shortness of breath. No pneumonia on CXR. Leukocytosis may have be transient elevation due to corticosteroid administration  - blood cultures negative at 4 days  - continue empiric doxycycline over concern for bronchitis/possible infectious trigger- day 5    GI:  1. Dysphagia- intermittent difficulty swallowing solids. Swallow evaluation with no major problems. Had a brief, 15 minute episode of food getting stuck in her distal esophagus while on normal diet.  - Continue dysphagia diet level 3- advanced thin liquids  - Recommend outpatient GI assessment when  "breathing better.    ENDO:  High-dose steroid use- continuing to watch BG trend. Had one reading near 190 range, but has otherwise been in the mid-100s.    MSK:  1.  Lower extremity edema- Patient reports that she began using lymphedema wraps 2 weeks ago. Performed by home nursing or patients daughter. Patient does have mild bilateral pitting edema in medial ankles and dorsum of foot, which was not present on admission. Unclear etiology. Echo without evidence of right or left ventricular dysfunction. PT evaluated the patient and recommended trial of Neto Hose for compression  - PT following, appreciate recs  Lines/drains/tubes: PIV  DVT Prophylaxis: Not indicated- patient ambulatory  GI Prophylaxis: N/A  Code Status: Full Code  Discharge Dispo/Date: Discharge to prior living situation once respiratory status has improved/stabilized        Interval History (Subjective):      The patient is sleeping comfortably on approach. Pleasant and interactive. Feels that she is slowly nearing her baseline respiratory status. Eager to go home. Denies fevers/chills, nausea, vomiting, dysphagia, chest pain, constipation.                  Physical Exam:      Last Vital Signs:  /76 (BP Location: Right arm)  Pulse 107  Temp 98.1  F (36.7  C) (Oral)  Resp 17  Ht 1.702 m (5' 7\")  Wt 110.1 kg (242 lb 11.6 oz)  LMP 12/01/2007  SpO2 98%  BMI 38.02 kg/m2    I/O last 3 completed shifts:  In: 1740 [P.O.:1740]  Out: 550 [Urine:550]    General: Alert, awake, no acute distress.  HEENT: Eyes anicteric, extraocular movements grossly intact  Cardiac: RRR, S1, S2.  No murmurs appreciated. Peripheral pulses strong  Pulmonary: Decreased airflow throughout lung fields; no crackles, wheezes, or rales; moderately prolonged expiration  Abdomen: soft, non-tender, non-distended.  Bowel Sounds Present.  No guarding.  Extremities: no deformities.  Warm, well perfused. Mild edema noted bilaterally through Neto Hose. No tenderness to palpation. " Edema absent in remainder of lower extremities  Skin: no rashes or lesions noted.  Warm and Dry.  Psych: Appropriate affect.         Medications:          ARIPiprazole  5 mg Oral Daily     aspirin chewable tablet 81 mg  81 mg Oral Daily     atorvastatin  40 mg Oral QPM     buPROPion  300 mg Oral QAM     docusate sodium  100 mg Oral Daily     doxycycline  100 mg Oral Q12H [facility]     DULoxetine (CYMBALTA) EC capsule 90 mg  90 mg Oral Daily     gabapentin  300 mg Oral At Bedtime     guaiFENesin  600 mg Oral BID     ipratropium - albuterol 0.5 mg/2.5 mg/3 mL  3 mL Nebulization Q4H     isosorbide mononitrate  30 mg Oral Daily     melatonin tablet 5 mg  5 mg Oral At Bedtime     predniSONE  60 mg Oral Daily     QUEtiapine  200 mg Oral At Bedtime     QUEtiapine  50 mg Oral Daily              Data:      All new lab and imaging data was reviewed.   Labs:    Recent Labs  Lab 06/05/18  1141 06/05/18  0948   CULT No growth after 4 days No growth after 4 days

## 2018-06-09 NOTE — PLAN OF CARE
Problem: Patient Care Overview  Goal: Plan of Care/Patient Progress Review  Discharge Planner SLP   Patient plan for discharge: home  Current status: Pt tolerating DD3 diet with thin liquids without overt s/s aspiration.  Pt reports that she is comfortable on current diet and she should continue on this diet at discharge.  Discussed MD plan (in notes 6/8/18/) for OP GI assessment when breathing is better to evaluate esophageal issues. Pt in agreement with this plan.  Educated patient on safe swallow strategies and reflux precautions.  Encouraged slow intake of small moist bites of food with alternation of liquids.  Pt at highest level of diet she can safely tolerate prior to GI assessment for possible return to regular diet using safe swallow strategies and reflux precautions.  SLP to sign off at this time.  Barriers to return to prior living situation: none  Recommendations for discharge: OP GI assessment when breathing is better to assess esophagus and possible return to regular texture diet.  Rationale for recommendations: Tolerating DD3 diet with choking and no overt s/s aspiration.  Pt to continue with DD3 and thin liquids until evaluation and MD makes other recommendations.       Entered by: Yonathan Teresa 06/09/2018 8:10 AM   Speech Language Therapy Discharge Summary    Reason for therapy discharge:    All goals and outcomes met, no further needs identified.    Progress towards therapy goal(s). See goals on Care Plan in [facility] electronic health record for goal details.  Goals met    Therapy recommendation(s):    No further therapy is recommended.

## 2018-06-09 NOTE — PLAN OF CARE
Problem: Patient Care Overview  Goal: Plan of Care/Patient Progress Review  Outcome: Improving  ICU End of Shift Summary.  For vital signs and complete assessments, please see documentation flowsheets.     Pertinent assessments:Alert and Oriented. LS diminished, BS active but no BM since Monday, stool softner given.  Major Shift Events: Neto hose applied, +2 edema in ankles. Up frequently to commode. Good appetite.  Plan (Upcoming Events): Plan is to be transferred out to the floors.  Discharge/Transfer Needs: Using bedside commode due to GARCIA.    Bedside Shift Report Completed : Yes  Bedside Safety Check Completed: Yes

## 2018-06-10 NOTE — PLAN OF CARE
Problem: Patient Care Overview  Goal: Plan of Care/Patient Progress Review  Outcome: Improving  ICU End of Shift Summary.  For vital signs and complete assessments, please see documentation flowsheets.     Pertinent assessments: Patient alert/oriented x4. Denies pain/discomfort. Slept well. Lung sounds diminished. Tele is SR. Abdomen is soft and non-tender, no BM since 6/4/18, but this is normal per patient. Voiding without difficulty. Skin intact with scattered bruising.   Major Shift Events: None  Plan (Upcoming Events): Continue steroids, nebulizers and ABX.    Discharge/Transfer Needs: Discharge home ?today?    Bedside Shift Report Completed :   Bedside Safety Check Completed:

## 2018-06-10 NOTE — PROGRESS NOTES
[facility]  Hospitalist ICU Progress Note  Collin Taylor MD 06/09/18    Reason for Stay (Diagnosis): Acute on chronic COPD exacerbation with acute hypercapnic and hypoxic respiratory failure.         Assessment and Plan:      Summary of Stay: Karen Alex is a 63 year old female w/a history of recurrent hospitalizations for COPD exacerbations admitted on 6/5/2018 with acute hypoxic and hypercarbic respiratory failure, likely due to COPD exacerbation. She's been slowly improving day by day with treatment and is now back on her 2.5 L of home oxygen supplementation. She's also been having some dysphagia with solid foods and is on a dysphagia diet, with plans for outpatient GI follow-up. Likely will be suitable for discharge home in the next few days.    Problem List/Assessment and Plan:     Cardiovascular:  1. Elevated troponin- Troponin was 0.234 on admission, peaked at 0.458 on night of 6/5. \The patient did not have classic symptoms of ACS, and the EKG minimal non specific abnormality. Then trended down to 0.332 on 6/6. An echo revealed mild to moderate pulmonary hypertension, normal EF but no findings suggestive of ischemia. Heparin drip was stopped. Likely a demand leak with initial hypoxia.    - -Continue ASA.  -- Statin  -- on Imdur  - outpatient assessment of coronary reserve    2. Hx of hypertension  -  Contine imdur, BP slightly high  -  Start Norvasc (would wait a few more days before starting a BB)    3. Pulmonary:  1.  Acute on chronic COPD exacerbation-  Presented with hypoxic and hypercarbic respiratory failure. Has had numerous hospitalizations in the past year for COPD exacerbations with frequent steroid bursts. Currently stable on home oxygen supplementation (2.5 L). Continues to get short of breath with exertion and is a chronic CO2 retainer. Had PFTs and pulm function in the past ordered but no result in Epic. Garcia Severe COPD I suspect  - O2 titrate down as able   -  Continue Steroid taper. Today 40 mg prednisone  - continue duonebs q4h prn  - start symbicort and Spiriva  - BID glucose checks due to high-dose steroid use- has not required insulin  - if possible pulm rehab post discharge  - pulmonary consult to evaluate for trilogy NIV at night    Psych:  1.  Hx of depression with psychotic features, PTSD, anxiety  - continue home aripiprazole, quetiapine, wellbutrin, duloxetine    ID:   1. Leukocytosis- WBC elevated to 14.0 on admission. No other signs or symptoms of infection besides increased shortness of breath. No pneumonia on CXR. Leukocytosis may have be transient elevation due to corticosteroid administration  - blood cultures negative at 4 days  - finishing empiric doxycycline over concern for bronchitis/possible infectious trigger- day 5    GI:  1. Dysphagia- intermittent difficulty swallowing solids. Swallow evaluation with no major problems. Had a brief, 15 minute episode of food getting stuck in her distal esophagus while on normal diet.  - Continue dysphagia diet level 3 advanced thin liquids  - Recommend outpatient GI assessment when breathing better.    ENDO:   - watch BG trend on prednisone. Had one reading near 190 range, latest one 80    MSK:  1.  Lower extremity edema- Patient reports that she began using lymphedema wraps 2 weeks ago. Performed by home nursing or patients daughter. Patient does have mild bilateral pitting edema in medial ankles and dorsum of foot, which was not present on admission. . Echo without evidence of severe right or left ventricular dysfunction. PT evaluated the patient and recommended trial of Neto Hose for compression.   - Edema related to CO2 retention   - PT following, appreciate recs  Lines/drains/tubes: PIV  DVT Prophylaxis: Not indicated- patient ambulatory  GI Prophylaxis: N/A  Code Status: Full Code  Discharge Dispo/Date: Discharge to prior living situation once respiratory status has improved/stabilized        Interval  "History (Subjective):      Doing well. Pleasant and interactive. Improving. Denies fevers/chills, nausea, vomiting, dysphagia, chest pain, constipation.  Does not get flu shot every fall. Never had pulmonary rehab and                   Physical Exam:      Last Vital Signs:  /80 (BP Location: Right arm)  Pulse 107  Temp 97.1  F (36.2  C) (Oral)  Resp 20  Ht 1.702 m (5' 7\")  Wt 110.8 kg (244 lb 3.2 oz)  LMP 12/01/2007  SpO2 96%  BMI 38.25 kg/m2    I/O last 3 completed shifts:  In: 1090 [P.O.:1090]  Out: 850 [Urine:850]    General: Alert, awake, no acute distress.  HEENT: Eyes anicteric, extraocular movements grossly intact  Cardiac: RRR, S1, S2.  No murmurs appreciated. Peripheral pulses strong  Pulmonary: prolonged expiration, fair air movement, mild wheezes, or rales; moderately prolonged expiration  Abdomen: soft, non-tender, non-distended.  Bowel Sounds Present. No guarding.  Extremities: no deformities.  Warm, well perfused. Mild edema noted bilaterally through Neto Hose. No tenderness to palpation. Edema absent in remainder of lower extremities  Skin: No rashes or lesions noted.  Warm and Dry.  Psych: Appropriate affect.         Medications:          ARIPiprazole  5 mg Oral Daily     aspirin chewable tablet 81 mg  81 mg Oral Daily     atorvastatin  40 mg Oral QPM     buPROPion  300 mg Oral QAM     docusate sodium  100 mg Oral Daily     DULoxetine (CYMBALTA) EC capsule 90 mg  90 mg Oral Daily     gabapentin  300 mg Oral At Bedtime     guaiFENesin  600 mg Oral BID     ipratropium - albuterol 0.5 mg/2.5 mg/3 mL  3 mL Nebulization 4x daily     isosorbide mononitrate  30 mg Oral Daily     melatonin tablet 5 mg  5 mg Oral At Bedtime     predniSONE  40 mg Oral Daily     QUEtiapine  200 mg Oral At Bedtime     QUEtiapine  50 mg Oral Daily              Data:      All new lab and imaging data was reviewed.   Labs:    Recent Labs  Lab 06/05/18  1141 06/05/18  0948   CULT No growth after 5 days No growth after 5 " days

## 2018-06-10 NOTE — PLAN OF CARE
Problem: Patient Care Overview  Goal: Plan of Care/Patient Progress Review  Pt a/o x4, up with SBA in room.  Tele SR.  On 2.5L NC at rest 95%, up to 6L with extertion 89-90%.  BM today, baseline for her to go x1/wk.  BID BG for steroid use.  Will continue POC.

## 2018-06-10 NOTE — PLAN OF CARE
Problem: Patient Care Overview  Goal: Plan of Care/Patient Progress Review  Outcome: Improving  ICU End of Shift Summary.  For vital signs and complete assessments, please see documentation flowsheets.     Pertinent assessments: VSS, A+OX4. On 2.5 L N.C. Dyspnea with activity. Increase 02 6 l NC with activity.  Major Shift Events: Pt denies pain, voided just before transfer to wheelchair. Pt ate breakfast.  Plan (Upcoming Events): Pt will transfer to 3 rd floor.  Discharge/Transfer Needs: Gave report to RN on 3 rd floor. Pt will transfer to 3rd and possibly go home later today.    Bedside Shift Report Completed :   Bedside Safety Check Completed:

## 2018-06-10 NOTE — PLAN OF CARE
Problem: Chronic Obstructive Pulmonary Disease (Adult)  Goal: Signs and Symptoms of Listed Potential Problems Will be Absent, Minimized or Managed (Chronic Obstructive Pulmonary Disease)  Signs and symptoms of listed potential problems will be absent, minimized or managed by discharge/transition of care (reference Chronic Obstructive Pulmonary Disease (Adult) CPG).   Outcome: Improving  Vss, patient up to commode increases oxygen with activity from 2.5 lpm to 6 lpm due to dyspnea.  Tolerates well, moves slowly to allow for rebuilding tolerance.  Denies pain, good appeitie. Lungs cont diminished.    ICU End of Shift Summary.  For vital signs and complete assessments, please see documentation flowsheets.     Pertinent assessments:   Major Shift Events:   Plan (Upcoming Events): cont assessment and treaments.  Plan for discharge  Discharge/Transfer Needs: tbd    Bedside Shift Report Completed : yes  Bedside Safety Check Completed:yes

## 2018-06-10 NOTE — PROGRESS NOTES
"[facility] RCAT     Date: 6/10/2018  Admission Dx: COPD  Pulmonary History: COPD  Home Nebulizer/MDI Use: Albuterol neb QID prn, Spiriva daily, Ventolin 1-2 puffs Q4 hours prn.  Home Oxygen: 2.5 Lpm NC  Acuity Level (RCAT flow sheet): 3  Aerosol Therapy initiated: Changed Duoneb Q4 hours to QID, continue Albuterol Q2 hours prn.      Pulmonary Hygiene initiated: Deep breath and cough.      Volume Expansion initiated: IS TID.      Current Oxygen Requirements: 2.5 Lpm NC  Current SpO2: 96%    Re-evaluation date: 6/13/2018    Patient Education:  Informed Pt of RCAT.  Gave instruction in the use of an IS and encouraged deep breathing.      See \"RT Assessments\" flow sheet for patient assessment scoring and Acuity Level Details.     Severino Pringle  Marguerite 10, 2018.11:52 AM                "

## 2018-06-10 NOTE — PLAN OF CARE
Problem: Chronic Obstructive Pulmonary Disease (Adult)  Goal: Signs and Symptoms of Listed Potential Problems Will be Absent, Minimized or Managed (Chronic Obstructive Pulmonary Disease)  Signs and symptoms of listed potential problems will be absent, minimized or managed by discharge/transition of care (reference Chronic Obstructive Pulmonary Disease (Adult) CPG).   Outcome: Improving  Continues dyspnea with exertion, requiring increased oxygen during activity. Lung sounds continue to be diminished. Less dyspneic this morning with activity.

## 2018-06-11 NOTE — PLAN OF CARE
Problem: Patient Care Overview  Goal: Plan of Care/Patient Progress Review  Outcome: No Change  Pt A&Ox4, VSS, denies pain.  SR on telemetry.  Had sudhir stockings on, asked to be removed for sleep.  On 2.5L NC, which is baseline.  Bumps O2 up to 6L with ambulation and activity.  SOB/GARCIA with all activity, needs time to rest, encouraged to take breaks between cares.  RCAT, respiratory paged for Neb overnight.  +4 BS, BM 06/09/18.  Tolerating DD3 thin liquid diet.  Voiding in bathroom.  Up SBA.  Plan:  O2, pulmonology consult, follow up with GI as outpt for Dysphagia issues.  Prednisone and seroquel.  Will DC to home when stable.  Will continue to monitor.      06/11/18 6:05 AM  Pt requested a neb treatment, respiratory paged.

## 2018-06-11 NOTE — PLAN OF CARE
Problem: Chronic Obstructive Pulmonary Disease (Adult)  Goal: Signs and Symptoms of Listed Potential Problems Will be Absent, Minimized or Managed (Chronic Obstructive Pulmonary Disease)  Signs and symptoms of listed potential problems will be absent, minimized or managed by discharge/transition of care (reference Chronic Obstructive Pulmonary Disease (Adult) CPG).   Outcome: No Change  A/O SBA to transfer. Increased SOB with activity.  Pt needs 2-3 min to recover after ambulating to BR. 02 at 6 L/M until recovered.  LS exp wheezes, diminished.  VSs afebrile.   Tele SR   at 1700.

## 2018-06-11 NOTE — DISCHARGE SUMMARY
Discharge Summary  Hospitalist Service    Karen Alex MRN# [id number]   YOB: 1955 Age: 63 year old     Date of Admission:  6/5/2018  Date of Discharge:  6/11/2018  Admitting Physician:  Matt Arias DO  Discharge Physician: Jhnoatan Campos DO  Discharging Service: Hospitalist Service     Primary Provider: Eros Rebolledo  Primary Care Physician Phone Number: [phone]         Discharge Diagnoses/Problem Oriented Hospital Course (Providers):    Karen Alex was admitted on 6/5/2018 by Matt Arias DO and I would refer you to their history and physical.  The following problems were addressed during her hospitalization:  1.  COPD exacerbation.  Has been slowly improving during hospital stay.  Currently on a prednisone taper.  Continue to taper prednisone over the next 8 days.  Continue Spiriva.  Start Symbicort.  Albuterol as needed.  She did complete a 5 day course of doxycycline during hospital stay.  2.  Acute on chronic hypoxic and hypercapnic respiratory failure.  Due to COPD exacerbation.  Initially did require BiPAP therapy in the intensive care unit.  Has slowly improved during hospital stay.  Continue to taper prednisone.  Is on chronic supplemental oxygen therapy in the outpatient setting.  Resume previous home oxygen.  Should avoid opiate medications and benzodiazepines as much as possible.  3.  Troponin elevation.  Suspect that this was demand ischemia due to acute pulmonary issues.  Did have echocardiogram done during hospital stay which did not show any wall motion abnormalities.  Continue on aspirin and atorvastatin.  She should discuss cardiac stress testing with her primary care physician to be considered once acute pulmonary issues have completely resolved.  4.  Hypertension.  Blood pressure was more elevated than usual during hospital stay.  She was on increased therapy with amlodipine in addition to her usual medications during hospital stay.  Blood  pressure by the time of discharge is trending to the lower side.  Stop amlodipine at this point.  Continue with isosorbide mononitrate.  5.  Bipolar disorder.  Continue Abilify and quetiapine.  6.  Depression.  Continue bupropion and duloxetine.  7.  Dysphagia.  Continue dysphagia diet.  Home speech therapy evaluation.  8.  Deconditioning.  Continue to work with physical therapy and Occupational Therapy in the home setting.         Code Status:      Full Code        Brief Hospital Stay Summary Sent Home With Patient in AVS:           # Discharge Pain Plan:   - Patient currently has NO PAIN and is not being prescribed pain medications on discharge.           Pending Results:        Unresulted Labs Ordered in the Past 30 Days of this Admission     No orders found from 4/6/2018 to 6/6/2018.            Discharge Instructions and Follow-Up:      Follow-up Appointments     Follow-up and recommended labs and tests        Follow up with primary care provider, Eros Rebolledo, within 7   days for hospital follow- up.  The following labs/tests are recommended:   CBC and BMP in 7 days.  Consider cardiac stress testing once current acute   pulmonary issues completely resolved.                      Discharge Disposition:      Discharged to home         Discharge Medications:        Current Discharge Medication List      START taking these medications    Details   budesonide-formoterol (SYMBICORT) 80-4.5 MCG/ACT Inhaler Inhale 2 puffs into the lungs 2 times daily  Qty: 1 Inhaler, Refills: 0    Associated Diagnoses: COPD exacerbation (H)      predniSONE (DELTASONE) 10 MG tablet 4 tabs daily for 2 days, then 3 tabs daily for 2 days, then 2 tabs daily for 2 days, then 1 tab daily for 2 days, then stop  Qty: 20 tablet, Refills: 0    Associated Diagnoses: COPD exacerbation (H)         CONTINUE these medications which have NOT CHANGED    Details   albuterol (2.5 MG/3ML) 0.083% neb solution Take 1 vial (2.5 mg) by nebulization 4  times daily as needed  Qty: 120 vial, Refills: 5    Associated Diagnoses: COPD exacerbation (H)      ARIPiprazole (ABILIFY) 5 MG tablet Take 5 mg by mouth daily      ASPIRIN PO Take 81 mg by mouth daily      atorvastatin (LIPITOR) 40 MG tablet Take 1 tablet (40 mg) by mouth daily  Qty: 90 tablet, Refills: 3    Associated Diagnoses: Coronary artery disease of native heart with stable angina pectoris, unspecified vessel or lesion type (H)      buPROPion (WELLBUTRIN XL) 300 MG 24 hr tablet Take 1 tablet (300 mg) by mouth every morning  Qty: 90 tablet, Refills: 1    Associated Diagnoses: Major depressive disorder, recurrent episode, in partial remission (H)      Cholecalciferol (VITAMIN D3) 2000 UNITS CHEW Take 2,000 mg by mouth daily       Docusate Calcium (STOOL SOFTENER PO) Take 100 mg by mouth daily       DULOXETINE HCL PO Take 90 mg by mouth daily      gabapentin (NEURONTIN) 300 MG capsule Take 1 capsule (300 mg) by mouth At Bedtime  Qty: 90 capsule, Refills: 1    Associated Diagnoses: Recurrent major depressive disorder, in partial remission (H)      isosorbide mononitrate (IMDUR) 30 MG 24 hr tablet Take 1 tablet (30 mg) by mouth daily  Qty: 90 tablet, Refills: 3    Associated Diagnoses: Chest pain, unspecified type; Coronary artery disease involving native coronary artery of native heart with angina pectoris (H)      MELATONIN PO Take 5 mg by mouth At Bedtime      !! QUEtiapine (SEROQUEL) 200 MG tablet Take 1 tablet (200 mg) by mouth At Bedtime  Qty: 90 tablet, Refills: 1    Associated Diagnoses: HUNG (generalized anxiety disorder)      !! QUEtiapine (SEROQUEL) 50 MG tablet Take 1 tablet (50 mg) by mouth daily  Qty: 30 tablet, Refills: 1    Associated Diagnoses: Major depressive disorder, recurrent episode, mild (H)      tiotropium (SPIRIVA) 18 MCG capsule Inhale 1 capsule (18 mcg) into the lungs daily  Qty: 90 capsule, Refills: 2    Associated Diagnoses: Generalized anxiety disorder; Chronic obstructive pulmonary  "disease, unspecified COPD type (H)      VENTOLIN  (90 BASE) MCG/ACT Inhaler SHAKE WELL AND INHALE 1 TO 2 PUFFS INTO THE LUNGS EVERY 4 HOURS AS NEEDED FOR SHORTNESS OF BREATH, DIFFICULTY BREATHING OR WHEEZING.  Qty: 18 g, Refills: 3    Associated Diagnoses: Panlobular emphysema (H)      nitroGLYcerin (NITROSTAT) 0.4 MG sublingual tablet Place 1 tablet (0.4 mg) under the tongue every 5 minutes as needed for chest pain prn  Qty: 25 tablet, Refills: 1    Associated Diagnoses: Chest pain      !! order for DME Equipment being ordered: Walker Wheels () and Walker ()  Treatment Diagnosis: Gait instability  Qty: 1 each, Refills: 0    Associated Diagnoses: Chronic obstructive pulmonary disease with acute exacerbation (H)      !! order for DME Equipment being ordered: Walker Wheels ()  Treatment Diagnosis: severe COPD  Qty: 1 Units, Refills: 0    Associated Diagnoses: Chronic obstructive pulmonary disease with acute exacerbation (H)      !! order for DME Equipment being ordered: Walker ()  Treatment Diagnosis: severe COPD  Qty: 1 Units, Refills: 0    Associated Diagnoses: Chronic obstructive pulmonary disease with acute exacerbation (H)       !! - Potential duplicate medications found. Please discuss with provider.      STOP taking these medications       diazepam (VALIUM) 10 MG tablet Comments:   Reason for Stopping:         HYDROcodone-acetaminophen (NORCO) 5-325 MG per tablet Comments:   Reason for Stopping:                 Allergies:       No Known Allergies        Condition and Physical on Discharge:      Discharge condition: Stable   Vitals: Blood pressure 109/68, pulse 86, temperature 97.6  F (36.4  C), temperature source Oral, resp. rate 16, height 1.702 m (5' 7\"), weight 110.3 kg (243 lb 3.2 oz), last menstrual period 12/01/2007, SpO2 96 %, not currently breastfeeding.   Gen:  NAD, A&Ox3.  Eyes:  PERRL, sclera anicteric.  OP:  MMM, no lesions.  Neck:  Supple.  CV:  Regular, no " murmurs.  Lung: Minimal wheeze to auscultation on right, left lung clear, normal effort.  Ab:  +BS, soft.  Skin:  Warm, dry to touch.  No rash.  Ext:  No pitting edema LE b/l.        Discharge Time:      I spent 40 minutes with Ms. Alex and working on discharge on 6/11/18.        Image Results From This Hospital Stay (For Non-EPIC Providers):        Results for orders placed or performed during the hospital encounter of 06/05/18   XR Chest Port 1 View    Narrative    XR CHEST PORT 1 VW 6/5/2018 10:14 AM    HISTORY: Short of breath.    COMPARISON: 5/8/2018    FINDINGS: Severe apical emphysema. No new consolidation, effusion or  evidence of acute pneumothorax. Normal heart size.      Impression    IMPRESSION: No acute abnormality.    JOLANTA RAMOS MD           Most Recent Lab Results In EPIC (For Non-EPIC Providers):    Most Recent 3 CBC's:  Recent Labs   Lab Test  06/05/18   0948  05/11/18   0745  05/09/18   0713  05/08/18   1538   WBC  14.0*   --   5.6  6.8   HGB  11.8   --   11.2*  12.0   MCV  100   --   96  98   PLT  203  135*  151  185      Most Recent 3 BMP's:  Recent Labs   Lab Test  06/06/18   0709  06/05/18   0948  05/11/18   0745  05/10/18   0659   NA  141  142   --   143   POTASSIUM  4.7  3.9   --   3.7   CHLORIDE  102  99   --   103   CO2  36*  41*   --   39*   BUN  14  14   --   18   CR  0.52  0.77  0.55  0.57   ANIONGAP  3  2*   --   1*   MAHOGANY  7.9*  8.5   --   8.6   GLC  150*  95   --   85     Most Recent 3 Troponin's:No lab results found.  Most Recent 3 INR's:  Recent Labs   Lab Test  02/21/18   0943   INR  0.95     Most Recent 2 LFT's:  Recent Labs   Lab Test  06/05/18   0948  05/08/18   1538   AST  18  23   ALT  22  22   ALKPHOS  93  93   BILITOTAL  0.5  0.3     Most Recent Cholesterol Panel:  Recent Labs   Lab Test  05/15/17   1113   CHOL  135   LDL  66   HDL  48*   TRIG  104     Most Recent 6 Bacteria Isolates From Any Culture (See EPIC Reports for Culture Details):  Recent Labs   Lab Test   06/05/18   1141  06/05/18   0948  03/26/18   1028  03/26/18   1013  02/19/18   2249  02/19/18   2228   CULT  No growth  No growth  No growth  No growth  No growth  No growth     Most Recent TSH, T4 and HgbA1c:   Recent Labs   Lab Test  10/17/11   1050  10/03/11   1427   TSH  3.02  8.17*   T4   --   0.84

## 2018-06-11 NOTE — PROGRESS NOTES
Care Coordination;  Handoff called to Korin Wang RN CC at the clinic with dc orders and plan of care. Pt discharging back home with resumption of home care. She has appt already made with Dr Rebolledo on Friday. Korin will follow up with her tomorrow.    Modesta Muñoz RN CTS

## 2018-06-11 NOTE — PLAN OF CARE
Problem: Patient Care Overview  Goal: Plan of Care/Patient Progress Review  Pt a/o x4, up with SBA in room.  Wt down 1 lb from yesterday.  Pt feels her breathing is back to baseline, on 2.5L at rest and uses 5-6L when up, recovers after a few minutes.  Tele SR.  Will follow up with GI out pt.  D/C instructions to be gone over with pt with understanding, all belongings packed up to be sent home with pt.  Pt education provided on the discontinuation of Diazepam, pt understands it's risks and benefits, pt will follow up with PCP about this medication.  Will continue POC.

## 2018-06-11 NOTE — PROGRESS NOTES
[facility]  Met with pt to discuss plans to resume home care.  Pt to be discharged home today and has agreed to have [facility] resume services.  Patient care support center processing referral.  Pt verbalized understanding that resumption of care visit is scheduled for 6/12 or 6/13/18.  Pt has 24 hour phone number for [facility] for any questions or concerns.

## 2018-06-12 NOTE — PROGRESS NOTES
Clinic Care Coordination Contact    Clinic Care Coordination Contact  OUTREACH    Referral Information:  Referral Source: IP Report    Primary Diagnosis: COPD    Chief Complaint   Patient presents with     [facility]     COPD, CCRN         [facility] Utilization:   Clinic Utilization  Difficulty keeping appointments:: No  Utilization    Last refreshed: 6/12/2018  9:24 AM:  No Show Count (past year) 1       Last refreshed: 6/12/2018  9:24 AM:  ED visits 2       Last refreshed: 6/12/2018  9:24 AM:  Hospital admissions 5          Current as of: 6/12/2018  9:24 AM           Clinical Concerns:  Current Medical Concerns: Patient admitted from 6/5 - 6/11 with COPD exacerbation   Patient was transferred to the hospital via ambulance after her oxygen was unplugged and oxygen sat was found to be 49-59%   Patient states she has been doing well since coming home. Feels a little foggy, likely tired she thinks. Her sister is at her house right now -  home care RN will be out today at 12:00 to see her   Patient currently has oxygen on 2.5 LPM - she has not checked oxygen saturation since coming home - CCRN reminded patient to check this   Patient is on prednisone taper - which she has taken many times before - she has no questions at this time   Denies increase in cough/sob since coming home   Patient has f/u with pcp set for 6/15/8    Current Behavioral Concerns: bipolar disorder - anxiety, depression   Patient visits with [facility] therapist Lupis     Education Provided to patient: Monitor pulse oximetry - call EMS if oxygen level low - call clinic / CCRN with non emergent concerns (last admission patient left voicemail on CCRN machine stating her oxygen level was 49% - CCRN discussed the safety concerns with leaving that on a message      Health Maintenance Reviewed: Due/Overdue   Health Maintenance Due   Topic Date Due     URINE DRUG SCREEN Q1 YR  05/18/1972     HIV SCREEN (SYSTEM ASSIGNED)   05/18/1975     LIPID MONITORING Q6 MO  11/15/2017     DEXA Q3 YR  01/29/2018       Clinical Pathway: None    Medication Management:  States compliance - [facility] RN will assist with reviewing meds      Functional Status:  Dependent ADLs:: Ambulation-walker  Bed or wheelchair confined:: No  Mobility Status: Independent w/Device    Living Situation:  Current living arrangement:: I live in a private home with family, I live in a private home, I live alone (in 3rd floor apartment - sister visits often she lives in [place] - sister does not drive but will stay with patient when she is not doig well )  Type of residence:: Apartment    Diet/Exercise/Sleep:  Diet:: Regular  Inadequate nutrition (GOAL):: No  Food Insecurity: No  Tube Feeding: No  Exercise:: Currently not exercising (walking [place] apartment [place] )  Inadequate activity/exercise (GOAL):: Yes  Significant changes in sleep pattern (GOAL): No    Transportation:  Transportation concerns (GOAL):: No  Transportation means:: Regular car (patient drives at baseline)     Psychosocial:  Mental health DX:: Yes  Mental health DX how managed:: Medication, [facility] at [facility]  Mental health management concern (GOAL):: No  Informal Support system:: Family     Financial/Insurance:   Financial/Insurance concerns (GOAL):: Yes     Resources and Interventions:  Current Resources:   List of home care services:: Skilled Nursing, Physicial Therapy, Occupational Therapy;      Supplies used at home:: Oxygen Tubing/Supplies (2.5 LPM inogen )  Equipment Currently Used at Home: oxygen, raised toilet, walker, rolling (2.5 LPM - [facility])    Advance Care Plan/Directive  Advanced Care Plans/Directives on file:: No  Advanced Care Plan/Directive Status: Not Applicable       Goals:   Goals        General    Financial Wellbeing (pt-stated)     Notes - Note created  4/13/2018 11:43 AM by Stacey Aguayo LGSW    Goal Statement: I will apply for [facility]  assistance  Measure of Success: evaluation for extra help, [place], and [place]  Supportive Steps to Achieve: I will sign the release for the  to speak to [facility]  Barriers: Unsure about finances  Strengths: motivated for help  Date to Achieve By: TBD          Lifestyle    Increase physical activity     Notes - Note edited  5/31/2018  2:56 PM by Korin Wang RN    Goal Statement: I will walk 1/2 down apartment hallway 3 times per week - increasing as tolerated to full hallway and more days per week   Measure of Success: patient able to meet goal of 3 times per week   Supportive Steps to Achieve: CCRN encouragement - support by weekly calls   Barriers: COPD   Strengths: wanting to increase endurance   Date to Achieve By: 6/30/18  As of today's date 5/31/2018 goal is met at 0 - 25%.   Goal Status:  Active - has not been able to work on goal due to hospitalizations, illness                   Patient/Caregiver understanding: yes     Outreach Frequency: 2 weeks  Future Appointments              In 3 days Eros Rebolledo MD [facility], [facility]    In 1 week Lupis Mcgee LICSW [facility], [facility]          Plan:   Patient will see home care RN today   Patient to continue monitoring symptoms using COPD action plan and call with symptom in the YELLOW zone   If emergent symptoms - oxygen level under 88% patient needs to call for EMS   CCRN will f/u with patient/home care in 2-3 weeks (home care  Honey Christina trying to assist with different placement for patient (assisted living facility)     Korin Wang Care Coordinator RN  [facility]  [phone]  June 12, 2018

## 2018-06-12 NOTE — TELEPHONE ENCOUNTER
Caller Richelle with [facility]. Requests VO:   SW eval and treat   OT, PT, and +lymphedema eval and treat  And skilled nurse visits:   3 x week x 2 weeks  2 x week x 2 weeks  1 x week x 1 week  2 PRN   Informed approved per standing orders.   Home Care staff will fax these orders for MD signature.   Chaim Slaughter RN

## 2018-06-15 PROBLEM — I50.9 CHF (CONGESTIVE HEART FAILURE) (H): Status: ACTIVE | Noted: 2018-01-01

## 2018-06-15 PROBLEM — E66.01 MORBID OBESITY (H): Status: ACTIVE | Noted: 2018-01-01

## 2018-06-15 NOTE — MR AVS SNAPSHOT
After Visit Summary   6/15/2018    Karen Alex    MRN: [id number]           Patient Information     Date Of Birth          1955        Visit Information        Provider Department      6/15/2018 11:15 AM Eros Rebolledo MD [facility]        Today's Diagnoses     Hyperlipidemia LDL goal <130    -  1    Chronic obstructive pulmonary disease with acute exacerbation (H)           Follow-ups after your visit        Your next 10 appointments already scheduled     Jun 25, 2018 12:30 PM CDT   Return Visit with MAURY Lemos   [facility]    [address] [address] [phone]              Who to contact     If you have questions or need follow up information about today's clinic visit or your schedule please contact [facility] directly at [phone].  Normal or non-critical lab and imaging results will be communicated to you by MyChart, letter or phone within 4 business days after the clinic has received the results. If you do not hear from us within 7 days, please contact the clinic through [employer] or phone. If you have a critical or abnormal lab result, we will notify you by phone as soon as possible.  Submit refill requests through [employer] or call your pharmacy and they will forward the refill request to us. Please allow 3 business days for your refill to be completed.          Additional Information About Your Visit        MyChart Information     [employer] gives you secure access to your electronic health record. If you see a primary care provider, you can also send messages to your care team and make appointments. If you have questions, please call your primary care clinic.  If you do not have a primary care provider, please call [phone] and they will assist you.        Care EveryWhere ID     This is your Care EveryWhere ID. This could be  used by other organizations to access your [facility] medical records  [id number]        Your Vitals Were     Pulse Temperature Respirations Last Period Pulse Oximetry BMI (Body Mass Index)    95 98.7  F (37.1  C) (Oral) 16 12/01/2007 92% 37.59 kg/m2       Blood Pressure from Last 3 Encounters:   06/15/18 111/73   06/11/18 109/68   05/18/18 128/86    Weight from Last 3 Encounters:   06/15/18 240 lb (108.9 kg)   06/11/18 243 lb 3.2 oz (110.3 kg)   05/18/18 228 lb (103.4 kg)              Today, you had the following     No orders found for display         Today's Medication Changes          These changes are accurate as of 6/15/18 11:42 AM.  If you have any questions, ask your nurse or doctor.               Start taking these medicines.        Dose/Directions    ACE/ARB/ARNI NOT PRESCRIBED (INTENTIONAL)   Used for:  Hyperlipidemia LDL goal <130   Started by:  Eros Rebolledo MD        Please choose reason not prescribed, below   Refills:  0       BETA BLOCKER NOT PRESCRIBED (INTENTIONAL)   Used for:  Chronic obstructive pulmonary disease with acute exacerbation (H)   Started by:  Eros Rebolledo MD        Beta Blocker not prescribed intentionally due to COPD and Hx of Class IV Heart Failure   Quantity:  1 each   Refills:  0            Where to get your medicines      Some of these will need a paper prescription and others can be bought over the counter.  Ask your nurse if you have questions.     Bring a paper prescription for each of these medications     BETA BLOCKER NOT PRESCRIBED (INTENTIONAL)       You don't need a prescription for these medications     ACE/ARB/ARNI NOT PRESCRIBED (INTENTIONAL)                Primary Care Provider Office Phone # Fax #    Eros Rebolledo [phone] [address] [facility] [id number]        Goals        General    Financial Wellbeing (pt-stated)     Notes - Note created  4/13/2018 11:43 AM by Stacey Aguayo LGSW    Goal Statement:  I will apply for [facility] assistance  Measure of Success: evaluation for extra help, medicare savings program, and [place]  Supportive Steps to Achieve: I will sign the release for the  to speak to [facility]  Barriers: Unsure about finances  Strengths: motivated for help  Date to Achieve By: TBD          Lifestyle    Increase physical activity     Notes - Note edited  5/31/2018  2:56 PM by Korin Wang RN    Goal Statement: I will walk 1/2 down apartment hallway 3 times per week - increasing as tolerated to full hallway and more days per week   Measure of Success: patient able to meet goal of 3 times per week   Supportive Steps to Achieve: CCRN encouragement - support by weekly calls   Barriers: COPD   Strengths: wanting to increase endurance   Date to Achieve By: 6/30/18  As of today's date 5/31/2018 goal is met at 0 - 25%.   Goal Status:  Active - has not been able to work on goal due to hospitalizations, illness                 Equal Access to Services     BRANDYN TURNER : Hadii omid Calixto, waaxda luchrissie, qaybta kaalmada ademars, rj ramirez . So [facility] [phone].    ATENCIÓN: Si habla español, tiene a zaragoza disposición servicios gratuitos de asistencia lingüística. Llame al [phone].    We comply with applicable federal civil rights laws and [place] laws. We do not discriminate on the basis of race, color, national origin, age, disability, sex, sexual orientation, or gender identity.            Thank you!     Thank you for choosing [facility]  for your care. Our goal is always to provide you with excellent care. Hearing back from our patients is one way we can continue to improve our services. Please take a few minutes to complete the written survey that you may receive in the mail after your visit with us. Thank you!             Your Updated Medication List - Protect others around you: Learn how to safely use,  store and throw away your medicines at www.disposemymeds.org.          This list is accurate as of 6/15/18 11:42 AM.  Always use your most recent med list.                   Brand Name Dispense Instructions for use Diagnosis    ABILIFY 5 MG tablet   Generic drug:  ARIPiprazole      Take 5 mg by mouth daily        ACE/ARB/ARNI NOT PRESCRIBED (INTENTIONAL)      Please choose reason not prescribed, below    Hyperlipidemia LDL goal <130       ASPIRIN PO      Take 81 mg by mouth daily        atorvastatin 40 MG tablet    LIPITOR    90 tablet    Take 1 tablet (40 mg) by mouth daily    Coronary artery disease of native heart with stable angina pectoris, unspecified vessel or lesion type (H)       BETA BLOCKER NOT PRESCRIBED (INTENTIONAL)     1 each    Beta Blocker not prescribed intentionally due to COPD and Hx of Class IV Heart Failure    Chronic obstructive pulmonary disease with acute exacerbation (H)       budesonide-formoterol 80-4.5 MCG/ACT Inhaler    SYMBICORT    1 Inhaler    Inhale 2 puffs into the lungs 2 times daily    COPD exacerbation (H)       buPROPion 300 MG 24 hr tablet    WELLBUTRIN XL    90 tablet    Take 1 tablet (300 mg) by mouth every morning    Major depressive disorder, recurrent episode, in partial remission (H)       DULOXETINE HCL PO      Take 90 mg by mouth daily        gabapentin 300 MG capsule    NEURONTIN    90 capsule    Take 1 capsule (300 mg) by mouth At Bedtime    Recurrent major depressive disorder, in partial remission (H)       isosorbide mononitrate 30 MG 24 hr tablet    IMDUR    90 tablet    Take 1 tablet (30 mg) by mouth daily    Chest pain, unspecified type, Coronary artery disease involving native coronary artery of native heart with angina pectoris (H)       MELATONIN PO      Take 5 mg by mouth At Bedtime        nitroGLYcerin 0.4 MG sublingual tablet    NITROSTAT    25 tablet    Place 1 tablet (0.4 mg) under the tongue every 5 minutes as needed for chest pain prn    Chest pain        order for DME     1 each    Equipment being ordered: Walker Wheels () and Walker () Treatment Diagnosis: Gait instability    Chronic obstructive pulmonary disease with acute exacerbation (H)       order for DME     1 Units    Equipment being ordered: Walker Wheels () Treatment Diagnosis: severe COPD    Chronic obstructive pulmonary disease with acute exacerbation (H)       order for DME     1 Units    Equipment being ordered: Walker () Treatment Diagnosis: severe COPD    Chronic obstructive pulmonary disease with acute exacerbation (H)       predniSONE 10 MG tablet    DELTASONE    20 tablet    4 tabs daily for 2 days, then 3 tabs daily for 2 days, then 2 tabs daily for 2 days, then 1 tab daily for 2 days, then stop    COPD exacerbation (H)       * QUEtiapine 200 MG tablet    SEROquel    90 tablet    Take 1 tablet (200 mg) by mouth At Bedtime    HUNG (generalized anxiety disorder)       * QUEtiapine 50 MG tablet    SEROQUEL    30 tablet    Take 1 tablet (50 mg) by mouth daily    Major depressive disorder, recurrent episode, mild (H)       STOOL SOFTENER PO      Take 100 mg by mouth daily        tiotropium 18 MCG capsule    SPIRIVA    90 capsule    Inhale 1 capsule (18 mcg) into the lungs daily    Generalized anxiety disorder, Chronic obstructive pulmonary disease, unspecified COPD type (H)       * VENTOLIN  (90 Base) MCG/ACT Inhaler   Generic drug:  albuterol     18 g    SHAKE WELL AND INHALE 1 TO 2 PUFFS INTO THE LUNGS EVERY 4 HOURS AS NEEDED FOR SHORTNESS OF BREATH, DIFFICULTY BREATHING OR WHEEZING.    Panlobular emphysema (H)       * albuterol (2.5 MG/3ML) 0.083% neb solution     120 vial    Take 1 vial (2.5 mg) by nebulization 4 times daily as needed    COPD exacerbation (H)       Vitamin D3 2000 units Chew      Take 2,000 mg by mouth daily        * Notice:  This list has 4 medication(s) that are the same as other medications prescribed for you. Read the directions carefully, and ask  your doctor or other care provider to review them with you.

## 2018-06-15 NOTE — PROGRESS NOTES
SUBJECTIVE:   Karen Alex is a 63 year old female who presents to clinic today for the following health issues:          Hospital Follow-up Visit:    Hospital/Nursing Home/IP Rehab Facility: [facility]  Date of Admission: 6/5/18  Date of Discharge: 6/11/18  Reason(s) for Admission: COPD            Problems taking medications regularly:  None       Medication changes since discharge: was put on Prednisone for tapering        Problems adhering to non-medication therapy:  None  Feels better, calm and breathing more easily  Summary of hospitalization:  [facility] discharge summary reviewed  Diagnostic Tests/Treatments reviewed.  Follow up needed: Pulmonary appt  Other Healthcare Providers Involved in Patient s Care:         Specialist appointment - pulmonnary   Update since discharge: improved. And sleeps well    Post Discharge Medication Reconciliation: .  Plan of care communicated with patient     Coding guidelines for this visit:  Type of Medical   Decision Making Face-to-Face Visit       within 7 Days of discharge Face-to-Face Visit        within 14 days of discharge   Moderate Complexity [id number]   High Complexity [id number]          On exam the vital signs are stable  Weight is Body mass index is 37.59 kg/(m^2).     Eyes show edith  No neck masses or thyromegaly.Ear nose and throat shows normal   No bruits, murmers, rubs or extrasounds. No cardiomegaly or chest wall tenderness. Lungs clear, no abdominal masses or organomegaly. No CVA tenderness.  Skin eval no rash   No hernias, good range of motion neck, back and extremities. No abnormal skin lesions. Normal genitalia. Good peripheral pulses. No adenopathy.  Normal gait and stance. Neck is supple.  Back exam shows good rom kyphosis and barrel chest     (E78.5) Hyperlipidemia LDL goal <130  (primary encounter diagnosis)  Comment:   Plan: ACE/ARB/ARNI NOT PRESCRIBED, INTENTIONAL,        stable    (J44.1) Chronic obstructive pulmonary  disease with acute exacerbation (H)  Comment:   Plan: BETA BLOCKER NOT PRESCRIBED, INTENTIONAL,        Med review, oxygen inventory, specialty coordination, Home Care orders and supervision

## 2018-06-19 NOTE — TELEPHONE ENCOUNTER
Honey Christina, BHAVIN  calls, verbal order provided for F/u  visit, looking at assisted living facilities, FYI to MD Estelle GONZALEZ RN, BSN  Message handled by Nurse Triage.

## 2018-06-22 NOTE — PROGRESS NOTES
"Clinic Care Coordination Contact    Clinic Care Coordination Contact  OUTREACH    Referral Information:  Referral Source: IP Report    Primary Diagnosis: COPD    Chief Complaint   Patient presents with     Clinic Care Coordination - Follow-up     prednisone questions, CCRN      [place] Utilization:   Clinic Utilization  Difficulty keeping appointments:: No  Utilization    Last refreshed: 6/19/2018  3:55 PM:  No Show Count (past year) 1       Last refreshed: 6/19/2018  3:55 PM:  ED visits 2       Last refreshed: 6/19/2018  3:55 PM:  Hospital admissions 5          Current as of: 6/19/2018  3:55 PM           Clinical Concerns:  Current Medical Concerns:  COPD   No increase in cough, sob, feeling \"great at the moment\"   Oxygen 2.5 LPM, with activity bumps up to 5 LPM and then rest while monitoring, sats do drop below 88% with some activities but she rests and recovers     Patient is active with [facility]     Patient is calling today as she has questions regarding prednisone  - currently on taper 10 mg for the next 3 days - prior to being put on the taper patient was taking 10 mg daily prescribed by Dr. Rebolledo. Patient wonders if she should continue on the 10 mg daily after the taper ends (which is actually 10 mg daily as well)   CCRN will send note to pcp and call patient back after his recommendations - pcp is currently out of the office today - patient will continue with the 10 mg the next 3 days and touch base with CCRN 6/25/18 after pcp recommendations     Current Behavioral Concerns:   Patient see's [facility] therapist at the [facility]     Education Provided to patient: continue monitoring oxygen levels - if below 88% and not recovering will need to seek medical attention      Health Maintenance Reviewed: Due/Overdue   Health Maintenance Due   Topic Date Due     HF ACTION PLAN Q3 YR  1955     URINE DRUG SCREEN Q1 YR  05/18/1972     HIV SCREEN (SYSTEM ASSIGNED)  05/18/1975     LIPID MONITORING Q6 MO  " 11/15/2017     DEXA Q3 YR  01/29/2018       Clinical Pathway: None    Medication Management:  Questions regarding prednisone - currently on last dose of taper 10 mg x 3 more days - should she continue the 10 mg after this as she was daily prior to the taper? CCRN will send to pcp to address      Functional Status:  Dependent ADLs:: Ambulation-walker  Bed or wheelchair confined:: No  Mobility Status: Independent w/Device    Living Situation:  Current living arrangement:: I live in a private home with family, I live in a private home, I live alone (in 3rd floor apartment - sister visits often she lives in [place] - sister does not drive but will stay with patient when she is not doig well )  Type of residence:: Apartment    HC SW had suggested [facility] - patient did go and look at a few - at this time she plans on staying in her apartment. She has her sister say with her for 2 weeks out of the month, her boyfriend that lives in [place] stays with her for 1 week out of the month and her son is able to stay with her when he is in [place]. Son is in  and will be home this weekend prior to being deployed to [facility] for 10 mos.     Diet/Exercise/Sleep:  Diet:: Regular  Inadequate nutrition (GOAL):: No  Food Insecurity: No  Tube Feeding: No  Exercise:: Currently not exercising (walking [place] apartment [place] )  Inadequate activity/exercise (GOAL):: Yes  Significant changes in sleep pattern (GOAL): No    Transportation:  Transportation concerns (GOAL):: No  Transportation means:: Regular car (patient drives at baseline)     Psychosocial:  Mental health DX:: Yes  Mental health DX how managed:: Medication, [facility] at Primary Care  Mental health management concern (GOAL):: No  Informal Support system:: Family     Financial/Insurance:   Financial/Insurance concerns (GOAL):: Yes     Resources and Interventions:  Current Resources:   List of home care services:: Skilled Nursing, Physicial Therapy, Occupational  Therapy;      Supplies used at home:: Oxygen Tubing/Supplies (3.5 LPM inogen )  Equipment Currently Used at Home: oxygen, raised toilet, walker, rolling (2.5 LPM - [facility] )    Advance Care Plan/Directive  Advanced Care Plans/Directives on file:: No  Advanced Care Plan/Directive Status: Not Applicable          Goals:   Goals        General    Financial Wellbeing (pt-stated)     Notes - Note created  4/13/2018 11:43 AM by Stacey Aguayo LGSW    Goal Statement: I will apply for [facility] assistance  Measure of Success: evaluation for extra help, [place], and [place]  Supportive Steps to Achieve: I will sign the release for the  to speak to [facility]  Barriers: Unsure about finances  Strengths: motivated for help  Date to Achieve By: TBD       Medical (pt-stated)     Notes - Note created  6/22/2018  1:38 PM by Korin Wang, RN    I will monitor my oxygen level using pulse oximeter - if oxygen level is below 88% I will seek medical attention   As of today's date 6/22/2018 goal is met at 0 - 25%.   Goal Status:  Active            Patient/Caregiver understanding: yes     Outreach Frequency: 2 weeks  Future Appointments              [facility]          Plan:   Patient will continue monitoring her oxygen levels and breathing status - she is to call with questions/concerns - she has been encouraged to call EMS if needed   CCRN will send note to pcp regarding prednisone and f/u with patient after pcp recommendations     Korin Wang Care Coordinator RN  [facility]  [phone]  June 22, 2018

## 2018-06-25 NOTE — PROGRESS NOTES
Progress Note    Client Name: Karen Alex  Date: 6/25/2018                                           Service Type: Individual      Session Start Time:  12:35  Session End Time: 1:30      Session Length: 45 - 50     Session #: 32     Attendees: Client attended alone    Treatment Plan Last Completed Date: 5/15/18  PHQ-9 / HUNG-7 Last Completed Date: last completed 6/4/2018                 DATA      Progress Since Last Session (Related to Symptoms / Goals / Homework):   Symptoms: Stable-scores remain high- son out of country on  duty     Homework: Partially completed- has been challenging with current health      Episode of Care Goals: Minimal progress - ACTION (Actively working towards change); Intervened by reinforcing change plan / affirming steps taken     Current / Ongoing Stressors and Concerns:   Client reports that she continues to tour different assisted living facilities.  She still struggles with acceptance of moving into assisted living.  Son was back for the weekend and shared with her that he  will remain deployed for the next 10 months.  Client admits to fear and struggles with the thought that her son will not be around.  She continues to get support from her siblings and boyfriend.   Processed through accepting the changes that are occurring and son not being present to provide support.   Client has anger with some of her current medical providers- more around having to face her incapacities and loss of independence.  She does agree that the move would be beneficial and provide relief for her son while he is deployed.       Treatment Objective(s) Addressed in This Session:   focus on being socially active and not isolating at home due to medical condition   Using acceptance skills to understand her level of depression and what she has control over       Intervention:   CBT: Working through acceptance of health and needing to move to get additional  "supports        ASSESSMENT: Current Emotional / Mental Status (status of significant symptoms):   Risk status (Self / Other harm or suicidal ideation)  Client denies a history of suicidal ideation, suicide attempts, self-injurious behavior, homicidal ideation, homicidal behavior and and other safety concerns   Client denies current fears or concerns for personal safety.   Client reports the following current or recent suicidal ideation or behaviors: passive thoughts of not wanting to live.  Denies any plan or intent.  \"Would not do that to my son\".   Client denies current or recent homicidal ideation or behaviors.   Client denies current or recent self injurious behavior or ideation.   Client denies other safety concerns.   A safety and risk management plan has not been developed at this time, however client was given the after-hours number should there be a change in any of these risk factors.     Appearance:   Appropriate    Eye Contact:   Fair    Psychomotor Behavior: Normal    Attitude:   Cooperative    Orientation:   All   Speech    Rate / Production: Normal     Volume:  Soft    Mood:    Depressed  Sad    Affect:    Flat    Thought Content:  Clear    Thought Form:  Focused on grief   Insight:    Fair      Medication Review:   No changes to current psychiatric medication(s)     Medication Compliance:   Yes- see with Sara Leonardo PA-C at [facility]     Changes in Health Issues:   Yes: Respiratory Disease, No Psychological Distress-struggling with breathing, memory, and gets around in wheelchair     Chemical Use Review:   Substance Use: Chemical use reviewed, no active concerns identified      Tobacco Use: No current tobacco use.   Almost celebrating her one year anniversary     Collateral Reports Completed:   Not Applicable    PLAN: (Client Tasks / Therapist Tasks / Other)  1.   Client will follow through on plan as discussed in her care conference- work on recognizing her emotions and accepting higher " level of care.     Lupis Mcgee, LICSW     __________________________________________________________________________________________________                                                           Treatment Plan    Client's Name: Karen Alex  YOB: 1955    Date: 9/22/2015       DSM-V Diagnoses: 296.32 - Major Depressive Disorder, Recurrent, Moderate    300.02 - Generalized Anxiety Disorder    309.81 - Posttraumatic Stress Disorder By History; 799.9 - Deferred Diagnosis   WHODAS:  35    Referral / Collaboration:  Referral to another professional/service is not indicated at this time..    Anticipated number of session or this episode of care: 12      MeasurableTreatment Goal(s) related to diagnosis / functional impairment(s)  Goal 1: Client will have stability with her mental health as evidenced by PHQ-9 and HUNG-7 scores as well as self-report.      I will know I've met my goal when I start feeling better about myself.      Objective #A (Client Action)      Status: Continued - Date(s): 5/15/2018           Client will Decrease frequency and intensity of feeling down, depressed, hopeless.    Intervention(s)  Therapist will assign homework by learning to build awareness of her triggers which activate her mental health symptoms.  Therapist will introduce and have client implement strategies to address triggers.    Objective #B  Client will Identify negative self-talk and behaviors: challenge core beliefs, myths, and actions.  Status: Continued - Date(s): 5/15/2018         Intervention(s)  Therapist will continue to help client identify and reframe negative perceptions of self.  Work on ways to increase self-esteem.    Objective #C  Client will Feel less tired and more energy during the day .  Status: Completed - Date: 3/2/16     Intervention(s)  Therapist will assign homework 1.  Complete at least one household activity daily; 2.  Focus on getting out of the house at least three times  weekly; 3.  Look into entering social groups.      Client has reviewed and agreed to the above plan.      Lupis Mcgee, [facility]  September 22, 2015

## 2018-06-25 NOTE — PROGRESS NOTES
Clinic Care Coordination Contact  [facility]    Referral Source: IP Report  Clinical Data: Care Coordinator Outreach - CCRN had sent message to pcp regarding prednisone dosing and if patient should continue with 10 mg daily after her taper - pcp has placed an order for patient to continue taking prednisone 10 mg     Outreach attempted x 1.  CCRN left detailed message (noted in chart demographics okay to do)  regarding continuation of predisone 10 mg daily - new rx sent to pharmacy by pcp.   Asked patient to return call to CCRN with questions/concerns   Plan: Care Coordinator will f/u with patient 2 weeks as previously planned - currently active with [facility]     Korin Wang Care Coordinator RN  [facility] and [facility]  [phone]  June 25, 2018

## 2018-07-06 NOTE — PROGRESS NOTES
SUBJECTIVE:   Karen lAex is a 63 year old female who presents to clinic today for the following health issues:      COPD Follow-Up    Symptoms are currently: stable    Current fatigue or dyspnea with ambulation: stable     Shortness of breath: stable    Increased or change in Cough/Sputum: No    Fever(s): No    Baseline ambulation without stopping to rest:  unsure . Able to walk up unable flights of stairs without stopping to rest.    Any ER/UC or hospital admissions since your last visit? No      2)patient reports she was to return for a blood pressure recheck     History   Smoking Status     Former Smoker     Packs/day: 0.50     Years: 26.00     Types: Cigarettes     Quit date: 7/3/2015   Smokeless Tobacco     Never Used     Comment: sometimes     No results found for: FEV1, YZJ1CKD    Amount of exercise or physical activity: None    Problems taking medications regularly: No    Medication side effects: none    Diet: regular (no restrictions)      On exam the vital signs are stable  Weight is Body mass index is 36.43 kg/(m^2).   Eyes show edith   No neck masses or thyromegaly.Ear nose and throat shows normal   No bruits, murmers, rubs or extrasounds. No cardiomegaly or chest wall tenderness.no abdominal masses or organomegaly. No CVA tenderness.  Skin eval no rash, distant breath sounds, limited chest excursion,   No hernias, good range of motion neck, back and extremities. No abnormal skin lesions. Normal genitalia. Good peripheral pulses. No adenopathy.  Normal gait and stance. Neck is supple.  Back exam shows kyphosis    (Z23) Need for pneumococcal vaccination  (primary encounter diagnosis)  Comment:   Plan: PNEUMOCOCCAL CONJ VACCINE 13 VALENT IM          (Z23) Need for pneumococcal vaccination  (primary encounter diagnosis)  Comment:   Plan: PNEUMOCOCCAL CONJ VACCINE 13 VALENT IM            (Z87.891) Personal history of tobacco use, presenting hazards to health  Comment:   Plan: not smoking  now    (J44.1) Chronic obstructive pulmonary disease with acute exacerbation (H)  Comment:   Plan: severe, sees Pulmonary this month     (F41.1) Generalized anxiety disorder  Comment:   Plan: severe: has Psych managing

## 2018-07-06 NOTE — TELEPHONE ENCOUNTER
Alethea from [facility] called in requesting VO for OT/Lymphadema therapy to start next week: 2/week for 3 weeks.   I did give VO for these. Pt was seen today by PCP.     Alethea: [phone]    Leyda Martinez RN -- [facility]

## 2018-07-06 NOTE — MR AVS SNAPSHOT
After Visit Summary   7/6/2018    Karen Alex    MRN: [id number]           Patient Information     Date Of Birth          1955        Visit Information        Provider Department      7/6/2018 11:15 AM Eros Rebolledo MD [facility]         Follow-ups after your visit        Follow-up notes from your care team     Return in about 6 weeks (around 8/17/2018) for Routine Visit.      Your next 10 appointments already scheduled     Jul 31, 2018  2:30 PM CDT   Return Visit with MAURY Lemos   Merged with [facility] ([facility])    [address] [place] [id number] [phone]            Aug 20, 2018  1:30 PM CDT   Return Visit with MAURY Lemos   Merged with [facility] ([address] [id number] [phone]              Who to contact     If you have questions or need follow up information about today's clinic visit or your schedule please contact [facility] directly at [phone].  Normal or non-critical lab and imaging results will be communicated to you by MyChart, letter or phone within 4 business days after the clinic has received the results. If you do not hear from us within 7 days, please contact the clinic through MyChart or phone. If you have a critical or abnormal lab result, we will notify you by phone as soon as possible.  Submit refill requests through [employer] or call your pharmacy and they will forward the refill request to us. Please allow 3 business days for your refill to be completed.          Additional Information About Your Visit        MyChart Information     [employer] gives you secure access to your electronic health record. If you see a primary care provider, you can also send messages to your care team and make appointments. If you have questions, please call  your primary care clinic.  If you do not have a primary care provider, please call [phone] and they will assist you.        Care EveryWhere ID     This is your Care EveryWhere ID. This could be used by other organizations to access your [place] medical records  [id number]        Your Vitals Were     Pulse Temperature Respirations Last Period Pulse Oximetry BMI (Body Mass Index)    93 98.3  F (36.8  C) (Oral) 16 12/01/2007 85% 36.43 kg/m2       Blood Pressure from Last 3 Encounters:   07/06/18 128/70   06/15/18 111/73   06/11/18 109/68    Weight from Last 3 Encounters:   07/06/18 232 lb 9.4 oz (105.5 kg)   06/15/18 240 lb (108.9 kg)   06/11/18 243 lb 3.2 oz (110.3 kg)              Today, you had the following     No orders found for display       Primary Care Provider Office Phone # Fax #    Eros Rebolledo [phone] [address] [facility] [address]        Goals        General    Medical (pt-stated)     Notes - Note created  6/22/2018  1:38 PM by Korin Wang RN    I will monitor my oxygen level using pulse oximeter - if oxygen level is below 88% I will seek medical attention   As of today's date 6/22/2018 goal is met at 0 - 25%.   Goal Status:  Active          Equal Access to Services     [facility]: Hadii omid woodard hadasho Sodinoraali, waaxda luqadaha, qaybta kaalmada rj dainel . [facility] [phone].    ATENCIÓN: Si habla español, tiene a zaragoza disposición servicios gratuitos de asistencia lingüística. Llame al [phone].    We comply with applicable federal civil rights laws and [place] laws. We do not discriminate on the basis of race, color, national origin, age, disability, sex, sexual orientation, or gender identity.            Thank you!     Thank you for choosing [facility]  for your care. Our goal is always to provide you with excellent care. Hearing back from our patients is one way we can  continue to improve our services. Please take a few minutes to complete the written survey that you may receive in the mail after your visit with us. Thank you!             Your Updated Medication List - Protect others around you: Learn how to safely use, store and throw away your medicines at www.disposemymeds.org.          This list is accurate as of 7/6/18 12:05 PM.  Always use your most recent med list.                   Brand Name Dispense Instructions for use Diagnosis    ABILIFY 5 MG tablet   Generic drug:  ARIPiprazole      Take 5 mg by mouth daily        ACE/ARB/ARNI NOT PRESCRIBED (INTENTIONAL)      Please choose reason not prescribed, below    Hyperlipidemia LDL goal <130       ASPIRIN PO      Take 81 mg by mouth daily        atorvastatin 40 MG tablet    LIPITOR    90 tablet    Take 1 tablet (40 mg) by mouth daily    Coronary artery disease of native heart with stable angina pectoris, unspecified vessel or lesion type (H)       BETA BLOCKER NOT PRESCRIBED (INTENTIONAL)     1 each    Beta Blocker not prescribed intentionally due to COPD and Hx of Class IV Heart Failure    Chronic obstructive pulmonary disease with acute exacerbation (H)       budesonide-formoterol 80-4.5 MCG/ACT Inhaler    SYMBICORT    1 Inhaler    Inhale 2 puffs into the lungs 2 times daily    COPD exacerbation (H)       buPROPion 300 MG 24 hr tablet    WELLBUTRIN XL    90 tablet    Take 1 tablet (300 mg) by mouth every morning    Major depressive disorder, recurrent episode, in partial remission (H)       DULOXETINE HCL PO      Take 90 mg by mouth daily        gabapentin 300 MG capsule    NEURONTIN    90 capsule    Take 1 capsule (300 mg) by mouth At Bedtime    Recurrent major depressive disorder, in partial remission (H)       isosorbide mononitrate 30 MG 24 hr tablet    IMDUR    90 tablet    Take 1 tablet (30 mg) by mouth daily    Chest pain, unspecified type, Coronary artery disease involving native coronary artery of native heart  with angina pectoris (H)       MELATONIN PO      Take 5 mg by mouth At Bedtime        nitroGLYcerin 0.4 MG sublingual tablet    NITROSTAT    25 tablet    Place 1 tablet (0.4 mg) under the tongue every 5 minutes as needed for chest pain prn    Chest pain       order for DME     1 each    Equipment being ordered: Walker Wheels () and Walker () Treatment Diagnosis: Gait instability    Chronic obstructive pulmonary disease with acute exacerbation (H)       order for DME     1 Units    Equipment being ordered: Walker Wheels () Treatment Diagnosis: severe COPD    Chronic obstructive pulmonary disease with acute exacerbation (H)       order for DME     1 Units    Equipment being ordered: Walker () Treatment Diagnosis: severe COPD    Chronic obstructive pulmonary disease with acute exacerbation (H)       predniSONE 10 MG tablet    DELTASONE    30 tablet    Take 1 tablet (10 mg) by mouth daily    Chronic obstructive pulmonary disease with acute exacerbation (H)       * QUEtiapine 200 MG tablet    SEROquel    90 tablet    Take 1 tablet (200 mg) by mouth At Bedtime    HUNG (generalized anxiety disorder)       * QUEtiapine 50 MG tablet    SEROQUEL    30 tablet    Take 1 tablet (50 mg) by mouth daily    Major depressive disorder, recurrent episode, mild (H)       STOOL SOFTENER PO      Take 100 mg by mouth daily        tiotropium 18 MCG capsule    SPIRIVA    90 capsule    Inhale 1 capsule (18 mcg) into the lungs daily    Generalized anxiety disorder, Chronic obstructive pulmonary disease, unspecified COPD type (H)       * VENTOLIN  (90 Base) MCG/ACT Inhaler   Generic drug:  albuterol     18 g    SHAKE WELL AND INHALE 1 TO 2 PUFFS INTO THE LUNGS EVERY 4 HOURS AS NEEDED FOR SHORTNESS OF BREATH, DIFFICULTY BREATHING OR WHEEZING.    Panlobular emphysema (H)       * albuterol (2.5 MG/3ML) 0.083% neb solution     120 vial    Take 1 vial (2.5 mg) by nebulization 4 times daily as needed    COPD exacerbation (H)        Vitamin D3 2000 units Chew      Take 2,000 mg by mouth daily        * Notice:  This list has 4 medication(s) that are the same as other medications prescribed for you. Read the directions carefully, and ask your doctor or other care provider to review them with you.

## 2018-07-09 NOTE — TELEPHONE ENCOUNTER
Controlled Substance Refill Request for Hydrocodone  Problem List Complete:  No     PROVIDER TO CONSIDER COMPLETION OF PROBLEM LIST AND OVERVIEW/CONTROLLED SUBSTANCE AGREEMENT    Last Written Prescription Date:  06/04/18  Last Fill Quantity: 30,   # refills: 0    Last Office Visit with [facility] primary care provider: 07/06/18 w/Dr Kesha Abad Office visit:   Next 5 appointments (look out 90 days)     Jul 31, 2018  2:30 PM CDT   Return Visit with Lupis Mcgee [facility] ([address] [id number] [phone]            Aug 20, 2018  1:30 PM CDT   Return Visit with Lupis Mcgee [facility] ([address] [id number] [phone]                  Controlled substance agreement on file: No.     Processing:  Staff will hand deliver Rx to on-site pharmacy   checked in past 3 months?  No, route to RN    Requested Prescriptions   Pending Prescriptions Disp Refills     QUEtiapine (SEROQUEL) 50 MG tablet 30 tablet 1     Sig: Take 1 tablet (50 mg) by mouth daily    There is no refill protocol information for this order        HYDROcodone-acetaminophen (NORCO) 5-325 MG per tablet 30 tablet 0     Sig: Take one pill  daily as needed, not to be taken with diazepam    There is no refill protocol information for this order        Last Written Prescription Date:  05/29/18  Last Fill Quantity: 30,  # refills: 1   Last office visit: 7/6/2018 with prescribing provider:  Dr Kesha Abad Office Visit:   Next 5 appointments (look out 90 days)     Jul 31, 2018  2:30 PM CDT   Return Visit with Lupis Mcgee [facility] ([facility])    [address] [place] [id number] [phone]            Aug 20, 2018  1:30 PM CDT   Return Visit  with Lupis Mcgee, [facility] ([facility])    [address] [id number] [phone]

## 2018-07-11 NOTE — TELEPHONE ENCOUNTER
Routing refill request to provider to review approval because:  Drug not on the [facility], UMP or  Health refill protocol or controlled substance, psych managing?     updated, at bronze  Last refill per : 6/13/18  Estelle Haynes RN, BSN  Message handled by Nurse Triage.

## 2018-07-11 NOTE — TELEPHONE ENCOUNTER
Walked script over to [facility] pharmacy for the Hydrocodone-acetaminophen 5-325MG.      Sherry CARDOSO TC    07/11/18 2:11 PM

## 2018-07-16 NOTE — TELEPHONE ENCOUNTER
Nitish Quick RN with [facility]   Requests verbal order for 1 x visit to discontinue home care skilled nurse visits.   Informed approved per standing orders.   Home Care staff will fax order for MD signature.   Chaim Slaughter RN

## 2018-07-30 NOTE — PROGRESS NOTES
Clinic Care Coordination Contact    Clinic Care Coordination Contact  OUTREACH    Referral Information:  Referral Source: IP Report    Primary Diagnosis: COPD    Chief Complaint   Patient presents with     Clinic Care Coordination - Follow-up     phone call from patient, CCRN         [place] Utilization:   Clinic Utilization  Difficulty keeping appointments:: No  Utilization    Last refreshed: 7/19/2018 10:53 AM:  No Show Count (past year) 1       Last refreshed: 7/19/2018 10:53 AM:  ED visits 2       Last refreshed: 7/19/2018 10:53 AM:  Hospital admissions 5          Current as of: 7/19/2018 10:53 AM             Clinical Concerns:  Current Medical Concerns:    COPD - patient is managing okay right now, she states that she went for a very short walk yesterday and her oxygen saturation dropped to 66% with 5 LPM of oxygen. She was able to stop rest and recover   Next pulmonology appt October - was the soonest she could get in.   Patient is currently working with  home care team, RN/PT/OT   CCRN advised to continue monitoring for worsening cough/sob and call if she needs an appt. with pcp     Current Behavioral Concerns: no concerns noted   PHQ-9 SCORE 4/17/2018 5/15/2018 6/4/2018   Total Score - - -   Total Score MyChart - - -   Total Score 19 20 24     Education Provided to patient: continue to monitor and call with worsening COPD symptoms if appt. Needed in clinic or present to ER with emergent symptoms      Health Maintenance Reviewed: Due/Overdue   Health Maintenance Due   Topic Date Due     HF ACTION PLAN Q3 YR  1955     URINE DRUG SCREEN Q1 YR  05/18/1972     HIV SCREEN (SYSTEM ASSIGNED)  05/18/1975     LIPID MONITORING Q6 MO  11/15/2017     DEXA Q3 YR  01/29/2018       Clinical Pathway: None    Medication Management:  Patient calling to inquire if she needs an appt. To have her meds refilled? CCRN asked which medications she needed filled? And patient states all of them.   CCRN advised that she should  call in the refills to the pharmacy - the pharmacy will contact Dr. Rebolledo and if there is something specific he needs to see her for he will then place a note.   CCRN did not see an obvious concern with obtaining refills as patient was in on 7/9/18     Current Outpatient Prescriptions   Medication     ACE/ARB/ARNI NOT PRESCRIBED, INTENTIONAL,     albuterol (2.5 MG/3ML) 0.083% neb solution     ARIPiprazole (ABILIFY) 5 MG tablet     ASPIRIN PO     atorvastatin (LIPITOR) 40 MG tablet     BETA BLOCKER NOT PRESCRIBED, INTENTIONAL,     budesonide-formoterol (SYMBICORT) 80-4.5 MCG/ACT Inhaler     buPROPion (WELLBUTRIN XL) 300 MG 24 hr tablet     Cholecalciferol (VITAMIN D3) 2000 UNITS CHEW     Docusate Calcium (STOOL SOFTENER PO)     DULOXETINE HCL PO     gabapentin (NEURONTIN) 300 MG capsule     HYDROcodone-acetaminophen (NORCO) 5-325 MG per tablet     isosorbide mononitrate (IMDUR) 30 MG 24 hr tablet     MELATONIN PO     nitroGLYcerin (NITROSTAT) 0.4 MG sublingual tablet     order for DME     order for DME     order for DME     predniSONE (DELTASONE) 10 MG tablet     QUEtiapine (SEROQUEL) 200 MG tablet     QUEtiapine (SEROQUEL) 50 MG tablet     tiotropium (SPIRIVA) 18 MCG capsule     VENTOLIN  (90 Base) MCG/ACT Inhaler     No current facility-administered medications for this visit.         Functional Status:  Dependent ADLs:: Ambulation-walker  Bed or wheelchair confined:: No  Mobility Status: Independent w/Device    Living Situation:  Current living arrangement:: I live in a private home with family, I live in a private home, I live alone (in 3rd floor apartment - sister visits often she lives in [place] - sister does not drive but will stay with patient when she is not doig well )  Type of residence:: Apartment    Diet/Exercise/Sleep:  Diet:: Regular  Inadequate nutrition (GOAL):: No  Food Insecurity: No  Tube Feeding: No  Exercise:: Currently not exercising (walking [place] the [place] at apartment complex  )  Inadequate activity/exercise (GOAL):: Yes  Significant changes in sleep pattern (GOAL): No    Transportation:  Transportation concerns (GOAL):: No  Transportation means:: Regular car (patient drives at baseline)     Psychosocial:  Mental health DX:: Yes  Mental health DX how managed:: Medication, [facility] at Primary Care  Mental health management concern (GOAL):: No  Informal Support system:: Family     Financial/Insurance:   Financial/Insurance concerns (GOAL):: Yes     Resources and Interventions:  Current Resources:   List of home care services:: Skilled Nursing, Physicial Therapy, Occupational Therapy;      Supplies used at home:: Oxygen Tubing/Supplies (3.5 LPM inogen )  Equipment Currently Used at Home: oxygen, raised toilet, walker, rolling (2.5 LPM - [facility] )    Advance Care Plan/Directive  Advanced Care Plans/Directives on file:: No  Advanced Care Plan/Directive Status: Not Applicable          Goals:   Goals        General    Medical (pt-stated)     Notes - Note edited  7/30/2018  9:15 AM by Korin Wang RN    I will monitor my oxygen level using pulse oximeter - if oxygen level is below 88% I will seek medical attention   As of today's date 7/30/2018 goal is met at 26 - 50%.   Goal Status:  Active                 Patient/Caregiver understanding: yes     Outreach Frequency: 4 weeks home care active     Future Appointments              Tomorrow Lupis Mcgee [facility]    In 3 weeks Lupis Mcgee [facility]          Plan:   Patient will call her refills into the pharmacy for approval from pcp - if she needs to be seen she will get a specific message   Patient will continue to work with home care team   Patient will continue to monitor her oxygen saturation and present to ER if needed   CCRN will check in with patient in 3-4 weeks as she  is still active with home care - patient will call CCRN if needed prior to that time     Korin Wang Care Coordinator RN  [facility]  [phone]  July 30, 2018

## 2018-07-30 NOTE — PROGRESS NOTES
Clinic Care Coordination Contact  [facility]/Voicemail    Referral Source: IP Report  Clinical Data: Care Coordinator Outreach - CCRN received voicemail from patient, she stated that she needs a refill of a medication (did not specify which one) however she was given the message that she needed to see pcp - She states she just saw pcp at the beginning of the month and wonders if she needs to come in again?     Outreach attempted x 1.  Left message on voicemail with call back information and requested return call.    Plan:  Care Coordinator will try to reach patient again in 1-2 business days.    Korin Wang Care Coordinator RN  [facility]  [phone]  July 30, 2018

## 2018-07-31 NOTE — TELEPHONE ENCOUNTER
Client reports that forgot about today's appointment.  Client reports that she is making the decision to move into assisted living.  She is awaiting her brother's return to help with the decisonmaking and moving.  Encouraged her to make the tour and follow through with decision making.  Verified the next appointment.    RUTHIE Rosas, [facility]  Outpatient Clinic Therapist  [facility]  [phone]

## 2018-08-02 NOTE — PROGRESS NOTES
Clinic Care Coordination Contact    Clinic Care Coordination Contact  OUTREACH    Referral Information:  Referral Source: IP Report    Primary Diagnosis: COPD    Chief Complaint   Patient presents with     Clinic Care Coordination - Follow-up     TRU, SERENA         [place] Utilization:   Clinic Utilization  Difficulty keeping appointments:: No  Utilization    Last refreshed: 7/31/2018  3:18 PM:  No Show Count (past year) 1       Last refreshed: 7/31/2018  3:18 PM:  ED visits 2       Last refreshed: 7/31/2018  3:18 PM:  Hospital admissions 5          Current as of: 7/31/2018  3:18 PM           Clinical Concerns:  Current Medical Concerns:  CCRN received call from patient     COPD, patient sounds out of breath today   She reports that she has her oxygen on 3-5LPM and oxygen saturation drops 78% at times with activity     Patient is calling today because she has decided that she does need assistance and needs to go into an assisted living facility.   She is scared about this.   [facility] had been out to help her with this in June however patient had declined following through with [facility] at that time.   Now patient is asking for [facility] to come back out to see her     CCRN placed call to [facility] - and was advised that patient's episode closed on 7/27/18 and would require new order from pcp - CCRN send message to Honey [facility] via email to see if this is the best route     Patient has her sister with her today   Patient is worried as she will not be able to drive and hasnt been able to for a while     Current Behavioral Concerns: Depression, PTSD, anxiety   Patient has been working with [facility] center therapist     PHQ-9 SCORE 4/17/2018 5/15/2018 6/4/2018   Total Score - - -   Total Score MyChart - - -   Total Score 19 20 24     HUNG-7 SCORE 4/17/2018 5/15/2018 6/4/2018   Total Score - - -   Total Score - - -   Total Score 17 16 20       Education Provided to patient: continue to monitor for increasing shortness of  breath/cough       Health Maintenance Reviewed: Due/Overdue   Health Maintenance Due   Topic Date Due     HF ACTION PLAN Q3 YR  1955     URINE DRUG SCREEN Q1 YR  05/18/1972     HIV SCREEN (SYSTEM ASSIGNED)  05/18/1975     LIPID MONITORING Q6 MO  11/15/2017     DEXA Q3 YR  01/29/2018       Clinical Pathway: None    Medication Management:  Current Outpatient Prescriptions   Medication     ACE/ARB/ARNI NOT PRESCRIBED, INTENTIONAL,     albuterol (2.5 MG/3ML) 0.083% neb solution     ARIPiprazole (ABILIFY) 5 MG tablet     ASPIRIN PO     atorvastatin (LIPITOR) 40 MG tablet     BETA BLOCKER NOT PRESCRIBED, INTENTIONAL,     budesonide-formoterol (SYMBICORT) 80-4.5 MCG/ACT Inhaler     buPROPion (WELLBUTRIN XL) 300 MG 24 hr tablet     Cholecalciferol (VITAMIN D3) 2000 UNITS CHEW     Docusate Calcium (STOOL SOFTENER PO)     DULOXETINE HCL PO     gabapentin (NEURONTIN) 300 MG capsule     HYDROcodone-acetaminophen (NORCO) 5-325 MG per tablet     isosorbide mononitrate (IMDUR) 30 MG 24 hr tablet     MELATONIN PO     nitroGLYcerin (NITROSTAT) 0.4 MG sublingual tablet     order for DME     order for DME     order for DME     predniSONE (DELTASONE) 10 MG tablet     QUEtiapine (SEROQUEL) 200 MG tablet     QUEtiapine (SEROQUEL) 50 MG tablet     tiotropium (SPIRIVA) 18 MCG capsule     VENTOLIN  (90 Base) MCG/ACT Inhaler     No current facility-administered medications for this visit.         Functional Status:  Dependent ADLs:: Ambulation-walker  Bed or wheelchair confined:: No  Mobility Status: Independent w/Device    Living Situation:  Current living arrangement:: I live in a private home with family, I live in a private home, I live alone (in 3rd floor apartment - sister visits often she lives in [place] - sister does not drive but will stay with patient when she is not doing well )  Type of residence:: Apartment    Diet/Exercise/Sleep:  Diet:: Regular  Inadequate nutrition (GOAL):: No  Food Insecurity: No  Tube  Feeding: No  Exercise:: Currently not exercising (walking in the [place] at apartment complex )  Inadequate activity/exercise (GOAL):: Yes  Significant changes in sleep pattern (GOAL): No    Transportation:  Transportation concerns (GOAL):: No  Transportation means:: Regular car (patient drives at baseline)     Psychosocial:  Mental health DX:: Yes  Mental health DX how managed:: Medication, [facility] Services at Primary Care  Mental health management concern (GOAL):: No  Informal Support system:: Family     Financial/Insurance:   Financial/Insurance concerns (GOAL):: Yes     Resources and Interventions:  Current Resources:    ;   Community Resources: None  Supplies used at home:: Oxygen Tubing/Supplies (3.5 LPM inogen )  Equipment Currently Used at Home: oxygen, raised toilet, walker, rolling (2.5 LPM - [facility] )    Advance Care Plan/Directive  Advanced Care Plans/Directives on file:: No  Advanced Care Plan/Directive Status: Not Applicable          Goals:   Goals        General    Medical (pt-stated)     Notes - Note edited  8/2/2018  2:46 PM by Korin Wang, RN    I will monitor my oxygen level using pulse oximeter - if oxygen level is below 88% I will seek medical attention   As of today's date 8/2/2018 goal is met at 51 - 75%.   Goal Status:  Active                  Patient/Caregiver understanding: yes     Outreach Frequency: 2 weeks  Future Appointments              In 2 weeks Lupis Mcgee [facility]          Plan:   Patient will continue monitoring COPD symptoms and call with concerns or present to ER if emergent   CCRN sent email to [facility]  Honey Christina and will await her response     Korin Wang Care Coordinator RN  [facility]  [phone]  August 2, 2018

## 2018-08-03 NOTE — PROGRESS NOTES
Clinic Care Coordination Contact    CCRN received 2 phone calls from patient advising that she had a ride to the clinic     On the second phone call patient sounded a bit confused - (she had already called for the same reason a few mins prior and did not seem to remember) And patient had previously told CCRN her brother in law from [place] was going to bring her to the clinic - and that her son was out of state -   the second phone call patient stated her son was driving her to clinic and that he was not out of state     CCRN concerned that patient may need to be seen in the ER instead of the clinic due to symptoms - CCRN placed call back to patient however received her voicemail - CCRN did leave a message asking patient to go to the ER so she can have labs drawn and get results right away (possible hypercapnia causing shakiness)     Korin Wang Care Coordinator RN  [facility]  [phone]  August 3, 2018

## 2018-08-03 NOTE — MR AVS SNAPSHOT
After Visit Summary   8/3/2018    Karen Alex    MRN: [id number]           Patient Information     Date Of Birth          1955        Visit Information        Provider Department      8/3/2018 4:15 PM Micah Van MD [facility]        Today's Diagnoses     Tremor    -  1    Hyperlipidemia LDL goal <130        Generalized anxiety disorder        Screening for HIV (human immunodeficiency virus)        Elevated troponin        Bipolar 2 disorder (H)        Physical deconditioning           Follow-ups after your visit        Additional Services     CARDIOLOGY EVAL ADULT REFERRAL       Preferred location:  [facility] [phone]   [email or web address]    Please be aware that coverage of these services is subject to the terms and limitations of your health insurance plan.  Call member services at your health plan with any benefit or coverage questions.      Please bring the following to your appointment:  Any x-rays, CTs or MRIs which have been performed. Contact the facility where they were done to arrange for  prior to your scheduled appointment.    List of current medications  This referral request   Any documents/labs given to you for this referral                  Follow-up notes from your care team     Return in about 1 week (around 8/10/2018).      Your next 10 appointments already scheduled     Aug 20, 2018  1:30 PM CDT   Return Visit with MAURY Lemos   [facility] ([facility])    [address] [address] [phone]              Who to contact     If you have questions or need follow up information about today's clinic visit or your schedule please contact [facility] directly at [phone].  Normal or non-critical lab and imaging results will be communicated to  you by MyChart, letter or phone within 4 business days after the clinic has received the results. If you do not hear from us within 7 days, please contact the clinic through [employer] or phone. If you have a critical or abnormal lab result, we will notify you by phone as soon as possible.  Submit refill requests through [employer] or call your pharmacy and they will forward the refill request to us. Please allow 3 business days for your refill to be completed.          Additional Information About Your Visit        navigayaharXormis Information     [employer] gives you secure access to your electronic health record. If you see a primary care provider, you can also send messages to your care team and make appointments. If you have questions, please call your primary care clinic.  If you do not have a primary care provider, please call [phone] and they will assist you.        Care EveryWhere ID     This is your Care EveryWhere ID. This could be used by other organizations to access your [place] medical records  [id number]        Your Vitals Were     Pulse Temperature Respirations Last Period Pulse Oximetry BMI (Body Mass Index)    107 98.7  F (37.1  C) (Oral) 14 12/01/2007 90% 36.34 kg/m2       Blood Pressure from Last 3 Encounters:   08/03/18 104/70   07/06/18 128/70   06/15/18 111/73    Weight from Last 3 Encounters:   08/03/18 232 lb (105.2 kg)   07/06/18 232 lb 9.4 oz (105.5 kg)   06/15/18 240 lb (108.9 kg)              We Performed the Following     CARDIOLOGY EVAL ADULT REFERRAL     Comprehensive metabolic panel     HIV Antigen Antibody Combo     Lipid panel reflex to direct LDL Non-fasting     TSH with free T4 reflex          Today's Medication Changes          These changes are accurate as of 8/3/18 11:59 PM.  If you have any questions, ask your nurse or doctor.               Start taking these medicines.        Dose/Directions    primidone 50 MG tablet   Commonly known as:  MYSOLINE   Used for:  Tremor   Started by:   Micah Van MD        Take 1/2 tablet (25 mg) at bedtime   Quantity:  15 tablet   Refills:  0            Where to get your medicines      These medications were sent to [facility] [address]  [address] [facility] [address]     Phone:  [phone]     primidone 50 MG tablet                Primary Care Provider Office Phone # Fax #    Eros Bayron Rebolledo [phone] [address] [facility] [address]        Goals        General    Medical (pt-stated)     Notes - Note edited  8/2/2018  2:46 PM by Korin Wang, RN    I will monitor my oxygen level using pulse oximeter - if oxygen level is below 88% I will seek medical attention   As of today's date 8/2/2018 goal is met at 51 - 75%.   Goal Status:  Active              Equal Access to Services     [facility]: Hadii omid woodard hadasho Sonatalie, waaxda luqadaha, qaybta kaalmada aderaymundoyada, rj ramirez . So [facility] [phone].    ATENCIÓN: Si habla español, tiene a zaragoza disposición servicios gratuitos de asistencia lingüística. Llame al [phone].    We comply with applicable federal civil rights laws and [place] laws. We do not discriminate on the basis of race, color, national origin, age, disability, sex, sexual orientation, or gender identity.            Thank you!     Thank you for choosing [facility]  for your care. Our goal is always to provide you with excellent care. Hearing back from our patients is one way we can continue to improve our services. Please take a few minutes to complete the written survey that you may receive in the mail after your visit with us. Thank you!             Your Updated Medication List - Protect others around you: Learn how to safely use, store and throw away your medicines at www.disposemymeds.org.          This list is accurate as of 8/3/18 11:59 PM.  Always use your most recent med list.                    Brand Name Dispense Instructions for use Diagnosis    ABILIFY 5 MG tablet   Generic drug:  ARIPiprazole      Take 5 mg by mouth daily        ACE/ARB/ARNI NOT PRESCRIBED (INTENTIONAL)      Please choose reason not prescribed, below    Hyperlipidemia LDL goal <130       * albuterol (2.5 MG/3ML) 0.083% neb solution     120 vial    Take 1 vial (2.5 mg) by nebulization 4 times daily as needed    COPD exacerbation (H)       * VENTOLIN  (90 Base) MCG/ACT Inhaler   Generic drug:  albuterol     18 g    SHAKE WELL AND INHALE 1 TO 2 PUFFS INTO THE LUNGS EVERY 4 HOURS AS NEEDED FOR SHORTNESS OF BREATH, DIFFICULTY BREATHING OR WHEEZING.    Panlobular emphysema (H)       ASPIRIN PO      Take 81 mg by mouth daily        atorvastatin 40 MG tablet    LIPITOR    90 tablet    Take 1 tablet (40 mg) by mouth daily    Coronary artery disease of native heart with stable angina pectoris, unspecified vessel or lesion type (H)       BETA BLOCKER NOT PRESCRIBED (INTENTIONAL)     1 each    Beta Blocker not prescribed intentionally due to COPD and Hx of Class IV Heart Failure    Chronic obstructive pulmonary disease with acute exacerbation (H)       budesonide-formoterol 80-4.5 MCG/ACT Inhaler    SYMBICORT    1 Inhaler    Inhale 2 puffs into the lungs 2 times daily    COPD exacerbation (H)       buPROPion 300 MG 24 hr tablet    WELLBUTRIN XL    90 tablet    Take 1 tablet (300 mg) by mouth every morning    Major depressive disorder, recurrent episode, in partial remission (H)       DULOXETINE HCL PO      Take 90 mg by mouth daily        gabapentin 300 MG capsule    NEURONTIN    90 capsule    Take 1 capsule (300 mg) by mouth At Bedtime    Recurrent major depressive disorder, in partial remission (H)       HYDROcodone-acetaminophen 5-325 MG per tablet    NORCO    30 tablet    Take one pill  daily as needed, not to be taken with diazepam    Mechanical low back pain, Other chronic pain       isosorbide mononitrate 30 MG 24 hr tablet     IMDUR    90 tablet    Take 1 tablet (30 mg) by mouth daily    Chest pain, unspecified type, Coronary artery disease involving native coronary artery of native heart with angina pectoris (H)       MELATONIN PO      Take 5 mg by mouth At Bedtime        nitroGLYcerin 0.4 MG sublingual tablet    NITROSTAT    25 tablet    Place 1 tablet (0.4 mg) under the tongue every 5 minutes as needed for chest pain prn    Chest pain       order for DME     1 each    Equipment being ordered: Walker Wheels () and Walker () Treatment Diagnosis: Gait instability    Chronic obstructive pulmonary disease with acute exacerbation (H)       order for DME     1 Units    Equipment being ordered: Walker Wheels () Treatment Diagnosis: severe COPD    Chronic obstructive pulmonary disease with acute exacerbation (H)       order for DME     1 Units    Equipment being ordered: Walker () Treatment Diagnosis: severe COPD    Chronic obstructive pulmonary disease with acute exacerbation (H)       predniSONE 10 MG tablet    DELTASONE    30 tablet    Take 1 tablet (10 mg) by mouth daily    Chronic obstructive pulmonary disease with acute exacerbation (H)       primidone 50 MG tablet    MYSOLINE    15 tablet    Take 1/2 tablet (25 mg) at bedtime    Tremor       * QUEtiapine 200 MG tablet    SEROquel    90 tablet    Take 1 tablet (200 mg) by mouth At Bedtime    HUNG (generalized anxiety disorder)       * QUEtiapine 50 MG tablet    SEROQUEL    30 tablet    Take 1 tablet (50 mg) by mouth daily    Major depressive disorder, recurrent episode, mild (H)       STOOL SOFTENER PO      Take 100 mg by mouth daily        tiotropium 18 MCG capsule    SPIRIVA    90 capsule    Inhale 1 capsule (18 mcg) into the lungs daily    Generalized anxiety disorder, Chronic obstructive pulmonary disease, unspecified COPD type (H)       Vitamin D3 2000 units Chew      Take 2,000 mg by mouth daily        * Notice:  This list has 4 medication(s) that are the same  as other medications prescribed for you. Read the directions carefully, and ask your doctor or other care provider to review them with you.

## 2018-08-03 NOTE — PROGRESS NOTES
SUBJECTIVE:   Karen Alex is a 63 year old female who presents to clinic today for the following health issues:    Tremor  (primary encounter diagnosis) -   Shakiness  Has been experiencing intermittent episodes of shakiness for some  years. This most recent episode has lasted for 3 days. Has been unable to hold anything. States that she is unaware of any triggers.     Duration: 3 days    Description (location/character/radiation): shakiness all over her body but mainly the arms    Intensity:  moderate    Accompanying signs and symptoms: numbness on lateral  side of right leg    History (similar episodes/previous evaluation): None    Precipitating or alleviating factors: None    Therapies tried and outcome: None     Hyperlipidemia LDL goal <130 - on atorvastatin 40 mg   LDL Cholesterol Calculated   Date Value Ref Range Status   05/15/2017 66 <100 mg/dL Final     Comment:     Desirable:       <100 mg/dl     Generalized anxiety disorder -not well controlled  PHQ-9 SCORE 4/17/2018 5/15/2018 6/4/2018   Total Score - - -   Total Score MyChart - - -   Total Score 19 20 24     Elevated troponin -in hospital in June.  Patient claims that she was unaware that she was previously referred to cardiology.  Thought to likely be demand ischemia    Bipolar 2 disorder (H) - q also diagnosed    Physical deconditioning - Patient using wheelchair to ambulate       Current Outpatient Prescriptions   Medication     ACE/ARB/ARNI NOT PRESCRIBED, INTENTIONAL,     albuterol (2.5 MG/3ML) 0.083% neb solution     ARIPiprazole (ABILIFY) 5 MG tablet     ASPIRIN PO     atorvastatin (LIPITOR) 40 MG tablet     BETA BLOCKER NOT PRESCRIBED, INTENTIONAL,     budesonide-formoterol (SYMBICORT) 80-4.5 MCG/ACT Inhaler     buPROPion (WELLBUTRIN XL) 300 MG 24 hr tablet     Cholecalciferol (VITAMIN D3) 2000 UNITS CHEW     Docusate Calcium (STOOL SOFTENER PO)     DULOXETINE HCL PO     gabapentin (NEURONTIN) 300 MG capsule     HYDROcodone-acetaminophen  (NORCO) 5-325 MG per tablet     isosorbide mononitrate (IMDUR) 30 MG 24 hr tablet     MELATONIN PO     nitroGLYcerin (NITROSTAT) 0.4 MG sublingual tablet     order for DME     order for DME     order for DME     predniSONE (DELTASONE) 10 MG tablet     primidone (MYSOLINE) 50 MG tablet     QUEtiapine (SEROQUEL) 200 MG tablet     QUEtiapine (SEROQUEL) 50 MG tablet     tiotropium (SPIRIVA) 18 MCG capsule     VENTOLIN  (90 Base) MCG/ACT Inhaler     No current facility-administered medications for this visit.          Problem list and histories reviewed & adjusted, as indicated.  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 Surgeon:  MILADY BASS MD     CONIZATION  10/23/07    Vulvar excision for BOBBY 3, cold knife conization     EXCISE VULVA WIDE LOCAL  5/25/2012    Procedure:EXCISE VULVA WIDE LOCAL; Wide Local Excision Of Vulvar Lesion; Surgeon:RE ALDRIDGE; Location:[facility] OR     FOOT SURGERY Right 01/24/2005    OPERATION: Excision of soft tissue mass right foot. Surgeon:  KHARI DUEÑAS DPM     FOOT SURGERY Right 02/28/2005    OPERATION: Excision of ganglion cyst right foot. Surgeon:  KHARI DUEÑAS DPM     HERNIORRHAPHY INCISIONAL (LOCATION)  4/25/2012    Procedure:HERNIORRHAPHY INCISIONAL (LOCATION); Incisional Hernia Repair with mesh ; Surgeon:KIM QUICK; Location:[facility] OR     HYSTERECTOMY TOTAL ABDOMINAL,  BILATERAL SALPINGO-OOPHORECTOMY, COMBINED       LAPAROSCOPIC HYSTERECTOMY TOTAL, BILATERAL SALPINGO-OOPHORECTOMY, COMBINED  3/6/2012    Procedure:COMBINED LAPAROSCOPIC HYSTERECTOMY TOTAL, BILATERAL SALPINGO-OOPHORECTOMY; Total laparoscopic Hysterectomy, Bilateral Salpingo Ooporectomy, Pubovaginal Sling, Biopsy Left Volva; Surgeon:RE ALDRIDGE; Location:[facility] OR     repair of anal fissures       SLING BLADDER SUSPENSION WITH FASCIA ALESHA       SLING TRANSPUBO WITHOUT ANTERIOR COLPORRHAPHY  3/6/2012    Procedure:SLING TRANSPUBO WITHOUT ANTERIOR COLPORRHAPHY; Surgeon:JOLANTA ENRIQUEZ; Location:[facility] OR     TUBAL LIGATION         Family History   Problem Relation Age of Onset     Cancer Father      asbestos lung disease     Hypertension Mother      Diabetes Paternal Aunt      HEART DISEASE Maternal Grandfather      Cancer Maternal Grandmother      unsure of what kind,  at the age of 86     Circulatory Paternal Grandmother       of brain aneurysm     Asthma Son      HEART DISEASE Sister      No Known Problems Brother      Pacemaker Sister      No Known Problems Brother    No FHx of tremors    Social History   Substance Use Topics     Smoking status: Former Smoker     Packs/day: 0.50     Years: 26.00     Types: Cigarettes     Quit date: 7/3/2015     Smokeless tobacco: Never Used      Comment: sometimes     Alcohol use No     Reviewed and updated as needed this visit by clinical staff       Reviewed and updated as needed this visit by Provider       ROS:  No cough or fever, No trouble with breathing as long as she is in the air conditioning with her O2.     This document serves as a record of the services and decisions personally performed and made by Micah Van MD. It was created on his behalf by Chris Bird, a trained medical scribe.  The creation of this document is based on the scribe's personal observations and the provider's statements to the medical scribe.  Chris Bird, August 3, 2018 3:34  PM    OBJECTIVE:     /70 (BP Location: Left arm, Patient Position: Chair, Cuff Size: Adult Large)  Pulse 107  Temp 98.7  F (37.1  C) (Oral)  Resp 14  Wt 105.2 kg (232 lb)  LMP 12/01/2007  SpO2 90%  BMI 36.34 kg/m2  Body mass index is 36.34 kg/(m^2).  Affect flat no thyromegaly  No tremor    .      Diagnostic Test Results:  No results found for this or any previous visit (from the past 24 hour(s)).    ASSESSMENT/PLAN:   ASSESSMENT / PLAN:  (R25.1) Tremor  (primary encounter diagnosis)  Comment: By history.  She is interested in therapy this does not appear to be Parkinson's  Plan: HIV Antigen Antibody Combo, Comprehensive         metabolic panel, primidone (MYSOLINE) 50 MG         Tablet beta-blockers relatively contraindicated with her COPD            (E78.5) Hyperlipidemia LDL goal <130  Comment due to the nature  Plan: Lipid panel reflex to direct LDL Non-fasting            (F41.1) Generalized anxiety disorder  Comment: Treated   Plan:  she has been referred to PCP    (Z11.4) Screening for HIV (human immunodeficiency virus)  Comment: One time health care maintenance screening   Plan: TSH with free T4 reflex            (R74.8) Elevated troponin  Comment: Advised patient to follow up with cardiologist.  Plan: CARDIOLOGY EVAL ADULT REFERRAL            (F31.81) Bipolar 2 disorder (H)  Comment:  She has been referred to PCP  Plan:   On Seroquel    (R53.81) Physical deconditioning  Comment: Patient wheelchair-bound  Plan: Declined rehabilitation in February    The information in this document, created by the medical scribe for me, accurately reflects the services I personally performed and the decisions made by me. I have reviewed and approved this document for accuracy prior to leaving the patient care area.  Micah Van MD August 3, 2018 3:34 PM    Micah Van MD  [facility]

## 2018-08-03 NOTE — PROGRESS NOTES
Clinic Care Coordination Contact    Clinic Care Coordination Contact  OUTREACH    Referral Information:  Referral Source: IP Report       Chief Complaint   Patient presents with     Clinic Care Coordination - Follow-up     phone call from patient - SERENA chirinos         [place] Utilization:      Utilization    Last refreshed: 8/2/2018  3:22 PM:  No Show Count (past year) 2       Last refreshed: 8/2/2018  3:22 PM:  ED visits 2       Last refreshed: 8/2/2018  3:22 PM:  Hospital admissions 5          Current as of: 8/2/2018  3:22 PM           Clinical Concerns:  Current Medical Concerns:   CCRN received phone call from patient   Patient states she has been having shakiness in her arms and legs for the past 3 days   Today she notes that she has been dropping things at times   She denies chest pain, no increase in sob   Patient is on 3 LPM of oxygen with a 93% oxygen saturation   No recent change in medications - Has not used albuterol in a few days   Patient does not have any other symptoms that she can think of   CCRN recommended patient be evaluated by provider due to shakiness, patient was able to call her brother in law to bring her in to the clinic today to see Dr. Van at 4:15 - CCRN advised if symptoms worsen or new symptoms arise she needs to call EMS for transport to the hospital     Current Behavioral Concerns: not discussed today      Education Provided to patient: needs to be seen due to shakiness - appt. In clinic today 4:15 if symptoms worsen patient is to go to ER prior to that time      Health Maintenance Reviewed:    Health Maintenance Due   Topic Date Due     HF ACTION PLAN Q3 YR  1955     URINE DRUG SCREEN Q1 YR  05/18/1972     HIV SCREEN (SYSTEM ASSIGNED)  05/18/1975     LIPID MONITORING Q6 MO  11/15/2017     DEXA Q3 YR  01/29/2018       Clinical Pathway: None    Medication Management:  Current Outpatient Prescriptions   Medication     ACE/ARB/ARNI NOT PRESCRIBED, INTENTIONAL,      albuterol (2.5 MG/3ML) 0.083% neb solution     ARIPiprazole (ABILIFY) 5 MG tablet     ASPIRIN PO     atorvastatin (LIPITOR) 40 MG tablet     BETA BLOCKER NOT PRESCRIBED, INTENTIONAL,     budesonide-formoterol (SYMBICORT) 80-4.5 MCG/ACT Inhaler     buPROPion (WELLBUTRIN XL) 300 MG 24 hr tablet     Cholecalciferol (VITAMIN D3) 2000 UNITS CHEW     Docusate Calcium (STOOL SOFTENER PO)     DULOXETINE HCL PO     gabapentin (NEURONTIN) 300 MG capsule     HYDROcodone-acetaminophen (NORCO) 5-325 MG per tablet     isosorbide mononitrate (IMDUR) 30 MG 24 hr tablet     MELATONIN PO     nitroGLYcerin (NITROSTAT) 0.4 MG sublingual tablet     order for DME     order for DME     order for DME     predniSONE (DELTASONE) 10 MG tablet     QUEtiapine (SEROQUEL) 200 MG tablet     QUEtiapine (SEROQUEL) 50 MG tablet     tiotropium (SPIRIVA) 18 MCG capsule     VENTOLIN  (90 Base) MCG/ACT Inhaler     No current facility-administered medications for this visit.      Functional Status:  Dependent ADLs:: Ambulation-walker  Bed or wheelchair confined:: No  Mobility Status: Independent w/Device    Living Situation:  Current living arrangement:: I live in a private home with family, I live in a private home, I live alone (in 3rd floor apartment - sister visits often she lives in [place] - sister does not drive but will stay with patient when she is not doig well )  Type of residence:: Apartment    Diet/Exercise/Sleep: not discussed today     Transportation: brother in law will drive to clinic today for appt. (patient called back to state her son would drive her)      Psychosocial:  Mental health DX:: Yes  Mental health DX how managed:: Medication, [facility] at [facility] Care  Mental health management concern (GOAL): No  Informal Support system:: Family     Financial/Insurance: None      Resources and Interventions:  Current Resources:   Community Resources: None  Supplies used at home:: Oxygen Tubing/Supplies (3.5 LPM inogen  )  Equipment Currently Used at Home: oxygen, raised toilet, walker, rolling (2.5 LPM - [facility])    Advance Care Plan/Directive  Advanced Care Plans/Directives on file:: No  Advanced Care Plan/Directive Status: Not Applicable     Goals:   Goals        General    Medical (pt-stated)     Notes - Note edited  8/2/2018  2:46 PM by Korin Wang RN    I will monitor my oxygen level using pulse oximeter - if oxygen level is below 88% I will seek medical attention   As of today's date 8/2/2018 goal is met at 51 - 75%.   Goal Status:  Active                Patient/Caregiver understanding: yes     Outreach Frequency: 2 weeks  Future Appointments              Today Micah Van MD [facility]    In 2 weeks Lupis Mcgee LICSW [facility], [facility]        Plan:   Patient will come to clinic today at 4:15 if symptoms worsen prior to clinic appt. patient will go to ER     Korin Wang Care Coordinator RN  [facility]  [phone]  August 3, 2018

## 2018-08-06 PROBLEM — R73.9 BLOOD GLUCOSE ELEVATED: Status: ACTIVE | Noted: 2018-01-01

## 2018-08-06 NOTE — PROGRESS NOTES
Tests show evidence of your lung disease, and suggest you may have diabetes.  I would suggest you see Dr. Rebolledo in the next month and look into this a bit further with more tests.  Micah Van MD

## 2018-08-06 NOTE — PROGRESS NOTES
Clinic Care Coordination Contact    Per chart review patient was seen by Dr. Van in the clinic 8/3 and diagnosed with tremors - no concerns for ER from chart review per Dr. Van visit     CCRN will f/u with patient later this week     Korin Wang Care Coordinator RN  [facility]  [phone]  August 6, 2018

## 2018-08-08 NOTE — TELEPHONE ENCOUNTER
Last Written Prescription Date:  1.16.18  Last Fill Quantity: 90,  # refills: 1   Last office visit: 8.3.18 provider:  Carlota   Future Office Visit:   Next 5 appointments (look out 90 days)     Aug 20, 2018  1:30 PM CDT   Return Visit with MAURY Lemos   formerly [facility] ([facility])    [address] [address] [phone]                 Requested Prescriptions   Pending Prescriptions Disp Refills     predniSONE (DELTASONE) 10 MG tablet [Pharmacy Med Name: PREDNISONE 10MG TABS] 30 tablet 0     Sig: TAKE ONE TABLET BY MOUTH EVERY DAY    There is no refill protocol information for this order        gabapentin (NEURONTIN) 300 MG capsule [Pharmacy Med Name: GABAPENTIN 300MG CAPS] 90 capsule 1     Sig: TAKE ONE CAPSULE BY MOUTH AT BEDTIME    There is no refill protocol information for this order      Routing refill request to provider for review/approval because:  Drug not on the [facility] refill protocol   Prednisone given IV during hospitalization for COPD exacerbation, don't see any given as out pt    Chinyere Taylor RN, BS  Clinical Nurse Triage.

## 2018-08-10 NOTE — TELEPHONE ENCOUNTER
Requested Prescriptions   Pending Prescriptions Disp Refills     HYDROcodone-acetaminophen (NORCO) 5-325 MG per tablet 30 tablet 0     Sig: Take one pill  daily as needed, not to be taken with diazepam    There is no refill protocol information for this order        Routing refill request to provider for review/approval because:  Drug not on the [facility] refill protocol   : 3.26.18  Annual Controlled substance agreement: signed 10.26.16  Annual drug screening: not on file    Meena Taylor RN

## 2018-08-10 NOTE — TELEPHONE ENCOUNTER
Controlled Substance Refill Request for norco 5/325  Problem List Complete:  Yes  Patient is followed by OKSANA ANTONIO for ongoing prescription of pain medication.  All refills should be approved by this provider, or covering partner.    Medication(s): Norco.   Maximum quantity per month: 30 --Clinic visit frequency required: Q 3 months     Controlled substance agreement on file: Yes       Date(s): 7/16/15    Pain Clinic evaluation in the past: No       Date(s):  n/a       Location(s):  n/a    DIRE Total Score(s):  No flowsheet data found.    Last [facility] website verification: 3/26/18   [email or web address]   checked in past 3 months?  No, route to RN - looks overdue    Please walk signed prescription over to pharmacy.  Thanks!  Modesta Lizama CPhT  [facility]  [phone]

## 2018-08-20 NOTE — MR AVS SNAPSHOT
MRN:[id number]                      After Visit Summary   8/20/2018    Karen Alex    MRN: [id number]           Visit Information        Provider Department      8/20/2018 1:30 PM Lupis Mcgee LICSW [facility]      Your next 10 appointments already scheduled     Sep 25, 2018 10:30 AM CDT   Return Visit with MAURY Lemos   [facility] ([facility])    [address] [place] [address] [phone]              MyChart Information     [employer] gives you secure access to your electronic health record. If you see a primary care provider, you can also send messages to your care team and make appointments. If you have questions, please call your primary care clinic.  If you do not have a primary care provider, please call [phone] and they will assist you.        Care EveryWhere ID     This is your Care EveryWhere ID. This could be used by other organizations to access your [place] medical records  [id number]        Equal Access to Services     BRANDYN TURNER : Hadii omid reynolds Sonatalie, waaxda luqadaha, qaybta kaalmakike daniel, rj ace. So [facility] [phone].    ATENCIÓN: Si habla español, tiene a zaragoza disposición servicios grattalos de asistencia lingüística. Bassemame al [phone].    We comply with applicable federal civil rights laws and [place] laws. We do not discriminate on the basis of race, color, national origin, age, disability, sex, sexual orientation, or gender identity.

## 2018-08-20 NOTE — PROGRESS NOTES
Progress Note    Client Name: Karen Alex  Date: 8/20/2018                                           Service Type: Individual      Session Start Time:  1:35  Session End Time: 2:30      Session Length: 45 - 50     Session #: 32     Attendees: Client attended alone    Treatment Plan Last Completed Date: 8/20/18  PHQ-9 / HUNG-7 Last Completed Date: last completed 8/20/2018                  DATA      Progress Since Last Session (Related to Symptoms / Goals / Homework):   Symptoms: No change scores remain high with her physical health deteriorating-scores remain high- son out of country on  duty     Homework: Partially completed- has been challenging with current health, but is committed to moving      Episode of Care Goals: Minimal progress - ACTION (Actively working towards change); Intervened by reinforcing change plan / affirming steps taken     Current / Ongoing Stressors and Concerns:   Client reports that the plan is to move into assisted living.  She has picked [facility] in [place].  Client's son is currently in , at [place], and then will be heading off [employer].  Client  reports that boyfriend and sister alternate staying with her.   Client reports that she has slowly been packing.  Client has not seen her grandchildren since her son has been busy with the guard. Client  reports that her current health status is challenging.  Feels sad that the money she has saved for her son will be drained by her medical needs.  Sees other medical providers in the next few months to  discuss lung transport and her heart condition.     Treatment Objective(s) Addressed in This Session:   focus on being socially active and not isolating at home due to medical condition   Using acceptance skills to understand her level of depression and what she has control over       Intervention:   CBT: Acceptance on current helath changes and limited  mobility        ASSESSMENT: Current Emotional / Mental Status (status of significant symptoms):   Risk status (Self / Other harm or suicidal ideation)  Client denies a history of suicidal ideation, suicide attempts, self-injurious behavior, homicidal ideation, homicidal behavior and and other safety concerns   Client denies current fears or concerns for personal safety.   Client denies current or recent suicidal ideation or behaviors.     Client denies current or recent homicidal ideation or behaviors.   Client denies current or recent self injurious behavior or ideation.    Client reports there has been no change in protective factors since their last session.      Client reports there has been no change in risk factors since their last session.     Client denies other safety concerns.   A safety and risk management plan has not been developed at this time, however client was given the after-hours number should there be a change in any of these risk factors.     Appearance:   Appropriate    Eye Contact:   Fair    Psychomotor Behavior: Retarded (Slowed)    Attitude:   Cooperative    Orientation:   All   Speech    Rate / Production: Normal     Volume:  Soft    Mood:    Depressed  Sad    Affect:    Flat    Thought Content:  Clear    Thought Form:  Logical    Insight:    Fair      Medication Review:   No changes to current psychiatric medication(s)     Medication Compliance:   Yes- see with [facility]ardo PA-C at [facility]     Changes in Health Issues:   Yes: Respiratory Disease, No Psychological Distress-no change with her breathing, memory, and wheelchair usage     Chemical Use Review:   Substance Use: Chemical use reviewed, no active concerns identified      Tobacco Use: No current tobacco use.   Almost celebrating her one year anniversary     Collateral Reports Completed:   Not Applicable    PLAN: (Client Tasks / Therapist Tasks / Other)  1.   Client will keep working on preparing for move into assisted  living- will have the support of family to help her move.     Lupis Mcgee, LICSW     __________________________________________________________________________________________________                                                           Treatment Plan    Client's Name: Karen Alex  YOB: 1955    Date: 9/22/2015       DSM-V Diagnoses: 296.32 - Major Depressive Disorder, Recurrent, Moderate    300.02 - Generalized Anxiety Disorder    309.81 - Posttraumatic Stress Disorder By History; 799.9 - Deferred Diagnosis   WHODAS:  35    Referral / Collaboration:  Referral to another professional/service is not indicated at this time..    Anticipated number of session or this episode of care: 12      MeasurableTreatment Goal(s) related to diagnosis / functional impairment(s)  Goal 1: Client will have stability with her mental health as evidenced by PHQ-9 and HUNG-7 scores as well as self-report.      I will know I've met my goal when I start feeling better about myself.      Objective #A (Client Action)   Client will decrease frequency and intensity of feeling down, depressed, hopeless.   Status: Continued - Date(s): 8/20/2018            Intervention(s)  Therapist will assign homework by learning to build awareness of her triggers which activate her mental health symptoms.  Therapist will introduce and have client implement strategies to address triggers.    Objective #B  Client will Identify negative self-talk and behaviors: challenge core beliefs, myths, and actions.  Status: Continued - Date(s): 8/20/2018          Intervention(s)  Therapist will continue to help client identify and reframe negative perceptions of self.  Work on ways to increase self-esteem.    Objective #C  Client will Feel less tired and more energy during the day .  Status: Completed - Date: 3/2/16     Intervention(s)  Therapist will assign homework 1.  Complete at least one household activity daily; 2.  Focus on  getting out of the house at least three times weekly; 3.  Look into entering social groups.      Client has reviewed and agreed to the above plan.      Lupis Mcgee, [facility] CenterSW  September 22, 2015

## 2018-08-23 NOTE — PROGRESS NOTES
Clinic Care Coordination Contact  [facility]/Voicemail    Referral Source: IP Report  Clinical Data: Care Coordinator Outreach - COPD follow up, DONNA status check in, tremors     Outreach attempted x 1.  Left message on voicemail with call back information and requested return call.  Plan: Care Coordinator will try to reach patient again in 1-2 business days.    Korin Wang Care Coordinator RN  [facility]  [phone]  August 23, 2018

## 2018-08-24 NOTE — PROGRESS NOTES
Clinic Care Coordination Contact    Clinic Care Coordination Contact  OUTREACH    Referral Information:  Referral Source: IP Report    Primary Diagnosis: COPD    Chief Complaint   Patient presents with     Clinic Care Coordination - Follow-up     COPD - CCRN      [place] Utilization:   Clinic Utilization  Difficulty keeping appointments:: No  Utilization    Last refreshed: 8/23/2018  2:34 PM:  No Show Count (past year) 2       Last refreshed: 8/23/2018  2:34 PM:  ED visits 2       Last refreshed: 8/23/2018  2:34 PM:  Hospital admissions 5          Current as of: 8/23/2018  2:34 PM           Clinical Concerns:  Current Medical Concerns:   COPD f/u -     Clinical Pathway: Clinic Care Coordination COPD Assessment    Discharge:      Symptom Review:  Are you having any increased shortness of breath? No  Symptoms  Anxiety: Yes  Chills: No  Cough: Yes  Is your cough:: Productive  Fever: No  Fatigue: Yes  Increased sputum: No  Wheezing: Yes  COPD symptoms limiting activities?: Yes (basline limits activity)  What COPD zone are you currently in?: Green  Taking COPD medications as prescribed: : Yes  Overall your COPD symptoms are (GOAL):: Stable    Do you have a COPD Action Plan? Yes **send pt a copy COPD     Is the COPD Action Plan on refrigerator or available: Yes     What number would you call if you were in the YELLOW zones:  [phone]    Oxygen/DME:    Are you currently on oxygen?  Yes   Is this new for you? No   Is it set up at home? Yes   What liter flow were you instructed to use and how often do you use it? 2.5 LPM at rest and up to 5 LPM with activity     Activity:    How much activity can you do before you are SOB? Sob with all activities at baseline   Have you had to reduce your activities because of dyspnea or other symptoms? No     Emotions/Lifestyle:    Do you smoke?  reports that she quit smoking about 3 years ago. Her smoking use included Cigarettes. She has a 13.00 pack-year smoking history. She has  never used smokeless tobacco.     Current Behavioral Concerns: anxiety - depression   Patient follows up with [facility] therapist regularly   Today patient is talking about her move to [facility] - she has now decided this is needed.   Patient plans on calling a facility in [place] tomorrow to set up a time to see what they have open - she has already toured this facility but does not remember the name at this time     Education Provided to patient: continue monitoring oxygen level and use COPD action plan - call with increase in symptoms      Health Maintenance Reviewed: Due/Overdue   Health Maintenance Due   Topic Date Due     HF ACTION PLAN Q3 YR  1955     URINE DRUG SCREEN Q1 YR  05/18/1972     DEXA Q3 YR  01/29/2018     Medication Management:  Current Outpatient Prescriptions   Medication     ACE/ARB/ARNI NOT PRESCRIBED, INTENTIONAL,     albuterol (2.5 MG/3ML) 0.083% neb solution     ARIPiprazole (ABILIFY) 5 MG tablet     ASPIRIN PO     atorvastatin (LIPITOR) 40 MG tablet     BETA BLOCKER NOT PRESCRIBED, INTENTIONAL,     budesonide-formoterol (SYMBICORT) 80-4.5 MCG/ACT Inhaler     buPROPion (WELLBUTRIN XL) 300 MG 24 hr tablet     Cholecalciferol (VITAMIN D3) 2000 UNITS CHEW     Docusate Calcium (STOOL SOFTENER PO)     DULOXETINE HCL PO     gabapentin (NEURONTIN) 300 MG capsule     HYDROcodone-acetaminophen (NORCO) 5-325 MG per tablet     isosorbide mononitrate (IMDUR) 30 MG 24 hr tablet     MELATONIN PO     nitroGLYcerin (NITROSTAT) 0.4 MG sublingual tablet     order for DME     order for DME     order for DME     predniSONE (DELTASONE) 10 MG tablet     primidone (MYSOLINE) 50 MG tablet     QUEtiapine (SEROQUEL) 200 MG tablet     QUEtiapine (SEROQUEL) 50 MG tablet     tiotropium (SPIRIVA) 18 MCG capsule     VENTOLIN  (90 Base) MCG/ACT Inhaler     No current facility-administered medications for this visit.      Functional Status:  Dependent ADLs:: Ambulation-walker  Bed or wheelchair confined:: No  Mobility  Status: Independent w/Device    Living Situation:  Current living arrangement:: I live alone (in 3rd floor apartment - sister visits often she lives in [place] - sister does not drive but will stay with patient when she is not doing well )  Type of residence:: Apartment - working on transition plan to [facility] in [place]     Diet/Exercise/Sleep:  Diet:: Regular  Inadequate nutrition (GOAL):: No  Food Insecurity: No  Tube Feeding: No  Exercise:: Currently not exercising (walking in the [place] apartment complex)  Inadequate activity/exercise (GOAL):: Yes  Significant changes in sleep pattern (GOAL): No    Transportation:  Transportation concerns (GOAL):: No  Transportation means:: Regular car (patient drives at baseline)     Psychosocial:  Mental health DX:: Yes  Mental health DX how managed:: Medication, [facility]  Mental health management concern (GOAL):: No  Informal Support system:: Family     Financial/Insurance:   Financial/Insurance concerns (GOAL):: Yes     Resources and Interventions:  Current Resources:    ;   Community Resources: None  Supplies used at home:: Oxygen Tubing/Supplies (3.5 LPM inogen )  Equipment Currently Used at Home: oxygen, raised toilet, walker, rolling (2.5 LPM - [facility])    Advance Care Plan/Directive  Advanced Care Plans/Directives on file:: No  Advanced Care Plan/Directive Status: Not Applicable          Goals: Patient drops to 78% with some activity and takes quite a while to bring her level back up   Goals        General    Medical (pt-stated)     Notes - Note edited  8/24/2018 10:42 AM by Korin Wang, RN    I will monitor my oxygen level using pulse oximeter - if oxygen level is below 88% I will seek medical attention   As of today's date 8/24/18 goal is met at 51 - 75%.   Goal Status:  Active                Patient/Caregiver understanding: yes     Outreach Frequency: 2 weeks  Future Appointments              In 1 month Lupis Mcgee  MAURY Perdomo [facility], [facility]          Plan:   Patient will continue monitoring COPD symptoms using COPD action plan and call with worsening symptoms in the YELLOW zone   Patient will call [facility] tomorrow to set up a time to see what units they have available and work on plan for moving   CCRN will check in with patient in 2 weeks     Korin Wang Care Coordinator RN  [facility]  [phone]  August 24, 2018

## 2018-08-28 NOTE — TELEPHONE ENCOUNTER
Pt is requesting a refill from Dr. Rebolledo of Diazepam 10mg tablets. Taking one tablet 2 to 3 tiems daily.  Last prescribed by Dr. Jany Leonardo. Last filled 7/24/18 for qty 42 (must last 14 days)  Would you like to continue therapy?      Controlled Substance Refill Request for Diazepam  Problem List Complete:  Yes    Patient is followed by OKSANA REBOLLEDO for ongoing prescription of benzodiazepines.  All refills should be approved by this provider, or covering partner.    Medication(s): Lorazepam.   Maximum quantity per month: 90  Clinic visit frequency required: Q 3 months Last Office Visit: 7/6/18    Controlled substance agreement on file: Yes-7/17/15  Benzodiazepine use reviewed by psychiatry:  No    Last [facility] website verification:  done on 6/24/16   [email or web address]         checked in past 3 months?  No, route to MANUELA Shabazz, Pharmacy [facility]

## 2018-08-28 NOTE — TELEPHONE ENCOUNTER
Her anxiety management is through Psych now and they've swapped out the lorazepam for the diazepam. Kathi needs to get those meds from the Psychiatrist.     I corrected her problem list to reflect this.

## 2018-09-04 NOTE — TELEPHONE ENCOUNTER
primidone (MYSOLINE) 50 MG tablet      Last Written Prescription Date:  8/3/2018  Last Fill Quantity: 15 tablet,   # refills: 0  Last Office Visit: 8/3/2018, Carlota    Future Office visit:    Next 5 appointments (look out 90 days)     Sep 25, 2018 10:30 AM CDT   Return Visit with MAURY Lemos   [facility] ([facility])    [address] [address] [phone]                   Routing refill request to provider for review/approval because:  Drug not on the [facility], [facility] or [facility] refill protocol or controlled substance

## 2018-09-05 NOTE — TELEPHONE ENCOUNTER
Controlled Substance Refill Request for Norco  Problem List Complete:  Yes    Patient is followed by OKSANA ANTONIO for ongoing prescription of pain medication.  All refills should be approved by this provider, or covering partner.    Medication(s): Norco.   Maximum quantity per month: 30 --Clinic visit frequency required: Q 3 months Last Office Visit:  7/6/18    Controlled substance agreement on file: Yes       Date(s): 7/16/15    Pain Clinic evaluation in the past: No       Date(s):  n/a       Location(s):  n/a    DIRE Total Score(s):  No flowsheet data found.    Last [facility] website verification: 3/26/18   [email or web address]     checked in past 3 months?  No, route to MANUELA Shabazz, Pharmacy [facility]

## 2018-09-06 NOTE — TELEPHONE ENCOUNTER
Was to RTC in 1 week.  Called patient.  Scheduled for Monday.  States she has enough medications til appt.  Kailey Guevara, RN    8/3/18  ASSESSMENT/PLAN:   ASSESSMENT / PLAN:  (R25.1) Tremor  (primary encounter diagnosis)  Comment: By history.  She is interested in therapy this does not appear to be Parkinson's  Plan: HIV Antigen Antibody Combo, Comprehensive         metabolic panel, primidone (MYSOLINE) 50 MG         Tablet beta-blockers relatively contraindicated with her COPD         (E78.5) Hyperlipidemia LDL goal <130  Comment due to the nature  Plan: Lipid panel reflex to direct LDL Non-fasting         (F41.1) Generalized anxiety disorder  Comment: Treated   Plan:  she has been referred to PCP     (Z11.4) Screening for HIV (human immunodeficiency virus)  Comment: One time health care maintenance screening   Plan: TSH with free T4 reflex         (R74.8) Elevated troponin  Comment: Advised patient to follow up with cardiologist.  Plan: CARDIOLOGY EVAL ADULT REFERRAL         (F31.81) Bipolar 2 disorder (H)  Comment:  She has been referred to PCP  Plan:   On Seroquel     (R53.81) Physical deconditioning  Comment: Patient wheelchair-bound  Plan: Declined rehabilitation in February     The information in this document, created by the medical scribe for me, accurately reflects the services I personally performed and the decisions made by me. I have reviewed and approved this document for accuracy prior to leaving the patient care area.  Micah Van MD August 3, 2018 3:34 PM     Micah Van MD  [facility]      Instructions        Return in about 1 week (around 8/10/2018).

## 2018-09-14 PROBLEM — I99.8 ISCHEMIC VASCULAR DISEASE: Status: ACTIVE | Noted: 2018-01-01

## 2018-09-14 NOTE — TELEPHONE ENCOUNTER
Panel Management Review      Patient has the following on her problem list:    COPD  Diagnosis date:    Is this diagnosis new within the last year?   YES   Was spirometry completed?  NO      Composite cancer screening  Chart review shows that this patient is due/due soon for the following None  Summary:    Patient is due/failing the following:   Problem list needs to be updated    Action needed:   Update PL    Type of outreach:    does not need to be contacted    Questions for provider review:    None                                                                                                                                    Carlota Blanc CMA       Chart routed to none .

## 2018-09-14 NOTE — PROGRESS NOTES
Clinic Care Coordination Contact    Clinic Care Coordination Contact  OUTREACH    Referral Information:  Referral Source: IP Report      Chief Complaint   Patient presents with     Clinic Care Coordination - Follow-up     COPD - CCRN       [place] Utilization:   Utilization    Last refreshed: 9/14/2018 11:05 AM:  No Show Count (past year) 2       Last refreshed: 9/14/2018 11:05 AM:  ED visits 2       Last refreshed: 9/14/2018 11:05 AM:  Hospital admissions 5          Current as of: 9/14/2018 11:05 AM           Clinical Concerns:  Current Medical Concerns:   COPD follow up   Patient is wearing her oxygen at 2.5 - 3 LPM and with activity sometimes needs to bump up to 4-5   She is not having increase in shortness of breath/cough at this time - staying in the air conditioning helps   Patient was thinking about moving to [facility] however now she decides she does not want to do this and will continue living in her own apartment - she feels she can do things on her own it will just take her a little bit longer and doesn't like the idea of someone else taking care of her.   Tremors- still there, psych provider thinks it may be from abilify and if this does not get better in the next week or so they will take her off of it     CCRN explained to patient that she will be transitioning out of this role and a new care coordinator will be taking over. CCRN sent letter to patient with new CCRN contact information and will ask new CCRN to f/u with patient in the next month to ensure she is doing well.     Current Behavioral Concerns: depression, anxiety,  PTSD   Patient working with [facility] therapist at [facility]   PHQ-9 SCORE 5/15/2018 6/4/2018 8/20/2018   Total Score - - -   Total Score MyChart - - -   Total Score 20 24 21     HUNG-7 SCORE 5/15/2018 6/4/2018 8/20/2018   Total Score - - -   Total Score - - -   Total Score 16 20 17       Education Provided to patient: continue using COPD action plan to monitor symptoms and call with symptoms  in the [facility]      Health Maintenance Reviewed:    Health Maintenance Due   Topic Date Due     HF ACTION PLAN Q3 YR  1955     URINE DRUG SCREEN Q1 YR  05/18/1972     DEXA Q3 YR  01/29/2018     INFLUENZA VACCINE (1) 09/01/2018       Clinical Pathway: None    Medication Management:  Current Outpatient Prescriptions   Medication     ACE/ARB/ARNI NOT PRESCRIBED, INTENTIONAL,     albuterol (2.5 MG/3ML) 0.083% neb solution     ARIPiprazole (ABILIFY) 5 MG tablet     ASPIRIN PO     atorvastatin (LIPITOR) 40 MG tablet     BETA BLOCKER NOT PRESCRIBED, INTENTIONAL,     budesonide-formoterol (SYMBICORT) 80-4.5 MCG/ACT Inhaler     buPROPion (WELLBUTRIN XL) 300 MG 24 hr tablet     Cholecalciferol (VITAMIN D3) 2000 UNITS CHEW     Docusate Calcium (STOOL SOFTENER PO)     DULOXETINE HCL PO     gabapentin (NEURONTIN) 300 MG capsule     HYDROcodone-acetaminophen (NORCO) 5-325 MG per tablet     isosorbide mononitrate (IMDUR) 30 MG 24 hr tablet     MELATONIN PO     nitroGLYcerin (NITROSTAT) 0.4 MG sublingual tablet     order for DME     order for DME     order for DME     predniSONE (DELTASONE) 10 MG tablet     primidone (MYSOLINE) 50 MG tablet     QUEtiapine (SEROQUEL) 200 MG tablet     QUEtiapine (SEROQUEL) 50 MG tablet     tiotropium (SPIRIVA) 18 MCG capsule     VENTOLIN  (90 Base) MCG/ACT Inhaler     No current facility-administered medications for this visit.           Functional Status:  Dependent ADLs:: Ambulation-walker  Bed or wheelchair confined:: No  Mobility Status: Independent w/Device    Living Situation:  Current living arrangement:: I live in a private home with family, I live in a private home, I live alone (in 3rd floor apartment - sister visits often she lives in [place] - sister does not drive but will stay with patient when she is not doig well )  Type of residence:: Apartment    Diet/Exercise/Sleep: no concerns        Transportation: sister/son reg car      Psychosocial:  Mental health DX::  Yes  Mental health DX how managed:: Medication, C Services at Primary Care  Mental health management concern (GOAL):: No  Informal Support system:: Family     Financial/Insurance: No concerns      Resources and Interventions:  Current Resources:   Community Resources: None  Supplies used at home:: Oxygen Tubing/Supplies (3.5 LPM inogen )  Equipment Currently Used at Home: oxygen, raised toilet, walker, rolling (2.5 LPM - [facility] )    Advance Care Plan/Directive  Advanced Care Plans/Directives on file:: No  Advanced Care Plan/Directive Status: Not Applicable     Goals: Complete   Goals        General    Medical (pt-stated)     Notes - Note edited  9/14/2018  3:57 PM by Korin Wang, RN    I will monitor my oxygen level using pulse oximeter - if oxygen level is below 88% I will seek medical attention   As of today's date 9/14/18 goal is met at 100%   Goal Status:Complete                 Patient had oxygen level of 48% one day and left this on CCRN voicemail - CCRN had to call 911 for patient - this is when this goal was made     Patient/Caregiver understanding: Yes     Outreach Frequency: monthly  Future Appointments              In 1 week Lupis Mcgee LICSW [facility]          Plan:   Patient will be handed off to new care coordinator for follow up - contact information mailed to patient   Previous goal met rosalee Wang Care Coordinator   RN  [facility]  [phone]  September 14, 2018

## 2018-09-14 NOTE — LETTER
[facility] CARE COORDINATION  [address] [address]  [phone]    September 14, 2018    Karen Alex  [address] [facility] [place]   [place] [id number]      Dear Karen,    I am writing to let you know that I will be leaving the care coordination position at the [facility] on 9/21/18   Meena will be taking over my patient's Please reach out to her at [phone] in the future with concerns. It was very nice working with you!      The clinic care coordinator is a registered nurse and/or  who understand the health care system. The goal of clinic care coordination is to help you manage your health and improve access to the [facility] in the most efficient manner. The registered nurse can assist you in meeting your health care goals by providing education, coordinating services, and strengthening the communication among your providers. The  can assist you with financial, behavioral, psychosocial, chemical dependency, counseling, and/or psychiatric resources.    Please feel free to contact Meena at [phone], with any questions or concerns. We at [facility] are focused on providing you with the highest-quality healthcare experience possible and that all starts with you.     Sincerely,     Korin Wang Care Coordinator RN  [facility]  [phone]  September 14, 2018

## 2018-09-14 NOTE — TELEPHONE ENCOUNTER
Requested Prescriptions   Pending Prescriptions Disp Refills     QUEtiapine (SEROQUEL) 50 MG tablet [Pharmacy Med Name: QUETIAPINE FUMARATE 50MG TABS] 30 tablet 1    Last Written Prescription Date:  7/11/18  Last Fill Quantity: 30,  # refills: 1   Last Office Visit: 8/3/2018   Future Office Visit:    Next 5 appointments (look out 90 days)     Sep 25, 2018 10:30 AM CDT   Return Visit with Lupis Mcgee, [facility] ([facility])    [address] [place] [id number] [phone]                  Sig: TAKE ONE TABLET BY MOUTH EVERY DAY    Antipsychotic Medications Passed    9/14/2018  7:37 AM       Passed - Blood pressure under 140/90 in past 12 months    BP Readings from Last 3 Encounters:   08/03/18 104/70   07/06/18 128/70   06/15/18 111/73                Passed - Patient is 12 years of age or older       Passed - Lipid panel on file within the past 12 months    Recent Labs   Lab Test  08/03/18   1549   01/16/15   1111   CHOL  165   < >  182   TRIG  76   < >  113   HDL  75   < >  69   LDL  75   < >  90   NHDL  90   < >   --    VLDL   --    --   23   CHOLHDLRATIO   --    --   2.6    < > = values in this interval not displayed.              Passed - CBC on file in past 12 months    Recent Labs   Lab Test  06/05/18   0948   WBC  14.0*   RBC  4.10   HGB  11.8   HCT  41.0   PLT  203       For [facility] ONLY: SFDF961 = WBC, GJSB492 = RBC         Passed - Heart Rate on file within past 12 months    Pulse Readings from Last 3 Encounters:   08/03/18 107   07/06/18 93   06/15/18 95              Passed - A1c or Glucose on file in past 12 months    Recent Labs   Lab Test  08/03/18   1549   GLC  180*       Please review patients last 3 weights. If a weight gain of >10 lbs exists, you may refill the prescription once after instructing the patient to schedule an appointment within the next 30 days.    Wt Readings from Last 3 Encounters:   08/03/18 232  "lb (105.2 kg)   07/06/18 232 lb 9.4 oz (105.5 kg)   06/15/18 240 lb (108.9 kg)            Passed - Patient is not pregnant       Passed - No positve pregnancy test on file in past 12 months       Passed - Recent (6 mo) or future (30 days) visit within the authorizing provider's specialty    Patient had office visit in the last 6 months or has a visit in the next 30 days with authorizing provider or within the authorizing provider's specialty.  See \"Patient Info\" tab in inbasket, or \"Choose Columns\" in Meds & Orders section of the refill encounter.              "

## 2018-09-17 NOTE — TELEPHONE ENCOUNTER
Routing refill request to provider to review approval because:  ? f /u Appointment due  Estelle Haynes RN, BSN  Message handled by Nurse Triage.

## 2018-09-25 NOTE — PROGRESS NOTES
Progress Note    Client Name: Karen Alex  Date: 9/25/2018                                           Service Type: Individual      Session Start Time:  10:35  Session End Time: 11:30      Session Length: 45 - 50     Session #: 33     Attendees: Client attended alone    Treatment Plan Last Completed Date: 8/20/18  PHQ-9 / HUNG-7 Last Completed Date: last completed 9/25/2018                   DATA      Progress Since Last Session (Related to Symptoms / Goals / Homework):   Symptoms: No change scores remain high with her physical health deteriorating-scores remain high- son out of country on  duty     Homework: Partially completed-struggling with move- not wanting it at the moment      Episode of Care Goals: Minimal progress - ACTION (Actively working towards change); Intervened by reinforcing change plan / affirming steps taken     Current / Ongoing Stressors and Concerns:   Client reports that is not wanting to move into assisted living.  Client reports that her sister, Shannan, is moving to [place] closer to her child.  Son, Shreyas, is now in [facility]. Discussed with client her current goals around maintaining her mental health, especially while her socia supports are limited.  We discussed her attendance with sessions as winter approaches.  She has her pulmonology appoitment coming out to determine if she will be a candidate for lung transplant.      Treatment Objective(s) Addressed in This Session:   focus on being socially active and not isolating at home due to medical condition   Using acceptance skills to understand her level of depression and what she has control over       Intervention:   CBT: Acceptance on health issues and limited social supports        ASSESSMENT: Current Emotional / Mental Status (status of significant symptoms):   Risk status (Self / Other harm or suicidal ideation)  Client denies a history of suicidal ideation, suicide attempts,  self-injurious behavior, homicidal ideation, homicidal behavior and and other safety concerns   Client denies current fears or concerns for personal safety.   Client denies current or recent suicidal ideation or behaviors.     Client denies current or recent homicidal ideation or behaviors.   Client denies current or recent self injurious behavior or ideation.    Client reports there has been a chance in protective factors since their last session.  son and sister will be out of [place]    Client reports there has been no change in risk factors since their last session.     Client denies other safety concerns.   A safety and risk management plan has not been developed at this time, however client was given the after-hours number should there be a change in any of these risk factors.     Appearance:   Appropriate    Eye Contact:   Fair    Psychomotor Behavior: Retarded (Slowed)    Attitude:   Cooperative    Orientation:   All   Speech    Rate / Production: Normal     Volume:  Soft    Mood:    Depressed  Sad    Affect:    Flat    Thought Content:  Clear    Thought Form:  Logical    Insight:    Fair      Medication Review:   No changes to current psychiatric medication(s)     Medication Compliance:   Yes- see with Sara Leonardo PA-C at [facility]     Changes in Health Issues:   Yes: Respiratory Disease, No Psychological Distress-no change with her breathing, memory, but walked and drove herself today     Chemical Use Review:   Substance Use: Chemical use reviewed, no active concerns identified      Tobacco Use: No current tobacco use.   Has 15 months of no cigarette use     Collateral Reports Completed:   Not Applicable    PLAN: (Client Tasks / Therapist Tasks / Other)  1.   Client will work on social needs- get out of house weekly with a social event.     MARUY Lemos     __________________________________________________________________________________________________                                                            Treatment Plan    Client's Name: Karen Alex  YOB: 1955    Date: 9/22/2015       DSM-V Diagnoses: 296.32 - Major Depressive Disorder, Recurrent, Moderate    300.02 - Generalized Anxiety Disorder    309.81 - Posttraumatic Stress Disorder By History; 799.9 - Deferred Diagnosis   WHODAS:  35    Referral / Collaboration:  Referral to another professional/service is not indicated at this time..    Anticipated number of session or this episode of care: 12      MeasurableTreatment Goal(s) related to diagnosis / functional impairment(s)  Goal 1: Client will have stability with her mental health as evidenced by PHQ-9 and HUNG-7 scores as well as self-report.      I will know I've met my goal when I start feeling better about myself.      Objective #A (Client Action)   Client will decrease frequency and intensity of feeling down, depressed, hopeless.   Status: Continued - Date(s): 8/20/2018            Intervention(s)  Therapist will assign homework by learning to build awareness of her triggers which activate her mental health symptoms.  Therapist will introduce and have client implement strategies to address triggers.    Objective #B  Client will Identify negative self-talk and behaviors: challenge core beliefs, myths, and actions.  Status: Continued - Date(s): 8/20/2018          Intervention(s)  Therapist will continue to help client identify and reframe negative perceptions of self.  Work on ways to increase self-esteem.    Objective #C  Client will Feel less tired and more energy during the day .  Status: Completed - Date: 3/2/16     Intervention(s)  Therapist will assign homework 1.  Complete at least one household activity daily; 2.  Focus on getting out of the house at least three times weekly; 3.  Look into entering social groups.      Client has reviewed and agreed to the above plan.      Lupis Mcgee, [facility]  September 22, 2015

## 2018-09-25 NOTE — MR AVS SNAPSHOT
MRN:[id number]                      After Visit Summary   9/25/2018    Karen Alex    MRN: [id number]           Visit Information        Provider Department      9/25/2018 10:30 AM Lupis Mcgee [facility] Generic      Your next 10 appointments already scheduled     Nov 05, 2018  2:30 PM CST   Return Visit with Lupiskatina Friedman Alma Mcgee [facility] ([facility][1])    [address] [place] [id number] [phone]            Dec 03, 2018  1:30 PM CST   Return Visit with Lupis Friedman Alma Mcgee [facility] ([facility][1])    [address] [place] [id number] [phone]              MyChart Information     [employer] F[employer]print[employer] gives you secure access to your electronic health record. If you see a primary care provider, you can also send messages to your care team and make appointments. If you have questions, please call your primary care clinic.  If you do not have a primary care provider, please call [phone] and they will assist you.        Care EveryWhere ID     This is your Care EveryWhere ID. This could be used by other organizations to access your [facility] medical records  [id number]        Equal Access to Services     BRANDYN TURNER : Hadii omid woodard hadasho Soomaali, waaxda luqadaha, qaybta kaalmada aderaymundoyakike, rj ace. So [facility] [phone].    ATENCIÓN: Si habla español, tiene a zaragoza disposición servicios gratuitos de asistencia lingüística. Llame al [phone].    We comply with applicable federal civil rights laws and [place] laws. We do not discriminate on the basis of race, color, national origin, age, disability, sex, sexual orientation, or gender identity.

## 2018-10-01 ENCOUNTER — TELEPHONE (OUTPATIENT)
Dept: INTERNAL MEDICINE | Facility: CLINIC | Age: 63
End: 2018-10-01

## 2018-10-01 NOTE — TELEPHONE ENCOUNTER
Call received from [facility] Examiner stating that patient was found  on . Requested information about diagnosis list and last office visit. Information provided. Routed to provider as FYI.

## 2018-10-22 ENCOUNTER — PATIENT OUTREACH (OUTPATIENT)
Dept: CARE COORDINATION | Facility: CLINIC | Age: 63
End: 2018-10-22

## 2018-10-22 NOTE — PROGRESS NOTES
Clinic Care Coordination Contact  Care Team Conversations    RN CC chart review, hand off from previous care coordination team member.   Chart review notes patient , sent message to care team to update chart status  Meena Mcgee RN  Clinic Care Coordinator  Office [phone]  [email or web address]

## 2018-11-05 ENCOUNTER — FCC EXTENDED DOCUMENTATION (OUTPATIENT)
Dept: BEHAVIORAL HEALTH | Facility: CLINIC | Age: 63
End: 2018-11-05

## 2018-11-05 NOTE — PROGRESS NOTES
Discharge Summary  Multiple Sessions    Client Name: Karen Alex MRN#: [id number] YOB: 1955      Intake / Discharge Date: 2013-  18      DSM5 Diagnoses: (Sustained by DSM5 Criteria Listed Above)  Diagnoses: 296.32 (F33.1) Major Depressive Disorder, Recurrent Episode, Moderate _  300.02 (F41.1) Generalized Anxiety Disorder  Psychosocial & Contextual Factors: Moderate:  Health issues, Limited support system  WHODAS 2.0 (12 item) Score: 35          Presenting Concern:  Client came in to address mental health concerns; developed health issues.      Reason for Discharge:  Client  on 18.      Disposition at Time of Last Encounter:   Comments:        Risk Management:   Client N/A  A safety and risk management plan has not been developed at this time, however client was given the after-hours number / 911 should there be a change in any of these risk factors.      Referred To:  n/A        Lupis Mcgee, [facility]   2018

## 2018-12-18 NOTE — MR AVS SNAPSHOT
MRN:[id number]                      After Visit Summary   6/25/2018    Karen Alex    MRN: [id number]           Visit Information        Provider Department      6/25/2018 12:30 PM Lupis Mcgeenatividadroger Marquez [facility] Generic      Your next 10 appointments already scheduled     Jul 31, 2018  2:30 PM CDT   Return Visit with Lupis Symoneashleigh Alma Mcgee [facility] ([facility])    [address] [place] [phone]            Aug 20, 2018  1:30 PM CDT   Return Visit with Lupis Friedman Alma Mcgee [facility] ([facility])    [address] [place] [address] [phone]              MyChart Information     [employer] gives you secure access to your electronic health record. If you see a primary care provider, you can also send messages to your care team and make appointments. If you have questions, please call your primary care clinic.  If you do not have a primary care provider, please call [phone] and they will assist you.        Care EveryWhere ID     This is your Care EveryWhere ID. This could be used by other organizations to access your [facility] medical records  [id number]        Equal Access to Services     BRANDYN TURNER : Hadii omid woodard hadasho Soomaali, waaxda luqadaha, qaybta kaalmada adeegyada, rj ace. So [facility] [phone].    ATENCIÓN: Si habla español, tiene a zaragoza disposición servicios gratuitos de asistencia lingüística. Llame al [phone].    We comply with applicable federal civil rights laws and [place] laws. We do not discriminate on the basis of race, color, national origin, age, disability, sex, sexual orientation, or gender identity.             Lipid panel (02/2018) , HDL 47, , LDL 49  On lipitor 20mg daily, well controlled

## 2019-02-08 NOTE — DISCHARGE SUMMARY
"Discharge Summary    Karen Alex MRN# [id number]   YOB: 1955 Age: 62 year old     Date of Admission:  5/8/2018  Date of Discharge:  5/12/2018  Admitting Physician:  Jhonatan Campos DO  Discharge Physician:  Ken Alcaraz MD  Discharging Service:  Hospitalist     Home clinic: [facility]  Primary Provider: Eros Rebolledo          Discharge Diagnosis:   1. Acute COPD Exacerbation.       2. Acute on Chronic Hypercapnic Respiratory Failure with Chronic Hypoxic Respiratory Failure.      3.  Bipolar disorder.        4.  Hypertension.        5.  Hyperlipidemia.        6.  Chronic pain.             Discharge Disposition:   Discharged to home           Allergies:   No Known Allergies             Condition on Discharge:   Discharge condition: Stable   Discharge vitals: Blood pressure 152/84, pulse 76, temperature 96.1  F (35.6  C), temperature source Axillary, resp. rate 18, height 1.702 m (5' 7\"), weight 103.7 kg (228 lb 11.2 oz), last menstrual period 12/01/2007, SpO2 97 %, not currently breastfeeding.   Code status on discharge: Full Code   Physical exam on day of discharge:   GENERAL:  Comfortable. Cooperative.  PSYCH: pleasant, oriented, No acute distress.  EYES: PERRLA, Normal conjunctiva.  HEART:  Regular rate and rhythm. No JVD. Pulses normal. No edema.  LUNGS:  Clear to auscultation, normal Respiratory effort.  ABDOMEN:  Soft, no hepatosplenomegaly, normal bowel sounds.  EXTREMETIES: No clubbing, cyanosis or ischemia  SKIN:  Dry to touch, No rash.         History of Present Illness and Hospital Course:     See detailed admission note for full details.  Karen Alex is a 62 year old female with history of COPD with chronic hypoxic respiratory failure (on 3.5 lpm of O2 at baseline), bipolar disorder, HLD, HTN, and chronic pain syndrome.  She presented to the ED on 5/8/2018 with progressive shortness of breath and was found to have acute COPD exacerbation.  She was " treated with steroids and nebs with gradual improvement.  She will discharge home today on an 11 day Prednisone taper.            Procedures / Imaging:   None           Consultations:   No consultations were requested during this admission             Pending Results:   None           Discharge Instructions and Follow-Up:   Discharge diet: Regular   Discharge activity: Activity as tolerated   Discharge follow-up: Follow up with primary care provider in 1-2 weeks   Outpatient therapy: None    Other instructions: None             Discharge Medications:   Current Discharge Medication List      CONTINUE these medications which have CHANGED    Details   albuterol (2.5 MG/3ML) 0.083% neb solution Take 1 vial (2.5 mg) by nebulization 4 times daily as needed  Qty: 120 vial, Refills: 5    Associated Diagnoses: COPD exacerbation (H)      predniSONE (DELTASONE) 10 MG tablet By mouth, take 6 tabs daily x 2 days, then 4 tabs daily x 3 days, then 2 tabs daily x 3 days, then 1 tab daily x 3 days, then stop.  Qty: 33 tablet, Refills: 0    Associated Diagnoses: COPD exacerbation (H)         CONTINUE these medications which have NOT CHANGED    Details   ARIPiprazole (ABILIFY) 5 MG tablet Take 5 mg by mouth daily      ASPIRIN PO Take 81 mg by mouth daily      atorvastatin (LIPITOR) 40 MG tablet Take 1 tablet (40 mg) by mouth daily  Qty: 90 tablet, Refills: 3    Associated Diagnoses: Coronary artery disease of native heart with stable angina pectoris, unspecified vessel or lesion type (H)      buPROPion (WELLBUTRIN XL) 300 MG 24 hr tablet Take 1 tablet (300 mg) by mouth every morning  Qty: 90 tablet, Refills: 1    Associated Diagnoses: Major depressive disorder, recurrent episode, in partial remission (H)      Cholecalciferol (VITAMIN D3) 2000 UNITS CHEW Take 2,000 mg by mouth daily       diazepam (VALIUM) 10 MG tablet Take 1 tablet (10 mg) by mouth every 12 hours as needed for anxiety  Qty: 60 tablet, Refills: 1    Associated  Diagnoses: HUNG (generalized anxiety disorder)      Docusate Calcium (STOOL SOFTENER PO) Take 100 mg by mouth daily       DULOXETINE HCL PO Take 90 mg by mouth daily      gabapentin (NEURONTIN) 300 MG capsule Take 1 capsule (300 mg) by mouth At Bedtime  Qty: 90 capsule, Refills: 1    Associated Diagnoses: Recurrent major depressive disorder, in partial remission (H)      HYDROcodone-acetaminophen (NORCO) 5-325 MG per tablet Take one pill  daily as needed, not to be taken with diazepam  Qty: 30 tablet, Refills: 0    Associated Diagnoses: Mechanical low back pain; Other chronic pain      isosorbide mononitrate (IMDUR) 30 MG 24 hr tablet Take 1 tablet (30 mg) by mouth daily  Qty: 90 tablet, Refills: 0    Comments: Additional refills may be requested at annual office visit on 3/5/18. Thank you.  Associated Diagnoses: Chest pain; CAD (coronary artery disease)      MELATONIN PO Take 5 mg by mouth At Bedtime      !! QUEtiapine (SEROQUEL) 200 MG tablet Take 1 tablet (200 mg) by mouth At Bedtime  Qty: 90 tablet, Refills: 1    Associated Diagnoses: HUNG (generalized anxiety disorder)      tiotropium (SPIRIVA) 18 MCG capsule Inhale 1 capsule (18 mcg) into the lungs daily  Qty: 90 capsule, Refills: 2    Associated Diagnoses: Generalized anxiety disorder; Chronic obstructive pulmonary disease, unspecified COPD type (H)      VENTOLIN  (90 BASE) MCG/ACT Inhaler SHAKE WELL AND INHALE 1 TO 2 PUFFS INTO THE LUNGS EVERY 4 HOURS AS NEEDED FOR SHORTNESS OF BREATH, DIFFICULTY BREATHING OR WHEEZING.  Qty: 18 g, Refills: 3    Associated Diagnoses: Panlobular emphysema (H)      nitroGLYcerin (NITROSTAT) 0.4 MG sublingual tablet Place 1 tablet (0.4 mg) under the tongue every 5 minutes as needed for chest pain prn  Qty: 25 tablet, Refills: 1    Associated Diagnoses: Chest pain      !! order for DME Equipment being ordered: Walker Wheels () and Walker ()  Treatment Diagnosis: Gait instability  Qty: 1 each, Refills: 0     Associated Diagnoses: Chronic obstructive pulmonary disease with acute exacerbation (H)      !! order for DME Equipment being ordered: Walker Wheels ()  Treatment Diagnosis: severe COPD  Qty: 1 Units, Refills: 0    Associated Diagnoses: Chronic obstructive pulmonary disease with acute exacerbation (H)      !! order for DME Equipment being ordered: Walker ()  Treatment Diagnosis: severe COPD  Qty: 1 Units, Refills: 0    Associated Diagnoses: Chronic obstructive pulmonary disease with acute exacerbation (H)      !! QUETIAPINE FUMARATE PO Take 50 mg by mouth every morning       !! - Potential duplicate medications found. Please discuss with provider.             Total time spent in face to face contact with the patient and coordinating discharge was:  25 Minutes     English

## 2019-09-28 NOTE — TELEPHONE ENCOUNTER
"Requested Prescriptions   Pending Prescriptions Disp Refills     VENTOLIN  (90 Base) MCG/ACT Inhaler [Pharmacy Med Name: VENTOLIN HFA 108MCG/ACT AERS] 18 g 3     Sig: SHAKE WELL AND INHALE 1 TO 2 PUFFS INTO THE LUNGS EVERY 4 HOURS AS NEEDED FOR SHORTNESS OF BREATH, DIFFICULTY BREATHING OR WHEEZING.    Asthma Maintenance Inhalers - Anticholinergics Failed    7/18/2018 10:02 AM       Failed - Asthma control assessment score within normal limits in last 6 months    Please review ACT score.          Passed - Patient is age 12 years or older       Passed - Recent (6 mo) or future (30 days) visit within the authorizing provider's specialty    Patient had office visit in the last 6 months or has a visit in the next 30 days with authorizing provider or within the authorizing provider's specialty.  See \"Patient Info\" tab in inbasket, or \"Choose Columns\" in Meds & Orders section of the refill encounter.            Prescription approved per [facility] Refill Protocol.    Ally THAKKAR RN, BSN, PHN  [place] [facility] RN    " anaphylactic reaction- got meds, epi pen in the ED- markedly improved- feels much better- now- still with some swelling to post pharynx- airway intact - d/w ENT at 11:15, observe in ED - px has an EPI pen at home

## 2020-01-07 NOTE — PROGRESS NOTES
Discharge Planner   Discharge Plans in progress: home with home oxygen and resumption of home care services.  Barriers to discharge plan: improved breathing  Follow up plan: PCP follow up appointment Friday 6/15/18 Dr. Zimmer at  [facility] clinic 11:15am.       Entered by: Gerladine Gambucci 06/06/2018 3:52 PM          Fluconazole Counseling:  Patient counseled regarding adverse effects of fluconazole including but not limited to headache, diarrhea, nausea, upset stomach, liver function test abnormalities, taste disturbance, and stomach pain.  There is a rare possibility of liver failure that can occur when taking fluconazole.  The patient understands that monitoring of LFTs and kidney function test may be required, especially at baseline. The patient verbalized understanding of the proper use and possible adverse effects of fluconazole.  All of the patient's questions and concerns were addressed.

## 2020-11-06 NOTE — PROGRESS NOTES
Care Transition Initial Assessment - RN        Met with: Patient.  DATA   Active Problems:    Acute exacerbation of chronic obstructive pulmonary disease (H)       Cognitive Status: awake, alert and oriented.        Contact information and PCP information verified: Yes  Lives With: Son and her sister  Living Arrangements: apartment         Insurance concerns: Pt does not have a secondary insurance  ASSESSMENT  Patient currently receives the following services: No services       Identified issues/concerns regarding health management: Pt is a poor historian.  She does not know her medications.  She is on home O2 through [facility] at 4-5L/NC.  Pt has a home nebulizer and reports she uses this as Dr Rebolledo had directed her.  Pt drives.  Her sister that lives with her does not drive and appears very frail.    PLAN  Financial costs for the patient include: No secondary, little assets. Encouraged her to go online to check if she would qualify for assistance.  Patient given options and choices for discharge:yes chose [facility]  Patient/family is agreeable to the plan?  Yes: Home with RN/PT/OT   Patient anticipates needs for home equipment: Yes, walker     Discharge Planner   Discharge Plans in progress: yes  Appointments:PCP 4/2/18 at 2:30pm    Care  (CTS) will continue to follow as needed.     stretcher

## 2022-11-09 NOTE — LETTER
Key Recommendations:  Pt admitted for COPD exacerbation with hypercapnia resp failure. Met with pt at bedside and discussed hypercapnia and the results of.  Pt reports that she is sleepy all the time and at times when this ha[ppends she will jsut turn up her 02.  She has never had a sleep study.  Please Follow up with this pt for on going chronic illness management knowledge deficient in this area was identified.  Pt is open to [facility] RN - handoff to be provided to them as well for recommended gaps in pt knowledge as how hypercapnia is related to the lethargy and being sleeping all the time and why turning up her o2 is not a good idea in a copd pt.     Chula Daugherty    AVS/Discharge Summary is the source of truth; this is a helpful guide for improved communication of patient story           Bi-Rhombic Flap Text: The defect edges were debeveled with a #15 scalpel blade.  Given the location of the defect and the proximity to free margins a bi-rhombic flap was deemed most appropriate.  Using a sterile surgical marker, an appropriate rhombic flap was drawn incorporating the defect. The area thus outlined was incised deep to adipose tissue with a #15 scalpel blade.  The skin margins were undermined to an appropriate distance in all directions utilizing iris scissors.

## 2023-11-10 NOTE — PLAN OF CARE
Problem: Patient Care Overview  Goal: Plan of Care/Patient Progress Review  Outcome: Improving  Outcome: Improving  ICU End of Shift Summary.  For vital signs and complete assessments, please see documentation flowsheets.     Pertinent assessments: A&Ox4. VSS, Afebrile. Denies pain. GARCIA with activity and increase Heart Rate. Takes few minutes to recover. LS: dim  +BS. Tele NSR. Up with SBA to bedside commode. Trace edema to bilateral BLE.   Major Shift Events: Rested most of the shift. no major complains  Plan (Upcoming Events): continue pulmonary toileting, lasix, steroids, neds,diuresis, encourage IS, CDB  Discharge/Transfer Needs: Transfer to medical floor.     Bedside Shift Report Completed : yes  Bedside Safety Check Completed: yes       Addendum: pt transferred to room 342 at 0630 am. All pts belongings were transported with her   No none

## 2024-03-27 NOTE — TELEPHONE ENCOUNTER
Client called requesting information about transportation.  She was given info on [facility]- [phone].   See other encounter